# Patient Record
Sex: FEMALE | Race: WHITE | NOT HISPANIC OR LATINO | Employment: OTHER | URBAN - METROPOLITAN AREA
[De-identification: names, ages, dates, MRNs, and addresses within clinical notes are randomized per-mention and may not be internally consistent; named-entity substitution may affect disease eponyms.]

---

## 2017-01-11 DIAGNOSIS — F90.0 ADHD (ATTENTION DEFICIT HYPERACTIVITY DISORDER), INATTENTIVE TYPE: ICD-10-CM

## 2017-01-11 NOTE — TELEPHONE ENCOUNTER
----- Message from Maggie Jason sent at 1/11/2017 10:41 AM CST -----  Contact: [phone] / self   REFILLS:     Patient is requesting a medication refill.     RX name: dextroamphetamine-amphetamine (ADDERALL) 20 mg tablet    Please advise.

## 2017-01-12 RX ORDER — DEXTROAMPHETAMINE SACCHARATE, AMPHETAMINE ASPARTATE, DEXTROAMPHETAMINE SULFATE AND AMPHETAMINE SULFATE 5; 5; 5; 5 MG/1; MG/1; MG/1; MG/1
1 TABLET ORAL 2 TIMES DAILY
Qty: 60 TABLET | Refills: 0 | Status: SHIPPED | OUTPATIENT
Start: 2017-01-12 | End: 2017-02-08 | Stop reason: SDUPTHER

## 2017-02-07 NOTE — TELEPHONE ENCOUNTER
----- Message from Hodan King sent at 2/7/2017  1:49 PM CST -----  Contact: [phone]/ self   Pt requesting a refill on rx ADDERALL  . Please advise

## 2017-02-07 NOTE — TELEPHONE ENCOUNTER
----- Message from Hodan King sent at 2/7/2017  1:49 PM CST -----  Contact: [phone]/ self   Pt requesting a refill on rx ADDERALL  . Please advise

## 2017-02-08 ENCOUNTER — OFFICE VISIT (OUTPATIENT)
Dept: FAMILY MEDICINE | Facility: CLINIC | Age: 62
End: 2017-02-08
Payer: COMMERCIAL

## 2017-02-08 VITALS
DIASTOLIC BLOOD PRESSURE: 70 MMHG | SYSTOLIC BLOOD PRESSURE: 132 MMHG | HEIGHT: 66 IN | BODY MASS INDEX: 24.77 KG/M2 | WEIGHT: 154.13 LBS | TEMPERATURE: 98 F | HEART RATE: 99 BPM | OXYGEN SATURATION: 99 %

## 2017-02-08 DIAGNOSIS — F90.0 ADHD (ATTENTION DEFICIT HYPERACTIVITY DISORDER), INATTENTIVE TYPE: Primary | ICD-10-CM

## 2017-02-08 PROCEDURE — 99213 OFFICE O/P EST LOW 20 MIN: CPT | Mod: S$GLB,,, | Performed by: NURSE PRACTITIONER

## 2017-02-08 PROCEDURE — 99999 PR PBB SHADOW E&M-EST. PATIENT-LVL IV: CPT | Mod: PBBFAC,,, | Performed by: NURSE PRACTITIONER

## 2017-02-08 PROCEDURE — 3075F SYST BP GE 130 - 139MM HG: CPT | Mod: S$GLB,,, | Performed by: NURSE PRACTITIONER

## 2017-02-08 PROCEDURE — 3078F DIAST BP <80 MM HG: CPT | Mod: S$GLB,,, | Performed by: NURSE PRACTITIONER

## 2017-02-08 RX ORDER — DEXTROAMPHETAMINE SACCHARATE, AMPHETAMINE ASPARTATE, DEXTROAMPHETAMINE SULFATE AND AMPHETAMINE SULFATE 5; 5; 5; 5 MG/1; MG/1; MG/1; MG/1
1 TABLET ORAL 2 TIMES DAILY
Qty: 60 TABLET | Refills: 0 | Status: SHIPPED | OUTPATIENT
Start: 2017-02-10 | End: 2017-03-13 | Stop reason: SDUPTHER

## 2017-02-08 NOTE — MR AVS SNAPSHOT
[facility]  [address]  [place] [address]  Phone: [phone]  Fax: [phone]                  Madalyn Iverson   2017 11:00 AM   Office Visit    Description:  Female : 1955   Provider:  Ruth Jaime NP   Department:  [facility]           Reason for Visit     Medication Refill           Diagnoses this Visit        Comments    ADHD (attention deficit hyperactivity disorder), inattentive type    -  Primary            To Do List           Goals (5 Years of Data)     None      Follow-Up and Disposition     Return in about 3 months (around 2017) for fasting labs and full wellness exam.    Follow-up and Disposition History       These Medications        Disp Refills Start End    dextroamphetamine-amphetamine (ADDERALL) 20 mg tablet 60 tablet 0 2/10/2017     Take 1 tablet by mouth 2 (two) times daily. - Oral    Pharmacy: [facility]/pharmacy #[id number] - [place] - [address] [facility] #: [phone]         [facility] On Call     [facility] On Call Nurse Care Line -  Assistance  Registered nurses in the [facility] On Call Center provide clinical advisement, health education, appointment booking, and other advisory services.  Call for this free service at [phone].             Medications                Verify that the below list of medications is an accurate representation of the medications you are currently taking.  If none reported, the list may be blank. If incorrect, please contact your healthcare provider. Carry this list with you in case of emergency.           Current Medications     alprazolam (XANAX) 0.5 MG tablet Take 0.5 mg by mouth every 8 (eight) hours as needed.    amlodipine (NORVASC) 10 MG tablet Take 1 tablet (10 mg total) by mouth once daily.    ARMOUR THYROID 60 mg Tab Take 60 mg by mouth once daily.    carisoprodol (SOMA) 350 MG tablet Take 350 mg by mouth 4 (four) times daily as needed for Muscle spasms.    citalopram (CELEXA) 20  "MG tablet Take 20 mg by mouth once daily.    CYANOCOBALAMIN, VITAMIN B-12, (VITAMIN B-12 INJ) Inject 1,000 mcg as directed every 30 days.    dextroamphetamine-amphetamine (ADDERALL) 20 mg tablet Starting on Feb 10, 2017. Take 1 tablet by mouth 2 (two) times daily.    ergocalciferol (ERGOCALCIFEROL) 50,000 unit Cap Take 1 capsule (50,000 Units total) by mouth every 7 days.    estradiol (ESTRACE) 2 MG tablet Take 2 mg by mouth once daily.    furosemide (LASIX) 40 MG tablet TAKE 1 TABLET (40 MG TOTAL) BY MOUTH ONCE DAILY.    hydrocodone-acetaminophen 10-325mg (NORCO)  mg Tab Take 1 tablet by mouth 4 (four) times daily as needed.    KLOR-CON SPRINKLE 10 mEq CpSR TAKE ONE CAPSULE BY MOUTH TWICE A DAY    metoprolol succinate (TOPROL-XL) 50 MG 24 hr tablet TAKE 1 TABLET BY MOUTH EVERY DAY           Clinical Reference Information           Your Vitals Were     BP Pulse Temp Height Weight SpO2    132/70 (BP Location: Left arm, Patient Position: Sitting, BP Method: Manual) 99 98.2 °F (36.8 °C) (Oral) 5' 6" (1.676 m) 69.9 kg (154 lb 1.6 oz) 99%    BMI                24.87 kg/m2          Blood Pressure          Most Recent Value    BP  132/70      Allergies as of 2/8/2017     No Known Allergies      Immunizations Administered on Date of Encounter - 2/8/2017     None      Language Assistance Services     ATTENTION: Language assistance services are available, free of charge. Please call [phone].      ATENCIÓN: Si homer jose, tiene a real disposición servicios gratuitos de asistencia lingüística. Llame al [phone].     [facility] Ý: N?u b?n nói Ti?ng Vi?t, có các d?ch v? h? tr? ngôn ng? mi?n phí dành cho b?n. G?i s? [phone].         [facility] complies with applicable Federal civil rights laws and does not discriminate on the basis of race, color, national origin, age, disability, or sex.        "

## 2017-02-08 NOTE — PROGRESS NOTES
Subjective:       Patient ID: Madalyn Iverson is a 62 y.o. female.    Chief Complaint: Medication Refill    HPI Comments: Patient has ADHD - takes Adderall 20 mg twice daily - able to focus and complete tasks.  Vital signs stable.      Previous Medications    ALPRAZOLAM (XANAX) 0.5 MG TABLET    Take 0.5 mg by mouth every 8 (eight) hours as needed.    AMLODIPINE (NORVASC) 10 MG TABLET    Take 1 tablet (10 mg total) by mouth once daily.    ARMOUR THYROID 60 MG TAB    Take 60 mg by mouth once daily.    CARISOPRODOL (SOMA) 350 MG TABLET    Take 350 mg by mouth 4 (four) times daily as needed for Muscle spasms.    CITALOPRAM (CELEXA) 20 MG TABLET    Take 20 mg by mouth once daily.    CYANOCOBALAMIN, VITAMIN B-12, (VITAMIN B-12 INJ)    Inject 1,000 mcg as directed every 30 days.    DEXTROAMPHETAMINE-AMPHETAMINE (ADDERALL) 20 MG TABLET    Take 1 tablet by mouth 2 (two) times daily.    ERGOCALCIFEROL (ERGOCALCIFEROL) 50,000 UNIT CAP    Take 1 capsule (50,000 Units total) by mouth every 7 days.    ESTRADIOL (ESTRACE) 2 MG TABLET    Take 2 mg by mouth once daily.    FUROSEMIDE (LASIX) 40 MG TABLET    TAKE 1 TABLET (40 MG TOTAL) BY MOUTH ONCE DAILY.    HYDROCODONE-ACETAMINOPHEN 10-325MG (NORCO)  MG TAB    Take 1 tablet by mouth 4 (four) times daily as needed.    KLOR-CON SPRINKLE 10 MEQ CPSR    TAKE ONE CAPSULE BY MOUTH TWICE A DAY    METOPROLOL SUCCINATE (TOPROL-XL) 50 MG 24 HR TABLET    TAKE 1 TABLET BY MOUTH EVERY DAY       Past Medical History   Diagnosis Date    ADHD (attention deficit hyperactivity disorder), inattentive type     Crohn's colitis     Fibromyalgia      treated by Dr. Thorpe - Rheumatologist    Hyperlipidemia      High triglycerides    Hypertension     Hypothyroidism      Treated by Dr. Mathew    IFG (impaired fasting glucose)     Migraine      Treated by neurologist - Dr. Destiny Florez    Ulcerative colitis      Treated by Dr. Mcfadden    Vitamin D deficiency 3/31/2016        Past Surgical History   Procedure Laterality Date     section      Hysterectomy      Tonsillectomy      Shoulder surgery Left     Colonoscopy  10/08/2010     Dr. Lee - repeat in 5 years - has appointment for colonoscopy 2016    Upper and lower gi  2016    Colonoscopy  2016     Dr. Kelly - normal  colon - repeat in 10 years - scanned report to media file.       Family History   Problem Relation Age of Onset    Hypertension Mother     Heart disease Mother     Hyperlipidemia Mother     Cancer Sister 53     thyroid cancer and lymph nodes involvement    Cancer Brother 61     colon and lung cancer    Cancer Brother 63     colon    Hypertension Brother     Diabetes Brother        Social History     Social History    Marital status:      Spouse name: N/A    Number of children: N/A    Years of education: N/A     Social History Main Topics    Smoking status: Former Smoker     Packs/day: 1.00     Years: 20.00     Quit date: 1997    Smokeless tobacco: None    Alcohol use No    Drug use: No    Sexual activity: No     Other Topics Concern    None     Social History Narrative       Review of Systems   Constitutional: Negative for appetite change, chills, fatigue, fever and unexpected weight change.   HENT: Negative for congestion, ear pain, mouth sores, nosebleeds, postnasal drip, rhinorrhea, sinus pressure, sneezing, sore throat, trouble swallowing and voice change.    Eyes: Negative for photophobia, pain, discharge, redness, itching and visual disturbance.   Respiratory: Negative for cough, chest tightness and shortness of breath.    Cardiovascular: Negative for chest pain, palpitations and leg swelling.   Gastrointestinal: Negative for abdominal pain, blood in stool, constipation, diarrhea, nausea and vomiting.   Genitourinary: Negative for dysuria, frequency, hematuria and urgency.   Musculoskeletal: Negative for arthralgias, back pain, joint swelling and  "myalgias.   Skin: Negative for color change and rash.   Allergic/Immunologic: Negative for immunocompromised state.   Neurological: Negative for dizziness, seizures, syncope, weakness and headaches.   Hematological: Negative for adenopathy. Does not bruise/bleed easily.   Psychiatric/Behavioral: Negative for agitation, dysphoric mood, sleep disturbance and suicidal ideas. The patient is not nervous/anxious.          Objective:     Vitals:    02/08/17 1106   BP: 132/70   BP Location: Left arm   Patient Position: Sitting   BP Method: Manual   Pulse: 99   Temp: 98.2 °F (36.8 °C)   TempSrc: Oral   SpO2: 99%   Weight: 69.9 kg (154 lb 1.6 oz)   Height: 5' 6" (1.676 m)          Physical Exam   Constitutional: She is oriented to person, place, and time. She appears well-developed and well-nourished. No distress.   Body mass index is 24.87 kg/(m^2).           HENT:   Head: Normocephalic and atraumatic.   Right Ear: External ear normal.   Left Ear: External ear normal.   Nose: Nose normal.   Mouth/Throat: Oropharynx is clear and moist. No oropharyngeal exudate.   Eyes: Conjunctivae and EOM are normal. Pupils are equal, round, and reactive to light.   Neck: Normal range of motion. Neck supple. No JVD present. No tracheal deviation present. No thyromegaly present.   Cardiovascular: Normal rate, regular rhythm and normal heart sounds.    No murmur heard.  Pulmonary/Chest: Effort normal and breath sounds normal. No stridor. No respiratory distress. She has no wheezes. She has no rales.   Abdominal: Soft. Bowel sounds are normal. She exhibits no distension. There is no guarding.   Musculoskeletal: Normal range of motion. She exhibits no edema.   Lymphadenopathy:     She has no cervical adenopathy.   Neurological: She is alert and oriented to person, place, and time. No cranial nerve deficit. Coordination normal.   Skin: Skin is warm and dry. No rash noted. She is not diaphoretic.   Psychiatric: She has a normal mood and affect.    "      Assessment:         ICD-10-CM ICD-9-CM   1. ADHD (attention deficit hyperactivity disorder), inattentive type F90.0 314.00       Plan:       ADHD (attention deficit hyperactivity disorder), inattentive type  -  Controlled on present medication.  Will call or send message for next refill.  Follow up in 3 months.  -     dextroamphetamine-amphetamine (ADDERALL) 20 mg tablet; Take 1 tablet by mouth 2 (two) times daily.  Dispense: 60 tablet; Refill: 0    Return in about 3 months (around 5/8/2017).     Patient's Medications   New Prescriptions    No medications on file   Previous Medications    ALPRAZOLAM (XANAX) 0.5 MG TABLET    Take 0.5 mg by mouth every 8 (eight) hours as needed.    AMLODIPINE (NORVASC) 10 MG TABLET    Take 1 tablet (10 mg total) by mouth once daily.    ARMOUR THYROID 60 MG TAB    Take 60 mg by mouth once daily.    CARISOPRODOL (SOMA) 350 MG TABLET    Take 350 mg by mouth 4 (four) times daily as needed for Muscle spasms.    CITALOPRAM (CELEXA) 20 MG TABLET    Take 20 mg by mouth once daily.    CYANOCOBALAMIN, VITAMIN B-12, (VITAMIN B-12 INJ)    Inject 1,000 mcg as directed every 30 days.    ERGOCALCIFEROL (ERGOCALCIFEROL) 50,000 UNIT CAP    Take 1 capsule (50,000 Units total) by mouth every 7 days.    ESTRADIOL (ESTRACE) 2 MG TABLET    Take 2 mg by mouth once daily.    FUROSEMIDE (LASIX) 40 MG TABLET    TAKE 1 TABLET (40 MG TOTAL) BY MOUTH ONCE DAILY.    HYDROCODONE-ACETAMINOPHEN 10-325MG (NORCO)  MG TAB    Take 1 tablet by mouth 4 (four) times daily as needed.    KLOR-CON SPRINKLE 10 MEQ CPSR    TAKE ONE CAPSULE BY MOUTH TWICE A DAY    METOPROLOL SUCCINATE (TOPROL-XL) 50 MG 24 HR TABLET    TAKE 1 TABLET BY MOUTH EVERY DAY   Modified Medications    Modified Medication Previous Medication    DEXTROAMPHETAMINE-AMPHETAMINE (ADDERALL) 20 MG TABLET dextroamphetamine-amphetamine (ADDERALL) 20 mg tablet       Take 1 tablet by mouth 2 (two) times daily.    Take 1 tablet by mouth 2 (two) times daily.    Discontinued Medications    No medications on file

## 2017-03-13 DIAGNOSIS — F90.0 ADHD (ATTENTION DEFICIT HYPERACTIVITY DISORDER), INATTENTIVE TYPE: ICD-10-CM

## 2017-03-13 NOTE — TELEPHONE ENCOUNTER
Spoke with pt inform pt that you are out til Wednesday pt needs refill on medication states she only has one pill left, pt understood.

## 2017-03-13 NOTE — TELEPHONE ENCOUNTER
----- Message from Elizabet Barrett sent at 3/13/2017  4:13 PM CDT -----  Contact: Self [phone]  Patient is calling to get a written RX on her medication  dextroamphetamine-amphetamine (ADDERALL) 20 mg tablet 60 tablet

## 2017-03-15 RX ORDER — DEXTROAMPHETAMINE SACCHARATE, AMPHETAMINE ASPARTATE, DEXTROAMPHETAMINE SULFATE AND AMPHETAMINE SULFATE 5; 5; 5; 5 MG/1; MG/1; MG/1; MG/1
1 TABLET ORAL 2 TIMES DAILY
Qty: 60 TABLET | Refills: 0 | Status: SHIPPED | OUTPATIENT
Start: 2017-03-15 | End: 2017-04-10 | Stop reason: SDUPTHER

## 2017-04-10 DIAGNOSIS — F90.0 ADHD (ATTENTION DEFICIT HYPERACTIVITY DISORDER), INATTENTIVE TYPE: ICD-10-CM

## 2017-04-10 RX ORDER — DEXTROAMPHETAMINE SACCHARATE, AMPHETAMINE ASPARTATE, DEXTROAMPHETAMINE SULFATE AND AMPHETAMINE SULFATE 5; 5; 5; 5 MG/1; MG/1; MG/1; MG/1
1 TABLET ORAL 2 TIMES DAILY
Qty: 60 TABLET | Refills: 0 | Status: SHIPPED | OUTPATIENT
Start: 2017-04-13 | End: 2017-06-09 | Stop reason: DRUGHIGH

## 2017-04-10 NOTE — TELEPHONE ENCOUNTER
----- Message from Hafsa Gannon sent at 4/10/2017  9:19 AM CDT -----  Contact: Self/[phone]  Patient said she needs a refill oh her dextroamphetamine-amphetamine (ADDERALL) . Please advise

## 2017-04-10 NOTE — TELEPHONE ENCOUNTER
Advise patient that she can  the prescription today but she can not fill the medication until Thursday, April 14th because her last prescription was filled March 15th.

## 2017-04-17 DIAGNOSIS — I10 ESSENTIAL HYPERTENSION: ICD-10-CM

## 2017-04-18 RX ORDER — AMLODIPINE BESYLATE 10 MG/1
TABLET ORAL
Qty: 90 TABLET | Refills: 1 | Status: SHIPPED | OUTPATIENT
Start: 2017-04-18 | End: 2017-07-12 | Stop reason: SDUPTHER

## 2017-05-11 ENCOUNTER — TELEPHONE (OUTPATIENT)
Dept: FAMILY MEDICINE | Facility: CLINIC | Age: 62
End: 2017-05-11

## 2017-05-11 DIAGNOSIS — I10 ESSENTIAL HYPERTENSION: Primary | ICD-10-CM

## 2017-05-11 DIAGNOSIS — Z13.220 SCREENING CHOLESTEROL LEVEL: ICD-10-CM

## 2017-05-11 DIAGNOSIS — E55.9 VITAMIN D DEFICIENCY: ICD-10-CM

## 2017-05-11 DIAGNOSIS — Z13.0 SCREENING, ANEMIA, DEFICIENCY, IRON: ICD-10-CM

## 2017-05-11 NOTE — TELEPHONE ENCOUNTER
Lab orders in - schedule fasting labs and office visit - needs appointment [facility] - will fill Adderall then - over 3 month bárbara

## 2017-05-11 NOTE — TELEPHONE ENCOUNTER
----- Message from Hodan King sent at 5/11/2017 10:12 AM CDT -----  Contact: [phone]  Pt its requesting a refill on rx ADDERALL. Please advise

## 2017-05-11 NOTE — TELEPHONE ENCOUNTER
----- Message from Magdalena Ray sent at 5/11/2017 10:31 AM CDT -----  Contact: [phone]/self  Patient called in returning your call. Please advise.

## 2017-05-15 ENCOUNTER — OFFICE VISIT (OUTPATIENT)
Dept: FAMILY MEDICINE | Facility: CLINIC | Age: 62
End: 2017-05-15
Payer: COMMERCIAL

## 2017-05-15 VITALS
HEART RATE: 84 BPM | HEIGHT: 66 IN | DIASTOLIC BLOOD PRESSURE: 74 MMHG | SYSTOLIC BLOOD PRESSURE: 160 MMHG | BODY MASS INDEX: 24.45 KG/M2 | WEIGHT: 152.13 LBS | TEMPERATURE: 97 F | OXYGEN SATURATION: 97 %

## 2017-05-15 DIAGNOSIS — F41.9 ANXIETY: ICD-10-CM

## 2017-05-15 DIAGNOSIS — F90.0 ADHD (ATTENTION DEFICIT HYPERACTIVITY DISORDER), INATTENTIVE TYPE: ICD-10-CM

## 2017-05-15 DIAGNOSIS — R73.01 IFG (IMPAIRED FASTING GLUCOSE): ICD-10-CM

## 2017-05-15 DIAGNOSIS — K51.919 ULCERATIVE COLITIS WITH COMPLICATION, UNSPECIFIED LOCATION: ICD-10-CM

## 2017-05-15 DIAGNOSIS — E78.5 HYPERLIPIDEMIA, UNSPECIFIED HYPERLIPIDEMIA TYPE: ICD-10-CM

## 2017-05-15 DIAGNOSIS — E55.9 VITAMIN D DEFICIENCY: ICD-10-CM

## 2017-05-15 DIAGNOSIS — E03.9 ACQUIRED HYPOTHYROIDISM: ICD-10-CM

## 2017-05-15 DIAGNOSIS — Z12.39 BREAST CANCER SCREENING: ICD-10-CM

## 2017-05-15 DIAGNOSIS — M79.7 FIBROMYALGIA: ICD-10-CM

## 2017-05-15 DIAGNOSIS — Z00.00 ANNUAL PHYSICAL EXAM: Primary | ICD-10-CM

## 2017-05-15 DIAGNOSIS — I10 ESSENTIAL HYPERTENSION: ICD-10-CM

## 2017-05-15 PROCEDURE — 3078F DIAST BP <80 MM HG: CPT | Mod: S$GLB,,, | Performed by: NURSE PRACTITIONER

## 2017-05-15 PROCEDURE — 99396 PREV VISIT EST AGE 40-64: CPT | Mod: S$GLB,,, | Performed by: NURSE PRACTITIONER

## 2017-05-15 PROCEDURE — 3077F SYST BP >= 140 MM HG: CPT | Mod: S$GLB,,, | Performed by: NURSE PRACTITIONER

## 2017-05-15 PROCEDURE — 99999 PR PBB SHADOW E&M-EST. PATIENT-LVL IV: CPT | Mod: PBBFAC,,, | Performed by: NURSE PRACTITIONER

## 2017-05-15 RX ORDER — ROSUVASTATIN CALCIUM 5 MG/1
5 TABLET, COATED ORAL DAILY
Qty: 30 TABLET | Refills: 2 | Status: SHIPPED | OUTPATIENT
Start: 2017-05-15 | End: 2017-07-12 | Stop reason: SDUPTHER

## 2017-05-15 NOTE — PROGRESS NOTES
"Subjective:       Patient ID: Madalyn Iverson is a 62 y.o. female.    Chief Complaint: Annual Exam (wellness)    HPI Comments: Patient is a 62 year old white female here today for wellness exam with fasting lab results.    Patient has Hypertension - UNCONTROLLED TODAY but is normally controlled every 3 months when I follow up on patient. Patient states she is having a lot of pain from her Fibromyalgia and her rheumatologist tried to change her medication to Savella and just left pharmacy and it was over $600 so patient was very upset and in pain with the Fibromyalgia.  Will return in a week or two to recheck blood pressure.  BP (!) 160/74  Pulse 84  Temp 97.3 °F (36.3 °C) (Oral)   Ht 5' 6" (1.676 m)  Wt 69 kg (152 lb 1.9 oz)  SpO2 97%  BMI 24.55 kg/m2    Patient has Ulcerative Colitis that is followed by Dr. Lee and Dr. Kelly.    Patient has Hypothyroidism that is followed by Dr. Mathew.    Patient has Migraines that is followed by Neurologist - dr. Florez.    Patient had mildly elevated triglycerides in the past and on lifestyle modifications only BUT her cholesterol levels are high this year - based on 10-year risk, age and HTN - will start patient on low dose cholesterol medication.    Patient had Vitamin D deficiency of 18.  Patient has been taking Vitamin D supplement and level is now 41.    Patient has IFG of 114 - advised on diet and lifestyle modifications.     Patient had ADHD - has been on Adderall for many years - she was on Adderall 30 mg twice daily years ago with Dr. Randhawa but when I started taking over the ADHD medication several years ago, I decreased patient down to 20 mg twice daily.  Advised patient that I will not prescribe medication today until blood pressure is back under control.  Also, due to age and cardiovascular risk - we really need to work on weaning down on the dose again.  Will discuss further once blood pressure controlled.    Wellness labs:  -  CBC WNL  -  CMP - " glucose high at 114, rest okay  -  Cholesterol discussed above  -  Thyroid is managed by Dr. Mathew so did not perform  -  Vitamin D is much improved at 41.      Health Maintenance:  -  Due for mammogram - order placed.        Component      Latest Ref Rng & Units 5/12/2017 8/9/2016 3/30/2016   WBC      3.90 - 12.70 K/uL 5.56  5.13   RBC      4.00 - 5.40 M/uL 4.64  3.89 (L)   Hemoglobin      12.0 - 16.0 g/dL 14.6  12.1   Hematocrit      37.0 - 48.5 % 43.1  36.0 (L)   MCV      82 - 98 fL 93  93   MCH      27.0 - 31.0 pg 31.5 (H)  31.1 (H)   MCHC      32.0 - 36.0 % 33.9  33.6   RDW      11.5 - 14.5 % 12.4  12.8   Platelets      150 - 350 K/uL 191  227   MPV      9.2 - 12.9 fL 12.0  11.6   Gran #      1.8 - 7.7 K/uL 3.9  2.4   Lymph #      1.0 - 4.8 K/uL 1.2  2.1   Mono #      0.3 - 1.0 K/uL 0.4  0.5   Eos #      0.0 - 0.5 K/uL 0.0  0.1   Baso #      0.00 - 0.20 K/uL 0.05  0.03   Gran%      38.0 - 73.0 % 69.2  46.4   Lymph%      18.0 - 48.0 % 21.6  41.3   Mono%      4.0 - 15.0 % 7.6  9.7   Eosinophil%      0.0 - 8.0 % 0.7  1.8   Basophil%      0.0 - 1.9 % 0.9  0.6   Differential Method       Automated  Automated   Sodium      136 - 145 mmol/L 142  137   Potassium      3.5 - 5.1 mmol/L 4.0  4.4   Chloride      95 - 110 mmol/L 104  99   CO2      23 - 29 mmol/L 30 (H)  28   Glucose      70 - 110 mg/dL 114 (H)  84   BUN, Bld      7 - 17 mg/dL 11  14   Creatinine      0.50 - 1.40 mg/dL 0.56  0.8   Calcium      8.7 - 10.5 mg/dL 9.0  8.9   Total Protein      6.0 - 8.4 g/dL 7.4  7.0   Albumin      3.5 - 5.2 g/dL 3.9  3.2 (L)   Total Bilirubin      0.1 - 1.0 mg/dL 0.6  0.2   Alkaline Phosphatase      38 - 126 U/L 135 (H)  151 (H)   AST      15 - 46 U/L 25  20   ALT      10 - 44 U/L 24  17   Anion Gap      8 - 16 mmol/L 8  10   eGFR if African American      >60 mL/min/1.73 m:2 >60.0  >60.0   eGFR if non African American      >60 mL/min/1.73 m:2 >60.0  >60.0   Cholesterol      120 - 199 mg/dL 222 (H)  198   Triglycerides      30  - 150 mg/dL 218 (H)  158 (H)   HDL      40 - 75 mg/dL 54  54   LDL Cholesterol      63.0 - 159.0 mg/dL 124.4  112.4   HDL/Chol Ratio      20.0 - 50.0 % 24.3  27.3   Total Cholesterol/HDL Ratio      2.0 - 5.0 4.1  3.7   Non-HDL Cholesterol      mg/dL 168  144   Hemoglobin A1C      4.5 - 6.2 %   5.6   Estimated Avg Glucose      68 - 131 mg/dL   114   Hepatitis C Ab         Negative   Vitamin B-12      210 - 950 pg/mL   583   Vit D, 25-Hydroxy      30 - 96 ng/mL 41 18 (L) 13 (L)       Previous Medications    ALPRAZOLAM (XANAX) 0.5 MG TABLET    Take 0.5 mg by mouth every 8 (eight) hours as needed.    AMLODIPINE (NORVASC) 10 MG TABLET    TAKE 1 TABLET (10 MG TOTAL) BY MOUTH ONCE DAILY.    ARMOUR THYROID 60 MG TAB    Take 60 mg by mouth once daily.    CITALOPRAM (CELEXA) 20 MG TABLET    Take 20 mg by mouth once daily.    CYANOCOBALAMIN, VITAMIN B-12, (VITAMIN B-12 INJ)    Inject 1,000 mcg as directed every 30 days.    DEXTROAMPHETAMINE-AMPHETAMINE (ADDERALL) 20 MG TABLET    Take 1 tablet by mouth 2 (two) times daily.    ERGOCALCIFEROL (ERGOCALCIFEROL) 50,000 UNIT CAP    Take 1 capsule (50,000 Units total) by mouth every 7 days.    ESTRADIOL (ESTRACE) 2 MG TABLET    Take 2 mg by mouth once daily.    FUROSEMIDE (LASIX) 40 MG TABLET    TAKE 1 TABLET (40 MG TOTAL) BY MOUTH ONCE DAILY.    HYDROCODONE-ACETAMINOPHEN 10-325MG (NORCO)  MG TAB    Take 1 tablet by mouth 4 (four) times daily as needed.    KLOR-CON SPRINKLE 10 MEQ CPSR    TAKE 1 CAPSULE TWICE A DAY    METOPROLOL SUCCINATE (TOPROL-XL) 50 MG 24 HR TABLET    TAKE 1 TABLET BY MOUTH EVERY DAY       Past Medical History:   Diagnosis Date    ADHD (attention deficit hyperactivity disorder), inattentive type     Crohn's colitis     Fibromyalgia     treated by Dr. Thorpe - Rheumatologist    Hyperlipidemia     High triglycerides    Hypertension     Hypothyroidism     Treated by Dr. Mathew    IFG (impaired fasting glucose)     Migraine     Treated by neurologist -  Dr. Destiny Florez    Ulcerative colitis     Treated by Dr. Lee and Aaron    Vitamin D deficiency 3/31/2016       Past Surgical History:   Procedure Laterality Date     SECTION      COLONOSCOPY  10/08/2010    Dr. Lee - repeat in 5 years - has appointment for colonoscopy 2016    COLONOSCOPY  2016    Dr. Kelly - normal  colon - repeat in 10 years - scanned report to media file.    HYSTERECTOMY      SHOULDER SURGERY Left     TONSILLECTOMY      upper and lower GI  2016       Family History   Problem Relation Age of Onset    Hypertension Mother     Heart disease Mother     Hyperlipidemia Mother     Cancer Sister 53     thyroid cancer and lymph nodes involvement    Cancer Brother 61     colon and lung cancer    Cancer Brother 63     colon    Hypertension Brother     Diabetes Brother        Social History     Social History    Marital status:      Spouse name: N/A    Number of children: N/A    Years of education: N/A     Social History Main Topics    Smoking status: Former Smoker     Packs/day: 1.00     Years: 20.00     Quit date: 1997    Smokeless tobacco: None    Alcohol use No    Drug use: No    Sexual activity: No     Other Topics Concern    None     Social History Narrative       Review of Systems   Constitutional: Negative for appetite change, chills, fatigue, fever and unexpected weight change.   HENT: Negative for congestion, ear pain, mouth sores, nosebleeds, postnasal drip, rhinorrhea, sinus pressure, sneezing, sore throat, trouble swallowing and voice change.    Eyes: Negative for photophobia, pain, discharge, redness, itching and visual disturbance.   Respiratory: Negative for cough, chest tightness and shortness of breath.    Cardiovascular: Negative for chest pain, palpitations and leg swelling.   Gastrointestinal: Negative for abdominal pain, blood in stool, constipation, diarrhea, nausea and vomiting.   Genitourinary: Negative for dysuria,  "frequency, hematuria and urgency.   Musculoskeletal: Positive for arthralgias and joint swelling. Negative for back pain and myalgias.        Pain to hands and joints and fibromyalgia all followed by rheumatologist.   Skin: Negative for color change and rash.   Allergic/Immunologic: Negative for immunocompromised state.   Neurological: Negative for dizziness, seizures, syncope, weakness and headaches.   Hematological: Negative for adenopathy. Does not bruise/bleed easily.   Psychiatric/Behavioral: Negative for agitation, dysphoric mood, sleep disturbance and suicidal ideas. The patient is not nervous/anxious.          Objective:     Vitals:    05/15/17 1515 05/15/17 1540   BP: (!) 174/74 (!) 160/74   BP Location: Right leg    Patient Position: Sitting    BP Method: Manual    Pulse: 84    Temp: 97.3 °F (36.3 °C)    TempSrc: Oral    SpO2: 97%    Weight: 69 kg (152 lb 1.9 oz)    Height: 5' 6" (1.676 m)           Physical Exam   Constitutional: She is oriented to person, place, and time. She appears well-developed and well-nourished. No distress.   Body mass index is 24.55 kg/(m^2).             HENT:   Head: Normocephalic and atraumatic.   Right Ear: External ear normal.   Left Ear: External ear normal.   Nose: Nose normal.   Mouth/Throat: Oropharynx is clear and moist. No oropharyngeal exudate.   Eyes: Conjunctivae and EOM are normal. Pupils are equal, round, and reactive to light.   Neck: Normal range of motion. Neck supple. No JVD present. No tracheal deviation present. No thyromegaly present.   Cardiovascular: Normal rate, regular rhythm and normal heart sounds.    No murmur heard.  Pulmonary/Chest: Effort normal and breath sounds normal. No stridor. No respiratory distress. She has no wheezes. She has no rales.   Abdominal: Soft. Bowel sounds are normal. She exhibits no distension. There is no guarding.   Musculoskeletal: Normal range of motion. She exhibits no edema.   Lymphadenopathy:     She has no cervical " adenopathy.   Neurological: She is alert and oriented to person, place, and time. No cranial nerve deficit. Coordination normal.   Skin: Skin is warm and dry. No rash noted. She is not diaphoretic.   Psychiatric: She has a normal mood and affect.         Assessment:         ICD-10-CM ICD-9-CM   1. Annual physical exam Z00.00 V70.0   2. Essential hypertension I10 401.9   3. Vitamin D deficiency E55.9 268.9   4. ADHD (attention deficit hyperactivity disorder), inattentive type F90.0 314.00   5. Fibromyalgia M79.7 729.1   6. IFG (impaired fasting glucose) R73.01 790.21   7. Acquired hypothyroidism E03.9 244.9   8. Anxiety F41.9 300.00   9. Ulcerative colitis with complication, unspecified location K51.919 556.9   10. Breast cancer screening Z12.39 V76.10   11. Hyperlipidemia, unspecified hyperlipidemia type E78.5 272.4       Plan:       Annual physical exam  -  Due for Tdap and Shingles - gave order to have done at pharmacy    Essential hypertension  -  Patient has Hypertension - UNCONTROLLED TODAY but is normally controlled every 3 months when I follow up on patient. Patient states she is having a lot of pain from her Fibromyalgia and her rheumatologist tried to change her medication to Savella and just left pharmacy and it was over $600 so patient was very upset and in pain with the Fibromyalgia.  Will return in a week or two to recheck blood pressure.      Vitamin D deficiency  -  Continue Vitamin D supplement but improved and now in normal range.    ADHD (attention deficit hyperactivity disorder), inattentive type  -  Patient had ADHD - has been on Adderall for many years - she was on Adderall 30 mg twice daily years ago with Dr. Randhawa but when I started taking over the ADHD medication several years ago, I decreased patient down to 20 mg twice daily.  Advised patient that I will not prescribe medication today until blood pressure is back under control.  Also, due to age and cardiovascular risk - we really need to  work on weaning down on the dose again.  Will discuss further once blood pressure controlled.      Fibromyalgia  -  Managed by specialist    IFG (impaired fasting glucose)  -  Advised on lifestyle modifications.    Acquired hypothyroidism  -  Followed by Dr. Mathew    Anxiety  -  Controlled on present medication.    Ulcerative colitis with complication, unspecified location  -  Followed by specialist.    Breast cancer screening  -     Mammo Digital Screening Bilat with CAD; Future; Expected date: 5/15/17    Hyperlipidemia, unspecified hyperlipidemia type  -  Patient had mildly elevated triglycerides in the past and on lifestyle modifications only BUT her cholesterol levels are high this year - based on 10-year risk, age and HTN - will start patient on low dose cholesterol medication.  -  Start Crestor 5 mg daily and recheck in 3 months.    -     rosuvastatin (CRESTOR) 5 MG tablet; Take 1 tablet (5 mg total) by mouth once daily.  Dispense: 30 tablet; Refill: 2  -     Comprehensive metabolic panel; Future; Expected date: 5/15/17  -     Lipid panel; Future; Expected date: 5/15/17    Return in about 2 weeks (around 5/29/2017) for blood pressure check.     Patient's Medications   New Prescriptions    ROSUVASTATIN (CRESTOR) 5 MG TABLET    Take 1 tablet (5 mg total) by mouth once daily.   Previous Medications    ALPRAZOLAM (XANAX) 0.5 MG TABLET    Take 0.5 mg by mouth every 8 (eight) hours as needed.    AMLODIPINE (NORVASC) 10 MG TABLET    TAKE 1 TABLET (10 MG TOTAL) BY MOUTH ONCE DAILY.    ARMOUR THYROID 60 MG TAB    Take 60 mg by mouth once daily.    CITALOPRAM (CELEXA) 20 MG TABLET    Take 20 mg by mouth once daily.    CYANOCOBALAMIN, VITAMIN B-12, (VITAMIN B-12 INJ)    Inject 1,000 mcg as directed every 30 days.    DEXTROAMPHETAMINE-AMPHETAMINE (ADDERALL) 20 MG TABLET    Take 1 tablet by mouth 2 (two) times daily.    ERGOCALCIFEROL (ERGOCALCIFEROL) 50,000 UNIT CAP    Take 1 capsule (50,000 Units total) by mouth every  7 days.    ESTRADIOL (ESTRACE) 2 MG TABLET    Take 2 mg by mouth once daily.    FUROSEMIDE (LASIX) 40 MG TABLET    TAKE 1 TABLET (40 MG TOTAL) BY MOUTH ONCE DAILY.    HYDROCODONE-ACETAMINOPHEN 10-325MG (NORCO)  MG TAB    Take 1 tablet by mouth 4 (four) times daily as needed.    KLOR-CON SPRINKLE 10 MEQ CPSR    TAKE 1 CAPSULE TWICE A DAY    METOPROLOL SUCCINATE (TOPROL-XL) 50 MG 24 HR TABLET    TAKE 1 TABLET BY MOUTH EVERY DAY   Modified Medications    No medications on file   Discontinued Medications    CARISOPRODOL (SOMA) 350 MG TABLET    Take 350 mg by mouth 4 (four) times daily as needed for Muscle spasms.

## 2017-05-17 RX ORDER — METOPROLOL SUCCINATE 50 MG/1
TABLET, EXTENDED RELEASE ORAL
Qty: 90 TABLET | Refills: 1 | Status: SHIPPED | OUTPATIENT
Start: 2017-05-17 | End: 2017-10-10 | Stop reason: SDUPTHER

## 2017-05-31 DIAGNOSIS — F90.0 ADHD (ATTENTION DEFICIT HYPERACTIVITY DISORDER), INATTENTIVE TYPE: ICD-10-CM

## 2017-05-31 RX ORDER — DEXTROAMPHETAMINE SACCHARATE, AMPHETAMINE ASPARTATE, DEXTROAMPHETAMINE SULFATE AND AMPHETAMINE SULFATE 5; 5; 5; 5 MG/1; MG/1; MG/1; MG/1
1 TABLET ORAL 2 TIMES DAILY
Qty: 60 TABLET | Refills: 0 | OUTPATIENT
Start: 2017-05-31

## 2017-05-31 NOTE — TELEPHONE ENCOUNTER
Pt requesting medication refill Adderall, inform pt she was to return this week to recheck pt blood pressure, no answer left detail message.

## 2017-05-31 NOTE — TELEPHONE ENCOUNTER
----- Message from Viri Loomis sent at 5/31/2017  1:32 PM CDT -----  Contact: [phone]/self  Pt would like to know if she can get a refill for dextroamphetamine-amphetamine (ADDERALL) 20 mg tablet OR do she have to come in for a office visit.  Please advise

## 2017-05-31 NOTE — TELEPHONE ENCOUNTER
Advised patient she must come in and have stable blood pressure prior to Adderall being prescribed.

## 2017-06-01 ENCOUNTER — OFFICE VISIT (OUTPATIENT)
Dept: FAMILY MEDICINE | Facility: CLINIC | Age: 62
End: 2017-06-01
Payer: COMMERCIAL

## 2017-06-01 VITALS
SYSTOLIC BLOOD PRESSURE: 168 MMHG | BODY MASS INDEX: 24.8 KG/M2 | OXYGEN SATURATION: 98 % | HEART RATE: 109 BPM | HEIGHT: 66 IN | WEIGHT: 154.31 LBS | DIASTOLIC BLOOD PRESSURE: 70 MMHG | TEMPERATURE: 98 F

## 2017-06-01 DIAGNOSIS — I10 ESSENTIAL HYPERTENSION: Primary | ICD-10-CM

## 2017-06-01 PROCEDURE — 99999 PR PBB SHADOW E&M-EST. PATIENT-LVL V: CPT | Mod: PBBFAC,,, | Performed by: NURSE PRACTITIONER

## 2017-06-01 PROCEDURE — 99213 OFFICE O/P EST LOW 20 MIN: CPT | Mod: S$GLB,,, | Performed by: NURSE PRACTITIONER

## 2017-06-01 RX ORDER — CLONIDINE HYDROCHLORIDE 0.1 MG/1
0.1 TABLET ORAL 2 TIMES DAILY
Qty: 60 TABLET | Refills: 0 | Status: SHIPPED | OUTPATIENT
Start: 2017-06-01 | End: 2017-06-30 | Stop reason: SDUPTHER

## 2017-06-01 NOTE — PROGRESS NOTES
"Subjective:       Patient ID: Madalyn Iverson is a 62 y.o. female.    Chief Complaint: Medication Refill    Patient is here today for blood pressure check and ADHD refill.    Patient has Hypertension that is UNCONTROLLED.  She is currently taking Amlodipine 10 mg daily, Metoprolol XL 50 mg daily and Lasix 40 mg daily.  Blood pressure uncontrolled for past month.  Only change in medication was some of her pain medications for Fibromyalgia.  BP (!) 168/70   Pulse 109   Temp 98.4 °F (36.9 °C) (Oral)   Ht 5' 6" (1.676 m)   Wt 70 kg (154 lb 5.2 oz)   SpO2 98%   BMI 24.91 kg/m²     Patient had ADHD - has been on Adderall for many years - she was on Adderall 30 mg twice daily years ago with Dr. Randhawa but when I started taking over the ADHD medication several years ago, I decreased patient down to 20 mg twice daily.  Advised patient that I will not prescribe medication today until blood pressure is back under control.  Also, due to age and cardiovascular risk - we really need to work on weaning down on the dose again.  Will discuss further once blood pressure controlled.        Previous Medications    ALPRAZOLAM (XANAX) 0.5 MG TABLET    Take 0.5 mg by mouth every 8 (eight) hours as needed.    AMLODIPINE (NORVASC) 10 MG TABLET    TAKE 1 TABLET (10 MG TOTAL) BY MOUTH ONCE DAILY.    ARMOUR THYROID 60 MG TAB    Take 60 mg by mouth once daily.    CITALOPRAM (CELEXA) 20 MG TABLET    Take 20 mg by mouth once daily.    CYANOCOBALAMIN, VITAMIN B-12, (VITAMIN B-12 INJ)    Inject 1,000 mcg as directed every 30 days.    DEXTROAMPHETAMINE-AMPHETAMINE (ADDERALL) 20 MG TABLET    Take 1 tablet by mouth 2 (two) times daily.    ERGOCALCIFEROL (ERGOCALCIFEROL) 50,000 UNIT CAP    Take 1 capsule (50,000 Units total) by mouth every 7 days.    ESTRADIOL (ESTRACE) 2 MG TABLET    Take 2 mg by mouth once daily.    FUROSEMIDE (LASIX) 40 MG TABLET    TAKE 1 TABLET (40 MG TOTAL) BY MOUTH ONCE DAILY.    HYDROCODONE-ACETAMINOPHEN 10-325MG " (NORCO)  MG TAB    Take 1 tablet by mouth 4 (four) times daily as needed.    KLOR-CON SPRINKLE 10 MEQ CPSR    TAKE 1 CAPSULE TWICE A DAY    METOPROLOL SUCCINATE (TOPROL-XL) 50 MG 24 HR TABLET    TAKE 1 TABLET BY MOUTH EVERY DAY    ROSUVASTATIN (CRESTOR) 5 MG TABLET    Take 1 tablet (5 mg total) by mouth once daily.       Past Medical History:   Diagnosis Date    ADHD (attention deficit hyperactivity disorder), inattentive type     Crohn's colitis     Fibromyalgia     treated by Dr. Thorpe - Rheumatologist    Hyperlipidemia     High triglycerides    Hypertension     Hypothyroidism     Treated by Dr. Mathew    IFG (impaired fasting glucose)     Migraine     Treated by neurologist - Dr. Destiny Florez    Ulcerative colitis     Treated by Dr. Lee and Aaron    Vitamin D deficiency 3/31/2016       Past Surgical History:   Procedure Laterality Date     SECTION      COLONOSCOPY  10/08/2010    Dr. Lee - repeat in 5 years - has appointment for colonoscopy 2016    COLONOSCOPY  2016    Dr. Kelly - normal  colon - repeat in 10 years - scanned report to media file.    HYSTERECTOMY      SHOULDER SURGERY Left     TONSILLECTOMY      upper and lower GI  2016       Family History   Problem Relation Age of Onset    Hypertension Mother     Heart disease Mother     Hyperlipidemia Mother     Cancer Sister 53     thyroid cancer and lymph nodes involvement    Cancer Brother 61     colon and lung cancer    Cancer Brother 63     colon    Hypertension Brother     Diabetes Brother        Social History     Social History    Marital status:      Spouse name: N/A    Number of children: N/A    Years of education: N/A     Social History Main Topics    Smoking status: Former Smoker     Packs/day: 1.00     Years: 20.00     Quit date: 1997    Smokeless tobacco: None    Alcohol use No    Drug use: No    Sexual activity: No     Other Topics Concern    None     Social  "History Narrative    None       Review of Systems   Constitutional: Negative for appetite change, chills, fatigue, fever and unexpected weight change.   HENT: Negative for congestion, ear pain, mouth sores, nosebleeds, postnasal drip, rhinorrhea, sinus pressure, sneezing, sore throat, trouble swallowing and voice change.    Eyes: Negative for photophobia, pain, discharge, redness, itching and visual disturbance.   Respiratory: Negative for cough, chest tightness and shortness of breath.    Cardiovascular: Negative for chest pain, palpitations and leg swelling.   Gastrointestinal: Negative for abdominal pain, blood in stool, constipation, diarrhea, nausea and vomiting.   Genitourinary: Negative for dysuria, frequency, hematuria and urgency.   Musculoskeletal: Negative for arthralgias, back pain, joint swelling and myalgias.   Skin: Negative for color change and rash.   Allergic/Immunologic: Negative for immunocompromised state.   Neurological: Negative for dizziness, seizures, syncope, weakness and headaches.   Hematological: Negative for adenopathy. Does not bruise/bleed easily.   Psychiatric/Behavioral: Positive for decreased concentration. Negative for agitation, dysphoric mood, sleep disturbance and suicidal ideas. The patient is not nervous/anxious.          Objective:     Vitals:    06/01/17 1114 06/01/17 1146   BP: (!) 164/78 (!) 168/70   BP Location: Right arm    Patient Position: Sitting    BP Method: Manual    Pulse: 109    Temp: 98.4 °F (36.9 °C)    TempSrc: Oral    SpO2: 98%    Weight: 70 kg (154 lb 5.2 oz)    Height: 5' 6" (1.676 m)           Physical Exam   Constitutional: She is oriented to person, place, and time. She appears well-developed and well-nourished. No distress.   Body mass index is 24.91 kg/m².   HENT:   Head: Normocephalic and atraumatic.   Right Ear: External ear normal.   Left Ear: External ear normal.   Nose: Nose normal.   Mouth/Throat: Oropharynx is clear and moist. No oropharyngeal " exudate.   Eyes: Conjunctivae and EOM are normal. Pupils are equal, round, and reactive to light.   Neck: Normal range of motion. Neck supple. No JVD present. No tracheal deviation present. No thyromegaly present.   Cardiovascular: Normal rate, regular rhythm and normal heart sounds.    No murmur heard.  Pulmonary/Chest: Effort normal and breath sounds normal. No stridor. No respiratory distress. She has no wheezes. She has no rales.   Abdominal: Soft. Bowel sounds are normal. She exhibits no distension. There is no guarding.   Musculoskeletal: Normal range of motion. She exhibits no edema.   Lymphadenopathy:     She has no cervical adenopathy.   Neurological: She is alert and oriented to person, place, and time. No cranial nerve deficit. Coordination normal.   Skin: Skin is warm and dry. No rash noted. She is not diaphoretic.   Psychiatric: She has a normal mood and affect.         Assessment:         ICD-10-CM ICD-9-CM   1. Essential hypertension I10 401.9       Plan:       Essential hypertension  -  Start Clonidine 0.1 mg twice daily.  Continue the Amlodipine, Lasix and Metoprolol at present doses.  Recheck in 1 week.  -     cloNIDine (CATAPRES) 0.1 MG tablet; Take 1 tablet (0.1 mg total) by mouth 2 (two) times daily.  Dispense: 60 tablet; Refill: 0      Return in about 1 week (around 6/8/2017) for blood pressure.     Patient's Medications   New Prescriptions    CLONIDINE (CATAPRES) 0.1 MG TABLET    Take 1 tablet (0.1 mg total) by mouth 2 (two) times daily.   Previous Medications    ALPRAZOLAM (XANAX) 0.5 MG TABLET    Take 0.5 mg by mouth every 8 (eight) hours as needed.    AMLODIPINE (NORVASC) 10 MG TABLET    TAKE 1 TABLET (10 MG TOTAL) BY MOUTH ONCE DAILY.    ARMOUR THYROID 60 MG TAB    Take 60 mg by mouth once daily.    CITALOPRAM (CELEXA) 20 MG TABLET    Take 20 mg by mouth once daily.    CYANOCOBALAMIN, VITAMIN B-12, (VITAMIN B-12 INJ)    Inject 1,000 mcg as directed every 30 days.     DEXTROAMPHETAMINE-AMPHETAMINE (ADDERALL) 20 MG TABLET    Take 1 tablet by mouth 2 (two) times daily.    ERGOCALCIFEROL (ERGOCALCIFEROL) 50,000 UNIT CAP    Take 1 capsule (50,000 Units total) by mouth every 7 days.    ESTRADIOL (ESTRACE) 2 MG TABLET    Take 2 mg by mouth once daily.    FUROSEMIDE (LASIX) 40 MG TABLET    TAKE 1 TABLET (40 MG TOTAL) BY MOUTH ONCE DAILY.    HYDROCODONE-ACETAMINOPHEN 10-325MG (NORCO)  MG TAB    Take 1 tablet by mouth 4 (four) times daily as needed.    KLOR-CON SPRINKLE 10 MEQ CPSR    TAKE 1 CAPSULE TWICE A DAY    METOPROLOL SUCCINATE (TOPROL-XL) 50 MG 24 HR TABLET    TAKE 1 TABLET BY MOUTH EVERY DAY    ROSUVASTATIN (CRESTOR) 5 MG TABLET    Take 1 tablet (5 mg total) by mouth once daily.   Modified Medications    No medications on file   Discontinued Medications    No medications on file

## 2017-06-02 ENCOUNTER — TELEPHONE (OUTPATIENT)
Dept: FAMILY MEDICINE | Facility: CLINIC | Age: 62
End: 2017-06-02

## 2017-06-02 NOTE — TELEPHONE ENCOUNTER
----- Message from Elizabet Barrett sent at 6/2/2017 10:40 AM CDT -----  Contact: Self [phone]  Patient is calling to talk to nurse concerning she is still running fever and she is taking tylenol every 4 hours. Please advice

## 2017-06-02 NOTE — TELEPHONE ENCOUNTER
Spoke with patient on phone.  Patient states that she is running fever.  I just seen patient in office yesterday and she was not running fever and did not mention anything.  She reports that her normal temperature is 97.9, so her temperature yesterday of 98.4 she considers as fever and today she states her temperature is 99.4.  She reports sneezing, body aches, coughing, congestion, and burning on urination.  Advised patient that if she is having UTI symptoms - she needs to come in so we can check her urine for infection and offered an appointment - patient declined.  If she thinks more cold symptoms - the low-grade temperature is likely her immune system's response to viral infection - treat Tyelnol and Ibuprofen - rotating every 3 hours and take OTC BP safe cold medication.  If urinary symptoms become worse and since it is weekend, go to Urgent Care for worsening.  Patient verbalizes understanding.

## 2017-06-09 ENCOUNTER — TELEPHONE (OUTPATIENT)
Dept: FAMILY MEDICINE | Facility: CLINIC | Age: 62
End: 2017-06-09

## 2017-06-09 ENCOUNTER — OFFICE VISIT (OUTPATIENT)
Dept: FAMILY MEDICINE | Facility: CLINIC | Age: 62
End: 2017-06-09
Payer: COMMERCIAL

## 2017-06-09 VITALS
TEMPERATURE: 98 F | DIASTOLIC BLOOD PRESSURE: 68 MMHG | HEART RATE: 87 BPM | SYSTOLIC BLOOD PRESSURE: 104 MMHG | OXYGEN SATURATION: 98 % | BODY MASS INDEX: 24.87 KG/M2 | WEIGHT: 154.75 LBS | HEIGHT: 66 IN

## 2017-06-09 DIAGNOSIS — F90.0 ADHD (ATTENTION DEFICIT HYPERACTIVITY DISORDER), INATTENTIVE TYPE: ICD-10-CM

## 2017-06-09 DIAGNOSIS — I10 ESSENTIAL HYPERTENSION: Primary | ICD-10-CM

## 2017-06-09 PROCEDURE — 99999 PR PBB SHADOW E&M-EST. PATIENT-LVL V: CPT | Mod: PBBFAC,,, | Performed by: NURSE PRACTITIONER

## 2017-06-09 PROCEDURE — 99214 OFFICE O/P EST MOD 30 MIN: CPT | Mod: S$GLB,,, | Performed by: NURSE PRACTITIONER

## 2017-06-09 RX ORDER — DEXTROAMPHETAMINE SACCHARATE, AMPHETAMINE ASPARTATE, DEXTROAMPHETAMINE SULFATE AND AMPHETAMINE SULFATE 2.5; 2.5; 2.5; 2.5 MG/1; MG/1; MG/1; MG/1
10 TABLET ORAL 2 TIMES DAILY
Qty: 60 TABLET | Refills: 0 | Status: SHIPPED | OUTPATIENT
Start: 2017-06-09 | End: 2017-07-12 | Stop reason: SDUPTHER

## 2017-06-09 NOTE — TELEPHONE ENCOUNTER
----- Message from Jam Silva sent at 6/9/2017  1:20 PM CDT -----  Contact: self/[phone]  They need a PA on the adderall since the strength has been changed.

## 2017-06-09 NOTE — PROGRESS NOTES
"Subjective:       Patient ID: Madalyn Iverson is a 62 y.o. female.    Chief Complaint: Follow-up (blood pressure check)    Patient is here today for follow up.    Patient has Uncontrolled Hypertension.  I added on Clonidine 0.1 mg twice daily to Amlodipine, Metoprolol XL and Lasix.  Blood pressure is much improved and now controlled.  /68   Pulse 87   Temp 97.6 °F (36.4 °C) (Oral)   Ht 5' 6" (1.676 m)   Wt 70.2 kg (154 lb 12.2 oz)   SpO2 98%   BMI 24.98 kg/m²     Patient has ADHD - we had stopped the Adderall while blood pressure was uncontrolled.  Patient was taking Adderall 20 mg twice daily and now has been off of for around 1 month.  Advised I will start patient back but on a lower dose.    Patient was started on Cholesterol medication in May - will be due to repeat labs in August.        Previous Medications    ALPRAZOLAM (XANAX) 0.5 MG TABLET    Take 0.5 mg by mouth every 8 (eight) hours as needed.    AMLODIPINE (NORVASC) 10 MG TABLET    TAKE 1 TABLET (10 MG TOTAL) BY MOUTH ONCE DAILY.    ARMOUR THYROID 60 MG TAB    Take 60 mg by mouth once daily.    CITALOPRAM (CELEXA) 20 MG TABLET    Take 20 mg by mouth once daily.    CLONIDINE (CATAPRES) 0.1 MG TABLET    Take 1 tablet (0.1 mg total) by mouth 2 (two) times daily.    CYANOCOBALAMIN, VITAMIN B-12, (VITAMIN B-12 INJ)    Inject 1,000 mcg as directed every 30 days.    DEXTROAMPHETAMINE-AMPHETAMINE (ADDERALL) 20 MG TABLET    Take 1 tablet by mouth 2 (two) times daily.    ERGOCALCIFEROL (ERGOCALCIFEROL) 50,000 UNIT CAP    Take 1 capsule (50,000 Units total) by mouth every 7 days.    ESTRADIOL (ESTRACE) 2 MG TABLET    Take 2 mg by mouth once daily.    FUROSEMIDE (LASIX) 40 MG TABLET    TAKE 1 TABLET (40 MG TOTAL) BY MOUTH ONCE DAILY.    HYDROCODONE-ACETAMINOPHEN 10-325MG (NORCO)  MG TAB    Take 1 tablet by mouth 4 (four) times daily as needed.    KLOR-CON SPRINKLE 10 MEQ CPSR    TAKE 1 CAPSULE TWICE A DAY    METOPROLOL SUCCINATE (TOPROL-XL) 50 " MG 24 HR TABLET    TAKE 1 TABLET BY MOUTH EVERY DAY    ROSUVASTATIN (CRESTOR) 5 MG TABLET    Take 1 tablet (5 mg total) by mouth once daily.       Past Medical History:   Diagnosis Date    ADHD (attention deficit hyperactivity disorder), inattentive type     Crohn's colitis     Fibromyalgia     treated by Dr. Thorpe - Rheumatologist    Hyperlipidemia     High triglycerides    Hypertension     Hypothyroidism     Treated by Dr. Mathew    IFG (impaired fasting glucose)     Migraine     Treated by neurologist - Dr. Destiny Florez    Ulcerative colitis     Treated by Dr. Lee and Aaron    Vitamin D deficiency 3/31/2016       Past Surgical History:   Procedure Laterality Date     SECTION      COLONOSCOPY  10/08/2010    Dr. Lee - repeat in 5 years - has appointment for colonoscopy 2016    COLONOSCOPY  2016    Dr. Kelly - normal  colon - repeat in 10 years - scanned report to media file.    HYSTERECTOMY      SHOULDER SURGERY Left     TONSILLECTOMY      upper and lower GI  2016       Family History   Problem Relation Age of Onset    Hypertension Mother     Heart disease Mother     Hyperlipidemia Mother     Cancer Sister 53     thyroid cancer and lymph nodes involvement    Cancer Brother 61     colon and lung cancer    Cancer Brother 63     colon    Hypertension Brother     Diabetes Brother        Social History     Social History    Marital status:      Spouse name: N/A    Number of children: N/A    Years of education: N/A     Social History Main Topics    Smoking status: Former Smoker     Packs/day: 1.00     Years: 20.00     Quit date: 1997    Smokeless tobacco: None    Alcohol use No    Drug use: No    Sexual activity: No     Other Topics Concern    None     Social History Narrative    None       Review of Systems   Constitutional: Negative for appetite change, chills, fatigue, fever and unexpected weight change.   HENT: Negative for congestion,  "ear pain, mouth sores, nosebleeds, postnasal drip, rhinorrhea, sinus pressure, sneezing, sore throat, trouble swallowing and voice change.    Eyes: Negative for photophobia, pain, discharge, redness, itching and visual disturbance.   Respiratory: Negative for cough, chest tightness and shortness of breath.    Cardiovascular: Negative for chest pain, palpitations and leg swelling.   Gastrointestinal: Negative for abdominal pain, blood in stool, constipation, diarrhea, nausea and vomiting.   Genitourinary: Negative for dysuria, frequency, hematuria and urgency.   Musculoskeletal: Negative for arthralgias, back pain, joint swelling and myalgias.   Skin: Negative for color change and rash.   Allergic/Immunologic: Negative for immunocompromised state.   Neurological: Negative for dizziness, seizures, syncope, weakness and headaches.   Hematological: Negative for adenopathy. Does not bruise/bleed easily.   Psychiatric/Behavioral: Negative for agitation, dysphoric mood, sleep disturbance and suicidal ideas. The patient is not nervous/anxious.          Objective:     Vitals:    06/09/17 1006 06/09/17 1035   BP: 110/68 104/68   BP Location: Left arm    Patient Position: Sitting    BP Method: Manual    Pulse: 87    Temp: 97.6 °F (36.4 °C)    TempSrc: Oral    SpO2: 98%    Weight: 70.2 kg (154 lb 12.2 oz)    Height: 5' 6" (1.676 m)           Physical Exam   Constitutional: She is oriented to person, place, and time. She appears well-developed and well-nourished. No distress.   Body mass index is 24.98 kg/m².     HENT:   Head: Normocephalic and atraumatic.   Right Ear: External ear normal.   Left Ear: External ear normal.   Nose: Nose normal.   Mouth/Throat: Oropharynx is clear and moist. No oropharyngeal exudate.   Eyes: Conjunctivae and EOM are normal. Pupils are equal, round, and reactive to light.   Neck: Normal range of motion. Neck supple. No JVD present. No tracheal deviation present. No thyromegaly present. "   Cardiovascular: Normal rate, regular rhythm and normal heart sounds.    No murmur heard.  Pulmonary/Chest: Effort normal and breath sounds normal. No stridor. No respiratory distress. She has no wheezes. She has no rales.   Abdominal: Soft. Bowel sounds are normal. She exhibits no distension. There is no guarding.   Musculoskeletal: Normal range of motion. She exhibits no edema.   Lymphadenopathy:     She has no cervical adenopathy.   Neurological: She is alert and oriented to person, place, and time. No cranial nerve deficit. Coordination normal.   Skin: Skin is warm and dry. No rash noted. She is not diaphoretic.   Psychiatric: She has a normal mood and affect.         Assessment:         ICD-10-CM ICD-9-CM   1. Essential hypertension I10 401.9   2. ADHD (attention deficit hyperactivity disorder), inattentive type F90.0 314.00       Plan:       Essential hypertension  -  Continue all medications at present dose and recheck in 4 weeks.    ADHD (attention deficit hyperactivity disorder), inattentive type  -  Had stopped Adderall 20 mg twice daily due to elevated blood pressure.  Now that BP is controlled, advised I will start back at lower dose - start Adderall 10 mg twice daily and recheck in 4 weeks.  -     dextroamphetamine-amphetamine 10 mg Tab; Take 10 mg by mouth 2 (two) times daily.  Dispense: 60 tablet; Refill: 0      Return in about 4 weeks (around 7/7/2017) for med check. due to repeat cholesterol in  August    Patient's Medications   New Prescriptions    DEXTROAMPHETAMINE-AMPHETAMINE 10 MG TAB    Take 10 mg by mouth 2 (two) times daily.   Previous Medications    ALPRAZOLAM (XANAX) 0.5 MG TABLET    Take 0.5 mg by mouth every 8 (eight) hours as needed.    AMLODIPINE (NORVASC) 10 MG TABLET    TAKE 1 TABLET (10 MG TOTAL) BY MOUTH ONCE DAILY.    ARMOUR THYROID 60 MG TAB    Take 60 mg by mouth once daily.    CITALOPRAM (CELEXA) 20 MG TABLET    Take 20 mg by mouth once daily.    CLONIDINE (CATAPRES) 0.1 MG  TABLET    Take 1 tablet (0.1 mg total) by mouth 2 (two) times daily.    CYANOCOBALAMIN, VITAMIN B-12, (VITAMIN B-12 INJ)    Inject 1,000 mcg as directed every 30 days.    ERGOCALCIFEROL (ERGOCALCIFEROL) 50,000 UNIT CAP    Take 1 capsule (50,000 Units total) by mouth every 7 days.    ESTRADIOL (ESTRACE) 2 MG TABLET    Take 2 mg by mouth once daily.    FUROSEMIDE (LASIX) 40 MG TABLET    TAKE 1 TABLET (40 MG TOTAL) BY MOUTH ONCE DAILY.    HYDROCODONE-ACETAMINOPHEN 10-325MG (NORCO)  MG TAB    Take 1 tablet by mouth 4 (four) times daily as needed.    KLOR-CON SPRINKLE 10 MEQ CPSR    TAKE 1 CAPSULE TWICE A DAY    METOPROLOL SUCCINATE (TOPROL-XL) 50 MG 24 HR TABLET    TAKE 1 TABLET BY MOUTH EVERY DAY    ROSUVASTATIN (CRESTOR) 5 MG TABLET    Take 1 tablet (5 mg total) by mouth once daily.   Modified Medications    No medications on file   Discontinued Medications    DEXTROAMPHETAMINE-AMPHETAMINE (ADDERALL) 20 MG TABLET    Take 1 tablet by mouth 2 (two) times daily.

## 2017-06-09 NOTE — TELEPHONE ENCOUNTER
Spoke to pharmacy does not have prescription that was last sent for the 10 mg need script sent in.but called insurance  To do a PA on Adderall and insurance  said that pt is approved for the 20 mg tablet.will send over approval letter.

## 2017-06-14 RX ORDER — FUROSEMIDE 40 MG/1
TABLET ORAL
Qty: 90 TABLET | Refills: 0 | Status: SHIPPED | OUTPATIENT
Start: 2017-06-14 | End: 2017-09-09 | Stop reason: SDUPTHER

## 2017-06-30 DIAGNOSIS — I10 ESSENTIAL HYPERTENSION: ICD-10-CM

## 2017-06-30 RX ORDER — CLONIDINE HYDROCHLORIDE 0.1 MG/1
TABLET ORAL
Qty: 60 TABLET | Refills: 0 | Status: SHIPPED | OUTPATIENT
Start: 2017-06-30 | End: 2017-07-12 | Stop reason: SDUPTHER

## 2017-07-03 ENCOUNTER — TELEPHONE (OUTPATIENT)
Dept: FAMILY MEDICINE | Facility: CLINIC | Age: 62
End: 2017-07-03

## 2017-07-03 NOTE — TELEPHONE ENCOUNTER
----- Message from Magdalena Ray sent at 7/3/2017  3:10 PM CDT -----  Contact: [phone]/self  Patient called in returning your call. Please advise.

## 2017-07-03 NOTE — TELEPHONE ENCOUNTER
Spoke with pt to see if receive medication Clonidine pt said no called pharmacy to see if received order,pharmacy said no system was down gave verbal order for pt medication.

## 2017-07-12 ENCOUNTER — OFFICE VISIT (OUTPATIENT)
Dept: FAMILY MEDICINE | Facility: CLINIC | Age: 62
End: 2017-07-12
Payer: COMMERCIAL

## 2017-07-12 VITALS
BODY MASS INDEX: 25.26 KG/M2 | WEIGHT: 157.19 LBS | SYSTOLIC BLOOD PRESSURE: 90 MMHG | OXYGEN SATURATION: 99 % | DIASTOLIC BLOOD PRESSURE: 60 MMHG | HEART RATE: 77 BPM | TEMPERATURE: 98 F | HEIGHT: 66 IN

## 2017-07-12 DIAGNOSIS — R73.01 IFG (IMPAIRED FASTING GLUCOSE): ICD-10-CM

## 2017-07-12 DIAGNOSIS — I95.9 HYPOTENSION, UNSPECIFIED HYPOTENSION TYPE: Primary | ICD-10-CM

## 2017-07-12 DIAGNOSIS — F90.0 ADHD (ATTENTION DEFICIT HYPERACTIVITY DISORDER), INATTENTIVE TYPE: ICD-10-CM

## 2017-07-12 DIAGNOSIS — E78.5 HYPERLIPIDEMIA, UNSPECIFIED HYPERLIPIDEMIA TYPE: ICD-10-CM

## 2017-07-12 DIAGNOSIS — I10 ESSENTIAL HYPERTENSION: ICD-10-CM

## 2017-07-12 PROCEDURE — 99214 OFFICE O/P EST MOD 30 MIN: CPT | Mod: S$GLB,,, | Performed by: NURSE PRACTITIONER

## 2017-07-12 PROCEDURE — 99999 PR PBB SHADOW E&M-EST. PATIENT-LVL V: CPT | Mod: PBBFAC,,, | Performed by: NURSE PRACTITIONER

## 2017-07-12 RX ORDER — CLONIDINE HYDROCHLORIDE 0.1 MG/1
0.1 TABLET ORAL NIGHTLY
Qty: 30 TABLET | Refills: 0 | Status: SHIPPED | OUTPATIENT
Start: 2017-07-12 | End: 2018-06-08 | Stop reason: SDUPTHER

## 2017-07-12 RX ORDER — DEXTROAMPHETAMINE SACCHARATE, AMPHETAMINE ASPARTATE, DEXTROAMPHETAMINE SULFATE AND AMPHETAMINE SULFATE 2.5; 2.5; 2.5; 2.5 MG/1; MG/1; MG/1; MG/1
10 TABLET ORAL 2 TIMES DAILY
Qty: 60 TABLET | Refills: 0 | Status: SHIPPED | OUTPATIENT
Start: 2017-07-12 | End: 2017-08-16 | Stop reason: DRUGHIGH

## 2017-07-12 RX ORDER — AMLODIPINE BESYLATE 5 MG/1
5 TABLET ORAL DAILY
Qty: 30 TABLET | Refills: 0 | Status: SHIPPED | OUTPATIENT
Start: 2017-07-12 | End: 2017-12-01 | Stop reason: SDUPTHER

## 2017-07-12 RX ORDER — ROSUVASTATIN CALCIUM 20 MG/1
20 TABLET, COATED ORAL DAILY
Qty: 30 TABLET | Refills: 2 | Status: SHIPPED | OUTPATIENT
Start: 2017-07-12 | End: 2017-08-16 | Stop reason: ALTCHOICE

## 2017-07-12 RX ORDER — DEXTROAMPHETAMINE SACCHARATE, AMPHETAMINE ASPARTATE, DEXTROAMPHETAMINE SULFATE AND AMPHETAMINE SULFATE 2.5; 2.5; 2.5; 2.5 MG/1; MG/1; MG/1; MG/1
10 TABLET ORAL 2 TIMES DAILY
Qty: 60 TABLET | Refills: 0 | OUTPATIENT
Start: 2017-07-12

## 2017-07-12 NOTE — PROGRESS NOTES
"Subjective:       Patient ID: Madalyn Iverson is a 62 y.o. female.    Chief Complaint: Follow-up (lab results)    Patient is here today for follow up with lab results.    Patient has Hypertension.  Patient is now Hypotensive.  Patient was experiencing increasing amount of pain to neck and increased anxiety related to pain in the past couple months that was affecting blood pressure and we had adjusted her blood pressure medications to control in the past.  Patient reports she has now had a procedure done by specialist to block the nerves and pain is improved and getting better.  Blood pressure is now low.  Patient is currently taking Amlodipine 10 mg daily, Clonidine 0.1 mg twice daily, Lasix 40 mg daily and Metoprolol XL 50 mg daily for both blood pressure and migraine control.  BP 90/60   Pulse 77   Temp 97.8 °F (36.6 °C) (Oral)   Ht 5' 6" (1.676 m)   Wt 71.3 kg (157 lb 3 oz)   SpO2 99%   BMI 25.37 kg/m²     Patient has ADHD - we had stopped the Adderall while blood pressure was uncontrolled in the past couple months and started patient back on Adderall at reduced dose of 10 mg twice daily last month.    Patient was started on cholesterol medication in May.  Patient reports she has been taking the Crestor 5 mg daily but cholesterol levels continue to elevate - will increase dose.          Component      Latest Ref Rng & Units 6/23/2017 5/12/2017 3/30/2016   Sodium      136 - 145 mmol/L 139 142 137   Potassium      3.5 - 5.1 mmol/L 4.9 4.0 4.4   Chloride      95 - 110 mmol/L 99 104 99   CO2      23 - 29 mmol/L 31 (H) 30 (H) 28   Glucose      70 - 110 mg/dL 104 114 (H) 84   BUN, Bld      7 - 17 mg/dL 17 11 14   Creatinine      0.50 - 1.40 mg/dL 0.64 0.56 0.8   Calcium      8.7 - 10.5 mg/dL 9.8 9.0 8.9   Total Protein      6.0 - 8.4 g/dL 8.0 7.4 7.0   Albumin      3.5 - 5.2 g/dL 4.0 3.9 3.2 (L)   Total Bilirubin      0.1 - 1.0 mg/dL 0.4 0.6 0.2   Alkaline Phosphatase      38 - 126 U/L 173 (H) 135 (H) 151 (H) "   AST      15 - 46 U/L 23 25 20   ALT      10 - 44 U/L 32 24 17   Anion Gap      8 - 16 mmol/L 9 8 10   eGFR if African American      >60 mL/min/1.73 m:2 >60.0 >60.0 >60.0   eGFR if non African American      >60 mL/min/1.73 m:2 >60.0 >60.0 >60.0   Cholesterol      120 - 199 mg/dL 266 (H) 222 (H) 198   Triglycerides      30 - 150 mg/dL 226 (H) 218 (H) 158 (H)   HDL      40 - 75 mg/dL 62 54 54   LDL Cholesterol      63.0 - 159.0 mg/dL 158.8 124.4 112.4   HDL/Chol Ratio      20.0 - 50.0 % 23.3 24.3 27.3   Total Cholesterol/HDL Ratio      2.0 - 5.0 4.3 4.1 3.7   Non-HDL Cholesterol      mg/dL 204 168 144   Hemoglobin A1C      4.0 - 5.6 % 5.8 (H)     Estimated Avg Glucose      68 - 131 mg/dL 120         Previous Medications    ALPRAZOLAM (XANAX) 0.5 MG TABLET    Take 0.5 mg by mouth every 8 (eight) hours as needed.    AMLODIPINE (NORVASC) 10 MG TABLET    TAKE 1 TABLET (10 MG TOTAL) BY MOUTH ONCE DAILY.    ARMOUR THYROID 60 MG TAB    Take 60 mg by mouth once daily.    CITALOPRAM (CELEXA) 20 MG TABLET    Take 20 mg by mouth once daily.    CLONIDINE (CATAPRES) 0.1 MG TABLET    TAKE 1 TABLET BY MOUTH TWICE A DAY    CYANOCOBALAMIN, VITAMIN B-12, (VITAMIN B-12 INJ)    Inject 1,000 mcg as directed every 30 days.    DEXTROAMPHETAMINE-AMPHETAMINE 10 MG TAB    Take 10 mg by mouth 2 (two) times daily.    ERGOCALCIFEROL (ERGOCALCIFEROL) 50,000 UNIT CAP    Take 1 capsule (50,000 Units total) by mouth every 7 days.    ESTRADIOL (ESTRACE) 2 MG TABLET    Take 2 mg by mouth once daily.    FUROSEMIDE (LASIX) 40 MG TABLET    TAKE 1 TABLET (40 MG TOTAL) BY MOUTH ONCE DAILY.    HYDROCODONE-ACETAMINOPHEN 10-325MG (NORCO)  MG TAB    Take 1 tablet by mouth 4 (four) times daily as needed.    KLOR-CON SPRINKLE 10 MEQ CPSR    TAKE 1 CAPSULE TWICE A DAY    METOPROLOL SUCCINATE (TOPROL-XL) 50 MG 24 HR TABLET    TAKE 1 TABLET BY MOUTH EVERY DAY    ROSUVASTATIN (CRESTOR) 5 MG TABLET    Take 1 tablet (5 mg total) by mouth once daily.       Past  Medical History:   Diagnosis Date    ADHD (attention deficit hyperactivity disorder), inattentive type     Crohn's colitis     Fibromyalgia     treated by Dr. Thorpe - Rheumatologist    Hyperlipidemia     High triglycerides    Hypertension     Hypothyroidism     Treated by Dr. Mathew    IFG (impaired fasting glucose)     Migraine     Treated by neurologist - Dr. Destiny Florez    Ulcerative colitis     Treated by Dr. Lee and Aaron    Vitamin D deficiency 3/31/2016       Past Surgical History:   Procedure Laterality Date     SECTION      COLONOSCOPY  10/08/2010    Dr. Lee - repeat in 5 years - has appointment for colonoscopy 2016    COLONOSCOPY  2016    Dr. Kelly - normal  colon - repeat in 10 years - scanned report to media file.    HYSTERECTOMY      OOPHORECTOMY      SHOULDER SURGERY Left     TONSILLECTOMY      upper and lower GI  2016       Family History   Problem Relation Age of Onset    Hypertension Mother     Heart disease Mother     Hyperlipidemia Mother     Cancer Sister 53     thyroid cancer and lymph nodes involvement    Cancer Brother 61     colon and lung cancer    Cancer Brother 63     colon    Hypertension Brother     Diabetes Brother        Social History     Social History    Marital status:      Spouse name: N/A    Number of children: N/A    Years of education: N/A     Social History Main Topics    Smoking status: Former Smoker     Packs/day: 1.00     Years: 20.00     Quit date: 1997    Smokeless tobacco: Never Used    Alcohol use No    Drug use: No    Sexual activity: No     Other Topics Concern    None     Social History Narrative    None       Review of Systems   Constitutional: Negative for appetite change, chills, fatigue, fever and unexpected weight change.   HENT: Negative for congestion, ear pain, mouth sores, nosebleeds, postnasal drip, rhinorrhea, sinus pressure, sneezing, sore throat, trouble swallowing and  "voice change.    Eyes: Negative for photophobia, pain, discharge, redness, itching and visual disturbance.   Respiratory: Negative for cough, chest tightness and shortness of breath.    Cardiovascular: Negative for chest pain, palpitations and leg swelling.   Gastrointestinal: Negative for abdominal pain, blood in stool, constipation, diarrhea, nausea and vomiting.   Genitourinary: Negative for dysuria, frequency, hematuria and urgency.   Musculoskeletal: Negative for arthralgias, back pain, joint swelling and myalgias.   Skin: Negative for color change and rash.   Allergic/Immunologic: Negative for immunocompromised state.   Neurological: Negative for dizziness, seizures, syncope, weakness and headaches.   Hematological: Negative for adenopathy. Does not bruise/bleed easily.   Psychiatric/Behavioral: Negative for agitation, dysphoric mood, sleep disturbance and suicidal ideas. The patient is not nervous/anxious.          Objective:     Vitals:    07/12/17 1411 07/12/17 1427   BP: (!) 86/60 90/60   BP Location: Left arm    Patient Position: Sitting    BP Method: Manual    Pulse: 77    Temp: 97.8 °F (36.6 °C)    TempSrc: Oral    SpO2: 99%    Weight: 71.3 kg (157 lb 3 oz)    Height: 5' 6" (1.676 m)           Physical Exam   Constitutional: She is oriented to person, place, and time. She appears well-developed and well-nourished. No distress.   Body mass index is 25.37 kg/m².       HENT:   Head: Normocephalic and atraumatic.   Right Ear: External ear normal.   Left Ear: External ear normal.   Nose: Nose normal.   Mouth/Throat: Oropharynx is clear and moist. No oropharyngeal exudate.   Eyes: Conjunctivae and EOM are normal. Pupils are equal, round, and reactive to light.   Neck: Normal range of motion. Neck supple. No JVD present. No tracheal deviation present. No thyromegaly present.   Cardiovascular: Normal rate, regular rhythm and normal heart sounds.    No murmur heard.  Pulmonary/Chest: Effort normal and breath " sounds normal. No stridor. No respiratory distress. She has no wheezes. She has no rales.   Abdominal: Soft. Bowel sounds are normal. She exhibits no distension. There is no guarding.   Musculoskeletal: Normal range of motion. She exhibits no edema.   Lymphadenopathy:     She has no cervical adenopathy.   Neurological: She is alert and oriented to person, place, and time. No cranial nerve deficit. Coordination normal.   Skin: Skin is warm and dry. No rash noted. She is not diaphoretic.   Psychiatric: She has a normal mood and affect.         Assessment:         ICD-10-CM ICD-9-CM   1. Hypotension, unspecified hypotension type I95.9 458.9   2. Essential hypertension I10 401.9   3. IFG (impaired fasting glucose) R73.01 790.21   4. Hyperlipidemia, unspecified hyperlipidemia type E78.5 272.4   5. ADHD (attention deficit hyperactivity disorder), inattentive type F90.0 314.00       Plan:       Hypotension, unspecified hypotension type  HOLD METOPROLOL AND CLONIDINE DOSE TONIGHT!!!!!  DECREASE THE AMLODIPINE TO 5 MG IN AM AND DECREASE THE CLONIDINE TO 0.1 MG AT BEDTIME ONLY STARTING TOMORROW NIGHT.  MONITOR BP AT HOME, IF > 100/60 - CONTINUE CURRENT MEDICATIONS AND FOLLOW UP IN 4 WEEKS.  IF < 100/60 - CALL OFFICE.      Essential hypertension  HOLD METOPROLOL AND CLONIDINE DOSE TONIGHT!!!!!  DECREASE THE AMLODIPINE TO 5 MG IN AM AND DECREASE THE CLONIDINE TO 0.1 MG AT BEDTIME ONLY STARTING TOMORROW NIGHT.  MONITOR BP AT HOME, IF > 100/60 - CONTINUE CURRENT MEDICATIONS AND FOLLOW UP IN 4 WEEKS.  IF < 100/60 - CALL OFFICE.  -     amlodipine (NORVASC) 5 MG tablet; Take 1 tablet (5 mg total) by mouth once daily.  Dispense: 30 tablet; Refill: 0  -     cloNIDine (CATAPRES) 0.1 MG tablet; Take 1 tablet (0.1 mg total) by mouth nightly.  Dispense: 30 tablet; Refill: 0    IFG (impaired fasting glucose)  -  Resolved    Hyperlipidemia, unspecified hyperlipidemia type  -  Increase the Crestor to 20 mg daily and recheck in 3 months.  -      rosuvastatin (CRESTOR) 20 MG tablet; Take 1 tablet (20 mg total) by mouth once daily.  Dispense: 30 tablet; Refill: 2    ADHD (attention deficit hyperactivity disorder), inattentive type  -  Continue Adderall 10 mg twice daily and recheck in 4 weeks.  -     dextroamphetamine-amphetamine 10 mg Tab; Take 10 mg by mouth 2 (two) times daily.  Dispense: 60 tablet; Refill: 0      Return in about 4 weeks (around 8/9/2017) for blood pressure check.     Patient's Medications   New Prescriptions    No medications on file   Previous Medications    ALPRAZOLAM (XANAX) 0.5 MG TABLET    Take 0.5 mg by mouth every 8 (eight) hours as needed.    ARMOUR THYROID 60 MG TAB    Take 60 mg by mouth once daily.    CITALOPRAM (CELEXA) 20 MG TABLET    Take 20 mg by mouth once daily.    CYANOCOBALAMIN, VITAMIN B-12, (VITAMIN B-12 INJ)    Inject 1,000 mcg as directed every 30 days.    ERGOCALCIFEROL (ERGOCALCIFEROL) 50,000 UNIT CAP    Take 1 capsule (50,000 Units total) by mouth every 7 days.    ESTRADIOL (ESTRACE) 2 MG TABLET    Take 2 mg by mouth once daily.    FUROSEMIDE (LASIX) 40 MG TABLET    TAKE 1 TABLET (40 MG TOTAL) BY MOUTH ONCE DAILY.    HYDROCODONE-ACETAMINOPHEN 10-325MG (NORCO)  MG TAB    Take 1 tablet by mouth 4 (four) times daily as needed.    KLOR-CON SPRINKLE 10 MEQ CPSR    TAKE 1 CAPSULE TWICE A DAY    METOPROLOL SUCCINATE (TOPROL-XL) 50 MG 24 HR TABLET    TAKE 1 TABLET BY MOUTH EVERY DAY   Modified Medications    Modified Medication Previous Medication    AMLODIPINE (NORVASC) 5 MG TABLET amlodipine (NORVASC) 10 MG tablet       Take 1 tablet (5 mg total) by mouth once daily.    TAKE 1 TABLET (10 MG TOTAL) BY MOUTH ONCE DAILY.    CLONIDINE (CATAPRES) 0.1 MG TABLET cloNIDine (CATAPRES) 0.1 MG tablet       Take 1 tablet (0.1 mg total) by mouth nightly.    TAKE 1 TABLET BY MOUTH TWICE A DAY    DEXTROAMPHETAMINE-AMPHETAMINE 10 MG TAB dextroamphetamine-amphetamine 10 mg Tab       Take 10 mg by mouth 2 (two) times daily.     Take 10 mg by mouth 2 (two) times daily.    ROSUVASTATIN (CRESTOR) 20 MG TABLET rosuvastatin (CRESTOR) 5 MG tablet       Take 1 tablet (20 mg total) by mouth once daily.    Take 1 tablet (5 mg total) by mouth once daily.   Discontinued Medications    No medications on file

## 2017-07-12 NOTE — TELEPHONE ENCOUNTER
----- Message from Kandace Coronado sent at 7/12/2017  8:57 AM CDT -----  Contact: [phone]  Patient is requesting to  an order for adderall

## 2017-07-12 NOTE — PATIENT INSTRUCTIONS
HOLD METOPROLOL AND CLONIDINE DOSE TONIGHT!!!!!    DECREASE THE AMLODIPINE TO 5 MG IN AM AND DECREASE THE CLONIDINE TO 0.1 MG AT BEDTIME ONLY STARTING TOMORROW NIGHT.    CRESTOR/ROSUVASTATIN INCREASED TO 20 MG DAILY AND RECHECK FASTING LABS IN 3 MONTHS.    MONITOR BP AT HOME, IF > 100/60 - CONTINUE CURRENT MEDICATIONS AND FOLLOW UP IN 4 WEEKS.  IF < 100/60 - CALL OFFICE.

## 2017-08-16 ENCOUNTER — OFFICE VISIT (OUTPATIENT)
Dept: FAMILY MEDICINE | Facility: CLINIC | Age: 62
End: 2017-08-16
Payer: COMMERCIAL

## 2017-08-16 VITALS
SYSTOLIC BLOOD PRESSURE: 134 MMHG | HEIGHT: 66 IN | OXYGEN SATURATION: 98 % | HEART RATE: 108 BPM | TEMPERATURE: 99 F | BODY MASS INDEX: 25.22 KG/M2 | WEIGHT: 156.94 LBS | DIASTOLIC BLOOD PRESSURE: 70 MMHG

## 2017-08-16 DIAGNOSIS — F90.0 ADHD (ATTENTION DEFICIT HYPERACTIVITY DISORDER), INATTENTIVE TYPE: Primary | ICD-10-CM

## 2017-08-16 DIAGNOSIS — R25.2 LEG CRAMPS: ICD-10-CM

## 2017-08-16 DIAGNOSIS — I10 ESSENTIAL HYPERTENSION: ICD-10-CM

## 2017-08-16 DIAGNOSIS — E78.5 HYPERLIPIDEMIA, UNSPECIFIED HYPERLIPIDEMIA TYPE: ICD-10-CM

## 2017-08-16 DIAGNOSIS — F90.0 ADHD (ATTENTION DEFICIT HYPERACTIVITY DISORDER), INATTENTIVE TYPE: ICD-10-CM

## 2017-08-16 PROCEDURE — 3078F DIAST BP <80 MM HG: CPT | Mod: S$GLB,,, | Performed by: NURSE PRACTITIONER

## 2017-08-16 PROCEDURE — 3075F SYST BP GE 130 - 139MM HG: CPT | Mod: S$GLB,,, | Performed by: NURSE PRACTITIONER

## 2017-08-16 PROCEDURE — 99214 OFFICE O/P EST MOD 30 MIN: CPT | Mod: S$GLB,,, | Performed by: NURSE PRACTITIONER

## 2017-08-16 PROCEDURE — 99999 PR PBB SHADOW E&M-EST. PATIENT-LVL V: CPT | Mod: PBBFAC,,, | Performed by: NURSE PRACTITIONER

## 2017-08-16 PROCEDURE — 3008F BODY MASS INDEX DOCD: CPT | Mod: S$GLB,,, | Performed by: NURSE PRACTITIONER

## 2017-08-16 RX ORDER — ATORVASTATIN CALCIUM 20 MG/1
20 TABLET, FILM COATED ORAL DAILY
Qty: 90 TABLET | Refills: 1 | Status: SHIPPED | OUTPATIENT
Start: 2017-08-16 | End: 2017-12-01 | Stop reason: ALTCHOICE

## 2017-08-16 RX ORDER — DEXTROAMPHETAMINE SACCHARATE, AMPHETAMINE ASPARTATE, DEXTROAMPHETAMINE SULFATE AND AMPHETAMINE SULFATE 5; 5; 5; 5 MG/1; MG/1; MG/1; MG/1
TABLET ORAL
Qty: 135 TABLET | Refills: 0 | Status: SHIPPED | OUTPATIENT
Start: 2017-08-16 | End: 2017-12-26 | Stop reason: DRUGHIGH

## 2017-08-16 RX ORDER — DEXTROAMPHETAMINE SACCHARATE, AMPHETAMINE ASPARTATE, DEXTROAMPHETAMINE SULFATE AND AMPHETAMINE SULFATE 2.5; 2.5; 2.5; 2.5 MG/1; MG/1; MG/1; MG/1
10 TABLET ORAL 2 TIMES DAILY
Qty: 60 TABLET | Refills: 0 | OUTPATIENT
Start: 2017-08-16

## 2017-08-16 NOTE — PROGRESS NOTES
"Subjective:       Patient ID: Madalyn Iverson is a 62 y.o. female.    Chief Complaint: Follow-up (Blood pressure check)    Patient is here today for follow up.    Patient has Hypertension.  At last visit, patient's blood pressure was low and we had to decrease medications.  Amlodipine was cut back from 10 to 5 mg daily and Clonidine 0.1 mg was cut back from BID to daily at bedtime.  The Lasix and Metoprolol were continued at present doses.  Blood pressure is now stable.  /70 (BP Location: Left arm, Patient Position: Sitting, BP Method: Medium (Manual))   Pulse 108   Temp 98.7 °F (37.1 °C) (Oral)   Ht 5' 6" (1.676 m)   Wt 71.2 kg (156 lb 15.5 oz)   SpO2 98%   BMI 25.34 kg/m²     Patient has ADHD - patient was on Adderall 20 mg twice daily in the past - when patient started having problems with blood pressure going really high due to pain level - I stopped the Adderall.  Once the pain improved after patient had nerve block and blood pressure became better controlled, I started patient back on Adderall at 10 mg twice daily.  Patient now asking to go back up to higher dose.  Advised patient that I would agree to 20 mg in AM and 10 mg mid-day.    Patient was started on Crestor 5 mg in May - we increased to 20 mg at end of June due to uncontrolled levels.  Patient reports she STOPPED the Crestor due to charley horses/muscle cramps.        Previous Medications    ALPRAZOLAM (XANAX) 0.5 MG TABLET    Take 0.5 mg by mouth every 8 (eight) hours as needed.    AMLODIPINE (NORVASC) 5 MG TABLET    Take 1 tablet (5 mg total) by mouth once daily.    ARMOUR THYROID 60 MG TAB    Take 60 mg by mouth once daily.    CITALOPRAM (CELEXA) 20 MG TABLET    Take 20 mg by mouth once daily.    CLONIDINE (CATAPRES) 0.1 MG TABLET    Take 1 tablet (0.1 mg total) by mouth nightly.    CYANOCOBALAMIN, VITAMIN B-12, (VITAMIN B-12 INJ)    Inject 1,000 mcg as directed every 30 days.    ERGOCALCIFEROL (ERGOCALCIFEROL) 50,000 UNIT CAP    " Take 1 capsule (50,000 Units total) by mouth every 7 days.    ESTRADIOL (ESTRACE) 2 MG TABLET    Take 2 mg by mouth once daily.    FUROSEMIDE (LASIX) 40 MG TABLET    TAKE 1 TABLET (40 MG TOTAL) BY MOUTH ONCE DAILY.    KLOR-CON SPRINKLE 10 MEQ CPSR    TAKE 1 CAPSULE TWICE A DAY    METOPROLOL SUCCINATE (TOPROL-XL) 50 MG 24 HR TABLET    TAKE 1 TABLET BY MOUTH EVERY DAY       Past Medical History:   Diagnosis Date    ADHD (attention deficit hyperactivity disorder), inattentive type     Crohn's colitis     Fibromyalgia     treated by Dr. Thorpe - Rheumatologist    Hyperlipidemia     High triglycerides    Hypertension     Hypothyroidism     Treated by Dr. Mathew    IFG (impaired fasting glucose)     Migraine     Treated by neurologist - Dr. Destiny Florez    Ulcerative colitis     Treated by Dr. Lee and Aaron    Vitamin D deficiency 3/31/2016       Past Surgical History:   Procedure Laterality Date     SECTION      COLONOSCOPY  10/08/2010    Dr. Lee - repeat in 5 years - has appointment for colonoscopy 2016    COLONOSCOPY  2016    Dr. Kelly - normal  colon - repeat in 10 years - scanned report to media file.    HYSTERECTOMY      OOPHORECTOMY      SHOULDER SURGERY Left     TONSILLECTOMY      upper and lower GI  2016       Family History   Problem Relation Age of Onset    Hypertension Mother     Heart disease Mother     Hyperlipidemia Mother     Cancer Sister 53     thyroid cancer and lymph nodes involvement    Cancer Brother 61     colon and lung cancer    Cancer Brother 63     colon    Hypertension Brother     Diabetes Brother        Social History     Social History    Marital status:      Spouse name: N/A    Number of children: N/A    Years of education: N/A     Social History Main Topics    Smoking status: Former Smoker     Packs/day: 1.00     Years: 20.00     Quit date: 1997    Smokeless tobacco: Never Used    Alcohol use No    Drug use: No  "   Sexual activity: No     Other Topics Concern    None     Social History Narrative    None       Review of Systems   Constitutional: Negative for appetite change, chills, fatigue, fever and unexpected weight change.   HENT: Negative for congestion, ear pain, mouth sores, nosebleeds, postnasal drip, rhinorrhea, sinus pressure, sneezing, sore throat, trouble swallowing and voice change.    Eyes: Negative for photophobia, pain, discharge, redness, itching and visual disturbance.   Respiratory: Negative for cough, chest tightness and shortness of breath.    Cardiovascular: Negative for chest pain, palpitations and leg swelling.   Gastrointestinal: Negative for abdominal pain, blood in stool, constipation, diarrhea, nausea and vomiting.   Genitourinary: Negative for dysuria, frequency, hematuria and urgency.   Musculoskeletal: Positive for myalgias. Negative for arthralgias, back pain and joint swelling.   Skin: Negative for color change and rash.   Allergic/Immunologic: Negative for immunocompromised state.   Neurological: Negative for dizziness, seizures, syncope, weakness and headaches.   Hematological: Negative for adenopathy. Does not bruise/bleed easily.   Psychiatric/Behavioral: Negative for agitation, dysphoric mood, sleep disturbance and suicidal ideas. The patient is not nervous/anxious.          Objective:     Vitals:    08/16/17 1516   BP: 134/70   BP Location: Left arm   Patient Position: Sitting   BP Method: Medium (Manual)   Pulse: 108   Temp: 98.7 °F (37.1 °C)   TempSrc: Oral   SpO2: 98%   Weight: 71.2 kg (156 lb 15.5 oz)   Height: 5' 6" (1.676 m)          Physical Exam   Constitutional: She is oriented to person, place, and time. She appears well-developed and well-nourished. No distress.   Body mass index is 25.34 kg/m².         HENT:   Head: Normocephalic and atraumatic.   Right Ear: External ear normal.   Left Ear: External ear normal.   Nose: Nose normal.   Mouth/Throat: Oropharynx is clear and " moist. No oropharyngeal exudate.   Eyes: Conjunctivae and EOM are normal. Pupils are equal, round, and reactive to light.   Neck: Normal range of motion. Neck supple. No JVD present. No tracheal deviation present. No thyromegaly present.   Cardiovascular: Normal rate, regular rhythm and normal heart sounds.    No murmur heard.  Pulmonary/Chest: Effort normal and breath sounds normal. No stridor. No respiratory distress. She has no wheezes. She has no rales.   Abdominal: Soft. Bowel sounds are normal. She exhibits no distension. There is no guarding.   Musculoskeletal: Normal range of motion. She exhibits no edema.   Lymphadenopathy:     She has no cervical adenopathy.   Neurological: She is alert and oriented to person, place, and time. No cranial nerve deficit. Coordination normal.   Skin: Skin is warm and dry. No rash noted. She is not diaphoretic.   Psychiatric: She has a normal mood and affect.         Assessment:         ICD-10-CM ICD-9-CM   1. ADHD (attention deficit hyperactivity disorder), inattentive type F90.0 314.00   2. Essential hypertension I10 401.9   3. Hyperlipidemia, unspecified hyperlipidemia type E78.5 272.4   4. Leg cramps R25.2 729.82       Plan:       ADHD (attention deficit hyperactivity disorder), inattentive type  -  Increase to 20 mg in AM and 10 mg mid-day - recheck in 3 months.  -     dextroamphetamine-amphetamine (ADDERALL) 20 mg tablet; Take 1 tablet in AM and 1/2 tablet mid-day  Dispense: 135 tablet; Refill: 0    Essential hypertension  -  Continue Amlodipine 5 mg in AM, Clonidine 0.1 mg at night and the Metoprolol and Lasix at present doses.    Hyperlipidemia, unspecified hyperlipidemia type  -  Stop the Crestor due to myalgias and change to Atorvastatin 20 mg daily - recheck CMP and lipids in 3 months.  -     atorvastatin (LIPITOR) 20 MG tablet; Take 1 tablet (20 mg total) by mouth once daily.  Dispense: 90 tablet; Refill: 1    Leg cramps  -  See handout on home care  instructions.      Return in about 3 months (around 11/16/2017) for check cholesterol levels in 3 months.     Patient's Medications   New Prescriptions    ATORVASTATIN (LIPITOR) 20 MG TABLET    Take 1 tablet (20 mg total) by mouth once daily.    DEXTROAMPHETAMINE-AMPHETAMINE (ADDERALL) 20 MG TABLET    Take 1 tablet in AM and 1/2 tablet mid-day   Previous Medications    ALPRAZOLAM (XANAX) 0.5 MG TABLET    Take 0.5 mg by mouth every 8 (eight) hours as needed.    AMLODIPINE (NORVASC) 5 MG TABLET    Take 1 tablet (5 mg total) by mouth once daily.    ARMOUR THYROID 60 MG TAB    Take 60 mg by mouth once daily.    CITALOPRAM (CELEXA) 20 MG TABLET    Take 20 mg by mouth once daily.    CLONIDINE (CATAPRES) 0.1 MG TABLET    Take 1 tablet (0.1 mg total) by mouth nightly.    CYANOCOBALAMIN, VITAMIN B-12, (VITAMIN B-12 INJ)    Inject 1,000 mcg as directed every 30 days.    ERGOCALCIFEROL (ERGOCALCIFEROL) 50,000 UNIT CAP    Take 1 capsule (50,000 Units total) by mouth every 7 days.    ESTRADIOL (ESTRACE) 2 MG TABLET    Take 2 mg by mouth once daily.    FUROSEMIDE (LASIX) 40 MG TABLET    TAKE 1 TABLET (40 MG TOTAL) BY MOUTH ONCE DAILY.    KLOR-CON SPRINKLE 10 MEQ CPSR    TAKE 1 CAPSULE TWICE A DAY    METOPROLOL SUCCINATE (TOPROL-XL) 50 MG 24 HR TABLET    TAKE 1 TABLET BY MOUTH EVERY DAY   Modified Medications    No medications on file   Discontinued Medications    DEXTROAMPHETAMINE-AMPHETAMINE 10 MG TAB    Take 10 mg by mouth 2 (two) times daily.    HYDROCODONE-ACETAMINOPHEN 10-325MG (NORCO)  MG TAB    Take 1 tablet by mouth 4 (four) times daily as needed.    ROSUVASTATIN (CRESTOR) 20 MG TABLET    Take 1 tablet (20 mg total) by mouth once daily.

## 2017-08-16 NOTE — TELEPHONE ENCOUNTER
----- Message from Viri Loomis sent at 8/16/2017 10:19 AM CDT -----  Contact: [phone]/self  Refill request for dextroamphetamine-amphetamine (ADDERALL) 20 mg tablet   Please advise

## 2017-08-16 NOTE — PATIENT INSTRUCTIONS
Leg Cramps  A muscle cramp or spasm is a strong contraction of the muscle fibers. It is also called a charley horse. This may occur in the foot, calf, or thigh at night when the legs are elevated. If the spasm is prolonged, it can become very painful.  This may be caused by sleeping in an uncomfortable position, muscle fatigue, poor muscle tone from lack of exercise and stretching, dehydration, electrolyte imbalance, diabetes, alcohol use, and certain medicine.  Home care  · Drink plenty of fluids during the day to prevent dehydration.  · Stretch your legs before bedtime.  · Eat a diet high in potassium. These foods include fresh fruit, such as bananas, oranges, cantaloupe, and honeydew melon. It also includes apple, prune, orange, grape and pineapple juices. Other foods high in potassium are white, red, and mcclure beans, baked potatoes, raw spinach, cod, flounder, halibut, salmon, and scallops.  · Talk with your healthcare provider about taking mineral and vitamin supplements that contain magnesium and vitamin B-12 if you are not already taking these. Other prescription medicines may also be used.  · Avoid stimulants such as caffeine, nicotine, decongestants.  How to relieve an acute leg cramp  · For mild pain, getting out of the bed and walking may help. Some people find relief with heat and massage. You can apply heat with a warm shower, bath, or compress. Some people feel better with a cold packs. You can make an ice pack by filling a plastic bag that seals at the top with ice cubes and then wrapping it with a thin towel. Try both and use the method that feels best for 15 to 20 minutes at a time.  · For severe pain, stretching the muscle that is in spasm may quickly relieve the pain.  · When the spasm is in your foot, your toes may curl up or down. To stretch the muscle in spasm, bend your toes in the opposite direction. If the spasm pulls your toes up, bend them down. If the spasm pulls them down, bend them  up.  · When the spasm is in your calf, bend the ankle so the foot points upward toward your knee.  · When the spasm is in your thigh, bend or straighten the knee and hip until you feel relief.  Follow-up care  Follow up with your healthcare provider, or as advised.  When to seek medical advice  Call your healthcare provider right away if any of these occur:  · Walking makes your pain worse and rest makes it better  · You develop weakness in the affected leg  · Pain or frequency of spasms increases and is not controlled by the above measures  Date Last Reviewed: 11/23/2015  © 3093-4159 [employer]. [address] [place] [address], [place] [address]. All rights reserved. This information is not intended as a substitute for professional medical care. Always follow your healthcare professional's instructions.

## 2017-08-16 NOTE — TELEPHONE ENCOUNTER
Advise patient she is due for office visit - we have to recheck her blood pressure since it was low last visit and changed medications.

## 2017-09-11 RX ORDER — FUROSEMIDE 40 MG/1
TABLET ORAL
Qty: 90 TABLET | Refills: 1 | Status: SHIPPED | OUTPATIENT
Start: 2017-09-11 | End: 2018-03-05 | Stop reason: SDUPTHER

## 2017-10-09 DIAGNOSIS — I10 ESSENTIAL HYPERTENSION: ICD-10-CM

## 2017-10-09 RX ORDER — AMLODIPINE BESYLATE 10 MG/1
TABLET ORAL
Qty: 90 TABLET | Refills: 1 | OUTPATIENT
Start: 2017-10-09

## 2017-10-10 RX ORDER — METOPROLOL SUCCINATE 50 MG/1
TABLET, EXTENDED RELEASE ORAL
Qty: 90 TABLET | Refills: 0 | Status: SHIPPED | OUTPATIENT
Start: 2017-10-10 | End: 2018-01-06 | Stop reason: SDUPTHER

## 2017-11-17 ENCOUNTER — TELEPHONE (OUTPATIENT)
Dept: FAMILY MEDICINE | Facility: CLINIC | Age: 62
End: 2017-11-17

## 2017-11-17 DIAGNOSIS — I10 ESSENTIAL HYPERTENSION: ICD-10-CM

## 2017-11-17 DIAGNOSIS — E78.5 HYPERLIPIDEMIA, UNSPECIFIED HYPERLIPIDEMIA TYPE: Primary | ICD-10-CM

## 2017-11-17 NOTE — TELEPHONE ENCOUNTER
----- Message from Artur Shearer sent at 11/17/2017  4:30 PM CST -----  Contact: [phone] self  Patient called in returning your call. Please advise.

## 2017-11-17 NOTE — TELEPHONE ENCOUNTER
Called pt no answer left message to call office to schedule labs and appointment for refill on Friday.

## 2017-11-17 NOTE — TELEPHONE ENCOUNTER
----- Message from Kandace Coronado sent at 11/17/2017  7:44 AM CST -----  Contact: [phone]  Patient is requesting to  an order for adderral

## 2017-11-17 NOTE — TELEPHONE ENCOUNTER
Spoke with pt requesting refill inform pt time for office visit do pt need ,labs done to check cholesterol,if so need orders.

## 2017-12-01 ENCOUNTER — OFFICE VISIT (OUTPATIENT)
Dept: FAMILY MEDICINE | Facility: CLINIC | Age: 62
End: 2017-12-01
Payer: COMMERCIAL

## 2017-12-01 VITALS
TEMPERATURE: 98 F | OXYGEN SATURATION: 98 % | HEART RATE: 105 BPM | BODY MASS INDEX: 25.58 KG/M2 | WEIGHT: 159.19 LBS | SYSTOLIC BLOOD PRESSURE: 154 MMHG | HEIGHT: 66 IN | DIASTOLIC BLOOD PRESSURE: 78 MMHG

## 2017-12-01 DIAGNOSIS — M19.042 PRIMARY OSTEOARTHRITIS OF BOTH HANDS: ICD-10-CM

## 2017-12-01 DIAGNOSIS — E78.5 HYPERLIPIDEMIA, UNSPECIFIED HYPERLIPIDEMIA TYPE: Primary | ICD-10-CM

## 2017-12-01 DIAGNOSIS — M19.041 PRIMARY OSTEOARTHRITIS OF BOTH HANDS: ICD-10-CM

## 2017-12-01 DIAGNOSIS — I10 ESSENTIAL HYPERTENSION: ICD-10-CM

## 2017-12-01 DIAGNOSIS — F90.0 ADHD (ATTENTION DEFICIT HYPERACTIVITY DISORDER), INATTENTIVE TYPE: ICD-10-CM

## 2017-12-01 PROCEDURE — 99214 OFFICE O/P EST MOD 30 MIN: CPT | Mod: S$GLB,,, | Performed by: NURSE PRACTITIONER

## 2017-12-01 PROCEDURE — 99999 PR PBB SHADOW E&M-EST. PATIENT-LVL V: CPT | Mod: PBBFAC,,, | Performed by: NURSE PRACTITIONER

## 2017-12-01 RX ORDER — ROSUVASTATIN CALCIUM 10 MG/1
10 TABLET, COATED ORAL DAILY
Qty: 90 TABLET | Refills: 1 | Status: SHIPPED | OUTPATIENT
Start: 2017-12-01 | End: 2018-07-06 | Stop reason: SDUPTHER

## 2017-12-01 RX ORDER — AMLODIPINE BESYLATE 10 MG/1
10 TABLET ORAL DAILY
Qty: 30 TABLET | Refills: 1 | Status: SHIPPED | OUTPATIENT
Start: 2017-12-01 | End: 2018-01-18 | Stop reason: SDUPTHER

## 2017-12-01 RX ORDER — DICLOFENAC SODIUM 10 MG/G
2 GEL TOPICAL 4 TIMES DAILY
Qty: 100 G | Refills: 5 | Status: SHIPPED | OUTPATIENT
Start: 2017-12-01 | End: 2019-03-21 | Stop reason: ALTCHOICE

## 2017-12-01 NOTE — PROGRESS NOTES
"Subjective:       Patient ID: Madalyn Iverson is a 62 y.o. female.    Chief Complaint: Follow-up (lab results)    Patient is here today for follow up with fasting lab results.    Patient has Hypertension - she is currently taking Amlodipine 5 mg daily, Clonidine 0.1 mg at bedtime, Metoprolol 50 mg daily and Lasix 40 mg daily.  Blood pressure is high.  BP (!) 154/78 (BP Location: Right arm, Patient Position: Sitting, BP Method: Medium (Manual))   Pulse 105   Temp 97.8 °F (36.6 °C) (Oral)   Ht 5' 6" (1.676 m)   Wt 72.2 kg (159 lb 3.2 oz)   SpO2 98%   BMI 25.70 kg/m²     Patient has Hyperlipidemia - she was on Crestor but reports she was having leg cramps at night so she stopped taking on her own, so at last visit I changed to Atorvastatin and again patient reports she doesn't take every day due to leg cramps at night - advised patient that she is at increased cardiovascular risk and must lower cholesterol levels and blood pressure or I will not prescribe any medication for ADHD that is a stimulant and can also affect cardiovascular system.  Agreed to get back on Crestor - advised to take Co-Q 10 and magnesium supplement daily for the leg cramps and will recheck in 3 months.    Patient has ADHD - patient was on Adderall 20 mg twice daily in the past - when patient started having problems with blood pressure going really high due to pain level earlier this year- I stopped the Adderall.  Once the pain improved after patient had nerve block and blood pressure became better controlled, I started patient back on Adderall at 10 mg twice daily which she tolerated well.  At last visit, Patient asked to go back up to higher dose.  I agreed to 20 mg in AM and 10 mg mid-day at last visit BUT NOW blood pressure is uncontrolled and cholesterol uncontrolled.  Advised patient that I will not prescribe Adderall until blood pressure is well controlled.      Component      Latest Ref Rng & Units 11/21/2017 6/23/2017 5/12/2017 "   Sodium      136 - 145 mmol/L 145 139 142   Potassium      3.5 - 5.1 mmol/L 3.9 4.9 4.0   Chloride      95 - 110 mmol/L 105 99 104   CO2      23 - 29 mmol/L 31 (H) 31 (H) 30 (H)   Glucose      70 - 110 mg/dL 110 104 114 (H)   BUN, Bld      7 - 17 mg/dL 15 17 11   Creatinine      0.50 - 1.40 mg/dL 0.67 0.64 0.56   Calcium      8.7 - 10.5 mg/dL 9.8 9.8 9.0   Total Protein      6.0 - 8.4 g/dL 8.0 8.0 7.4   Albumin      3.5 - 5.2 g/dL 4.3 4.0 3.9   Total Bilirubin      0.1 - 1.0 mg/dL 0.5 0.4 0.6   Alkaline Phosphatase      38 - 126 U/L 141 (H) 173 (H) 135 (H)   AST      15 - 46 U/L 21 23 25   ALT      10 - 44 U/L 24 32 24   Anion Gap      8 - 16 mmol/L 9 9 8   eGFR if African American      >60 mL/min/1.73 m:2 >60.0 >60.0 >60.0   eGFR if non African American      >60 mL/min/1.73 m:2 >60.0 >60.0 >60.0   Cholesterol      120 - 199 mg/dL 240 (H) 266 (H) 222 (H)   Triglycerides      30 - 150 mg/dL 256 (H) 226 (H) 218 (H)   HDL      40 - 75 mg/dL 55 62 54   LDL Cholesterol      63.0 - 159.0 mg/dL 133.8 158.8 124.4   HDL/Chol Ratio      20.0 - 50.0 % 22.9 23.3 24.3   Total Cholesterol/HDL Ratio      2.0 - 5.0 4.4 4.3 4.1   Non-HDL Cholesterol      mg/dL 185 204 168   Hemoglobin A1C      4.0 - 5.6 %  5.8 (H)    Estimated Avg Glucose      68 - 131 mg/dL  120        Previous Medications    ALPRAZOLAM (XANAX) 0.5 MG TABLET    Take 0.5 mg by mouth every 8 (eight) hours as needed.    AMLODIPINE (NORVASC) 5 MG TABLET    Take 1 tablet (5 mg total) by mouth once daily.    ARMOUR THYROID 60 MG TAB    Take 60 mg by mouth once daily.    ATORVASTATIN (LIPITOR) 20 MG TABLET    Take 1 tablet (20 mg total) by mouth once daily.    CITALOPRAM (CELEXA) 20 MG TABLET    Take 20 mg by mouth once daily.    CLONIDINE (CATAPRES) 0.1 MG TABLET    Take 1 tablet (0.1 mg total) by mouth nightly.    CYANOCOBALAMIN, VITAMIN B-12, (VITAMIN B-12 INJ)    Inject 1,000 mcg as directed every 30 days.    DEXTROAMPHETAMINE-AMPHETAMINE (ADDERALL) 20 MG TABLET     Take 1 tablet in AM and 1/2 tablet mid-day    ERGOCALCIFEROL (ERGOCALCIFEROL) 50,000 UNIT CAP    Take 1 capsule (50,000 Units total) by mouth every 7 days.    ESTRADIOL (ESTRACE) 2 MG TABLET    Take 2 mg by mouth once daily.    FUROSEMIDE (LASIX) 40 MG TABLET    TAKE 1 TABLET (40 MG TOTAL) BY MOUTH ONCE DAILY.    KLOR-CON SPRINKLE 10 MEQ CPSR    TAKE 1 CAPSULE TWICE A DAY    METOPROLOL SUCCINATE (TOPROL-XL) 50 MG 24 HR TABLET    TAKE 1 TABLET BY MOUTH EVERY DAY       Past Medical History:   Diagnosis Date    ADHD (attention deficit hyperactivity disorder), inattentive type     Crohn's colitis     Fibromyalgia     treated by Dr. Thorpe - Rheumatologist    Hyperlipidemia     High triglycerides    Hypertension     Hypothyroidism     Treated by Dr. Mathew    IFG (impaired fasting glucose)     Migraine     Treated by neurologist - Dr. Destiny Florez    Ulcerative colitis     Treated by Dr. Lee and Aaron    Vitamin D deficiency 3/31/2016       Past Surgical History:   Procedure Laterality Date     SECTION      COLONOSCOPY  10/08/2010    Dr. Lee - repeat in 5 years - has appointment for colonoscopy 2016    COLONOSCOPY  2016    Dr. Kelly - normal  colon - repeat in 10 years - scanned report to media file.    HYSTERECTOMY      OOPHORECTOMY      SHOULDER SURGERY Left     TONSILLECTOMY      upper and lower GI  2016       Family History   Problem Relation Age of Onset    Hypertension Mother     Heart disease Mother     Hyperlipidemia Mother     Cancer Sister 53     thyroid cancer and lymph nodes involvement    Cancer Brother 61     colon and lung cancer    Cancer Brother 63     colon    Hypertension Brother     Diabetes Brother        Social History     Social History    Marital status:      Spouse name: N/A    Number of children: N/A    Years of education: N/A     Social History Main Topics    Smoking status: Former Smoker     Packs/day: 1.00     Years: 20.00  "    Quit date: 2/1/1997    Smokeless tobacco: Never Used    Alcohol use No    Drug use: No    Sexual activity: No     Other Topics Concern    None     Social History Narrative    None       Review of Systems   Constitutional: Negative for appetite change, chills, fatigue, fever and unexpected weight change.   HENT: Negative for congestion, ear pain, mouth sores, nosebleeds, postnasal drip, rhinorrhea, sinus pressure, sneezing, sore throat, trouble swallowing and voice change.    Eyes: Negative for photophobia, pain, discharge, redness, itching and visual disturbance.   Respiratory: Negative for cough, chest tightness and shortness of breath.    Cardiovascular: Negative for chest pain, palpitations and leg swelling.   Gastrointestinal: Negative for abdominal pain, blood in stool, constipation, diarrhea, nausea and vomiting.   Genitourinary: Negative for dysuria, frequency, hematuria and urgency.   Musculoskeletal: Positive for arthralgias, back pain and myalgias. Negative for joint swelling.   Skin: Negative for color change and rash.   Allergic/Immunologic: Negative for immunocompromised state.   Neurological: Negative for dizziness, seizures, syncope, weakness and headaches.   Hematological: Negative for adenopathy. Does not bruise/bleed easily.   Psychiatric/Behavioral: Negative for agitation, dysphoric mood, sleep disturbance and suicidal ideas. The patient is nervous/anxious.          Objective:     Vitals:    12/01/17 1102   BP: (!) 154/78   BP Location: Right arm   Patient Position: Sitting   BP Method: Medium (Manual)   Pulse: 105   Temp: 97.8 °F (36.6 °C)   TempSrc: Oral   SpO2: 98%   Weight: 72.2 kg (159 lb 3.2 oz)   Height: 5' 6" (1.676 m)          Physical Exam   Constitutional: She is oriented to person, place, and time. She appears well-developed and well-nourished. No distress.   Body mass index is 25.7 kg/m².     HENT:   Head: Normocephalic and atraumatic.   Right Ear: External ear normal. "   Left Ear: External ear normal.   Nose: Nose normal.   Mouth/Throat: Oropharynx is clear and moist. No oropharyngeal exudate.   Eyes: Conjunctivae and EOM are normal. Pupils are equal, round, and reactive to light.   Neck: Normal range of motion. Neck supple. No JVD present. No tracheal deviation present. No thyromegaly present.   Cardiovascular: Normal rate, regular rhythm and normal heart sounds.    No murmur heard.  Pulmonary/Chest: Effort normal and breath sounds normal. No stridor. No respiratory distress. She has no wheezes. She has no rales.   Abdominal: Soft. Bowel sounds are normal. She exhibits no distension. There is no guarding.   Musculoskeletal: Normal range of motion. She exhibits no edema.   Lymphadenopathy:     She has no cervical adenopathy.   Neurological: She is alert and oriented to person, place, and time. No cranial nerve deficit. Coordination normal.   Skin: Skin is warm and dry. No rash noted. She is not diaphoretic.   Psychiatric: She has a normal mood and affect.         Assessment:         ICD-10-CM ICD-9-CM   1. Hyperlipidemia, unspecified hyperlipidemia type E78.5 272.4   2. Essential hypertension I10 401.9   3. ADHD (attention deficit hyperactivity disorder), inattentive type F90.0 314.00   4. Primary osteoarthritis of both hands M19.041 715.14    M19.042        Plan:       Hyperlipidemia, unspecified hyperlipidemia type  -  Stop the Atorvastatin 20 mg daily and will change back to Crestor 10 mg daily since tolerated better.  Add on Co-Q10 and magnesium supplement daily.  Recheck labs in 3 to 6 months.  -     rosuvastatin (CRESTOR) 10 MG tablet; Take 1 tablet (10 mg total) by mouth once daily.  Dispense: 90 tablet; Refill: 1    Essential hypertension  -  Increase the Amlodipine from 5 to 10 mg daily, continue the Lasix and Metoprolol at present doses.  Cotinue the Clonidine 0.1 mg at bedtime but monitor blood pressure at home - if after 1 week of taking all meds as directed, if not <  130/80 - increase Clonidine to 0.1 mg twice daily.  -     amLODIPine (NORVASC) 10 MG tablet; Take 1 tablet (10 mg total) by mouth once daily.  Dispense: 30 tablet; Refill: 1    ADHD (attention deficit hyperactivity disorder), inattentive type  -  HOLD medication until blood pressure controlled and then will start back at Adderall 10 mg twice daily.    Primary osteoarthritis of both hands  - Patient can't take NSAIDs due to Ulcerative Colitis - will trial Topical Diclofenac - warned patient that even though it is topical and not ingested - it can still affect GI system due to systemic absorption - so IF GI side effects, stop medication.  -     diclofenac sodium 1 % Gel; Apply 2 g topically 4 (four) times daily.  Dispense: 100 g; Refill: 5      Return in about 2 weeks (around 12/15/2017) for blood pressure check.     Patient's Medications   New Prescriptions    DICLOFENAC SODIUM 1 % GEL    Apply 2 g topically 4 (four) times daily.    ROSUVASTATIN (CRESTOR) 10 MG TABLET    Take 1 tablet (10 mg total) by mouth once daily.   Previous Medications    ALPRAZOLAM (XANAX) 0.5 MG TABLET    Take 0.5 mg by mouth every 8 (eight) hours as needed.    ARMOUR THYROID 60 MG TAB    Take 60 mg by mouth once daily.    CITALOPRAM (CELEXA) 20 MG TABLET    Take 20 mg by mouth once daily.    CLONIDINE (CATAPRES) 0.1 MG TABLET    Take 1 tablet (0.1 mg total) by mouth nightly.    CYANOCOBALAMIN, VITAMIN B-12, (VITAMIN B-12 INJ)    Inject 1,000 mcg as directed every 30 days.    DEXTROAMPHETAMINE-AMPHETAMINE (ADDERALL) 20 MG TABLET    Take 1 tablet in AM and 1/2 tablet mid-day    ERGOCALCIFEROL (ERGOCALCIFEROL) 50,000 UNIT CAP    Take 1 capsule (50,000 Units total) by mouth every 7 days.    ESTRADIOL (ESTRACE) 2 MG TABLET    Take 2 mg by mouth once daily.    FUROSEMIDE (LASIX) 40 MG TABLET    TAKE 1 TABLET (40 MG TOTAL) BY MOUTH ONCE DAILY.    KLOR-CON SPRINKLE 10 MEQ CPSR    TAKE 1 CAPSULE TWICE A DAY    METOPROLOL SUCCINATE (TOPROL-XL) 50 MG  24 HR TABLET    TAKE 1 TABLET BY MOUTH EVERY DAY   Modified Medications    Modified Medication Previous Medication    AMLODIPINE (NORVASC) 10 MG TABLET amlodipine (NORVASC) 5 MG tablet       Take 1 tablet (10 mg total) by mouth once daily.    Take 1 tablet (5 mg total) by mouth once daily.   Discontinued Medications    ATORVASTATIN (LIPITOR) 20 MG TABLET    Take 1 tablet (20 mg total) by mouth once daily.

## 2017-12-26 ENCOUNTER — OFFICE VISIT (OUTPATIENT)
Dept: FAMILY MEDICINE | Facility: CLINIC | Age: 62
End: 2017-12-26
Payer: COMMERCIAL

## 2017-12-26 VITALS
OXYGEN SATURATION: 97 % | TEMPERATURE: 98 F | DIASTOLIC BLOOD PRESSURE: 64 MMHG | HEART RATE: 104 BPM | BODY MASS INDEX: 26.36 KG/M2 | WEIGHT: 164 LBS | SYSTOLIC BLOOD PRESSURE: 128 MMHG | HEIGHT: 66 IN

## 2017-12-26 DIAGNOSIS — I10 ESSENTIAL HYPERTENSION: Primary | ICD-10-CM

## 2017-12-26 DIAGNOSIS — F90.0 ADHD (ATTENTION DEFICIT HYPERACTIVITY DISORDER), INATTENTIVE TYPE: ICD-10-CM

## 2017-12-26 PROCEDURE — 99999 PR PBB SHADOW E&M-EST. PATIENT-LVL V: CPT | Mod: PBBFAC,,, | Performed by: NURSE PRACTITIONER

## 2017-12-26 PROCEDURE — 99213 OFFICE O/P EST LOW 20 MIN: CPT | Mod: S$GLB,,, | Performed by: NURSE PRACTITIONER

## 2017-12-26 RX ORDER — ACETAMINOPHEN 500 MG
5000 TABLET ORAL DAILY
COMMUNITY
End: 2018-07-09

## 2017-12-26 RX ORDER — DEXTROAMPHETAMINE SACCHARATE, AMPHETAMINE ASPARTATE, DEXTROAMPHETAMINE SULFATE AND AMPHETAMINE SULFATE 2.5; 2.5; 2.5; 2.5 MG/1; MG/1; MG/1; MG/1
10 TABLET ORAL 2 TIMES DAILY
Qty: 60 TABLET | Refills: 0 | Status: SHIPPED | OUTPATIENT
Start: 2017-12-26 | End: 2018-01-29 | Stop reason: SDUPTHER

## 2017-12-26 NOTE — PROGRESS NOTES
"Subjective:       Patient ID: Madalyn Iverson is a 62 y.o. female.    Chief Complaint: Follow-up (blood pressure check)    Patient is here today for 3 week follow up.    Patient had uncontrolled Hypertension at last visit.  I increased the Amlodipine from 5 to 10 mg daily and continued the Lasix and Metoprolol.  I also continued the Clonidine 0.1 mg at bedtime.  Blood pressure is much improved and now controlled.  /64 (BP Location: Left arm, Patient Position: Sitting, BP Method: Medium (Manual))   Pulse 104   Temp 98.4 °F (36.9 °C) (Oral)   Ht 5' 6" (1.676 m)   Wt 74.4 kg (164 lb 0.4 oz)   SpO2 97%   BMI 26.47 kg/m²     Patient has ADHD - patient was on Adderall 20 mg twice daily in the past - when patient started having problems with blood pressure going really high due to pain level earlier this year 2017- I stopped the Adderall.  Once the pain improved after patient had nerve block and blood pressure became better controlled, I started patient back on Adderall at 10 mg twice daily which she tolerated well. We tried increasing to 20 mg in AM and 10 mg mid-day BUT blood pressure elevates and the benefits do not outweigh the risks - advised I will ONLY  Prescribe Adderall 10 mg twice daily since this dose seems to not affect the blood pressure.        Previous Medications    ALPRAZOLAM (XANAX) 0.5 MG TABLET    Take 0.5 mg by mouth every 8 (eight) hours as needed.    AMLODIPINE (NORVASC) 10 MG TABLET    Take 1 tablet (10 mg total) by mouth once daily.    ARMOUR THYROID 60 MG TAB    Take 60 mg by mouth once daily.    CHOLECALCIFEROL, VITAMIN D3, (VITAMIN D3) 5,000 UNIT TAB    Take 5,000 Units by mouth once daily.    CITALOPRAM (CELEXA) 20 MG TABLET    Take 20 mg by mouth once daily.    CLONIDINE (CATAPRES) 0.1 MG TABLET    Take 1 tablet (0.1 mg total) by mouth nightly.    CYANOCOBALAMIN, VITAMIN B-12, (VITAMIN B-12 INJ)    Inject 1,000 mcg as directed every 30 days.    DEXTROAMPHETAMINE-AMPHETAMINE " (ADDERALL) 20 MG TABLET    Take 1 tablet in AM and 1/2 tablet mid-day    DICLOFENAC SODIUM 1 % GEL    Apply 2 g topically 4 (four) times daily.    ESTRADIOL (ESTRACE) 2 MG TABLET    Take 2 mg by mouth once daily.    FUROSEMIDE (LASIX) 40 MG TABLET    TAKE 1 TABLET (40 MG TOTAL) BY MOUTH ONCE DAILY.    KLOR-CON SPRINKLE 10 MEQ CPSR    TAKE 1 CAPSULE TWICE A DAY    METOPROLOL SUCCINATE (TOPROL-XL) 50 MG 24 HR TABLET    TAKE 1 TABLET BY MOUTH EVERY DAY    ROSUVASTATIN (CRESTOR) 10 MG TABLET    Take 1 tablet (10 mg total) by mouth once daily.       Past Medical History:   Diagnosis Date    ADHD (attention deficit hyperactivity disorder), inattentive type     Crohn's colitis     Fibromyalgia     treated by Dr. Thorpe - Rheumatologist    Hyperlipidemia     High triglycerides    Hypertension     Hypothyroidism     Treated by Dr. Mathew    IFG (impaired fasting glucose)     Migraine     Treated by neurologist - Dr. Destiny Florez    Ulcerative colitis     Treated by Dr. Lee and Aaron    Vitamin D deficiency 3/31/2016       Past Surgical History:   Procedure Laterality Date     SECTION      COLONOSCOPY  10/08/2010    Dr. Lee - repeat in 5 years - has appointment for colonoscopy 2016    COLONOSCOPY  2016    Dr. Kelly - normal  colon - repeat in 10 years - scanned report to media file.    HYSTERECTOMY      OOPHORECTOMY      SHOULDER SURGERY Left     TONSILLECTOMY      upper and lower GI  2016       Family History   Problem Relation Age of Onset    Hypertension Mother     Heart disease Mother     Hyperlipidemia Mother     Cancer Sister 53     thyroid cancer and lymph nodes involvement    Cancer Brother 61     colon and lung cancer    Cancer Brother 63     colon    Hypertension Brother     Diabetes Brother        Social History     Social History    Marital status:      Spouse name: N/A    Number of children: N/A    Years of education: N/A     Social History  "Main Topics    Smoking status: Former Smoker     Packs/day: 1.00     Years: 20.00     Quit date: 2/1/1997    Smokeless tobacco: Never Used    Alcohol use No    Drug use: No    Sexual activity: No     Other Topics Concern    None     Social History Narrative    None       Review of Systems   Constitutional: Negative for appetite change, chills, fatigue, fever and unexpected weight change.   HENT: Negative for congestion, ear pain, mouth sores, nosebleeds, postnasal drip, rhinorrhea, sinus pressure, sneezing, sore throat, trouble swallowing and voice change.    Eyes: Negative for photophobia, pain, discharge, redness, itching and visual disturbance.   Respiratory: Negative for cough, chest tightness and shortness of breath.    Cardiovascular: Negative for chest pain, palpitations and leg swelling.   Gastrointestinal: Negative for abdominal pain, blood in stool, constipation, diarrhea, nausea and vomiting.   Genitourinary: Negative for dysuria, frequency, hematuria and urgency.   Musculoskeletal: Positive for back pain. Negative for arthralgias, joint swelling and myalgias.   Skin: Negative for color change and rash.   Allergic/Immunologic: Negative for immunocompromised state.   Neurological: Negative for dizziness, seizures, syncope, weakness and headaches.   Hematological: Negative for adenopathy. Does not bruise/bleed easily.   Psychiatric/Behavioral: Negative for agitation, dysphoric mood, sleep disturbance and suicidal ideas. The patient is not nervous/anxious.          Objective:     Vitals:    12/26/17 1321   BP: 128/64   BP Location: Left arm   Patient Position: Sitting   BP Method: Medium (Manual)   Pulse: 104   Temp: 98.4 °F (36.9 °C)   TempSrc: Oral   SpO2: 97%   Weight: 74.4 kg (164 lb 0.4 oz)   Height: 5' 6" (1.676 m)          Physical Exam   Constitutional: She is oriented to person, place, and time. She appears well-developed and well-nourished. No distress.   Body mass index is 26.47 kg/m². "   HENT:   Head: Normocephalic and atraumatic.   Right Ear: External ear normal.   Left Ear: External ear normal.   Nose: Nose normal.   Mouth/Throat: Oropharynx is clear and moist. No oropharyngeal exudate.   Eyes: Conjunctivae and EOM are normal. Pupils are equal, round, and reactive to light.   Neck: Normal range of motion. Neck supple. No JVD present. No tracheal deviation present. No thyromegaly present.   Cardiovascular: Normal rate, regular rhythm and normal heart sounds.    No murmur heard.  Pulmonary/Chest: Effort normal and breath sounds normal. No stridor. No respiratory distress. She has no wheezes. She has no rales.   Abdominal: Soft. Bowel sounds are normal. She exhibits no distension. There is no guarding.   Musculoskeletal: Normal range of motion. She exhibits no edema.   Lymphadenopathy:     She has no cervical adenopathy.   Neurological: She is alert and oriented to person, place, and time. No cranial nerve deficit. Coordination normal.   Skin: Skin is warm and dry. No rash noted. She is not diaphoretic.   Psychiatric: She has a normal mood and affect.         Assessment:         ICD-10-CM ICD-9-CM   1. Essential hypertension I10 401.9   2. ADHD (attention deficit hyperactivity disorder), inattentive type F90.0 314.00       Plan:       Essential hypertension  -  Continue blood pressure medications at present dose.  Recheck in 3 months = fasting labs and office visit.    ADHD (attention deficit hyperactivity disorder), inattentive type  -  Will agree to Adderall 10 mg twice daily ONLY - may call for next prescription - follow up is in 3 months.  -     dextroamphetamine-amphetamine 10 mg Tab; Take 10 mg by mouth 2 (two) times daily.  Dispense: 60 tablet; Refill: 0      Return in about 3 months (around 3/26/2018).     Patient's Medications   New Prescriptions    DEXTROAMPHETAMINE-AMPHETAMINE 10 MG TAB    Take 10 mg by mouth 2 (two) times daily.   Previous Medications    ALPRAZOLAM (XANAX) 0.5 MG TABLET     Take 0.5 mg by mouth every 8 (eight) hours as needed.    AMLODIPINE (NORVASC) 10 MG TABLET    Take 1 tablet (10 mg total) by mouth once daily.    ARMOUR THYROID 60 MG TAB    Take 60 mg by mouth once daily.    CHOLECALCIFEROL, VITAMIN D3, (VITAMIN D3) 5,000 UNIT TAB    Take 5,000 Units by mouth once daily.    CITALOPRAM (CELEXA) 20 MG TABLET    Take 20 mg by mouth once daily.    CLONIDINE (CATAPRES) 0.1 MG TABLET    Take 1 tablet (0.1 mg total) by mouth nightly.    CYANOCOBALAMIN, VITAMIN B-12, (VITAMIN B-12 INJ)    Inject 1,000 mcg as directed every 30 days.    DICLOFENAC SODIUM 1 % GEL    Apply 2 g topically 4 (four) times daily.    ESTRADIOL (ESTRACE) 2 MG TABLET    Take 2 mg by mouth once daily.    FUROSEMIDE (LASIX) 40 MG TABLET    TAKE 1 TABLET (40 MG TOTAL) BY MOUTH ONCE DAILY.    KLOR-CON SPRINKLE 10 MEQ CPSR    TAKE 1 CAPSULE TWICE A DAY    METOPROLOL SUCCINATE (TOPROL-XL) 50 MG 24 HR TABLET    TAKE 1 TABLET BY MOUTH EVERY DAY    ROSUVASTATIN (CRESTOR) 10 MG TABLET    Take 1 tablet (10 mg total) by mouth once daily.   Modified Medications    No medications on file   Discontinued Medications    DEXTROAMPHETAMINE-AMPHETAMINE (ADDERALL) 20 MG TABLET    Take 1 tablet in AM and 1/2 tablet mid-day    ERGOCALCIFEROL (ERGOCALCIFEROL) 50,000 UNIT CAP    Take 1 capsule (50,000 Units total) by mouth every 7 days.

## 2018-01-06 RX ORDER — METOPROLOL SUCCINATE 50 MG/1
TABLET, EXTENDED RELEASE ORAL
Qty: 90 TABLET | Refills: 0 | Status: SHIPPED | OUTPATIENT
Start: 2018-01-06 | End: 2018-05-21 | Stop reason: SDUPTHER

## 2018-01-18 DIAGNOSIS — I10 ESSENTIAL HYPERTENSION: ICD-10-CM

## 2018-01-18 RX ORDER — AMLODIPINE BESYLATE 10 MG/1
TABLET ORAL
Qty: 30 TABLET | Refills: 2 | Status: SHIPPED | OUTPATIENT
Start: 2018-01-18 | End: 2018-04-01 | Stop reason: SDUPTHER

## 2018-01-29 DIAGNOSIS — F90.0 ADHD (ATTENTION DEFICIT HYPERACTIVITY DISORDER), INATTENTIVE TYPE: ICD-10-CM

## 2018-01-29 RX ORDER — DEXTROAMPHETAMINE SACCHARATE, AMPHETAMINE ASPARTATE, DEXTROAMPHETAMINE SULFATE AND AMPHETAMINE SULFATE 2.5; 2.5; 2.5; 2.5 MG/1; MG/1; MG/1; MG/1
10 TABLET ORAL 2 TIMES DAILY
Qty: 60 TABLET | Refills: 0 | Status: SHIPPED | OUTPATIENT
Start: 2018-01-29 | End: 2018-02-26 | Stop reason: SDUPTHER

## 2018-01-29 NOTE — TELEPHONE ENCOUNTER
----- Message from Mouna York sent at 1/29/2018  9:36 AM CST -----  Contact: [phone]/self  Patient is requesting to have a refill of dextroamphetamine-amphetamine 10 mg Tab sent to [facility].  Please call her if she has to pick it up. Please advise.

## 2018-02-02 DIAGNOSIS — E78.5 HYPERLIPIDEMIA, UNSPECIFIED HYPERLIPIDEMIA TYPE: ICD-10-CM

## 2018-02-02 RX ORDER — ATORVASTATIN CALCIUM 20 MG/1
TABLET, FILM COATED ORAL
Qty: 90 TABLET | OUTPATIENT
Start: 2018-02-02

## 2018-02-26 ENCOUNTER — TELEPHONE (OUTPATIENT)
Dept: FAMILY MEDICINE | Facility: CLINIC | Age: 63
End: 2018-02-26

## 2018-02-26 DIAGNOSIS — F90.0 ADHD (ATTENTION DEFICIT HYPERACTIVITY DISORDER), INATTENTIVE TYPE: ICD-10-CM

## 2018-02-26 RX ORDER — DEXTROAMPHETAMINE SACCHARATE, AMPHETAMINE ASPARTATE, DEXTROAMPHETAMINE SULFATE AND AMPHETAMINE SULFATE 2.5; 2.5; 2.5; 2.5 MG/1; MG/1; MG/1; MG/1
10 TABLET ORAL 2 TIMES DAILY
Qty: 60 TABLET | Refills: 0 | Status: SHIPPED | OUTPATIENT
Start: 2018-02-27 | End: 2018-04-04 | Stop reason: SDUPTHER

## 2018-02-26 NOTE — TELEPHONE ENCOUNTER
Advise patient that she can pickup prescription today but it can not be filled until tomorrow.  Also advise that she will be due for office visit before next refill.

## 2018-02-26 NOTE — TELEPHONE ENCOUNTER
----- Message from Mouna York sent at 2/26/2018 10:33 AM CST -----  Contact: [phone]/self  Patient is requesting a call back. She needs a refill for her adderol. When can she  the rx. Please advise.

## 2018-03-05 RX ORDER — FUROSEMIDE 40 MG/1
TABLET ORAL
Qty: 90 TABLET | Refills: 1 | Status: SHIPPED | OUTPATIENT
Start: 2018-03-05 | End: 2018-08-27 | Stop reason: SDUPTHER

## 2018-03-28 DIAGNOSIS — F90.0 ADHD (ATTENTION DEFICIT HYPERACTIVITY DISORDER), INATTENTIVE TYPE: ICD-10-CM

## 2018-03-28 RX ORDER — DEXTROAMPHETAMINE SACCHARATE, AMPHETAMINE ASPARTATE, DEXTROAMPHETAMINE SULFATE AND AMPHETAMINE SULFATE 2.5; 2.5; 2.5; 2.5 MG/1; MG/1; MG/1; MG/1
10 TABLET ORAL 2 TIMES DAILY
Qty: 60 TABLET | Refills: 0 | OUTPATIENT
Start: 2018-03-28

## 2018-03-28 NOTE — TELEPHONE ENCOUNTER
----- Message from Halley Villela sent at 3/28/2018 12:04 PM CDT -----  Contact: [phone]/self  Patient requesting to speak with you regarding picking up a Rx for dextroamphetamine-amphetamine 10 mg Tab. Please advise.

## 2018-03-28 NOTE — TELEPHONE ENCOUNTER
Advise patient that it has been 3 months - I must have visit before I can prescribe medication - must show that blood pressure and heart rate are stable - Im sorry - but I can not make no exceptions.

## 2018-04-01 DIAGNOSIS — I10 ESSENTIAL HYPERTENSION: ICD-10-CM

## 2018-04-03 RX ORDER — AMLODIPINE BESYLATE 10 MG/1
TABLET ORAL
Qty: 30 TABLET | Refills: 0 | Status: SHIPPED | OUTPATIENT
Start: 2018-04-03 | End: 2018-07-06 | Stop reason: SDUPTHER

## 2018-04-04 ENCOUNTER — OFFICE VISIT (OUTPATIENT)
Dept: FAMILY MEDICINE | Facility: CLINIC | Age: 63
End: 2018-04-04
Payer: MEDICARE

## 2018-04-04 VITALS
HEART RATE: 98 BPM | HEIGHT: 66 IN | WEIGHT: 165.81 LBS | OXYGEN SATURATION: 98 % | TEMPERATURE: 98 F | SYSTOLIC BLOOD PRESSURE: 136 MMHG | BODY MASS INDEX: 26.65 KG/M2 | DIASTOLIC BLOOD PRESSURE: 72 MMHG

## 2018-04-04 DIAGNOSIS — I10 ESSENTIAL HYPERTENSION: ICD-10-CM

## 2018-04-04 DIAGNOSIS — E78.5 HYPERLIPIDEMIA, UNSPECIFIED HYPERLIPIDEMIA TYPE: ICD-10-CM

## 2018-04-04 DIAGNOSIS — Z13.0 SCREENING FOR DEFICIENCY ANEMIA: ICD-10-CM

## 2018-04-04 DIAGNOSIS — R73.01 IFG (IMPAIRED FASTING GLUCOSE): ICD-10-CM

## 2018-04-04 DIAGNOSIS — E55.9 VITAMIN D DEFICIENCY: ICD-10-CM

## 2018-04-04 DIAGNOSIS — E03.9 ACQUIRED HYPOTHYROIDISM: ICD-10-CM

## 2018-04-04 DIAGNOSIS — F90.0 ADHD (ATTENTION DEFICIT HYPERACTIVITY DISORDER), INATTENTIVE TYPE: Primary | ICD-10-CM

## 2018-04-04 PROCEDURE — 99213 OFFICE O/P EST LOW 20 MIN: CPT | Mod: S$GLB,,, | Performed by: NURSE PRACTITIONER

## 2018-04-04 PROCEDURE — 3075F SYST BP GE 130 - 139MM HG: CPT | Mod: CPTII,S$GLB,, | Performed by: NURSE PRACTITIONER

## 2018-04-04 PROCEDURE — 3078F DIAST BP <80 MM HG: CPT | Mod: CPTII,S$GLB,, | Performed by: NURSE PRACTITIONER

## 2018-04-04 PROCEDURE — 99999 PR PBB SHADOW E&M-EST. PATIENT-LVL V: CPT | Mod: PBBFAC,,, | Performed by: NURSE PRACTITIONER

## 2018-04-04 RX ORDER — DEXTROAMPHETAMINE SACCHARATE, AMPHETAMINE ASPARTATE, DEXTROAMPHETAMINE SULFATE AND AMPHETAMINE SULFATE 2.5; 2.5; 2.5; 2.5 MG/1; MG/1; MG/1; MG/1
10 TABLET ORAL 2 TIMES DAILY
Qty: 60 TABLET | Refills: 0 | Status: SHIPPED | OUTPATIENT
Start: 2018-04-04 | End: 2018-05-04 | Stop reason: SDUPTHER

## 2018-04-04 NOTE — PROGRESS NOTES
"Subjective:       Patient ID: Madalyn Iverson is a 63 y.o. female.    Chief Complaint: Medication Refill    Patient has ADHD - patient was on Adderall 20 mg twice daily in the past - when patient started having problems with blood pressure going really high due to pain level earlier this year 2017- I stopped the Adderall.  Once the pain improved after patient had nerve block and blood pressure became better controlled, I started patient back on Adderall at 10 mg twice daily which she tolerated well. We tried increasing to 20 mg in AM and 10 mg mid-day BUT blood pressure elevates and the benefits do not outweigh the risks - advised I will ONLY  Prescribe Adderall 10 mg twice daily since this dose seems to not affect the blood pressure.  /72   Pulse 98   Temp 98.4 °F (36.9 °C) (Oral)   Ht 5' 6" (1.676 m)   Wt 75.2 kg (165 lb 12.6 oz)   SpO2 98%   BMI 26.76 kg/m²             Previous Medications    ALPRAZOLAM (XANAX) 0.5 MG TABLET    Take 0.5 mg by mouth every 8 (eight) hours as needed.    AMLODIPINE (NORVASC) 10 MG TABLET    TAKE 1 TABLET BY MOUTH ONCE DAILY    ARMOUR THYROID 60 MG TAB    Take 60 mg by mouth once daily.    CHOLECALCIFEROL, VITAMIN D3, (VITAMIN D3) 5,000 UNIT TAB    Take 5,000 Units by mouth once daily.    CITALOPRAM (CELEXA) 20 MG TABLET    Take 20 mg by mouth once daily.    CLONIDINE (CATAPRES) 0.1 MG TABLET    Take 1 tablet (0.1 mg total) by mouth nightly.    CYANOCOBALAMIN, VITAMIN B-12, (VITAMIN B-12 INJ)    Inject 1,000 mcg as directed every 30 days.    DEXTROAMPHETAMINE-AMPHETAMINE 10 MG TAB    Take 10 mg by mouth 2 (two) times daily.    DICLOFENAC SODIUM 1 % GEL    Apply 2 g topically 4 (four) times daily.    ESTRADIOL (ESTRACE) 2 MG TABLET    Take 2 mg by mouth once daily.    FUROSEMIDE (LASIX) 40 MG TABLET    TAKE 1 TABLET (40 MG TOTAL) BY MOUTH ONCE DAILY.    KLOR-CON SPRINKLE 10 MEQ CPSR    TAKE 1 CAPSULE TWICE A DAY    METOPROLOL SUCCINATE (TOPROL-XL) 50 MG 24 HR TABLET    " TAKE 1 TABLET BY MOUTH EVERY DAY    ROSUVASTATIN (CRESTOR) 10 MG TABLET    Take 1 tablet (10 mg total) by mouth once daily.       Past Medical History:   Diagnosis Date    ADHD (attention deficit hyperactivity disorder), inattentive type     Crohn's colitis     Fibromyalgia     treated by Dr. Thorpe - Rheumatologist    Hyperlipidemia     High triglycerides    Hypertension     Hypothyroidism     Treated by Dr. Mathew    IFG (impaired fasting glucose)     Migraine     Treated by neurologist - Dr. Destiny Florez    Ulcerative colitis     Treated by Dr. Lee and Aaron    Vitamin D deficiency 3/31/2016       Past Surgical History:   Procedure Laterality Date     SECTION      COLONOSCOPY  10/08/2010    Dr. Lee - repeat in 5 years - has appointment for colonoscopy 2016    COLONOSCOPY  2016    Dr. Kelly - normal  colon - repeat in 10 years - scanned report to media file.    HYSTERECTOMY      OOPHORECTOMY      SHOULDER SURGERY Left     TONSILLECTOMY      upper and lower GI  2016       Family History   Problem Relation Age of Onset    Hypertension Mother     Heart disease Mother     Hyperlipidemia Mother     Cancer Sister 53     thyroid cancer and lymph nodes involvement    Cancer Brother 61     colon and lung cancer    Cancer Brother 63     colon    Hypertension Brother     Diabetes Brother        Social History     Social History    Marital status:      Spouse name: N/A    Number of children: N/A    Years of education: N/A     Social History Main Topics    Smoking status: Former Smoker     Packs/day: 1.00     Years: 20.00     Quit date: 1997    Smokeless tobacco: Never Used    Alcohol use No    Drug use: No    Sexual activity: No     Other Topics Concern    None     Social History Narrative    None       Review of Systems   Constitutional: Negative for appetite change, chills, fatigue, fever and unexpected weight change.   HENT: Negative for  "congestion, ear pain, mouth sores, nosebleeds, postnasal drip, rhinorrhea, sinus pressure, sneezing, sore throat, trouble swallowing and voice change.    Eyes: Negative for photophobia, pain, discharge, redness, itching and visual disturbance.   Respiratory: Negative for cough, chest tightness and shortness of breath.    Cardiovascular: Negative for chest pain, palpitations and leg swelling.   Gastrointestinal: Negative for abdominal pain, blood in stool, constipation, diarrhea, nausea and vomiting.   Genitourinary: Negative for dysuria, frequency, hematuria and urgency.   Musculoskeletal: Negative for arthralgias, back pain, joint swelling and myalgias.   Skin: Negative for color change and rash.   Allergic/Immunologic: Negative for immunocompromised state.   Neurological: Negative for dizziness, seizures, syncope, weakness and headaches.   Hematological: Negative for adenopathy. Does not bruise/bleed easily.   Psychiatric/Behavioral: Negative for agitation, dysphoric mood, sleep disturbance and suicidal ideas. The patient is not nervous/anxious.          Objective:     Vitals:    04/04/18 1010 04/04/18 1023   BP: (!) 148/80 136/72   BP Location: Left arm    Patient Position: Sitting    BP Method: Medium (Manual)    Pulse: 98    Temp: 98.4 °F (36.9 °C)    TempSrc: Oral    SpO2: 98%    Weight: 75.2 kg (165 lb 12.6 oz)    Height: 5' 6" (1.676 m)           Physical Exam   Constitutional: She is oriented to person, place, and time. She appears well-developed and well-nourished. No distress.   Body mass index is 26.76 kg/m².     HENT:   Head: Normocephalic and atraumatic.   Right Ear: External ear normal.   Left Ear: External ear normal.   Nose: Nose normal.   Mouth/Throat: Oropharynx is clear and moist. No oropharyngeal exudate.   Eyes: Conjunctivae and EOM are normal. Pupils are equal, round, and reactive to light.   Neck: Normal range of motion. Neck supple. No JVD present. No tracheal deviation present. No " thyromegaly present.   Cardiovascular: Normal rate, regular rhythm and normal heart sounds.    No murmur heard.  Pulmonary/Chest: Effort normal and breath sounds normal. No stridor. No respiratory distress. She has no wheezes. She has no rales.   Abdominal: Soft. Bowel sounds are normal. She exhibits no distension. There is no guarding.   Musculoskeletal: Normal range of motion. She exhibits no edema.   Lymphadenopathy:     She has no cervical adenopathy.   Neurological: She is alert and oriented to person, place, and time. No cranial nerve deficit. Coordination normal.   Skin: Skin is warm and dry. No rash noted. She is not diaphoretic.   Psychiatric: She has a normal mood and affect.         Assessment:         ICD-10-CM ICD-9-CM   1. ADHD (attention deficit hyperactivity disorder), inattentive type F90.0 314.00   2. Essential hypertension I10 401.9   3. Hyperlipidemia, unspecified hyperlipidemia type E78.5 272.4   4. IFG (impaired fasting glucose) R73.01 790.21   5. Vitamin D deficiency E55.9 268.9   6. Acquired hypothyroidism E03.9 244.9   7. Screening for deficiency anemia Z13.0 V78.1       Plan:       ADHD (attention deficit hyperactivity disorder), inattentive type  -  Controlled on present medication - recheck in 3 months.  -     dextroamphetamine-amphetamine 10 mg Tab; Take 10 mg by mouth 2 (two) times daily.  Dispense: 60 tablet; Refill: 0    Essential hypertension  -  Controlled on present medications  -     CBC auto differential; Future; Expected date: 07/04/2018          ORDERS FOR NEXT VISIT:  Hyperlipidemia, unspecified hyperlipidemia type  -     Lipid panel; Future; Expected date: 07/04/2018    IFG (impaired fasting glucose)  -     Comprehensive metabolic panel; Future; Expected date: 07/04/2018  -     Hemoglobin A1c; Future; Expected date: 07/04/2018    Vitamin D deficiency  -     Vitamin D; Future; Expected date: 10/04/2018    Acquired hypothyroidism  -     TSH; Future; Expected date: 07/04/2018  -      T4, free; Future; Expected date: 07/04/2018    Screening for deficiency anemia      Follow-up in about 3 months (around 7/4/2018) for fasting labs and WELLNESS EXAM.     Patient's Medications   New Prescriptions    No medications on file   Previous Medications    ALPRAZOLAM (XANAX) 0.5 MG TABLET    Take 0.5 mg by mouth every 8 (eight) hours as needed.    AMLODIPINE (NORVASC) 10 MG TABLET    TAKE 1 TABLET BY MOUTH ONCE DAILY    ARMOUR THYROID 60 MG TAB    Take 60 mg by mouth once daily.    CHOLECALCIFEROL, VITAMIN D3, (VITAMIN D3) 5,000 UNIT TAB    Take 5,000 Units by mouth once daily.    CITALOPRAM (CELEXA) 20 MG TABLET    Take 20 mg by mouth once daily.    CLONIDINE (CATAPRES) 0.1 MG TABLET    Take 1 tablet (0.1 mg total) by mouth nightly.    CYANOCOBALAMIN, VITAMIN B-12, (VITAMIN B-12 INJ)    Inject 1,000 mcg as directed every 30 days.    DICLOFENAC SODIUM 1 % GEL    Apply 2 g topically 4 (four) times daily.    ESTRADIOL (ESTRACE) 2 MG TABLET    Take 2 mg by mouth once daily.    FUROSEMIDE (LASIX) 40 MG TABLET    TAKE 1 TABLET (40 MG TOTAL) BY MOUTH ONCE DAILY.    KLOR-CON SPRINKLE 10 MEQ CPSR    TAKE 1 CAPSULE TWICE A DAY    METOPROLOL SUCCINATE (TOPROL-XL) 50 MG 24 HR TABLET    TAKE 1 TABLET BY MOUTH EVERY DAY    ROSUVASTATIN (CRESTOR) 10 MG TABLET    Take 1 tablet (10 mg total) by mouth once daily.   Modified Medications    Modified Medication Previous Medication    DEXTROAMPHETAMINE-AMPHETAMINE 10 MG TAB dextroamphetamine-amphetamine 10 mg Tab       Take 10 mg by mouth 2 (two) times daily.    Take 10 mg by mouth 2 (two) times daily.   Discontinued Medications    No medications on file

## 2018-05-04 ENCOUNTER — OFFICE VISIT (OUTPATIENT)
Dept: FAMILY MEDICINE | Facility: CLINIC | Age: 63
End: 2018-05-04
Payer: MEDICARE

## 2018-05-04 VITALS
DIASTOLIC BLOOD PRESSURE: 70 MMHG | TEMPERATURE: 98 F | HEART RATE: 90 BPM | OXYGEN SATURATION: 98 % | SYSTOLIC BLOOD PRESSURE: 130 MMHG | WEIGHT: 166.44 LBS | HEIGHT: 66 IN | BODY MASS INDEX: 26.75 KG/M2

## 2018-05-04 DIAGNOSIS — H10.9 BACTERIAL CONJUNCTIVITIS OF RIGHT EYE: Primary | ICD-10-CM

## 2018-05-04 DIAGNOSIS — F90.0 ADHD (ATTENTION DEFICIT HYPERACTIVITY DISORDER), INATTENTIVE TYPE: ICD-10-CM

## 2018-05-04 PROCEDURE — 3078F DIAST BP <80 MM HG: CPT | Mod: CPTII,S$GLB,, | Performed by: NURSE PRACTITIONER

## 2018-05-04 PROCEDURE — 3075F SYST BP GE 130 - 139MM HG: CPT | Mod: CPTII,S$GLB,, | Performed by: NURSE PRACTITIONER

## 2018-05-04 PROCEDURE — 99213 OFFICE O/P EST LOW 20 MIN: CPT | Mod: S$GLB,,, | Performed by: NURSE PRACTITIONER

## 2018-05-04 PROCEDURE — 99999 PR PBB SHADOW E&M-EST. PATIENT-LVL V: CPT | Mod: PBBFAC,,, | Performed by: NURSE PRACTITIONER

## 2018-05-04 PROCEDURE — 3008F BODY MASS INDEX DOCD: CPT | Mod: CPTII,S$GLB,, | Performed by: NURSE PRACTITIONER

## 2018-05-04 RX ORDER — DEXTROAMPHETAMINE SACCHARATE, AMPHETAMINE ASPARTATE, DEXTROAMPHETAMINE SULFATE AND AMPHETAMINE SULFATE 2.5; 2.5; 2.5; 2.5 MG/1; MG/1; MG/1; MG/1
10 TABLET ORAL 2 TIMES DAILY
Qty: 60 TABLET | Refills: 0 | Status: SHIPPED | OUTPATIENT
Start: 2018-05-04 | End: 2019-03-21 | Stop reason: ALTCHOICE

## 2018-05-04 RX ORDER — POLYMYXIN B SULFATE AND TRIMETHOPRIM 1; 10000 MG/ML; [USP'U]/ML
1 SOLUTION OPHTHALMIC EVERY 4 HOURS
Qty: 10 ML | Refills: 0 | Status: SHIPPED | OUTPATIENT
Start: 2018-05-04 | End: 2018-06-05

## 2018-05-04 NOTE — PROGRESS NOTES
Subjective:       Patient ID: Madalyn Iverson is a 63 y.o. female.    Chief Complaint: Follow-up (blood pressuer) and Eye Pain (started 3 days ago red, vision blurred and 2 days ago could not open, swollen)    Patient is a 63-year-old white female with ADHD, fibromyalgia, hypertension, hyperlipidemia, hypothyroidism, impaired fasting glucose, migraines, vitamin D deficiency, and ulcerative colitis that is here today with complaint of redness and irritation to the right eye.          Conjunctivitis   This is a new problem. Episode onset: 3 days. The problem has been gradually worsening. Pertinent negatives include no abdominal pain, arthralgias, chest pain, chills, congestion, coughing, fatigue, fever, headaches, joint swelling, myalgias, nausea, rash, sore throat, vomiting or weakness. Associated symptoms comments: Right eye redness and irritated with watering during the day, crusted shut in the mornings for the past 3 days.  Does not wear contacts.. She has tried nothing for the symptoms.       Previous Medications    ALPRAZOLAM (XANAX) 0.5 MG TABLET    Take 0.5 mg by mouth every 8 (eight) hours as needed.    AMLODIPINE (NORVASC) 10 MG TABLET    TAKE 1 TABLET BY MOUTH ONCE DAILY    ARMOUR THYROID 60 MG TAB    Take 60 mg by mouth once daily.    CHOLECALCIFEROL, VITAMIN D3, (VITAMIN D3) 5,000 UNIT TAB    Take 5,000 Units by mouth once daily.    CITALOPRAM (CELEXA) 20 MG TABLET    Take 20 mg by mouth once daily.    CLONIDINE (CATAPRES) 0.1 MG TABLET    Take 1 tablet (0.1 mg total) by mouth nightly.    CYANOCOBALAMIN, VITAMIN B-12, (VITAMIN B-12 INJ)    Inject 1,000 mcg as directed every 30 days.    DEXTROAMPHETAMINE-AMPHETAMINE 10 MG TAB    Take 10 mg by mouth 2 (two) times daily.    DICLOFENAC SODIUM 1 % GEL    Apply 2 g topically 4 (four) times daily.    ESTRADIOL (ESTRACE) 2 MG TABLET    Take 2 mg by mouth once daily.    FUROSEMIDE (LASIX) 40 MG TABLET    TAKE 1 TABLET (40 MG TOTAL) BY MOUTH ONCE DAILY.     KLOR-CON SPRINKLE 10 MEQ CPSR    TAKE 1 CAPSULE TWICE A DAY    METOPROLOL SUCCINATE (TOPROL-XL) 50 MG 24 HR TABLET    TAKE 1 TABLET BY MOUTH EVERY DAY    ROSUVASTATIN (CRESTOR) 10 MG TABLET    Take 1 tablet (10 mg total) by mouth once daily.       Past Medical History:   Diagnosis Date    ADHD (attention deficit hyperactivity disorder), inattentive type     Crohn's colitis     Fibromyalgia     treated by Dr. Thorpe - Rheumatologist    Hyperlipidemia     High triglycerides    Hypertension     Hypothyroidism     Treated by Dr. Mathew    IFG (impaired fasting glucose)     Migraine     Treated by neurologist - Dr. Destiny Florez    Ulcerative colitis     Treated by Dr. Lee and Aaron    Vitamin D deficiency 3/31/2016       Past Surgical History:   Procedure Laterality Date     SECTION      COLONOSCOPY  10/08/2010    Dr. Lee - repeat in 5 years - has appointment for colonoscopy 2016    COLONOSCOPY  2016    Dr. Kelly - normal  colon - repeat in 10 years - scanned report to media file.    HYSTERECTOMY      OOPHORECTOMY      SHOULDER SURGERY Left     TONSILLECTOMY      upper and lower GI  2016       Family History   Problem Relation Age of Onset    Hypertension Mother     Heart disease Mother     Hyperlipidemia Mother     Cancer Sister 53        thyroid cancer and lymph nodes involvement    Cancer Brother 61        colon and lung cancer    Cancer Brother 63        colon    Hypertension Brother     Diabetes Brother        Social History     Social History    Marital status:      Spouse name: N/A    Number of children: N/A    Years of education: N/A     Social History Main Topics    Smoking status: Former Smoker     Packs/day: 1.00     Years: 20.00     Quit date: 1997    Smokeless tobacco: Never Used    Alcohol use No    Drug use: No    Sexual activity: No     Other Topics Concern    None     Social History Narrative    None       Review of Systems  "  Constitutional: Negative for appetite change, chills, fatigue, fever and unexpected weight change.   HENT: Negative for congestion, ear pain, mouth sores, nosebleeds, postnasal drip, rhinorrhea, sinus pressure, sneezing, sore throat, trouble swallowing and voice change.    Eyes: Positive for discharge, redness and itching. Negative for photophobia, pain and visual disturbance.   Respiratory: Negative for cough, chest tightness and shortness of breath.    Cardiovascular: Negative for chest pain, palpitations and leg swelling.   Gastrointestinal: Negative for abdominal pain, blood in stool, constipation, diarrhea, nausea and vomiting.   Genitourinary: Negative for dysuria, frequency, hematuria and urgency.   Musculoskeletal: Negative for arthralgias, back pain, joint swelling and myalgias.   Skin: Negative for color change and rash.   Allergic/Immunologic: Negative for immunocompromised state.   Neurological: Negative for dizziness, seizures, syncope, weakness and headaches.   Hematological: Negative for adenopathy. Does not bruise/bleed easily.   Psychiatric/Behavioral: Negative for agitation, dysphoric mood, sleep disturbance and suicidal ideas. The patient is not nervous/anxious.          Objective:     Vitals:    05/04/18 0832   BP: 130/70   BP Location: Left arm   Patient Position: Sitting   BP Method: Medium (Manual)   Pulse: 90   Temp: 97.7 °F (36.5 °C)   TempSrc: Oral   SpO2: 98%   Weight: 75.5 kg (166 lb 7.2 oz)   Height: 5' 6" (1.676 m)          Physical Exam   Constitutional: She is oriented to person, place, and time. She appears well-developed and well-nourished. No distress.   Body mass index is 26.87 kg/m².       HENT:   Head: Normocephalic and atraumatic.   Right Ear: External ear normal.   Left Ear: External ear normal.   Nose: Nose normal.   Mouth/Throat: Oropharynx is clear and moist. No oropharyngeal exudate.   Eyes: EOM and lids are normal. Pupils are equal, round, and reactive to light. Right " conjunctiva is injected. Right conjunctiva has no hemorrhage.   Neck: Normal range of motion. Neck supple. No JVD present. No tracheal deviation present. No thyromegaly present.   Cardiovascular: Normal rate, regular rhythm and normal heart sounds.    No murmur heard.  Pulmonary/Chest: Effort normal and breath sounds normal. No stridor. No respiratory distress. She has no wheezes. She has no rales.   Abdominal: Soft. Bowel sounds are normal. She exhibits no distension. There is no guarding.   Musculoskeletal: Normal range of motion. She exhibits no edema.   Lymphadenopathy:     She has no cervical adenopathy.   Neurological: She is alert and oriented to person, place, and time. No cranial nerve deficit. Coordination normal.   Skin: Skin is warm and dry. No rash noted. She is not diaphoretic.   Psychiatric: She has a normal mood and affect.         Assessment:         ICD-10-CM ICD-9-CM   1. Bacterial conjunctivitis of right eye H10.9 372.39     041.9   2. ADHD (attention deficit hyperactivity disorder), inattentive type F90.0 314.00       Plan:       Bacterial conjunctivitis of right eye  -  Apply drops as directed.  Frequent handwashing.  Get rid of masquera and eyeliners.  See eye specialist if no improvement 24 hours.  -     polymyxin B sulf-trimethoprim (POLYTRIM) 10,000 unit- 1 mg/mL Drop; Place 1 drop into the right eye every 4 (four) hours.  Dispense: 10 mL; Refill: 0    ADHD (attention deficit hyperactivity disorder), inattentive type  -     dextroamphetamine-amphetamine 10 mg Tab; Take 10 mg by mouth 2 (two) times daily.  Dispense: 60 tablet; Refill: 0      Follow-up in about 2 months (around 7/4/2018) for wellness as scheduled.     Patient's Medications   New Prescriptions    POLYMYXIN B SULF-TRIMETHOPRIM (POLYTRIM) 10,000 UNIT- 1 MG/ML DROP    Place 1 drop into the right eye every 4 (four) hours.   Previous Medications    ALPRAZOLAM (XANAX) 0.5 MG TABLET    Take 0.5 mg by mouth every 8 (eight) hours as  needed.    AMLODIPINE (NORVASC) 10 MG TABLET    TAKE 1 TABLET BY MOUTH ONCE DAILY    ARMOUR THYROID 60 MG TAB    Take 60 mg by mouth once daily.    CHOLECALCIFEROL, VITAMIN D3, (VITAMIN D3) 5,000 UNIT TAB    Take 5,000 Units by mouth once daily.    CITALOPRAM (CELEXA) 20 MG TABLET    Take 20 mg by mouth once daily.    CLONIDINE (CATAPRES) 0.1 MG TABLET    Take 1 tablet (0.1 mg total) by mouth nightly.    CYANOCOBALAMIN, VITAMIN B-12, (VITAMIN B-12 INJ)    Inject 1,000 mcg as directed every 30 days.    DICLOFENAC SODIUM 1 % GEL    Apply 2 g topically 4 (four) times daily.    ESTRADIOL (ESTRACE) 2 MG TABLET    Take 2 mg by mouth once daily.    FUROSEMIDE (LASIX) 40 MG TABLET    TAKE 1 TABLET (40 MG TOTAL) BY MOUTH ONCE DAILY.    KLOR-CON SPRINKLE 10 MEQ CPSR    TAKE 1 CAPSULE TWICE A DAY    METOPROLOL SUCCINATE (TOPROL-XL) 50 MG 24 HR TABLET    TAKE 1 TABLET BY MOUTH EVERY DAY    ROSUVASTATIN (CRESTOR) 10 MG TABLET    Take 1 tablet (10 mg total) by mouth once daily.   Modified Medications    Modified Medication Previous Medication    DEXTROAMPHETAMINE-AMPHETAMINE 10 MG TAB dextroamphetamine-amphetamine 10 mg Tab       Take 10 mg by mouth 2 (two) times daily.    Take 10 mg by mouth 2 (two) times daily.   Discontinued Medications    No medications on file

## 2018-05-21 RX ORDER — METOPROLOL SUCCINATE 50 MG/1
TABLET, EXTENDED RELEASE ORAL
Qty: 90 TABLET | Refills: 0 | Status: SHIPPED | OUTPATIENT
Start: 2018-05-21 | End: 2018-08-22 | Stop reason: SDUPTHER

## 2018-05-30 ENCOUNTER — TELEPHONE (OUTPATIENT)
Dept: FAMILY MEDICINE | Facility: CLINIC | Age: 63
End: 2018-05-30

## 2018-06-05 ENCOUNTER — HOSPITAL ENCOUNTER (OUTPATIENT)
Facility: HOSPITAL | Age: 63
Discharge: HOME | End: 2018-06-06
Attending: EMERGENCY MEDICINE | Admitting: INTERNAL MEDICINE
Payer: MEDICARE

## 2018-06-05 DIAGNOSIS — K85.90 ACUTE PANCREATITIS, UNSPECIFIED COMPLICATION STATUS, UNSPECIFIED PANCREATITIS TYPE: ICD-10-CM

## 2018-06-05 DIAGNOSIS — R10.9 INTRACTABLE ABDOMINAL PAIN: Primary | ICD-10-CM

## 2018-06-05 DIAGNOSIS — R00.0 TACHYCARDIA: ICD-10-CM

## 2018-06-05 PROBLEM — R19.7 DIARRHEA: Status: ACTIVE | Noted: 2018-06-05

## 2018-06-05 LAB
ALBUMIN SERPL BCP-MCNC: 3.5 G/DL
ALP SERPL-CCNC: 191 U/L
ALT SERPL W/O P-5'-P-CCNC: 33 U/L
ANION GAP SERPL CALC-SCNC: 13 MMOL/L
AST SERPL-CCNC: 32 U/L
BACTERIA #/AREA URNS AUTO: NORMAL /HPF
BASOPHILS # BLD AUTO: 0.09 K/UL
BASOPHILS NFR BLD: 0.9 %
BILIRUB SERPL-MCNC: 0.2 MG/DL
BILIRUB UR QL STRIP: NEGATIVE
BUN SERPL-MCNC: 13 MG/DL
BUN SERPL-MCNC: 14 MG/DL (ref 6–30)
CALCIUM SERPL-MCNC: 9.7 MG/DL
CHLORIDE SERPL-SCNC: 101 MMOL/L
CHLORIDE SERPL-SCNC: 102 MMOL/L (ref 95–110)
CLARITY UR REFRACT.AUTO: CLEAR
CO2 SERPL-SCNC: 23 MMOL/L
COLOR UR AUTO: YELLOW
CREAT SERPL-MCNC: 0.7 MG/DL (ref 0.5–1.4)
CREAT SERPL-MCNC: 0.8 MG/DL
CRP SERPL-MCNC: 29.5 MG/L
DIFFERENTIAL METHOD: ABNORMAL
EOSINOPHIL # BLD AUTO: 0.2 K/UL
EOSINOPHIL NFR BLD: 1.8 %
ERYTHROCYTE [DISTWIDTH] IN BLOOD BY AUTOMATED COUNT: 13 %
ERYTHROCYTE [SEDIMENTATION RATE] IN BLOOD BY WESTERGREN METHOD: 46 MM/HR
EST. GFR  (AFRICAN AMERICAN): >60 ML/MIN/1.73 M^2
EST. GFR  (NON AFRICAN AMERICAN): >60 ML/MIN/1.73 M^2
GLUCOSE SERPL-MCNC: 94 MG/DL
GLUCOSE SERPL-MCNC: 98 MG/DL (ref 70–110)
GLUCOSE UR QL STRIP: NEGATIVE
HCT VFR BLD AUTO: 44.6 %
HCT VFR BLD CALC: 45 %PCV (ref 36–54)
HGB BLD-MCNC: 15.3 G/DL
HGB UR QL STRIP: NEGATIVE
HYALINE CASTS UR QL AUTO: 1 /LPF
IMM GRANULOCYTES # BLD AUTO: 0.09 K/UL
IMM GRANULOCYTES NFR BLD AUTO: 0.9 %
IRON SERPL-MCNC: 61 UG/DL
KETONES UR QL STRIP: NEGATIVE
LACTATE SERPL-SCNC: 1.6 MMOL/L
LEUKOCYTE ESTERASE UR QL STRIP: ABNORMAL
LIPASE SERPL-CCNC: 16 U/L
LYMPHOCYTES # BLD AUTO: 2.6 K/UL
LYMPHOCYTES NFR BLD: 26.1 %
MCH RBC QN AUTO: 30.9 PG
MCHC RBC AUTO-ENTMCNC: 34.3 G/DL
MCV RBC AUTO: 90 FL
MICROSCOPIC COMMENT: NORMAL
MONOCYTES # BLD AUTO: 1.1 K/UL
MONOCYTES NFR BLD: 11 %
NEUTROPHILS # BLD AUTO: 5.9 K/UL
NEUTROPHILS NFR BLD: 59.3 %
NITRITE UR QL STRIP: NEGATIVE
NRBC BLD-RTO: 0 /100 WBC
PH UR STRIP: 7 [PH] (ref 5–8)
PLATELET # BLD AUTO: 403 K/UL
PMV BLD AUTO: 10.6 FL
POC IONIZED CALCIUM: 1.04 MMOL/L (ref 1.06–1.42)
POC TCO2 (MEASURED): 28 MMOL/L (ref 23–29)
POTASSIUM BLD-SCNC: 4.4 MMOL/L (ref 3.5–5.1)
POTASSIUM SERPL-SCNC: 4.5 MMOL/L
PROT SERPL-MCNC: 8.4 G/DL
PROT UR QL STRIP: NEGATIVE
RBC # BLD AUTO: 4.95 M/UL
RBC #/AREA URNS AUTO: 0 /HPF (ref 0–4)
SAMPLE: ABNORMAL
SATURATED IRON: 19 %
SODIUM BLD-SCNC: 138 MMOL/L (ref 136–145)
SODIUM SERPL-SCNC: 137 MMOL/L
SP GR UR STRIP: 1.01 (ref 1–1.03)
SQUAMOUS #/AREA URNS AUTO: 1 /HPF
TOTAL IRON BINDING CAPACITY: 323 UG/DL
TRANSFERRIN SERPL-MCNC: 218 MG/DL
URN SPEC COLLECT METH UR: ABNORMAL
UROBILINOGEN UR STRIP-ACNC: NEGATIVE EU/DL
WBC # BLD AUTO: 9.89 K/UL
WBC #/AREA URNS AUTO: 1 /HPF (ref 0–5)

## 2018-06-05 PROCEDURE — 25000003 PHARM REV CODE 250: Performed by: PHYSICIAN ASSISTANT

## 2018-06-05 PROCEDURE — 83690 ASSAY OF LIPASE: CPT

## 2018-06-05 PROCEDURE — 99285 EMERGENCY DEPT VISIT HI MDM: CPT | Mod: 25

## 2018-06-05 PROCEDURE — 99220 PR INITIAL OBSERVATION CARE,LEVL III: CPT | Mod: ,,, | Performed by: PHYSICIAN ASSISTANT

## 2018-06-05 PROCEDURE — 85651 RBC SED RATE NONAUTOMATED: CPT

## 2018-06-05 PROCEDURE — 83605 ASSAY OF LACTIC ACID: CPT

## 2018-06-05 PROCEDURE — 96374 THER/PROPH/DIAG INJ IV PUSH: CPT | Mod: 59

## 2018-06-05 PROCEDURE — 96375 TX/PRO/DX INJ NEW DRUG ADDON: CPT

## 2018-06-05 PROCEDURE — G0378 HOSPITAL OBSERVATION PER HR: HCPCS

## 2018-06-05 PROCEDURE — 96372 THER/PROPH/DIAG INJ SC/IM: CPT

## 2018-06-05 PROCEDURE — 63600175 PHARM REV CODE 636 W HCPCS: Performed by: PHYSICIAN ASSISTANT

## 2018-06-05 PROCEDURE — 81001 URINALYSIS AUTO W/SCOPE: CPT

## 2018-06-05 PROCEDURE — 99285 EMERGENCY DEPT VISIT HI MDM: CPT | Mod: ,,, | Performed by: PHYSICIAN ASSISTANT

## 2018-06-05 PROCEDURE — 96361 HYDRATE IV INFUSION ADD-ON: CPT

## 2018-06-05 PROCEDURE — 25500020 PHARM REV CODE 255: Performed by: EMERGENCY MEDICINE

## 2018-06-05 PROCEDURE — 86140 C-REACTIVE PROTEIN: CPT

## 2018-06-05 PROCEDURE — 83540 ASSAY OF IRON: CPT

## 2018-06-05 PROCEDURE — 93010 ELECTROCARDIOGRAM REPORT: CPT | Mod: ,,, | Performed by: INTERNAL MEDICINE

## 2018-06-05 PROCEDURE — 80053 COMPREHEN METABOLIC PANEL: CPT

## 2018-06-05 PROCEDURE — 85025 COMPLETE CBC W/AUTO DIFF WBC: CPT

## 2018-06-05 PROCEDURE — 93005 ELECTROCARDIOGRAM TRACING: CPT

## 2018-06-05 RX ORDER — CYCLOBENZAPRINE HCL 5 MG
5 TABLET ORAL 2 TIMES DAILY
Status: DISCONTINUED | OUTPATIENT
Start: 2018-06-05 | End: 2018-06-06 | Stop reason: HOSPADM

## 2018-06-05 RX ORDER — IBUPROFEN 200 MG
16 TABLET ORAL
Status: DISCONTINUED | OUTPATIENT
Start: 2018-06-05 | End: 2018-06-06 | Stop reason: HOSPADM

## 2018-06-05 RX ORDER — CITALOPRAM 20 MG/1
20 TABLET, FILM COATED ORAL DAILY
Status: DISCONTINUED | OUTPATIENT
Start: 2018-06-06 | End: 2018-06-06 | Stop reason: HOSPADM

## 2018-06-05 RX ORDER — KETOROLAC TROMETHAMINE 30 MG/ML
15 INJECTION, SOLUTION INTRAMUSCULAR; INTRAVENOUS EVERY 6 HOURS PRN
Status: DISCONTINUED | OUTPATIENT
Start: 2018-06-05 | End: 2018-06-06 | Stop reason: HOSPADM

## 2018-06-05 RX ORDER — TIZANIDINE HYDROCHLORIDE 4 MG/1
CAPSULE, GELATIN COATED ORAL 2 TIMES DAILY
COMMUNITY
End: 2020-04-29 | Stop reason: SDUPTHER

## 2018-06-05 RX ORDER — CIPROFLOXACIN 500 MG/1
500 TABLET ORAL 2 TIMES DAILY
COMMUNITY
End: 2018-07-06 | Stop reason: ALTCHOICE

## 2018-06-05 RX ORDER — THYROID 60 MG/1
60 TABLET ORAL
Status: DISCONTINUED | OUTPATIENT
Start: 2018-06-06 | End: 2018-06-06 | Stop reason: HOSPADM

## 2018-06-05 RX ORDER — DICYCLOMINE HYDROCHLORIDE 10 MG/ML
20 INJECTION INTRAMUSCULAR
Status: COMPLETED | OUTPATIENT
Start: 2018-06-05 | End: 2018-06-05

## 2018-06-05 RX ORDER — AMOXICILLIN 250 MG
1 CAPSULE ORAL 2 TIMES DAILY PRN
Status: DISCONTINUED | OUTPATIENT
Start: 2018-06-05 | End: 2018-06-06 | Stop reason: HOSPADM

## 2018-06-05 RX ORDER — TIZANIDINE 2 MG/1
4 TABLET ORAL 2 TIMES DAILY PRN
Status: DISCONTINUED | OUTPATIENT
Start: 2018-06-05 | End: 2018-06-05

## 2018-06-05 RX ORDER — PROMETHAZINE HYDROCHLORIDE 12.5 MG/1
25 TABLET ORAL EVERY 6 HOURS PRN
Status: DISCONTINUED | OUTPATIENT
Start: 2018-06-05 | End: 2018-06-06 | Stop reason: HOSPADM

## 2018-06-05 RX ORDER — PROGESTERONE 100 MG/1
100 CAPSULE ORAL DAILY
COMMUNITY
End: 2020-07-16

## 2018-06-05 RX ORDER — AMLODIPINE BESYLATE 10 MG/1
10 TABLET ORAL DAILY
Status: DISCONTINUED | OUTPATIENT
Start: 2018-06-06 | End: 2018-06-06 | Stop reason: HOSPADM

## 2018-06-05 RX ORDER — ONDANSETRON 4 MG/1
4 TABLET, ORALLY DISINTEGRATING ORAL EVERY 8 HOURS PRN
Status: DISCONTINUED | OUTPATIENT
Start: 2018-06-05 | End: 2018-06-06 | Stop reason: HOSPADM

## 2018-06-05 RX ORDER — ROSUVASTATIN CALCIUM 5 MG/1
10 TABLET, COATED ORAL DAILY
Status: DISCONTINUED | OUTPATIENT
Start: 2018-06-06 | End: 2018-06-06 | Stop reason: HOSPADM

## 2018-06-05 RX ORDER — HYDROCODONE BITARTRATE AND ACETAMINOPHEN 5; 325 MG/1; MG/1
1 TABLET ORAL EVERY 6 HOURS PRN
Status: DISCONTINUED | OUTPATIENT
Start: 2018-06-05 | End: 2018-06-06

## 2018-06-05 RX ORDER — PROMETHAZINE HYDROCHLORIDE 12.5 MG/1
12.5 TABLET ORAL 3 TIMES DAILY PRN
COMMUNITY
End: 2018-07-06 | Stop reason: ALTCHOICE

## 2018-06-05 RX ORDER — KETOROLAC TROMETHAMINE 30 MG/ML
15 INJECTION, SOLUTION INTRAMUSCULAR; INTRAVENOUS
Status: COMPLETED | OUTPATIENT
Start: 2018-06-05 | End: 2018-06-05

## 2018-06-05 RX ORDER — HYDROXYZINE HYDROCHLORIDE 25 MG/1
25 TABLET, FILM COATED ORAL 3 TIMES DAILY PRN
Status: DISCONTINUED | OUTPATIENT
Start: 2018-06-05 | End: 2018-06-06 | Stop reason: HOSPADM

## 2018-06-05 RX ORDER — IBUPROFEN 200 MG
24 TABLET ORAL
Status: DISCONTINUED | OUTPATIENT
Start: 2018-06-05 | End: 2018-06-06 | Stop reason: HOSPADM

## 2018-06-05 RX ORDER — METOPROLOL SUCCINATE 50 MG/1
50 TABLET, EXTENDED RELEASE ORAL DAILY
Status: DISCONTINUED | OUTPATIENT
Start: 2018-06-06 | End: 2018-06-06 | Stop reason: HOSPADM

## 2018-06-05 RX ORDER — SODIUM CHLORIDE 9 MG/ML
INJECTION, SOLUTION INTRAVENOUS CONTINUOUS
Status: DISCONTINUED | OUTPATIENT
Start: 2018-06-05 | End: 2018-06-06 | Stop reason: HOSPADM

## 2018-06-05 RX ORDER — SODIUM CHLORIDE 0.9 % (FLUSH) 0.9 %
5 SYRINGE (ML) INJECTION
Status: DISCONTINUED | OUTPATIENT
Start: 2018-06-05 | End: 2018-06-06 | Stop reason: HOSPADM

## 2018-06-05 RX ORDER — ONDANSETRON 4 MG/1
4 TABLET, ORALLY DISINTEGRATING ORAL
Status: COMPLETED | OUTPATIENT
Start: 2018-06-05 | End: 2018-06-05

## 2018-06-05 RX ORDER — HYOSCYAMINE SULFATE 0.12 MG/1
0.12 TABLET SUBLINGUAL EVERY 4 HOURS PRN
COMMUNITY
End: 2018-07-06 | Stop reason: ALTCHOICE

## 2018-06-05 RX ORDER — RAMELTEON 8 MG/1
8 TABLET ORAL NIGHTLY PRN
Status: DISCONTINUED | OUTPATIENT
Start: 2018-06-05 | End: 2018-06-06 | Stop reason: HOSPADM

## 2018-06-05 RX ORDER — GLUCAGON 1 MG
1 KIT INJECTION
Status: DISCONTINUED | OUTPATIENT
Start: 2018-06-05 | End: 2018-06-06 | Stop reason: HOSPADM

## 2018-06-05 RX ORDER — METRONIDAZOLE 500 MG/1
500 TABLET ORAL 3 TIMES DAILY
Status: DISCONTINUED | OUTPATIENT
Start: 2018-06-05 | End: 2018-06-06 | Stop reason: HOSPADM

## 2018-06-05 RX ORDER — IPRATROPIUM BROMIDE AND ALBUTEROL SULFATE 2.5; .5 MG/3ML; MG/3ML
3 SOLUTION RESPIRATORY (INHALATION) EVERY 4 HOURS PRN
Status: DISCONTINUED | OUTPATIENT
Start: 2018-06-05 | End: 2018-06-06 | Stop reason: HOSPADM

## 2018-06-05 RX ORDER — CIPROFLOXACIN 500 MG/1
500 TABLET ORAL 2 TIMES DAILY
Status: DISCONTINUED | OUTPATIENT
Start: 2018-06-05 | End: 2018-06-06 | Stop reason: HOSPADM

## 2018-06-05 RX ORDER — ESTRADIOL 2 MG/1
2 TABLET ORAL DAILY
Status: DISCONTINUED | OUTPATIENT
Start: 2018-06-06 | End: 2018-06-06 | Stop reason: HOSPADM

## 2018-06-05 RX ORDER — FUROSEMIDE 40 MG/1
40 TABLET ORAL DAILY
Status: DISCONTINUED | OUTPATIENT
Start: 2018-06-06 | End: 2018-06-05

## 2018-06-05 RX ORDER — HYDROCODONE BITARTRATE AND ACETAMINOPHEN 5; 325 MG/1; MG/1
1 TABLET ORAL
Status: COMPLETED | OUTPATIENT
Start: 2018-06-05 | End: 2018-06-05

## 2018-06-05 RX ORDER — ACETAMINOPHEN 325 MG/1
650 TABLET ORAL EVERY 4 HOURS PRN
Status: DISCONTINUED | OUTPATIENT
Start: 2018-06-05 | End: 2018-06-06 | Stop reason: HOSPADM

## 2018-06-05 RX ORDER — MORPHINE SULFATE 4 MG/ML
4 INJECTION, SOLUTION INTRAMUSCULAR; INTRAVENOUS
Status: COMPLETED | OUTPATIENT
Start: 2018-06-05 | End: 2018-06-05

## 2018-06-05 RX ORDER — METRONIDAZOLE 500 MG/1
500 TABLET ORAL 3 TIMES DAILY
Status: ON HOLD | COMMUNITY
End: 2018-07-18 | Stop reason: HOSPADM

## 2018-06-05 RX ORDER — TIZANIDINE 2 MG/1
4 TABLET ORAL 2 TIMES DAILY
Status: DISCONTINUED | OUTPATIENT
Start: 2018-06-05 | End: 2018-06-05

## 2018-06-05 RX ADMIN — SODIUM CHLORIDE 1000 ML: 0.9 INJECTION, SOLUTION INTRAVENOUS at 02:06

## 2018-06-05 RX ADMIN — KETOROLAC TROMETHAMINE 15 MG: 30 INJECTION, SOLUTION INTRAMUSCULAR at 11:06

## 2018-06-05 RX ADMIN — KETOROLAC TROMETHAMINE 15 MG: 30 INJECTION, SOLUTION INTRAMUSCULAR at 02:06

## 2018-06-05 RX ADMIN — DICYCLOMINE HYDROCHLORIDE 20 MG: 20 INJECTION, SOLUTION INTRAMUSCULAR at 03:06

## 2018-06-05 RX ADMIN — HYDROCODONE BITARTRATE AND ACETAMINOPHEN 1 TABLET: 5; 325 TABLET ORAL at 09:06

## 2018-06-05 RX ADMIN — IOHEXOL 75 ML: 350 INJECTION, SOLUTION INTRAVENOUS at 04:06

## 2018-06-05 RX ADMIN — SODIUM CHLORIDE: 0.9 INJECTION, SOLUTION INTRAVENOUS at 09:06

## 2018-06-05 RX ADMIN — CIPROFLOXACIN 500 MG: 500 TABLET, FILM COATED ORAL at 09:06

## 2018-06-05 RX ADMIN — CYCLOBENZAPRINE HYDROCHLORIDE 5 MG: 5 TABLET, FILM COATED ORAL at 09:06

## 2018-06-05 RX ADMIN — MORPHINE SULFATE 4 MG: 4 INJECTION INTRAVENOUS at 06:06

## 2018-06-05 RX ADMIN — METRONIDAZOLE 500 MG: 500 TABLET ORAL at 09:06

## 2018-06-05 RX ADMIN — ONDANSETRON 4 MG: 4 TABLET, ORALLY DISINTEGRATING ORAL at 02:06

## 2018-06-05 RX ADMIN — ALUMINUM HYDROXIDE, MAGNESIUM HYDROXIDE, AND SIMETHICONE 50 ML: 200; 200; 20 SUSPENSION ORAL at 09:06

## 2018-06-05 RX ADMIN — HYDROCODONE BITARTRATE AND ACETAMINOPHEN 1 TABLET: 5; 325 TABLET ORAL at 05:06

## 2018-06-05 RX ADMIN — ONDANSETRON 4 MG: 4 TABLET, ORALLY DISINTEGRATING ORAL at 06:06

## 2018-06-05 NOTE — ED NOTES
Patient identifiers have been checked and are correct.     LOC: The patient is awake, alert, and aware of environment. The patient is oriented x 3 and speaking appropriately.   APPEARANCE: No acute distress noted.   PSYCHOSOCIAL: Patient is calm and cooperative.  SKIN: The skin is warm, dry  RESPIRATORY: Airway is open and patent. Bilateral chest rise and fall. Respirations are spontaneous, even and unlabored. Normal effort and rate noted. No accessory muscle use noted.   CARDIAC: Patient has a normal rate  ABDOMEN: Soft. Generalized tenderness noted. No distention noted. + nausea  URINARY: Patient reports routine urination without pain, frequency, or urgency. Voids independently. Reports urine appears anand/yellow in color.  EXTREMITIES: No redness, heat, swelling, deformity, or pain.  PULSES: 2+ and equal in all extremities.   NEUROLOGIC: Eyes open spontaneously. Speech clear. Tolerating saliva secretions well. Able to follow commands.. Symmetrical facial muscles. Moving all extremities. Movement is purposeful.   MUSCULOSKELETAL: No obvious deformities noted.

## 2018-06-05 NOTE — ED TRIAGE NOTES
C/o generalized abdominal pain for a while. Reports discharged from ED Saturday and told pain caused from pancrease problem and they don't have a pancrease specialist. + nausea. Denies any urinary complaints. + low grade fevers.

## 2018-06-05 NOTE — ED PROVIDER NOTES
Encounter Date: 2018    SCRIBE #1 NOTE: I, Nicolasa Morales, am scribing for, and in the presence of,  Dr. Shah. I have scribed the following portions of the note - the APC attestation.       History     Chief Complaint   Patient presents with    Abdominal Pain     hx pancreatitis     Patient is a 63 year old female with a history of crohn's, Ulcerative colitis, hypertension, hypothyroidism is presenting to the ER for evaluation of abdominal pain.  Patient states that the abdominal pain is located in the epigastric region.  Patient was recently admitted to [facility] for Crohn's and pancreatitis.  She was discharged on the .  Patient states she continues to have severe abdominal pain.  She has also had nausea.  No vomiting. Patient states that she has diarrhea since prior to the discharge. She had 4 episodes today.  No blood in stool or black tarry stool.  She denies flank pain, history of kidney stones.  No prior abdominal surgeries.  No alcohol use.  Denies recorded fevers at home.    During patient's stay at the hospital she did have an EGD which was found to be unremarkable. She was discharged to be followed up with GI.  She is currently on Cipro and Flagyl.      The history is provided by the patient and the spouse.     Review of patient's allergies indicates:  No Known Allergies  Past Medical History:   Diagnosis Date    ADHD (attention deficit hyperactivity disorder), inattentive type     Crohn's colitis     Fibromyalgia     treated by Dr. Thorpe - Rheumatologist    Hyperlipidemia     High triglycerides    Hypertension     Hypothyroidism     Treated by Dr. Mathew    IFG (impaired fasting glucose)     Migraine     Treated by neurologist - Dr. Destiny Florez    Ulcerative colitis     Treated by Dr. Mcfadden    Vitamin D deficiency 3/31/2016     Past Surgical History:   Procedure Laterality Date     SECTION      COLONOSCOPY  10/08/2010    Dr. Lee -  repeat in 5 years - has appointment for colonoscopy 4/6/2016    COLONOSCOPY  04/06/2016    Dr. Kelly - normal  colon - repeat in 10 years - scanned report to media file.    HYSTERECTOMY      OOPHORECTOMY      SHOULDER SURGERY Left     TONSILLECTOMY      upper and lower GI  03/2016     Family History   Problem Relation Age of Onset    Hypertension Mother     Heart disease Mother     Hyperlipidemia Mother     Cancer Sister 53        thyroid cancer and lymph nodes involvement    Cancer Brother 61        colon and lung cancer    Cancer Brother 63        colon    Hypertension Brother     Diabetes Brother      Social History   Substance Use Topics    Smoking status: Former Smoker     Packs/day: 1.00     Years: 20.00     Quit date: 2/1/1997    Smokeless tobacco: Never Used    Alcohol use No     Review of Systems   Constitutional: Negative for chills and fever.   HENT: Negative for congestion.    Respiratory: Negative for chest tightness and shortness of breath.    Cardiovascular: Negative for chest pain and palpitations.   Gastrointestinal: Positive for abdominal pain, diarrhea and nausea. Negative for constipation and vomiting.   Genitourinary: Negative for dysuria.   Musculoskeletal: Negative for back pain.   Skin: Negative for wound.   Allergic/Immunologic: Negative for immunocompromised state.   Neurological: Positive for weakness. Negative for dizziness.   Hematological: Does not bruise/bleed easily.   Psychiatric/Behavioral: Negative for confusion.       Physical Exam     Initial Vitals [06/05/18 1311]   BP Pulse Resp Temp SpO2   (!) 145/88 108 18 98.4 °F (36.9 °C) 97 %      MAP       107         Physical Exam    Constitutional: She appears well-developed and well-nourished. She is not diaphoretic. No distress.   HENT:   Head: Normocephalic and atraumatic.   Mouth/Throat: Oropharynx is clear and moist.   Eyes: Conjunctivae and EOM are normal. Pupils are equal, round, and reactive to light.   Neck:  Neck supple.   Cardiovascular: Normal rate, regular rhythm, normal heart sounds and intact distal pulses.   Pulmonary/Chest: Breath sounds normal. No respiratory distress.   Abdominal: Soft. Normal appearance. There is tenderness in the epigastric area and periumbilical area. There is no rigidity, no rebound, no guarding and no CVA tenderness.   Neurological: She is alert and oriented to person, place, and time.   Skin: Skin is warm and dry.         ED Course   Procedures  Labs Reviewed   CBC W/ AUTO DIFFERENTIAL - Abnormal; Notable for the following:        Result Value    Platelets 403 (*)     Immature Granulocytes 0.9 (*)     Immature Grans (Abs) 0.09 (*)     Mono # 1.1 (*)     All other components within normal limits   COMPREHENSIVE METABOLIC PANEL - Abnormal; Notable for the following:     Alkaline Phosphatase 191 (*)     All other components within normal limits   URINALYSIS, REFLEX TO URINE CULTURE - Abnormal; Notable for the following:     Leukocytes, UA Trace (*)     All other components within normal limits    Narrative:     Preferred Collection Type->Urine, Clean Catch   ISTAT PROCEDURE - Abnormal; Notable for the following:     POC Ionized Calcium 1.04 (*)     All other components within normal limits   LACTIC ACID, PLASMA   LIPASE   URINALYSIS MICROSCOPIC    Narrative:     Preferred Collection Type->Urine, Clean Catch   C-REACTIVE PROTEIN   SEDIMENTATION RATE   SEDIMENTATION RATE         CT Abdomen Pelvis With Contrast   Final Result      Enlargement and peripancreatic haziness of the pancreatic head concerning for pancreatitis.  No evidence of calcified gallstone or biliary ductal dilatation.      Diverticulosis coli without evidence of diverticulitis.      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      Status post hysterectomy.      Electronically signed by resident: Néstor Alberto   Date:    06/05/2018   Time:    16:35      Electronically signed by: Nabil Chowdary MD   Date:    06/05/2018    Time:    17:13               Medical Decision Making:   History:   Old Medical Records: I decided to obtain old medical records.  Clinical Tests:   Lab Tests: Reviewed and Ordered  Radiological Study: Reviewed and Ordered       APC / Resident Notes:   Patient was seen in the ER promptly upon arrival.  She is afebrile, no acute distress.  Tenderness on palpation over the epigastric and periumbilical region.  Abdomen is otherwise soft, nondistended. IV access was established labs ordered fluids given.  She was given Toradol and Bentyl for pain.    Laboratory studies show normal white count of 9.8.  Hemoglobin stable.  Chemistries unremarkable.  Lipase normal limits. Urinalysis does not show evidence of infection or blood.  Lactic acid normal.    CT of the abdomen pelvis with contrast is concerning for pancreatitis.  There is enlargement and peripancreatic haziness of the pancreatic head.  No calcified gallstones or biliary ductal dilatation noted.  Diverticulosis without evidence of diverticulitis.    Upon reassessment patient states that she still continues have a 10/10 pain. She was given additional Norco and morphine with only minimal alleviation of her pain.    Will admit to Hospital Medicine for further management of her pain as well as workup of her pancreatitis.    The care of this patient was overseen by attending physician who agrees with treatment, plan, and disposition.         Scribe Attestation:   Scribe #1: I performed the above scribed service and the documentation accurately describes the services I performed. I attest to the accuracy of the note.    Attending Attestation:     Physician Attestation Statement for NP/PA:   I discussed this assessment and plan of this patient with the NP/PA, but I did not personally examine the patient. The face to face encounter was performed by the NP/PA.                     Clinical Impression:   The primary encounter diagnosis was Intractable abdominal pain.  Diagnoses of Acute pancreatitis, unspecified complication status, unspecified pancreatitis type and Tachycardia were also pertinent to this visit.        Disposition:   Disposition: Admitted  Condition: Stable                        Lamar Younger PA-C  06/05/18 1461

## 2018-06-05 NOTE — HPI
This is a 63 year old female with a history of crohn's/UC, hypertension, hypothyroidism, anxiety, fibromyalgia presents to ED c/o acute onset generalized abdominal pain x7 days with associated chronic diarrhea, nausea, reduced PO intake, dry heaves, abd distention, chills, low grade fevers ( 99).  Pain is severe, starts in suprapubic area and radiates to epigastric area then to BL back.  It is constant and has no alleviating or exacerbating factors, despite bentyl and levsin.  Patient was recently admitted to [facility] for Crohn's and pancreatitis from 5/28-6/2.  Patient reports that her diarrhea is chronic, mixed stool, present prior to discharge, sometimes is dark and has BRBPR on different occassions.   She had 4 episodes of diarrhea today and is exacerbated by PO intake.  Patient also c/o back pain, flank pain, and generalized fibromyalgia pains.  Pt denies prior abdominal surgeries.  Denies alcohol use.      During patient's stay at the hospital she did have an EGD which was found to be unremarkable. She was discharged to be followed up with GI.  She is currently on Cipro and Flagyl.    Of note, patient is poor historian and c/o admission status under observation because they believe that they will need to be here for several days and need full workup.    In ED, lipase and transaminases WNL, WBC 9.89, UA unremarkable. Afebrile, VSS.  ESR/CRP pending.  CT abd/pelvis with contrast concerning for pancreatitis with enlargement and peripancreatic haziness of the pancreatic head. There is no evidence of calcified gallstone or biliary ductal dilatation.  Also visualized is diverticulosis coli without evidence of diverticulitis.

## 2018-06-06 ENCOUNTER — TELEPHONE (OUTPATIENT)
Dept: GASTROENTEROLOGY | Facility: CLINIC | Age: 63
End: 2018-06-06

## 2018-06-06 VITALS
DIASTOLIC BLOOD PRESSURE: 79 MMHG | HEIGHT: 66 IN | BODY MASS INDEX: 24.91 KG/M2 | WEIGHT: 155 LBS | SYSTOLIC BLOOD PRESSURE: 170 MMHG | TEMPERATURE: 97 F | OXYGEN SATURATION: 94 % | RESPIRATION RATE: 16 BRPM | HEART RATE: 86 BPM

## 2018-06-06 LAB
ALBUMIN SERPL BCP-MCNC: 2.8 G/DL
ALP SERPL-CCNC: 148 U/L
ALT SERPL W/O P-5'-P-CCNC: 24 U/L
ANION GAP SERPL CALC-SCNC: 7 MMOL/L
AST SERPL-CCNC: 21 U/L
BASOPHILS # BLD AUTO: 0.07 K/UL
BASOPHILS NFR BLD: 1.1 %
BILIRUB SERPL-MCNC: 0.2 MG/DL
BNP SERPL-MCNC: <10 PG/ML
BUN SERPL-MCNC: 13 MG/DL
CALCIUM SERPL-MCNC: 8.5 MG/DL
CHLORIDE SERPL-SCNC: 105 MMOL/L
CO2 SERPL-SCNC: 27 MMOL/L
CREAT SERPL-MCNC: 0.8 MG/DL
DIFFERENTIAL METHOD: ABNORMAL
EOSINOPHIL # BLD AUTO: 0.2 K/UL
EOSINOPHIL NFR BLD: 2.9 %
ERYTHROCYTE [DISTWIDTH] IN BLOOD BY AUTOMATED COUNT: 13 %
EST. GFR  (AFRICAN AMERICAN): >60 ML/MIN/1.73 M^2
EST. GFR  (NON AFRICAN AMERICAN): >60 ML/MIN/1.73 M^2
ESTIMATED AVG GLUCOSE: 120 MG/DL
FERRITIN SERPL-MCNC: 119 NG/ML
FOLATE SERPL-MCNC: 12.2 NG/ML
GLUCOSE SERPL-MCNC: 105 MG/DL
HBA1C MFR BLD HPLC: 5.8 %
HCT VFR BLD AUTO: 38.6 %
HGB BLD-MCNC: 12.4 G/DL
IMM GRANULOCYTES # BLD AUTO: 0.07 K/UL
IMM GRANULOCYTES NFR BLD AUTO: 1.1 %
LIPASE SERPL-CCNC: 9 U/L
LYMPHOCYTES # BLD AUTO: 2.4 K/UL
LYMPHOCYTES NFR BLD: 38.7 %
MAGNESIUM SERPL-MCNC: 2.1 MG/DL
MCH RBC QN AUTO: 30.1 PG
MCHC RBC AUTO-ENTMCNC: 32.1 G/DL
MCV RBC AUTO: 94 FL
MONOCYTES # BLD AUTO: 0.6 K/UL
MONOCYTES NFR BLD: 9.6 %
NEUTROPHILS # BLD AUTO: 2.9 K/UL
NEUTROPHILS NFR BLD: 46.6 %
NRBC BLD-RTO: 0 /100 WBC
PHOSPHATE SERPL-MCNC: 3.8 MG/DL
PLATELET # BLD AUTO: 291 K/UL
PMV BLD AUTO: 10 FL
POTASSIUM SERPL-SCNC: 4 MMOL/L
PROT SERPL-MCNC: 6.4 G/DL
RBC # BLD AUTO: 4.12 M/UL
SODIUM SERPL-SCNC: 139 MMOL/L
TRANSFERRIN SERPL-MCNC: 167 MG/DL
TSH SERPL DL<=0.005 MIU/L-ACNC: 1.45 UIU/ML
VIT B12 SERPL-MCNC: 1015 PG/ML
WBC # BLD AUTO: 6.26 K/UL

## 2018-06-06 PROCEDURE — 97110 THERAPEUTIC EXERCISES: CPT

## 2018-06-06 PROCEDURE — 80053 COMPREHEN METABOLIC PANEL: CPT

## 2018-06-06 PROCEDURE — 82607 VITAMIN B-12: CPT

## 2018-06-06 PROCEDURE — 99217 PR OBSERVATION CARE DISCHARGE: CPT | Mod: ,,, | Performed by: PHYSICIAN ASSISTANT

## 2018-06-06 PROCEDURE — 83036 HEMOGLOBIN GLYCOSYLATED A1C: CPT

## 2018-06-06 PROCEDURE — 83735 ASSAY OF MAGNESIUM: CPT

## 2018-06-06 PROCEDURE — 25000003 PHARM REV CODE 250: Performed by: PHYSICIAN ASSISTANT

## 2018-06-06 PROCEDURE — 83690 ASSAY OF LIPASE: CPT

## 2018-06-06 PROCEDURE — 84466 ASSAY OF TRANSFERRIN: CPT

## 2018-06-06 PROCEDURE — 63600175 PHARM REV CODE 636 W HCPCS: Performed by: PHYSICIAN ASSISTANT

## 2018-06-06 PROCEDURE — G8988 SELF CARE GOAL STATUS: HCPCS | Mod: CJ

## 2018-06-06 PROCEDURE — 82746 ASSAY OF FOLIC ACID SERUM: CPT

## 2018-06-06 PROCEDURE — G8989 SELF CARE D/C STATUS: HCPCS | Mod: CJ

## 2018-06-06 PROCEDURE — 97165 OT EVAL LOW COMPLEX 30 MIN: CPT

## 2018-06-06 PROCEDURE — 83880 ASSAY OF NATRIURETIC PEPTIDE: CPT

## 2018-06-06 PROCEDURE — 36415 COLL VENOUS BLD VENIPUNCTURE: CPT

## 2018-06-06 PROCEDURE — G0378 HOSPITAL OBSERVATION PER HR: HCPCS

## 2018-06-06 PROCEDURE — G8987 SELF CARE CURRENT STATUS: HCPCS | Mod: CJ

## 2018-06-06 PROCEDURE — G8981 BODY POS CURRENT STATUS: HCPCS | Mod: CN

## 2018-06-06 PROCEDURE — 82728 ASSAY OF FERRITIN: CPT

## 2018-06-06 PROCEDURE — 84100 ASSAY OF PHOSPHORUS: CPT

## 2018-06-06 PROCEDURE — 85025 COMPLETE CBC W/AUTO DIFF WBC: CPT

## 2018-06-06 PROCEDURE — 97161 PT EVAL LOW COMPLEX 20 MIN: CPT

## 2018-06-06 PROCEDURE — 25000003 PHARM REV CODE 250: Performed by: NURSE PRACTITIONER

## 2018-06-06 PROCEDURE — G8982 BODY POS GOAL STATUS: HCPCS | Mod: CM

## 2018-06-06 PROCEDURE — 84443 ASSAY THYROID STIM HORMONE: CPT

## 2018-06-06 RX ORDER — HYDROCODONE BITARTRATE AND ACETAMINOPHEN 10; 325 MG/1; MG/1
1 TABLET ORAL EVERY 6 HOURS PRN
Qty: 28 TABLET | Refills: 0 | Status: SHIPPED | OUTPATIENT
Start: 2018-06-06 | End: 2018-06-06

## 2018-06-06 RX ORDER — HYDROCODONE BITARTRATE AND ACETAMINOPHEN 10; 325 MG/1; MG/1
1 TABLET ORAL EVERY 6 HOURS PRN
Qty: 28 TABLET | Refills: 0 | Status: SHIPPED | OUTPATIENT
Start: 2018-06-06 | End: 2018-06-13

## 2018-06-06 RX ORDER — HYDROCODONE BITARTRATE AND ACETAMINOPHEN 10; 325 MG/1; MG/1
1 TABLET ORAL EVERY 6 HOURS PRN
Status: DISCONTINUED | OUTPATIENT
Start: 2018-06-06 | End: 2018-06-06 | Stop reason: HOSPADM

## 2018-06-06 RX ADMIN — AMLODIPINE BESYLATE 10 MG: 10 TABLET ORAL at 08:06

## 2018-06-06 RX ADMIN — KETOROLAC TROMETHAMINE 15 MG: 30 INJECTION, SOLUTION INTRAMUSCULAR at 01:06

## 2018-06-06 RX ADMIN — CIPROFLOXACIN 500 MG: 500 TABLET, FILM COATED ORAL at 08:06

## 2018-06-06 RX ADMIN — ONDANSETRON 4 MG: 4 TABLET, ORALLY DISINTEGRATING ORAL at 02:06

## 2018-06-06 RX ADMIN — METOPROLOL SUCCINATE 50 MG: 50 TABLET, EXTENDED RELEASE ORAL at 08:06

## 2018-06-06 RX ADMIN — HYDROCODONE BITARTRATE AND ACETAMINOPHEN 1 TABLET: 10; 325 TABLET ORAL at 03:06

## 2018-06-06 RX ADMIN — HYDROXYZINE HYDROCHLORIDE 25 MG: 25 TABLET, FILM COATED ORAL at 09:06

## 2018-06-06 RX ADMIN — CITALOPRAM HYDROBROMIDE 20 MG: 20 TABLET ORAL at 08:06

## 2018-06-06 RX ADMIN — ESTRADIOL 2 MG: 2 TABLET ORAL at 01:06

## 2018-06-06 RX ADMIN — SODIUM CHLORIDE: 0.9 INJECTION, SOLUTION INTRAVENOUS at 06:06

## 2018-06-06 RX ADMIN — THYROID, PORCINE 60 MG: 60 TABLET ORAL at 06:06

## 2018-06-06 RX ADMIN — ACETAMINOPHEN 650 MG: 325 TABLET ORAL at 06:06

## 2018-06-06 RX ADMIN — HYDROCODONE BITARTRATE AND ACETAMINOPHEN 1 TABLET: 10; 325 TABLET ORAL at 09:06

## 2018-06-06 RX ADMIN — METRONIDAZOLE 500 MG: 500 TABLET ORAL at 08:06

## 2018-06-06 RX ADMIN — ROSUVASTATIN CALCIUM 10 MG: 5 TABLET, FILM COATED ORAL at 08:06

## 2018-06-06 RX ADMIN — METRONIDAZOLE 500 MG: 500 TABLET ORAL at 03:06

## 2018-06-06 RX ADMIN — CYCLOBENZAPRINE HYDROCHLORIDE 5 MG: 5 TABLET, FILM COATED ORAL at 01:06

## 2018-06-06 NOTE — ASSESSMENT & PLAN NOTE
Chronic Diarrhea    - patient w/o evidence of UC/Crohns on imaging, she states that in the past only 1 MD has told he she has symptoms, but has never seen this on colonoscopy or imaging. WIll remove from PMx  - She was recently discharged from [facility] for pancreatitis w/o pain medications  - case discussed with angelita HUANG for outpatient follow up and supporive care  - CT abdomen with inflammation of pancreas, lipase WNL. This represents a resolving pancreatitis.  - stool studies ordered, but doubt they will provide any significant information  - patient likely have IBS-D, GI consulted placed for outpatient work up  - continue cipro and flagyl from pancreatitis  - tolerated PO, good pain control. Further work up outpatient

## 2018-06-06 NOTE — ASSESSMENT & PLAN NOTE
Crohn's colitis   - f/w tory foster with GI ([facility])  - waxing and waning chronic diarrhea exacerbated by PO intake. No changes  - family and patient frustrated with care and lack of pancreas specialist at .  - will check iron studies, B12, folate

## 2018-06-06 NOTE — ASSESSMENT & PLAN NOTE
Suspect exacerbating presentation today  - Change tizanidine BID to flexeril BID in setting of cipro  - will need close rheumatology f/u for better pain control  - PT/OT consult

## 2018-06-06 NOTE — ASSESSMENT & PLAN NOTE
Diarrhea  62 y/o F with a pmhx of crohn's/UC, HTN, hypothyroidism presents with severe epigastric abdominal pain with associated diarrhea.     - Vague complaints and unable to localize pain.  Patient tearful during interview.  Will treat for GI source of pain for now, but likely associated with anxiety/fibromyalgia.  Patient will likely need multi-faceted approach to abd pain.  - GI consulted and appreciate recs  - CT abd concerning for pancreatitis but lipase WNL and recent hospitalization for pancreatitis- likely recovering.    - ESR and CRP mildly elevated, suspect 2/2 resolving pancreatitis vs UC/crohns  - Continue cipro and flagyl  - Will check C. Diff, stool studies.  - NPO  - pain control, anti-emetics, mIVF  - Need records from [facility]

## 2018-06-06 NOTE — HOSPITAL COURSE
Patient admitted for intractable abdominal pain in setting of resolving pancreatitis. She was not discharged with pain regimen from [facility]. Repeat CT here showed inflamed pancrease with normal lipase. Patient tolerated diet with still needing some pain control. Patient requesting to be seen by GI here, will send outpatient referral on discharge.

## 2018-06-06 NOTE — SUBJECTIVE & OBJECTIVE
Interval History: Complaining of continued abdominal pain, case reviewed with GI. Will send for outpatient follow up. Patient wanting work up of diarrhea x several years while inpatient. Explained that this can be worked up outpatient. Additionally GI reviewed imaging and does not see evidence of UC/Crohns. The patient states only 1 MD in the past has told her she has the symptoms of UC/Crohns but they have never had any direct evidence via colonoscopy. Will remove it from her diagnoses.     Review of Systems   Constitutional: Positive for fatigue. Negative for fever.   Cardiovascular: Negative for chest pain, palpitations and leg swelling.   Gastrointestinal: Positive for abdominal pain. Negative for nausea and vomiting.   Musculoskeletal: Negative for arthralgias and back pain.   Neurological: Positive for weakness. Negative for headaches.   Psychiatric/Behavioral: The patient is nervous/anxious.      Objective:     Vital Signs (Most Recent):  Temp: 96.3 °F (35.7 °C) (06/06/18 1145)  Pulse: 84 (06/06/18 1145)  Resp: 16 (06/06/18 1145)  BP: (!) 142/70 (06/06/18 1145)  SpO2: (!) 93 % (06/06/18 1145) Vital Signs (24h Range):  Temp:  [96.3 °F (35.7 °C)-98.2 °F (36.8 °C)] 96.3 °F (35.7 °C)  Pulse:  [84-95] 84  Resp:  [16-18] 16  SpO2:  [93 %-97 %] 93 %  BP: (137-155)/(68-81) 142/70     Weight: 70.3 kg (154 lb 15.7 oz)  Body mass index is 25.01 kg/m².    Intake/Output Summary (Last 24 hours) at 06/06/18 1313  Last data filed at 06/06/18 1000   Gross per 24 hour   Intake             2360 ml   Output                0 ml   Net             2360 ml      Physical Exam   Constitutional: She is oriented to person, place, and time. She appears well-developed and well-nourished. No distress.   HENT:   Head: Normocephalic and atraumatic.   Cardiovascular: Normal rate and regular rhythm.    No murmur heard.  Pulmonary/Chest: Effort normal and breath sounds normal. No respiratory distress. She has no wheezes.   Abdominal: Soft.  Bowel sounds are normal. She exhibits no distension. There is tenderness (generalized).   Musculoskeletal: Normal range of motion. She exhibits no edema.   Neurological: She is alert and oriented to person, place, and time.   Skin: She is not diaphoretic.   Psychiatric: She is withdrawn. She exhibits a depressed mood.   Nursing note and vitals reviewed.      Significant Labs: All pertinent labs within the past 24 hours have been reviewed.    Significant Imaging: I have reviewed all pertinent imaging results/findings within the past 24 hours.

## 2018-06-06 NOTE — H&P
[facility] Hospital Medicine  History & Physical    Patient Name: Madalyn Iverson  MRN: [id number]  Admission Date: 6/5/2018  Attending Physician: IVAN Allen MD   Primary Care Provider: Ruth Jaime NP    Hospital Medicine Team: Networked reference to record PCT  Néstor Lau PA-C     Patient information was obtained from patient, past medical records and ER records.     Subjective:     Principal Problem:Intractable abdominal pain    Chief Complaint:   Chief Complaint   Patient presents with    Abdominal Pain     hx pancreatitis        HPI: This is a 63 year old female with a history of crohn's/UC, hypertension, hypothyroidism, anxiety, fibromyalgia presents to ED c/o acute onset generalized abdominal pain x7 days with associated chronic diarrhea, nausea, reduced PO intake, dry heaves, abd distention, chills, low grade fevers ( 99).   Pain is severe, starts in suprapubic area and radiates to epigastric area then to BL back.  It is constant and has no alleviating or exacerbating factors, despite bentyl and levsin.  Patient was recently admitted to [facility] for Crohn's and pancreatitis from 5/28-6/2.  Patient reports that her diarrhea is chronic, mixed stool, present prior to discharge, sometimes is dark and has BRBPR on different occassions.   She had 4 episodes of diarrhea today and is exacerbated by PO intake.   Patient also c/o back pain, flank pain, and generalized fibromyalgia pains.   Pt denies prior abdominal surgeries.  Denies alcohol use.      During patient's stay at the hospital she did have an EGD which was found to be unremarkable. She was discharged to be followed up with GI.  She is currently on Cipro and Flagyl.    Of note, patient is poor historian and c/o admission status under observation because they believe that they will need to be here for several days and need full workup.    In ED, lipase and transaminases WNL, WBC 9.89, UA unremarkable. Afebrile, VSS.  ESR/CRP  pending.  CT abd/pelvis with contrast concerning for pancreatitis with enlargement and peripancreatic haziness of the pancreatic head. There is no evidence of calcified gallstone or biliary ductal dilatation.  Also visualized is diverticulosis coli without evidence of diverticulitis.    Past Medical History:   Diagnosis Date    ADHD (attention deficit hyperactivity disorder), inattentive type     Crohn's colitis     Fibromyalgia     treated by Dr. Thorpe - Rheumatologist    Hyperlipidemia     High triglycerides    Hypertension     Hypothyroidism     Treated by Dr. Mathew    IFG (impaired fasting glucose)     Migraine     Treated by neurologist - Dr. Destiny Florez    Ulcerative colitis     Treated by Dr. Lee and Aaron    Vitamin D deficiency 3/31/2016       Past Surgical History:   Procedure Laterality Date     SECTION      COLONOSCOPY  10/08/2010    Dr. Lee - repeat in 5 years - has appointment for colonoscopy 2016    COLONOSCOPY  2016    Dr. Kelly - normal  colon - repeat in 10 years - scanned report to media file.    HYSTERECTOMY      OOPHORECTOMY      SHOULDER SURGERY Left     TONSILLECTOMY      upper and lower GI  2016       Review of patient's allergies indicates:  No Known Allergies    No current facility-administered medications on file prior to encounter.      Current Outpatient Prescriptions on File Prior to Encounter   Medication Sig    alprazolam (XANAX) 0.5 MG tablet Take 0.5 mg by mouth every 8 (eight) hours as needed.    amLODIPine (NORVASC) 10 MG tablet TAKE 1 TABLET BY MOUTH ONCE DAILY    cholecalciferol, vitamin D3, (VITAMIN D3) 5,000 unit Tab Take 5,000 Units by mouth once daily.    citalopram (CELEXA) 20 MG tablet Take 20 mg by mouth once daily.    cloNIDine (CATAPRES) 0.1 MG tablet Take 1 tablet (0.1 mg total) by mouth nightly.    CYANOCOBALAMIN, VITAMIN B-12, (VITAMIN B-12 INJ) Inject 1,000 mcg as directed every 30 days.     dextroamphetamine-amphetamine 10 mg Tab Take 10 mg by mouth 2 (two) times daily.    diclofenac sodium 1 % Gel Apply 2 g topically 4 (four) times daily.    estradiol (ESTRACE) 2 MG tablet Take 2 mg by mouth once daily.    furosemide (LASIX) 40 MG tablet TAKE 1 TABLET (40 MG TOTAL) BY MOUTH ONCE DAILY.    KLOR-CON SPRINKLE 10 mEq CpSR TAKE 1 CAPSULE TWICE A DAY    metoprolol succinate (TOPROL-XL) 50 MG 24 hr tablet TAKE 1 TABLET BY MOUTH EVERY DAY    rosuvastatin (CRESTOR) 10 MG tablet Take 1 tablet (10 mg total) by mouth once daily.    [DISCONTINUED] ARMOUR THYROID 60 mg Tab Take 60 mg by mouth once daily.    [DISCONTINUED] polymyxin B sulf-trimethoprim (POLYTRIM) 10,000 unit- 1 mg/mL Drop Place 1 drop into the right eye every 4 (four) hours.     Family History     Problem Relation (Age of Onset)    Cancer Sister (53), Brother (61), Brother (63)    Diabetes Brother    Heart disease Mother    Hyperlipidemia Mother    Hypertension Mother, Brother        Social History Main Topics    Smoking status: Former Smoker     Packs/day: 1.00     Years: 20.00     Quit date: 2/1/1997    Smokeless tobacco: Never Used    Alcohol use No    Drug use: No    Sexual activity: No     Review of Systems   Constitutional: Positive for appetite change, chills, fatigue and fever (low grade 99). Negative for diaphoresis and unexpected weight change.   HENT: Negative for congestion and rhinorrhea.    Eyes: Negative for photophobia and visual disturbance.   Respiratory: Positive for shortness of breath (when pain severe). Negative for cough, chest tightness and wheezing.    Cardiovascular: Positive for chest pain (reproducible). Negative for leg swelling.   Gastrointestinal: Positive for abdominal distention, abdominal pain (generalized), blood in stool (chronic) and diarrhea (chronic). Negative for constipation, nausea and vomiting.   Genitourinary: Negative for difficulty urinating, dysuria, frequency and hematuria.   Skin:  Negative for rash and wound.        Bruising LUE   Neurological: Positive for weakness (generalized) and headaches (mid-frontal). Negative for dizziness and light-headedness.   Psychiatric/Behavioral: Positive for dysphoric mood. Negative for agitation and confusion. The patient is nervous/anxious.      Objective:     Vital Signs (Most Recent):  Temp: 98.2 °F (36.8 °C) (06/05/18 1847)  Pulse: 94 (06/05/18 1847)  Resp: 16 (06/05/18 1847)  BP: (!) 145/81 (06/05/18 1847)  SpO2: 95 % (06/05/18 1847) Vital Signs (24h Range):  Temp:  [98.2 °F (36.8 °C)-98.4 °F (36.9 °C)] 98.2 °F (36.8 °C)  Pulse:  [] 94  Resp:  [16-18] 16  SpO2:  [95 %-97 %] 95 %  BP: (145)/(81-88) 145/81     Weight: 70.3 kg (155 lb)  Body mass index is 25.02 kg/m².    Physical Exam   Constitutional: She is oriented to person, place, and time. She appears well-developed and well-nourished.   Obese female sitting on side of bed in NAD  Tearful during exam    HENT:   Head: Normocephalic and atraumatic.   Eyes: EOM are normal. Pupils are equal, round, and reactive to light.   Neck: Normal range of motion. Neck supple. No JVD present.   Cardiovascular: Normal rate, regular rhythm, normal heart sounds and intact distal pulses.    No murmur heard.  Pulmonary/Chest: Effort normal and breath sounds normal. No respiratory distress. She has no wheezes. She has no rales.   + chest tenderness   Abdominal: Soft. She exhibits no distension and no mass. There is tenderness. There is no rebound. No hernia.   Reduced bowel sounds  Diffuse tenderness to abd on mild palpation   Musculoskeletal: Normal range of motion.   TTP to paraspinal BL lumbar, BL thoracic, BL flank areas   No spinal tenderness  No cervical pain or neck stiffness  4+/5 STR to extremities   Neurological: She is alert and oriented to person, place, and time. No cranial nerve deficit or sensory deficit.   Skin: Skin is warm and dry.   Large 5x5 cm ecchymosis to LUE   Psychiatric: She has a normal  mood and affect. Her behavior is normal. Judgment and thought content normal.   Nursing note and vitals reviewed.        CRANIAL NERVES     CN III, IV, VI   Pupils are equal, round, and reactive to light.  Extraocular motions are normal.        Significant Labs:   CBC:   Recent Labs  Lab 06/05/18  1400 06/05/18  1404   WBC 9.89  --    HGB 15.3  --    HCT 44.6 45   *  --      CMP:   Recent Labs  Lab 06/05/18  1400      K 4.5      CO2 23   GLU 94   BUN 13   CREATININE 0.8   CALCIUM 9.7   PROT 8.4   ALBUMIN 3.5   BILITOT 0.2   ALKPHOS 191*   AST 32   ALT 33   ANIONGAP 13   EGFRNONAA >60.0     Lipase:   Recent Labs  Lab 06/05/18  1400   LIPASE 16     Urine Studies:   Recent Labs  Lab 06/05/18  1403   COLORU Yellow   APPEARANCEUA Clear   PHUR 7.0   SPECGRAV 1.010   PROTEINUA Negative   GLUCUA Negative   KETONESU Negative   BILIRUBINUA Negative   OCCULTUA Negative   NITRITE Negative   UROBILINOGEN Negative   LEUKOCYTESUR Trace*   RBCUA 0   WBCUA 1   BACTERIA Rare   SQUAMEPITHEL 1   HYALINECASTS 1       Significant Imaging: I have reviewed all pertinent imaging results/findings within the past 24 hours.    Assessment/Plan:     * Intractable abdominal pain    Diarrhea  64 y/o F with a pmhx of crohn's/UC, HTN, hypothyroidism presents with severe epigastric abdominal pain with associated diarrhea.     - Vague complaints and unable to localize pain.  Patient tearful during interview.  Will treat for GI source of pain for now, but likely associated with anxiety/fibromyalgia.  Patient will likely need multi-faceted approach to abd pain.  - GI consulted and appreciate recs  - CT abd concerning for pancreatitis but lipase WNL and recent hospitalization for pancreatitis- likely recovering.    - ESR and CRP mildly elevated, suspect 2/2 resolving pancreatitis vs UC/crohns  - Continue cipro and flagyl  - Will check C. Diff, stool studies.  - NPO  - pain control, anti-emetics, mIVF  - Need records from [facility]          Ulcerative colitis    Crohn's colitis   - f/w tory foster with GI ([facility])  - waxing and waning chronic diarrhea exacerbated by PO intake. No changes  - family and patient frustrated with care and lack of pancreas specialist at .  - will check iron studies, B12, folate        Anxiety    Uncontrolled  - continue celexa 20 mg PO daily  - takes xanax .5 mg PO QHD nightly for insomnia which has not been effective.  Will hold, as there are safer alternatives for sleep aids in elderly.        Fibromyalgia    Suspect exacerbating presentation today  - Change tizanidine BID to flexeril BID in setting of cipro  - will need close rheumatology f/u for better pain control  - PT/OT consult        Hypothyroidism    Continue thyroid armour 60 mg PO daily  - unclear if recently started by  or chronic medication  - check TSH        Hypertension    Stable  - continue amlodipine 10 mg PO daily, metoprolol succ 50 mg PO daily  - hold clonidine as better options for BP control  - hold lasix- BLE edema? Check BNP        Hyperlipidemia    Continue statin          VTE Risk Mitigation         Ordered     IP VTE LOW RISK PATIENT  Once      06/05/18 1928     Place sequential compression device  Until discontinued      06/05/18 1928     Place ELLIOT hose  Until discontinued      06/05/18 1928             Néstor Lau PA-C  Department of Hospital Medicine   [facility]

## 2018-06-06 NOTE — ASSESSMENT & PLAN NOTE
Stable  - continue amlodipine 10 mg PO daily, metoprolol succ 50 mg PO daily  - hold clonidine as better options for BP control  - hold lasix- BLE edema? Check BNP

## 2018-06-06 NOTE — NURSING
Order to d/c home today. Saline lock removed. VSS. Discharge instructions given to pt and spouse. Pt verbalized understanding. Pt discharged from OBS unit via w/c. Pt accompanied by spouse. All personal belongings taken home by pt.

## 2018-06-06 NOTE — PROGRESS NOTES
[facility] Hospital Medicine  Progress Note    Patient Name: Madalyn Iverson  MRN: [id number]  Patient Class: OP- Observation   Admission Date: 6/5/2018  Length of Stay: 0 days  Attending Physician: IVAN Allen MD  Primary Care Provider: Ruth Jaime NP    [facility] Medicine Team: [facility] MED E Herbert Luther PA-C    Subjective:     Principal Problem:Intractable abdominal pain    HPI:  This is a 63 year old female with a history of crohn's/UC, hypertension, hypothyroidism, anxiety, fibromyalgia presents to ED c/o acute onset generalized abdominal pain x7 days with associated chronic diarrhea, nausea, reduced PO intake, dry heaves, abd distention, chills, low grade fevers ( 99).   Pain is severe, starts in suprapubic area and radiates to epigastric area then to BL back.  It is constant and has no alleviating or exacerbating factors, despite bentyl and levsin.  Patient was recently admitted to [facility] for Crohn's and pancreatitis from 5/28-6/2.  Patient reports that her diarrhea is chronic, mixed stool, present prior to discharge, sometimes is dark and has BRBPR on different occassions.   She had 4 episodes of diarrhea today and is exacerbated by PO intake.   Patient also c/o back pain, flank pain, and generalized fibromyalgia pains.   Pt denies prior abdominal surgeries.  Denies alcohol use.      During patient's stay at the hospital she did have an EGD which was found to be unremarkable. She was discharged to be followed up with GI.  She is currently on Cipro and Flagyl.    Of note, patient is poor historian and c/o admission status under observation because they believe that they will need to be here for several days and need full workup.    In ED, lipase and transaminases WNL, WBC 9.89, UA unremarkable. Afebrile, VSS.  ESR/CRP pending.  CT abd/pelvis with contrast concerning for pancreatitis with enlargement and peripancreatic haziness of the pancreatic head. There is no evidence of  calcified gallstone or biliary ductal dilatation.  Also visualized is diverticulosis coli without evidence of diverticulitis.    Hospital Course:  Patient admitted for intractable abdominal pain in setting of resolving pancreatitis. She was not discharged with pain regimen from [facility]. Repeat CT here showed inflamed pancrease with normal lipase. Patient tolerated diet with still needing some pain control. Patient requesting to be seen by GI here, will send outpatient referral on discharge.    Interval History: Complaining of continued abdominal pain, case reviewed with GI. Will send for outpatient follow up. Patient wanting work up of diarrhea x several years while inpatient. Explained that this can be worked up outpatient. Additionally GI reviewed imaging and does not see evidence of UC/Crohns. The patient states only 1 MD in the past has told her she has the symptoms of UC/Crohns but they have never had any direct evidence via colonoscopy. Will remove it from her diagnoses.    She has empty cup of liquid at bedside, tolerating PO. Later reported to RN this upset her stomach and requesting delay in discharge.     Review of Systems   Constitutional: Positive for fatigue. Negative for fever.   Cardiovascular: Negative for chest pain, palpitations and leg swelling.   Gastrointestinal: Positive for abdominal pain. Negative for nausea and vomiting.   Musculoskeletal: Negative for arthralgias and back pain.   Neurological: Positive for weakness. Negative for headaches.   Psychiatric/Behavioral: The patient is nervous/anxious.      Objective:     Vital Signs (Most Recent):  Temp: 96.3 °F (35.7 °C) (06/06/18 1145)  Pulse: 84 (06/06/18 1145)  Resp: 16 (06/06/18 1145)  BP: (!) 142/70 (06/06/18 1145)  SpO2: (!) 93 % (06/06/18 1145) Vital Signs (24h Range):  Temp:  [96.3 °F (35.7 °C)-98.2 °F (36.8 °C)] 96.3 °F (35.7 °C)  Pulse:  [84-95] 84  Resp:  [16-18] 16  SpO2:  [93 %-97 %] 93 %  BP: (137-155)/(68-81) 142/70     Weight: 70.3  kg (154 lb 15.7 oz)  Body mass index is 25.01 kg/m².    Intake/Output Summary (Last 24 hours) at 06/06/18 1313  Last data filed at 06/06/18 1000   Gross per 24 hour   Intake             2360 ml   Output                0 ml   Net             2360 ml      Physical Exam   Constitutional: She is oriented to person, place, and time. She appears well-developed and well-nourished. No distress.   HENT:   Head: Normocephalic and atraumatic.   Cardiovascular: Normal rate and regular rhythm.    No murmur heard.  Pulmonary/Chest: Effort normal and breath sounds normal. No respiratory distress. She has no wheezes.   Abdominal: Soft. Bowel sounds are normal. She exhibits no distension. There is tenderness (generalized).   Musculoskeletal: Normal range of motion. She exhibits no edema.   Neurological: She is alert and oriented to person, place, and time.   Skin: She is not diaphoretic.   Psychiatric: She is withdrawn. She exhibits a depressed mood.   Nursing note and vitals reviewed.      Significant Labs: All pertinent labs within the past 24 hours have been reviewed.    Significant Imaging: I have reviewed all pertinent imaging results/findings within the past 24 hours.    Assessment/Plan:      * Intractable abdominal pain    Chronic Diarrhea    - patient w/o evidence of UC/Crohns on imaging, she states that in the past only 1 MD has told he she has symptoms, but has never seen this on colonoscopy or imaging. WIll remove from PMHx  - She was recently discharged from [facility] for pancreatitis w/o pain medications  - case discussed with angelita HUANG for outpatient follow up and supporive care  - CT abdomen with inflammation of pancreas, lipase WNL. This represents a resolving pancreatitis.  - stool studies ordered, but doubt they will provide any significant information  - patient likely have IBS-D, GI consulted placed for outpatient work up  - continue cipro and flagyl from pancreatitis    - continue to advance diet and obtain pain  control        Hyperlipidemia    Continue stati        Fibromyalgia    Suspect exacerbating presentation today  - Change tizanidine BID to flexeril BID in setting of cipro  - will need close rheumatology f/u for better pain control  - PT/OT consul        Hypothyroidism    Continue thyroid armour 60 mg PO daily  - unclear if recently started by EJ or chronic medication  - check TS        Anxiety    Uncontrolled  - continue celexa 20 mg PO daily  - takes xanax .5 mg PO QHD nightly for insomnia which has not been effective.  Will hold, as there are safer alternatives for sleep aids in elderly.        Hypertension    Stable  - continue amlodipine 10 mg PO daily, metoprolol succ 50 mg PO daily  - hold clonidine as better options for BP control  - hold lasix- BLE edema? Check BNP          VTE Risk Mitigation         Ordered     IP VTE LOW RISK PATIENT  Once      06/05/18 1928     Place sequential compression device  Until discontinued      06/05/18 1928     Place ELLIOT hose  Until discontinued      06/05/18 1928              Herbert Luther PA-C  Department of Hospital Medicine   [facility]

## 2018-06-06 NOTE — PT/OT/SLP EVAL
"Occupational Therapy   Evaluation and Discharge Note    Name: Madalyn Iverson  MRN: [id number]  Admitting Diagnosis:  Intractable abdominal pain      Recommendations:     Discharge Recommendations: home  Discharge Equipment Recommendations:  none  Barriers to discharge:  None    History:     Occupational Profile:  Living Environment: Pt reports she lives with her  in a H with 1 KATHERINE. Pt has a tub/shower combo with no DME present. 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  Previous level of function: PTA, pt was (I) with ADLs and functional mobility   Roles and Routines: wife  Equipment Owned:  none (owns but does not use: RW, w/c, bath bench )  Assistance upon Discharge: Pt will have assistance from  upon d/c.     Past Medical History:   Diagnosis Date    ADHD (attention deficit hyperactivity disorder), inattentive type     Crohn's colitis     Fibromyalgia     treated by Dr. Thorpe - Rheumatologist    Hyperlipidemia     High triglycerides    Hypertension     Hypothyroidism     Treated by Dr. Mathew    IFG (impaired fasting glucose)     Migraine     Treated by neurologist - Dr. Destiny Florez    Ulcerative colitis     Treated by Dr. Mcfadden    Vitamin D deficiency 3/31/2016       Past Surgical History:   Procedure Laterality Date     SECTION      COLONOSCOPY  10/08/2010    Dr. Lee - repeat in 5 years - has appointment for colonoscopy 2016    COLONOSCOPY  2016    Dr. Kelly - normal  colon - repeat in 10 years - scanned report to media file.    HYSTERECTOMY      OOPHORECTOMY      SHOULDER SURGERY Left     TONSILLECTOMY      upper and lower GI  2016       Subjective     Chief Complaint: abdominal pain   Patient/Family stated goals: to find out why she is having so much abdominal pain.   Communicated with: RN prior to session. Pt found resting with HOB elevated. Pt agreeable to therapy evaluation.     Pt stated, " I just want " "them to find out what's going on."     Pain/Comfort:  · Pain Rating 1: 10/10  · Location - Orientation 1: generalized  · Location 1:  (stomach pain that radiates to back)  · Pain Addressed 1: Reposition, Distraction    Patients cultural, spiritual, Congregational conflicts given the current situation: none stated     Objective:     Patient found with: peripheral IV     General Precautions: Standard, contact   Orthopedic Precautions:N/A   Braces: N/A     Occupational Performance:    Bed Mobility:    · Patient completed Supine to Sit with modified independence  · Patient completed Sit to Supine with modified independence    Functional Mobility/Transfers:  · Patient completed Sit <> Stand Transfer from EOB with supervision  with  no assistive device   · Patient completed Toilet Transfer with functional ambulation to the bathroom with supervision with grab bar  · Functional Mobility: Pt performed functional mobility within room simulating household mobility with SBA <> (S) using no AD, slight SOB noted 2* abdominal pain.     Activities of Daily Living:  · Grooming: supervision Pt completed hand hygiene standing at the sink countertop with (S) for standing balance.   · UB Dressing: minimum assistance to don gown like robe 2* line managment of R UE   · LB Dressing: supervision Pt able to bring   · Toileting: supervision simulated     Cognitive/Visual Perceptual:  Cognitive/Psychosocial Skills:     -       Oriented to: Person, Place, Time and Situation   -       Follows Commands/attention:Follows multistep  commands  -       Communication: clear/fluent  -       Memory: No Deficits noted  -       Safety awareness/insight to disability: intact   -       Mood/Affect/Coping skills/emotional control: Appropriate to situation  Visual/Perceptual:      -Intact    Physical Exam:  Balance:    - Static sit: (S)  -  Dynamic sit: SBA  - Static standing: SBA  - Dynamic Standing: SBA     Postural examination/scapula alignment:    -       No " "postural abnormalities identified  Skin integrity: Visible skin intact  Edema:  None noted  Sensation:    -       Intact  light/touch BUEs  Dominant hand:    -       right  Upper Extremity Range of Motion:     -       Right Upper Extremity: WFL  -       Left Upper Extremity: WFL  Upper Extremity Strength:    -       Right Upper Extremity: grossly 4+/5   -       Left Upper Extremity: grossly 4+/5     Patient left HOB elevated with all lines intact, call button in reach and RN notified    [facility] 6 Click:  [facility] Total Score: 20    Treatment & Education:  Pt educated by therapist on:   - Pt educated on role of OT, POC, and goals for therapy.    - Time provided for therapeutic counseling and discussion of health disposition.   - Importance of OOB ax's with staff member assistance and sitting OOB majority of day.   - Pt verbalized understanding. Pt expressed no further concerns/questions.  - whiteboard updated   Education:    Assessment:     Madalyn Iverson is a 63 y.o. female with a medical diagnosis of Intractable abdominal pain. At this time, patient is functioning at their prior level of function and does not require further acute OT services.     Clinical Decision Makin.  OT Low:  "Pt evaluation falls under low complexity for evaluation coding due to performance deficits noted in 1-3 areas as stated above and 0 co-morbities affecting current functional status. Data obtained from problem focused assessments. No modifications or assistance was required for completion of evaluation. Only brief occupational profile and history review completed."     Plan:     During this hospitalization, patient does not require further acute OT services.  Please re-consult if situation changes.    · Plan of Care Reviewed with: patient    This Plan of care has been discussed with the patient who was involved in its development and understands and is in agreement with the identified goals and treatment plan    GOALS:    " Occupational Therapy Goals     Not on file          Multidisciplinary Problems (Resolved)        Problem: Occupational Therapy Goal    Goal Priority Disciplines Outcome Interventions   Occupational Therapy Goal   (Resolved)     OT, PT/OT Outcome(s) achieved                    Time Tracking:     OT Date of Treatment: 06/06/18  OT Start Time: 0902  OT Stop Time: 0923  OT Total Time (min): 21 min    Billable Minutes:Evaluation 21    Roxana Estrada OT  6/6/2018

## 2018-06-06 NOTE — ASSESSMENT & PLAN NOTE
Suspect exacerbating presentation today  - Change tizanidine BID to flexeril BID in setting of cipro  - will need close rheumatology f/u for better pain control  - PT/OT consul

## 2018-06-06 NOTE — DISCHARGE SUMMARY
[facility] Hospital Medicine  Discharge Summary      Patient Name: Madalyn Iverson  MRN: [id number]  Admission Date: 6/5/2018  Hospital Length of Stay: 0 days  Discharge Date and Time:  06/06/2018 3:18 PM  Attending Physician: IVAN Allen MD   Discharging Provider: Herbert Luther PA-C  Primary Care Provider: Ruth Jaime NP  Hospital Medicine Team: [facility]OS[facility] MED E Herbert Luther PA-C    HPI:   This is a 63 year old female with a history of crohn's/UC, hypertension, hypothyroidism, anxiety, fibromyalgia presents to ED c/o acute onset generalized abdominal pain x7 days with associated chronic diarrhea, nausea, reduced PO intake, dry heaves, abd distention, chills, low grade fevers ( 99).   Pain is severe, starts in suprapubic area and radiates to epigastric area then to BL back.  It is constant and has no alleviating or exacerbating factors, despite bentyl and levsin.  Patient was recently admitted to [facility] for Crohn's and pancreatitis from 5/28-6/2.  Patient reports that her diarrhea is chronic, mixed stool, present prior to discharge, sometimes is dark and has BRBPR on different occassions.   She had 4 episodes of diarrhea today and is exacerbated by PO intake.   Patient also c/o back pain, flank pain, and generalized fibromyalgia pains.   Pt denies prior abdominal surgeries.  Denies alcohol use.      During patient's stay at the hospital she did have an EGD which was found to be unremarkable. She was discharged to be followed up with GI.  She is currently on Cipro and Flagyl.    Of note, patient is poor historian and c/o admission status under observation because they believe that they will need to be here for several days and need full workup.    In ED, lipase and transaminases WNL, WBC 9.89, UA unremarkable. Afebrile, VSS.  ESR/CRP pending.  CT abd/pelvis with contrast concerning for pancreatitis with enlargement and peripancreatic haziness of the pancreatic head. There is no evidence of  calcified gallstone or biliary ductal dilatation.  Also visualized is diverticulosis coli without evidence of diverticulitis.    * No surgery found *      Hospital Course:   Patient admitted for intractable abdominal pain in setting of resolving pancreatitis. She was not discharged with pain regimen from [facility]. Repeat CT here showed inflamed pancrease with normal lipase - likely resolving pancreatitis. Patient tolerated diet with still needing some pain control. Patient requesting to be seen by GI here, will send outpatient referral on discharge.     Consults:     * Intractable abdominal pain    Chronic Diarrhea    - patient w/o evidence of UC/Crohns on imaging, she states that in the past only 1 MD has told he she has symptoms, but has never seen this on colonoscopy or imaging. [facility]  - She was recently discharged from [facility] for pancreatitis w/o pain medications  - case discussed with angelita HUANG for outpatient follow up and supporive care  - CT abdomen with inflammation of pancreas, lipase WNL. This represents a resolving pancreatitis.  - stool studies ordered, but doubt they will provide any significant information  - patient likely have IBS-D, GI consulted placed for outpatient work up  - continue cipro and flagyl from pancreatitis  - tolerated PO, good pain control. Further work up outpatient        Fibromyalgia    Suspect exacerbating presentation today  - Change tizanidine BID to flexeril BID in setting of cipro  - will need close rheumatology f/u for better pain control  - PT/OT consul        Hypertension    Stable  - continue amlodipine 10 mg PO daily, metoprolol succ 50 mg PO daily  - hold clonidine as better options for BP control  - hold lasix- BLE edema? Check BNP        Crohn's colitis-resolved as of 6/6/2018    - self reported diagnosis, no imaging or colonoscopy evidence of such        Ulcerative colitis-resolved as of 6/6/2018    - self reported diagnosis, no imaging or colonoscopy  evidence of such          Final Active Diagnoses:    Diagnosis Date Noted POA    PRINCIPAL PROBLEM:  Intractable abdominal pain [R10.9] 06/05/2018 Yes    Diarrhea [R19.7] 06/05/2018 Yes    Hyperlipidemia [E78.5] 05/15/2017 Yes    Hypothyroidism [E03.9] 03/23/2016 Yes    Fibromyalgia [M79.7] 03/23/2016 Yes    Anxiety [F41.9] 01/14/2015 Yes    Hypertension [I10]  Yes      Problems Resolved During this Admission:    Diagnosis Date Noted Date Resolved POA    Ulcerative colitis [K51.90]  06/06/2018 Yes    Crohn's colitis [K50.10]  06/06/2018 Yes       Discharged Condition: fair    Disposition:     Follow Up:  Follow-up Information     [facility] - Gastroenterology On 6/8/2018.    Specialty:  Gastroenterology  Why:  GI clinic nurse to call the patient with appointment date & time.  Contact information:  [address]  [facility] [address] [phone]  Additional information:  [facility] [address]           Ruth B Colwart, NP On 6/13/2018.    Specialty:  Family Medicine  Why:  at 10:20 AM  Contact information:  [address] [facility]  [address] [place] [id number]  [phone]                 Patient Instructions:     Ambulatory referral to Gastroenterology   Referral Priority: Routine Referral Type: Consultation   Referral Reason: Specialty Services Required    Requested Specialty: Gastroenterology    Number of Visits Requested: 1      Diet Adult Regular     Activity as tolerated     Notify your health care provider if you experience any of the following:  temperature >100.4     Notify your health care provider if you experience any of the following:  persistent nausea and vomiting or diarrhea     Notify your health care provider if you experience any of the following:  severe uncontrolled pain         Significant Diagnostic Studies: Labs:   BMP:   Recent Labs  Lab 06/05/18  1400 06/06/18  0512   GLU 94 105    139   K 4.5 4.0    105   CO2 23 27   BUN 13 13   CREATININE 0.8 0.8   CALCIUM 9.7  8.5*   MG  --  2.1   , CMP   Recent Labs  Lab 06/05/18  1400 06/06/18  0512    139   K 4.5 4.0    105   CO2 23 27   GLU 94 105   BUN 13 13   CREATININE 0.8 0.8   CALCIUM 9.7 8.5*   PROT 8.4 6.4   ALBUMIN 3.5 2.8*   BILITOT 0.2 0.2   ALKPHOS 191* 148*   AST 32 21   ALT 33 24   ANIONGAP 13 7*   ESTGFRAFRICA >60.0 >60.0   EGFRNONAA >60.0 >60.0   , CBC   Recent Labs  Lab 06/05/18  1400  06/06/18  0512   WBC 9.89  --  6.26   HGB 15.3  --  12.4   HCT 44.6  < > 38.6   *  --  291   < > = values in this interval not displayed. and All labs within the past 24 hours have been reviewed    Pending Diagnostic Studies:     None         Medications:  Reconciled Home Medications:      Medication List      START taking these medications    HYDROcodone-acetaminophen  mg per tablet  Commonly known as:  NORCO  Take 1 tablet by mouth every 6 (six) hours as needed.        CONTINUE taking these medications    ALPRAZolam 0.5 MG tablet  Commonly known as:  XANAX  Take 0.5 mg by mouth every 8 (eight) hours as needed.     amLODIPine 10 MG tablet  Commonly known as:  NORVASC  TAKE 1 TABLET BY MOUTH ONCE DAILY     ciprofloxacin HCl 500 MG tablet  Commonly known as:  CIPRO  Take 500 mg by mouth 2 (two) times daily.     citalopram 20 MG tablet  Commonly known as:  CELEXA  Take 20 mg by mouth once daily.     cloNIDine 0.1 MG tablet  Commonly known as:  CATAPRES  Take 1 tablet (0.1 mg total) by mouth nightly.     dextroamphetamine-amphetamine 10 mg Tab  Take 10 mg by mouth 2 (two) times daily.     diclofenac sodium 1 % Gel  Apply 2 g topically 4 (four) times daily.     estradiol 2 MG tablet  Commonly known as:  ESTRACE  Take 2 mg by mouth once daily.     furosemide 40 MG tablet  Commonly known as:  LASIX  TAKE 1 TABLET (40 MG TOTAL) BY MOUTH ONCE DAILY.     hyoscyamine 0.125 mg Subl  Commonly known as:  LEVSIN/SL  Place 0.125 mg under the tongue every 4 (four) hours as needed.     KLOR-CON SPRINKLE 10 MEQ Cpsr  Generic drug:   potassium chloride  TAKE 1 CAPSULE TWICE A DAY     metoprolol succinate 50 MG 24 hr tablet  Commonly known as:  TOPROL-XL  TAKE 1 TABLET BY MOUTH EVERY DAY     metroNIDAZOLE 500 MG tablet  Commonly known as:  FLAGYL  Take 500 mg by mouth 3 (three) times daily.     progesterone 100 MG capsule  Commonly known as:  PROMETRIUM  Take 100 mg by mouth once daily.     promethazine 12.5 MG Tab  Commonly known as:  PHENERGAN  Take 12.5 mg by mouth 3 (three) times daily as needed.     rosuvastatin 10 MG tablet  Commonly known as:  CRESTOR  Take 1 tablet (10 mg total) by mouth once daily.     tiZANidine 4 mg Cap  Take by mouth.     VITAMIN B-12 INJ  Inject 1,000 mcg as directed every 30 days.     VITAMIN D3 5,000 unit Tab  Generic drug:  cholecalciferol (vitamin D3)  Take 5,000 Units by mouth once daily.            Indwelling Lines/Drains at time of discharge:   Lines/Drains/Airways          No matching active lines, drains, or airways          Time spent on the discharge of patient: 35 minutes  Patient was seen and examined on the date of discharge and determined to be suitable for discharge.         Herbert Luther PA-C  Department of Hospital Medicine  [facility]

## 2018-06-06 NOTE — PLAN OF CARE
Problem: Patient Care Overview  Goal: Plan of Care Review  Outcome: Ongoing (interventions implemented as appropriate)  Transferred drom ER at 19:00 06/05/18, admitted to room 6.  at bedside.  Patient AAO, ambulatory, has cramping  pain on upper abdomen--chief complaint.  Settled in bed, oriented to room, safety and falls risk discussed, call bell within reach.  Plan of care explained, pain meds given. Outstanding stool specimen, patient instructed on specimen collection.  Isolation precaution maintained for possible C.Diff infection, on going work up. Will continue to monitor..

## 2018-06-06 NOTE — PLAN OF CARE
Problem: Occupational Therapy Goal  Goal: Occupational Therapy Goal  Outcome: Outcome(s) achieved Date Met: 06/06/18  Initial eval completed. No goals established.   Pt is currently performing ADLs, functional mobility & t/fs at baseline and displays age-appropriate strength, endurance & balance. OT services are not recommended at this time and patient is safe to D/C home with assist from family.    Roxana Estrada, OTR/L  [phone]  6/6/2018

## 2018-06-06 NOTE — PLAN OF CARE
Problem: Patient Care Overview  Goal: Plan of Care Review  Outcome: Ongoing (interventions implemented as appropriate)  Pt with  No falls or injuries this hospital stay. Pt afebrile, on po antibiotics. Pt reports pain level 9/10 with pain med.

## 2018-06-06 NOTE — ASSESSMENT & PLAN NOTE
Uncontrolled  - continue celexa 20 mg PO daily  - takes xanax .5 mg PO QHD nightly for insomnia which has not been effective.  Will hold, as there are safer alternatives for sleep aids in elderly.

## 2018-06-06 NOTE — SUBJECTIVE & OBJECTIVE
Past Medical History:   Diagnosis Date    ADHD (attention deficit hyperactivity disorder), inattentive type     Crohn's colitis     Fibromyalgia     treated by Dr. Thorpe - Rheumatologist    Hyperlipidemia     High triglycerides    Hypertension     Hypothyroidism     Treated by Dr. Mathew    IFG (impaired fasting glucose)     Migraine     Treated by neurologist - Dr. Destiny Florez    Ulcerative colitis     Treated by Dr. Lee and Aaron    Vitamin D deficiency 3/31/2016       Past Surgical History:   Procedure Laterality Date     SECTION      COLONOSCOPY  10/08/2010    Dr. Lee - repeat in 5 years - has appointment for colonoscopy 2016    COLONOSCOPY  2016    Dr. Kelly - normal  colon - repeat in 10 years - scanned report to media file.    HYSTERECTOMY      OOPHORECTOMY      SHOULDER SURGERY Left     TONSILLECTOMY      upper and lower GI  2016       Review of patient's allergies indicates:  No Known Allergies    No current facility-administered medications on file prior to encounter.      Current Outpatient Prescriptions on File Prior to Encounter   Medication Sig    alprazolam (XANAX) 0.5 MG tablet Take 0.5 mg by mouth every 8 (eight) hours as needed.    amLODIPine (NORVASC) 10 MG tablet TAKE 1 TABLET BY MOUTH ONCE DAILY    cholecalciferol, vitamin D3, (VITAMIN D3) 5,000 unit Tab Take 5,000 Units by mouth once daily.    citalopram (CELEXA) 20 MG tablet Take 20 mg by mouth once daily.    cloNIDine (CATAPRES) 0.1 MG tablet Take 1 tablet (0.1 mg total) by mouth nightly.    CYANOCOBALAMIN, VITAMIN B-12, (VITAMIN B-12 INJ) Inject 1,000 mcg as directed every 30 days.    dextroamphetamine-amphetamine 10 mg Tab Take 10 mg by mouth 2 (two) times daily.    diclofenac sodium 1 % Gel Apply 2 g topically 4 (four) times daily.    estradiol (ESTRACE) 2 MG tablet Take 2 mg by mouth once daily.    furosemide (LASIX) 40 MG tablet TAKE 1 TABLET (40 MG TOTAL) BY MOUTH ONCE  DAILY.    KLOR-CON SPRINKLE 10 mEq CpSR TAKE 1 CAPSULE TWICE A DAY    metoprolol succinate (TOPROL-XL) 50 MG 24 hr tablet TAKE 1 TABLET BY MOUTH EVERY DAY    rosuvastatin (CRESTOR) 10 MG tablet Take 1 tablet (10 mg total) by mouth once daily.    [DISCONTINUED] ARMOUR THYROID 60 mg Tab Take 60 mg by mouth once daily.    [DISCONTINUED] polymyxin B sulf-trimethoprim (POLYTRIM) 10,000 unit- 1 mg/mL Drop Place 1 drop into the right eye every 4 (four) hours.     Family History     Problem Relation (Age of Onset)    Cancer Sister (53), Brother (61), Brother (63)    Diabetes Brother    Heart disease Mother    Hyperlipidemia Mother    Hypertension Mother, Brother        Social History Main Topics    Smoking status: Former Smoker     Packs/day: 1.00     Years: 20.00     Quit date: 2/1/1997    Smokeless tobacco: Never Used    Alcohol use No    Drug use: No    Sexual activity: No     Review of Systems   Constitutional: Positive for appetite change, chills, fatigue and fever (low grade 99). Negative for diaphoresis and unexpected weight change.   HENT: Negative for congestion and rhinorrhea.    Eyes: Negative for photophobia and visual disturbance.   Respiratory: Positive for shortness of breath (when pain severe). Negative for cough, chest tightness and wheezing.    Cardiovascular: Positive for chest pain (reproducible). Negative for leg swelling.   Gastrointestinal: Positive for abdominal distention, abdominal pain (generalized), blood in stool (chronic) and diarrhea (chronic). Negative for constipation, nausea and vomiting.   Genitourinary: Negative for difficulty urinating, dysuria, frequency and hematuria.   Skin: Negative for rash and wound.        Bruising LUE   Neurological: Positive for weakness (generalized) and headaches (mid-frontal). Negative for dizziness and light-headedness.   Psychiatric/Behavioral: Positive for dysphoric mood. Negative for agitation and confusion. The patient is nervous/anxious.       Objective:     Vital Signs (Most Recent):  Temp: 98.2 °F (36.8 °C) (06/05/18 1847)  Pulse: 94 (06/05/18 1847)  Resp: 16 (06/05/18 1847)  BP: (!) 145/81 (06/05/18 1847)  SpO2: 95 % (06/05/18 1847) Vital Signs (24h Range):  Temp:  [98.2 °F (36.8 °C)-98.4 °F (36.9 °C)] 98.2 °F (36.8 °C)  Pulse:  [] 94  Resp:  [16-18] 16  SpO2:  [95 %-97 %] 95 %  BP: (145)/(81-88) 145/81     Weight: 70.3 kg (155 lb)  Body mass index is 25.02 kg/m².    Physical Exam   Constitutional: She is oriented to person, place, and time. She appears well-developed and well-nourished.   Obese female sitting on side of bed in NAD  Tearful during exam    HENT:   Head: Normocephalic and atraumatic.   Eyes: EOM are normal. Pupils are equal, round, and reactive to light.   Neck: Normal range of motion. Neck supple. No JVD present.   Cardiovascular: Normal rate, regular rhythm, normal heart sounds and intact distal pulses.    No murmur heard.  Pulmonary/Chest: Effort normal and breath sounds normal. No respiratory distress. She has no wheezes. She has no rales.   + chest tenderness   Abdominal: Soft. She exhibits no distension and no mass. There is tenderness. There is no rebound. No hernia.   Reduced bowel sounds  Diffuse tenderness to abd on mild palpation   Musculoskeletal: Normal range of motion.   TTP to paraspinal BL lumbar, BL thoracic, BL flank areas   No spinal tenderness  No cervical pain or neck stiffness  4+/5 STR to extremities   Neurological: She is alert and oriented to person, place, and time. No cranial nerve deficit or sensory deficit.   Skin: Skin is warm and dry.   Large 5x5 cm ecchymosis to LUE   Psychiatric: She has a normal mood and affect. Her behavior is normal. Judgment and thought content normal.   Nursing note and vitals reviewed.        CRANIAL NERVES     CN III, IV, VI   Pupils are equal, round, and reactive to light.  Extraocular motions are normal.        Significant Labs:   CBC:   Recent Labs  Lab 06/05/18  1400  06/05/18  1404   WBC 9.89  --    HGB 15.3  --    HCT 44.6 45   *  --      CMP:   Recent Labs  Lab 06/05/18  1400      K 4.5      CO2 23   GLU 94   BUN 13   CREATININE 0.8   CALCIUM 9.7   PROT 8.4   ALBUMIN 3.5   BILITOT 0.2   ALKPHOS 191*   AST 32   ALT 33   ANIONGAP 13   EGFRNONAA >60.0     Lipase:   Recent Labs  Lab 06/05/18  1400   LIPASE 16     Urine Studies:   Recent Labs  Lab 06/05/18  1403   COLORU Yellow   APPEARANCEUA Clear   PHUR 7.0   SPECGRAV 1.010   PROTEINUA Negative   GLUCUA Negative   KETONESU Negative   BILIRUBINUA Negative   OCCULTUA Negative   NITRITE Negative   UROBILINOGEN Negative   LEUKOCYTESUR Trace*   RBCUA 0   WBCUA 1   BACTERIA Rare   SQUAMEPITHEL 1   HYALINECASTS 1       Significant Imaging: I have reviewed all pertinent imaging results/findings within the past 24 hours.

## 2018-06-06 NOTE — ASSESSMENT & PLAN NOTE
Continue thyroid armour 60 mg PO daily  - unclear if recently started by THI or chronic medication  - check TS

## 2018-06-06 NOTE — PT/OT/SLP EVAL
"Physical Therapy Evaluation and Discharge Note    Patient Name:  Madalyn Iverson   MRN:  [id number]    Recommendations:     Discharge Recommendations:  home   Discharge Equipment Recommendations: none   Barriers to discharge: None    Assessment:     Madalyn Iverson is a 63 y.o. female admitted with a medical diagnosis of Intractable abdominal pain.  Pt presents with a history of crohn's/UC, hypertension, hypothyroidism, anxiety, fibromyalgia presents to ED c/o acute onset generalized abdominal pain x7 days with associated chronic diarrhea, nausea, reduced PO intake, dry heaves, abd distention, chills, low grade fevers ( 99).   Pain is severe, starts in suprapubic area and radiates to epigastric area then to BL back.  It is constant and has no alleviating or exacerbating factors, despite bentyl and levsin.  Patient was recently admitted to [facility] for Crohn's and pancreatitis from 5/28-6/2.  Patient reports that her diarrhea is chronic, mixed stool, present prior to discharge, sometimes is dark and has BRBPR on different occassions.     CT of the abdomen indicates pancreatitis, diverticula without diverticulitis with the GI, and L4 anterolisthesis on L5.  Pt reports pain in L upper quadrant, consistent with referred pancreas pain.  Currently presenting as weepy and confused regarding dx and why she has so much pain.  Pt was crying throughout evaluation.  Refused OOB activity sec to pain and anxiety.  As per pt, she is functioning at their prior level of function and is currently refusing further acute PT services.     Recent Surgery: * No surgery found *      Plan:     During this hospitalization, patient does not require further acute PT services.  Please re-consult if situation changes.     Plan of Care Reviewed with: patient    Subjective     Communicated with nursing prior to session.  Patient found in supine upon PT entry to room, agreeable to evaluation.      "10 with anxiety."  "I don't know why they can't " "cure it."    Chief Complaint: abdominal and back pain  Patient comments/goals: To determine reason for pain  Pain/Comfort:  · Pain Rating 1: 10/10  · Location - Side 1: Left  · Location - Orientation 1: upper  · Location 1: abdomen  · Pain Addressed 1: Pre-medicate for activity, Nurse notified  · Pain Rating Post-Intervention 1: 10/10  · Pain Rating 2: 10/10  · Location - Orientation 2: lower  · Location 2: back  · Pain Addressed 2: Nurse notified, Pre-medicate for activity  · Pain Rating Post-Intervention 2: 10/10    Patients cultural, spiritual, Religion conflicts given the current situation: no    Living Environment:  Pt lives with spouse, SSH, [address].  Pt and family drive.   Prior to admission, patients level of function was independent with gait, ADLs, and functional transfers.  Patient has the following equipment: none.  DME owned (not currently used): rolling walker, single point cane, bedside commode, shower chair and wheelchair.  Upon discharge, patient will have assistance from spouse.    Objective:     Patient found with: peripheral IV     General Precautions: Standard, fall   Orthopedic Precautions:N/A   Braces: N/A     Exams:  · Cognitive Exam:  Patient is oriented to Person, Place, Time and Situation and follows 80% of single step commands   · Fine Motor Coordination:    · -       Intact  Left hand thumb/finger opposition skills and Right hand thumb/finger opposition skills  · Gross Motor Coordination:  WFL  · Postural Exam:  Patient presented with the following abnormalities:    · -       Unable to determine with pt in supine throughout eval  · Sensation:    · -       Intact  light/touch B LEs  · Skin Integrity/Edema:      · -       Skin integrity: Visible skin intact  · -       Edema: None noted B LEs  · RUE ROM: WFL  · RUE Strength: WFL  · LUE ROM: WFL  · LUE Strength: WFL  · RLE ROM: WFL  · RLE Strength: WFL  · LLE ROM: WFL  · LLE Strength: WFL    Functional Mobility:  · As per consultation with " OT: pt amb with S from bed<>bathroom, without AD.  I with functional transfers.    AM-PAC 6 CLICK MOBILITY  Total Score:6       Therapeutic Activities and Exercises:   Whiteboard updated  Ed on referred pain  Ed on CT scan results and indications  Ed on LE therex to be perf in bed: Ankle Pumps:Heel slides: Hip abd/add: SKTC: Bridges    Patient left supine with all lines intact, call button in reach and nursing notified.    GOALS:    Physical Therapy Goals     Not on file                History:     Past Medical History:   Diagnosis Date    ADHD (attention deficit hyperactivity disorder), inattentive type     Crohn's colitis     Fibromyalgia     treated by Dr. Thorpe - Rheumatologist    Hyperlipidemia     High triglycerides    Hypertension     Hypothyroidism     Treated by Dr. Mathew    IFG (impaired fasting glucose)     Migraine     Treated by neurologist - Dr. Destiny Florez    Ulcerative colitis     Treated by Dr. Lee and Aaron    Vitamin D deficiency 3/31/2016       Past Surgical History:   Procedure Laterality Date     SECTION      COLONOSCOPY  10/08/2010    Dr. Lee - repeat in 5 years - has appointment for colonoscopy 2016    COLONOSCOPY  2016    Dr. Kelly - normal  colon - repeat in 10 years - scanned report to media file.    HYSTERECTOMY      OOPHORECTOMY      SHOULDER SURGERY Left     TONSILLECTOMY      upper and lower GI  2016       Clinical Decision Making:     History  Co-morbidities and personal factors that may impact the plan of care Examination  Body Structures and Functions, activity limitations and participation restrictions that may impact the plan of care Clinical Presentation   Decision Making/ Complexity Score   Co-morbidities:   [] Time since onset of injury / illness / exacerbation  [] Status of current condition  []Patient's cognitive status and safety concerns    [] Multiple Medical Problems (see med hx)  Personal Factors:   [] Patient's  age  [] Prior Level of function   [] Patient's home situation (environment and family support)  [] Patient's level of motivation  [] Expected progression of patient      HISTORY:(criteria)    [] 05425 - no personal factors/history    [] 04457 - has 1-2 personal factor/comorbidity     [] 05095 - has >3 personal factor/comorbidity     Body Regions:  [] Objective examination findings  [] Head     []  Neck  [] Trunk   [] Upper Extremity  [] Lower Extremity    Body Systems:  [] For communication ability, affect, cognition, language, and learning style: the assessment of the ability to make needs known, consciousness, orientation (person, place, and time), expected emotional /behavioral responses, and learning preferences (eg, learning barriers, education  needs)  [] For the neuromuscular system: a general assessment of gross coordinated movement (eg, balance, gait, locomotion, transfers, and transitions) and motor function  (motor control and motor learning)  [] For the musculoskeletal system: the assessment of gross symmetry, gross range of motion, gross strength, height, and weight  [] For the integumentary system: the assessment of pliability(texture), presence of scar formation, skin color, and skin integrity  [] For cardiovascular/pulmonary system: the assessment of heart rate, respiratory rate, blood pressure, and edema     Activity limitations:    [] Patient's cognitive status and saf ety concerns          [] Status of current condition      [] Weight bearing restriction  [] Cardiopulmunary Restriction    Participation Restrictions:   [] Goals and goal agreement with the patient     [] Rehab potential (prognosis) and probable outcome      Examination of Body System: (criteria)    [] 72701 - addressing 1-2 elements    [] 09889 - addressing a total of 3 or more elements     [] 25222 -  Addressing a total of 4 or more elements         Clinical Presentation: (criteria)  Choose one     On examination of body system  using standardized tests and measures patient presents with (CHOOSE ONE) elements from any of the following: body structures and functions, activity limitations, and/or participation restrictions.  Leading to a clinical presentation that is considered (CHOOSE ONE)                              Clinical Decision Making  (Eval Complexity):  Choose One     Time Tracking:     PT Received On: 06/06/18  PT Start Time: 1040     PT Stop Time: 1104  PT Total Time (min): 24 min     Billable Minutes: Evaluation 9 and Therapeutic Exercise 15      Chelle Solitario, PT  06/06/2018

## 2018-06-06 NOTE — TELEPHONE ENCOUNTER
----- Message from Agnieszka Watson MA sent at 6/6/2018 12:08 PM CDT -----  Contact: pt [phone]  Patient Requesting Referral    Referral to What Specialty: Gastro    Provider asking for Referral: any available provider that see's this problem     Reason for Referral: Intractable abdominal pain  Acute pancreatitis, unspecified complication status, unspecified pancreatitis type    Communication Preference: pt can be reached at [phone]     Additional Information: this is from a hospital discharge request. Pt going home today and instructed to f/u with gastro in one week.

## 2018-06-06 NOTE — PLAN OF CARE
06/06/18 0955   Discharge Assessment   Assessment Type Discharge Planning Assessment   Confirmed/corrected address and phone number on facesheet? Yes   Assessment information obtained from? Patient   Expected Length of Stay (days) 2   Communicated expected length of stay with patient/caregiver yes   Prior to hospitilization cognitive status: Alert/Oriented   Prior to hospitalization functional status: Independent   Current cognitive status: Alert/Oriented   Current Functional Status: Independent   Lives With spouse  (spouse, Kendra Boyce ([phone]))   Able to Return to Prior Arrangements yes   Is patient able to care for self after discharge? Yes   Patient's perception of discharge disposition home or selfcare   Readmission Within The Last 30 Days previous discharge plan unsuccessful   If yes, most recent facility [facility]   Patient currently being followed by outpatient case management? No   Patient currently receives any other outside agency services? No   Equipment Currently Used at Home none   Do you have any problems affording any of your prescribed medications? No   Is the patient taking medications as prescribed? yes   Does the patient have transportation home? Yes   Transportation Available family or friend will provide  (spouse)   Does the patient receive services at the [facility]? No   Discharge Plan A Home with family   Discharge Plan B Home Health   Patient/Family In Agreement With Plan yes   Readmission Questionnaire   At the time of your discharge, did someone talk to you about what your health problems were? Yes   At the time of discharge, did someone talk to you about what to watch out for regarding worsening of your health problem? Yes   At the time of discharge, did someone talk to you about what to do if you experienced worsening of your health problem? Yes   At the time of discharge, did someone talk to you about which medication to take when you left the hospital and which ones  to stop taking? Yes  (pt dc without pain medication)   At the time of discharge, did someone talk to you about when and where to follow up with a doctor after you left the hospital? Yes   What do you believe caused you to be sick enough to be re-admitted? abd pain   How often do you need to have someone help you when you read instructions, pamphlets, or other written material from your doctor or pharmacy? Never   Do you have problems taking your medications as prescribed? No   Do you have any problems affording any of  your prescribed medications? No   Do you have problems obtaining/receiving your medications? No   Does the patient have transportation to healthcare appointments? Yes   Living Arrangements house   Does the patient have family/friends to help with healtcare needs after discharge? yes   Does your caregiver provide all the help you need? Yes     Dx: intractable abd pain  Consult: GI & PT/OT  Pharm: [facility]  Hosp f/u appt: REDD Jaime (PCP) on 6/13/18 at 1020. Message sent to the GI clinic requesting an appointment in 1-2 weeks. GI clinic nurse to call the patient with appointment date & time.

## 2018-06-06 NOTE — ASSESSMENT & PLAN NOTE
Continue thyroid armour 60 mg PO daily  - unclear if recently started by THI or chronic medication  - check TSH

## 2018-06-06 NOTE — ASSESSMENT & PLAN NOTE
Chronic Diarrhea    - patient w/o evidence of UC/Crohns on imaging, she states that in the past only 1 MD has told he she has symptoms, but has never seen this on colonoscopy or imaging. [facility]  - She was recently discharged from [facility] for pancreatitis w/o pain medications  - case discussed with angelita HUANG for outpatient follow up and supporive care  - CT abdomen with inflammation of pancreas, lipase WNL. This represents a resolving pancreatitis.  - stool studies ordered, but doubt they will provide any significant information  - patient likely have IBS-D, GI consulted placed for outpatient work up  - continue cipro and flagyl from pancreatitis    - continue to advance diet and obtain pain control

## 2018-06-07 ENCOUNTER — TELEPHONE (OUTPATIENT)
Dept: GASTROENTEROLOGY | Facility: CLINIC | Age: 63
End: 2018-06-07

## 2018-06-07 NOTE — PLAN OF CARE
06/07/18 0755   Final Note   Assessment Type Final Discharge Note     Patient discharged home with no needs on 6/6/18.

## 2018-06-07 NOTE — TELEPHONE ENCOUNTER
----- Message from Alma Rosa Maciel MA sent at 6/6/2018 12:21 PM CDT -----  Contact: pt [phone]      ----- Message -----  From: Agnieszka Watson MA  Sent: 6/6/2018  12:08 PM  To: [facility] Clinical Staff    Patient Requesting Referral    Referral to What Specialty: Gastro    Provider asking for Referral: any available provider that see's this problem     Reason for Referral: Intractable abdominal pain  Acute pancreatitis, unspecified complication status, unspecified pancreatitis type    Communication Preference: pt can be reached at [phone]     Additional Information: this is from a hospital discharge request. Pt going home today and instructed to f/u with gastro in one week.

## 2018-06-08 DIAGNOSIS — I10 ESSENTIAL HYPERTENSION: ICD-10-CM

## 2018-06-11 RX ORDER — CLONIDINE HYDROCHLORIDE 0.1 MG/1
TABLET ORAL
Qty: 30 TABLET | Refills: 5 | Status: SHIPPED | OUTPATIENT
Start: 2018-06-11 | End: 2018-06-13 | Stop reason: ALTCHOICE

## 2018-06-13 ENCOUNTER — OFFICE VISIT (OUTPATIENT)
Dept: FAMILY MEDICINE | Facility: CLINIC | Age: 63
End: 2018-06-13
Payer: MEDICARE

## 2018-06-13 VITALS
SYSTOLIC BLOOD PRESSURE: 148 MMHG | WEIGHT: 164.13 LBS | TEMPERATURE: 99 F | OXYGEN SATURATION: 97 % | DIASTOLIC BLOOD PRESSURE: 86 MMHG | BODY MASS INDEX: 26.38 KG/M2 | HEART RATE: 108 BPM | HEIGHT: 66 IN

## 2018-06-13 DIAGNOSIS — Z09 HOSPITAL DISCHARGE FOLLOW-UP: ICD-10-CM

## 2018-06-13 DIAGNOSIS — R10.9 INTRACTABLE ABDOMINAL PAIN: ICD-10-CM

## 2018-06-13 DIAGNOSIS — F90.0 ADHD (ATTENTION DEFICIT HYPERACTIVITY DISORDER), INATTENTIVE TYPE: ICD-10-CM

## 2018-06-13 DIAGNOSIS — M79.7 FIBROMYALGIA: ICD-10-CM

## 2018-06-13 DIAGNOSIS — E03.9 ACQUIRED HYPOTHYROIDISM: ICD-10-CM

## 2018-06-13 DIAGNOSIS — K85.90 ACUTE PANCREATITIS, UNSPECIFIED COMPLICATION STATUS, UNSPECIFIED PANCREATITIS TYPE: Primary | ICD-10-CM

## 2018-06-13 DIAGNOSIS — K52.9 CHRONIC DIARRHEA: ICD-10-CM

## 2018-06-13 DIAGNOSIS — I10 ESSENTIAL HYPERTENSION: ICD-10-CM

## 2018-06-13 PROCEDURE — 3079F DIAST BP 80-89 MM HG: CPT | Mod: CPTII,S$GLB,, | Performed by: NURSE PRACTITIONER

## 2018-06-13 PROCEDURE — 99999 PR PBB SHADOW E&M-EST. PATIENT-LVL V: CPT | Mod: PBBFAC,,, | Performed by: NURSE PRACTITIONER

## 2018-06-13 PROCEDURE — 3077F SYST BP >= 140 MM HG: CPT | Mod: CPTII,S$GLB,, | Performed by: NURSE PRACTITIONER

## 2018-06-13 PROCEDURE — 99215 OFFICE O/P EST HI 40 MIN: CPT | Mod: S$GLB,,, | Performed by: NURSE PRACTITIONER

## 2018-06-13 PROCEDURE — 3008F BODY MASS INDEX DOCD: CPT | Mod: CPTII,S$GLB,, | Performed by: NURSE PRACTITIONER

## 2018-06-13 RX ORDER — VALSARTAN 80 MG/1
80 TABLET ORAL DAILY
Qty: 30 TABLET | Refills: 0 | Status: SHIPPED | OUTPATIENT
Start: 2018-06-13 | End: 2018-07-06 | Stop reason: SDUPTHER

## 2018-06-13 RX ORDER — VALSARTAN 80 MG/1
80 TABLET ORAL DAILY
COMMUNITY
End: 2018-06-13 | Stop reason: CLARIF

## 2018-06-13 RX ORDER — THYROID 60 MG/1
TABLET ORAL
COMMUNITY
End: 2022-07-26 | Stop reason: SDUPTHER

## 2018-06-13 NOTE — PROGRESS NOTES
Subjective:       Patient ID: Madalyn Iverson is a 63 y.o. female.    Chief Complaint: Follow-up (hospital)    Patient is a 63-year-old white female with ADHD, fibromyalgia, hypertension, hyperlipidemia, hypothyroidism, impaired fasting glucose, migraines, vitamin D deficiency, and ulcerative colitis per patient report that is here today for Hospital follow up.    Patient was Hospitalized at [facility] on 6/5/18 with Resolving Pancreatitis, intractable abdominal pain, chronic diarrhea, Fibromyalgia and Hypertension.  Patient was hospitalized at [facility] for Crohn's and Pancreatitis from 5/28/18 to 6/2/18.  Patient had EGD during hospital stay that was unremarkable.   She was discharged from [facility] with instructions to follow up with GI and prescribed Cipro and Flagyl.  At [facility] nn ED, lipase and transaminases WNL, WBC 9.89, UA unremarkable. Afebrile, VSS.  ESR/CRP pending.  CT abd/pelvis with contrast concerning for pancreatitis with enlargement and peripancreatic haziness of the pancreatic head. There is no evidence of calcified gallstone or biliary ductal dilatation.  Also visualized is diverticulosis coli without evidence of diverticulitis.  Hospital Course:   Patient admitted for intractable abdominal pain in setting of resolving pancreatitis. She was not discharged with pain regimen from [facility]. Repeat CT here showed inflamed pancrease with normal lipase - likely resolving pancreatitis. Patient tolerated diet with still needing some pain control.    Hospital Discharge Plan/Notes:  Intractable abdominal pain    Chronic Diarrhea  - patient w/o evidence of UC/Crohns on imaging, she states that in the past only 1 MD has told he she has symptoms, but has never seen this on colonoscopy or imaging. WIll remove from [facility]  - She was recently discharged from [facility] for pancreatitis w/o pain medications  - case discussed with angelita HUANG for outpatient follow up and supporive care  - CT abdomen with inflammation of pancreas,  "lipase WNL. This represents a resolving pancreatitis.  - stool studies ordered, but doubt they will provide any significant information  - patient likely have IBS-D, GI consulted placed for outpatient work up  - continue cipro and flagyl from pancreatitis  - tolerated PO, good pain control. Further work up outpatient     ####Of note based on medical records, patient has been being followed by GI Dr. Lee/Robin since 2004.  The colonoscopy in April 2004 showed a few 8mm ulcers to the descending colon that could be suggestive of ulcerative colitis BUT repeat sigmoidoscopy in June 2004 showed no ulcerations present and patient has had normal colonoscopies since that time - please review media file for all colonoscopy reports from Dr. Lee and Keiko######    Patient has appointment with [facility] tomorrow for follow up on Pancreatitis admit as well as intractable abdominal pains and chronic diarrhea.    Patient has Fibromyalgia that is being followed by Rheumatology, Dr. Thorpe and prescribed Tizanidine BID.  The admitting provider advised to change the Tizanidine to Flexeril in setting of Cipro but patient reports that the Flexeril is not effective.  Patient advised she needs to see Rheumatology to further discuss and manage Fibromyalgia - patient reports around 1 year seen last seen by Dr. Thorpe and would like referral to [facility].    Patient has Hypertension and currently taking Amlodipine 10 mg daily, Metoprolol 50 mg daily Lasix 40 mg daily and Clonidine at bedtime.  Patient reports the extremity fluid retention is only controlled with Lasix and has been stable on this medication.  Will stop the Clonidine and add on an ARB for better blood pressure control.  BP (!) 148/86 (BP Location: Left arm, Patient Position: Sitting, BP Method: Medium (Manual))   Pulse 108   Temp 98.7 °F (37.1 °C) (Oral)   Ht 5' 6" (1.676 m)   Wt 74.5 kg (164 lb 2.1 oz)   SpO2 97%   BMI 26.49 kg/m² "       Patient has ADHD - patient was on Adderall 20 mg twice daily in the past - when patient started having problems with blood pressure going really high due to pain level earlier this year 2017- I stopped the Adderall.  Once the pain improved after patient had nerve block and blood pressure became better controlled, I started patient back on Adderall at 10 mg twice daily which she tolerated well. We tried increasing to 20 mg in AM and 10 mg mid-day BUT blood pressure elevates and the benefits do not outweigh the risks - advised I will ONLY  Prescribe Adderall 10 mg twice daily since this dose seems to not affect the blood pressure. Advised patient that ADHD medications are not recommended in patients > 65 but patient reports she can not function without the medication and has been on for many years.  Advised patient I will again not fill medication until her blood pressure is controlled.    Patient has Hypothyroidism that is followed by Dr. Mathew.            Previous Medications    ALPRAZOLAM (XANAX) 0.5 MG TABLET    Take 0.5 mg by mouth every 8 (eight) hours as needed.    AMLODIPINE (NORVASC) 10 MG TABLET    TAKE 1 TABLET BY MOUTH ONCE DAILY    CHOLECALCIFEROL, VITAMIN D3, (VITAMIN D3) 5,000 UNIT TAB    Take 5,000 Units by mouth once daily.    CIPROFLOXACIN HCL (CIPRO) 500 MG TABLET    Take 500 mg by mouth 2 (two) times daily.    CITALOPRAM (CELEXA) 20 MG TABLET    Take 20 mg by mouth once daily.    CLONIDINE (CATAPRES) 0.1 MG TABLET    TAKE 1 TABLET BY MOUTH AT BEDTIME    CYANOCOBALAMIN, VITAMIN B-12, (VITAMIN B-12 INJ)    Inject 1,000 mcg as directed every 30 days.    DEXTROAMPHETAMINE-AMPHETAMINE 10 MG TAB    Take 10 mg by mouth 2 (two) times daily.    DICLOFENAC SODIUM 1 % GEL    Apply 2 g topically 4 (four) times daily.    ESTRADIOL (ESTRACE) 2 MG TABLET    Take 2 mg by mouth once daily.    FUROSEMIDE (LASIX) 40 MG TABLET    TAKE 1 TABLET (40 MG TOTAL) BY MOUTH ONCE DAILY.    HYDROCODONE-ACETAMINOPHEN  (NORCO)  MG PER TABLET    Take 1 tablet by mouth every 6 (six) hours as needed.    HYOSCYAMINE (LEVSIN/SL) 0.125 MG SUBL    Place 0.125 mg under the tongue every 4 (four) hours as needed.    KLOR-CON SPRINKLE 10 MEQ CPSR    TAKE 1 CAPSULE TWICE A DAY    METOPROLOL SUCCINATE (TOPROL-XL) 50 MG 24 HR TABLET    TAKE 1 TABLET BY MOUTH EVERY DAY    METRONIDAZOLE (FLAGYL) 500 MG TABLET    Take 500 mg by mouth 3 (three) times daily.    PROGESTERONE (PROMETRIUM) 100 MG CAPSULE    Take 100 mg by mouth once daily.    PROMETHAZINE (PHENERGAN) 12.5 MG TAB    Take 12.5 mg by mouth 3 (three) times daily as needed.    ROSUVASTATIN (CRESTOR) 10 MG TABLET    Take 1 tablet (10 mg total) by mouth once daily.    THYROID, PORK, (ARMOUR THYROID) 60 MG TAB    [place] Thyroid 60 mg tablet    TIZANIDINE 4 MG CAP    Take by mouth.       Past Medical History:   Diagnosis Date    Acute pancreatitis     2018 - [facility]    ADHD (attention deficit hyperactivity disorder), inattentive type     Fibromyalgia     treated by Dr. Thorpe - Rheumatologist    Hyperlipidemia     High triglycerides    Hypertension     Hypothyroidism     Treated by Dr. Mathew    IFG (impaired fasting glucose)     Migraine     Treated by neurologist - Dr. Destiny Florez    Ulcerative colitis     Treated by Dr. Lee and Aaron in past per patient report but on admit in 2018 - no UC seen on records    Vitamin D deficiency 3/31/2016       Past Surgical History:   Procedure Laterality Date     SECTION      COLONOSCOPY  10/08/2010    Dr. Lee - repeat in 5 years - has appointment for colonoscopy 2016    COLONOSCOPY  2016    Dr. Kelly - normal  colon - repeat in 10 years - scanned report to media file.    HYSTERECTOMY      OOPHORECTOMY      SHOULDER SURGERY Left     TONSILLECTOMY      upper and lower GI  2016       Family History   Problem Relation Age of Onset    Hypertension Mother      Heart disease Mother     Hyperlipidemia Mother     Cancer Sister 53        thyroid cancer and lymph nodes involvement    Cancer Brother 61        colon and lung cancer    Cancer Brother 63        colon    Hypertension Brother     Diabetes Brother        Social History     Social History    Marital status:      Spouse name: N/A    Number of children: N/A    Years of education: N/A     Social History Main Topics    Smoking status: Former Smoker     Packs/day: 1.00     Years: 20.00     Quit date: 2/1/1997    Smokeless tobacco: Never Used    Alcohol use No    Drug use: No    Sexual activity: No     Other Topics Concern    None     Social History Narrative    None       Review of Systems   Constitutional: Negative for appetite change, chills, fatigue, fever and unexpected weight change.   HENT: Negative for congestion, ear pain, mouth sores, nosebleeds, postnasal drip, rhinorrhea, sinus pressure, sneezing, sore throat, trouble swallowing and voice change.    Eyes: Negative for photophobia, pain, discharge, redness, itching and visual disturbance.   Respiratory: Negative for cough, chest tightness and shortness of breath.    Cardiovascular: Negative for chest pain, palpitations and leg swelling.   Gastrointestinal: Positive for abdominal pain and diarrhea. Negative for blood in stool, constipation, nausea and vomiting.   Genitourinary: Negative for dysuria, frequency, hematuria and urgency.   Musculoskeletal: Positive for arthralgias and myalgias. Negative for back pain and joint swelling.   Skin: Negative for color change and rash.   Allergic/Immunologic: Negative for immunocompromised state.   Neurological: Negative for dizziness, seizures, syncope, weakness and headaches.   Hematological: Negative for adenopathy. Does not bruise/bleed easily.   Psychiatric/Behavioral: Positive for decreased concentration. Negative for agitation, dysphoric mood, sleep disturbance and suicidal ideas. The patient is  "not nervous/anxious.          Objective:     Vitals:    06/13/18 1001   BP: (!) 148/86   BP Location: Left arm   Patient Position: Sitting   BP Method: Medium (Manual)   Pulse: 108   Temp: 98.7 °F (37.1 °C)   TempSrc: Oral   SpO2: 97%   Weight: 74.5 kg (164 lb 2.1 oz)   Height: 5' 6" (1.676 m)          Physical Exam   Constitutional: She is oriented to person, place, and time. She appears well-developed and well-nourished. No distress.   Body mass index is 26.49 kg/m².   HENT:   Head: Normocephalic and atraumatic.   Right Ear: External ear normal.   Left Ear: External ear normal.   Nose: Nose normal.   Mouth/Throat: Oropharynx is clear and moist. No oropharyngeal exudate.   Eyes: Conjunctivae and EOM are normal. Pupils are equal, round, and reactive to light.   Neck: Normal range of motion. Neck supple. No JVD present. No tracheal deviation present. No thyromegaly present.   Cardiovascular: Normal rate, regular rhythm and normal heart sounds.    No murmur heard.  Pulmonary/Chest: Effort normal and breath sounds normal. No stridor. No respiratory distress. She has no wheezes. She has no rales.   Abdominal: Soft. Bowel sounds are normal. She exhibits no distension and no mass. There is no rebound and no guarding.   Musculoskeletal: Normal range of motion. She exhibits no edema.   Lymphadenopathy:     She has no cervical adenopathy.   Neurological: She is alert and oriented to person, place, and time. No cranial nerve deficit. Coordination normal.   Skin: Skin is warm and dry. No rash noted. She is not diaphoretic.   Psychiatric: She has a normal mood and affect.         Assessment:         ICD-10-CM ICD-9-CM   1. Acute pancreatitis, unspecified complication status, unspecified pancreatitis type K85.90 577.0   2. Intractable abdominal pain R10.9 789.00   3. Chronic diarrhea K52.9 787.91   4. Hospital discharge follow-up Z09 V67.59   5. Essential hypertension I10 401.9   6. Fibromyalgia M79.7 729.1   7. ADHD (attention " deficit hyperactivity disorder), inattentive type F90.0 314.00   8. Acquired hypothyroidism E03.9 244.9       Plan:       Acute pancreatitis, unspecified complication status, unspecified pancreatitis type  -  Has appt with GI tomorrow for hospital follow up.    Intractable abdominal pain  -  Has appt with GI tomorrow for hospital follow up.  ####PLEASE REVIEW NOTES UNDER HPI###    Chronic diarrhea  -  Has appt with GI tomorrow for hospital follow up.  ####PLEASE REVIEW NOTES UNDER HPI###      Hospital discharge follow-up    Essential hypertension  -  Stop the Clonidine.  Start Valsartan 80 mg daily.  Continue Amlodipine, Metoprolol and Lasix at present doses.  Patient already has appt in July for fasting labs and WELLNESS exam so will re-evaluate at that time.  -     valsartan (DIOVAN) 80 MG tablet; Take 1 tablet (80 mg total) by mouth once daily.  Dispense: 30 tablet; Refill: 0    Fibromyalgia  -  Patient wants to establish care with [facility] - diagnosed and treated in past by Dr. Thorpe.  -     Ambulatory Referral to Rheumatology    ADHD (attention deficit hyperactivity disorder), inattentive type  -  Hold Adderall until blood pressure is controlled.    Acquired hypothyroidism  -  Followed by Dr. Mathew    #### 1 hour spent with patient reviewing diagnoses with patient - review of previous colonoscopies/records, etc.  A lot of patient education/counseling during visit####    Follow-up in about 4 weeks (around 7/11/2018) for follow up = keep appt for wellness.     Patient's Medications   New Prescriptions    VALSARTAN (DIOVAN) 80 MG TABLET    Take 1 tablet (80 mg total) by mouth once daily.   Previous Medications    ALPRAZOLAM (XANAX) 0.5 MG TABLET    Take 0.5 mg by mouth every 8 (eight) hours as needed.    AMLODIPINE (NORVASC) 10 MG TABLET    TAKE 1 TABLET BY MOUTH ONCE DAILY    CHOLECALCIFEROL, VITAMIN D3, (VITAMIN D3) 5,000 UNIT TAB    Take 5,000 Units by mouth once daily.    CIPROFLOXACIN HCL  (CIPRO) 500 MG TABLET    Take 500 mg by mouth 2 (two) times daily.    CITALOPRAM (CELEXA) 20 MG TABLET    Take 20 mg by mouth once daily.    CYANOCOBALAMIN, VITAMIN B-12, (VITAMIN B-12 INJ)    Inject 1,000 mcg as directed every 30 days.    DEXTROAMPHETAMINE-AMPHETAMINE 10 MG TAB    Take 10 mg by mouth 2 (two) times daily.    DICLOFENAC SODIUM 1 % GEL    Apply 2 g topically 4 (four) times daily.    ESTRADIOL (ESTRACE) 2 MG TABLET    Take 2 mg by mouth once daily.    FUROSEMIDE (LASIX) 40 MG TABLET    TAKE 1 TABLET (40 MG TOTAL) BY MOUTH ONCE DAILY.    HYDROCODONE-ACETAMINOPHEN (NORCO)  MG PER TABLET    Take 1 tablet by mouth every 6 (six) hours as needed.    HYOSCYAMINE (LEVSIN/SL) 0.125 MG SUBL    Place 0.125 mg under the tongue every 4 (four) hours as needed.    KLOR-CON SPRINKLE 10 MEQ CPSR    TAKE 1 CAPSULE TWICE A DAY    METOPROLOL SUCCINATE (TOPROL-XL) 50 MG 24 HR TABLET    TAKE 1 TABLET BY MOUTH EVERY DAY    METRONIDAZOLE (FLAGYL) 500 MG TABLET    Take 500 mg by mouth 3 (three) times daily.    PROGESTERONE (PROMETRIUM) 100 MG CAPSULE    Take 100 mg by mouth once daily.    PROMETHAZINE (PHENERGAN) 12.5 MG TAB    Take 12.5 mg by mouth 3 (three) times daily as needed.    ROSUVASTATIN (CRESTOR) 10 MG TABLET    Take 1 tablet (10 mg total) by mouth once daily.    THYROID, PORK, (ARMOUR THYROID) 60 MG TAB    [place] Thyroid 60 mg tablet    TIZANIDINE 4 MG CAP    Take by mouth.   Modified Medications    No medications on file   Discontinued Medications    CLONIDINE (CATAPRES) 0.1 MG TABLET    TAKE 1 TABLET BY MOUTH AT BEDTIME    VALSARTAN (DIOVAN) 80 MG TABLET    Take 80 mg by mouth once daily.

## 2018-06-14 ENCOUNTER — LAB VISIT (OUTPATIENT)
Dept: LAB | Facility: HOSPITAL | Age: 63
End: 2018-06-14
Attending: INTERNAL MEDICINE
Payer: MEDICARE

## 2018-06-14 ENCOUNTER — OFFICE VISIT (OUTPATIENT)
Dept: GASTROENTEROLOGY | Facility: CLINIC | Age: 63
End: 2018-06-14
Payer: MEDICARE

## 2018-06-14 VITALS
SYSTOLIC BLOOD PRESSURE: 159 MMHG | HEIGHT: 66 IN | BODY MASS INDEX: 26.14 KG/M2 | WEIGHT: 162.69 LBS | HEART RATE: 108 BPM | DIASTOLIC BLOOD PRESSURE: 76 MMHG

## 2018-06-14 DIAGNOSIS — K85.90 ACUTE PANCREATITIS, UNSPECIFIED COMPLICATION STATUS, UNSPECIFIED PANCREATITIS TYPE: Primary | ICD-10-CM

## 2018-06-14 DIAGNOSIS — K85.90 ACUTE PANCREATITIS, UNSPECIFIED COMPLICATION STATUS, UNSPECIFIED PANCREATITIS TYPE: ICD-10-CM

## 2018-06-14 LAB
ALBUMIN SERPL BCP-MCNC: 3.4 G/DL
ALP SERPL-CCNC: 146 U/L
ALT SERPL W/O P-5'-P-CCNC: 22 U/L
ANION GAP SERPL CALC-SCNC: 7 MMOL/L
AST SERPL-CCNC: 27 U/L
BILIRUB DIRECT SERPL-MCNC: 0.1 MG/DL
BILIRUB SERPL-MCNC: 0.2 MG/DL
BUN SERPL-MCNC: 13 MG/DL
CALCIUM SERPL-MCNC: 9.5 MG/DL
CHLORIDE SERPL-SCNC: 102 MMOL/L
CO2 SERPL-SCNC: 28 MMOL/L
CREAT SERPL-MCNC: 0.7 MG/DL
EST. GFR  (AFRICAN AMERICAN): >60 ML/MIN/1.73 M^2
EST. GFR  (NON AFRICAN AMERICAN): >60 ML/MIN/1.73 M^2
GLUCOSE SERPL-MCNC: 110 MG/DL
LIPASE SERPL-CCNC: 4 U/L
POTASSIUM SERPL-SCNC: 4.6 MMOL/L
PROT SERPL-MCNC: 7.8 G/DL
SODIUM SERPL-SCNC: 137 MMOL/L

## 2018-06-14 PROCEDURE — 3008F BODY MASS INDEX DOCD: CPT | Mod: CPTII,S$GLB,, | Performed by: INTERNAL MEDICINE

## 2018-06-14 PROCEDURE — 99999 PR PBB SHADOW E&M-EST. PATIENT-LVL III: CPT | Mod: PBBFAC,,,

## 2018-06-14 PROCEDURE — 3077F SYST BP >= 140 MM HG: CPT | Mod: CPTII,S$GLB,, | Performed by: INTERNAL MEDICINE

## 2018-06-14 PROCEDURE — 99215 OFFICE O/P EST HI 40 MIN: CPT | Mod: S$GLB,,, | Performed by: INTERNAL MEDICINE

## 2018-06-14 PROCEDURE — 80076 HEPATIC FUNCTION PANEL: CPT

## 2018-06-14 PROCEDURE — 80321 ALCOHOLS BIOMARKERS 1OR 2: CPT

## 2018-06-14 PROCEDURE — 80048 BASIC METABOLIC PNL TOTAL CA: CPT

## 2018-06-14 PROCEDURE — 3078F DIAST BP <80 MM HG: CPT | Mod: CPTII,S$GLB,, | Performed by: INTERNAL MEDICINE

## 2018-06-14 PROCEDURE — 83690 ASSAY OF LIPASE: CPT

## 2018-06-14 PROCEDURE — 36415 COLL VENOUS BLD VENIPUNCTURE: CPT

## 2018-06-19 ENCOUNTER — TELEPHONE (OUTPATIENT)
Dept: GASTROENTEROLOGY | Facility: CLINIC | Age: 63
End: 2018-06-19

## 2018-06-19 DIAGNOSIS — K86.1 CHRONIC PANCREATITIS, UNSPECIFIED PANCREATITIS TYPE: Primary | ICD-10-CM

## 2018-06-19 LAB — PHOSPHATIDYLETHANOL (PETH): NEGATIVE NG/ML

## 2018-06-19 NOTE — TELEPHONE ENCOUNTER
----- Message from Etta Smith MD sent at 6/19/2018 10:18 AM CDT -----  Labs are fine. Please schedule EUS in 3-4 weeks.

## 2018-07-05 ENCOUNTER — TELEPHONE (OUTPATIENT)
Dept: GASTROENTEROLOGY | Facility: CLINIC | Age: 63
End: 2018-07-05

## 2018-07-05 NOTE — TELEPHONE ENCOUNTER
----- Message from Samantha Masterson sent at 7/5/2018 12:48 PM CDT -----  Needs Advice    Reason for call:  Pt states Kenny Quintanilla states Dr Quan would have to fax a request for pt's medical records.   Pt is asking that medical records be obtained prior too her appt on 07/09/18 at 8 am  Communication Preference: [phone]  Additional Information:  Kenny Lee and Dr Gee Kelly   Fax [phone]

## 2018-07-05 NOTE — TELEPHONE ENCOUNTER
Spoke with patient.  She will contact  office to sign a release to have her medical records faxed to .

## 2018-07-06 ENCOUNTER — OFFICE VISIT (OUTPATIENT)
Dept: FAMILY MEDICINE | Facility: CLINIC | Age: 63
End: 2018-07-06
Payer: MEDICARE

## 2018-07-06 VITALS
TEMPERATURE: 99 F | SYSTOLIC BLOOD PRESSURE: 114 MMHG | BODY MASS INDEX: 26.89 KG/M2 | DIASTOLIC BLOOD PRESSURE: 78 MMHG | HEART RATE: 84 BPM | OXYGEN SATURATION: 98 % | HEIGHT: 66 IN | WEIGHT: 167.31 LBS

## 2018-07-06 DIAGNOSIS — Z23 NEED FOR STREPTOCOCCUS PNEUMONIAE VACCINATION: ICD-10-CM

## 2018-07-06 DIAGNOSIS — Z12.39 BREAST CANCER SCREENING: ICD-10-CM

## 2018-07-06 DIAGNOSIS — E03.9 ACQUIRED HYPOTHYROIDISM: ICD-10-CM

## 2018-07-06 DIAGNOSIS — M79.7 FIBROMYALGIA: ICD-10-CM

## 2018-07-06 DIAGNOSIS — E55.9 VITAMIN D DEFICIENCY: ICD-10-CM

## 2018-07-06 DIAGNOSIS — Z00.00 ANNUAL PHYSICAL EXAM: Primary | ICD-10-CM

## 2018-07-06 DIAGNOSIS — F90.0 ADHD (ATTENTION DEFICIT HYPERACTIVITY DISORDER), INATTENTIVE TYPE: ICD-10-CM

## 2018-07-06 DIAGNOSIS — I10 ESSENTIAL HYPERTENSION: ICD-10-CM

## 2018-07-06 DIAGNOSIS — K52.9 CHRONIC DIARRHEA: ICD-10-CM

## 2018-07-06 DIAGNOSIS — Z78.0 POSTMENOPAUSAL: ICD-10-CM

## 2018-07-06 DIAGNOSIS — E78.5 HYPERLIPIDEMIA, UNSPECIFIED HYPERLIPIDEMIA TYPE: ICD-10-CM

## 2018-07-06 DIAGNOSIS — F41.9 ANXIETY: ICD-10-CM

## 2018-07-06 DIAGNOSIS — Z87.19 HISTORY OF PANCREATITIS: ICD-10-CM

## 2018-07-06 PROCEDURE — G0402 INITIAL PREVENTIVE EXAM: HCPCS | Mod: S$GLB,,, | Performed by: NURSE PRACTITIONER

## 2018-07-06 PROCEDURE — 3078F DIAST BP <80 MM HG: CPT | Mod: CPTII,S$GLB,, | Performed by: NURSE PRACTITIONER

## 2018-07-06 PROCEDURE — 90670 PCV13 VACCINE IM: CPT | Mod: S$GLB,,, | Performed by: NURSE PRACTITIONER

## 2018-07-06 PROCEDURE — 99999 PR PBB SHADOW E&M-EST. PATIENT-LVL V: CPT | Mod: PBBFAC,,, | Performed by: NURSE PRACTITIONER

## 2018-07-06 PROCEDURE — G0009 ADMIN PNEUMOCOCCAL VACCINE: HCPCS | Mod: S$GLB,,, | Performed by: NURSE PRACTITIONER

## 2018-07-06 PROCEDURE — 3074F SYST BP LT 130 MM HG: CPT | Mod: CPTII,S$GLB,, | Performed by: NURSE PRACTITIONER

## 2018-07-06 RX ORDER — CITALOPRAM 20 MG/1
20 TABLET, FILM COATED ORAL DAILY
Qty: 90 TABLET | Refills: 1 | Status: SHIPPED | OUTPATIENT
Start: 2018-07-06 | End: 2018-07-09

## 2018-07-06 RX ORDER — VALSARTAN 80 MG/1
80 TABLET ORAL DAILY
Qty: 90 TABLET | Refills: 0 | Status: SHIPPED | OUTPATIENT
Start: 2018-07-06 | End: 2018-10-26 | Stop reason: SDUPTHER

## 2018-07-06 RX ORDER — AMLODIPINE BESYLATE 10 MG/1
10 TABLET ORAL DAILY
Qty: 90 TABLET | Refills: 0 | Status: SHIPPED | OUTPATIENT
Start: 2018-07-06 | End: 2019-03-21 | Stop reason: SDUPTHER

## 2018-07-06 RX ORDER — ROSUVASTATIN CALCIUM 20 MG/1
20 TABLET, COATED ORAL DAILY
Qty: 90 TABLET | Refills: 0 | Status: SHIPPED | OUTPATIENT
Start: 2018-07-06 | End: 2019-03-21 | Stop reason: SINTOL

## 2018-07-06 NOTE — PROGRESS NOTES
Subjective:       Patient ID: Madalyn Iverson is a 63 y.o. female.    Chief Complaint: Annual Exam (wellness)    Patient is a 63-year-old white female with ADHD, fibromyalgia, hypertension, hyperlipidemia, hypothyroidism, impaired fasting glucose, migraines, vitamin D deficiency, osteoarthritis to both hands and chronic GI problem that patient reported was ulcerative colitis that is now questionable that is here today for Annual physical exam with fasting lab results.    Health Risk Assessment, Depression Screening and ADL checklist completed - please see flowsheets.    Patient has chronic abdominal pain with chronic diarrhea that was previously been being followed by Dr. Kelly and Dr. Lee.  Patient had reported that she had Ulcerative Colitis but repeat colonoscopies do not support this.  ####Of note based on medical records, patient has been being followed by GI Dr. Lee/Robin since 2004.  The colonoscopy in April 2004 showed a few 8mm ulcers to the descending colon that could be suggestive of ulcerative colitis BUT repeat sigmoidoscopy in June 2004 showed no ulcerations present and patient has had normal colonoscopies since that time - please review media file for all colonoscopy reports from Dr. Lee and [facility]######  Patient is now being followed by [facility] for chronic complaints and recent diagnosis of Pancreatitis.    Patient has Fibromyalgia and chronic pains that was being followed by Rheumatology, Dr. Thorpe in the past.  She reports she is no longer seeing Dr. Thorpe and has appointment with [facility].    Patient has chronic Migraines that is followed by Neurologist, Dr. Florez, whom she states has been prescribing her Xanax and Tizanidine.  She reports she has recently been approved for Botox injections.    Patient has Hypothyroidism that is followed by her GYN MD, Dr. Mathew.    Patient has Hypertension that is now controlled on Amlodipine 10 mg daily,  "Metoprolol 50 mg daily, Lasix 40 mg with potassium supplement, and Valsartan 80 mg daily.  /78 (BP Location: Right arm, Patient Position: Sitting, BP Method: Medium (Manual))   Pulse 84   Temp 98.7 °F (37.1 °C) (Oral)   Ht 5' 6" (1.676 m)   Wt 75.9 kg (167 lb 5.3 oz)   SpO2 98%   BMI 27.01 kg/m²     Patient has Hyperlipidemia.  Patient has known NONCOMPLIANCE with medication due to report of leg cramping but reports that she has been taking the Rosuvastatin 10 mg for the past 3 months - cholesterol levels remain elevated.    Patient has ADHD that has been treated for multiple years by Dr. Randhawa in the past and myself over the past several years.  I have titrated patient down from Adderall 30 mg twice daily over the past several years down to Adderall 10 mg twice daily.  Advised patient that Adderall is NOT recommended to patient over age 65 due to cardiovascular risk.  I advised that if she keeps her cardiovascular risks down, I will prescribe a low dose medication but NOT until blood pressure and cholesterol levels are well controlled.    Patient has Anxiety and reports that her neurologist prescribes the Xanax.  Patient states unsure who prescribes her Citalopram.  I will fill the Citalopram today but will not prescribe Xanax.    Patient has Osteoarthritis to both hands and prescribed topical Dicofenac because she can not take oral NSAIDs due to chronic GI issues.    Wellness Labs:  -  CBC WNL  -  CMP okay other than chronically elevated Alk Phos - followed by GI  -  Cholesterol levels uncontrolled - will adjust Crestor and advised she must take medication daily  -  Thyroid levels are normal on current medication that is followed by Dr. Mathew  -  Vitamin D level remains low and advised to increase supplement.    Health Maintenance:  -  Due for mammogram and DEXA  -  Due for Pneumonia vaccine.        Component      Latest Ref Rng & Units 6/30/2018 6/6/2018 11/21/2017 6/23/2017   WBC      3.90 - 12.70 " K/uL 7.17 6.26     RBC      4.00 - 5.40 M/uL 4.55 4.12     Hemoglobin      12.0 - 16.0 g/dL 13.8 12.4     Hematocrit      37.0 - 48.5 % 41.5 38.6     MCV      82 - 98 fL 91 94     MCH      27.0 - 31.0 pg 30.3 30.1     MCHC      32.0 - 36.0 g/dL 33.3 32.1     RDW      11.5 - 14.5 % 13.0 13.0     Platelets      150 - 350 K/uL 170 291     MPV      9.2 - 12.9 fL 12.2 10.0     Immature Granulocytes      0.0 - 0.5 %  1.1 (H)     Gran # (ANC)      1.8 - 7.7 K/uL 4.7 2.9     Immature Grans (Abs)      0.00 - 0.04 K/uL  0.07 (H)     Lymph #      1.0 - 4.8 K/uL 1.7 2.4     Mono #      0.3 - 1.0 K/uL 0.6 0.6     Eos #      0.0 - 0.5 K/uL 0.1 0.2     Baso #      0.00 - 0.20 K/uL 0.05 0.07     nRBC      0 /100 WBC  0     Gran%      38.0 - 73.0 % 66.1 46.6     Lymph%      18.0 - 48.0 % 23.2 38.7     Mono%      4.0 - 15.0 % 8.5 9.6     Eosinophil%      0.0 - 8.0 % 1.5 2.9     Basophil%      0.0 - 1.9 % 0.7 1.1     Differential Method       Automated Automated     Sodium      136 - 145 mmol/L 141 139 145 139   Potassium      3.5 - 5.1 mmol/L 3.9 4.0 3.9 4.9   Chloride      95 - 110 mmol/L 102 105 105 99   CO2      23 - 29 mmol/L 27 27 31 (H) 31 (H)   Glucose      70 - 110 mg/dL 110 105 110 104   BUN, Bld      7 - 17 mg/dL 17 13 15 17   Creatinine      0.50 - 1.40 mg/dL 0.56 0.8 0.67 0.64   Calcium      8.7 - 10.5 mg/dL 9.2 8.5 (L) 9.8 9.8   Total Protein      6.0 - 8.4 g/dL 7.5 6.4 8.0 8.0   Albumin      3.5 - 5.2 g/dL 3.9 2.8 (L) 4.3 4.0   Total Bilirubin      0.1 - 1.0 mg/dL 0.3 0.2 0.5 0.4   Alkaline Phosphatase      38 - 126 U/L 162 (H) 148 (H) 141 (H) 173 (H)   AST      15 - 46 U/L 29 21 21 23   ALT      10 - 44 U/L 41 24 24 32   Anion Gap      8 - 16 mmol/L 12 7 (L) 9 9   eGFR if African American      >60 mL/min/1.73 m:2 >60.0 >60.0 >60.0 >60.0   eGFR if non African American      >60 mL/min/1.73 m:2 >60.0 >60.0 >60.0 >60.0   Cholesterol      120 - 199 mg/dL 248 (H)  240 (H) 266 (H)   Triglycerides      30 - 150 mg/dL 295 (H)   256 (H) 226 (H)   HDL      40 - 75 mg/dL 47  55 62   LDL Cholesterol      63.0 - 159.0 mg/dL 142.0  133.8 158.8   HDL/Chol Ratio      20.0 - 50.0 % 19.0 (L)  22.9 23.3   Total Cholesterol/HDL Ratio      2.0 - 5.0 5.3 (H)  4.4 4.3   Non-HDL Cholesterol      mg/dL 201  185 204   Hemoglobin A1C      4.0 - 5.6 % 5.8 (H) 5.8 (H)  5.8 (H)   Estimated Avg Glucose      68 - 131 mg/dL 120 120  120   TSH      0.400 - 4.000 uIU/mL 0.695 1.446     Free T4      0.71 - 1.51 ng/dL 0.80      Vit D, 25-Hydroxy      30 - 96 ng/mL 18 (L)        Previous Medications    ALPRAZOLAM (XANAX) 0.5 MG TABLET    Take 0.5 mg by mouth every 8 (eight) hours as needed.    AMLODIPINE (NORVASC) 10 MG TABLET    TAKE 1 TABLET BY MOUTH ONCE DAILY    CHOLECALCIFEROL, VITAMIN D3, (VITAMIN D3) 5,000 UNIT TAB    Take 5,000 Units by mouth once daily.    CITALOPRAM (CELEXA) 20 MG TABLET    Take 20 mg by mouth once daily.    CYANOCOBALAMIN, VITAMIN B-12, (VITAMIN B-12 INJ)    Inject 1,000 mcg as directed every 30 days.    DEXTROAMPHETAMINE-AMPHETAMINE 10 MG TAB    Take 10 mg by mouth 2 (two) times daily.    DICLOFENAC SODIUM 1 % GEL    Apply 2 g topically 4 (four) times daily.    ESTRADIOL (ESTRACE) 2 MG TABLET    Take 2 mg by mouth once daily.    FUROSEMIDE (LASIX) 40 MG TABLET    TAKE 1 TABLET (40 MG TOTAL) BY MOUTH ONCE DAILY.    KLOR-CON SPRINKLE 10 MEQ CPSR    TAKE 1 CAPSULE TWICE A DAY    METOPROLOL SUCCINATE (TOPROL-XL) 50 MG 24 HR TABLET    TAKE 1 TABLET BY MOUTH EVERY DAY    METRONIDAZOLE (FLAGYL) 500 MG TABLET    Take 500 mg by mouth 3 (three) times daily.    PROGESTERONE (PROMETRIUM) 100 MG CAPSULE    Take 100 mg by mouth once daily.    ROSUVASTATIN (CRESTOR) 10 MG TABLET    Take 1 tablet (10 mg total) by mouth once daily.    THYROID, PORK, (ARMOUR THYROID) 60 MG TAB    [place] Thyroid 60 mg tablet    TIZANIDINE 4 MG CAP    Take by mouth.    VALSARTAN (DIOVAN) 80 MG TABLET    Take 1 tablet (80 mg total) by mouth once daily.       Past Medical  History:   Diagnosis Date    Acute pancreatitis     2018 - [facility]    ADHD (attention deficit hyperactivity disorder), inattentive type     Fibromyalgia     treated by Dr. Thorpe - Rheumatologist    Hyperlipidemia     High triglycerides    Hypertension     Hypothyroidism     Treated by Dr. Mathew    IFG (impaired fasting glucose)     Migraine     Treated by neurologist - Dr. Destiny Florez    Ulcerative colitis     Patient reported treated by Dr. Lee and Aaron in past  but on admit in 2018 - no UC seen on recent colonoscopy.  It was noted on colonoscopy in 2004 there were ulcers noted to descending colon but not present on sigmoidoscopy in 2004.    Vitamin D deficiency 3/31/2016       Past Surgical History:   Procedure Laterality Date     SECTION      COLONOSCOPY  10/08/2010    Dr. Lee - repeat in 5 years - has appointment for colonoscopy 2016    COLONOSCOPY  2016    Dr. Kelly - normal  colon - repeat in 10 years - scanned report to media file.    HYSTERECTOMY      OOPHORECTOMY      SHOULDER SURGERY Left     TONSILLECTOMY      upper and lower GI  2016       Family History   Problem Relation Age of Onset    Hypertension Mother     Heart disease Mother     Hyperlipidemia Mother     Cancer Sister 53        thyroid cancer and lymph nodes involvement    Cancer Brother 61        colon and lung cancer    Cancer Brother 63        colon    Hypertension Brother     Diabetes Brother        Social History     Social History    Marital status:      Spouse name: N/A    Number of children: N/A    Years of education: N/A     Social History Main Topics    Smoking status: Former Smoker     Packs/day: 1.00     Years: 20.00     Quit date: 1997    Smokeless tobacco: Former User    Alcohol use No    Drug use: No    Sexual activity: No     Other Topics Concern    None     Social History Narrative    None  "      Review of Systems   Constitutional: Negative for appetite change, chills, fatigue, fever and unexpected weight change.   HENT: Negative for congestion, ear pain, mouth sores, nosebleeds, postnasal drip, rhinorrhea, sinus pressure, sneezing, sore throat, trouble swallowing and voice change.    Eyes: Negative for photophobia, pain, discharge, redness, itching and visual disturbance.   Respiratory: Negative for cough, chest tightness and shortness of breath.    Cardiovascular: Negative for chest pain, palpitations and leg swelling.   Gastrointestinal: Positive for abdominal pain and diarrhea. Negative for blood in stool, constipation, nausea and vomiting.   Genitourinary: Negative for dysuria, frequency, hematuria and urgency.   Musculoskeletal: Positive for arthralgias and myalgias. Negative for back pain and joint swelling.   Skin: Negative for color change and rash.   Allergic/Immunologic: Negative for immunocompromised state.   Neurological: Negative for dizziness, seizures, syncope, weakness and headaches.   Hematological: Negative for adenopathy. Does not bruise/bleed easily.   Psychiatric/Behavioral: Positive for decreased concentration. Negative for agitation, dysphoric mood, sleep disturbance and suicidal ideas. The patient is not nervous/anxious.          Objective:     Vitals:    07/06/18 1003   BP: 114/78   BP Location: Right arm   Patient Position: Sitting   BP Method: Medium (Manual)   Pulse: 84   Temp: 98.7 °F (37.1 °C)   TempSrc: Oral   SpO2: 98%   Weight: 75.9 kg (167 lb 5.3 oz)   Height: 5' 6" (1.676 m)          Physical Exam   Constitutional: She is oriented to person, place, and time. She appears well-developed and well-nourished. No distress.   Body mass index is 27.01 kg/m².     HENT:   Head: Normocephalic and atraumatic.   Right Ear: External ear normal.   Left Ear: External ear normal.   Nose: Nose normal.   Mouth/Throat: Oropharynx is clear and moist. No oropharyngeal exudate.   Eyes: " Conjunctivae and EOM are normal. Pupils are equal, round, and reactive to light.   Neck: Normal range of motion. Neck supple. No JVD present. No tracheal deviation present. No thyromegaly present.   Cardiovascular: Normal rate, regular rhythm and normal heart sounds.    No murmur heard.  Pulmonary/Chest: Effort normal and breath sounds normal. No stridor. No respiratory distress. She has no wheezes. She has no rales.   Abdominal: Soft. Bowel sounds are normal. She exhibits no distension and no mass. There is no rebound and no guarding.   Musculoskeletal: Normal range of motion. She exhibits no edema.   Lymphadenopathy:     She has no cervical adenopathy.   Neurological: She is alert and oriented to person, place, and time. No cranial nerve deficit. Coordination normal.   Skin: Skin is warm and dry. No rash noted. She is not diaphoretic.   Psychiatric: Her behavior is normal. Her mood appears anxious.         Assessment:         ICD-10-CM ICD-9-CM   1. Annual physical exam Z00.00 V70.0   2. History of pancreatitis Z87.19 V12.79   3. Chronic diarrhea K52.9 787.91   4. Essential hypertension I10 401.9   5. Hyperlipidemia, unspecified hyperlipidemia type E78.5 272.4   6. Fibromyalgia M79.7 729.1   7. Acquired hypothyroidism E03.9 244.9   8. ADHD (attention deficit hyperactivity disorder), inattentive type F90.0 314.00   9. Anxiety F41.9 300.00   10. Breast cancer screening Z12.31 V76.10   11. Postmenopausal Z78.0 V49.81   12. Need for Streptococcus pneumoniae vaccination Z23 V03.82   13. Vitamin D deficiency E55.9 268.9       Plan:       Annual physical exam  -  Mammogram and DEXA scheduled  Health Maintenance Summary     DEXA SCAN Overdue 1/25/1993    Influenza Vaccine Next Due 8/1/2018     Declined 12/26/2017     Done 10/16/2015 Imm Admin: Influenza    Declined 10/8/2015 getting next week at work    Done 9/24/2014 Imm Admin: Influenza   Mammogram Next Due 6/28/2019     Done 6/28/2017 MAMMO DIGITAL SCREENING BILAT WITH  CAD    Done 8/31/2015 MAMMO PREVIOUS    Done 8/31/2015 MAMMO PREVIOUS    Done 8/31/2015 MAMMO PREVIOUS    Done 8/31/2015 MAMMO DIGITAL SCREENING BILAT WITH TOMOSYNTHESIS_CAD   Pneumococcal (65+) Next Due 7/6/2019     Done 7/6/2018 Imm Admin: Pneumococcal Conjugate - 13 Valent   Lipid Panel Next Due 6/30/2023     Done 6/30/2018 LIPID PANEL     Done 11/21/2017 LIPID PANEL     Done 6/23/2017 LIPID PANEL     Done 5/12/2017 LIPID PANEL     Done 3/30/2016 LIPID PANEL    Colonoscopy Next Due 4/6/2026     Done 4/6/2016     Done 9/28/2015 followed by GI due to history of ulcerative colitis   TETANUS VACCINE Next Due 5/15/2027     Done 5/15/2017 Imm Admin: Tdap   Hepatitis C Screening Completed     Done 3/30/2016 HEPATITIS C ANTIBODY   Zoster Vaccine Completed     Done 5/15/2017 Imm Admin: Zoster         History of pancreatitis  -  Being followed by [facility]    Chronic diarrhea  -  Being followed by [facility]    Essential hypertension  -  Continue the Valsartan, Amlodipine, Metoprolol and Lasix with potassium supplement daily.  Recheck in 3 months.  -     valsartan (DIOVAN) 80 MG tablet; Take 1 tablet (80 mg total) by mouth once daily.  Dispense: 90 tablet; Refill: 0  -     amLODIPine (NORVASC) 10 MG tablet; Take 1 tablet (10 mg total) by mouth once daily.  Dispense: 90 tablet; Refill: 0  -     Comprehensive metabolic panel; Future; Expected date: 10/01/2018    Hyperlipidemia, unspecified hyperlipidemia type  -  Increase the Crestor from 10 to 20 mg daily and recheck in 3 months.  -     rosuvastatin (CRESTOR) 20 MG tablet; Take 1 tablet (20 mg total) by mouth once daily.  Dispense: 90 tablet; Refill: 0  -     Lipid panel; Future; Expected date: 10/01/2018    Fibromyalgia  -  Has appt with Rheumatology    Acquired hypothyroidism  -  Thyroid levels normal - followed by Dr. Mathew    ADHD (attention deficit hyperactivity disorder), inattentive type  -  Medication is on HOLD until BP and Cholesterol levels are well  controlled.  Patient is aware that medication is NOT recommended in patients > 65.    Anxiety  -  Continue the Citalopram 20 mg daily.  -     citalopram (CELEXA) 20 MG tablet; Take 1 tablet (20 mg total) by mouth once daily.  Dispense: 90 tablet; Refill: 1    Breast cancer screening  -     Mammo Digital Screening Bilateral With CAD; Future; Expected date: 07/06/2018    Postmenopausal  -     DXA Bone Density Spine And Hip; Future; Expected date: 07/06/2018    Need for Streptococcus pneumoniae vaccination  -     (In Office Administered) Pneumococcal Conjugate Vaccine (13 Valent) (IM)    Vitamin D deficiency  -  Get back on and stay on Vitamin D supplement.    Follow-up in about 3 months (around 10/6/2018) for fasting labs and follow up visit.     Patient's Medications   New Prescriptions    No medications on file   Previous Medications    ALPRAZOLAM (XANAX) 0.5 MG TABLET    Take 0.5 mg by mouth every 8 (eight) hours as needed.    CHOLECALCIFEROL, VITAMIN D3, (VITAMIN D3) 5,000 UNIT TAB    Take 5,000 Units by mouth once daily.    CYANOCOBALAMIN, VITAMIN B-12, (VITAMIN B-12 INJ)    Inject 1,000 mcg as directed every 30 days.    DEXTROAMPHETAMINE-AMPHETAMINE 10 MG TAB    Take 10 mg by mouth 2 (two) times daily.    DICLOFENAC SODIUM 1 % GEL    Apply 2 g topically 4 (four) times daily.    ESTRADIOL (ESTRACE) 2 MG TABLET    Take 2 mg by mouth once daily.    FUROSEMIDE (LASIX) 40 MG TABLET    TAKE 1 TABLET (40 MG TOTAL) BY MOUTH ONCE DAILY.    KLOR-CON SPRINKLE 10 MEQ CPSR    TAKE 1 CAPSULE TWICE A DAY    METOPROLOL SUCCINATE (TOPROL-XL) 50 MG 24 HR TABLET    TAKE 1 TABLET BY MOUTH EVERY DAY    METRONIDAZOLE (FLAGYL) 500 MG TABLET    Take 500 mg by mouth 3 (three) times daily.    PROGESTERONE (PROMETRIUM) 100 MG CAPSULE    Take 100 mg by mouth once daily.    THYROID, PORK, (ARMOUR THYROID) 60 MG TAB    [place] Thyroid 60 mg tablet    TIZANIDINE 4 MG CAP    Take by mouth.   Modified Medications    Modified Medication Previous  Medication    AMLODIPINE (NORVASC) 10 MG TABLET amLODIPine (NORVASC) 10 MG tablet       Take 1 tablet (10 mg total) by mouth once daily.    TAKE 1 TABLET BY MOUTH ONCE DAILY    CITALOPRAM (CELEXA) 20 MG TABLET citalopram (CELEXA) 20 MG tablet       Take 1 tablet (20 mg total) by mouth once daily.    Take 20 mg by mouth once daily.    ROSUVASTATIN (CRESTOR) 20 MG TABLET rosuvastatin (CRESTOR) 10 MG tablet       Take 1 tablet (20 mg total) by mouth once daily.    Take 1 tablet (10 mg total) by mouth once daily.    VALSARTAN (DIOVAN) 80 MG TABLET valsartan (DIOVAN) 80 MG tablet       Take 1 tablet (80 mg total) by mouth once daily.    Take 1 tablet (80 mg total) by mouth once daily.   Discontinued Medications    CIPROFLOXACIN HCL (CIPRO) 500 MG TABLET    Take 500 mg by mouth 2 (two) times daily.    HYOSCYAMINE (LEVSIN/SL) 0.125 MG SUBL    Place 0.125 mg under the tongue every 4 (four) hours as needed.    PROMETHAZINE (PHENERGAN) 12.5 MG TAB    Take 12.5 mg by mouth 3 (three) times daily as needed.

## 2018-07-09 ENCOUNTER — HOSPITAL ENCOUNTER (OUTPATIENT)
Dept: RADIOLOGY | Facility: HOSPITAL | Age: 63
Discharge: HOME | End: 2018-07-09
Attending: INTERNAL MEDICINE
Payer: MEDICARE

## 2018-07-09 ENCOUNTER — OFFICE VISIT (OUTPATIENT)
Dept: GASTROENTEROLOGY | Facility: CLINIC | Age: 63
End: 2018-07-09
Payer: MEDICARE

## 2018-07-09 ENCOUNTER — OFFICE VISIT (OUTPATIENT)
Dept: RHEUMATOLOGY | Facility: CLINIC | Age: 63
End: 2018-07-09
Payer: MEDICARE

## 2018-07-09 VITALS
WEIGHT: 169 LBS | HEART RATE: 68 BPM | DIASTOLIC BLOOD PRESSURE: 55 MMHG | BODY MASS INDEX: 27.16 KG/M2 | HEIGHT: 66 IN | SYSTOLIC BLOOD PRESSURE: 80 MMHG

## 2018-07-09 VITALS
HEIGHT: 66 IN | SYSTOLIC BLOOD PRESSURE: 128 MMHG | WEIGHT: 168.88 LBS | HEART RATE: 80 BPM | BODY MASS INDEX: 27.14 KG/M2 | DIASTOLIC BLOOD PRESSURE: 79 MMHG

## 2018-07-09 DIAGNOSIS — M25.50 POLYARTHRALGIA: Primary | ICD-10-CM

## 2018-07-09 DIAGNOSIS — M25.50 POLYARTHRALGIA: ICD-10-CM

## 2018-07-09 DIAGNOSIS — F41.9 ANXIETY: ICD-10-CM

## 2018-07-09 DIAGNOSIS — K58.0 IRRITABLE BOWEL SYNDROME WITH DIARRHEA: Primary | ICD-10-CM

## 2018-07-09 DIAGNOSIS — G43.909 MIGRAINE WITHOUT STATUS MIGRAINOSUS, NOT INTRACTABLE, UNSPECIFIED MIGRAINE TYPE: ICD-10-CM

## 2018-07-09 DIAGNOSIS — M79.7 FIBROMYALGIA: ICD-10-CM

## 2018-07-09 PROCEDURE — 73562 X-RAY EXAM OF KNEE 3: CPT | Mod: 26,50,, | Performed by: RADIOLOGY

## 2018-07-09 PROCEDURE — 72100 X-RAY EXAM L-S SPINE 2/3 VWS: CPT | Mod: 26,,, | Performed by: RADIOLOGY

## 2018-07-09 PROCEDURE — 72100 X-RAY EXAM L-S SPINE 2/3 VWS: CPT | Mod: TC

## 2018-07-09 PROCEDURE — 3074F SYST BP LT 130 MM HG: CPT | Mod: CPTII,S$GLB,, | Performed by: INTERNAL MEDICINE

## 2018-07-09 PROCEDURE — 99999 PR PBB SHADOW E&M-EST. PATIENT-LVL IV: CPT | Mod: PBBFAC,,, | Performed by: INTERNAL MEDICINE

## 2018-07-09 PROCEDURE — 72040 X-RAY EXAM NECK SPINE 2-3 VW: CPT | Mod: 26,,, | Performed by: RADIOLOGY

## 2018-07-09 PROCEDURE — 3078F DIAST BP <80 MM HG: CPT | Mod: CPTII,S$GLB,, | Performed by: INTERNAL MEDICINE

## 2018-07-09 PROCEDURE — 99204 OFFICE O/P NEW MOD 45 MIN: CPT | Mod: S$GLB,,, | Performed by: INTERNAL MEDICINE

## 2018-07-09 PROCEDURE — 73562 X-RAY EXAM OF KNEE 3: CPT | Mod: 50,TC

## 2018-07-09 PROCEDURE — 73130 X-RAY EXAM OF HAND: CPT | Mod: 26,50,, | Performed by: RADIOLOGY

## 2018-07-09 PROCEDURE — 99999 PR PBB SHADOW E&M-EST. PATIENT-LVL III: CPT | Mod: PBBFAC,,, | Performed by: INTERNAL MEDICINE

## 2018-07-09 PROCEDURE — 72040 X-RAY EXAM NECK SPINE 2-3 VW: CPT | Mod: TC

## 2018-07-09 PROCEDURE — 3008F BODY MASS INDEX DOCD: CPT | Mod: CPTII,S$GLB,, | Performed by: INTERNAL MEDICINE

## 2018-07-09 PROCEDURE — 73130 X-RAY EXAM OF HAND: CPT | Mod: 50,TC

## 2018-07-09 PROCEDURE — 99215 OFFICE O/P EST HI 40 MIN: CPT | Mod: S$GLB,,, | Performed by: INTERNAL MEDICINE

## 2018-07-09 RX ORDER — ALPRAZOLAM 0.5 MG/1
0.5 TABLET ORAL 3 TIMES DAILY
Qty: 90 TABLET | Refills: 3 | Status: SHIPPED | OUTPATIENT
Start: 2018-07-09 | End: 2018-08-08

## 2018-07-09 RX ORDER — ERGOCALCIFEROL 1.25 MG/1
50000 CAPSULE ORAL
Qty: 12 CAPSULE | Refills: 0 | Status: SHIPPED | OUTPATIENT
Start: 2018-07-09 | End: 2018-10-02 | Stop reason: SDUPTHER

## 2018-07-09 ASSESSMENT — ROUTINE ASSESSMENT OF PATIENT INDEX DATA (RAPID3)
PATIENT GLOBAL ASSESSMENT SCORE: 10
PAIN SCORE: 10
TOTAL RAPID3 SCORE: 8.89
AM STIFFNESS SCORE: 1, YES
MDHAQ FUNCTION SCORE: 2
FATIGUE SCORE: 10

## 2018-07-09 NOTE — LETTER
July 9, 2018      [place]  [address] [facility]  [address]  [facility] [place] [address]           [facility] Rhe[facility]  [address]  [place] [address]  Phone: [phone]  Fax: [phone]          Patient: Madalyn Iverson   MR Number: [id number]   YOB: 1955   Date of Visit: 7/9/2018       Dear Ruth Jaime:    Thank you for referring Madalyn Iverson to me for evaluation. Attached you will find relevant portions of my assessment and plan of care.    If you have questions, please do not hesitate to call me. I look forward to following Madalyn Iverson along with you.    Sincerely,    Michael Yates MD    Enclosure  CC:  No Recipients    If you would like to receive this communication electronically, please contact [email or web address] or [phone] to request more information on [employer] access.    For providers and/or their staff who would like to refer a patient to [facility], please contact us through our one-stop-shop provider referral line, [facility], at [phone].    If you feel you have received this communication in error or would no longer like to receive these types of communications, please e-mail [email or web address]

## 2018-07-09 NOTE — PROGRESS NOTES
Subjective:       Patient ID: Madalyn Iverson is a 63 y.o. female.    Chief Complaint: Crohn's Disease    HPI  Review of Systems   Constitutional: Positive for activity change and fatigue. Negative for appetite change, chills, diaphoresis, fever and unexpected weight change.   HENT: Negative for congestion, ear pain, mouth sores, nosebleeds, postnasal drip, rhinorrhea, sinus pressure, sore throat, trouble swallowing and voice change.    Eyes: Negative for pain.   Respiratory: Negative for cough, shortness of breath and wheezing.    Cardiovascular: Negative for chest pain, palpitations and leg swelling.   Genitourinary: Negative for difficulty urinating, dysuria, flank pain, hematuria and menstrual problem.   Musculoskeletal: Positive for arthralgias, joint swelling and myalgias. Negative for back pain, gait problem and neck pain.   Skin: Negative for rash.   Neurological: Positive for dizziness. Negative for tremors, syncope, numbness and headaches.   Hematological: Negative for adenopathy. Does not bruise/bleed easily.   Psychiatric/Behavioral: Negative for agitation, behavioral problems, confusion, decreased concentration and dysphoric mood. The patient is not nervous/anxious.        Objective:      Physical Exam   Constitutional: She is oriented to person, place, and time. She appears well-developed and well-nourished. No distress.   HENT:   Head: Normocephalic and atraumatic.   Right Ear: External ear normal.   Left Ear: External ear normal.   Nose: Nose normal.   Mouth/Throat: Oropharynx is clear and moist. No oropharyngeal exudate.   Eyes: Conjunctivae are normal. Pupils are equal, round, and reactive to light. No scleral icterus.   Neck: Normal range of motion. Neck supple. No thyromegaly present.   Cardiovascular: Normal rate, regular rhythm, normal heart sounds and intact distal pulses.  Exam reveals no gallop.    No murmur heard.  Pulmonary/Chest: Effort normal and breath sounds normal. She has no  "wheezes. She has no rales.   Abdominal: Soft. Normal appearance and bowel sounds are normal. She exhibits no shifting dullness, no distension, no fluid wave, no abdominal bruit and no mass. There is no hepatosplenomegaly. There is generalized tenderness.   Musculoskeletal: Normal range of motion. She exhibits no edema or tenderness.   Lymphadenopathy:     She has no cervical adenopathy.   Neurological: She is alert and oriented to person, place, and time. No cranial nerve deficit.   Skin: Skin is warm and dry. No rash noted.   Psychiatric: She has a normal mood and affect. Her behavior is normal. Judgment and thought content normal.       Assessment:       1. Irritable bowel syndrome with diarrhea        Plan:         Ms. Boyce is here today with her .  She is booked as a new patient,   but in actuality this is a fifth opinion established patient.  She has seen over   the years and has been followed by Dr. Lee and Dr. Kelly at .  She saw   Dr. Plascencia two years ago and recently should she saw Dr. Doug Smith at the   [facility].  She is taking this fifth opinion because of her "Crohn's disease."    I had a chance to review a large volume of outside medical records.  She says   "every time I eat, I have a bowel movement."  She has up to five bowel movements   a day.  She has a lot of urgency.  There is rarely bright red blood.  This has   been going on for about 14 years.  It is variable in its intensity.  The stool   is either loose or soft.  It is never totally hard and firm.  She does have   abdominal pain.  There have been different abdominal pains she has had over the   past 14 years.  For instance, recently, she had more epigastric pain that   radiated to the back and that was since she was admitted for pancreatitis at   [facility] (I do not have all those records), but more typically over the   last 14 years she has had a generalized diffuse abdominal discomfort or cramps.    She has " incomplete evacuation.  The pain can be relieved by having a bowel   movement.  Symptoms are worse with stress.  She is unable to identify any   certain foods that cause this.    PAST WORKUP:  , EGD at [facility] was normal.  , outpatient visit with Dr. Plascencia.  She was going to obtain records.    No followup.  , colonoscopy by Dr. Lee with biopsies was normal.  , colonoscopy normal.  , flex sig showed some ulcers in the descending colon and felt to be   consistent with ischemic colitis.    PAST MEDICATION TRIALS:  She has used Imodium.  I do not see she has ever used   antispasm medicines.    Apparently, she was recently admitted with pancreatitis.  She does not have   gallstones.  She is pending an endoscopic ultrasound.    PAST MEDICAL HISTORY:  Significant for palpitations, hypothyroidism and   fibromyalgia.  The fibromyalgia causes a lot of complaints.    FAMILY HISTORY:  Significant for colon cancer in a brother.    ASSESSMENT AND DECISION MAKIN.  IBS.  This is IBS with diarrhea.  She has been told IBD in the past.  I   think it is unlikely this is IBD.  It sounds like she did have one episode of   ischemic colitis in  with documented healing of the mucosa.  I think this is   probably just a postinflammatory IBS that is combined with fibromyalgia and is   just severe in general.  She did have the episode of pancreatitis.  I do not   know if there may have been others undiagnosed.  So, there is possibility of   some pancreatic insufficiency, which possibly consider SIBO as a factor in   making her IBS worse and I need to review the last date of her colonoscopy.  Her   last colonoscopy was in  that is too long for someone who has had a family   history.  On the other hand, it looks like there may have been a colonoscopy in   2016 and if that is the case, then that is adequate for now.    RECOMMENDATION:  1.  Proceed with EUS as scheduled for next week.  2.  We can  probably use Bentyl, Levsin and possibly even Creon to help with her   symptoms.  3.  We might even be able to use Viberzi since she does have a gallbladder in   place.  4.  I will check stool for fat stain now.  5.  I will check TTG and immunoglobulins now.  6.  Consider SIBO workup in the future.  7.  Confirm if that the last colonoscopy was in 2016, that if so she does not   need a colonoscopy.  If not, we would need to repeat colonoscopy with random   biopsies, although that has been done in the past.      SOPHIA  dd: 07/09/2018 08:34:32 (CDT)  td: 07/09/2018 09:10:32 (CDT)  Doc ID   #[id number]  Job ID #[id number]    CC:

## 2018-07-09 NOTE — LETTER
July 9, 2018      Jose Lee MD  [address] [facility]  [address]  [facility]  [place] [address]           [facility]  [address]  [place] [address]  Phone: [phone]  Fax: [phone]          Patient: Madalyn Iverson   MR Number: [id number]   YOB: 1955   Date of Visit: 7/9/2018       Dear Dr. Jose Lee:    Thank you for referring Madalyn Iverson to me for evaluation. Attached you will find relevant portions of my assessment and plan of care.    If you have questions, please do not hesitate to call me. I look forward to following Madalyn Iverson along with you.    Sincerely,    Cesar Quan MD    Enclosure  CC:  No Recipients    If you would like to receive this communication electronically, please contact [email or web address] or [phone] to request more information on Constant Contact Link access.    For providers and/or their staff who would like to refer a patient to [facility], please contact us through our one-stop-shop provider referral line, [facility], at [phone].    If you feel you have received this communication in error or would no longer like to receive these types of communications, please e-mail [email or web address]

## 2018-07-09 NOTE — PROGRESS NOTES
Chief Complaint   Patient presents with    Disease Management       Patient was referred by Yeni    History of presenting illness    63 year old white female has fibromyalgia : for 2 years now  She used to see :   : rheumatology : has not see him   Tried :  lyrica : light headed,dizziness    Takes cymbalta for anxiety/depression  Off and on  Doesn't think it helps  Takes xanax to sleep  Started celexa 20 mg   Takes tizanidine 4 mg x 2 daily   Used to take trazodone,50 mg,didnt help    Neck hurts  Low back hurts  Knees hurt,they crack  Hands hurt  Cant   Fingers swell  Mornings harder  They are frozen  MCPs hurt and swell  Ankes and feet hurt and swell  She had xrays : bulging discs low back,L4-5  Hand xrays : OA   C spine : OA     Headaches +  Takes 4 BP meds : metoprolol,lasix,diovan,norvasc   Migraines not on treatment  Light headedness+  Sees neurology : CT brains normal    Episodes of nausea and feeling of sickness  Diarrhea+  Abdominal pain +  GI : they think this is irritable bowel syndrome   Only once in 2004 she had ischemic colitis  EUS all planned   Colonoscopy 2 years ago nml  EGD this year nml  There was no Crohn's on the recent w/u    Crestor gives her charley horses     Past history : pancreatitis,IBS  ADHD  Htn,migraine,anxiety,hypothyroidism,FMS,HLD,IBS    Cause of pancreatitis : unclear   Has had high ALPs for a while now     Family history : mom heart problems    Social history : quit smoking 1997      Review of Systems   Constitutional: Negative for activity change, appetite change, chills, diaphoresis, fatigue, fever and unexpected weight change.   HENT: Negative for congestion, dental problem, drooling, ear discharge, ear pain, facial swelling, hearing loss, mouth sores, nosebleeds, postnasal drip, rhinorrhea, sinus pain, sinus pressure, sneezing, sore throat, tinnitus, trouble swallowing and voice change.    Eyes: Negative for photophobia, pain, discharge, redness, itching and  visual disturbance.   Respiratory: Negative for apnea, cough, choking, chest tightness, shortness of breath, wheezing and stridor.    Cardiovascular: Negative for chest pain, palpitations and leg swelling.   Gastrointestinal: Positive for abdominal pain, diarrhea and nausea. Negative for abdominal distention, anal bleeding, blood in stool, constipation, rectal pain and vomiting.   Endocrine: Negative for cold intolerance, heat intolerance, polydipsia, polyphagia and polyuria.   Genitourinary: Negative for decreased urine volume, difficulty urinating, dysuria, enuresis, flank pain, frequency, genital sores, hematuria and urgency.   Musculoskeletal: Positive for arthralgias. Negative for back pain, gait problem, joint swelling, myalgias, neck pain and neck stiffness.   Skin: Negative for color change, pallor, rash and wound.   Allergic/Immunologic: Negative for environmental allergies, food allergies and immunocompromised state.   Neurological: Negative for dizziness, tremors, seizures, syncope, facial asymmetry, speech difficulty, weakness, light-headedness, numbness and headaches.   Hematological: Negative for adenopathy. Does not bruise/bleed easily.   Psychiatric/Behavioral: Negative for agitation, behavioral problems, confusion, decreased concentration, dysphoric mood, hallucinations, self-injury, sleep disturbance and suicidal ideas. The patient is not nervous/anxious and is not hyperactive.      Physical Exam     Tenderness:   Right hand: 2nd PIP, 3rd PIP, 4th PIP and 5th PIP  Left hand: 2nd PIP, 3rd PIP, 4th PIP and 5th PIP    SANTIAGO-28 tender joint count: 8  SANTIAGO-28 swollen joint count: 0    All PIPs have osteophytes    Physical Exam   Constitutional: She is oriented to person, place, and time and well-developed, well-nourished, and in no distress. No distress.   HENT:   Head: Normocephalic.   Mouth/Throat: Oropharynx is clear and moist.   Eyes: Conjunctivae are normal. Pupils are equal, round, and reactive to  light. Right eye exhibits no discharge. Left eye exhibits no discharge. No scleral icterus.   Neck: Normal range of motion. No thyromegaly present.   Cardiovascular: Normal rate, regular rhythm, normal heart sounds and intact distal pulses.    Pulmonary/Chest: Effort normal and breath sounds normal. No stridor.   Abdominal: Soft. Bowel sounds are normal.   Lymphadenopathy:     She has no cervical adenopathy.   Neurological: She is alert and oriented to person, place, and time.   Skin: Skin is warm. No rash noted. She is not diaphoretic.     Psychiatric: Affect and judgment normal.   Musculoskeletal: Normal range of motion.           Assessment     63 year old white female comes with    -polyarthralgias in the hands,knees,low back and neck  -GI symptoms of nausea,diarrhea,abdominal pain  -anxiety > depression  -poor sleep  -headaches and light headedness    She has a diagnosis of fibromyalgia,osteoarthritis of hands,C,LS spine    She also has a diagnosis of IBS,a question if this is Crohn's vs celiac disease   Recent pancreatitis,cause not known  Noted high ALP,but also low vit d,not sure if ALP is from liver or bone    Light headedness but she takes so many BP meds,she has chronic diarrhea,she also has BP of 80/55 today    We need to w/u more and work on her medications    1. Polyarthralgia    2. Fibromyalgia    3. Migraine without status migrainosus, not intractable, unspecified migraine type    4. Anxiety        Reviewed labs/xrays  Reviewed medications    Ordered labs /xrays    New problem     Plan    RA panel  Xray survey    Low vit   Will replace    Anxiety :  Xanax 0.5 mg daily tid  Stop cymbalta and celexa : wean it off   Tizanidine bid : she wants to keep it    BP meds : polypharmacy  Wean off one at a time  Work with PCP,make a log of meds and give her the numbers so that the meds can be addressed    Crestor and Lipitor give ciarra horses  Try changing from crestor to something else  She has family h/o  intolerance to statins    GI w/u     Neurology w/u      Madalyn was seen today for disease management.    Diagnoses and all orders for this visit:    Polyarthralgia  -     Rheumatoid factor; Future  -     Cyclic citrul peptide antibody, IgG; Future  -     Sedimentation rate; Future  -     C-reactive protein; Standing  -     HLA B27 Antigen; Future  -     X-Ray Hand 3 View Bilateral; Future  -     X-Ray Cervical Spine Flexion And Extension Only; Future  -     X-Ray Lumbar Spine AP And Lateral; Future  -     X-Ray Knee 3 View Bilateral; Future    Fibromyalgia    Migraine without status migrainosus, not intractable, unspecified migraine type    Anxiety    Other orders  -     ALPRAZolam (XANAX) 0.5 MG tablet; Take 1 tablet (0.5 mg total) by mouth 3 (three) times daily.  -     ergocalciferol (ERGOCALCIFEROL) 50,000 unit Cap; Take 1 capsule (50,000 Units total) by mouth every 7 days. for 12 doses        rtc in 2 to 3 weeks

## 2018-07-18 ENCOUNTER — SURGERY (OUTPATIENT)
Age: 63
End: 2018-07-18

## 2018-07-18 ENCOUNTER — ANESTHESIA EVENT (OUTPATIENT)
Dept: ENDOSCOPY | Facility: HOSPITAL | Age: 63
End: 2018-07-18
Payer: MEDICARE

## 2018-07-18 ENCOUNTER — ANESTHESIA (OUTPATIENT)
Dept: ENDOSCOPY | Facility: HOSPITAL | Age: 63
End: 2018-07-18
Payer: MEDICARE

## 2018-07-18 ENCOUNTER — HOSPITAL ENCOUNTER (OUTPATIENT)
Facility: HOSPITAL | Age: 63
Discharge: HOME | End: 2018-07-18
Attending: INTERNAL MEDICINE | Admitting: INTERNAL MEDICINE
Payer: MEDICARE

## 2018-07-18 DIAGNOSIS — K86.1 CHRONIC PANCREATITIS: ICD-10-CM

## 2018-07-18 DIAGNOSIS — Z87.19 HISTORY OF PANCREATITIS: Primary | ICD-10-CM

## 2018-07-18 PROCEDURE — 63600175 PHARM REV CODE 636 W HCPCS: Performed by: NURSE ANESTHETIST, CERTIFIED REGISTERED

## 2018-07-18 PROCEDURE — 43259 EGD US EXAM DUODENUM/JEJUNUM: CPT | Mod: ,,, | Performed by: INTERNAL MEDICINE

## 2018-07-18 PROCEDURE — 43259 EGD US EXAM DUODENUM/JEJUNUM: CPT | Performed by: INTERNAL MEDICINE

## 2018-07-18 PROCEDURE — 37000009 HC ANESTHESIA EA ADD 15 MINS: Performed by: INTERNAL MEDICINE

## 2018-07-18 PROCEDURE — 37000008 HC ANESTHESIA 1ST 15 MINUTES: Performed by: INTERNAL MEDICINE

## 2018-07-18 PROCEDURE — 25000003 PHARM REV CODE 250: Performed by: NURSE ANESTHETIST, CERTIFIED REGISTERED

## 2018-07-18 RX ORDER — PROPOFOL 10 MG/ML
VIAL (ML) INTRAVENOUS
Status: DISCONTINUED | OUTPATIENT
Start: 2018-07-18 | End: 2018-07-18

## 2018-07-18 RX ORDER — PROPOFOL 10 MG/ML
VIAL (ML) INTRAVENOUS CONTINUOUS PRN
Status: DISCONTINUED | OUTPATIENT
Start: 2018-07-18 | End: 2018-07-18

## 2018-07-18 RX ORDER — FENTANYL CITRATE 50 UG/ML
INJECTION, SOLUTION INTRAMUSCULAR; INTRAVENOUS
Status: DISCONTINUED | OUTPATIENT
Start: 2018-07-18 | End: 2018-07-18

## 2018-07-18 RX ORDER — LIDOCAINE HCL/PF 100 MG/5ML
SYRINGE (ML) INTRAVENOUS
Status: DISCONTINUED | OUTPATIENT
Start: 2018-07-18 | End: 2018-07-18

## 2018-07-18 RX ORDER — SODIUM CHLORIDE 9 MG/ML
INJECTION, SOLUTION INTRAVENOUS CONTINUOUS PRN
Status: DISCONTINUED | OUTPATIENT
Start: 2018-07-18 | End: 2018-07-18

## 2018-07-18 RX ORDER — SODIUM CHLORIDE 0.9 % (FLUSH) 0.9 %
3 SYRINGE (ML) INJECTION
Status: DISCONTINUED | OUTPATIENT
Start: 2018-07-18 | End: 2018-07-18 | Stop reason: HOSPADM

## 2018-07-18 RX ADMIN — FENTANYL CITRATE 50 MCG: 50 INJECTION, SOLUTION INTRAMUSCULAR; INTRAVENOUS at 08:07

## 2018-07-18 RX ADMIN — PROPOFOL 150 MCG/KG/MIN: 10 INJECTION, EMULSION INTRAVENOUS at 08:07

## 2018-07-18 RX ADMIN — LIDOCAINE HYDROCHLORIDE 60 MG: 20 INJECTION, SOLUTION INTRAVENOUS at 08:07

## 2018-07-18 RX ADMIN — SODIUM CHLORIDE: 0.9 INJECTION, SOLUTION INTRAVENOUS at 08:07

## 2018-07-18 RX ADMIN — PROPOFOL 50 MG: 10 INJECTION, EMULSION INTRAVENOUS at 08:07

## 2018-07-18 NOTE — [employer] PATIENT INSTRUCTIONS
Discharge Summary/Instructions after an Endoscopic Procedure  Patient Name: Madalyn Iverson  Patient MRN: [id number]  Patient YOB: 1955 Wednesday, July 18, 2018  Reji Russell MD  RESTRICTIONS:  During your procedure today, you received medications for sedation.  These   medications may affect your judgment, balance and coordination.  Therefore,   for 24 hours, you have the following restrictions:   - DO NOT drive a car, operate machinery, make legal/financial decisions,   sign important papers or drink alcohol.    ACTIVITY:  Today: no heavy lifting, straining or running due to procedural   sedation/anesthesia.  The following day: return to full activity including work.  DIET:  Eat and drink normally unless instructed otherwise.     TREATMENT FOR COMMON SIDE EFFECTS:  - Mild abdominal pain, nausea, belching, bloating or excessive gas:  rest,   eat lightly and use a heating pad.  - Sore Throat: treat with throat lozenges and/or gargle with warm salt   water.  - Because air was used during the procedure, expelling large amounts of air   from your rectum or belching is normal.  - If a bowel prep was taken, you may not have a bowel movement for 1-3 days.    This is normal.  SYMPTOMS TO WATCH FOR AND REPORT TO YOUR PHYSICIAN:  1. Abdominal pain or bloating, other than gas cramps.  2. Chest pain.  3. Back pain.  4. Signs of infection such as: chills or fever occurring within 24 hours   after the procedure.  5. Rectal bleeding, which would show as bright red, maroon, or black stools.   (A tablespoon of blood from the rectum is not serious, especially if   hemorrhoids are present.)  6. Vomiting.  7. Weakness or dizziness.  GO DIRECTLY TO THE NEAREST EMERGENCY ROOM IF YOU HAVE ANY OF THE FOLLOWING:      Difficulty breathing              Chills and/or fever over 101 F   Persistent vomiting and/or vomiting blood   Severe abdominal pain   Severe chest pain   Black, tarry stools   Bleeding- more than one  tablespoon   Any other symptom or condition that you feel may need urgent attention  Your doctor recommends these additional instructions:  If any biopsies were taken, your doctors clinic will contact you in 1 to 2   weeks with any results.  - Discharge patient to home (ambulatory).   - Return to referring physician.  For questions, problems or results please call your physician - Reji Russell MD at Work:  [phone].  EMERGENCY PHONE NUMBER: [phone],  LAB RESULTS: [phone]  IF A COMPLICATION OR EMERGENCY SITUATION ARISES AND YOU ARE UNABLE TO REACH   YOUR PHYSICIAN - GO DIRECTLY TO THE EMERGENCY ROOM.  Reji Russell MD  7/18/2018 8:32:03 AM  This report has been verified and signed electronically.  [employer]

## 2018-07-18 NOTE — ANESTHESIA POSTPROCEDURE EVALUATION
"Anesthesia Post Evaluation    Patient: Madalyn Iverson    Procedure(s) Performed: Procedure(s) (LRB):  ULTRASOUND, ENDOSCOPIC, UPPER GI TRACT (N/A)    Final Anesthesia Type: MAC  Patient location during evaluation: GI PACU  Patient participation: Yes- Able to Participate  Level of consciousness: awake and alert and oriented  Post-procedure vital signs: reviewed and stable  Pain management: adequate  Airway patency: patent  PONV status at discharge: No PONV  Anesthetic complications: no      Cardiovascular status: blood pressure returned to baseline and hemodynamically stable  Respiratory status: unassisted, spontaneous ventilation and room air  Hydration status: euvolemic  Follow-up not needed.        Visit Vitals  BP (!) 140/73   Pulse 79   Temp 37.1 °C (98.8 °F) (Temporal)   Resp 20   Ht 5' 6" (1.676 m)   Wt 74.8 kg (165 lb)   SpO2 99%   Breastfeeding? No   BMI 26.63 kg/m²       Pain/Carie Score: Presence of Pain: denies (7/18/2018  7:45 AM)  Carie Score: 10 (7/18/2018  8:30 AM)      "

## 2018-07-18 NOTE — TRANSFER OF CARE
"Anesthesia Transfer of Care Note    Patient: Mdaalyn Iverson    Procedure(s) Performed: Procedure(s) (LRB):  ULTRASOUND, ENDOSCOPIC, UPPER GI TRACT (N/A)    Patient location: GI    Anesthesia Type: MAC    Transport from OR: Transported from OR on room air with adequate spontaneous ventilation    Post pain: adequate analgesia    Post assessment: no apparent anesthetic complications and tolerated procedure well    Post vital signs: stable    Level of consciousness: awake, alert and oriented    Nausea/Vomiting: no nausea/vomiting    Complications: none    Transfer of care protocol was followed      Last vitals:   Visit Vitals  BP (!) 140/73   Pulse 79   Temp 37.1 °C (98.8 °F) (Temporal)   Resp 20   Ht 5' 6" (1.676 m)   Wt 74.8 kg (165 lb)   SpO2 99%   Breastfeeding? No   BMI 26.63 kg/m²     "

## 2018-07-18 NOTE — DISCHARGE SUMMARY
Discharge Summary/Instructions after an Endoscopic Procedure    Patient Name: Madalyn Iverson  Patient MRN: [id number]  Patient YOB: 1955 Wednesday, July 18, 2018  Reji Russell MD    RESTRICTIONS:  During your procedure today, you received medications for sedation.  These medications may affect your judgment, balance and coordination.  Therefore, for 24 hours, you have the following restrictions:     - DO NOT drive a car, operate machinery, make legal/financial decisions, sign important papers or drink alcohol.      ACTIVITY:  Today: no heavy lifting, straining or running due to procedural sedation/anesthesia.  The following day: return to full activity including work.    DIET:  Eat and drink normally unless instructed otherwise.     TREATMENT FOR COMMON SIDE EFFECTS:  - Mild abdominal pain, nausea, belching, bloating or excessive gas:  rest, eat lightly and use a heating pad.  - Sore Throat: treat with throat lozenges and/or gargle with warm salt water.  - Because air was used during the procedure, expelling large amounts of air from your rectum or belching is normal.  - If a bowel prep was taken, you may not have a bowel movement for 1-3 days.  This is normal.      SYMPTOMS TO WATCH FOR AND REPORT TO YOUR PHYSICIAN:  1. Abdominal pain or bloating, other than gas cramps.  2. Chest pain.  3. Back pain.  4. Signs of infection such as: chills or fever occurring within 24 hours after the procedure.  5. Rectal bleeding, which would show as bright red, maroon, or black stools. (A tablespoon of blood from the rectum is not serious, especially if hemorrhoids are present.)  6. Vomiting.  7. Weakness or dizziness.      GO DIRECTLY TO THE NEAREST EMERGENCY ROOM IF YOU HAVE ANY OF THE FOLLOWING:     Difficulty breathing              Chills and/or fever over 101 F   Persistent vomiting and/or vomiting blood   Severe abdominal pain   Severe chest pain   Black, tarry stools   Bleeding- more than one tablespoon   Any  other symptom or condition that you feel may need urgent attention    Your doctor recommends these additional instructions:  If any biopsies were taken, your doctors clinic will contact you in 1 to 2 weeks with any results.    - Discharge patient to home (ambulatory).   - Return to referring physician.    For questions, problems or results please call your physician - Reji Russell MD at Work:  [phone].    EMERGENCY PHONE NUMBER: [phone],  LAB RESULTS: [phone]    IF A COMPLICATION OR EMERGENCY SITUATION ARISES AND YOU ARE UNABLE TO REACH YOUR PHYSICIAN - GO DIRECTLY TO THE EMERGENCY ROOM.

## 2018-07-19 VITALS
TEMPERATURE: 99 F | WEIGHT: 165 LBS | DIASTOLIC BLOOD PRESSURE: 62 MMHG | SYSTOLIC BLOOD PRESSURE: 119 MMHG | RESPIRATION RATE: 17 BRPM | HEART RATE: 69 BPM | BODY MASS INDEX: 26.52 KG/M2 | HEIGHT: 66 IN | OXYGEN SATURATION: 98 %

## 2018-07-26 ENCOUNTER — OFFICE VISIT (OUTPATIENT)
Dept: RHEUMATOLOGY | Facility: CLINIC | Age: 63
End: 2018-07-26
Payer: MEDICARE

## 2018-07-26 ENCOUNTER — TELEPHONE (OUTPATIENT)
Dept: RHEUMATOLOGY | Facility: CLINIC | Age: 63
End: 2018-07-26

## 2018-07-26 VITALS
HEART RATE: 103 BPM | BODY MASS INDEX: 27.32 KG/M2 | SYSTOLIC BLOOD PRESSURE: 160 MMHG | DIASTOLIC BLOOD PRESSURE: 77 MMHG | WEIGHT: 170 LBS | HEIGHT: 66 IN

## 2018-07-26 DIAGNOSIS — M19.042 PRIMARY OSTEOARTHRITIS OF BOTH HANDS: ICD-10-CM

## 2018-07-26 DIAGNOSIS — M19.041 PRIMARY OSTEOARTHRITIS OF BOTH HANDS: ICD-10-CM

## 2018-07-26 DIAGNOSIS — M25.542 ARTHRALGIA OF BOTH HANDS: Primary | ICD-10-CM

## 2018-07-26 DIAGNOSIS — M79.7 FIBROMYALGIA: ICD-10-CM

## 2018-07-26 DIAGNOSIS — M47.892 OTHER OSTEOARTHRITIS OF SPINE, CERVICAL REGION: ICD-10-CM

## 2018-07-26 DIAGNOSIS — R20.2 PARESTHESIA OF BOTH HANDS: ICD-10-CM

## 2018-07-26 DIAGNOSIS — M25.541 ARTHRALGIA OF BOTH HANDS: Primary | ICD-10-CM

## 2018-07-26 DIAGNOSIS — M47.896 OTHER OSTEOARTHRITIS OF SPINE, LUMBAR REGION: ICD-10-CM

## 2018-07-26 PROCEDURE — 99999 PR PBB SHADOW E&M-EST. PATIENT-LVL III: CPT | Mod: PBBFAC,,, | Performed by: INTERNAL MEDICINE

## 2018-07-26 PROCEDURE — 3078F DIAST BP <80 MM HG: CPT | Mod: CPTII,S$GLB,, | Performed by: INTERNAL MEDICINE

## 2018-07-26 PROCEDURE — 99214 OFFICE O/P EST MOD 30 MIN: CPT | Mod: S$GLB,,, | Performed by: INTERNAL MEDICINE

## 2018-07-26 PROCEDURE — 3077F SYST BP >= 140 MM HG: CPT | Mod: CPTII,S$GLB,, | Performed by: INTERNAL MEDICINE

## 2018-07-26 PROCEDURE — 3008F BODY MASS INDEX DOCD: CPT | Mod: CPTII,S$GLB,, | Performed by: INTERNAL MEDICINE

## 2018-07-26 RX ORDER — TRAZODONE HYDROCHLORIDE 50 MG/1
50 TABLET ORAL NIGHTLY
Qty: 30 TABLET | Refills: 3 | Status: SHIPPED | OUTPATIENT
Start: 2018-07-26 | End: 2018-09-19

## 2018-07-26 RX ORDER — TRAMADOL HYDROCHLORIDE 50 MG/1
50 TABLET ORAL EVERY 6 HOURS PRN
Qty: 60 TABLET | Refills: 3 | Status: SHIPPED | OUTPATIENT
Start: 2018-07-26 | End: 2019-03-21 | Stop reason: SDUPTHER

## 2018-07-26 ASSESSMENT — ROUTINE ASSESSMENT OF PATIENT INDEX DATA (RAPID3)
WHEN YOU AWAKENED IN THE MORNING OVER THE LAST WEEK, PLEASE INDICATE THE AMOUNT OF TIME IT TAKES UNTIL YOU ARE AS LIMBER AS YOU WILL BE FOR THE DAY: 2HRS
TOTAL RAPID3 SCORE: 5
MDHAQ FUNCTION SCORE: .6
FATIGUE SCORE: 9
PAIN SCORE: 6.5
AM STIFFNESS SCORE: 1, YES
PATIENT GLOBAL ASSESSMENT SCORE: 6.5

## 2018-07-26 NOTE — TELEPHONE ENCOUNTER
----- Message from Michael Yates MD sent at 7/25/2018  4:53 PM CDT -----  Have called her to see if she can come at 3.40 tomorrow  She didn't   Left her a message to call us back  If she calls and confirms please keep me posted  thanks

## 2018-07-26 NOTE — PROGRESS NOTES
Chief Complaint   Patient presents with    Pain     follow up       Patient with fibromyalgia for a follow up    History of presenting illness    63 year old white female has fibromyalgia : for 2 years now  She used to see :   : rheumatology : has not see him recently   Tried :  lyrica : light headed,dizziness    Takes cymbalta for anxiety/depression  Off and on  Doesn't think it helps  Takes xanax to sleep  Started celexa 20 mg   Takes tizanidine 4 mg x 2 daily   Used to take trazodone,50 mg,didnt help,then took 100 mg and it helped    Current complaints     Neck hurts  Low back hurts  Knees hurt,they crack  Hands hurt  Cant   Fingers swell  Mornings harder  They are frozen  MCPs hurt and swell  Hands are numb and tingly   [place]kes and feet hurt and swell  She had xrays : bulging discs low back,L4-5  Hand xrays : OA   C spine : OA     Headaches +  Takes 4 BP meds : metoprolol,lasix,diovan,norvasc   Migraines not on treatment  Light headedness+  Sees neurology : CT brains normal    Episodes of nausea and feeling of sickness  Diarrhea+  Abdominal pain +  GI : they think this is irritable bowel syndrome   Only once in 2004 she had ischemic colitis  EUS all planned   Colonoscopy 2 years ago nml  EGD this year nml  There was no Crohn's on the recent w/u    Crestor gives her charley horses     Past history : pancreatitis,IBS  ADHD  Htn,migraine,anxiety,hypothyroidism,FMS,HLD,IBS    Cause of pancreatitis : unclear   Has had high ALPs for a while now     Blood work    High ESR,CRP  Negative RF,CCP  HLA B27 negative    Xrays    Hands : deg changes + punctate erosions proximal phalanx of the little finger   Knees : nml  C spine : deg changes  LS spine : deg changes,facet arthritis    Family history : mom heart problems    Social history : quit smoking 1997      Review of Systems   Constitutional: Negative for activity change, appetite change, chills, diaphoresis, fatigue, fever and unexpected weight change.    HENT: Negative for congestion, dental problem, drooling, ear discharge, ear pain, facial swelling, hearing loss, mouth sores, nosebleeds, postnasal drip, rhinorrhea, sinus pain, sinus pressure, sneezing, sore throat, tinnitus, trouble swallowing and voice change.    Eyes: Negative for photophobia, pain, discharge, redness, itching and visual disturbance.   Respiratory: Negative for apnea, cough, choking, chest tightness, shortness of breath, wheezing and stridor.    Cardiovascular: Negative for chest pain, palpitations and leg swelling.   Gastrointestinal: Positive for abdominal pain, diarrhea and nausea. Negative for abdominal distention, anal bleeding, blood in stool, constipation, rectal pain and vomiting.   Endocrine: Negative for cold intolerance, heat intolerance, polydipsia, polyphagia and polyuria.   Genitourinary: Negative for decreased urine volume, difficulty urinating, dysuria, enuresis, flank pain, frequency, genital sores, hematuria and urgency.   Musculoskeletal: Positive for arthralgias. Negative for back pain, gait problem, joint swelling, myalgias, neck pain and neck stiffness.   Skin: Negative for color change, pallor, rash and wound.   Allergic/Immunologic: Negative for environmental allergies, food allergies and immunocompromised state.   Neurological: Negative for dizziness, tremors, seizures, syncope, facial asymmetry, speech difficulty, weakness, light-headedness, numbness and headaches.   Hematological: Negative for adenopathy. Does not bruise/bleed easily.   Psychiatric/Behavioral: Negative for agitation, behavioral problems, confusion, decreased concentration, dysphoric mood, hallucinations, self-injury, sleep disturbance and suicidal ideas. The patient is not nervous/anxious and is not hyperactive.      Physical Exam     SANTIAGO-28 tender joint count: 0  SANTIAGO-28 swollen joint count: 0    All PIPs have osteophytes  Tender C/LS spine    Physical Exam   Constitutional: She is oriented to  person, place, and time and well-developed, well-nourished, and in no distress. No distress.   HENT:   Head: Normocephalic.   Mouth/Throat: Oropharynx is clear and moist.   Eyes: Conjunctivae are normal. Pupils are equal, round, and reactive to light. Right eye exhibits no discharge. Left eye exhibits no discharge. No scleral icterus.   Neck: Normal range of motion. No thyromegaly present.   Cardiovascular: Normal rate, regular rhythm, normal heart sounds and intact distal pulses.    Pulmonary/Chest: Effort normal and breath sounds normal. No stridor.   Abdominal: Soft. Bowel sounds are normal.   Lymphadenopathy:     She has no cervical adenopathy.   Neurological: She is alert and oriented to person, place, and time.   Skin: Skin is warm. No rash noted. She is not diaphoretic.     Psychiatric: Affect and judgment normal.   Musculoskeletal: Normal range of motion.           Assessment     63 year old white female comes with    -polyarthralgias in the hands,knees,low back and neck  -GI symptoms of nausea,diarrhea,abdominal pain  -anxiety > depression  -poor sleep  -headaches and light headedness    She has a diagnosis of fibromyalgia,osteoarthritis of hands,C,LS spine    She also has a diagnosis of IBS,a question if this is Crohn's vs celiac disease   Recent pancreatitis,cause not known  Noted high ALP,but also low vit d,not sure if ALP is from liver or bone    She has high inflammatory markers  She has erosive changes on the xrays    She definitely has osteoarthritis of her C/LS spine  Want to r/o inflammatory arthritis  She has osteoarthritis of her hands  Her hand paresthesias also need evaluation    1. Arthralgia of both hands    2. Paresthesia of both hands    3. Fibromyalgia    4. Other osteoarthritis of spine, cervical region    5. Other osteoarthritis of spine, lumbar region    6. Primary osteoarthritis of both hands        Reviewed labs/xrays  Reviewed medications    f/u    Plan    MRI hands b/l with and  without contrast    Continue vit d replacement    Offered pain management,PT for her C/LS spine issues    Referred to EMG/NCS for hand paresthesias     Tramadol for pain management     Anxiety :Xanax 0.5 mg daily tid  Trazodone to sleep  Stop cymbalta and celexa : wean it off   Tizanidine bid : she wants to keep it    Crestor and Lipitor give charley horses  Try changing from crestor to something else  She has family h/o intolerance to statins    GI w/u     Neurology w/u      Madalyn was seen today for pain.    Diagnoses and all orders for this visit:    Arthralgia of both hands  -     MRI Hand Fingers W WO Contrast Left; Future  -     MRI Hand Fingers W WO Contrast Right; Future  -     EMG W/ ULTRASOUND AND NERVE CONDUCTION TEST 2 Extremities; Future    Paresthesia of both hands  -     EMG W/ ULTRASOUND AND NERVE CONDUCTION TEST 2 Extremities; Future    Fibromyalgia    Other osteoarthritis of spine, cervical region    Other osteoarthritis of spine, lumbar region    Primary osteoarthritis of both hands    Other orders  -     traMADol (ULTRAM) 50 mg tablet; Take 1 tablet (50 mg total) by mouth every 6 (six) hours as needed for Pain.  -     traZODone (DESYREL) 50 MG tablet; Take 1 tablet (50 mg total) by mouth every evening.        Call to discuss MRI results  rtc in 3 months  If she decides to go to PT/pain management,she will call me

## 2018-08-03 ENCOUNTER — HOSPITAL ENCOUNTER (OUTPATIENT)
Dept: RADIOLOGY | Facility: HOSPITAL | Age: 63
Discharge: HOME | End: 2018-08-03
Attending: INTERNAL MEDICINE
Payer: MEDICARE

## 2018-08-03 DIAGNOSIS — M25.542 ARTHRALGIA OF BOTH HANDS: ICD-10-CM

## 2018-08-03 DIAGNOSIS — M25.541 ARTHRALGIA OF BOTH HANDS: ICD-10-CM

## 2018-08-03 PROCEDURE — 73220 MRI UPPR EXTREMITY W/O&W/DYE: CPT | Mod: 26,LT,, | Performed by: RADIOLOGY

## 2018-08-03 PROCEDURE — 73220 MRI UPPR EXTREMITY W/O&W/DYE: CPT | Mod: TC,RT

## 2018-08-03 PROCEDURE — 73220 MRI UPPR EXTREMITY W/O&W/DYE: CPT | Mod: 26,RT,, | Performed by: RADIOLOGY

## 2018-08-03 PROCEDURE — 73220 MRI UPPR EXTREMITY W/O&W/DYE: CPT | Mod: TC,LT

## 2018-08-03 PROCEDURE — A9585 GADOBUTROL INJECTION: HCPCS | Performed by: INTERNAL MEDICINE

## 2018-08-03 PROCEDURE — 25500020 PHARM REV CODE 255: Performed by: INTERNAL MEDICINE

## 2018-08-03 RX ORDER — GADOBUTROL 604.72 MG/ML
7.5 INJECTION INTRAVENOUS
Status: COMPLETED | OUTPATIENT
Start: 2018-08-03 | End: 2018-08-03

## 2018-08-03 RX ADMIN — GADOBUTROL 7.5 ML: 604.72 INJECTION INTRAVENOUS at 03:08

## 2018-08-14 ENCOUNTER — TELEPHONE (OUTPATIENT)
Dept: FAMILY MEDICINE | Facility: CLINIC | Age: 63
End: 2018-08-14

## 2018-08-14 NOTE — TELEPHONE ENCOUNTER
----- Message from Kathi Hawthorne LPN sent at 8/14/2018  2:52 PM CDT -----  Contact: Cindy blackwell/ DARRI[facility] [phone] ext [phone]      ----- Message -----  From: Halley Villela  Sent: 8/14/2018   2:48 PM  To: [facility] Staff    Cindy is calling to get additional patient information. Please advise.

## 2018-08-22 RX ORDER — METOPROLOL SUCCINATE 50 MG/1
TABLET, EXTENDED RELEASE ORAL
Qty: 90 TABLET | Refills: 0 | Status: SHIPPED | OUTPATIENT
Start: 2018-08-22 | End: 2018-08-27 | Stop reason: SDUPTHER

## 2018-08-27 DIAGNOSIS — I10 ESSENTIAL HYPERTENSION: Primary | ICD-10-CM

## 2018-08-27 RX ORDER — FUROSEMIDE 40 MG/1
TABLET ORAL
Qty: 90 TABLET | Refills: 0 | Status: SHIPPED | OUTPATIENT
Start: 2018-08-27 | End: 2018-11-27 | Stop reason: SDUPTHER

## 2018-08-27 RX ORDER — METOPROLOL SUCCINATE 50 MG/1
TABLET, EXTENDED RELEASE ORAL
Qty: 90 TABLET | Refills: 0 | Status: SHIPPED | OUTPATIENT
Start: 2018-08-27 | End: 2018-11-30 | Stop reason: SDUPTHER

## 2018-08-28 RX ORDER — POTASSIUM CHLORIDE 750 MG/1
CAPSULE, EXTENDED RELEASE ORAL
Qty: 180 CAPSULE | Refills: 0 | Status: SHIPPED | OUTPATIENT
Start: 2018-08-28 | End: 2018-11-27 | Stop reason: SDUPTHER

## 2018-09-19 RX ORDER — TRAZODONE HYDROCHLORIDE 50 MG/1
50 TABLET ORAL NIGHTLY
Qty: 90 TABLET | Refills: 2 | Status: SHIPPED | OUTPATIENT
Start: 2018-09-19 | End: 2019-02-20 | Stop reason: SDUPTHER

## 2018-10-01 ENCOUNTER — PROCEDURE VISIT (OUTPATIENT)
Dept: NEUROLOGY | Facility: CLINIC | Age: 63
End: 2018-10-01
Payer: MEDICARE

## 2018-10-01 DIAGNOSIS — M25.542 ARTHRALGIA OF BOTH HANDS: ICD-10-CM

## 2018-10-01 DIAGNOSIS — M25.541 ARTHRALGIA OF BOTH HANDS: ICD-10-CM

## 2018-10-01 DIAGNOSIS — R20.2 PARESTHESIA OF BOTH HANDS: ICD-10-CM

## 2018-10-01 PROCEDURE — 95886 MUSC TEST DONE W/N TEST COMP: CPT | Mod: 26,S$PBB,, | Performed by: PSYCHIATRY & NEUROLOGY

## 2018-10-01 PROCEDURE — 95913 NRV CNDJ TEST 13/> STUDIES: CPT | Mod: PBBFAC | Performed by: PSYCHIATRY & NEUROLOGY

## 2018-10-01 PROCEDURE — 95886 MUSC TEST DONE W/N TEST COMP: CPT | Mod: PBBFAC | Performed by: PSYCHIATRY & NEUROLOGY

## 2018-10-01 PROCEDURE — 95913 NRV CNDJ TEST 13/> STUDIES: CPT | Mod: 26,S$PBB,, | Performed by: PSYCHIATRY & NEUROLOGY

## 2018-10-02 RX ORDER — ERGOCALCIFEROL 1.25 MG/1
CAPSULE ORAL
Qty: 12 CAPSULE | Refills: 0 | Status: SHIPPED | OUTPATIENT
Start: 2018-10-02 | End: 2019-03-21 | Stop reason: ALTCHOICE

## 2018-10-09 ENCOUNTER — PATIENT MESSAGE (OUTPATIENT)
Dept: GASTROENTEROLOGY | Facility: CLINIC | Age: 63
End: 2018-10-09

## 2018-10-10 ENCOUNTER — OFFICE VISIT (OUTPATIENT)
Dept: GASTROENTEROLOGY | Facility: CLINIC | Age: 63
End: 2018-10-10
Payer: MEDICARE

## 2018-10-10 VITALS
HEIGHT: 66 IN | BODY MASS INDEX: 28.21 KG/M2 | DIASTOLIC BLOOD PRESSURE: 85 MMHG | HEART RATE: 94 BPM | SYSTOLIC BLOOD PRESSURE: 147 MMHG | WEIGHT: 175.5 LBS

## 2018-10-10 DIAGNOSIS — K58.0 IRRITABLE BOWEL SYNDROME WITH DIARRHEA: Primary | ICD-10-CM

## 2018-10-10 DIAGNOSIS — R10.84 GENERALIZED ABDOMINAL PAIN: ICD-10-CM

## 2018-10-10 DIAGNOSIS — R15.9 INCONTINENCE OF FECES, UNSPECIFIED FECAL INCONTINENCE TYPE: ICD-10-CM

## 2018-10-10 PROCEDURE — 3079F DIAST BP 80-89 MM HG: CPT | Mod: CPTII,,, | Performed by: NURSE PRACTITIONER

## 2018-10-10 PROCEDURE — 99999 PR PBB SHADOW E&M-EST. PATIENT-LVL V: CPT | Mod: PBBFAC,,, | Performed by: NURSE PRACTITIONER

## 2018-10-10 PROCEDURE — 99214 OFFICE O/P EST MOD 30 MIN: CPT | Mod: S$PBB,,, | Performed by: NURSE PRACTITIONER

## 2018-10-10 PROCEDURE — 99215 OFFICE O/P EST HI 40 MIN: CPT | Mod: PBBFAC | Performed by: NURSE PRACTITIONER

## 2018-10-10 PROCEDURE — 3077F SYST BP >= 140 MM HG: CPT | Mod: CPTII,,, | Performed by: NURSE PRACTITIONER

## 2018-10-10 PROCEDURE — 1101F PT FALLS ASSESS-DOCD LE1/YR: CPT | Mod: CPTII,,, | Performed by: NURSE PRACTITIONER

## 2018-10-10 PROCEDURE — 3008F BODY MASS INDEX DOCD: CPT | Mod: CPTII,,, | Performed by: NURSE PRACTITIONER

## 2018-10-10 RX ORDER — DICYCLOMINE HYDROCHLORIDE 20 MG/1
20 TABLET ORAL 4 TIMES DAILY
Qty: 120 TABLET | Refills: 6 | Status: SHIPPED | OUTPATIENT
Start: 2018-10-10 | End: 2019-04-12 | Stop reason: SDUPTHER

## 2018-10-10 NOTE — PATIENT INSTRUCTIONS
Take over the counter IBgard for abdominal pain.    HIGH FIBER KEY POINTS:  1. Goal of 20-25 grams of fiber each day for women, 30-35 grams each day for men. Slowly build up to this goal as you may experience gas and bloating at first.  2. Take a fiber supplement to help reach this goal: Metamucil, Citrucel, Fibercon, Konsyl, or Psyllium  3. For Constipation: Finely ground psyllium husk (with or without flavoring or Additives)   - To start: 1 teaspoon once a day in the morning with 8 oz of liquid    followed by an additional 8 ounce glass of water   - After a week: add a second teaspoon in the middle of the day   - After two weeks: add a third teaspoon at bedtime   - Follow each dose with another glass of water. Can add a splash of juice or lemonade for taste.  3. Drink 6-8 glasses of water a day.  4. Try to do plant fiber (fruits and vegetables) over processed fibers (cereals and breads)     HIGH FIBER DIET  Fiber is present in all fruits, vegetables, cereals and grains. Fiber passes through the body undigested. A high fiber diet helps food move through the intestinal tract. The added bulk is helpful in preventing constipation. In persons with diverticulosis it serves to clean out the pouches along the colon wall while preventing new ones from forming. A high fiber diet may reduce the risk of colon cancer, decreases blood cholesterol and prevents high blood sugar in diabetics.    The foods listed below are high in fiber and should be included in your diet. If you are not used to high fiber foods, start with 1 or 2 foods from this list. Every 3-4 days add a new one to your diet until you are eating 4 high fiber foods per day. This should give you 20-35 Gm of fiber/day. It is also important to drink a lot of water when you are on this diet (6-8 glasses a day). Water causes the fiber to swell and increases the benefit.  Add Fiber to Your Diet   Adding fiber to your diet can help relieve constipation by making stools  softer and easier to pass. To increase your fiber intake, your doctor may recommend a bulking agent, such as psyllium. This is a high-fiber supplement available at most grocery and drugstores. Eating more fiber-rich foods will also help. There are two types of fiber:   Insoluble fiber is the main ingredient in bulking agents. Its also found in foods such as wheat bran, whole-grain breads, fresh fruits, and vegetables.   Soluble fiber is found in foods such as oat bran. Although soluble fiber is good for you, it may not ease constipation as much as foods high in insoluble fiber.    FOODS HIGH IN DIETARY FIBER:  BREADS: Made with 100% whole wheat flour; Marcelo, wheat or rye crackers; tortillas, bran muffins. Whole grains, such as wheat bran, corn bran, and brown rice.  CEREALS: Whole grain cereal with bran (Chex, Raisin Bran, Corn Bran), oatmeal, rolled oats, granola, wheat flakes, brown rice  NUTS: Any nuts  FRUITS: All fresh fruits along with edible skins, (bananas, citrus fruit, mangoes, pears, prunes, raisins, apples, pineapple, apricot, melon, jams and marmalades), fruit juices (especially prune juice)  VEGETABLES: All types, preferably raw or lightly cooked: especially, celery, eggplant, potatoes,spinach, broccoli, brussel sprouts, winter squash, carrots, cauliflower, soybeans, lentils, fresh and dried beans of all kinds  OTHER: Popcorn, any spices.   Nuts and legumes, especially peanuts, lentils, and kidney beans.  Easy Ways to Add Fiber   The tips below offer some simple ways to add more high-fiber foods to your meals.   Start your day with a high-fiber breakfast. Eat a wheat bran cereal along with a sliced banana. Or, try peanut butter on whole-wheat toast.   Eat carrot sticks for snacks. Theyre easy to prepare, taste great, and are low in calories.   Use whole-grain breads instead of white bread for sandwiches.   Eat fruits for treats. Try an apple and some raisins instead of a candy  bar.  Vegetables  Artichoke, cooked 10.3  Asparagus - 2.8  Beans - 2  Broccoli, boiled 1 cup - 5.1  [place] sprouts, cooked 1 cup - 4.1  Carrots - boiled 2.5, raw 4  Celery - 1  Corn - 4  Corn on the [place] - 5.9  Lettuce - 1  Peas (canned) - 4  Peas (dried) - 7.9  Green peas (cooked) - 8.8  Potato, with skin, baked - 3.0  Spinach - 4  Sweet potato - 3.4  Turnip greens, boiled 1 cup - 5.0  Sweet corn, cooked  1 cup  4.0  Tomato paste -1/4 cup -2.7  Yam - 2.7  Legumes, Nuts, and Seeds  Split peas, cooked  1 cup  16.3  Lentils, cooked 1 cup 15.6  Black beans, cooked 1 cup 15.0  Black eyed peas (1/2 cup) 4.2  Chick peas (1/2 cup) 5  Lima beans, cooked 1 cup  13.2  Baked beans, vegetarian, canned, cooked 1 cup 10.4  Sunflower seed kernels 1/4 cup 3.9  Almonds 1 ounce (23 nuts)  3.5  Pistachio nuts 1 ounce (49 nuts) 2.9  Pecans 1 ounce (19 halves) 2.7  Beans (lima,kidney,baked) - 10 (1/2 cup)  Refried beans -12 (1cup)  Lentils - 8  Soybeans (1/2 cup) 5  Fruit  Raspberries  1 cup  8.0  Pear, with skin - 5.5  Apple, with skin 4.4 (Juice = 0)  Banana - 3.1  Orange - 3.1  Strawberries (halves) 1 cup - 3.0  Figs, dried 2 medium - 1.6  Raisins 1 ounce (60 raisins) -1.0  Grapefruit - 1.4  Peach - 2  Kiwi - 5  [place] - 3.7  Pineapple - 2  Cereal, Grains, and Pasta  Spaghetti, whole-wheat, cooked 1 cup 6.3  Barley, pearled, cooked 1 cup 6.0  Bran flakes 3/4 cup 5.3  Oat bran muffin 1 medium 5.2  Oatmeal, instant, cooked 1 cup 4.0  Popcorn, air-popped 3 cups 3.5  Brown rice, cooked  1 cup  3.5  Bread, rye 1 slice 1.9, pumpernickel - 2.1  Bagel - 1.6  Bread, whole-wheat or multigrain  1 slice 1.9  Fiber One - 14  100% Bran - 13  Raisin Bran - 3.5  All Bran Extra Fiber - 13

## 2018-10-10 NOTE — PROGRESS NOTES
[facility] Established Patient Visit    Reason for Visit:  The primary encounter diagnosis was Irritable bowel syndrome with diarrhea. Diagnoses of Generalized abdominal pain and Incontinence of feces, unspecified fecal incontinence type were also pertinent to this visit.    PCP: Ruth Jaime    HPI:  This is a 63 y.o. female here for f/u IBS.  Previously seen by Dr. Quan for 5 th opinion (7/2018)  Dx with IBS.     Abdominal pain  ONSET:several years  LOCATION:gereralized  DURATION:constant  CHARACTER:bloating, cramping  ASSOCIATED/ALLEVIATING/AGGRAVAITING:+ nausea. No vomiting. Sometimes fever. better with resting. No improvement with BM.   RADIATION:to back  SEVERITY:10/10    Reflux - no  Dysphagia/odynophagia - no   Bowel habits - usually 3-5 BM/day. [place] type 6-7 w/ mucus. Sometimes type bristol type 4,5. With urgency. No improvement with imodium 4 tabs daily x 2 years. Some accidents d/t urgency.  GI bleeding - BRBPR. Improving. Now occurring every few months. Last occurrence 1 month ago. On TP and in TW. Large amt x 1 day. denies hematochezia, hematemesis, melena, BRBPR, black/tarry stools, and coffee ground emesis  NSAID usage - ibuprofen PRN-very seldom.    ROS:  Constitutional: No fevers, no chills, No unintentional weight loss, + fatigue,   ENT: No allergies  CV: No chest pain, no palpitations, no perif. edema, no sob on exertion  Pulm: No cough, + shortness of breath, no wheezes, no sputum  Ophtho: No vision changes  GI: see HPI;   Derm: No rash  Heme: No lymphadenopathy, No bruising  MSK: + arthritis, + muscle pain 2/2 fibro, no muscle weakness  : No dysuria, No hematuria  Endo: No hot or cold intolerance  Neuro: No syncope, No seizure,     PMHX:  has a past medical history of Acute pancreatitis, ADHD (attention deficit hyperactivity disorder), inattentive type, Fibromyalgia, Hyperlipidemia, Hypertension, Hypothyroidism, IFG (impaired fasting glucose), Migraine, Ulcerative colitis,  and Vitamin D deficiency (3/31/2016).    PSHX:  has a past surgical history that includes  section; Hysterectomy; Tonsillectomy; Shoulder surgery (Left); Colonoscopy (10/08/2010); upper and lower GI (2016); Colonoscopy (2016); Oophorectomy; and ULTRASOUND, ENDOSCOPIC, UPPER GI TRACT (N/A, 2018).    The patient's social and family histories were reviewed by me and updated in the appropriate section of the electronic medical record.    Review of patient's allergies indicates:  No Known Allergies    Current Outpatient Medications   Medication Sig    alprazolam (XANAX) 0.5 MG tablet Take 0.5 mg by mouth every 8 (eight) hours as needed.    amLODIPine (NORVASC) 10 MG tablet Take 1 tablet (10 mg total) by mouth once daily.    CYANOCOBALAMIN, VITAMIN B-12, (VITAMIN B-12 INJ) Inject 1,000 mcg as directed every 30 days.    diclofenac sodium 1 % Gel Apply 2 g topically 4 (four) times daily.    estradiol (ESTRACE) 2 MG tablet Take 2 mg by mouth once daily.    furosemide (LASIX) 40 MG tablet TAKE 1 TABLET BY MOUTH EVERY DAY    metoprolol succinate (TOPROL-XL) 50 MG 24 hr tablet TAKE 1 TABLET BY MOUTH EVERY DAY    potassium chloride (MICRO-K) 10 MEQ CpSR TAKE ONE CAPSULE TWICE A DAY    rosuvastatin (CRESTOR) 20 MG tablet Take 1 tablet (20 mg total) by mouth once daily.    thyroid, pork, (ARMOUR THYROID) 60 mg Tab [place] Thyroid 60 mg tablet    tiZANidine 4 mg Cap Take by mouth.    traMADol (ULTRAM) 50 mg tablet Take 1 tablet (50 mg total) by mouth every 6 (six) hours as needed for Pain.    traZODone (DESYREL) 50 MG tablet Take 1 tablet (50 mg total) by mouth every evening.    VITAMIN D2 50,000 unit capsule TAKE 1 CAPSULE (50,000 UNITS TOTAL) BY MOUTH EVERY 7 DAYS. FOR 12 DOSES    dextroamphetamine-amphetamine 10 mg Tab Take 10 mg by mouth 2 (two) times daily.    dicyclomine (BENTYL) 20 mg tablet Take 1 tablet (20 mg total) by mouth 4 (four) times daily.    progesterone (PROMETRIUM) 100 MG  "capsule Take 100 mg by mouth once daily.    valsartan (DIOVAN) 80 MG tablet Take 1 tablet (80 mg total) by mouth once daily.     No current facility-administered medications for this visit.          Objective Findings:    Vital Signs:  BP (!) 147/85 Comment: took med today  Pulse 94   Ht 5' 6" (1.676 m)   Wt 79.6 kg (175 lb 7.8 oz)   BMI 28.32 kg/m²  Body mass index is 28.32 kg/m².    Physical Exam:  General Appearance: Well appearing in no acute distress  Head:   Normocephalic, without obvious abnormality  Eyes:    No scleral icterus, EOMI  Throat: Lips, mucosa, and tongue normal; teeth and gums normal  Lungs: CTA bilaterally in anterior and posterior fields, no wheezes, no crackles.  Heart:  Regular rate and rhythm, S1, S2 normal, no murmurs heard  Abdomen: Soft, diffuse tenderness LUQ>right, non distended with positive bowel sounds in all four quadrants. No hepatosplenomegaly, ascites, or mass  Extremities:    2+ radial pulses, no clubbing, cyanosis or edema  Skin: No rash to exposed areas  Neurologic: A&Ox4      Labs:  Lab Results   Component Value Date    WBC 7.17 06/30/2018    HGB 13.8 06/30/2018    HCT 41.5 06/30/2018     06/30/2018    CHOL 167 08/08/2018    TRIG 192 (H) 08/08/2018    HDL 61 08/08/2018    ALT 19 08/08/2018    AST 25 08/08/2018     08/08/2018    K 4.3 08/08/2018     08/08/2018    CREATININE 0.65 08/08/2018    BUN 18 (H) 08/08/2018    CO2 25 08/08/2018    TSH 0.695 06/30/2018    HGBA1C 5.8 (H) 06/30/2018       Endoscopy:   EUS 7/18/18:gallbladder sludge  05/18, [facility] was [facility].  Colonoscopy 2016:[facility]PTC.large internal hemorrhoids. Nl colon. bx for microscopic colitis (?)  EGD 2016:nl esophagus. Nl stomach (?). Nl dudenum, (?)  04/16, outpatient visit with Dr. Plascencia.  She was going to obtain records.    No followup.  2010, colonoscopy by Dr. Jesus with biopsies was normal.  2004, colonoscopy normal.  2004, flex sig showed some ulcers in the descending " colon and felt to be   consistent with ischemic colitis.    Assessment:    1. Irritable bowel syndrome with diarrhea    2. Generalized abdominal pain    3. Incontinence of feces, unspecified fecal incontinence type           Recommendations:  1., 2.  IBS-D- bentyl 20 mg QID. OTC IBgard.   3. Fecal incontinence- Daily fiber as per handout provided.    Follow-up in about 3 months (around 1/10/2019) for IBS.     Visit time: 30 minutes with greater than 50% of the time spent in face to face pt  discussing the aforementioned diagnoses, recommendations, test results, and answering pt questions.    Order summary:  Orders Placed This Encounter    dicyclomine (BENTYL) 20 mg tablet       Thank you for allowing me to participate in the care of Madalyn Whitney, EBONY, FNP-C

## 2018-10-19 RX ORDER — ACETAMINOPHEN 500 MG
5000 TABLET ORAL DAILY
Qty: 30 TABLET | Refills: 3 | Status: SHIPPED | OUTPATIENT
Start: 2018-10-19 | End: 2018-11-18

## 2018-10-26 ENCOUNTER — OFFICE VISIT (OUTPATIENT)
Dept: RHEUMATOLOGY | Facility: CLINIC | Age: 63
End: 2018-10-26
Payer: MEDICARE

## 2018-10-26 VITALS — DIASTOLIC BLOOD PRESSURE: 67 MMHG | SYSTOLIC BLOOD PRESSURE: 108 MMHG | HEART RATE: 65 BPM

## 2018-10-26 DIAGNOSIS — M47.896 OTHER OSTEOARTHRITIS OF SPINE, LUMBAR REGION: ICD-10-CM

## 2018-10-26 DIAGNOSIS — I10 ESSENTIAL HYPERTENSION: ICD-10-CM

## 2018-10-26 DIAGNOSIS — G56.03 BILATERAL CARPAL TUNNEL SYNDROME: Primary | ICD-10-CM

## 2018-10-26 DIAGNOSIS — M19.90 INFLAMMATORY ARTHRITIS: ICD-10-CM

## 2018-10-26 DIAGNOSIS — M47.22 OSTEOARTHRITIS OF SPINE WITH RADICULOPATHY, CERVICAL REGION: ICD-10-CM

## 2018-10-26 PROCEDURE — 99999 PR PBB SHADOW E&M-EST. PATIENT-LVL IV: CPT | Mod: PBBFAC,,, | Performed by: INTERNAL MEDICINE

## 2018-10-26 PROCEDURE — 99214 OFFICE O/P EST MOD 30 MIN: CPT | Mod: PBBFAC | Performed by: INTERNAL MEDICINE

## 2018-10-26 PROCEDURE — 99214 OFFICE O/P EST MOD 30 MIN: CPT | Mod: S$PBB,,, | Performed by: INTERNAL MEDICINE

## 2018-10-26 PROCEDURE — 3074F SYST BP LT 130 MM HG: CPT | Mod: CPTII,,, | Performed by: INTERNAL MEDICINE

## 2018-10-26 PROCEDURE — 3078F DIAST BP <80 MM HG: CPT | Mod: CPTII,,, | Performed by: INTERNAL MEDICINE

## 2018-10-26 PROCEDURE — 1101F PT FALLS ASSESS-DOCD LE1/YR: CPT | Mod: CPTII,,, | Performed by: INTERNAL MEDICINE

## 2018-10-26 RX ORDER — TRAMADOL HYDROCHLORIDE 50 MG/1
50 TABLET ORAL EVERY 12 HOURS PRN
Qty: 60 TABLET | Refills: 3 | Status: ON HOLD | OUTPATIENT
Start: 2018-10-26 | End: 2019-03-07 | Stop reason: SDUPTHER

## 2018-10-26 RX ORDER — VALSARTAN 80 MG/1
TABLET ORAL
Qty: 90 TABLET | Refills: 0 | Status: SHIPPED | OUTPATIENT
Start: 2018-10-26 | End: 2019-03-21

## 2018-10-26 RX ORDER — TRAZODONE HYDROCHLORIDE 100 MG/1
100 TABLET ORAL NIGHTLY
Qty: 30 TABLET | Refills: 3 | Status: SHIPPED | OUTPATIENT
Start: 2018-10-26 | End: 2019-02-24 | Stop reason: SDUPTHER

## 2018-10-26 RX ORDER — FOLIC ACID 1 MG/1
1 TABLET ORAL DAILY
Qty: 30 TABLET | Refills: 3 | Status: SHIPPED | OUTPATIENT
Start: 2018-10-26 | End: 2019-02-18 | Stop reason: SDUPTHER

## 2018-10-26 RX ORDER — METHOTREXATE 2.5 MG/1
TABLET ORAL
Qty: 20 TABLET | Refills: 3 | Status: SHIPPED | OUTPATIENT
Start: 2018-10-26 | End: 2019-01-23

## 2018-10-26 ASSESSMENT — ROUTINE ASSESSMENT OF PATIENT INDEX DATA (RAPID3)
PAIN SCORE: 8.5
WHEN YOU AWAKENED IN THE MORNING OVER THE LAST WEEK, PLEASE INDICATE THE AMOUNT OF TIME IT TAKES UNTIL YOU ARE AS LIMBER AS YOU WILL BE FOR THE DAY: 2HRS
AM STIFFNESS SCORE: 1, YES
MDHAQ FUNCTION SCORE: 3
PATIENT GLOBAL ASSESSMENT SCORE: 9.5
FATIGUE SCORE: 10
TOTAL RAPID3 SCORE: 9.33

## 2018-10-26 NOTE — PROGRESS NOTES
Chief Complaint   Patient presents with    Pain     swelling in legs and arm, pt feels really fatique       Patient with fibromyalgia for a follow up    History of presenting illness    63 year old white female has fibromyalgia : for 2 years now  She used to see :   : rheumatology : has not see him recently   Tried :  lyrica : light headed,dizziness    She used to take cymbalta,she weaned off  She weaned off the celexa    Takes xanax to sleep  Takes tizanidine 4 mg x 2 daily   She takes trazodone 100 mg to sleep    Current complaints     Neck hurts  Low back hurts  Knees hurt,they crack  Hands hurt  Cant   Fingers swell  Mornings harder  They are frozen  MCPs hurt and swell  Hands are numb and tingly   Ankes and feet hurt and swell  She had xrays : bulging discs low back,L4-5  Hand xrays : OA   C spine : OA     She had feet spurs : injected     Headaches +  Takes 4 BP meds : metoprolol,lasix,diovan,norvasc   Migraines not on treatment  Light headedness+  Sees neurology : CT brains normal    Episodes of nausea and feeling of sickness  Diarrhea+  Abdominal pain +  GI : they think this is irritable bowel syndrome   Only once in 2004 she had ischemic colitis  EUS all planned   Colonoscopy 2 years ago nml  EGD this year nml  There was no Crohn's on the recent w/u    Crestor gives her charley horses     Past history : pancreatitis,IBS  ADHD  Htn,migraine,anxiety,hypothyroidism,FMS,HLD,IBS    Cause of pancreatitis : unclear   Has had high ALPs for a while now     Blood work    High ESR,CRP  Negative RF,CCP  HLA B27 negative    Xrays    Hands : deg changes + punctate erosions proximal phalanx of the little finger   Knees : nml  C spine : deg changes  LS spine : deg changes,facet arthritis    MRI hands :  Right and left hands: Multifocal degenerative changes noted, most prominent in the IP joints, noting joint space loss and osteophytosis.  Prominent degenerative changes also noted at the 1st carpometacarpal  and triscaphe joint of the wrist.  Subcortical erosive change noted in the scaphoid, capitate, and triquetrum.    There is prominent synovitis/enhancement at the MCP joints, carpal carpal joints, and proximal IP joint.  Periarticular erosions noted..  Enhancement also noted around the flexor tendon sheath, suggesting tenosynovitis. The flexor tendons demonstrate normal size and signal.    No fracture fracture.  No marrow replacement process.      Impression       Degenerative arthropathy of both hands with enhancement/ synovitis involving the MCP, carpal-carpal, and proximal IP joints, consistent with superimposed inflammatory component.     UE EMG/NCS :    Mild to moderately severe bilateral carpal tunnel syndrome;   2. A primarily axonal right ulnar sensory neuropathy with some secondary demyelination;   3. Chronic denervation in the right C7 myotome consistent with chronic radiculopathy.       Family history : mom heart problems    Social history : quit smoking 1997      Review of Systems   Constitutional: Negative for activity change, appetite change, chills, diaphoresis, fatigue, fever and unexpected weight change.   HENT: Negative for congestion, dental problem, drooling, ear discharge, ear pain, facial swelling, hearing loss, mouth sores, nosebleeds, postnasal drip, rhinorrhea, sinus pressure, sinus pain, sneezing, sore throat, tinnitus, trouble swallowing and voice change.    Eyes: Negative for photophobia, pain, discharge, redness, itching and visual disturbance.   Respiratory: Negative for apnea, cough, choking, chest tightness, shortness of breath, wheezing and stridor.    Cardiovascular: Negative for chest pain, palpitations and leg swelling.   Gastrointestinal: Positive for abdominal pain, diarrhea and nausea. Negative for abdominal distention, anal bleeding, blood in stool, constipation, rectal pain and vomiting.   Endocrine: Negative for cold intolerance, heat intolerance, polydipsia, polyphagia and  polyuria.   Genitourinary: Negative for decreased urine volume, difficulty urinating, dysuria, enuresis, flank pain, frequency, genital sores, hematuria and urgency.   Musculoskeletal: Positive for arthralgias. Negative for back pain, gait problem, joint swelling, myalgias, neck pain and neck stiffness.   Skin: Negative for color change, pallor, rash and wound.   Allergic/Immunologic: Negative for environmental allergies, food allergies and immunocompromised state.   Neurological: Negative for dizziness, tremors, seizures, syncope, facial asymmetry, speech difficulty, weakness, light-headedness, numbness and headaches.   Hematological: Negative for adenopathy. Does not bruise/bleed easily.   Psychiatric/Behavioral: Negative for agitation, behavioral problems, confusion, decreased concentration, dysphoric mood, hallucinations, self-injury, sleep disturbance and suicidal ideas. The patient is not nervous/anxious and is not hyperactive.      Physical Exam     SANTIAGO-28 tender joint count: 0  SANTIAGO-28 swollen joint count: 0    All PIPs have osteophytes  Tender C/LS spine    Physical Exam   Constitutional: She is oriented to person, place, and time and well-developed, well-nourished, and in no distress. No distress.   HENT:   Head: Normocephalic.   Mouth/Throat: Oropharynx is clear and moist.   Eyes: Conjunctivae are normal. Pupils are equal, round, and reactive to light. Right eye exhibits no discharge. Left eye exhibits no discharge. No scleral icterus.   Neck: Normal range of motion. No thyromegaly present.   Cardiovascular: Normal rate, regular rhythm, normal heart sounds and intact distal pulses.    Pulmonary/Chest: Effort normal and breath sounds normal. No stridor.   Abdominal: Soft. Bowel sounds are normal.   Lymphadenopathy:     She has no cervical adenopathy.   Neurological: She is alert and oriented to person, place, and time.   Skin: Skin is warm. No rash noted. She is not diaphoretic.     Psychiatric: Affect and  judgment normal.   Musculoskeletal: Normal range of motion.           Assessment     63 year old white female comes with    -polyarthralgias in the hands,knees,low back and neck  -GI symptoms of nausea,diarrhea,abdominal pain  -anxiety > depression  -poor sleep  -headaches and light headedness    She has a diagnosis of fibromyalgia,osteoarthritis of hands,C,LS spine    She also has a diagnosis of IBS  Gi ruled out crohn's and celiac disease  Recent pancreatitis,cause not known  Noted high ALP,but also low vit d,not sure if ALP is from liver or bone    She has high inflammatory markers  She has erosive changes on the xrays    She definitely has osteoarthritis of her C/LS spine    She has inflammatory arthritis in her hands/wrists  She has b/l CTS and right ulnar neuropathy and she has cervical radiculopathy    1. Bilateral carpal tunnel syndrome    2. Osteoarthritis of spine with radiculopathy, cervical region    3. Other osteoarthritis of spine, lumbar region    4. Inflammatory arthritis        Reviewed labs/xrays  Reviewed medications    f/u    Plan     mtx : 5 tabs weekly with folic acid  Discussed side effects    Continue vit d replacement    Offered pain management,PT for her C/LS spine issues  OT hands    CT surgery discussed  Suggested OT/splinting    Tramadol for pain management   50 mg bid    BP meds adjustments    Anxiety :Xanax 0.5 mg daily tid  Trazodone to sleep  Tizanidine bid     Crestor and Lipitor give charley horses  Try changing from crestor to something else  She has family h/o intolerance to statins    GI says she has IBS   she is on meds    Neurology w/u  To evaluate her C/LS spine issues and the neuropathy     Madalyn was seen today for pain.    Diagnoses and all orders for this visit:    Bilateral carpal tunnel syndrome  -     Ambulatory Referral to Physical/Occupational Therapy  -     Ambulatory Referral to Physical/Occupational Therapy    Osteoarthritis of spine with radiculopathy, cervical  region  -     Ambulatory Referral to Physical/Occupational Therapy  -     Ambulatory Referral to Physical/Occupational Therapy    Other osteoarthritis of spine, lumbar region  -     Ambulatory Referral to Physical/Occupational Therapy  -     Ambulatory Referral to Physical/Occupational Therapy    Inflammatory arthritis  -     CBC auto differential; Future  -     Comprehensive metabolic panel; Future  -     Sedimentation rate; Future  -     C-reactive protein; Standing    Other orders  -     traZODone (DESYREL) 100 MG tablet; Take 1 tablet (100 mg total) by mouth every evening.  -     methotrexate 2.5 MG Tab; Take 5 tabs once a week only.  -     folic acid (FOLVITE) 1 MG tablet; Take 1 tablet (1 mg total) by mouth once daily.  -     traMADol (ULTRAM) 50 mg tablet; Take 1 tablet (50 mg total) by mouth every 12 (twelve) hours as needed for Pain.          rtc in 2 months

## 2018-11-06 PROBLEM — M25.60 DECREASED RANGE OF MOTION WITH DECREASED STRENGTH: Status: ACTIVE | Noted: 2018-11-06

## 2018-11-06 PROBLEM — R53.1 DECREASED RANGE OF MOTION WITH DECREASED STRENGTH: Status: ACTIVE | Noted: 2018-11-06

## 2018-11-16 RX ORDER — ALPRAZOLAM 0.5 MG/1
TABLET ORAL
Qty: 90 TABLET | Refills: 1 | Status: SHIPPED | OUTPATIENT
Start: 2018-11-16 | End: 2019-01-20 | Stop reason: SDUPTHER

## 2018-11-20 RX ORDER — ALPRAZOLAM 0.5 MG/1
TABLET ORAL
Qty: 90 TABLET | Refills: 1 | OUTPATIENT
Start: 2018-11-20

## 2018-11-20 RX ORDER — ALPRAZOLAM 0.5 MG/1
0.5 TABLET ORAL 3 TIMES DAILY
Qty: 90 TABLET | Refills: 1 | Status: CANCELLED | OUTPATIENT
Start: 2018-11-20

## 2018-11-20 NOTE — TELEPHONE ENCOUNTER
----- Message from Michael Yates MD sent at 11/20/2018  2:54 PM CST -----  I have not given her narcotics  Please call her and ask her to go to pain management  We dont want to start narcotics here  Thanks    ----- Message -----  From: Allison Suárez MA  Sent: 11/20/2018   2:39 PM  To: Michael Yates MD    Pt stated she usually takes Tylenol 4 with Norco, she would like a rx for tylenol 4 (30 or 60), she stated she is leaving out of town tomorrow afternoon.

## 2018-11-20 NOTE — TELEPHONE ENCOUNTER
----- Message from Leticia Song sent at 11/20/2018  2:19 PM CST -----  Contact: self  Pt is requesting a call back to schedule the next available appt. Pt can be reached at [phone].

## 2018-11-20 NOTE — TELEPHONE ENCOUNTER
----- Message from Don Lloyd sent at 11/20/2018  3:19 PM CST -----  Contact: patient  Please call pt at [phone]    Patient has a questions regarding Tylenol #4    Thank you

## 2018-11-27 DIAGNOSIS — I10 ESSENTIAL HYPERTENSION: ICD-10-CM

## 2018-11-27 RX ORDER — FUROSEMIDE 40 MG/1
TABLET ORAL
Qty: 90 TABLET | Refills: 0 | Status: SHIPPED | OUTPATIENT
Start: 2018-11-27 | End: 2019-02-27 | Stop reason: SDUPTHER

## 2018-11-27 RX ORDER — POTASSIUM CHLORIDE 750 MG/1
CAPSULE, EXTENDED RELEASE ORAL
Qty: 180 CAPSULE | Refills: 0 | Status: SHIPPED | OUTPATIENT
Start: 2018-11-27 | End: 2019-04-16 | Stop reason: SDUPTHER

## 2018-11-28 DIAGNOSIS — I10 ESSENTIAL HYPERTENSION: ICD-10-CM

## 2018-11-29 RX ORDER — CLONIDINE HYDROCHLORIDE 0.1 MG/1
TABLET ORAL
Qty: 30 TABLET | Refills: 5 | OUTPATIENT
Start: 2018-11-29

## 2018-11-30 RX ORDER — METOPROLOL SUCCINATE 50 MG/1
TABLET, EXTENDED RELEASE ORAL
Qty: 90 TABLET | Refills: 0 | Status: SHIPPED | OUTPATIENT
Start: 2018-11-30 | End: 2019-02-27 | Stop reason: SDUPTHER

## 2018-12-05 ENCOUNTER — OFFICE VISIT (OUTPATIENT)
Dept: RHEUMATOLOGY | Facility: CLINIC | Age: 63
End: 2018-12-05
Payer: MEDICARE

## 2018-12-05 VITALS
SYSTOLIC BLOOD PRESSURE: 140 MMHG | HEART RATE: 106 BPM | WEIGHT: 177 LBS | DIASTOLIC BLOOD PRESSURE: 87 MMHG | BODY MASS INDEX: 28.45 KG/M2 | HEIGHT: 66 IN

## 2018-12-05 DIAGNOSIS — M79.7 FIBROMYALGIA: Primary | ICD-10-CM

## 2018-12-05 PROCEDURE — 99214 OFFICE O/P EST MOD 30 MIN: CPT | Mod: S$GLB,,, | Performed by: INTERNAL MEDICINE

## 2018-12-05 PROCEDURE — 3077F SYST BP >= 140 MM HG: CPT | Mod: CPTII,S$GLB,, | Performed by: INTERNAL MEDICINE

## 2018-12-05 PROCEDURE — 3079F DIAST BP 80-89 MM HG: CPT | Mod: CPTII,S$GLB,, | Performed by: INTERNAL MEDICINE

## 2018-12-05 PROCEDURE — 3008F BODY MASS INDEX DOCD: CPT | Mod: CPTII,S$GLB,, | Performed by: INTERNAL MEDICINE

## 2018-12-05 PROCEDURE — 99999 PR PBB SHADOW E&M-EST. PATIENT-LVL III: CPT | Mod: PBBFAC,,, | Performed by: INTERNAL MEDICINE

## 2018-12-05 PROCEDURE — 1101F PT FALLS ASSESS-DOCD LE1/YR: CPT | Mod: CPTII,S$GLB,, | Performed by: INTERNAL MEDICINE

## 2018-12-05 ASSESSMENT — ROUTINE ASSESSMENT OF PATIENT INDEX DATA (RAPID3)
AM STIFFNESS SCORE: 1, YES
PATIENT GLOBAL ASSESSMENT SCORE: 9
PAIN SCORE: 9
MDHAQ FUNCTION SCORE: .2
TOTAL RAPID3 SCORE: 6.22
PSYCHOLOGICAL DISTRESS SCORE: 7.1
FATIGUE SCORE: 10

## 2018-12-05 NOTE — PROGRESS NOTES
Chief Complaint   Patient presents with    Follow-up       Patient with fibromyalgia for a follow up    History of presenting illness    63 year old white female has fibromyalgia : for 2 years now  She used to see :   : rheumatology : has not see him recently   Tried :  lyrica : light headed,dizziness    She used to take cymbalta,she weaned off  She weaned off the celexa    Takes xanax to sleep  Takes tizanidine 4 mg x 2 daily   She takes trazodone 100 mg to sleep    Current complaints     Neck hurts  Low back hurts  Knees hurt,they crack  Hands hurt  Cant   Fingers swell  Mornings harder  They are frozen  MCPs hurt and swell  Hands are numb and tingly   Ankes and feet hurt and swell  She had xrays : bulging discs low back,L4-5  Hand xrays : OA   C spine : OA     She had feet spurs : injected     Headaches +  Takes 4 BP meds : metoprolol,lasix,diovan,norvasc   Migraines not on treatment  Light headedness+  Sees neurology : CT brains normal    Episodes of nausea and feeling of sickness  Diarrhea+  Abdominal pain +  GI : they think this is irritable bowel syndrome   Only once in 2004 she had ischemic colitis  EUS all planned   Colonoscopy 2 years ago nml  EGD this year nml  There was no Crohn's on the recent w/u    Crestor gives her charley horses     Past history : pancreatitis,IBS  ADHD  Htn,migraine,anxiety,hypothyroidism,FMS,HLD,IBS    Cause of pancreatitis : unclear   Has had high ALPs for a while now     Blood work    High ESR,CRP  Negative RF,CCP  HLA B27 negative    Xrays    Hands : deg changes + punctate erosions proximal phalanx of the little finger   Knees : nml  C spine : deg changes  LS spine : deg changes,facet arthritis    MRI hands :  Right and left hands: Multifocal degenerative changes noted, most prominent in the IP joints, noting joint space loss and osteophytosis.  Prominent degenerative changes also noted at the 1st carpometacarpal and triscaphe joint of the wrist.  Subcortical  erosive change noted in the scaphoid, capitate, and triquetrum.    There is prominent synovitis/enhancement at the MCP joints, carpal carpal joints, and proximal IP joint.  Periarticular erosions noted..  Enhancement also noted around the flexor tendon sheath, suggesting tenosynovitis. The flexor tendons demonstrate normal size and signal.    No fracture fracture.  No marrow replacement process.      Impression       Degenerative arthropathy of both hands with enhancement/ synovitis involving the MCP, carpal-carpal, and proximal IP joints, consistent with superimposed inflammatory component.     UE EMG/NCS :    Mild to moderately severe bilateral carpal tunnel syndrome;   2. A primarily axonal right ulnar sensory neuropathy with some secondary demyelination;   3. Chronic denervation in the right C7 myotome consistent with chronic radiculopathy.       Family history : mom heart problems    Social history : quit smoking 1997      Review of Systems   Constitutional: Negative for activity change, appetite change, chills, diaphoresis, fatigue, fever and unexpected weight change.   HENT: Negative for congestion, dental problem, drooling, ear discharge, ear pain, facial swelling, hearing loss, mouth sores, nosebleeds, postnasal drip, rhinorrhea, sinus pressure, sinus pain, sneezing, sore throat, tinnitus, trouble swallowing and voice change.    Eyes: Negative for photophobia, pain, discharge, redness, itching and visual disturbance.   Respiratory: Negative for apnea, cough, choking, chest tightness, shortness of breath, wheezing and stridor.    Cardiovascular: Negative for chest pain, palpitations and leg swelling.   Gastrointestinal: Positive for abdominal pain, diarrhea and nausea. Negative for abdominal distention, anal bleeding, blood in stool, constipation, rectal pain and vomiting.   Endocrine: Negative for cold intolerance, heat intolerance, polydipsia, polyphagia and polyuria.   Genitourinary: Negative for  decreased urine volume, difficulty urinating, dysuria, enuresis, flank pain, frequency, genital sores, hematuria and urgency.   Musculoskeletal: Positive for arthralgias. Negative for back pain, gait problem, joint swelling, myalgias, neck pain and neck stiffness.   Skin: Negative for color change, pallor, rash and wound.   Allergic/Immunologic: Negative for environmental allergies, food allergies and immunocompromised state.   Neurological: Negative for dizziness, tremors, seizures, syncope, facial asymmetry, speech difficulty, weakness, light-headedness, numbness and headaches.   Hematological: Negative for adenopathy. Does not bruise/bleed easily.   Psychiatric/Behavioral: Negative for agitation, behavioral problems, confusion, decreased concentration, dysphoric mood, hallucinations, self-injury, sleep disturbance and suicidal ideas. The patient is not nervous/anxious and is not hyperactive.      Physical Exam     SANTIAGO-28 tender joint count: 0  SANTIAGO-28 swollen joint count: 0    All PIPs have osteophytes  Tender C/LS spine    Physical Exam   Constitutional: She is oriented to person, place, and time and well-developed, well-nourished, and in no distress. No distress.   HENT:   Head: Normocephalic.   Mouth/Throat: Oropharynx is clear and moist.   Eyes: Conjunctivae are normal. Pupils are equal, round, and reactive to light. Right eye exhibits no discharge. Left eye exhibits no discharge. No scleral icterus.   Neck: Normal range of motion. No thyromegaly present.   Cardiovascular: Normal rate, regular rhythm, normal heart sounds and intact distal pulses.    Pulmonary/Chest: Effort normal and breath sounds normal. No stridor.   Abdominal: Soft. Bowel sounds are normal.   Lymphadenopathy:     She has no cervical adenopathy.   Neurological: She is alert and oriented to person, place, and time.   Skin: Skin is warm. No rash noted. She is not diaphoretic.     Psychiatric: Affect and judgment normal.   Musculoskeletal:  Normal range of motion.           Assessment     63 year old white female comes with    -polyarthralgias in the hands,knees,low back and neck  -GI symptoms of nausea,diarrhea,abdominal pain  -anxiety > depression  -poor sleep  -headaches and light headedness    She has a diagnosis of fibromyalgia,osteoarthritis of hands,C,LS spine    She also has a diagnosis of IBS  Gi ruled out crohn's and celiac disease  Recent pancreatitis,cause not known  Noted high ALP,but also low vit d,not sure if ALP is from liver or bone    She has high inflammatory markers  She has erosive changes on the xrays    She definitely has osteoarthritis of her C/LS spine    She has inflammatory arthritis in her hands/wrists  She has b/l CTS and right ulnar neuropathy and she has cervical radiculopathy    No diagnosis found.    Reviewed labs/xrays  Reviewed medications    f/u    Plan     mtx : 5 tabs weekly with folic acid offered last time  Discussed side effects  She will do labs and rtc pino    Continue vit d replacement    Offered pain management,PT for her C/LS spine issues  OT hands    CT surgery discussed  Suggested OT/splinting    Tramadol for pain management   50 mg bid    BP meds adjustments    Anxiety :Xanax 0.5 mg daily tid  Trazodone to sleep  Tizanidine bid     Offered counselling    Crestor and Lipitor give charley horses  Try changing from crestor to something else  She has family h/o intolerance to statins    GI says she has IBS   she is on meds    Neurology w/u  To evaluate her C/LS spine issues and the neuropathy     There are no diagnoses linked to this encounter.      rtc in 2 months

## 2019-01-04 RX ORDER — CITALOPRAM 20 MG/1
TABLET, FILM COATED ORAL
Qty: 90 TABLET | Refills: 1 | OUTPATIENT
Start: 2019-01-04

## 2019-01-11 DIAGNOSIS — F41.9 ANXIETY: ICD-10-CM

## 2019-01-11 RX ORDER — CITALOPRAM 20 MG/1
TABLET, FILM COATED ORAL
Qty: 90 TABLET | Refills: 1 | OUTPATIENT
Start: 2019-01-11

## 2019-01-18 DIAGNOSIS — F41.9 ANXIETY: ICD-10-CM

## 2019-01-18 RX ORDER — CITALOPRAM 20 MG/1
TABLET, FILM COATED ORAL
Qty: 90 TABLET | Refills: 1 | OUTPATIENT
Start: 2019-01-18

## 2019-01-21 RX ORDER — ALPRAZOLAM 0.5 MG/1
TABLET ORAL
Qty: 90 TABLET | Refills: 1 | Status: SHIPPED | OUTPATIENT
Start: 2019-01-21 | End: 2019-02-22 | Stop reason: SDUPTHER

## 2019-01-23 RX ORDER — METHOTREXATE 2.5 MG/1
TABLET ORAL
Qty: 75 TABLET | Refills: 1 | Status: SHIPPED | OUTPATIENT
Start: 2019-01-23 | End: 2019-02-05 | Stop reason: SDUPTHER

## 2019-01-25 DIAGNOSIS — F41.9 ANXIETY: ICD-10-CM

## 2019-01-25 RX ORDER — CITALOPRAM 20 MG/1
TABLET, FILM COATED ORAL
Qty: 90 TABLET | Refills: 1 | OUTPATIENT
Start: 2019-01-25

## 2019-02-01 DIAGNOSIS — F41.9 ANXIETY: ICD-10-CM

## 2019-02-01 RX ORDER — CITALOPRAM 20 MG/1
TABLET, FILM COATED ORAL
Qty: 90 TABLET | Refills: 1 | OUTPATIENT
Start: 2019-02-01

## 2019-02-05 ENCOUNTER — OFFICE VISIT (OUTPATIENT)
Dept: RHEUMATOLOGY | Facility: CLINIC | Age: 64
End: 2019-02-05
Payer: MEDICARE

## 2019-02-05 VITALS
BODY MASS INDEX: 28.29 KG/M2 | WEIGHT: 175.25 LBS | HEART RATE: 88 BPM | SYSTOLIC BLOOD PRESSURE: 132 MMHG | DIASTOLIC BLOOD PRESSURE: 84 MMHG

## 2019-02-05 DIAGNOSIS — M79.7 FIBROMYALGIA: Primary | ICD-10-CM

## 2019-02-05 DIAGNOSIS — G56.03 BILATERAL CARPAL TUNNEL SYNDROME: ICD-10-CM

## 2019-02-05 DIAGNOSIS — M13.80 SERONEGATIVE ARTHRITIS: ICD-10-CM

## 2019-02-05 PROCEDURE — 3075F PR MOST RECENT SYSTOLIC BLOOD PRESS GE 130-139MM HG: ICD-10-PCS | Mod: CPTII,S$GLB,, | Performed by: INTERNAL MEDICINE

## 2019-02-05 PROCEDURE — 99214 OFFICE O/P EST MOD 30 MIN: CPT | Mod: S$GLB,,, | Performed by: INTERNAL MEDICINE

## 2019-02-05 PROCEDURE — 99999 PR PBB SHADOW E&M-EST. PATIENT-LVL IV: CPT | Mod: PBBFAC,,, | Performed by: INTERNAL MEDICINE

## 2019-02-05 PROCEDURE — 99999 PR PBB SHADOW E&M-EST. PATIENT-LVL IV: ICD-10-PCS | Mod: PBBFAC,,, | Performed by: INTERNAL MEDICINE

## 2019-02-05 PROCEDURE — 1101F PR PT FALLS ASSESS DOC 0-1 FALLS W/OUT INJ PAST YR: ICD-10-PCS | Mod: CPTII,S$GLB,, | Performed by: INTERNAL MEDICINE

## 2019-02-05 PROCEDURE — 3075F SYST BP GE 130 - 139MM HG: CPT | Mod: CPTII,S$GLB,, | Performed by: INTERNAL MEDICINE

## 2019-02-05 PROCEDURE — 1101F PT FALLS ASSESS-DOCD LE1/YR: CPT | Mod: CPTII,S$GLB,, | Performed by: INTERNAL MEDICINE

## 2019-02-05 PROCEDURE — 3079F DIAST BP 80-89 MM HG: CPT | Mod: CPTII,S$GLB,, | Performed by: INTERNAL MEDICINE

## 2019-02-05 PROCEDURE — 99214 PR OFFICE/OUTPT VISIT, EST, LEVL IV, 30-39 MIN: ICD-10-PCS | Mod: S$GLB,,, | Performed by: INTERNAL MEDICINE

## 2019-02-05 PROCEDURE — 3079F PR MOST RECENT DIASTOLIC BLOOD PRESSURE 80-89 MM HG: ICD-10-PCS | Mod: CPTII,S$GLB,, | Performed by: INTERNAL MEDICINE

## 2019-02-05 RX ORDER — CYANOCOBALAMIN 1000 UG/ML
1000 INJECTION, SOLUTION INTRAMUSCULAR; SUBCUTANEOUS
Qty: 1 ML | Refills: 11 | Status: SHIPPED | OUTPATIENT
Start: 2019-02-05 | End: 2020-02-07

## 2019-02-05 RX ORDER — METHOTREXATE 2.5 MG/1
TABLET ORAL
Qty: 120 TABLET | Refills: 1 | Status: SHIPPED | OUTPATIENT
Start: 2019-02-05 | End: 2019-05-21

## 2019-02-05 ASSESSMENT — ROUTINE ASSESSMENT OF PATIENT INDEX DATA (RAPID3)
PSYCHOLOGICAL DISTRESS SCORE: 2.2
MDHAQ FUNCTION SCORE: 0
AM STIFFNESS SCORE: 1, YES
FATIGUE SCORE: 7.5
PAIN SCORE: 6
WHEN YOU AWAKENED IN THE MORNING OVER THE LAST WEEK, PLEASE INDICATE THE AMOUNT OF TIME IT TAKES UNTIL YOU ARE AS LIMBER AS YOU WILL BE FOR THE DAY: 2HRS
PATIENT GLOBAL ASSESSMENT SCORE: 7
TOTAL RAPID3 SCORE: 4.33

## 2019-02-05 NOTE — PROGRESS NOTES
Chief Complaint   Patient presents with    Follow-up       Patient with fibromyalgia for a follow up    History of presenting illness    64 year old white female has fibromyalgia : for 2 years now  She used to see :   : rheumatology  Tried :  lyrica : light headed,dizziness    She used to take cymbalta,she weaned off  She weaned off the celexa    Takes xanax to sleep  Takes tizanidine 4 mg x 2 daily   She takes trazodone 100 mg to sleep    Current complaints     Hand swelling and pain  Hand stiffness    Overall her pain is better     She had xrays : bulging discs low back,L4-5  Hand xrays : OA   C spine : OA   She had feet spurs : injected     Headaches +  Takes 4 BP meds : metoprolol,lasix,diovan,norvasc   Migraines not on treatment  Light headedness+  Sees neurology : CT brains normal    Episodes of nausea and feeling of sickness  Diarrhea+  Abdominal pain +  GI : they think this is irritable bowel syndrome   Only once in 2004 she had ischemic colitis  EUS all planned   Colonoscopy 2 years ago nml  EGD this year nml  There was no Crohn's on the recent w/u    Crestor gives her charley horses     Past history : pancreatitis,IBS  ADHD  Htn,migraine,anxiety,hypothyroidism,FMS,HLD,IBS    Cause of pancreatitis : unclear   Has had high ALPs for a while now     Blood work    High ESR,CRP    Negative RF,CCP  HLA B27 negative    Xrays    Hands : deg changes + punctate erosions proximal phalanx of the little finger   Knees : nml  C spine : deg changes  LS spine : deg changes,facet arthritis    MRI hands :  Right and left hands: Multifocal degenerative changes noted, most prominent in the IP joints, noting joint space loss and osteophytosis.  Prominent degenerative changes also noted at the 1st carpometacarpal and triscaphe joint of the wrist.  Subcortical erosive change noted in the scaphoid, capitate, and triquetrum.    There is prominent synovitis/enhancement at the MCP joints, carpal carpal joints, and proximal  IP joint.  Periarticular erosions noted..  Enhancement also noted around the flexor tendon sheath, suggesting tenosynovitis. The flexor tendons demonstrate normal size and signal.    No fracture fracture.  No marrow replacement process.      Impression       Degenerative arthropathy of both hands with enhancement/ synovitis involving the MCP, carpal-carpal, and proximal IP joints, consistent with superimposed inflammatory component.     UE EMG/NCS :    Mild to moderately severe bilateral carpal tunnel syndrome;   2. A primarily axonal right ulnar sensory neuropathy with some secondary demyelination;   3. Chronic denervation in the right C7 myotome consistent with chronic radiculopathy.     We have her on mtx 5 tabs weekly and folic acid  Labs normal CBC,CMP  Normal ESR  CRP mild elevation    Family history : mom heart problems    Social history : quit smoking 1997      Review of Systems   Constitutional: Negative for activity change, appetite change, chills, diaphoresis, fatigue, fever and unexpected weight change.   HENT: Negative for congestion, dental problem, drooling, ear discharge, ear pain, facial swelling, hearing loss, mouth sores, nosebleeds, postnasal drip, rhinorrhea, sinus pressure, sinus pain, sneezing, sore throat, tinnitus, trouble swallowing and voice change.    Eyes: Negative for photophobia, pain, discharge, redness, itching and visual disturbance.   Respiratory: Negative for apnea, cough, choking, chest tightness, shortness of breath, wheezing and stridor.    Cardiovascular: Negative for chest pain, palpitations and leg swelling.   Gastrointestinal: Positive for abdominal pain, diarrhea and nausea. Negative for abdominal distention, anal bleeding, blood in stool, constipation, rectal pain and vomiting.   Endocrine: Negative for cold intolerance, heat intolerance, polydipsia, polyphagia and polyuria.   Genitourinary: Negative for decreased urine volume, difficulty urinating, dysuria, enuresis,  flank pain, frequency, genital sores, hematuria and urgency.   Musculoskeletal: Positive for arthralgias. Negative for back pain, gait problem, joint swelling, myalgias, neck pain and neck stiffness.   Skin: Negative for color change, pallor, rash and wound.   Allergic/Immunologic: Negative for environmental allergies, food allergies and immunocompromised state.   Neurological: Negative for dizziness, tremors, seizures, syncope, facial asymmetry, speech difficulty, weakness, light-headedness, numbness and headaches.   Hematological: Negative for adenopathy. Does not bruise/bleed easily.   Psychiatric/Behavioral: Negative for agitation, behavioral problems, confusion, decreased concentration, dysphoric mood, hallucinations, self-injury, sleep disturbance and suicidal ideas. The patient is not nervous/anxious and is not hyperactive.      Physical Exam     Tenderness:   RUE: carpometacarpal  LUE: carpometacarpal  Right hand: 1st MCP, 2nd PIP, 3rd PIP, 4th PIP, 5th PIP, 2nd DIP, 3rd DIP, 4th DIP and 5th DIP  Left hand: 1st MCP, 2nd PIP, 3rd PIP, 4th PIP, 2nd DIP, 3rd DIP, 4th DIP and 5th DIP    SANTIAGO-28 tender joint count: 9  SANTIAGO-28 swollen joint count: 0    All PIPs have osteophytes  Tender C/LS spine    Physical Exam   Constitutional: She is oriented to person, place, and time and well-developed, well-nourished, and in no distress. No distress.   HENT:   Head: Normocephalic.   Mouth/Throat: Oropharynx is clear and moist.   Eyes: Conjunctivae are normal. Pupils are equal, round, and reactive to light. Right eye exhibits no discharge. Left eye exhibits no discharge. No scleral icterus.   Neck: Normal range of motion. No thyromegaly present.   Cardiovascular: Normal rate, regular rhythm, normal heart sounds and intact distal pulses.    Pulmonary/Chest: Effort normal and breath sounds normal. No stridor.   Abdominal: Soft. Bowel sounds are normal.   Lymphadenopathy:     She has no cervical adenopathy.   Neurological: She is  alert and oriented to person, place, and time.   Skin: Skin is warm. No rash noted. She is not diaphoretic.     Psychiatric: Affect and judgment normal.   Musculoskeletal: Normal range of motion.           Assessment     64 year old white female comes with    -polyarthralgias in the hands,knees,low back and neck  -GI symptoms of nausea,diarrhea,abdominal pain  -anxiety > depression  -poor sleep  -headaches and light headedness    She has a diagnosis of fibromyalgia,osteoarthritis of hands,C,LS spine    She also has a diagnosis of IBS  Gi ruled out crohn's and celiac disease  Recent pancreatitis,cause not known  Noted high ALP,but also low vit d,not sure if ALP is from liver or bone    She has high inflammatory markers  She has erosive changes on the xrays    She definitely has osteoarthritis of her C/LS spine    She has inflammatory arthritis in her hands/wrists  She has b/l CTS and right ulnar neuropathy and she has cervical radiculopathy    1. Fibromyalgia    2. Seronegative arthritis        Reviewed labs/xrays  Reviewed medications    F/u    She has done well except that her hands continue to hurt and swell  May be this is a combination of OA and inflammatory arthritis and CTS and the neuropathy    CRP better than before but still high  May be the hand pain and swelling is from active RA    Plan    Titrate to mtx to 8 tabs weekly  Folic acid daily     Continue vit d replacement  Vit b12     Offered pain management,PT for her C/LS spine issues  OT hands    CT surgery discussed  Suggested OT/splinting    Tramadol for pain management   50 mg bid to tid     BP meds adjustments    Anxiety :Xanax 0.5 mg daily tid  Trazodone to sleep  Tizanidine bid     Offered counselling    Crestor and Lipitor give charley horses  Try changing from crestor to something else  She has family h/o intolerance to statins    GI says she has IBS   she is on meds    Neurology w/u  To evaluate her C/LS spine issues and the neuropathy  "    Madalyn was seen today for follow-up.    Diagnoses and all orders for this visit:    Fibromyalgia  -     CBC auto differential; Future  -     Comprehensive metabolic panel; Future  -     Sedimentation rate; Future  -     C-reactive protein; Standing    Seronegative arthritis  -     CBC auto differential; Future  -     Comprehensive metabolic panel; Future  -     Sedimentation rate; Future  -     C-reactive protein; Standing    Other orders  -     cyanocobalamin 1,000 mcg/mL injection; Inject 1 mL (1,000 mcg total) into the skin every 30 days.  -     methotrexate 2.5 MG Tab; Take 8 tabs once a week only  -     insulin syringe-needle U-100 1 mL 25 gauge x 5/8" Syrg; 1 mL by Misc.(Non-Drug; Combo Route) route every 30 days.          rtc in 3 months          "

## 2019-02-08 DIAGNOSIS — F41.9 ANXIETY: ICD-10-CM

## 2019-02-08 RX ORDER — CITALOPRAM 20 MG/1
TABLET, FILM COATED ORAL
Qty: 90 TABLET | Refills: 1 | OUTPATIENT
Start: 2019-02-08

## 2019-02-15 DIAGNOSIS — F41.9 ANXIETY: ICD-10-CM

## 2019-02-15 RX ORDER — CITALOPRAM 20 MG/1
TABLET, FILM COATED ORAL
Qty: 90 TABLET | Refills: 1 | OUTPATIENT
Start: 2019-02-15

## 2019-02-18 RX ORDER — FOLIC ACID 1 MG/1
1 TABLET ORAL DAILY
Qty: 30 TABLET | Refills: 3 | Status: SHIPPED | OUTPATIENT
Start: 2019-02-18 | End: 2019-05-17 | Stop reason: SDUPTHER

## 2019-02-21 RX ORDER — TRAZODONE HYDROCHLORIDE 50 MG/1
50 TABLET ORAL NIGHTLY
Qty: 90 TABLET | Refills: 2 | Status: ON HOLD | OUTPATIENT
Start: 2019-02-21 | End: 2019-03-07 | Stop reason: SDUPTHER

## 2019-02-22 DIAGNOSIS — F41.9 ANXIETY: ICD-10-CM

## 2019-02-22 RX ORDER — CITALOPRAM 20 MG/1
TABLET, FILM COATED ORAL
Qty: 90 TABLET | Refills: 1 | OUTPATIENT
Start: 2019-02-22

## 2019-02-22 RX ORDER — ALPRAZOLAM 0.5 MG/1
TABLET ORAL
Qty: 90 TABLET | Refills: 0 | Status: SHIPPED | OUTPATIENT
Start: 2019-02-22 | End: 2019-03-21 | Stop reason: SDUPTHER

## 2019-02-25 RX ORDER — TRAZODONE HYDROCHLORIDE 100 MG/1
100 TABLET ORAL NIGHTLY
Qty: 30 TABLET | Refills: 3 | Status: SHIPPED | OUTPATIENT
Start: 2019-02-25 | End: 2019-05-20 | Stop reason: SDUPTHER

## 2019-02-27 DIAGNOSIS — E55.9 VITAMIN D DEFICIENCY: ICD-10-CM

## 2019-02-27 DIAGNOSIS — E03.9 ACQUIRED HYPOTHYROIDISM: ICD-10-CM

## 2019-02-27 DIAGNOSIS — E78.5 HYPERLIPIDEMIA, UNSPECIFIED HYPERLIPIDEMIA TYPE: Primary | ICD-10-CM

## 2019-02-27 DIAGNOSIS — I10 ESSENTIAL HYPERTENSION: ICD-10-CM

## 2019-02-27 DIAGNOSIS — R73.01 IFG (IMPAIRED FASTING GLUCOSE): ICD-10-CM

## 2019-02-27 RX ORDER — METOPROLOL SUCCINATE 50 MG/1
TABLET, EXTENDED RELEASE ORAL
Qty: 30 TABLET | Refills: 0 | Status: SHIPPED | OUTPATIENT
Start: 2019-02-27 | End: 2019-05-13 | Stop reason: SDUPTHER

## 2019-02-27 RX ORDER — FUROSEMIDE 40 MG/1
TABLET ORAL
Qty: 30 TABLET | Refills: 0 | Status: SHIPPED | OUTPATIENT
Start: 2019-02-27 | End: 2019-03-30 | Stop reason: SDUPTHER

## 2019-02-27 NOTE — TELEPHONE ENCOUNTER
Advise patient she is OVERDUE for fasting labs and visit to check blood pressure and cholesterol - need to schedule fasting labs and visit for results.  Make sure to link ONLY my labs:  CMP, Lipid, HgbA1C, TSH, free T4 and Vitamin D levels.

## 2019-03-01 DIAGNOSIS — F41.9 ANXIETY: ICD-10-CM

## 2019-03-01 RX ORDER — CITALOPRAM 20 MG/1
TABLET, FILM COATED ORAL
Qty: 90 TABLET | Refills: 1 | OUTPATIENT
Start: 2019-03-01

## 2019-03-02 ENCOUNTER — NURSE TRIAGE (OUTPATIENT)
Dept: ADMINISTRATIVE | Facility: CLINIC | Age: 64
End: 2019-03-02

## 2019-03-02 ENCOUNTER — HOSPITAL ENCOUNTER (INPATIENT)
Facility: HOSPITAL | Age: 64
LOS: 5 days | Discharge: HOME | DRG: 418 | End: 2019-03-07
Attending: EMERGENCY MEDICINE | Admitting: HOSPITALIST
Payer: MEDICARE

## 2019-03-02 DIAGNOSIS — K85.10 ACUTE BILIARY PANCREATITIS WITHOUT INFECTION OR NECROSIS: ICD-10-CM

## 2019-03-02 DIAGNOSIS — R10.12 LEFT UPPER QUADRANT PAIN: ICD-10-CM

## 2019-03-02 DIAGNOSIS — K85.00 IDIOPATHIC ACUTE PANCREATITIS WITHOUT INFECTION OR NECROSIS: Primary | ICD-10-CM

## 2019-03-02 DIAGNOSIS — Z90.49 STATUS POST LAPAROSCOPIC CHOLECYSTECTOMY: ICD-10-CM

## 2019-03-02 LAB
ALBUMIN SERPL BCP-MCNC: 3.5 G/DL
ALP SERPL-CCNC: 180 U/L
ALT SERPL W/O P-5'-P-CCNC: 30 U/L
AMYLASE SERPL-CCNC: 138 U/L
ANION GAP SERPL CALC-SCNC: 12 MMOL/L
AST SERPL-CCNC: 36 U/L
BACTERIA #/AREA URNS HPF: ABNORMAL /HPF
BASOPHILS # BLD AUTO: 0.03 K/UL
BASOPHILS NFR BLD: 0.2 %
BILIRUB SERPL-MCNC: 0.8 MG/DL
BILIRUB UR QL STRIP: ABNORMAL
BUN SERPL-MCNC: 13 MG/DL
CALCIUM SERPL-MCNC: 9.7 MG/DL
CHLORIDE SERPL-SCNC: 99 MMOL/L
CLARITY UR: ABNORMAL
CO2 SERPL-SCNC: 24 MMOL/L
COLOR UR: ABNORMAL
CREAT SERPL-MCNC: 1 MG/DL
DIFFERENTIAL METHOD: ABNORMAL
EOSINOPHIL # BLD AUTO: 0.1 K/UL
EOSINOPHIL NFR BLD: 0.7 %
ERYTHROCYTE [DISTWIDTH] IN BLOOD BY AUTOMATED COUNT: 14.1 %
EST. GFR  (AFRICAN AMERICAN): >60 ML/MIN/1.73 M^2
EST. GFR  (NON AFRICAN AMERICAN): 59 ML/MIN/1.73 M^2
GLUCOSE SERPL-MCNC: 106 MG/DL
GLUCOSE UR QL STRIP: NEGATIVE
HCT VFR BLD AUTO: 42 %
HGB BLD-MCNC: 13.9 G/DL
HGB UR QL STRIP: NEGATIVE
HYALINE CASTS #/AREA URNS LPF: 0 /LPF
KETONES UR QL STRIP: ABNORMAL
LACTATE SERPL-SCNC: 1.2 MMOL/L
LEUKOCYTE ESTERASE UR QL STRIP: NEGATIVE
LIPASE SERPL-CCNC: 49 U/L
LYMPHOCYTES # BLD AUTO: 1.4 K/UL
LYMPHOCYTES NFR BLD: 9.1 %
MCH RBC QN AUTO: 31.2 PG
MCHC RBC AUTO-ENTMCNC: 33.1 G/DL
MCV RBC AUTO: 94 FL
MICROSCOPIC COMMENT: ABNORMAL
MONOCYTES # BLD AUTO: 1.5 K/UL
MONOCYTES NFR BLD: 10 %
NEUTROPHILS # BLD AUTO: 12.1 K/UL
NEUTROPHILS NFR BLD: 79.6 %
NITRITE UR QL STRIP: POSITIVE
PH UR STRIP: 6 [PH] (ref 5–8)
PLATELET # BLD AUTO: 205 K/UL
PMV BLD AUTO: 11.9 FL
POTASSIUM SERPL-SCNC: 3.8 MMOL/L
PROT SERPL-MCNC: 7.9 G/DL
PROT UR QL STRIP: ABNORMAL
RBC # BLD AUTO: 4.45 M/UL
RBC #/AREA URNS HPF: 2 /HPF (ref 0–4)
SODIUM SERPL-SCNC: 135 MMOL/L
SP GR UR STRIP: >=1.03 (ref 1–1.03)
URN SPEC COLLECT METH UR: ABNORMAL
UROBILINOGEN UR STRIP-ACNC: 1 EU/DL
WBC # BLD AUTO: 15.14 K/UL
WBC #/AREA URNS HPF: 3 /HPF (ref 0–5)

## 2019-03-02 PROCEDURE — 63600175 PHARM REV CODE 636 W HCPCS: Performed by: FAMILY MEDICINE

## 2019-03-02 PROCEDURE — 83690 ASSAY OF LIPASE: CPT

## 2019-03-02 PROCEDURE — 82150 ASSAY OF AMYLASE: CPT

## 2019-03-02 PROCEDURE — 25000003 PHARM REV CODE 250: Performed by: EMERGENCY MEDICINE

## 2019-03-02 PROCEDURE — 87040 BLOOD CULTURE FOR BACTERIA: CPT

## 2019-03-02 PROCEDURE — 96376 TX/PRO/DX INJ SAME DRUG ADON: CPT

## 2019-03-02 PROCEDURE — 96374 THER/PROPH/DIAG INJ IV PUSH: CPT

## 2019-03-02 PROCEDURE — 99220 PR INITIAL OBSERVATION CARE,LEVL III: CPT | Mod: ,,, | Performed by: FAMILY MEDICINE

## 2019-03-02 PROCEDURE — G0378 HOSPITAL OBSERVATION PER HR: HCPCS

## 2019-03-02 PROCEDURE — 99220 PR INITIAL OBSERVATION CARE,LEVL III: ICD-10-PCS | Mod: ,,, | Performed by: FAMILY MEDICINE

## 2019-03-02 PROCEDURE — 96375 TX/PRO/DX INJ NEW DRUG ADDON: CPT

## 2019-03-02 PROCEDURE — 25000003 PHARM REV CODE 250: Performed by: FAMILY MEDICINE

## 2019-03-02 PROCEDURE — 85025 COMPLETE CBC W/AUTO DIFF WBC: CPT

## 2019-03-02 PROCEDURE — 80053 COMPREHEN METABOLIC PANEL: CPT

## 2019-03-02 PROCEDURE — 99285 EMERGENCY DEPT VISIT HI MDM: CPT | Mod: 25

## 2019-03-02 PROCEDURE — 11000001 HC ACUTE MED/SURG PRIVATE ROOM

## 2019-03-02 PROCEDURE — 83605 ASSAY OF LACTIC ACID: CPT

## 2019-03-02 PROCEDURE — C9113 INJ PANTOPRAZOLE SODIUM, VIA: HCPCS | Performed by: EMERGENCY MEDICINE

## 2019-03-02 PROCEDURE — 96361 HYDRATE IV INFUSION ADD-ON: CPT

## 2019-03-02 PROCEDURE — 63600175 PHARM REV CODE 636 W HCPCS: Performed by: EMERGENCY MEDICINE

## 2019-03-02 PROCEDURE — 81000 URINALYSIS NONAUTO W/SCOPE: CPT

## 2019-03-02 PROCEDURE — 25500020 PHARM REV CODE 255: Performed by: EMERGENCY MEDICINE

## 2019-03-02 RX ORDER — HYDROMORPHONE HYDROCHLORIDE 1 MG/ML
1 INJECTION, SOLUTION INTRAMUSCULAR; INTRAVENOUS; SUBCUTANEOUS 3 TIMES DAILY PRN
Status: DISCONTINUED | OUTPATIENT
Start: 2019-03-03 | End: 2019-03-03

## 2019-03-02 RX ORDER — METOPROLOL SUCCINATE 50 MG/1
50 TABLET, EXTENDED RELEASE ORAL DAILY
Status: DISCONTINUED | OUTPATIENT
Start: 2019-03-03 | End: 2019-03-07 | Stop reason: HOSPADM

## 2019-03-02 RX ORDER — HYDROMORPHONE HYDROCHLORIDE 1 MG/ML
0.5 INJECTION, SOLUTION INTRAMUSCULAR; INTRAVENOUS; SUBCUTANEOUS
Status: COMPLETED | OUTPATIENT
Start: 2019-03-02 | End: 2019-03-02

## 2019-03-02 RX ORDER — DEXTROSE MONOHYDRATE AND SODIUM CHLORIDE 5; .9 G/100ML; G/100ML
1000 INJECTION, SOLUTION INTRAVENOUS
Status: DISCONTINUED | OUTPATIENT
Start: 2019-03-02 | End: 2019-03-03

## 2019-03-02 RX ORDER — PANTOPRAZOLE SODIUM 40 MG/10ML
40 INJECTION, POWDER, LYOPHILIZED, FOR SOLUTION INTRAVENOUS
Status: COMPLETED | OUTPATIENT
Start: 2019-03-02 | End: 2019-03-02

## 2019-03-02 RX ORDER — SODIUM CHLORIDE 0.9 % (FLUSH) 0.9 %
5 SYRINGE (ML) INJECTION
Status: DISCONTINUED | OUTPATIENT
Start: 2019-03-02 | End: 2019-03-07 | Stop reason: HOSPADM

## 2019-03-02 RX ORDER — FUROSEMIDE 40 MG/1
40 TABLET ORAL DAILY
Status: DISCONTINUED | OUTPATIENT
Start: 2019-03-03 | End: 2019-03-03

## 2019-03-02 RX ORDER — HYDRALAZINE HYDROCHLORIDE 20 MG/ML
10 INJECTION INTRAMUSCULAR; INTRAVENOUS EVERY 6 HOURS PRN
Status: DISCONTINUED | OUTPATIENT
Start: 2019-03-03 | End: 2019-03-07 | Stop reason: HOSPADM

## 2019-03-02 RX ORDER — FOLIC ACID 1 MG/1
1 TABLET ORAL DAILY
Status: DISCONTINUED | OUTPATIENT
Start: 2019-03-03 | End: 2019-03-07 | Stop reason: HOSPADM

## 2019-03-02 RX ORDER — DICYCLOMINE HYDROCHLORIDE 20 MG/1
20 TABLET ORAL 4 TIMES DAILY
Status: DISCONTINUED | OUTPATIENT
Start: 2019-03-03 | End: 2019-03-07 | Stop reason: HOSPADM

## 2019-03-02 RX ORDER — ONDANSETRON 4 MG/1
4 TABLET, ORALLY DISINTEGRATING ORAL
Status: COMPLETED | OUTPATIENT
Start: 2019-03-02 | End: 2019-03-02

## 2019-03-02 RX ORDER — ONDANSETRON 2 MG/ML
4 INJECTION INTRAMUSCULAR; INTRAVENOUS EVERY 8 HOURS PRN
Status: DISCONTINUED | OUTPATIENT
Start: 2019-03-02 | End: 2019-03-03

## 2019-03-02 RX ORDER — AMLODIPINE BESYLATE 5 MG/1
10 TABLET ORAL DAILY
Status: DISCONTINUED | OUTPATIENT
Start: 2019-03-03 | End: 2019-03-07 | Stop reason: HOSPADM

## 2019-03-02 RX ORDER — ESTRADIOL 0.5 MG/1
2 TABLET ORAL DAILY
Status: DISCONTINUED | OUTPATIENT
Start: 2019-03-03 | End: 2019-03-03

## 2019-03-02 RX ORDER — MORPHINE SULFATE 4 MG/ML
4 INJECTION, SOLUTION INTRAMUSCULAR; INTRAVENOUS
Status: COMPLETED | OUTPATIENT
Start: 2019-03-02 | End: 2019-03-02

## 2019-03-02 RX ORDER — TRAZODONE HYDROCHLORIDE 100 MG/1
100 TABLET ORAL NIGHTLY
Status: DISCONTINUED | OUTPATIENT
Start: 2019-03-02 | End: 2019-03-07 | Stop reason: HOSPADM

## 2019-03-02 RX ORDER — SODIUM CHLORIDE AND POTASSIUM CHLORIDE 150; 450 MG/100ML; MG/100ML
INJECTION, SOLUTION INTRAVENOUS CONTINUOUS
Status: DISCONTINUED | OUTPATIENT
Start: 2019-03-02 | End: 2019-03-03

## 2019-03-02 RX ORDER — HYDROMORPHONE HYDROCHLORIDE 1 MG/ML
1 INJECTION, SOLUTION INTRAMUSCULAR; INTRAVENOUS; SUBCUTANEOUS ONCE
Status: COMPLETED | OUTPATIENT
Start: 2019-03-02 | End: 2019-03-02

## 2019-03-02 RX ADMIN — LIDOCAINE HYDROCHLORIDE: 20 SOLUTION ORAL; TOPICAL at 06:03

## 2019-03-02 RX ADMIN — SODIUM CHLORIDE 1000 ML: 0.9 INJECTION, SOLUTION INTRAVENOUS at 05:03

## 2019-03-02 RX ADMIN — Medication 0.5 MG: at 06:03

## 2019-03-02 RX ADMIN — POTASSIUM CHLORIDE AND SODIUM CHLORIDE: 450; 150 INJECTION, SOLUTION INTRAVENOUS at 10:03

## 2019-03-02 RX ADMIN — TRAZODONE HYDROCHLORIDE 100 MG: 100 TABLET ORAL at 10:03

## 2019-03-02 RX ADMIN — HYDROMORPHONE HYDROCHLORIDE 1 MG: 1 INJECTION, SOLUTION INTRAMUSCULAR; INTRAVENOUS; SUBCUTANEOUS at 10:03

## 2019-03-02 RX ADMIN — ONDANSETRON 4 MG: 4 TABLET, ORALLY DISINTEGRATING ORAL at 06:03

## 2019-03-02 RX ADMIN — PIPERACILLIN AND TAZOBACTAM 4.5 G: 4; .5 INJECTION, POWDER, LYOPHILIZED, FOR SOLUTION INTRAVENOUS; PARENTERAL at 11:03

## 2019-03-02 RX ADMIN — PANTOPRAZOLE SODIUM 40 MG: 40 INJECTION, POWDER, FOR SOLUTION INTRAVENOUS at 06:03

## 2019-03-02 RX ADMIN — IOHEXOL 75 ML: 350 INJECTION, SOLUTION INTRAVENOUS at 07:03

## 2019-03-02 RX ADMIN — MORPHINE SULFATE 4 MG: 4 INJECTION INTRAVENOUS at 05:03

## 2019-03-02 RX ADMIN — ONDANSETRON 4 MG: 4 TABLET, ORALLY DISINTEGRATING ORAL at 05:03

## 2019-03-02 NOTE — ED PROVIDER NOTES
Sort note: Madalyn Iverson nontoxic/afebrile 64 y.o.  presented to the ED with c/o abdominal pain with nausea and vomiting.  History of pancreatitis.    Patient seen and medically screened by Nurse practitioner in Sort process due to ED crowding.  Appropriate tests and/or medications ordered.  Care transferred to an alternate provider when patient was placed in an Exam Room from the [facility] for physical exam, additional orders, and disposition. 5:04 PM. REDD Reyes  03/02/19 170

## 2019-03-02 NOTE — ED PROVIDER NOTES
Encounter Date: 3/2/2019    SCRIBE #1 NOTE: I, Becca Morrell, am scribing for, and in the presence of,  Dr. Yuri Barron. I have scribed the entire note.       History     Chief Complaint   Patient presents with    Abdominal Pain     abd pain with nuasea and fever x 3 days. hx of pancreatitis. last dose of tylenol was 0900 this am     Madalyn Iverson is a 64 y.o. female who  has a past medical history of Acute pancreatitis, ADHD (attention deficit hyperactivity disorder), inattentive type, Fibromyalgia, Hyperlipidemia, Hypertension, Hypothyroidism, IFG (impaired fasting glucose), Migraine, Ulcerative colitis, and Vitamin D deficiency (3/31/2016).    Pt presents in the ED today with complaints of mid-epigastric abdominal pain and bloating for the past 3 days. Pt notes that pain radiates around to her left side and back. She confirms nausea. Pt states that pain is currently 10/10  Pt is not a smoker.   She denies EtOH consumption.       The history is provided by the patient.     Review of patient's allergies indicates:  No Known Allergies  Past Medical History:   Diagnosis Date    Acute pancreatitis     2018 - [facility]    ADHD (attention deficit hyperactivity disorder), inattentive type     Fibromyalgia     treated by Dr. Thorpe - Rheumatologist    Hyperlipidemia     High triglycerides    Hypertension     Hypothyroidism     Treated by Dr. Mathew    IFG (impaired fasting glucose)     Migraine     Treated by neurologist - Dr. Destiny Florez    Ulcerative colitis     Patient reported treated by Dr. Lee and Aaron in past  but on admit in 2018 - no UC seen on recent colonoscopy.  It was noted on colonoscopy in 2004 there were ulcers noted to descending colon but not present on sigmoidoscopy in 2004.    Vitamin D deficiency 3/31/2016     Past Surgical History:   Procedure Laterality Date     SECTION      COLONOSCOPY  10/08/2010      Jesus - repeat in 5 years - has appointment for colonoscopy 2016    COLONOSCOPY  2016    Dr. Kelly - normal  colon - repeat in 10 years - scanned report to media file.    HYSTERECTOMY      OOPHORECTOMY      SHOULDER SURGERY Left     TONSILLECTOMY      ULTRASOUND, ENDOSCOPIC, UPPER GI TRACT N/A 2018    [facility]    upper and lower GI  2016     Family History   Problem Relation Age of Onset    Hypertension Mother     Heart disease Mother     Hyperlipidemia Mother     Cancer Sister 53        thyroid cancer and lymph nodes involvement    Cancer Brother 61        colon and lung cancer    Colon cancer Brother 66    Cancer Brother 63        colon    Hypertension Brother     Diabetes Brother     Colon cancer Brother 60    Esophageal cancer Neg Hx     Stomach cancer Neg Hx     Ulcerative colitis Neg Hx     Crohn's disease Neg Hx     Irritable bowel syndrome Neg Hx     Celiac disease Neg Hx      Social History     Tobacco Use    Smoking status: Former Smoker     Packs/day: 1.00     Years: 20.00     Pack years: 20.00     Last attempt to quit: 1997     Years since quittin.0    Smokeless tobacco: Former User   Substance Use Topics    Alcohol use: No    Drug use: No     Review of Systems   Constitutional: Negative for chills and fever.   HENT: Negative for congestion, rhinorrhea and sore throat.    Eyes: Negative for redness and visual disturbance.   Respiratory: Negative for cough, shortness of breath and wheezing.    Cardiovascular: Negative for chest pain and palpitations.   Gastrointestinal: Positive for abdominal pain and nausea. Negative for diarrhea and vomiting.        Abd bloating.    Genitourinary: Negative for dysuria and hematuria.   Musculoskeletal: Negative for back pain, myalgias and neck pain.   Skin: Negative for rash.   Neurological: Negative for dizziness, weakness and light-headedness.   Psychiatric/Behavioral:  Negative for confusion.       Physical Exam     Initial Vitals [03/02/19 1702]   BP Pulse Resp Temp SpO2   (!) 122/59 85 19 98.6 °F (37 °C) 96 %      MAP       --         Physical Exam    Nursing note and vitals reviewed.  Constitutional: She appears well-developed and well-nourished. She is not diaphoretic. No distress.   HENT:   Head: Normocephalic and atraumatic.   Mouth/Throat: Oropharynx is clear and moist.   Eyes: Conjunctivae and EOM are normal. Pupils are equal, round, and reactive to light.   Neck: Normal range of motion. Neck supple.   Cardiovascular: Normal rate, regular rhythm and normal heart sounds. Exam reveals no gallop and no friction rub.    No murmur heard.  Pulmonary/Chest: Breath sounds normal. She has no wheezes. She has no rhonchi. She has no rales.   Abdominal: Soft. Bowel sounds are normal. There is no rebound and no guarding.   LUQ and mid epigastric tenderness w/ palpation.    Musculoskeletal: Normal range of motion. She exhibits no edema or tenderness.   Lymphadenopathy:     She has no cervical adenopathy.   Neurological: She is alert and oriented to person, place, and time. She has normal strength.   Skin: Skin is warm and dry. Capillary refill takes less than 2 seconds. No rash noted.         ED Course   Procedures  Labs Reviewed   CBC W/ AUTO DIFFERENTIAL - Abnormal; Notable for the following components:       Result Value    WBC 15.14 (*)     MCH 31.2 (*)     Gran # (ANC) 12.1 (*)     Mono # 1.5 (*)     Gran% 79.6 (*)     Lymph% 9.1 (*)     All other components within normal limits   COMPREHENSIVE METABOLIC PANEL - Abnormal; Notable for the following components:    Sodium 135 (*)     Alkaline Phosphatase 180 (*)     eGFR if non  59 (*)     All other components within normal limits   URINALYSIS, REFLEX TO URINE CULTURE - Abnormal; Notable for the following components:    Color, UA Orange (*)     Appearance, UA Hazy (*)     Specific Gravity, UA >=1.030 (*)     Protein,  UA 1+ (*)     Ketones, UA Trace (*)     Bilirubin (UA) 2+ (*)     Nitrite, UA Positive (*)     All other components within normal limits    Narrative:     Preferred Collection Type->Urine, Clean Catch   AMYLASE - Abnormal; Notable for the following components:    Amylase 138 (*)     All other components within normal limits   URINALYSIS MICROSCOPIC - Abnormal; Notable for the following components:    Bacteria, UA Few (*)     All other components within normal limits    Narrative:     Preferred Collection Type->Urine, Clean Catch   CULTURE, BLOOD   CULTURE, BLOOD   LIPASE   LACTIC ACID, PLASMA          Imaging Results          CT Abdomen Pelvis With Contrast (Final result)  Result time 03/02/19 19:09:48    Final result by Violeta Woodward MD (03/02/19 19:09:48)                 Impression:      1. Peripancreatic inflammatory changes consistent with acute pancreatitis.  2. Additional findings as detailed above.      Electronically signed by: Violeta Woodward MD  Date:    03/02/2019  Time:    19:09             Narrative:    EXAMINATION:  CT ABDOMEN PELVIS WITH CONTRAST    CLINICAL HISTORY:  luq and midepigastric pain, hx of pancreatitis with nl lipase;    TECHNIQUE:  Low dose axial images, sagittal and coronal reformations were obtained from the lung bases to the pubic symphysis following the IV administration of 75 mL of Omnipaque 350 .  Oral contrast was not given.    COMPARISON:  CT abdomen and pelvis from June 2018.    FINDINGS:  The visualized portion of the heart is unremarkable.  Mild atelectatic changes are seen at the lung bases.    Peripancreatic inflammatory changes are seen consistent with acute pancreatitis.  Pancreatic parenchyma enhances normally.  No evidence of pancreatic necrosis or pseudocyst.  Portal vein and splenic vein are patent.  No significant hepatic abnormalities are identified.  There is no intra-or extrahepatic biliary ductal dilatation.  The gallbladder is unremarkable.  The stomach,  spleen, and adrenal glands are unremarkable.    Kidneys enhance normally with no evidence of hydronephrosis.  No abnormalities are seen along the ureteral courses.  Urinary bladder is nondistended.  Uterus has been removed.    Appendix is visualized and is unremarkable.  The visualized loops of small and large bowel show no evidence of obstruction or inflammation.  Scattered colonic diverticula are seen without evidence of acute diverticulitis.  No free air or free fluid.    Aorta tapers normally with mild to moderate atherosclerosis.  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    No acute osseous abnormality identified.  Multilevel degenerative changes are visualized within the spine.  Subcutaneous soft tissue structures are unremarkable.                                 Medical Decision Making:   Clinical Tests:   Lab Tests: Ordered and Reviewed  ED Management:  6:21 PM Pt continues to have pain s/p Morphine 4 mg.       Medical decision making patient with persistent left upper quadrant pain after receiving a L of fluid, 4 mg of morphine IV, 0.5 mg of Dilaudid and Zofran 4 mg requesting admission for pain control.  Case discussed with , who will admit the patient for Dr. Salgado, CT demonstrates inflammation of the pancreatic head no peritoneal fluid or evidence of perforation.  Slight elevation of white blood count otherwise unremarkable chemistries and laboratory.                          Clinical Impression:     1. Idiopathic acute pancreatitis without infection or necrosis    2. Left upper quadrant pain                  I, Dr. Yuri Barron, personally performed the services described in this documentation.   All medical record entries made by the scribe were at my direction and in my presence.   I have reviewed the chart and agree that the record is accurate and complete.   Yuri Barron MD.  7:41 PM 03/02/2019              Yuri Barron MD  03/02/19 1942

## 2019-03-03 PROBLEM — K85.10 ACUTE BILIARY PANCREATITIS WITHOUT INFECTION OR NECROSIS: Status: ACTIVE | Noted: 2018-06-13

## 2019-03-03 PROBLEM — D64.9 ANEMIA: Status: ACTIVE | Noted: 2019-03-03

## 2019-03-03 PROBLEM — E87.1 HYPONATREMIA: Status: ACTIVE | Noted: 2019-03-03

## 2019-03-03 PROBLEM — D72.829 LEUKOCYTOSIS: Status: ACTIVE | Noted: 2019-03-03

## 2019-03-03 LAB
ALBUMIN SERPL BCP-MCNC: 2.7 G/DL
ALP SERPL-CCNC: 132 U/L
ALT SERPL W/O P-5'-P-CCNC: 19 U/L
ANION GAP SERPL CALC-SCNC: 9 MMOL/L
AST SERPL-CCNC: 17 U/L
BASOPHILS # BLD AUTO: 0.04 K/UL
BASOPHILS NFR BLD: 0.3 %
BILIRUB SERPL-MCNC: 1 MG/DL
BUN SERPL-MCNC: 7 MG/DL
CALCIUM SERPL-MCNC: 9 MG/DL
CHLORIDE SERPL-SCNC: 103 MMOL/L
CO2 SERPL-SCNC: 23 MMOL/L
CREAT SERPL-MCNC: 0.7 MG/DL
DIFFERENTIAL METHOD: ABNORMAL
EOSINOPHIL # BLD AUTO: 0.1 K/UL
EOSINOPHIL NFR BLD: 1 %
ERYTHROCYTE [DISTWIDTH] IN BLOOD BY AUTOMATED COUNT: 14.1 %
EST. GFR  (AFRICAN AMERICAN): >60 ML/MIN/1.73 M^2
EST. GFR  (NON AFRICAN AMERICAN): >60 ML/MIN/1.73 M^2
GGT SERPL-CCNC: 53 U/L
GLUCOSE SERPL-MCNC: 88 MG/DL
HCT VFR BLD AUTO: 35.7 %
HGB BLD-MCNC: 11.8 G/DL
LIPASE SERPL-CCNC: 22 U/L
LYMPHOCYTES # BLD AUTO: 1.5 K/UL
LYMPHOCYTES NFR BLD: 11.2 %
MAGNESIUM SERPL-MCNC: 2.2 MG/DL
MCH RBC QN AUTO: 31.2 PG
MCHC RBC AUTO-ENTMCNC: 33.1 G/DL
MCV RBC AUTO: 94 FL
MONOCYTES # BLD AUTO: 1.6 K/UL
MONOCYTES NFR BLD: 11.8 %
NEUTROPHILS # BLD AUTO: 10.2 K/UL
NEUTROPHILS NFR BLD: 75.5 %
PHOSPHATE SERPL-MCNC: 3.1 MG/DL
PLATELET # BLD AUTO: 172 K/UL
PMV BLD AUTO: 11.6 FL
POTASSIUM SERPL-SCNC: 3.9 MMOL/L
PROT SERPL-MCNC: 6.3 G/DL
RBC # BLD AUTO: 3.78 M/UL
SODIUM SERPL-SCNC: 135 MMOL/L
TRIGL SERPL-MCNC: 89 MG/DL
WBC # BLD AUTO: 13.51 K/UL

## 2019-03-03 PROCEDURE — 80053 COMPREHEN METABOLIC PANEL: CPT

## 2019-03-03 PROCEDURE — 83690 ASSAY OF LIPASE: CPT

## 2019-03-03 PROCEDURE — 63600175 PHARM REV CODE 636 W HCPCS: Performed by: EMERGENCY MEDICINE

## 2019-03-03 PROCEDURE — 25000003 PHARM REV CODE 250: Performed by: FAMILY MEDICINE

## 2019-03-03 PROCEDURE — 11000001 HC ACUTE MED/SURG PRIVATE ROOM

## 2019-03-03 PROCEDURE — 99222 PR INITIAL HOSPITAL CARE,LEVL II: ICD-10-PCS | Mod: ,,, | Performed by: STUDENT IN AN ORGANIZED HEALTH CARE EDUCATION/TRAINING PROGRAM

## 2019-03-03 PROCEDURE — 85025 COMPLETE CBC W/AUTO DIFF WBC: CPT

## 2019-03-03 PROCEDURE — 63600175 PHARM REV CODE 636 W HCPCS: Performed by: HOSPITALIST

## 2019-03-03 PROCEDURE — 63600175 PHARM REV CODE 636 W HCPCS: Performed by: FAMILY MEDICINE

## 2019-03-03 PROCEDURE — 96375 TX/PRO/DX INJ NEW DRUG ADDON: CPT

## 2019-03-03 PROCEDURE — 27000221 HC OXYGEN, UP TO 24 HOURS

## 2019-03-03 PROCEDURE — 82977 ASSAY OF GGT: CPT

## 2019-03-03 PROCEDURE — 84100 ASSAY OF PHOSPHORUS: CPT

## 2019-03-03 PROCEDURE — 84478 ASSAY OF TRIGLYCERIDES: CPT

## 2019-03-03 PROCEDURE — 36415 COLL VENOUS BLD VENIPUNCTURE: CPT

## 2019-03-03 PROCEDURE — 96376 TX/PRO/DX INJ SAME DRUG ADON: CPT

## 2019-03-03 PROCEDURE — 83735 ASSAY OF MAGNESIUM: CPT

## 2019-03-03 PROCEDURE — 94761 N-INVAS EAR/PLS OXIMETRY MLT: CPT

## 2019-03-03 PROCEDURE — 99222 1ST HOSP IP/OBS MODERATE 55: CPT | Mod: ,,, | Performed by: STUDENT IN AN ORGANIZED HEALTH CARE EDUCATION/TRAINING PROGRAM

## 2019-03-03 PROCEDURE — 25000003 PHARM REV CODE 250: Performed by: PHYSICIAN ASSISTANT

## 2019-03-03 RX ORDER — TRAZODONE HYDROCHLORIDE 100 MG/1
100 TABLET ORAL NIGHTLY
Status: DISCONTINUED | OUTPATIENT
Start: 2019-03-03 | End: 2019-03-03

## 2019-03-03 RX ORDER — HYDROMORPHONE HYDROCHLORIDE 1 MG/ML
1 INJECTION, SOLUTION INTRAMUSCULAR; INTRAVENOUS; SUBCUTANEOUS
Status: DISCONTINUED | OUTPATIENT
Start: 2019-03-03 | End: 2019-03-06

## 2019-03-03 RX ORDER — HYDROMORPHONE HYDROCHLORIDE 1 MG/ML
0.5 INJECTION, SOLUTION INTRAMUSCULAR; INTRAVENOUS; SUBCUTANEOUS
Status: DISCONTINUED | OUTPATIENT
Start: 2019-03-03 | End: 2019-03-06

## 2019-03-03 RX ORDER — ONDANSETRON 2 MG/ML
4 INJECTION INTRAMUSCULAR; INTRAVENOUS EVERY 6 HOURS PRN
Status: DISCONTINUED | OUTPATIENT
Start: 2019-03-03 | End: 2019-03-07 | Stop reason: HOSPADM

## 2019-03-03 RX ORDER — TIZANIDINE 4 MG/1
4 TABLET ORAL EVERY 8 HOURS PRN
Status: DISCONTINUED | OUTPATIENT
Start: 2019-03-03 | End: 2019-03-07 | Stop reason: HOSPADM

## 2019-03-03 RX ORDER — HYDROMORPHONE HYDROCHLORIDE 1 MG/ML
1 INJECTION, SOLUTION INTRAMUSCULAR; INTRAVENOUS; SUBCUTANEOUS EVERY 4 HOURS
Status: DISCONTINUED | OUTPATIENT
Start: 2019-03-03 | End: 2019-03-03

## 2019-03-03 RX ORDER — THYROID 30 MG/1
60 TABLET ORAL
Status: DISCONTINUED | OUTPATIENT
Start: 2019-03-04 | End: 2019-03-07 | Stop reason: HOSPADM

## 2019-03-03 RX ORDER — SODIUM CHLORIDE, SODIUM LACTATE, POTASSIUM CHLORIDE, CALCIUM CHLORIDE 600; 310; 30; 20 MG/100ML; MG/100ML; MG/100ML; MG/100ML
INJECTION, SOLUTION INTRAVENOUS CONTINUOUS
Status: DISCONTINUED | OUTPATIENT
Start: 2019-03-03 | End: 2019-03-04

## 2019-03-03 RX ORDER — ACETAMINOPHEN 325 MG/1
650 TABLET ORAL EVERY 4 HOURS PRN
Status: DISCONTINUED | OUTPATIENT
Start: 2019-03-03 | End: 2019-03-07 | Stop reason: HOSPADM

## 2019-03-03 RX ADMIN — FOLIC ACID 1 MG: 1 TABLET ORAL at 08:03

## 2019-03-03 RX ADMIN — DICYCLOMINE HYDROCHLORIDE 20 MG: 20 TABLET ORAL at 08:03

## 2019-03-03 RX ADMIN — TRAZODONE HYDROCHLORIDE 100 MG: 100 TABLET ORAL at 08:03

## 2019-03-03 RX ADMIN — HYDROMORPHONE HYDROCHLORIDE 0.5 MG: 1 INJECTION, SOLUTION INTRAMUSCULAR; INTRAVENOUS; SUBCUTANEOUS at 08:03

## 2019-03-03 RX ADMIN — ESTRADIOL 2 MG: 0.5 TABLET ORAL at 08:03

## 2019-03-03 RX ADMIN — ONDANSETRON 4 MG: 2 INJECTION INTRAMUSCULAR; INTRAVENOUS at 04:03

## 2019-03-03 RX ADMIN — HYDROMORPHONE HYDROCHLORIDE 0.5 MG: 1 INJECTION, SOLUTION INTRAMUSCULAR; INTRAVENOUS; SUBCUTANEOUS at 11:03

## 2019-03-03 RX ADMIN — AMLODIPINE BESYLATE 10 MG: 5 TABLET ORAL at 08:03

## 2019-03-03 RX ADMIN — DICYCLOMINE HYDROCHLORIDE 20 MG: 20 TABLET ORAL at 02:03

## 2019-03-03 RX ADMIN — DICYCLOMINE HYDROCHLORIDE 20 MG: 20 TABLET ORAL at 05:03

## 2019-03-03 RX ADMIN — HYDROMORPHONE HYDROCHLORIDE 1 MG: 1 INJECTION, SOLUTION INTRAMUSCULAR; INTRAVENOUS; SUBCUTANEOUS at 03:03

## 2019-03-03 RX ADMIN — HYDROMORPHONE HYDROCHLORIDE 0.5 MG: 1 INJECTION, SOLUTION INTRAMUSCULAR; INTRAVENOUS; SUBCUTANEOUS at 03:03

## 2019-03-03 RX ADMIN — HYDROMORPHONE HYDROCHLORIDE 0.5 MG: 1 INJECTION, SOLUTION INTRAMUSCULAR; INTRAVENOUS; SUBCUTANEOUS at 06:03

## 2019-03-03 RX ADMIN — SODIUM CHLORIDE, SODIUM LACTATE, POTASSIUM CHLORIDE, AND CALCIUM CHLORIDE: .6; .31; .03; .02 INJECTION, SOLUTION INTRAVENOUS at 07:03

## 2019-03-03 RX ADMIN — METOPROLOL SUCCINATE 50 MG: 50 TABLET, EXTENDED RELEASE ORAL at 08:03

## 2019-03-03 RX ADMIN — ONDANSETRON 4 MG: 2 INJECTION INTRAMUSCULAR; INTRAVENOUS at 11:03

## 2019-03-03 NOTE — H&P
[facility] Medicine  History & Physical    Patient Name: Madalyn Iverson  MRN: [id number]  Admission Date: 3/2/2019  Attending Physician: Eitan Lora MD   Primary Care Provider: Ruth Jaime NP         Patient information was obtained from patient and ER records.     Subjective:     Principal Problem:Acute pancreatitis    Chief Complaint:   Chief Complaint   Patient presents with    Abdominal Pain     abd pain with nuasea and fever x 3 days. hx of pancreatitis. last dose of tylenol was 0900 this am        HPI: 64 yr old pleasant white female with ADD, HTN, HLD, hypothyroidism, IGT, migraine, pancreatitis, UC, presents to [facility] complaining of severe upper abdominal pain. Onset 3-4 days agoa nd rapidly worsening. It is aching type, 10/10 in severity and associated with nausea and SOB. No chest pain, vomiting, diarrhea or constipation. She reported febrile and had fever while in emergency. On presentation, she has leukocytosis, fever, and CT showed acute pancreatitis. She will be admitted for fluids, bowel rest, pain control and antibiotics.    Past Medical History:   Diagnosis Date    Acute pancreatitis     2018 - [facility]    ADHD (attention deficit hyperactivity disorder), inattentive type     Fibromyalgia     treated by Dr. Thorpe - Rheumatologist    Hyperlipidemia     High triglycerides    Hypertension     Hypothyroidism     Treated by Dr. Mathew    IFG (impaired fasting glucose)     Migraine     Treated by neurologist - Dr. Destiny Florez    Ulcerative colitis     Patient reported treated by Dr. Mcfadden in past  but on admit in 2018 - no UC seen on recent colonoscopy.  It was noted on colonoscopy in 2004 there were ulcers noted to descending colon but not present on sigmoidoscopy in 2004.    Vitamin D deficiency 3/31/2016       Past Surgical History:   Procedure Laterality Date      "SECTION      COLONOSCOPY  10/08/2010    Dr. Lee - repeat in 5 years - has appointment for colonoscopy 4/6/2016    COLONOSCOPY  04/06/2016    Dr. Kelly - normal  colon - repeat in 10 years - scanned report to media file.    HYSTERECTOMY      OOPHORECTOMY      SHOULDER SURGERY Left     TONSILLECTOMY      ULTRASOUND, ENDOSCOPIC, UPPER GI TRACT N/A 7/18/2018    Performed by Reji Russell [facility] at [facility]    upper and lower GI  03/2016       Review of patient's allergies indicates:  No Known Allergies    No current facility-administered medications on file prior to encounter.      Current Outpatient Medications on File Prior to Encounter   Medication Sig    ALPRAZolam (XANAX) 0.5 MG tablet TAKE 1 TABLET BY MOUTH 3 TIMES A DAY    amLODIPine (NORVASC) 10 MG tablet Take 1 tablet (10 mg total) by mouth once daily.    thyroid, pork, (ARMOUR THYROID) 60 mg Tab [place] Thyroid 60 mg tablet    cyanocobalamin 1,000 mcg/mL injection Inject 1 mL (1,000 mcg total) into the skin every 30 days.    CYANOCOBALAMIN, VITAMIN B-12, (VITAMIN B-12 INJ) Inject 1,000 mcg as directed every 30 days.    dextroamphetamine-amphetamine 10 mg Tab Take 10 mg by mouth 2 (two) times daily.    diclofenac sodium 1 % Gel Apply 2 g topically 4 (four) times daily.    dicyclomine (BENTYL) 20 mg tablet Take 1 tablet (20 mg total) by mouth 4 (four) times daily.    estradiol (ESTRACE) 2 MG tablet Take 2 mg by mouth once daily.    folic acid (FOLVITE) 1 MG tablet Take 1 tablet (1 mg total) by mouth once daily.    furosemide (LASIX) 40 MG tablet TAKE 1 TABLET BY MOUTH EVERY DAY    insulin syringe-needle U-100 1 mL 25 gauge x 5/8" Syrg 1 mL by Misc.(Non-Drug; Combo Route) route every 30 days.    methotrexate 2.5 MG Tab Take 8 tabs once a week only    metoprolol succinate (TOPROL-XL) 50 MG 24 hr tablet TAKE 1 TABLET BY MOUTH EVERY DAY    potassium chloride (MICRO-K) 10 MEQ CpSR TAKE 1 CAPSULE BY MOUTH TWICE A DAY    progesterone " (PROMETRIUM) 100 MG capsule Take 100 mg by mouth once daily.    rosuvastatin (CRESTOR) 20 MG tablet Take 1 tablet (20 mg total) by mouth once daily.    tiZANidine 4 mg Cap Take by mouth.    traMADol (ULTRAM) 50 mg tablet Take 1 tablet (50 mg total) by mouth every 6 (six) hours as needed for Pain.    traMADol (ULTRAM) 50 mg tablet Take 1 tablet (50 mg total) by mouth every 12 (twelve) hours as needed for Pain.    traZODone (DESYREL) 100 MG tablet TAKE 1 TABLET (100 MG TOTAL) BY MOUTH EVERY EVENING.    traZODone (DESYREL) 50 MG tablet Take 1 tablet (50 mg total) by mouth every evening.    valsartan (DIOVAN) 80 MG tablet TAKE 1 TABLET BY MOUTH EVERY DAY    VITAMIN D2 50,000 unit capsule TAKE 1 CAPSULE (50,000 UNITS TOTAL) BY MOUTH EVERY 7 DAYS. FOR 12 DOSES     Family History     Problem Relation (Age of Onset)    Cancer Sister (53), Brother (61), Brother (63)    Colon cancer Brother (66), Brother (60)    Diabetes Brother    Heart disease Mother    Hyperlipidemia Mother    Hypertension Mother, Brother        Tobacco Use    Smoking status: Former Smoker     Packs/day: 1.00     Years: 20.00     Pack years: 20.00     Last attempt to quit: 1997     Years since quittin.0    Smokeless tobacco: Former User   Substance and Sexual Activity    Alcohol use: No    Drug use: No    Sexual activity: No     Review of Systems   Constitutional: Positive for fatigue and fever. Negative for activity change, diaphoresis and unexpected weight change.   HENT: Negative.  Negative for congestion, ear discharge, hearing loss, rhinorrhea, sore throat and voice change.    Eyes: Negative.  Negative for pain, discharge and visual disturbance.   Respiratory: Negative.  Negative for chest tightness, shortness of breath and wheezing.    Cardiovascular: Negative.  Negative for chest pain.   Gastrointestinal: Positive for abdominal distention and abdominal pain. Negative for anal bleeding, constipation and nausea.   Endocrine:  Negative.  Negative for cold intolerance, polydipsia and polyuria.   Genitourinary: Negative.  Negative for decreased urine volume, difficulty urinating, dysuria, frequency, menstrual problem and vaginal pain.   Musculoskeletal: Positive for arthralgias and myalgias. Negative for gait problem.   Skin: Negative.  Negative for color change, pallor and wound.   Allergic/Immunologic: Negative.  Negative for environmental allergies and immunocompromised state.   Neurological: Negative.  Negative for dizziness, tremors, seizures, speech difficulty and headaches.   Hematological: Negative.  Negative for adenopathy. Does not bruise/bleed easily.   Psychiatric/Behavioral: Negative.  Negative for agitation, confusion, decreased concentration, hallucinations, self-injury and suicidal ideas. The patient is not nervous/anxious.      Objective:     Vital Signs (Most Recent):  Temp: 98.7 °F (37.1 °C) (03/02/19 2108)  Pulse: 106 (03/02/19 2108)  Resp: 18 (03/02/19 2108)  BP: (!) 169/74 (03/02/19 2108)  SpO2: 97 % (03/02/19 2108) Vital Signs (24h Range):  Temp:  [98.6 °F (37 °C)-98.7 °F (37.1 °C)] 98.7 °F (37.1 °C)  Pulse:  [] 106  Resp:  [18-19] 18  SpO2:  [90 %-97 %] 97 %  BP: (122-174)/(59-81) 169/74     Weight: 82 kg (180 lb 12.4 oz)  Body mass index is 29.18 kg/m².    Physical Exam   Abdominal: She exhibits distension. Bowel sounds are decreased. There is tenderness in the right upper quadrant, epigastric area and left upper quadrant. There is rebound and guarding.              Significant Labs:   Admission on 03/02/2019   Component Date Value Ref Range Status    WBC 03/02/2019 15.14* 3.90 - 12.70 K/uL Final    RBC 03/02/2019 4.45  4.00 - 5.40 M/uL Final    Hemoglobin 03/02/2019 13.9  12.0 - 16.0 g/dL Final    Hematocrit 03/02/2019 42.0  37.0 - 48.5 % Final    MCV 03/02/2019 94  82 - 98 fL Final    MCH 03/02/2019 31.2* 27.0 - 31.0 pg Final    MCHC 03/02/2019 33.1  32.0 - 36.0 g/dL Final    RDW 03/02/2019 14.1   11.5 - 14.5 % Final    Platelets 03/02/2019 205  150 - 350 K/uL Final    MPV 03/02/2019 11.9  9.2 - 12.9 fL Final    Gran # (ANC) 03/02/2019 12.1* 1.8 - 7.7 K/uL Final    Lymph # 03/02/2019 1.4  1.0 - 4.8 K/uL Final    Mono # 03/02/2019 1.5* 0.3 - 1.0 K/uL Final    Eos # 03/02/2019 0.1  0.0 - 0.5 K/uL Final    Baso # 03/02/2019 0.03  0.00 - 0.20 K/uL Final    Gran% 03/02/2019 79.6* 38.0 - 73.0 % Final    Lymph% 03/02/2019 9.1* 18.0 - 48.0 % Final    Mono% 03/02/2019 10.0  4.0 - 15.0 % Final    Eosinophil% 03/02/2019 0.7  0.0 - 8.0 % Final    Basophil% 03/02/2019 0.2  0.0 - 1.9 % Final    Differential Method 03/02/2019 Automated   Final    Sodium 03/02/2019 135* 136 - 145 mmol/L Final    Potassium 03/02/2019 3.8  3.5 - 5.1 mmol/L Final    Chloride 03/02/2019 99  95 - 110 mmol/L Final    CO2 03/02/2019 24  23 - 29 mmol/L Final    Glucose 03/02/2019 106  70 - 110 mg/dL Final    BUN, Bld 03/02/2019 13  8 - 23 mg/dL Final    Creatinine 03/02/2019 1.0  0.5 - 1.4 mg/dL Final    Calcium 03/02/2019 9.7  8.7 - 10.5 mg/dL Final    Total Protein 03/02/2019 7.9  6.0 - 8.4 g/dL Final    Albumin 03/02/2019 3.5  3.5 - 5.2 g/dL Final    Total Bilirubin 03/02/2019 0.8  0.1 - 1.0 mg/dL Final    Comment: For infants and newborns, interpretation of results should be based  on gestational age, weight and in agreement with clinical  observations.  Premature Infant recommended reference ranges:  Up to 24 hours.............<8.0 mg/dL  Up to 48 hours............<12.0 mg/dL  3-5 days..................<15.0 mg/dL  6-29 days.................<15.0 mg/dL      Alkaline Phosphatase 03/02/2019 180* 55 - 135 U/L Final    AST 03/02/2019 36  10 - 40 U/L Final    ALT 03/02/2019 30  10 - 44 U/L Final    Anion Gap 03/02/2019 12  8 - 16 mmol/L Final    eGFR if African American 03/02/2019 >60  >60 mL/min/1.73 m^2 Final    eGFR if non African American 03/02/2019 59* >60 mL/min/1.73 m^2 Final    Comment: Calculation used to  obtain the estimated glomerular filtration  rate (eGFR) is the CKD-EPI equation.       Lipase 03/02/2019 49  4 - 60 U/L Final    Specimen UA 03/02/2019 Urine, Clean Catch   Final    Color, UA 03/02/2019 Orange* Yellow, Straw, Gabriela Final    Appearance, UA 03/02/2019 Hazy* Clear Final    pH, UA 03/02/2019 6.0  5.0 - 8.0 Final    Specific Gravity, UA 03/02/2019 >=1.030* 1.005 - 1.030 Final    Protein, UA 03/02/2019 1+* Negative Final    Comment: Recommend a 24 hour urine protein or a urine   protein/creatinine ratio if globulin induced proteinuria is  clinically suspected.      Glucose, UA 03/02/2019 Negative  Negative Final    Ketones, UA 03/02/2019 Trace* Negative Final    Bilirubin (UA) 03/02/2019 2+* Negative Final    Comment: Positive urine bilirubin is not confirmed. Correlate with   serum bilirubin and clinical presentation.      Occult Blood UA 03/02/2019 Negative  Negative Final    Nitrite, UA 03/02/2019 Positive* Negative Final    Urobilinogen, UA 03/02/2019 1.0  <2.0 EU/dL Final    Leukocytes, UA 03/02/2019 Negative  Negative Final    Lactate (Lactic Acid) 03/02/2019 1.2  0.5 - 2.2 mmol/L Final    Comment: Falsely low lactic acid results can be found in samples   containing >=13.0 mg/dL total bilirubin and/or >=3.5 mg/dL   direct bilirubin.      Amylase 03/02/2019 138* 20 - 110 U/L Final    RBC, UA 03/02/2019 2  0 - 4 /hpf Final    WBC, UA 03/02/2019 3  0 - 5 /hpf Final    Bacteria, UA 03/02/2019 Few* None-Occ /hpf Final    Hyaline Casts, UA 03/02/2019 0  0-1/lpf /lpf Final    Microscopic Comment 03/02/2019 SEE COMMENT   Final    Comment: Other formed elements not mentioned in the report are not   present in the microscopic examination.            Significant Imaging: I have reviewed and interpreted all pertinent imaging results/findings within the past 24 hours.     Imaging Results          CT Abdomen Pelvis With Contrast (Final result)  Result time 03/02/19 19:09:48    Final result by  Violeta Woodward MD (03/02/19 19:09:48)                 Impression:      1. Peripancreatic inflammatory changes consistent with acute pancreatitis.  2. Additional findings as detailed above.      Electronically signed by: Violeta Woodward MD  Date:    03/02/2019  Time:    19:09             Narrative:    EXAMINATION:  CT ABDOMEN PELVIS WITH CONTRAST    CLINICAL HISTORY:  luq and midepigastric pain, hx of pancreatitis with nl lipase;    TECHNIQUE:  Low dose axial images, sagittal and coronal reformations were obtained from the lung bases to the pubic symphysis following the IV administration of 75 mL of Omnipaque 350 .  Oral contrast was not given.    COMPARISON:  CT abdomen and pelvis from June 2018.    FINDINGS:  The visualized portion of the heart is unremarkable.  Mild atelectatic changes are seen at the lung bases.    Peripancreatic inflammatory changes are seen consistent with acute pancreatitis.  Pancreatic parenchyma enhances normally.  No evidence of pancreatic necrosis or pseudocyst.  Portal vein and splenic vein are patent.  No significant hepatic abnormalities are identified.  There is no intra-or extrahepatic biliary ductal dilatation.  The gallbladder is unremarkable.  The stomach, spleen, and adrenal glands are unremarkable.    Kidneys enhance normally with no evidence of hydronephrosis.  No abnormalities are seen along the ureteral courses.  Urinary bladder is nondistended.  Uterus has been removed.    Appendix is visualized and is unremarkable.  The visualized loops of small and large bowel show no evidence of obstruction or inflammation.  Scattered colonic diverticula are seen without evidence of acute diverticulitis.  No free air or free fluid.    Aorta tapers normally with mild to moderate atherosclerosis.  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    No acute osseous abnormality identified.  Multilevel degenerative changes are visualized within the spine.  Subcutaneous soft tissue structures are  unremarkable.                                  Assessment/Plan:     Active Diagnoses:    Diagnosis Date Noted POA    PRINCIPAL PROBLEM:  Acute pancreatitis [K85.90] 06/13/2018 Yes    Idiopathic acute pancreatitis without infection or necrosis [K85.00] 03/02/2019 Unknown    Hyperlipidemia [E78.5] 05/15/2017 Yes    Hypothyroidism [E03.9] 03/23/2016 Yes    Essential hypertension [I10]  Yes      Problems Resolved During this Admission:     VTE Risk Mitigation (From admission, onward)        Ordered     IP VTE LOW RISK PATIENT  Once      03/02/19 2035     Place sequential compression device  Until discontinued      03/02/19 2035        Admit to Hospital Medicine    1. Acute pancreatitis  -bowel rest  -IV Fluids  -IV Zosyn     2. HTN  -continue home meds    3. Hypothyroidism  -continue synthroid    4. HLD  -continue statin    5. Prophylaxis  -SCD, PPI    Spent adequate time in obtaining history and explaining differentials      Cruz Finn MD  Department of Hospital Medicine   [facility]

## 2019-03-03 NOTE — ASSESSMENT & PLAN NOTE
Leukocytosis  Had similar episode in June 2018 with same findings on CT scan at that time. Alk phos was elevated to 191 then other LFTs normal. Had EUS in July 2018 that showed normal pancreas, normal CBD duct, and normal liver, but she did have sludge in the gallbladder. Alk phos is elevated still and other LFTs remain WNL. It is possible that the sludge could be causing the recurrence of pancreatitis. She is on a couple medications that could be causing the pancreatitis as well with estradiol and furosemide. Will hold these for now. Will consult GI and General Surgery for evaluation and consideration of the sludge as the cause of her recurrent pancreatitis.

## 2019-03-03 NOTE — SUBJECTIVE & OBJECTIVE
Interval History: Still having significant epigastric pain that radiates to the back. Mild nausea, but is asking to eat. Feeling short of breath with the pain.     Review of Systems   Constitutional: Negative for chills and fever.   Respiratory: Positive for shortness of breath. Negative for cough.    Cardiovascular: Negative for chest pain and palpitations.   Gastrointestinal: Positive for abdominal pain and nausea. Negative for vomiting.     Objective:     Vital Signs (Most Recent):  Temp: 99.2 °F (37.3 °C) (03/03/19 1004)  Pulse: 103 (03/03/19 1004)  Resp: 20 (03/03/19 1004)  BP: (!) 148/62 (03/03/19 1004)  SpO2: 99 % (03/03/19 1004) Vital Signs (24h Range):  Temp:  [98.6 °F (37 °C)-100 °F (37.8 °C)] 99.2 °F (37.3 °C)  Pulse:  [] 103  Resp:  [18-20] 20  SpO2:  [90 %-99 %] 99 %  BP: (122-182)/(59-86) 148/62     Weight: 80.2 kg (176 lb 12.9 oz)  Body mass index is 28.54 kg/m².    Intake/Output Summary (Last 24 hours) at 3/3/2019 1045  Last data filed at 3/3/2019 0811  Gross per 24 hour   Intake 1131.25 ml   Output 900 ml   Net 231.25 ml      Physical Exam   Constitutional: She is oriented to person, place, and time. She appears well-developed and well-nourished. No distress. Nasal cannula in place.   Cardiovascular: Normal rate and regular rhythm.   No murmur heard.  Pulmonary/Chest: Effort normal and breath sounds normal. No respiratory distress. She has no wheezes.   Abdominal: Soft. Bowel sounds are normal. She exhibits distension. There is tenderness.   Musculoskeletal: She exhibits no edema.   Neurological: She is alert and oriented to person, place, and time.   Skin: Skin is warm and dry.   Psychiatric: She has a normal mood and affect. Her behavior is normal.   Nursing note and vitals reviewed.      Significant Labs:   A1C:   Recent Labs   Lab 02/28/19  0837   HGBA1C 5.9*     CBC:   Recent Labs   Lab 03/02/19  1728 03/03/19  0622   WBC 15.14* 13.51*   HGB 13.9 11.8*   HCT 42.0 35.7*    172      CMP:   Recent Labs   Lab 03/02/19  1728 03/03/19  0621   * 135*   K 3.8 3.9   CL 99 103   CO2 24 23    88   BUN 13 7*   CREATININE 1.0 0.7   CALCIUM 9.7 9.0   PROT 7.9 6.3   ALBUMIN 3.5 2.7*   BILITOT 0.8 1.0   ALKPHOS 180* 132   AST 36 17   ALT 30 19   ANIONGAP 12 9   EGFRNONAA 59* >60     Lipase:   Recent Labs   Lab 03/02/19  1728 03/03/19  0621   LIPASE 49 22     Lipid Panel:   Recent Labs   Lab 03/03/19  0621   TRIG 89     Magnesium:   Recent Labs   Lab 03/03/19  0621   MG 2.2       Significant Imaging: I have reviewed all pertinent imaging results/findings within the past 24 hours.

## 2019-03-03 NOTE — ASSESSMENT & PLAN NOTE
SBP ranged 122 to 182. Holding her home furosemide and olmesartan. Continue home amlodipine 10 mg daily and Toprol XL 50 mg daily. Hydralazine prn.

## 2019-03-03 NOTE — CONSULTS
Patient ID: Madalyn Iverson is a 64 y.o. female.    Chief Complaint: Abdominal Pain (abd pain with nuasea and fever x 3 days. hx of pancreatitis. last dose of tylenol was 0900 this am)      HPI:  64F presented to ED with worsening epigastric pain now severe. Admitted by medicine service for pancreatitis when CT showed inflammation around pancreas. She does report pain radiates to nereida upper abdomen and around to her back. She states that this pain has been constant for months but not this bad. Does not seem to get worse with eating. She does have off and on nausea for the past few months. In may of last year she presented with the same pain. She underwent EUS which showed some sludge in the gallbladder. CT then also showed inflammation around her pancreas. This pain improved but never completely resolved. She has seen Dr Cesar Quan with GI at [facility] but not recently since this pain started. She says she has a hx of crohns that was diagnosed in 2004 when she would have intermittent lower abdominal cramping and diarrhea, sometimes with blood. No bloody stool recently. She is not being treated for crohns. States that she feels distended like she overate all the time. She feels she has gained significant amount of weight in the past few months.  Feels about the same as she did yesterday. GI is consulted. Surgery consulted for evaluation and reocmmendations regarding possible biliary pancreatitis needing cholecystectomy. Denies etoh. Stopped smoking over 20 years ago.        Review of Systems   Constitutional: Positive for unexpected weight change. Negative for fever.   HENT: Negative for trouble swallowing.    Respiratory: Negative for shortness of breath.    Cardiovascular: Negative for chest pain.   Gastrointestinal: Positive for abdominal distention, abdominal pain and nausea. Negative for blood in stool and vomiting.   Genitourinary: Negative for dysuria.   Musculoskeletal: Negative for gait problem.   Skin:  Negative for rash and wound.   Allergic/Immunologic: Negative for immunocompromised state.   Neurological: Negative for weakness.   Hematological: Does not bruise/bleed easily.   Psychiatric/Behavioral: Negative for agitation.       Current Facility-Administered Medications   Medication Dose Route Frequency Provider Last Rate Last Dose    acetaminophen tablet 650 mg  650 mg Oral Q4H PRN Eitan Lora MD        amLODIPine tablet 10 mg  10 mg Oral Daily Cruz Finn MD   10 mg at 03/03/19 0813    dicyclomine tablet 20 mg  20 mg Oral QID Cruz Finn MD   20 mg at 03/03/19 0813    folic acid tablet 1 mg  1 mg Oral Daily Cruz Finn MD   1 mg at 03/03/19 0813    hydrALAZINE injection 10 mg  10 mg Intravenous Q6H PRN Cruz Finn MD        HYDROmorphone injection 0.5 mg  0.5 mg Intravenous Q3H PRN Eitan Lora MD   0.5 mg at 03/03/19 1111    HYDROmorphone injection 1 mg  1 mg Intravenous Q3H PRN Eitan Lora MD        lactated ringers infusion   Intravenous Continuous Eitan Lora  mL/hr at 03/03/19 0757      metoprolol succinate (TOPROL-XL) 24 hr tablet 50 mg  50 mg Oral Daily Cruz Finn MD   50 mg at 03/03/19 0813    ondansetron injection 4 mg  4 mg Intravenous Q6H PRN Eitan Lora MD   4 mg at 03/03/19 1122    promethazine (PHENERGAN) 12.5 mg in dextrose 5 % 50 mL IVPB  12.5 mg Intravenous Q6H PRN Eitan Lora MD        sodium chloride 0.9% flush 5 mL  5 mL Intravenous PRN Yuri Barron MD        [START ON 3/4/2019] thyroid (pork) tablet 60 mg  60 mg Oral Before breakfast Eitan Lora MD        tiZANidine tablet 4 mg  4 mg Oral Q8H PRN Eitan Lora MD        traZODone tablet 100 mg  100 mg Oral QHS Cruz Finn MD   100 mg at 03/02/19 9576       Review of patient's allergies indicates:  No Known Allergies    Past Medical History:   Diagnosis Date    Acute pancreatitis     June 2018 - [facility]    ADHD (attention deficit hyperactivity disorder), inattentive type     Fibromyalgia     treated by Dr. Thorpe - Rheumatologist    Hyperlipidemia     High triglycerides    Hypertension     Hypothyroidism     Treated by Dr. Mathew    IFG (impaired fasting glucose)     Migraine     Treated by neurologist - Dr. Destiny Florez    Ulcerative colitis     Patient reported treated by Dr. Lee and Aaron in past  but on admit in 2018 - no UC seen on recent colonoscopy.  It was noted on colonoscopy in 2004 there were ulcers noted to descending colon but not present on sigmoidoscopy in 2004.    Vitamin D deficiency 3/31/2016       Past Surgical History:   Procedure Laterality Date     SECTION      COLONOSCOPY  10/08/2010    Dr. Lee - repeat in 5 years - has appointment for colonoscopy 2016    COLONOSCOPY  2016    Dr. Kelly - normal  colon - repeat in 10 years - scanned report to media file.    HYSTERECTOMY      OOPHORECTOMY      SHOULDER SURGERY Left     TONSILLECTOMY      ULTRASOUND, ENDOSCOPIC, UPPER GI TRACT N/A 2018    Performed by [facility] Russell [facility]    upper and lower GI  2016       Family History   Problem Relation Age of Onset    Hypertension Mother     Heart disease Mother     Hyperlipidemia Mother     Cancer Sister 53        thyroid cancer and lymph nodes involvement    Cancer Brother 61        colon and lung cancer    Colon cancer Brother 66    Cancer Brother 63        colon    Hypertension Brother     Diabetes Brother     Colon cancer Brother 60    Esophageal cancer Neg Hx     Stomach cancer Neg Hx     Ulcerative colitis Neg Hx     Crohn's disease Neg Hx     Irritable bowel syndrome Neg Hx     Celiac disease Neg Hx        Social History     Socioeconomic History    Marital status:      Spouse name: Not on file    Number of children: Not on file    Years of education: Not on file    Highest education  level: Not on file   Social Needs    Financial resource strain: Not on file    Food insecurity - worry: Not on file    Food insecurity - inability: Not on file    Transportation needs - medical: Not on file    Transportation needs - non-medical: Not on file   Occupational History    Not on file   Tobacco Use    Smoking status: Former Smoker     Packs/day: 1.00     Years: 20.00     Pack years: 20.00     Last attempt to quit: 1997     Years since quittin.0    Smokeless tobacco: Former User   Substance and Sexual Activity    Alcohol use: No    Drug use: No    Sexual activity: No   Other Topics Concern    Not on file   Social History Narrative    Not on file       Vitals:    19 1132   BP: 135/62   Pulse: 98   Resp: 20   Temp: 98 °F (36.7 °C)       Physical Exam   Constitutional: She is oriented to person, place, and time. She appears well-developed and well-nourished. No distress.   HENT:   Head: Normocephalic and atraumatic.   Eyes: No scleral icterus.   Cardiovascular: Normal rate.   Pulmonary/Chest: Effort normal. No stridor.   Abdominal: Soft. She exhibits no distension. There is no tenderness.   Lymphadenopathy:     She has no cervical adenopathy.   Neurological: She is alert and oriented to person, place, and time.   Skin: Skin is warm. No erythema.   Psychiatric: She has a normal mood and affect. Her behavior is normal.     WBC 13 from 15  Lipase normal. Has been normal with every lab for the past few months  GFR >60  Tbili 1.0  AST/ALT normal  Triglycerides normal  CT shows peripancreatic inflammation, no necrosis or abscess. No stones or biliary abnormalities  US shows no obvious stones, normal CBD  EUS from 2018 does show biliary sludge    Assessment & Plan:   64F with pancreatitis of uncertain origin  Will discuss with GI. We can assume she still has biliary sludge and it is not unreasonable to do lap tiffany. Discussed with patient. Will get GI input on crohns treatment.  No etoh,  triglycerides normal this admit.but she does take lasix and mtx? Triglycerides mildy elevated in the past but only 200-300 range. Lipase normal because pancreatitis is chronic?  Pancreatitis related to IBD?  NPO for now. Will follow. Will discuss possible lap tiffany as she improves and after discuss with GI.

## 2019-03-03 NOTE — PROGRESS NOTES
[facility] Hospital Medicine  Progress Note    Patient Name: Madalyn Iverson  MRN: [id number]  Patient Class: IP- Inpatient   Admission Date: 3/2/2019  Length of Stay: 0 days  Attending Physician: Eitan Lora MD  Primary Care Provider: Ruth Jaime NP        Subjective:     Principal Problem:Acute biliary pancreatitis without infection or necrosis    HPI:  64 yr old pleasant white female with ADD, HTN, HLD, hypothyroidism, IGT, migraine, pancreatitis, UC, presents to [facility] complaining of severe upper abdominal pain. Onset 3-4 days agoa nd rapidly worsening. It is aching type, 10/10 in severity and associated with nausea and SOB. No chest pain, vomiting, diarrhea or constipation. She reported febrile and had fever while in emergency. On presentation, she has leukocytosis, fever, and CT showed acute pancreatitis. She will be admitted for fluids, bowel rest, pain control and antibiotics.    Hospital Course:  No notes on file    Interval History: Still having significant epigastric pain that radiates to the back. Mild nausea, but is asking to eat. Feeling short of breath with the pain.     Review of Systems   Constitutional: Negative for chills and fever.   Respiratory: Positive for shortness of breath. Negative for cough.    Cardiovascular: Negative for chest pain and palpitations.   Gastrointestinal: Positive for abdominal pain and nausea. Negative for vomiting.     Objective:     Vital Signs (Most Recent):  Temp: 99.2 °F (37.3 °C) (03/03/19 1004)  Pulse: 103 (03/03/19 1004)  Resp: 20 (03/03/19 1004)  BP: (!) 148/62 (03/03/19 1004)  SpO2: 99 % (03/03/19 1004) Vital Signs (24h Range):  Temp:  [98.6 °F (37 °C)-100 °F (37.8 °C)] 99.2 °F (37.3 °C)  Pulse:  [] 103  Resp:  [18-20] 20  SpO2:  [90 %-99 %] 99 %  BP: (122-182)/(59-86) 148/62     Weight: 80.2 kg (176 lb 12.9 oz)  Body mass index is 28.54 kg/m².    Intake/Output Summary (Last 24 hours) at 3/3/2019 1045  Last data  filed at 3/3/2019 0811  Gross per 24 hour   Intake 1131.25 ml   Output 900 ml   Net 231.25 ml      Physical Exam   Constitutional: She is oriented to person, place, and time. She appears well-developed and well-nourished. No distress. Nasal cannula in place.   Cardiovascular: Normal rate and regular rhythm.   No murmur heard.  Pulmonary/Chest: Effort normal and breath sounds normal. No respiratory distress. She has no wheezes.   Abdominal: Soft. Bowel sounds are normal. She exhibits distension. There is tenderness.   Musculoskeletal: She exhibits no edema.   Neurological: She is alert and oriented to person, place, and time.   Skin: Skin is warm and dry.   Psychiatric: She has a normal mood and affect. Her behavior is normal.   Nursing note and vitals reviewed.      Significant Labs:   A1C:   Recent Labs   Lab 02/28/19  0837   HGBA1C 5.9*     CBC:   Recent Labs   Lab 03/02/19 1728 03/03/19  0622   WBC 15.14* 13.51*   HGB 13.9 11.8*   HCT 42.0 35.7*    172     CMP:   Recent Labs   Lab 03/02/19 1728 03/03/19  0621   * 135*   K 3.8 3.9   CL 99 103   CO2 24 23    88   BUN 13 7*   CREATININE 1.0 0.7   CALCIUM 9.7 9.0   PROT 7.9 6.3   ALBUMIN 3.5 2.7*   BILITOT 0.8 1.0   ALKPHOS 180* 132   AST 36 17   ALT 30 19   ANIONGAP 12 9   EGFRNONAA 59* >60     Lipase:   Recent Labs   Lab 03/02/19 1728 03/03/19  0621   LIPASE 49 22     Lipid Panel:   Recent Labs   Lab 03/03/19  0621   TRIG 89     Magnesium:   Recent Labs   Lab 03/03/19  0621   MG 2.2       Significant Imaging: I have reviewed all pertinent imaging results/findings within the past 24 hours.    Assessment/Plan:      * Acute biliary pancreatitis without infection or necrosis    Leukocytosis  Had similar episode in June 2018 with same findings on CT scan at that time. Alk phos was elevated to 191 then other LFTs normal. Had EUS in July 2018 that showed normal pancreas, normal CBD duct, and normal liver, but she did have sludge in the gallbladder.  Alk phos is elevated still and other LFTs remain WNL. It is possible that the sludge could be causing the recurrence of pancreatitis. She is on a couple medications that could be causing the pancreatitis as well with estradiol and furosemide. Will hold these for now. Will consult GI and General Surgery for evaluation and consideration of the sludge as the cause of her recurrent pancreatitis.        Anemia    Expected dilutional. Monitor.        Hyponatremia    Likely related to volume depletion. Will monitor with the IV fluids.        Hyperlipidemia    Holding crestor.        Acquired hypothyroidism    Continue thyroid pork.        Essential hypertension    SBP ranged 122 to 182. Holding her home furosemide and olmesartan. Continue home amlodipine 10 mg daily and Toprol XL 50 mg daily. Hydralazine prn.          VTE Risk Mitigation (From admission, onward)        Ordered     IP VTE LOW RISK PATIENT  Once      03/02/19 2035     Place sequential compression device  Until discontinued      03/02/19 2035              Eitan Lora MD  Department of Hospital Medicine   [facility]

## 2019-03-03 NOTE — PLAN OF CARE
Chief Complaint   Patient presents with    Abdominal Pain       abd pain with nuasea and fever x 3 days. hx of pancreatitis. last dose of tylenol was 0900 this am       Pt independent with ADLs, no HH/HME, lives with , Kendra Burr, he can provide transportation on discharge    Future Appointments   Date Time Provider Department Center   3/8/2019  9:30 AM Ruth Jaime NP [facility]   3/12/2019 10:15 AM Li Waller MD [facility]   5/7/2019 11:50 AM SAME DAY LAB, [facility]   5/21/2019 10:40 AM Michael Yates MD [facility]       Pt denies any needs or concerns at this time. Discharge planning brochure and business card provided. CM numbers written on white board. Pt encouraged to call with any questions or needs. CM will continue to follow patient throughout the transitions of care, and assist with any discharge needs.       03/03/19 1529   Discharge Assessment   Assessment Type Discharge Planning Assessment   Confirmed/corrected address and phone number on facesheet? Yes   Assessment information obtained from? Patient   Expected Length of Stay (days) 3   Communicated expected length of stay with patient/caregiver yes   Prior to hospitilization cognitive status: Alert/Oriented   Prior to hospitalization functional status: Independent   Current cognitive status: Alert/Oriented   Current Functional Status: Needs Assistance   Facility Arrived From: (home)   Lives With spouse  (: Kendra Iverson [phone])   Able to Return to Prior Arrangements yes   Is patient able to care for self after discharge? Yes   Who are your caregiver(s) and their phone number(s)? : Kendra Iverson [phone]   Patient's perception of discharge disposition home or selfcare   Readmission Within the Last 30 Days no previous admission in last 30 days   Patient currently being followed by outpatient case management? No   Patient currently  receives any other outside agency services? No   Equipment Currently Used at Home none   Do you have any problems affording any of your prescribed medications? No   Is the patient taking medications as prescribed? yes   Does the patient have transportation home? Yes   Transportation Anticipated family or friend will provide   Dialysis Name and Scheduled days N/A   Does the patient receive services at the Coumadin Clinic? No   Discharge Plan A Home   Discharge Plan B Home with family   DME Needed Upon Discharge  none   Patient/Family in Agreement with Plan yes     Emily Campoverde RN Transitional Navigator  [phone]

## 2019-03-03 NOTE — PLAN OF CARE
Problem: Adult Inpatient Plan of Care  Goal: Plan of Care Review  Outcome: Ongoing (interventions implemented as appropriate)  POC was discussed with patient and her . IV fluids and antibiotics initiated. Oral medication taken with small sips of water. C/o of pain treated with IV dilaudid x2 during this shift. Assisting patient with other comfort needs. Patient was encouraged to use call light to voice needs as they arise.

## 2019-03-03 NOTE — HPI
Madalyn Iverson is a 64 y.o. white woman with hypertension, hyperlipidemia, hypothyroidism, fibromyalgia, attention deficit hyperactivity disorder, history of pancreatitis, irritable bowel syndrome with diarrhea, migraine headaches, history of  section in  and . Her primary care provider is nurse practitioner Ruth Jaime.    She presented to [facility] on 3/2/19 with severe upper abdominal pain for 3-4 days. She described it as aching. It was associated with nausea and shortness of breath. She had subjective and objective fever. WBC count was found to be 15,140. Lipase was only 49. CT showed pancreatitis. She was admitted to [facility].

## 2019-03-04 ENCOUNTER — ANESTHESIA EVENT (OUTPATIENT)
Dept: ENDOSCOPY | Facility: HOSPITAL | Age: 64
DRG: 418 | End: 2019-03-04
Payer: MEDICARE

## 2019-03-04 ENCOUNTER — ANESTHESIA (OUTPATIENT)
Dept: ENDOSCOPY | Facility: HOSPITAL | Age: 64
DRG: 418 | End: 2019-03-04
Payer: MEDICARE

## 2019-03-04 PROBLEM — D72.829 LEUKOCYTOSIS: Status: RESOLVED | Noted: 2019-03-03 | Resolved: 2019-03-04

## 2019-03-04 PROBLEM — K86.1 CHRONIC PANCREATITIS: Status: RESOLVED | Noted: 2018-07-18 | Resolved: 2019-03-04

## 2019-03-04 PROBLEM — R10.9 INTRACTABLE ABDOMINAL PAIN: Status: RESOLVED | Noted: 2018-06-05 | Resolved: 2019-03-04

## 2019-03-04 PROBLEM — D64.9 ANEMIA: Status: RESOLVED | Noted: 2019-03-03 | Resolved: 2019-03-04

## 2019-03-04 PROBLEM — E87.1 HYPONATREMIA: Status: RESOLVED | Noted: 2019-03-03 | Resolved: 2019-03-04

## 2019-03-04 LAB
ALBUMIN SERPL BCP-MCNC: 2.6 G/DL
ALP SERPL-CCNC: 137 U/L
ALT SERPL W/O P-5'-P-CCNC: 15 U/L
ANION GAP SERPL CALC-SCNC: 8 MMOL/L
AST SERPL-CCNC: 13 U/L
BASOPHILS # BLD AUTO: 0.04 K/UL
BASOPHILS NFR BLD: 0.5 %
BILIRUB SERPL-MCNC: 0.4 MG/DL
BUN SERPL-MCNC: 7 MG/DL
CALCIUM SERPL-MCNC: 9.5 MG/DL
CHLORIDE SERPL-SCNC: 106 MMOL/L
CO2 SERPL-SCNC: 26 MMOL/L
CREAT SERPL-MCNC: 0.7 MG/DL
DIFFERENTIAL METHOD: ABNORMAL
EOSINOPHIL # BLD AUTO: 0.1 K/UL
EOSINOPHIL NFR BLD: 1.5 %
ERYTHROCYTE [DISTWIDTH] IN BLOOD BY AUTOMATED COUNT: 13.8 %
EST. GFR  (AFRICAN AMERICAN): >60 ML/MIN/1.73 M^2
EST. GFR  (NON AFRICAN AMERICAN): >60 ML/MIN/1.73 M^2
GLUCOSE SERPL-MCNC: 91 MG/DL
HCT VFR BLD AUTO: 37.4 %
HGB BLD-MCNC: 12.5 G/DL
INR PPP: 1
LYMPHOCYTES # BLD AUTO: 1 K/UL
LYMPHOCYTES NFR BLD: 12.6 %
MAGNESIUM SERPL-MCNC: 2 MG/DL
MCH RBC QN AUTO: 31.3 PG
MCHC RBC AUTO-ENTMCNC: 33.4 G/DL
MCV RBC AUTO: 94 FL
MONOCYTES # BLD AUTO: 0.5 K/UL
MONOCYTES NFR BLD: 6.7 %
NEUTROPHILS # BLD AUTO: 6.2 K/UL
NEUTROPHILS NFR BLD: 78.6 %
PHOSPHATE SERPL-MCNC: 3 MG/DL
PLATELET # BLD AUTO: 213 K/UL
PMV BLD AUTO: 11.1 FL
POTASSIUM SERPL-SCNC: 4 MMOL/L
PROT SERPL-MCNC: 6.7 G/DL
PROTHROMBIN TIME: 10.6 SEC
RBC # BLD AUTO: 3.99 M/UL
SODIUM SERPL-SCNC: 140 MMOL/L
WBC # BLD AUTO: 7.91 K/UL

## 2019-03-04 PROCEDURE — 43259 EGD US EXAM DUODENUM/JEJUNUM: CPT | Mod: ,,, | Performed by: INTERNAL MEDICINE

## 2019-03-04 PROCEDURE — 80053 COMPREHEN METABOLIC PANEL: CPT

## 2019-03-04 PROCEDURE — 99232 PR SUBSEQUENT HOSPITAL CARE,LEVL II: ICD-10-PCS | Mod: ,,, | Performed by: STUDENT IN AN ORGANIZED HEALTH CARE EDUCATION/TRAINING PROGRAM

## 2019-03-04 PROCEDURE — 85025 COMPLETE CBC W/AUTO DIFF WBC: CPT

## 2019-03-04 PROCEDURE — 43259 PR ENDOSCOPIC ULTRASOUND EXAM: ICD-10-PCS | Mod: ,,, | Performed by: INTERNAL MEDICINE

## 2019-03-04 PROCEDURE — 85610 PROTHROMBIN TIME: CPT

## 2019-03-04 PROCEDURE — 84100 ASSAY OF PHOSPHORUS: CPT

## 2019-03-04 PROCEDURE — 11000001 HC ACUTE MED/SURG PRIVATE ROOM

## 2019-03-04 PROCEDURE — 99222 1ST HOSP IP/OBS MODERATE 55: CPT | Mod: ,,, | Performed by: INTERNAL MEDICINE

## 2019-03-04 PROCEDURE — 63600175 PHARM REV CODE 636 W HCPCS: Performed by: NURSE ANESTHETIST, CERTIFIED REGISTERED

## 2019-03-04 PROCEDURE — 25000003 PHARM REV CODE 250: Performed by: NURSE ANESTHETIST, CERTIFIED REGISTERED

## 2019-03-04 PROCEDURE — 36415 COLL VENOUS BLD VENIPUNCTURE: CPT

## 2019-03-04 PROCEDURE — 94761 N-INVAS EAR/PLS OXIMETRY MLT: CPT

## 2019-03-04 PROCEDURE — 37000009 HC ANESTHESIA EA ADD 15 MINS: Performed by: INTERNAL MEDICINE

## 2019-03-04 PROCEDURE — 63600175 PHARM REV CODE 636 W HCPCS: Performed by: HOSPITALIST

## 2019-03-04 PROCEDURE — 83735 ASSAY OF MAGNESIUM: CPT

## 2019-03-04 PROCEDURE — 25000003 PHARM REV CODE 250: Performed by: HOSPITALIST

## 2019-03-04 PROCEDURE — 99232 SBSQ HOSP IP/OBS MODERATE 35: CPT | Mod: ,,, | Performed by: STUDENT IN AN ORGANIZED HEALTH CARE EDUCATION/TRAINING PROGRAM

## 2019-03-04 PROCEDURE — 43259 EGD US EXAM DUODENUM/JEJUNUM: CPT | Performed by: INTERNAL MEDICINE

## 2019-03-04 PROCEDURE — 25000003 PHARM REV CODE 250: Performed by: FAMILY MEDICINE

## 2019-03-04 PROCEDURE — 37000008 HC ANESTHESIA 1ST 15 MINUTES: Performed by: INTERNAL MEDICINE

## 2019-03-04 PROCEDURE — 99222 PR INITIAL HOSPITAL CARE,LEVL II: ICD-10-PCS | Mod: ,,, | Performed by: INTERNAL MEDICINE

## 2019-03-04 RX ORDER — FUROSEMIDE 10 MG/ML
20 INJECTION INTRAMUSCULAR; INTRAVENOUS ONCE
Status: COMPLETED | OUTPATIENT
Start: 2019-03-04 | End: 2019-03-04

## 2019-03-04 RX ORDER — LIDOCAINE HCL/PF 100 MG/5ML
SYRINGE (ML) INTRAVENOUS
Status: DISCONTINUED | OUTPATIENT
Start: 2019-03-04 | End: 2019-03-04

## 2019-03-04 RX ORDER — PROPOFOL 10 MG/ML
VIAL (ML) INTRAVENOUS CONTINUOUS PRN
Status: DISCONTINUED | OUTPATIENT
Start: 2019-03-04 | End: 2019-03-04

## 2019-03-04 RX ORDER — PROPOFOL 10 MG/ML
VIAL (ML) INTRAVENOUS
Status: DISCONTINUED | OUTPATIENT
Start: 2019-03-04 | End: 2019-03-04

## 2019-03-04 RX ORDER — SODIUM CHLORIDE 9 MG/ML
INJECTION, SOLUTION INTRAVENOUS CONTINUOUS PRN
Status: DISCONTINUED | OUTPATIENT
Start: 2019-03-04 | End: 2019-03-04

## 2019-03-04 RX ADMIN — DICYCLOMINE HYDROCHLORIDE 20 MG: 20 TABLET ORAL at 08:03

## 2019-03-04 RX ADMIN — HYDROMORPHONE HYDROCHLORIDE 1 MG: 1 INJECTION, SOLUTION INTRAMUSCULAR; INTRAVENOUS; SUBCUTANEOUS at 03:03

## 2019-03-04 RX ADMIN — SODIUM CHLORIDE, SODIUM LACTATE, POTASSIUM CHLORIDE, AND CALCIUM CHLORIDE: .6; .31; .03; .02 INJECTION, SOLUTION INTRAVENOUS at 07:03

## 2019-03-04 RX ADMIN — FUROSEMIDE 20 MG: 10 INJECTION, SOLUTION INTRAMUSCULAR; INTRAVENOUS at 03:03

## 2019-03-04 RX ADMIN — LEVOTHYROXINE, LIOTHYRONINE 60 MG: 19; 4.5 TABLET ORAL at 06:03

## 2019-03-04 RX ADMIN — SODIUM CHLORIDE: 9 INJECTION, SOLUTION INTRAVENOUS at 01:03

## 2019-03-04 RX ADMIN — HYDROMORPHONE HYDROCHLORIDE 0.5 MG: 1 INJECTION, SOLUTION INTRAMUSCULAR; INTRAVENOUS; SUBCUTANEOUS at 08:03

## 2019-03-04 RX ADMIN — SODIUM CHLORIDE, SODIUM LACTATE, POTASSIUM CHLORIDE, AND CALCIUM CHLORIDE: .6; .31; .03; .02 INJECTION, SOLUTION INTRAVENOUS at 01:03

## 2019-03-04 RX ADMIN — DICYCLOMINE HYDROCHLORIDE 20 MG: 20 TABLET ORAL at 03:03

## 2019-03-04 RX ADMIN — METOPROLOL SUCCINATE 50 MG: 50 TABLET, EXTENDED RELEASE ORAL at 09:03

## 2019-03-04 RX ADMIN — HYDROMORPHONE HYDROCHLORIDE 1 MG: 1 INJECTION, SOLUTION INTRAMUSCULAR; INTRAVENOUS; SUBCUTANEOUS at 10:03

## 2019-03-04 RX ADMIN — PROPOFOL 80 MG: 10 INJECTION, EMULSION INTRAVENOUS at 01:03

## 2019-03-04 RX ADMIN — PROPOFOL 150 MCG/KG/MIN: 10 INJECTION, EMULSION INTRAVENOUS at 01:03

## 2019-03-04 RX ADMIN — LIDOCAINE HYDROCHLORIDE 80 MG: 20 INJECTION, SOLUTION INTRAVENOUS at 01:03

## 2019-03-04 RX ADMIN — TOPICAL ANESTHETIC 1 EACH: 200 SPRAY DENTAL; PERIODONTAL at 01:03

## 2019-03-04 RX ADMIN — DICYCLOMINE HYDROCHLORIDE 20 MG: 20 TABLET ORAL at 09:03

## 2019-03-04 RX ADMIN — HYDROMORPHONE HYDROCHLORIDE 0.5 MG: 1 INJECTION, SOLUTION INTRAMUSCULAR; INTRAVENOUS; SUBCUTANEOUS at 11:03

## 2019-03-04 RX ADMIN — FOLIC ACID 1 MG: 1 TABLET ORAL at 09:03

## 2019-03-04 RX ADMIN — AMLODIPINE BESYLATE 10 MG: 5 TABLET ORAL at 09:03

## 2019-03-04 RX ADMIN — TRAZODONE HYDROCHLORIDE 100 MG: 100 TABLET ORAL at 08:03

## 2019-03-04 NOTE — PLAN OF CARE
VN cued into pt's room for introduction. VN informed pt and  that VN would be working along side bedside nurse and PCT throughout shift. Level of present pain assessed. At present pain diminishing since bedside nurse just admin prn pain medication. Discussed thoroughly with patient and  today's plan of car and upcoming surgery. Patient under the imoression that she will be going to surgery today. OR nursing station contacted and the schedule shows that patient not scheduled for surgery until Wed. March 6th at 11 am. Dr Clayton paged for clarification. Waiting for return call. Discussed with patient High fall risk protocol and interventions that have been initiated and cont be in place for safety. Patient verbalized clear understanding and cooperation using teach back method. Bed alarm presently activated and in use. Will cont to be available to patient and intervene prn.

## 2019-03-04 NOTE — ANESTHESIA POSTPROCEDURE EVALUATION
"Anesthesia Post Evaluation    Patient: Madalyn Iverson    Procedure(s) Performed: Procedure(s) (LRB):  ULTRASOUND, UPPER GI TRACT, ENDOSCOPIC (N/A)    Final Anesthesia Type: MAC  Patient location during evaluation: GI PACU  Patient participation: Yes- Able to Participate  Level of consciousness: awake and alert  Post-procedure vital signs: reviewed and stable  Pain management: adequate  Airway patency: patent  PONV status at discharge: No PONV  Anesthetic complications: no      Cardiovascular status: hemodynamically stable  Respiratory status: unassisted, spontaneous ventilation and room air  Hydration status: euvolemic  Follow-up not needed.        Visit Vitals  BP (!) 167/73 (BP Location: Left arm, Patient Position: Lying)   Pulse 97   Temp 36.7 °C (98 °F) (Oral)   Resp 16   Ht 5' 6" (1.676 m)   Wt 79 kg (174 lb 2.6 oz)   SpO2 (!) 94%   Breastfeeding? No   BMI 28.11 kg/m²       Pain/Carie Score: Pain Rating Prior to Med Admin: 5 (3/4/2019 11:49 AM)  Pain Rating Post Med Admin: 0 (3/3/2019  5:09 PM)        "

## 2019-03-04 NOTE — ASSESSMENT & PLAN NOTE
- Pt with recurrent acute pancreatitis.  EUS with normal pancreas 6 months ago and with sludge in gallbladder therefore she will need her gallbladder removed.  - Will plan for repeat EUS to ensure no pancreaticobiliary pathology, to be done today.  - She does not have Crohn's therefore this is likely biliary pancreatitis.

## 2019-03-04 NOTE — [employer] PATIENT INSTRUCTIONS
Discharge Summary/Instructions after an Endoscopic Procedure  Patient Name: Madalyn Iverson  Patient MRN: [id number]  Patient YOB: 1955 Monday, March 04, 2019  Randy Boyd MD  RESTRICTIONS:  During your procedure today, you received medications for sedation.  These   medications may affect your judgment, balance and coordination.  Therefore,   for 24 hours, you have the following restrictions:   - DO NOT drive a car, operate machinery, make legal/financial decisions,   sign important papers or drink alcohol.    ACTIVITY:  Today: no heavy lifting, straining or running due to procedural   sedation/anesthesia.  The following day: return to full activity including work.  DIET:  Eat and drink normally unless instructed otherwise.     TREATMENT FOR COMMON SIDE EFFECTS:  - Mild abdominal pain, nausea, belching, bloating or excessive gas:  rest,   eat lightly and use a heating pad.  - Sore Throat: treat with throat lozenges and/or gargle with warm salt   water.  - Because air was used during the procedure, expelling large amounts of air   from your rectum or belching is normal.  - If a bowel prep was taken, you may not have a bowel movement for 1-3 days.    This is normal.  SYMPTOMS TO WATCH FOR AND REPORT TO YOUR PHYSICIAN:  1. Abdominal pain or bloating, other than gas cramps.  2. Chest pain.  3. Back pain.  4. Signs of infection such as: chills or fever occurring within 24 hours   after the procedure.  5. Rectal bleeding, which would show as bright red, maroon, or black stools.   (A tablespoon of blood from the rectum is not serious, especially if   hemorrhoids are present.)  6. Vomiting.  7. Weakness or dizziness.  GO DIRECTLY TO THE NEAREST EMERGENCY ROOM IF YOU HAVE ANY OF THE FOLLOWING:      Difficulty breathing              Chills and/or fever over 101 F   Persistent vomiting and/or vomiting blood   Severe abdominal pain   Severe chest pain   Black, tarry stools   Bleeding- more than one  tablespoon   Any other symptom or condition that you feel may need urgent attention  Your doctor recommends these additional instructions:  If any biopsies were taken, your doctors clinic will contact you in 1 to 2   weeks with any results.  - Resume previous diet.   - Refer to a surgeon at appointment to be scheduled.   - Return patient to hospital [facility] for ongoing care.   - Continue present medications.  For questions, problems or results please call your physician - Randy Boyd MD at Work:  [phone].  EMERGENCY PHONE NUMBER: [phone],  LAB RESULTS: [phone]  IF A COMPLICATION OR EMERGENCY SITUATION ARISES AND YOU ARE UNABLE TO REACH   YOUR PHYSICIAN - GO DIRECTLY TO THE EMERGENCY ROOM.  Randy Boyd MD  3/4/2019 1:34:47 PM  This report has been verified and signed electronically.  [employer]

## 2019-03-04 NOTE — TRANSFER OF CARE
"Anesthesia Transfer of Care Note    Patient: Madalyn Iverson    Procedure(s) Performed: Procedure(s) (LRB):  ULTRASOUND, UPPER GI TRACT, ENDOSCOPIC (N/A)    Patient location: GI    Anesthesia Type: MAC    Transport from OR: Transported from OR on room air with adequate spontaneous ventilation    Post pain: adequate analgesia    Post assessment: no apparent anesthetic complications and tolerated procedure well    Post vital signs: stable    Level of consciousness: awake    Nausea/Vomiting: no nausea/vomiting    Complications: none    Transfer of care protocol was followed      Last vitals:   Visit Vitals  BP (!) 167/73 (BP Location: Left arm, Patient Position: Lying)   Pulse 97   Temp 36.7 °C (98 °F) (Oral)   Resp 16   Ht 5' 6" (1.676 m)   Wt 79 kg (174 lb 2.6 oz)   SpO2 (!) 94%   Breastfeeding? No   BMI 28.11 kg/m²     "

## 2019-03-04 NOTE — PLAN OF CARE
Problem: Adult Inpatient Plan of Care  Goal: Plan of Care Review  Outcome: Ongoing (interventions implemented as appropriate)  Patient on RA with sats as documented.  Will continue to monitor.

## 2019-03-04 NOTE — H&P
Short Stay Endoscopy History and Physical    PCP - uRth Jaime NP  Referring Physician - Aaareferral Self  No address on file    Procedure - eus  ASA - per anesthesia  Mallampati - per anesthesia  History of Anesthesia problems - no  Family history Anesthesia problems -  no   Plan of anesthesia - General    HPI:  This is a 64 y.o. female here for evaluation of: pancreatitis    Reflux - no  Dysphagia - no  Abdominal pain - no  Diarrhea - no    ROS:  Constitutional: No fevers, chills, No weight loss  CV: No chest pain  Pulm: No cough, No shortness of breath  Ophtho: No vision changes  GI: see HPI  Derm: No rash    Medical History:  has a past medical history of Acute pancreatitis, ADHD (attention deficit hyperactivity disorder), inattentive type, Fibromyalgia, Hyperlipidemia, Hypertension, Hypothyroidism, IFG (impaired fasting glucose), Migraine, Ulcerative colitis, and Vitamin D deficiency (3/31/2016).    Surgical History:  has a past surgical history that includes  section; Hysterectomy; Tonsillectomy; Shoulder surgery (Left); Colonoscopy (10/08/2010); upper and lower GI (2016); Colonoscopy (2016); Oophorectomy; and Endoscopic ultrasound of upper gastrointestinal tract (N/A, 2018).    Family History: family history includes Cancer (age of onset: 53) in her sister; Cancer (age of onset: 61) in her brother; Cancer (age of onset: 63) in her brother; Colon cancer (age of onset: 60) in her brother; Colon cancer (age of onset: 66) in her brother; Diabetes in her brother; Heart disease in her mother; Hyperlipidemia in her mother; Hypertension in her brother and mother..    Social History:  reports that she quit smoking about 22 years ago. She has a 20.00 pack-year smoking history. She has quit using smokeless tobacco. She reports that she does not drink alcohol or use drugs.    Review of patient's allergies indicates:  No Known Allergies    Medications:   Medications Prior to Admission    Medication Sig Dispense Refill Last Dose    ALPRAZolam (XANAX) 0.5 MG tablet TAKE 1 TABLET BY MOUTH 3 TIMES A DAY 90 tablet 0 3/1/2019 at Unknown time    amLODIPine (NORVASC) 10 MG tablet Take 1 tablet (10 mg total) by mouth once daily. 90 tablet 0 3/1/2019 at Unknown time    estradiol (ESTRACE) 2 MG tablet Take 2 mg by mouth once daily.  3 3/1/2019 at Unknown time    furosemide (LASIX) 40 MG tablet TAKE 1 TABLET BY MOUTH EVERY DAY 30 tablet 0 3/1/2019 at Unknown time    metoprolol succinate (TOPROL-XL) 50 MG 24 hr tablet TAKE 1 TABLET BY MOUTH EVERY DAY 30 tablet 0 3/1/2019 at Unknown time    potassium chloride (MICRO-K) 10 MEQ CpSR TAKE 1 CAPSULE BY MOUTH TWICE A  capsule 0 3/1/2019 at Unknown time    thyroid, pork, (ARMOUR THYROID) 60 mg Tab Correll Thyroid 60 mg tablet   3/1/2019 at Unknown time    tiZANidine 4 mg Cap Take by mouth 2 (two) times daily.    3/1/2019 at Unknown time    traMADol (ULTRAM) 50 mg tablet Take 1 tablet (50 mg total) by mouth every 12 (twelve) hours as needed for Pain. 60 tablet 3 3/1/2019 at Unknown time    traZODone (DESYREL) 50 MG tablet Take 1 tablet (50 mg total) by mouth every evening. (Patient taking differently: Take 100 mg by mouth every evening. ) 90 tablet 2 3/1/2019 at Unknown time    VITAMIN D2 50,000 unit capsule TAKE 1 CAPSULE (50,000 UNITS TOTAL) BY MOUTH EVERY 7 DAYS. FOR 12 DOSES 12 capsule 0 Past Week at Unknown time    cyanocobalamin 1,000 mcg/mL injection Inject 1 mL (1,000 mcg total) into the skin every 30 days. 1 mL 11     CYANOCOBALAMIN, VITAMIN B-12, (VITAMIN B-12 INJ) Inject 1,000 mcg as directed every 30 days.   Taking    dextroamphetamine-amphetamine 10 mg Tab Take 10 mg by mouth 2 (two) times daily. 60 tablet 0 Not Taking    diclofenac sodium 1 % Gel Apply 2 g topically 4 (four) times daily. 100 g 5 Not Taking    dicyclomine (BENTYL) 20 mg tablet Take 1 tablet (20 mg total) by mouth 4 (four) times daily. 120 tablet 6 Taking    folic  "acid (FOLVITE) 1 MG tablet Take 1 tablet (1 mg total) by mouth once daily. 30 tablet 3     insulin syringe-needle U-100 1 mL 25 gauge x 5/8" Syrg 1 mL by Misc.(Non-Drug; Combo Route) route every 30 days. 12 each 0     methotrexate 2.5 MG Tab Take 8 tabs once a week only 120 tablet 1     progesterone (PROMETRIUM) 100 MG capsule Take 100 mg by mouth once daily.   Not Taking    rosuvastatin (CRESTOR) 20 MG tablet Take 1 tablet (20 mg total) by mouth once daily. 90 tablet 0 Not Taking    traMADol (ULTRAM) 50 mg tablet Take 1 tablet (50 mg total) by mouth every 6 (six) hours as needed for Pain. 60 tablet 3 Taking    traZODone (DESYREL) 100 MG tablet TAKE 1 TABLET (100 MG TOTAL) BY MOUTH EVERY EVENING. 30 tablet 3     valsartan (DIOVAN) 80 MG tablet TAKE 1 TABLET BY MOUTH EVERY DAY 90 tablet 0 Not Taking       Physical Exam:    Vital Signs:   Vitals:    03/04/19 1203   BP: (!) 167/73   Pulse: 97   Resp: 16   Temp: 98 °F (36.7 °C)       General Appearance: Well appearing in no acute distress    Labs:  Lab Results   Component Value Date    WBC 7.91 03/04/2019    HGB 12.5 03/04/2019    HCT 37.4 03/04/2019     03/04/2019    CHOL 241 (H) 02/28/2019    TRIG 89 03/03/2019    HDL 56 02/28/2019    ALT 15 03/04/2019    AST 13 03/04/2019     03/04/2019    K 4.0 03/04/2019     03/04/2019    CREATININE 0.7 03/04/2019    BUN 7 (L) 03/04/2019    CO2 26 03/04/2019    TSH 0.574 02/28/2019    INR 1.0 03/04/2019    HGBA1C 5.9 (H) 02/28/2019       I have explained the risks and benefits of this endoscopic procedure to the patient including but not limited to bleeding, inflammation, infection, perforation, and death.      Randy Boyd MD    "

## 2019-03-04 NOTE — PROGRESS NOTES
[facility]  General Surgery  Progress Note    Subjective:     Interval History: feeling somewhat better  Soreness improving  Still not much appetite  SOme nausea  EUS today still no abnormality in the pancreas or stomach, +sludge      Post-Op Info:  Procedure(s) (LRB):  ULTRASOUND, UPPER GI TRACT, ENDOSCOPIC (N/A)   Day of Surgery      Medications:  Continuous Infusions:   lactated ringers 75 mL/hr at 03/04/19 0927     Scheduled Meds:   amLODIPine  10 mg Oral Daily    dicyclomine  20 mg Oral QID    folic acid  1 mg Oral Daily    metoprolol succinate  50 mg Oral Daily    thyroid (pork)  60 mg Oral Before breakfast    traZODone  100 mg Oral QHS     PRN Meds:acetaminophen, hydrALAZINE, HYDROmorphone, HYDROmorphone, ondansetron, promethazine (PHENERGAN) IVPB, sodium chloride 0.9%, tiZANidine     Objective:     Vital Signs (Most Recent):  Temp: 98 °F (36.7 °C) (03/04/19 1203)  Pulse: 90 (03/04/19 1404)  Resp: 17 (03/04/19 1404)  BP: 139/77 (03/04/19 1404)  SpO2: 99 % (03/04/19 1404) Vital Signs (24h Range):  Temp:  [96.4 °F (35.8 °C)-98.4 °F (36.9 °C)] 98 °F (36.7 °C)  Pulse:  [] 90  Resp:  [16-20] 17  SpO2:  [92 %-99 %] 99 %  BP: (123-167)/(56-77) 139/77       Intake/Output Summary (Last 24 hours) at 3/4/2019 1406  Last data filed at 3/4/2019 1327  Gross per 24 hour   Intake 3363.75 ml   Output 900 ml   Net 2463.75 ml       Physical Exam   Constitutional: She is oriented to person, place, and time. She appears well-developed and well-nourished. No distress.   HENT:   Head: Normocephalic and atraumatic.   Eyes: No scleral icterus.   Cardiovascular: Normal rate.   Pulmonary/Chest: Effort normal. No stridor.   Abdominal: Soft. She exhibits no distension. There is no tenderness.   Lymphadenopathy:     She has no cervical adenopathy.   Neurological: She is alert and oriented to person, place, and time.   Skin: Skin is warm. No erythema.   Psychiatric: She has a normal mood and affect. Her  behavior is normal.       Significant Labs:  CBC:   Recent Labs   Lab 03/04/19  0847   WBC 7.91   RBC 3.99*   HGB 12.5   HCT 37.4      MCV 94   MCH 31.3*   MCHC 33.4     CMP:   Recent Labs   Lab 03/04/19  0847   GLU 91   CALCIUM 9.5   ALBUMIN 2.6*   PROT 6.7      K 4.0   CO2 26      BUN 7*   CREATININE 0.7   ALKPHOS 137*   ALT 15   AST 13   BILITOT 0.4       Significant Diagnostics:  I have reviewed all pertinent imaging results/findings within the past 24 hours.    Assessment/Plan:     Active Diagnoses:    Diagnosis Date Noted POA    PRINCIPAL PROBLEM:  Acute biliary pancreatitis without infection or necrosis [K85.10] 06/13/2018 Yes    Hyponatremia [E87.1] 03/03/2019 Yes    Leukocytosis [D72.829] 03/03/2019 Yes    Anemia [D64.9] 03/03/2019 Yes    Idiopathic acute pancreatitis without infection or necrosis [K85.00] 03/02/2019 Yes    Hyperlipidemia [E78.5] 05/15/2017 Yes    Acquired hypothyroidism [E03.9] 03/23/2016 Yes    Essential hypertension [I10]  Yes      Problems Resolved During this Admission:     64F with pancreatitis, recurrent likely biliary  Discussed with GI. EUS unremarkable aside from GB sludge  Still NPO per primary team  Plan for lap tiffany Wednesday morning        Hill Palacios MD  General Surgery  [facility]

## 2019-03-04 NOTE — PLAN OF CARE
Problem: Adult Inpatient Plan of Care  Goal: Plan of Care Review  Outcome: Ongoing (interventions implemented as appropriate)  POC discussed with patient. VSS. IV fluids continued per MAR. Denies any pain or nausea. Telemetry monitored. Patient consistently tachycardic 120-130s. Patient encouraged to use call light to voice needs. Will continue to monitor.

## 2019-03-04 NOTE — ANESTHESIA PREPROCEDURE EVALUATION
03/04/2019  Madalyn Iverson is a 64 y.o., female for ERCP    Past Medical History:   Diagnosis Date    Acute pancreatitis     June 2018 - [facility] at [facility]    ADHD (attention deficit hyperactivity disorder), inattentive type     Fibromyalgia     treated by Dr. Thorpe - Rheumatologist    Hyperlipidemia     High triglycerides    Hypertension     Hypothyroidism     Treated by Dr. Mathew    IFG (impaired fasting glucose)     Migraine     Treated by neurologist - Dr. Destiny Florez    Ulcerative colitis     Patient reported treated by Dr. Mcfadden in past  but on admit in 2018 - no UC seen on recent colonoscopy.  It was noted on colonoscopy in April 2004 there were ulcers noted to descending colon but not present on sigmoidoscopy in June 2004.    Vitamin D deficiency 3/31/2016         Pre-op Assessment    I have reviewed the Patient Summary Reports.      I have reviewed the Medications.     Review of Systems  Anesthesia Hx:   Denies Personal Hx of Anesthesia complications.   Social:  Former Smoker, No Alcohol Use    Cardiovascular:   Hypertension hyperlipidemia    Hepatic/GI:   PUD, UC, pancreatitis   Neurological:   Headaches    Endocrine:   Hypothyroidism    Psych:   Psychiatric History fibromyalgia         Physical Exam  General:  Well nourished    Airway/Jaw/Neck:  Airway Findings: Mouth Opening: Normal Tongue: Normal  General Airway Assessment: Adult  Mallampati: II  TM Distance: Normal, at least 6 cm      Dental:  Dental Findings: Periodontal disease, Severe, lower partial dentures   Chest/Lungs:  Chest/Lungs Clear    Heart/Vascular:  Heart Findings: Normal            Anesthesia Plan  Type of Anesthesia, risks & benefits discussed:  Anesthesia Type:  MAC, general  Patient's Preference:   Intra-op Monitoring Plan:   Intra-op Monitoring Plan Comments:   Post  Op Pain Control Plan:   Post Op Pain Control Plan Comments:   Induction:   IV  Beta Blocker:  Patient is on a Beta-Blocker and has received one dose within the past 24 hours (No further documentation required).       Informed Consent: Patient understands risks and agrees with Anesthesia plan.  Questions answered. Anesthesia consent signed with patient.  ASA Score: 3     Day of Surgery Review of History & Physical:            Ready For Surgery From Anesthesia Perspective.

## 2019-03-04 NOTE — HPI
"65 yo F admitted with pancreatitis. She had her initial episode of pancreatitis 6/2018 with EUS at that time showing normal pancreas and sludge in gallbladder.  She states that she has not really felt better since that time.  She has a constant upper abdominal pressure and "noise" that is constant.  She states that the pain again became acute therefore she came to the ED.  She is a poor historian and can not give me details to help differentiate b/t this pain and her IBS pain.  She did not have her gallbladder removed after last episode of pancreatitis.    She states that she was told she had Crohn's in 2004.  Chart review shows that pt with an acute colitis at that time.  Dr. Quan at [facility] has given opinion that the pt had acute ischemic colitis at that time and does NOT have Crohn's disease.  She has IBS-D.  "

## 2019-03-04 NOTE — CONSULTS
"[facility]  Gastroenterology  Consult Note    Patient Name: Madalyn Iverson  MRN: [id number]  Admission Date: 3/2/2019  Hospital Length of Stay: 1 days  Code Status: Full Code   Attending Provider: Eitan Lora MD   Consulting Provider: Monisha Clayton MD  Primary Care Physician: Ruth Jaime NP  Principal Problem:Acute biliary pancreatitis without infection or necrosis    Inpatient consult [facility]  Consult performed by: Monisha Clayton MD  Consult ordered by: Eitan Lora MD  Reason for consult: Acute pancreatis  Assessment/Recommendations: EUS today  Cholecystectomy    She does not have Crohn's disease        Subjective:     HPI:  65 yo F admitted with pancreatitis. She had her initial episode of pancreatitis 6/2018 with EUS at that time showing normal pancreas and sludge in gallbladder.  She states that she has not really felt better since that time.  She has a constant upper abdominal pressure and "noise" that is constant.  She states that the pain again became acute therefore she came to the ED.  She is a poor historian and can not give me details to help differentiate b/t this pain and her IBS pain.  She did not have her gallbladder removed after last episode of pancreatitis.    She states that she was told she had Crohn's in 2004.  Chart review shows that pt with an acute colitis at that time.  Dr. Quan at [facility] has given opinion that the pt had acute ischemic colitis at that time and does NOT have Crohn's disease.  She has IBS-D.    Past Medical History:   Diagnosis Date    Acute pancreat[facility]     [facility]    ADHD (attention deficit hyperactivity disorder), inattentive type     Fibromyalgia     treated by Dr. Thorpe - Rheumatologist    Hyperlipidemia     High triglycerides    Hypertension     Hypothyroidism     Treated by Dr. Mathew    IFG (impaired fasting glucose)     Migraine     " Treated by neurologist - Dr. Destiny Florez    Ulcerative colitis     Patient reported treated by Dr. Lee and Aaron in past  but on admit in 2018 - no UC seen on recent colonoscopy.  It was noted on colonoscopy in 2004 there were ulcers noted to descending colon but not present on sigmoidoscopy in 2004.    Vitamin D deficiency 3/31/2016       Past Surgical History:   Procedure Laterality Date     SECTION      COLONOSCOPY  10/08/2010    Dr. Lee - repeat in 5 years - has appointment for colonoscopy 2016    COLONOSCOPY  2016    Dr. Kelly - normal  colon - repeat in 10 years - scanned report to media file.    HYSTERECTOMY      OOPHORECTOMY      SHOULDER SURGERY Left     TONSILLECTOMY      ULTRASOUND, ENDOSCOPIC, UPPER GI TRACT N/A 2018    Performed by Reji Russell [facility] at [facility]    upper and lower GI  2016       Review of patient's allergies indicates:  No Known Allergies  Family History     Problem Relation (Age of Onset)    Cancer Sister (53), Brother (61), Brother (63)    Colon cancer Brother (66), Brother (60)    Diabetes Brother    Heart disease Mother    Hyperlipidemia Mother    Hypertension Mother, Brother        Tobacco Use    Smoking status: Former Smoker     Packs/day: 1.00     Years: 20.00     Pack years: 20.00     Last attempt to quit: 1997     Years since quittin.0    Smokeless tobacco: Former User   Substance and Sexual Activity    Alcohol use: No    Drug use: No    Sexual activity: No     Review of Systems   Constitutional: Positive for fever. Negative for appetite change and unexpected weight change.   Eyes: Negative for photophobia and visual disturbance.   Respiratory: Negative for chest tightness, shortness of breath and wheezing.    Cardiovascular: Negative for chest pain, palpitations and leg swelling.   Genitourinary: Negative for dysuria, flank pain and hematuria.   Musculoskeletal: Negative for joint swelling and  myalgias.   Skin: Negative for color change and rash.   Neurological: Negative for dizziness and speech difficulty.   Psychiatric/Behavioral: Negative for confusion and hallucinations.     Objective:     Vital Signs (Most Recent):  Temp: 96.4 °F (35.8 °C) (03/04/19 0555)  Pulse: 90 (03/04/19 0741)  Resp: 17 (03/04/19 0555)  BP: (!) 153/72 (03/04/19 0555)  SpO2: (!) 92 % (03/04/19 0555) Vital Signs (24h Range):  Temp:  [96.4 °F (35.8 °C)-99.2 °F (37.3 °C)] 96.4 °F (35.8 °C)  Pulse:  [] 90  Resp:  [17-20] 17  SpO2:  [92 %-99 %] 92 %  BP: (123-159)/(56-74) 153/72     Weight: 79 kg (174 lb 2.6 oz) (03/04/19 0500)  Body mass index is 28.11 kg/m².      Intake/Output Summary (Last 24 hours) at 3/4/2019 0753  Last data filed at 3/4/2019 0600  Gross per 24 hour   Intake 3313.75 ml   Output 1800 ml   Net 1513.75 ml       Lines/Drains/Airways     Peripheral Intravenous Line                 Peripheral IV - Single Lumen 03/03/19 0904 Anterior;Right Upper Arm less than 1 day                Physical Exam   Constitutional: She is oriented to person, place, and time. She appears well-developed and well-nourished.   Eyes: EOM are normal. Pupils are equal, round, and reactive to light.   Neck: Normal range of motion. Neck supple.   Cardiovascular: Normal rate and regular rhythm.   Pulmonary/Chest: Effort normal and breath sounds normal.   Abdominal: Soft. Bowel sounds are normal. She exhibits no distension and no mass. There is tenderness. There is no rebound and no guarding.   Musculoskeletal: Normal range of motion. She exhibits no edema.   Neurological: She is alert and oriented to person, place, and time.   Psychiatric: She has a normal mood and affect. Her behavior is normal. Judgment and thought content normal.       Significant Labs:  Amylase:   Recent Labs   Lab 03/02/19  1728   AMYLASE 138*     CBC:   Recent Labs   Lab 03/02/19  1728 03/03/19  0622   WBC 15.14* 13.51*   HGB 13.9 11.8*   HCT 42.0 35.7*    172      CMP:   Recent Labs   Lab 03/03/19  0621   GLU 88   CALCIUM 9.0   ALBUMIN 2.7*   PROT 6.3   *   K 3.9   CO2 23      BUN 7*   CREATININE 0.7   ALKPHOS 132   ALT 19   AST 17   BILITOT 1.0     Lipase:   Recent Labs   Lab 03/02/19  1728 03/03/19  0621   LIPASE 49 22       Significant Imaging:  Imaging results within the past 24 hours have been reviewed.    Assessment/Plan:     * Acute biliary pancreatitis without infection or necrosis    - Pt with recurrent acute pancreatitis.  EUS with normal pancreas 6 months ago and with sludge in gallbladder therefore she will need her gallbladder removed.  - Will plan for repeat EUS to ensure no pancreaticobiliary pathology, to be done today.  - She does not have Crohn's therefore this is likely biliary pancreatitis.         Thank you for your consult. I will follow-up with patient. Please contact us if you have any additional questions.    Monisha Clayton MD  Gastroenterology  [facility]

## 2019-03-04 NOTE — NURSING
VN rounding: VN cued into patient's room to complete evening rounding. Patient sittiung up in bed resting without difficulties.  at bedside. Patient requesting clear liquid tray. Dietary office contacted and relayed requests. At this time patient denies any questions, concerns and needs. Will cont to be available to patient and intervene prn.

## 2019-03-04 NOTE — SUBJECTIVE & OBJECTIVE
Past Medical History:   Diagnosis Date    Acute pancreatitis     2018 - admitted at [facility] then [facility]    ADHD (attention deficit hyperactivity disorder), inattentive type     Fibromyalgia     treated by Dr. Thorpe - Rheumatologist    Hyperlipidemia     High triglycerides    Hypertension     Hypothyroidism     Treated by Dr. Mathew    IFG (impaired fasting glucose)     Migraine     Treated by neurologist - Dr. Destiny Florez    Ulcerative colitis     Patient reported treated by Dr. Lee and Aaron in past  but on admit in 2018 - no UC seen on recent colonoscopy.  It was noted on colonoscopy in 2004 there were ulcers noted to descending colon but not present on sigmoidoscopy in 2004.    Vitamin D deficiency 3/31/2016       Past Surgical History:   Procedure Laterality Date     SECTION      COLONOSCOPY  10/08/2010    Dr. Lee - repeat in 5 years - has appointment for colonoscopy 2016    COLONOSCOPY  2016    Dr. Kelly - normal  colon - repeat in 10 years - scanned report to media file.    HYSTERECTOMY      OOPHORECTOMY      SHOULDER SURGERY Left     TONSILLECTOMY      ULTRASOUND, ENDOSCOPIC, UPPER GI TRACT N/A 2018    Performed by Reji Russell [facility] at [facility] END[facility]    upper and lower GI  2016       Review of patient's allergies indicates:  No Known Allergies  Family History     Problem Relation (Age of Onset)    Cancer Sister (53), Brother (61), Brother (63)    Colon cancer Brother (66), Brother (60)    Diabetes Brother    Heart disease Mother    Hyperlipidemia Mother    Hypertension Mother, Brother        Tobacco Use    Smoking status: Former Smoker     Packs/day: 1.00     Years: 20.00     Pack years: 20.00     Last attempt to quit: 1997     Years since quittin.0    Smokeless tobacco: Former User   Substance and Sexual Activity    Alcohol use: No    Drug use: No    Sexual activity: No     Review of Systems    Constitutional: Positive for fever. Negative for appetite change and unexpected weight change.   Eyes: Negative for photophobia and visual disturbance.   Respiratory: Negative for chest tightness, shortness of breath and wheezing.    Cardiovascular: Negative for chest pain, palpitations and leg swelling.   Genitourinary: Negative for dysuria, flank pain and hematuria.   Musculoskeletal: Negative for joint swelling and myalgias.   Skin: Negative for color change and rash.   Neurological: Negative for dizziness and speech difficulty.   Psychiatric/Behavioral: Negative for confusion and hallucinations.     Objective:     Vital Signs (Most Recent):  Temp: 96.4 °F (35.8 °C) (03/04/19 0555)  Pulse: 90 (03/04/19 0741)  Resp: 17 (03/04/19 0555)  BP: (!) 153/72 (03/04/19 0555)  SpO2: (!) 92 % (03/04/19 0555) Vital Signs (24h Range):  Temp:  [96.4 °F (35.8 °C)-99.2 °F (37.3 °C)] 96.4 °F (35.8 °C)  Pulse:  [] 90  Resp:  [17-20] 17  SpO2:  [92 %-99 %] 92 %  BP: (123-159)/(56-74) 153/72     Weight: 79 kg (174 lb 2.6 oz) (03/04/19 0500)  Body mass index is 28.11 kg/m².      Intake/Output Summary (Last 24 hours) at 3/4/2019 0753  Last data filed at 3/4/2019 0600  Gross per 24 hour   Intake 3313.75 ml   Output 1800 ml   Net 1513.75 ml       Lines/Drains/Airways     Peripheral Intravenous Line                 Peripheral IV - Single Lumen 03/03/19 0904 Anterior;Right Upper Arm less than 1 day                Physical Exam   Constitutional: She is oriented to person, place, and time. She appears well-developed and well-nourished.   Eyes: EOM are normal. Pupils are equal, round, and reactive to light.   Neck: Normal range of motion. Neck supple.   Cardiovascular: Normal rate and regular rhythm.   Pulmonary/Chest: Effort normal and breath sounds normal.   Abdominal: Soft. Bowel sounds are normal. She exhibits no distension and no mass. There is tenderness. There is no rebound and no guarding.   Musculoskeletal: Normal range of  motion. She exhibits no edema.   Neurological: She is alert and oriented to person, place, and time.   Psychiatric: She has a normal mood and affect. Her behavior is normal. Judgment and thought content normal.       Significant Labs:  Amylase:   Recent Labs   Lab 03/02/19  1728   AMYLASE 138*     CBC:   Recent Labs   Lab 03/02/19  1728 03/03/19  0622   WBC 15.14* 13.51*   HGB 13.9 11.8*   HCT 42.0 35.7*    172     CMP:   Recent Labs   Lab 03/03/19  0621   GLU 88   CALCIUM 9.0   ALBUMIN 2.7*   PROT 6.3   *   K 3.9   CO2 23      BUN 7*   CREATININE 0.7   ALKPHOS 132   ALT 19   AST 17   BILITOT 1.0     Lipase:   Recent Labs   Lab 03/02/19  1728 03/03/19  0621   LIPASE 49 22       Significant Imaging:  Imaging results within the past 24 hours have been reviewed.

## 2019-03-04 NOTE — NURSING
VN note: Dr Clayton clarified that patient is to have a EUS today in Endoscopy not in OR. Cued into patient's room and updated on procedure today. Patient and  verbalized clear understanding of all discussed. Will cont to monitor pt and intervene prn.

## 2019-03-05 ENCOUNTER — ANESTHESIA EVENT (OUTPATIENT)
Dept: SURGERY | Facility: HOSPITAL | Age: 64
DRG: 418 | End: 2019-03-05
Payer: MEDICARE

## 2019-03-05 PROBLEM — K58.0 IRRITABLE BOWEL SYNDROME WITH DIARRHEA: Chronic | Status: ACTIVE | Noted: 2018-06-05

## 2019-03-05 PROCEDURE — 99231 PR SUBSEQUENT HOSPITAL CARE,LEVL I: ICD-10-PCS | Mod: ,,, | Performed by: INTERNAL MEDICINE

## 2019-03-05 PROCEDURE — 63600175 PHARM REV CODE 636 W HCPCS: Performed by: HOSPITALIST

## 2019-03-05 PROCEDURE — 11000001 HC ACUTE MED/SURG PRIVATE ROOM

## 2019-03-05 PROCEDURE — 25000003 PHARM REV CODE 250: Performed by: FAMILY MEDICINE

## 2019-03-05 PROCEDURE — 25000003 PHARM REV CODE 250: Performed by: HOSPITALIST

## 2019-03-05 PROCEDURE — 99231 SBSQ HOSP IP/OBS SF/LOW 25: CPT | Mod: ,,, | Performed by: INTERNAL MEDICINE

## 2019-03-05 PROCEDURE — 94761 N-INVAS EAR/PLS OXIMETRY MLT: CPT

## 2019-03-05 RX ADMIN — LEVOTHYROXINE, LIOTHYRONINE 60 MG: 19; 4.5 TABLET ORAL at 06:03

## 2019-03-05 RX ADMIN — DICYCLOMINE HYDROCHLORIDE 20 MG: 20 TABLET ORAL at 05:03

## 2019-03-05 RX ADMIN — HYDROMORPHONE HYDROCHLORIDE 1 MG: 1 INJECTION, SOLUTION INTRAMUSCULAR; INTRAVENOUS; SUBCUTANEOUS at 10:03

## 2019-03-05 RX ADMIN — DICYCLOMINE HYDROCHLORIDE 20 MG: 20 TABLET ORAL at 02:03

## 2019-03-05 RX ADMIN — TRAZODONE HYDROCHLORIDE 100 MG: 100 TABLET ORAL at 08:03

## 2019-03-05 RX ADMIN — HYDROMORPHONE HYDROCHLORIDE 1 MG: 1 INJECTION, SOLUTION INTRAMUSCULAR; INTRAVENOUS; SUBCUTANEOUS at 05:03

## 2019-03-05 RX ADMIN — HYDROMORPHONE HYDROCHLORIDE 1 MG: 1 INJECTION, SOLUTION INTRAMUSCULAR; INTRAVENOUS; SUBCUTANEOUS at 02:03

## 2019-03-05 RX ADMIN — HYDROMORPHONE HYDROCHLORIDE 1 MG: 1 INJECTION, SOLUTION INTRAMUSCULAR; INTRAVENOUS; SUBCUTANEOUS at 08:03

## 2019-03-05 RX ADMIN — HYDROMORPHONE HYDROCHLORIDE 0.5 MG: 1 INJECTION, SOLUTION INTRAMUSCULAR; INTRAVENOUS; SUBCUTANEOUS at 03:03

## 2019-03-05 RX ADMIN — METOPROLOL SUCCINATE 50 MG: 50 TABLET, EXTENDED RELEASE ORAL at 08:03

## 2019-03-05 RX ADMIN — AMLODIPINE BESYLATE 10 MG: 5 TABLET ORAL at 08:03

## 2019-03-05 RX ADMIN — DICYCLOMINE HYDROCHLORIDE 20 MG: 20 TABLET ORAL at 08:03

## 2019-03-05 RX ADMIN — FOLIC ACID 1 MG: 1 TABLET ORAL at 08:03

## 2019-03-05 RX ADMIN — HYDROMORPHONE HYDROCHLORIDE 1 MG: 1 INJECTION, SOLUTION INTRAMUSCULAR; INTRAVENOUS; SUBCUTANEOUS at 06:03

## 2019-03-05 NOTE — ASSESSMENT & PLAN NOTE
SBP ranged 123 to 159. Holding her home olmesartan. Continue home amlodipine 10 mg daily and Toprol XL 50 mg daily. Hydralazine prn. Give dose of furosemide 20 mg IV today.

## 2019-03-05 NOTE — ASSESSMENT & PLAN NOTE
Holding her home olmesartan. Continue home amlodipine 10 mg daily and Toprol XL 50 mg daily. Hydralazine prn.

## 2019-03-05 NOTE — PROGRESS NOTES
[facility]  Gastroenterology  Progress Note    Patient Name: Madalyn Iverson  MRN: [id number]  Admission Date: 3/2/2019  Hospital Length of Stay: 2 days  Code Status: Full Code   Attending Provider: Austin Weiss MD  Consulting Provider: Aguilar Estevez MD  Primary Care Physician: Ruth Jaime NP  Principal Problem: Acute biliary pancreatitis without infection or necrosis      Subjective:     Interval History: Doing ok, feels better. Less abd pain. jersey diet, afebrile, no melena.     Review of Systems   Constitutional: Negative for appetite change and unexpected weight change.   Respiratory: Negative for apnea and chest tightness.    Cardiovascular: Negative for chest pain and palpitations.   Gastrointestinal: Negative for abdominal distention and anal bleeding.     Objective:     Vital Signs (Most Recent):  Temp: 97 °F (36.1 °C) (03/05/19 0759)  Pulse: 79 (03/05/19 0759)  Resp: 16 (03/05/19 0759)  BP: (!) 142/72 (03/05/19 0759)  SpO2: (!) 93 % (03/05/19 0808) Vital Signs (24h Range):  Temp:  [96.9 °F (36.1 °C)-98.2 °F (36.8 °C)] 97 °F (36.1 °C)  Pulse:  [] 79  Resp:  [16-19] 16  SpO2:  [92 %-99 %] 93 %  BP: (130-167)/(65-77) 142/72     Weight: 79 kg (174 lb 2.6 oz) (03/04/19 0500)  Body mass index is 28.11 kg/m².      Intake/Output Summary (Last 24 hours) at 3/5/2019 0943  Last data filed at 3/5/2019 0025  Gross per 24 hour   Intake 375 ml   Output 2 ml   Net 373 ml       Lines/Drains/Airways     Peripheral Intravenous Line                 Peripheral IV - Single Lumen 03/03/19 0904 Anterior;Right Upper Arm 2 days                Physical Exam   Constitutional: She is oriented to person, place, and time. She appears well-developed and well-nourished. No distress.   HENT:   Head: Normocephalic and atraumatic.   Eyes: Conjunctivae are normal. No scleral icterus.   Cardiovascular: Normal rate and regular rhythm. Exam reveals no gallop and no friction rub.   Pulmonary/Chest: Effort normal and  breath sounds normal. No respiratory distress. She has no wheezes.   Abdominal: Soft. Bowel sounds are normal. She exhibits no distension. There is no tenderness.   Neurological: She is alert and oriented to person, place, and time.   Skin: Skin is warm and dry. No rash noted. She is not diaphoretic. No erythema.   Psychiatric: She has a normal mood and affect. Her behavior is normal.   Nursing note and vitals reviewed.      Significant Labs:  CMP:   Recent Labs   Lab 03/04/19  0847   GLU 91   CALCIUM 9.5   ALBUMIN 2.6*   PROT 6.7      K 4.0   CO2 26      BUN 7*   CREATININE 0.7   ALKPHOS 137*   ALT 15   AST 13   BILITOT 0.4         Significant Imaging:  Imaging results within the past 24 hours have been reviewed.    Assessment/Plan:     * Acute biliary pancreatitis without infection or necrosis    - EUS with no additional findings  - surgery planned for tomorrow  - monitor LFTs  - clinically improving  - diet per surgery         Thank you for your consult. I will follow-up with patient. Please contact us if you have any additional questions.    Aguilar Estevez MD  Gastroenterology  [facility]

## 2019-03-05 NOTE — PLAN OF CARE
Problem: Adult Inpatient Plan of Care  Goal: Plan of Care Review  Outcome: Ongoing (interventions implemented as appropriate)   03/05/19 0516   Plan of Care Review   Plan of Care Reviewed With patient   POC reviewed with the pt,  verbalized understanding.  AAOx4, VSS.  Complaint of pain on abdomen, prn pain medication given; moderate relief noted.  On continuous telemetry monitor, SR.  No ectopy noted.  Plan for cholecystectomy on Wed.  Safety maintained, free of falls throughout shift.  Instructed to call for any assistance.  Anticipate discharge in am.

## 2019-03-05 NOTE — HOSPITAL COURSE
Ultrasound on 3/3/19 showed no evidence of cholelithiasis or cholecystitis. Due to history of gallbladder sludge, she had an endoscopic ultrasound on 3/4/19, which again showed gallbladder sludge, with a normal appearing pancreas and bile ducts. She tolerated clear liquid diet. General surgeon Dr. Adarsh Palacios performed laparoscopic cholecystectomy on 3/6/19. She tolerated food and was discharged home the next morning with a prescription for 20 tablets of oxycodone-acetaminophen 7.5-325 mg.

## 2019-03-05 NOTE — PROGRESS NOTES
[facility] Hospital Medicine  Progress Note    Patient Name: Madalyn Iverson  MRN: [id number]  Patient Class: IP- Inpatient   Admission Date: 3/2/2019  Length of Stay: 2 days  Attending Physician: Austin Weiss MD  Primary Care Provider: Ruth Jaime NP        Subjective:     Principal Problem:Acute biliary pancreatitis without infection or necrosis    HPI:  Madalyn Iverson is a 64 y.o. white woman with hypertension, hyperlipidemia, hypothyroidism, fibromyalgia, attention deficit hyperactivity disorder, history of pancreatitis, irritable bowel syndrome with diarrhea, migraine headaches. Her primary care provider is nurse practitioner Ruth Jaime.    She presented to [facility] Emergency Department on 3/2/19 with severe upper abdominal pain for 3-4 days. She described it as aching. It was associated with nausea and shortness of breath. She had subjective and objective fever. WBC count was found to be 15,140. Lipase was only 49. CT showed pancreatitis. She was admitted to [facility].     Hospital Course:  Ultrasound on 3/3/19 showed no evidence of cholelithiasis or cholecystitis. Due to history of gallbladder sludge, she had an endoscopic ultrasound on 3/4/19, which again showed gallbladder sludge, with a normal appearing pancreas and bile ducts. She tolerated clear liquid diet. General Surgery was consulted and planned cholecystectomy on 3/6/19.     Interval History: Has been having symptoms for the past 6 months.    Review of Systems   Constitutional: Negative for chills and fever.   Respiratory: Positive for shortness of breath. Negative for cough.    Gastrointestinal: Positive for abdominal pain.     Objective:     Vital Signs (Most Recent):  Temp: 98.5 °F (36.9 °C) (03/05/19 1152)  Pulse: 79 (03/05/19 1209)  Resp: 18 (03/05/19 1152)  BP: 125/72 (03/05/19 1152)  SpO2: 95 % (03/05/19 1210) Vital Signs (24h Range):  Temp:  [96.9 °F (36.1 °C)-98.5 °F  (36.9 °C)] 98.5 °F (36.9 °C)  Pulse:  [79-96] 79  Resp:  [16-18] 18  SpO2:  [92 %-98 %] 95 %  BP: (125-157)/(65-76) 125/72     Weight: 79 kg (174 lb 2.6 oz)  Body mass index is 28.11 kg/m².    Intake/Output Summary (Last 24 hours) at 3/5/2019 1519  Last data filed at 3/5/2019 0957  Gross per 24 hour   Intake 325 ml   Output 202 ml   Net 123 ml      Physical Exam   Constitutional: She is oriented to person, place, and time. She appears well-developed. No distress.   Pulmonary/Chest: Effort normal. No respiratory distress.   Abdominal: Soft. There is tenderness.   Neurological: She is alert and oriented to person, place, and time.   Psychiatric: She has a normal mood and affect.   Nursing note and vitals reviewed.      Significant Labs: All pertinent labs within the past 24 hours have been reviewed.    Significant Imaging: I have reviewed all pertinent imaging results/findings within the past 24 hours.   CT Abdomen Pelvis With Contrast 3/02/19: FINDINGS:  The visualized portion of the heart is unremarkable.  Mild atelectatic changes are seen at the lung bases.  Peripancreatic inflammatory changes are seen consistent with acute pancreatitis.  Pancreatic parenchyma enhances normally.  No evidence of pancreatic necrosis or pseudocyst.  Portal vein and splenic vein are patent.  No significant hepatic abnormalities are identified.  There is no intra-or extrahepatic biliary ductal dilatation.  The gallbladder is unremarkable.  The stomach, spleen, and adrenal glands are unremarkable.  Kidneys enhance normally with no evidence of hydronephrosis.  No abnormalities are seen along the ureteral courses.  Urinary bladder is nondistended.  Uterus has been removed.  Appendix is visualized and is unremarkable.  The visualized loops of small and large bowel show no evidence of obstruction or inflammation.  Scattered colonic diverticula are seen without evidence of acute diverticulitis.  No free air or free fluid.  Aorta tapers  normally with mild to moderate atherosclerosis.  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  No acute osseous abnormality identified.  Multilevel degenerative changes are visualized within the spine.  Subcutaneous soft tissue structures are unremarkable.  Impression:  1. Peripancreatic inflammatory changes consistent with acute pancreatitis.  2. Additional findings as detailed above.  US Abdomen Limited 3/03/19: FINDINGS:  The liver is normal in size measuring approximately 15.4 cmat the mid clavicular line, no focal abnormal hepatic echogenicity to suggest focal lesion..No evidence for intra or extra biliary ductal dilatation common duct measuring approximately 5-6 mm.  No gallstones, wall thickening or evidence for pericholecystic fluid.  Pancreas is partially obscured by bowel gas, visualized portions of the pancreas are within normal limits without evidence for peripancreatic fluid collection. No evidence for right-sided hydronephrosis.  Impression:  Unremarkable right upper quadrant abdominal ultrasound specifically without evidence for cholelithiasis or secondary signs to suggest acute cholecystitis.  Please note pancreas is partially obscured by bowel gas, no definite peripancreatic fluid collection in the visualized pancreas.  Please see abdominal CT report for further details.    Assessment/Plan:      * Acute biliary pancreatitis without infection or necrosis    Clear liquid diet. Cholecystectomy tomorrow.     Hyperlipidemia    Holding home rosuvastatin.     Acquired hypothyroidism    Continue home thyroid pork.      Essential hypertension    Holding her home olmesartan. Continue home amlodipine 10 mg daily and Toprol XL 50 mg daily. Hydralazine prn.        VTE Risk Mitigation (From admission, onward)        Ordered     IP VTE LOW RISK PATIENT  Once      03/02/19 2035     Place sequential compression device  Until discontinued      03/02/19 2035              Austin Weiss MD  Department of Hospital  Medicine   [facility]

## 2019-03-05 NOTE — PLAN OF CARE
VN cued into pt's room for introduction. VN informed pt that VN would be working along side bedside nurse and PCT throughout shift. Level of present pain assessed. At present no distress noted. Thoroughly discussed with patient today's plan of care and tomorrow's surgery. Discussed with patient High fall risk protocol and interventions that have been initiated and cont be in place for safety. Patient verbalized clear understanding and cooperation using teach back method. Bed alarm presently activated and in use. Will cont to be available to patient and intervene prn.

## 2019-03-05 NOTE — PROGRESS NOTES
[facility] Hospital Medicine  Progress Note    Patient Name: Madalyn Iverson  MRN: [id number]  Patient Class: IP- Inpatient   Admission Date: 3/2/2019  Length of Stay: 1 days  Attending Physician: Eitan Lora MD  Primary Care Provider: Ruth Jaime NP        Subjective:     Principal Problem:Acute biliary pancreatitis without infection or necrosis    HPI:  64 yr old pleasant white female with ADD, HTN, HLD, hypothyroidism, IGT, migraine, pancreatitis, UC, presents to [facility] complaining of severe upper abdominal pain. Onset 3-4 days agoa nd rapidly worsening. It is aching type, 10/10 in severity and associated with nausea and SOB. No chest pain, vomiting, diarrhea or constipation. She reported febrile and had fever while in emergency. On presentation, she has leukocytosis, fever, and CT showed acute pancreatitis. She will be admitted for fluids, bowel rest, pain control and antibiotics.    Hospital Course:  EUS on 3/4 showed gallbladder sludge again with a normal appearing pancreas and bile ducts. General Surgery planning for cholecystectomy on Wednesday 3/6/19.     Interval History: Continues to have epigastric pain and nausea, but they are better controlled with the current medication regimen. She is asking about eating.     Review of Systems   Constitutional: Negative for chills and fever.   Respiratory: Positive for shortness of breath. Negative for cough.    Cardiovascular: Negative for chest pain and palpitations.   Gastrointestinal: Positive for abdominal pain and nausea. Negative for vomiting.     Objective:     Vital Signs (Most Recent):  Temp: 97.5 °F (36.4 °C) (03/04/19 1739)  Pulse: 83 (03/04/19 1739)  Resp: 16 (03/04/19 1739)  BP: (!) 157/72 (03/04/19 1739)  SpO2: 98 % (03/04/19 1536) Vital Signs (24h Range):  Temp:  [96.4 °F (35.8 °C)-98.1 °F (36.7 °C)] 97.5 °F (36.4 °C)  Pulse:  [] 83  Resp:  [16-20] 16  SpO2:  [92 %-99 %] 98 %  BP: (123-167)/(56-77)  157/72     Weight: 79 kg (174 lb 2.6 oz)  Body mass index is 28.11 kg/m².    Intake/Output Summary (Last 24 hours) at 3/4/2019 1825  Last data filed at 3/4/2019 1327  Gross per 24 hour   Intake 1850 ml   Output --   Net 1850 ml      Physical Exam   Constitutional: She is oriented to person, place, and time. She appears well-developed and well-nourished. No distress.   Cardiovascular: Normal rate and regular rhythm.   No murmur heard.  Pulmonary/Chest: Effort normal. No respiratory distress. She has no wheezes. She has rales.   Abdominal: Soft. Bowel sounds are normal. She exhibits no distension. There is tenderness in the right upper quadrant and epigastric area.   Musculoskeletal: She exhibits edema.   Neurological: She is alert and oriented to person, place, and time.   Skin: Skin is warm and dry.   Psychiatric: She has a normal mood and affect. Her behavior is normal.   Nursing note and vitals reviewed.      Significant Labs:   CBC:   Recent Labs   Lab 03/03/19  0622 03/04/19  0847   WBC 13.51* 7.91   HGB 11.8* 12.5   HCT 35.7* 37.4    213     CMP:   Recent Labs   Lab 03/03/19  0621 03/04/19  0847   * 140   K 3.9 4.0    106   CO2 23 26   GLU 88 91   BUN 7* 7*   CREATININE 0.7 0.7   CALCIUM 9.0 9.5   PROT 6.3 6.7   ALBUMIN 2.7* 2.6*   BILITOT 1.0 0.4   ALKPHOS 132 137*   AST 17 13   ALT 19 15   ANIONGAP 9 8   EGFRNONAA >60 >60     Magnesium:   Recent Labs   Lab 03/03/19  0621 03/04/19  0847   MG 2.2 2.0       Significant Imaging: I have reviewed all pertinent imaging results/findings within the past 24 hours.    Assessment/Plan:      * Acute biliary pancreatitis without infection or necrosis    Leukocytosis -  Resolved  EUS today shows gallbladder sludge and normal pancreas, normal bile duct. GI agrees that this is likely biliary pancreatitis and recommends surgical consult. Dr. Palacios planning for cholesystectomy on Wednesday, 3/6/19, after acute pancreatitis improves.  Will start on clear  liquids today. Decrease IV fluids.      Anemia    Expected dilutional. Monitor.        Hyponatremia    Resolved. Likely related to volume depletion. Will monitor with the IV fluids.        Hyperlipidemia    Holding crestor.        Acquired hypothyroidism    Continue thyroid pork.        Essential hypertension    SBP ranged 123 to 159. Holding her home olmesartan. Continue home amlodipine 10 mg daily and Toprol XL 50 mg daily. Hydralazine prn. Give dose of furosemide 20 mg IV today.          VTE Risk Mitigation (From admission, onward)        Ordered     IP VTE LOW RISK PATIENT  Once      03/02/19 2035     Place sequential compression device  Until discontinued      03/02/19 2035              Eitan Lora MD  Department of Hospital Medicine   [facility]

## 2019-03-05 NOTE — SUBJECTIVE & OBJECTIVE
Subjective:     Interval History: Doing ok, feels better. Less abd pain. jersey diet, afebrile, no melena.     Review of Systems   Constitutional: Negative for appetite change and unexpected weight change.   Respiratory: Negative for apnea and chest tightness.    Cardiovascular: Negative for chest pain and palpitations.   Gastrointestinal: Negative for abdominal distention and anal bleeding.     Objective:     Vital Signs (Most Recent):  Temp: 97 °F (36.1 °C) (03/05/19 0759)  Pulse: 79 (03/05/19 0759)  Resp: 16 (03/05/19 0759)  BP: (!) 142/72 (03/05/19 0759)  SpO2: (!) 93 % (03/05/19 0808) Vital Signs (24h Range):  Temp:  [96.9 °F (36.1 °C)-98.2 °F (36.8 °C)] 97 °F (36.1 °C)  Pulse:  [] 79  Resp:  [16-19] 16  SpO2:  [92 %-99 %] 93 %  BP: (130-167)/(65-77) 142/72     Weight: 79 kg (174 lb 2.6 oz) (03/04/19 0500)  Body mass index is 28.11 kg/m².      Intake/Output Summary (Last 24 hours) at 3/5/2019 0943  Last data filed at 3/5/2019 0025  Gross per 24 hour   Intake 375 ml   Output 2 ml   Net 373 ml       Lines/Drains/Airways     Peripheral Intravenous Line                 Peripheral IV - Single Lumen 03/03/19 0904 Anterior;Right Upper Arm 2 days                Physical Exam   Constitutional: She is oriented to person, place, and time. She appears well-developed and well-nourished. No distress.   HENT:   Head: Normocephalic and atraumatic.   Eyes: Conjunctivae are normal. No scleral icterus.   Cardiovascular: Normal rate and regular rhythm. Exam reveals no gallop and no friction rub.   Pulmonary/Chest: Effort normal and breath sounds normal. No respiratory distress. She has no wheezes.   Abdominal: Soft. Bowel sounds are normal. She exhibits no distension. There is no tenderness.   Neurological: She is alert and oriented to person, place, and time.   Skin: Skin is warm and dry. No rash noted. She is not diaphoretic. No erythema.   Psychiatric: She has a normal mood and affect. Her behavior is normal.   Nursing  note and vitals reviewed.      Significant Labs:  CMP:   Recent Labs   Lab 03/04/19  0847   GLU 91   CALCIUM 9.5   ALBUMIN 2.6*   PROT 6.7      K 4.0   CO2 26      BUN 7*   CREATININE 0.7   ALKPHOS 137*   ALT 15   AST 13   BILITOT 0.4         Significant Imaging:  Imaging results within the past 24 hours have been reviewed.

## 2019-03-05 NOTE — PROGRESS NOTES
EUS confirmed biliary sludge    Plan for laparoscopic cholecystectomy tomorrow with Dr. Palacios    Patient made NPO at midnight for procedure

## 2019-03-05 NOTE — ASSESSMENT & PLAN NOTE
- EUS with no additional findings  - surgery planned for tomorrow  - monitor LFTs  - clinically improving  - diet per surgery

## 2019-03-05 NOTE — ASSESSMENT & PLAN NOTE
Leukocytosis -  Resolved  EUS today shows gallbladder sludge and normal pancreas, normal bile duct. GI agrees that this is likely biliary pancreatitis and recommends surgical consult. Dr. Palacios planning for cholesystectomy on Wednesday, 3/6/19, after acute pancreatitis improves.  Will start on clear liquids today. Decrease IV fluids.

## 2019-03-05 NOTE — SUBJECTIVE & OBJECTIVE
Interval History: Continues to have epigastric pain and nausea, but they are better controlled with the current medication regimen. She is asking about eating.     Review of Systems   Constitutional: Negative for chills and fever.   Respiratory: Positive for shortness of breath. Negative for cough.    Cardiovascular: Negative for chest pain and palpitations.   Gastrointestinal: Positive for abdominal pain and nausea. Negative for vomiting.     Objective:     Vital Signs (Most Recent):  Temp: 97.5 °F (36.4 °C) (03/04/19 1739)  Pulse: 83 (03/04/19 1739)  Resp: 16 (03/04/19 1739)  BP: (!) 157/72 (03/04/19 1739)  SpO2: 98 % (03/04/19 1536) Vital Signs (24h Range):  Temp:  [96.4 °F (35.8 °C)-98.1 °F (36.7 °C)] 97.5 °F (36.4 °C)  Pulse:  [] 83  Resp:  [16-20] 16  SpO2:  [92 %-99 %] 98 %  BP: (123-167)/(56-77) 157/72     Weight: 79 kg (174 lb 2.6 oz)  Body mass index is 28.11 kg/m².    Intake/Output Summary (Last 24 hours) at 3/4/2019 1825  Last data filed at 3/4/2019 1327  Gross per 24 hour   Intake 1850 ml   Output --   Net 1850 ml      Physical Exam   Constitutional: She is oriented to person, place, and time. She appears well-developed and well-nourished. No distress.   Cardiovascular: Normal rate and regular rhythm.   No murmur heard.  Pulmonary/Chest: Effort normal. No respiratory distress. She has no wheezes. She has rales.   Abdominal: Soft. Bowel sounds are normal. She exhibits no distension. There is tenderness in the right upper quadrant and epigastric area.   Musculoskeletal: She exhibits edema.   Neurological: She is alert and oriented to person, place, and time.   Skin: Skin is warm and dry.   Psychiatric: She has a normal mood and affect. Her behavior is normal.   Nursing note and vitals reviewed.      Significant Labs:   CBC:   Recent Labs   Lab 03/03/19  0622 03/04/19  0847   WBC 13.51* 7.91   HGB 11.8* 12.5   HCT 35.7* 37.4    213     CMP:   Recent Labs   Lab 03/03/19  0621 03/04/19  0844    * 140   K 3.9 4.0    106   CO2 23 26   GLU 88 91   BUN 7* 7*   CREATININE 0.7 0.7   CALCIUM 9.0 9.5   PROT 6.3 6.7   ALBUMIN 2.7* 2.6*   BILITOT 1.0 0.4   ALKPHOS 132 137*   AST 17 13   ALT 19 15   ANIONGAP 9 8   EGFRNONAA >60 >60     Magnesium:   Recent Labs   Lab 03/03/19  0621 03/04/19  0847   MG 2.2 2.0       Significant Imaging: I have reviewed all pertinent imaging results/findings within the past 24 hours.

## 2019-03-06 ENCOUNTER — ANESTHESIA (OUTPATIENT)
Dept: SURGERY | Facility: HOSPITAL | Age: 64
DRG: 418 | End: 2019-03-06
Payer: MEDICARE

## 2019-03-06 PROCEDURE — 36000708 HC OR TIME LEV III 1ST 15 MIN: Performed by: STUDENT IN AN ORGANIZED HEALTH CARE EDUCATION/TRAINING PROGRAM

## 2019-03-06 PROCEDURE — 25000003 PHARM REV CODE 250: Performed by: FAMILY MEDICINE

## 2019-03-06 PROCEDURE — 88304 TISSUE EXAM BY PATHOLOGIST: CPT | Mod: 26,,, | Performed by: PATHOLOGY

## 2019-03-06 PROCEDURE — 25000003 PHARM REV CODE 250: Performed by: HOSPITALIST

## 2019-03-06 PROCEDURE — 47562 PR LAP,CHOLECYSTECTOMY: ICD-10-PCS | Mod: ,,, | Performed by: STUDENT IN AN ORGANIZED HEALTH CARE EDUCATION/TRAINING PROGRAM

## 2019-03-06 PROCEDURE — S0020 INJECTION, BUPIVICAINE HYDRO: HCPCS | Performed by: STUDENT IN AN ORGANIZED HEALTH CARE EDUCATION/TRAINING PROGRAM

## 2019-03-06 PROCEDURE — 88304 TISSUE EXAM BY PATHOLOGIST: CPT | Performed by: PATHOLOGY

## 2019-03-06 PROCEDURE — 25000003 PHARM REV CODE 250: Performed by: ANESTHESIOLOGY

## 2019-03-06 PROCEDURE — 25000003 PHARM REV CODE 250: Performed by: STUDENT IN AN ORGANIZED HEALTH CARE EDUCATION/TRAINING PROGRAM

## 2019-03-06 PROCEDURE — 37000008 HC ANESTHESIA 1ST 15 MINUTES: Performed by: STUDENT IN AN ORGANIZED HEALTH CARE EDUCATION/TRAINING PROGRAM

## 2019-03-06 PROCEDURE — 25000003 PHARM REV CODE 250: Performed by: NURSE ANESTHETIST, CERTIFIED REGISTERED

## 2019-03-06 PROCEDURE — 71000033 HC RECOVERY, INTIAL HOUR: Performed by: STUDENT IN AN ORGANIZED HEALTH CARE EDUCATION/TRAINING PROGRAM

## 2019-03-06 PROCEDURE — 47562 LAPAROSCOPIC CHOLECYSTECTOMY: CPT | Mod: ,,, | Performed by: STUDENT IN AN ORGANIZED HEALTH CARE EDUCATION/TRAINING PROGRAM

## 2019-03-06 PROCEDURE — 63600175 PHARM REV CODE 636 W HCPCS: Performed by: NURSE ANESTHETIST, CERTIFIED REGISTERED

## 2019-03-06 PROCEDURE — 37000009 HC ANESTHESIA EA ADD 15 MINS: Performed by: STUDENT IN AN ORGANIZED HEALTH CARE EDUCATION/TRAINING PROGRAM

## 2019-03-06 PROCEDURE — 71000039 HC RECOVERY, EACH ADD'L HOUR: Performed by: STUDENT IN AN ORGANIZED HEALTH CARE EDUCATION/TRAINING PROGRAM

## 2019-03-06 PROCEDURE — 11000001 HC ACUTE MED/SURG PRIVATE ROOM

## 2019-03-06 PROCEDURE — 94761 N-INVAS EAR/PLS OXIMETRY MLT: CPT

## 2019-03-06 PROCEDURE — 88304 TISSUE SPECIMEN TO PATHOLOGY - SURGERY: ICD-10-PCS | Mod: 26,,, | Performed by: PATHOLOGY

## 2019-03-06 PROCEDURE — 36000709 HC OR TIME LEV III EA ADD 15 MIN: Performed by: STUDENT IN AN ORGANIZED HEALTH CARE EDUCATION/TRAINING PROGRAM

## 2019-03-06 PROCEDURE — 63600175 PHARM REV CODE 636 W HCPCS: Performed by: ANESTHESIOLOGY

## 2019-03-06 PROCEDURE — 63600175 PHARM REV CODE 636 W HCPCS: Performed by: HOSPITALIST

## 2019-03-06 PROCEDURE — 27201423 OPTIME MED/SURG SUP & DEVICES STERILE SUPPLY: Performed by: STUDENT IN AN ORGANIZED HEALTH CARE EDUCATION/TRAINING PROGRAM

## 2019-03-06 RX ORDER — SODIUM CHLORIDE 0.9 % (FLUSH) 0.9 %
3 SYRINGE (ML) INJECTION
Status: DISCONTINUED | OUTPATIENT
Start: 2019-03-06 | End: 2019-03-06

## 2019-03-06 RX ORDER — ONDANSETRON 2 MG/ML
4 INJECTION INTRAMUSCULAR; INTRAVENOUS DAILY PRN
Status: DISCONTINUED | OUTPATIENT
Start: 2019-03-06 | End: 2019-03-06

## 2019-03-06 RX ORDER — FENTANYL CITRATE 50 UG/ML
INJECTION, SOLUTION INTRAMUSCULAR; INTRAVENOUS
Status: DISCONTINUED | OUTPATIENT
Start: 2019-03-06 | End: 2019-03-06

## 2019-03-06 RX ORDER — CEFAZOLIN SODIUM 1 G/3ML
INJECTION, POWDER, FOR SOLUTION INTRAMUSCULAR; INTRAVENOUS
Status: DISCONTINUED | OUTPATIENT
Start: 2019-03-06 | End: 2019-03-06

## 2019-03-06 RX ORDER — NEOSTIGMINE METHYLSULFATE 1 MG/ML
INJECTION, SOLUTION INTRAVENOUS
Status: DISCONTINUED | OUTPATIENT
Start: 2019-03-06 | End: 2019-03-06

## 2019-03-06 RX ORDER — GLYCOPYRROLATE 0.2 MG/ML
INJECTION INTRAMUSCULAR; INTRAVENOUS
Status: DISCONTINUED | OUTPATIENT
Start: 2019-03-06 | End: 2019-03-06

## 2019-03-06 RX ORDER — LIDOCAINE HCL/PF 100 MG/5ML
SYRINGE (ML) INTRAVENOUS
Status: DISCONTINUED | OUTPATIENT
Start: 2019-03-06 | End: 2019-03-06

## 2019-03-06 RX ORDER — METOPROLOL TARTRATE 1 MG/ML
INJECTION, SOLUTION INTRAVENOUS
Status: DISCONTINUED | OUTPATIENT
Start: 2019-03-06 | End: 2019-03-06

## 2019-03-06 RX ORDER — ACETAMINOPHEN 10 MG/ML
INJECTION, SOLUTION INTRAVENOUS
Status: DISCONTINUED | OUTPATIENT
Start: 2019-03-06 | End: 2019-03-06

## 2019-03-06 RX ORDER — ROCURONIUM BROMIDE 10 MG/ML
INJECTION, SOLUTION INTRAVENOUS
Status: DISCONTINUED | OUTPATIENT
Start: 2019-03-06 | End: 2019-03-06

## 2019-03-06 RX ORDER — HYDROMORPHONE HYDROCHLORIDE 2 MG/ML
0.5 INJECTION, SOLUTION INTRAMUSCULAR; INTRAVENOUS; SUBCUTANEOUS EVERY 5 MIN PRN
Status: DISCONTINUED | OUTPATIENT
Start: 2019-03-06 | End: 2019-03-06

## 2019-03-06 RX ORDER — MIDAZOLAM HYDROCHLORIDE 1 MG/ML
INJECTION, SOLUTION INTRAMUSCULAR; INTRAVENOUS
Status: DISCONTINUED | OUTPATIENT
Start: 2019-03-06 | End: 2019-03-06

## 2019-03-06 RX ORDER — SUCCINYLCHOLINE CHLORIDE 20 MG/ML
INJECTION INTRAMUSCULAR; INTRAVENOUS
Status: DISCONTINUED | OUTPATIENT
Start: 2019-03-06 | End: 2019-03-06

## 2019-03-06 RX ORDER — EPHEDRINE SULFATE 50 MG/ML
INJECTION, SOLUTION INTRAVENOUS
Status: DISCONTINUED | OUTPATIENT
Start: 2019-03-06 | End: 2019-03-06

## 2019-03-06 RX ORDER — OXYCODONE HYDROCHLORIDE 5 MG/1
5 TABLET ORAL EVERY 6 HOURS PRN
Status: DISCONTINUED | OUTPATIENT
Start: 2019-03-06 | End: 2019-03-07 | Stop reason: HOSPADM

## 2019-03-06 RX ORDER — ONDANSETRON 2 MG/ML
INJECTION INTRAMUSCULAR; INTRAVENOUS
Status: DISCONTINUED | OUTPATIENT
Start: 2019-03-06 | End: 2019-03-06

## 2019-03-06 RX ORDER — BUPIVACAINE HYDROCHLORIDE 5 MG/ML
INJECTION, SOLUTION EPIDURAL; INTRACAUDAL
Status: DISCONTINUED | OUTPATIENT
Start: 2019-03-06 | End: 2019-03-06 | Stop reason: HOSPADM

## 2019-03-06 RX ORDER — DIPHENHYDRAMINE HYDROCHLORIDE 50 MG/ML
12.5 INJECTION INTRAMUSCULAR; INTRAVENOUS EVERY 6 HOURS PRN
Status: DISCONTINUED | OUTPATIENT
Start: 2019-03-06 | End: 2019-03-06

## 2019-03-06 RX ORDER — SODIUM CHLORIDE, SODIUM LACTATE, POTASSIUM CHLORIDE, CALCIUM CHLORIDE 600; 310; 30; 20 MG/100ML; MG/100ML; MG/100ML; MG/100ML
INJECTION, SOLUTION INTRAVENOUS CONTINUOUS PRN
Status: DISCONTINUED | OUTPATIENT
Start: 2019-03-06 | End: 2019-03-06

## 2019-03-06 RX ORDER — PROPOFOL 10 MG/ML
VIAL (ML) INTRAVENOUS
Status: DISCONTINUED | OUTPATIENT
Start: 2019-03-06 | End: 2019-03-06

## 2019-03-06 RX ORDER — PHENYLEPHRINE HYDROCHLORIDE 10 MG/ML
INJECTION INTRAVENOUS
Status: DISCONTINUED | OUTPATIENT
Start: 2019-03-06 | End: 2019-03-06

## 2019-03-06 RX ORDER — OXYCODONE HYDROCHLORIDE 5 MG/1
10 TABLET ORAL EVERY 6 HOURS PRN
Status: DISCONTINUED | OUTPATIENT
Start: 2019-03-06 | End: 2019-03-07 | Stop reason: HOSPADM

## 2019-03-06 RX ADMIN — HYDROMORPHONE HYDROCHLORIDE 1 MG: 1 INJECTION, SOLUTION INTRAMUSCULAR; INTRAVENOUS; SUBCUTANEOUS at 08:03

## 2019-03-06 RX ADMIN — EPHEDRINE SULFATE 5 MG: 50 INJECTION, SOLUTION INTRAMUSCULAR; INTRAVENOUS; SUBCUTANEOUS at 11:03

## 2019-03-06 RX ADMIN — ACETAMINOPHEN 1000 MG: 10 INJECTION, SOLUTION INTRAVENOUS at 11:03

## 2019-03-06 RX ADMIN — CEFAZOLIN 2 G: 330 INJECTION, POWDER, FOR SOLUTION INTRAMUSCULAR; INTRAVENOUS at 11:03

## 2019-03-06 RX ADMIN — HYDROMORPHONE HYDROCHLORIDE 0.5 MG: 2 INJECTION INTRAMUSCULAR; INTRAVENOUS; SUBCUTANEOUS at 12:03

## 2019-03-06 RX ADMIN — SUCCINYLCHOLINE CHLORIDE 100 MG: 20 INJECTION, SOLUTION INTRAMUSCULAR; INTRAVENOUS at 11:03

## 2019-03-06 RX ADMIN — DICYCLOMINE HYDROCHLORIDE 20 MG: 20 TABLET ORAL at 03:03

## 2019-03-06 RX ADMIN — NEOSTIGMINE METHYLSULFATE 5 MG: 1 INJECTION INTRAVENOUS at 12:03

## 2019-03-06 RX ADMIN — PHENYLEPHRINE HYDROCHLORIDE 50 MCG: 10 INJECTION INTRAVENOUS at 11:03

## 2019-03-06 RX ADMIN — METOPROLOL TARTRATE 1 MG: 5 INJECTION, SOLUTION INTRAVENOUS at 11:03

## 2019-03-06 RX ADMIN — METOPROLOL SUCCINATE 50 MG: 50 TABLET, EXTENDED RELEASE ORAL at 10:03

## 2019-03-06 RX ADMIN — METOPROLOL TARTRATE 1 MG: 5 INJECTION, SOLUTION INTRAVENOUS at 12:03

## 2019-03-06 RX ADMIN — ONDANSETRON 4 MG: 2 INJECTION INTRAMUSCULAR; INTRAVENOUS at 08:03

## 2019-03-06 RX ADMIN — MIDAZOLAM 2 MG: 1 INJECTION INTRAMUSCULAR; INTRAVENOUS at 11:03

## 2019-03-06 RX ADMIN — FOLIC ACID 1 MG: 1 TABLET ORAL at 03:03

## 2019-03-06 RX ADMIN — ROCURONIUM BROMIDE 25 MG: 10 INJECTION, SOLUTION INTRAVENOUS at 11:03

## 2019-03-06 RX ADMIN — SODIUM CHLORIDE, SODIUM LACTATE, POTASSIUM CHLORIDE, AND CALCIUM CHLORIDE: .6; .31; .03; .02 INJECTION, SOLUTION INTRAVENOUS at 11:03

## 2019-03-06 RX ADMIN — PROMETHAZINE HYDROCHLORIDE 6.25 MG: 25 INJECTION INTRAMUSCULAR; INTRAVENOUS at 01:03

## 2019-03-06 RX ADMIN — PROPOFOL 200 MG: 10 INJECTION, EMULSION INTRAVENOUS at 11:03

## 2019-03-06 RX ADMIN — LIDOCAINE HYDROCHLORIDE 100 MG: 20 INJECTION, SOLUTION INTRAVENOUS at 11:03

## 2019-03-06 RX ADMIN — OXYCODONE HYDROCHLORIDE 10 MG: 5 TABLET ORAL at 12:03

## 2019-03-06 RX ADMIN — GLYCOPYRROLATE 0.8 MG: 0.2 INJECTION, SOLUTION INTRAMUSCULAR; INTRAVENOUS at 12:03

## 2019-03-06 RX ADMIN — DICYCLOMINE HYDROCHLORIDE 20 MG: 20 TABLET ORAL at 08:03

## 2019-03-06 RX ADMIN — LEVOTHYROXINE, LIOTHYRONINE 60 MG: 19; 4.5 TABLET ORAL at 05:03

## 2019-03-06 RX ADMIN — ONDANSETRON 4 MG: 2 INJECTION, SOLUTION INTRAMUSCULAR; INTRAVENOUS at 12:03

## 2019-03-06 RX ADMIN — FENTANYL CITRATE 100 MCG: 50 INJECTION, SOLUTION INTRAMUSCULAR; INTRAVENOUS at 11:03

## 2019-03-06 RX ADMIN — HYDROMORPHONE HYDROCHLORIDE 1 MG: 1 INJECTION, SOLUTION INTRAMUSCULAR; INTRAVENOUS; SUBCUTANEOUS at 12:03

## 2019-03-06 RX ADMIN — OXYCODONE HYDROCHLORIDE 10 MG: 5 TABLET ORAL at 06:03

## 2019-03-06 RX ADMIN — AMLODIPINE BESYLATE 10 MG: 5 TABLET ORAL at 10:03

## 2019-03-06 RX ADMIN — PROPOFOL 30 MG: 10 INJECTION, EMULSION INTRAVENOUS at 11:03

## 2019-03-06 RX ADMIN — ROCURONIUM BROMIDE 5 MG: 10 INJECTION, SOLUTION INTRAVENOUS at 11:03

## 2019-03-06 RX ADMIN — PHENYLEPHRINE HYDROCHLORIDE 100 MCG: 10 INJECTION INTRAVENOUS at 11:03

## 2019-03-06 RX ADMIN — TIZANIDINE 4 MG: 4 TABLET ORAL at 03:03

## 2019-03-06 RX ADMIN — TRAZODONE HYDROCHLORIDE 100 MG: 100 TABLET ORAL at 08:03

## 2019-03-06 RX ADMIN — HYDROMORPHONE HYDROCHLORIDE 1 MG: 1 INJECTION, SOLUTION INTRAMUSCULAR; INTRAVENOUS; SUBCUTANEOUS at 05:03

## 2019-03-06 NOTE — PLAN OF CARE
Problem: Adult Inpatient Plan of Care  Goal: Plan of Care Review  Outcome: Ongoing (interventions implemented as appropriate)  Pt is AAOx3 with complaints of abd pain with relief from oxy and zanaflex. Pt tolerating bland diet. Pt up with assist to bathroom.

## 2019-03-06 NOTE — OP NOTE
DATE OF PROCEDURE:  03/06/2019    PREOPERATIVE DIAGNOSIS:  Biliary pancreatitis.    POSTOPERATIVE DIAGNOSIS:  Biliary pancreatitis.    PROCEDURE:  Laparoscopic cholecystectomy.    SURGEON:  Hill Palacios M.D.    ASSISTANT:  Marco Del Rosario PGY4    ANESTHESIA:  General endotracheal.    PREP:  Chlorhexidine.    SPECIMEN:  Gallbladder.    ESTIMATED BLOOD LOSS:  Minimal.    INDICATIONS:  The patient is a 64 y.o. female who presents to ED with   Biliary pancreatitis.  The patient was counseled on his options for   treatment and desired to proceed with surgical intervention.  The risks of the   procedure were described to the patient including bleeding, infection, pain,   scarring, wound complications, injury to local structures including the liver,   intestine or bile duct retained common duct stone and potential need for further   surgery.  The patient demonstrated understanding of these risks and a consent   form was obtained.    PROCEDURE:  The patient was identified in the Preoperative Unit and taken back   to the Operating Room and laid supine on the operating room table.  IV   antibiotics were administered prior to the induction of general anesthesia.    General anesthesia was induced without complication.  The patient was then   prepped and draped in standard sterile fashion manner.  A timeout procedure was   performed in accordance of hospital protocol.      A 3 mm incision was made in the   supraumbilical location using a #15 blade scalpel.  A 5mm optical trocar was used to enter the abdomen.  Once we confirmed an   intra-abdominal presence, CO2 insufflation was initiated.  A camera was inserted and the abdomen   was inspected.  There did not appear to be any abnormalities within the   abdomen.  At this point, an 11 mm subxiphoid trocar and two 5-mm right subcostal   trocars were then placed under direct visualization.  The gallbladder was   grasped and elevated into the right upper quadrant over the liver.  The    peritoneum was gently stripped inferiorly until the infundibulum and cystic   structures were visualized.  Gentle dissection of the cystic duct and artery   were skeletonized and the critical view was obtained.  At this point, the cystic   duct and artery were then clipped twice proximally, once distally and then   divided.  The gallbladder was then removed from the liver bed using Bovie   cautery.  Once this was completed, it was put into an EndoCatch bag and then   removed through the umbilical port.      The clips were visualized and appeared   intact.  The liver bed was hemostatic.  The right upper quadrant was gently   irrigated and fluid was evacuated.  At this point, the ports were removed under   direct visualization and CO2 gas was evacuated.  The subxiphoid fascia was closed   using 0 Vicryl suture in a figure-of-eight fashion.  The skin incisions were   closed using 5-0 Monocryl suture in an interrupted fashion.  Dry sterile   dressings were applied.  The patient was awakened from anesthesia without   complication and returned to the Postop Recovery in stable condition.  At the   end of the case, sponge and needle counts were correct on 2 occasions.  I was   present and scrubbed throughout the entirety of the case.    COMPLICATIONS:  None.    CONDITION:  Stable.

## 2019-03-06 NOTE — ANESTHESIA PREPROCEDURE EVALUATION
03/05/2019  Madalyn Iverson is a 64 y.o., female with HTN, hypothyroidism, Ulcerative colitis, here for lap tiffany under GEN    Underwent EUS 3/4/18 with [facility]    Past Medical History:   Diagnosis Date    Acute pancreatitis     June 2018 - admitted at [facility] then [facility]    ADHD (attention deficit hyperactivity disorder), inattentive type     Fibromyalgia     treated by Dr. Thorpe - Rheumatologist    Hyperlipidemia     High triglycerides    Hypertension     Hypothyroidism     Treated by Dr. Mathew    IFG (impaired fasting glucose)     Migraine     Treated by neurologist - Dr. Destiny Florez    Ulcerative colitis     Patient reported treated by Dr. Lee and Aaron in past  but on admit in 2018 - no UC seen on recent colonoscopy.  It was noted on colonoscopy in April 2004 there were ulcers noted to descending colon but not present on sigmoidoscopy in June 2004.    Vitamin D deficiency 3/31/2016         Anesthesia Evaluation    I have reviewed the Patient Summary Reports.     I have reviewed the Medications.     Review of Systems  Anesthesia Hx:   Denies Personal Hx of Anesthesia complications.   Social:  Former Smoker, No Alcohol Use    Cardiovascular:   Hypertension hyperlipidemia    Hepatic/GI:   PUD, UC, pancreatitis   Neurological:   Headaches    Endocrine:   Hypothyroidism    Psych:   Psychiatric History fibromyalgia         Physical Exam  General:  Well nourished    Airway/Jaw/Neck:  Airway Findings: Mouth Opening: Normal Tongue: Normal  General Airway Assessment: Adult  Mallampati: II  TM Distance: Normal, at least 6 cm      Dental:  Dental Findings: Periodontal disease, Severe, lower partial dentures   Chest/Lungs:  Chest/Lungs Clear    Heart/Vascular:  Heart Findings: Normal          No results for input(s): WBC, RBC, HGB, HCT, PLT, MCV, MCH, MCHC in the last 24  hours.        Anesthesia Plan  Type of Anesthesia, risks & benefits discussed:  Anesthesia Type:  MAC, general  Patient's Preference:   Intra-op Monitoring Plan: standard ASA monitors  Intra-op Monitoring Plan Comments:   Post Op Pain Control Plan: per primary service following discharge from PACU  Post Op Pain Control Plan Comments:   Induction:   IV  Beta Blocker:  Patient is on a Beta-Blocker and has received one dose within the past 24 hours (No further documentation required).       Informed Consent: Patient understands risks and agrees with Anesthesia plan.  Questions answered. Anesthesia consent signed with patient.  ASA Score: 3     Day of Surgery Review of History & Physical:  There are no significant changes.          Ready For Surgery From Anesthesia Perspective.

## 2019-03-06 NOTE — SUBJECTIVE & OBJECTIVE
Interval History: Anxious and did not get good sleep last night.    Review of Systems   Constitutional: Negative for chills and fever.   Eyes:        Dry eyes   Respiratory: Negative for cough.    Psychiatric/Behavioral: The patient is nervous/anxious.      Objective:     Vital Signs (Most Recent):  Temp: 97.7 °F (36.5 °C) (03/06/19 1330)  Pulse: 76 (03/06/19 1330)  Resp: 18 (03/06/19 1330)  BP: (!) 140/67 (03/06/19 1330)  SpO2: 96 % (03/06/19 1330) Vital Signs (24h Range):  Temp:  [96.5 °F (35.8 °C)-99.1 °F (37.3 °C)] 97.7 °F (36.5 °C)  Pulse:  [76-90] 76  Resp:  [14-20] 18  SpO2:  [95 %-100 %] 96 %  BP: (125-198)/(61-86) 140/67     Weight: 75 kg (165 lb 5.5 oz)  Body mass index is 26.69 kg/m².    Intake/Output Summary (Last 24 hours) at 3/6/2019 1339  Last data filed at 3/6/2019 1219  Gross per 24 hour   Intake 800 ml   Output --   Net 800 ml      Physical Exam   Constitutional: She is oriented to person, place, and time. She appears well-developed. No distress.   Pulmonary/Chest: Effort normal. No respiratory distress.   Abdominal: Soft. There is tenderness.   Neurological: She is alert and oriented to person, place, and time.   Psychiatric: She has a normal mood and affect.   Nursing note and vitals reviewed.      Significant Labs: All pertinent labs within the past 24 hours have been reviewed.    Significant Imaging: I have reviewed all pertinent imaging results/findings within the past 24 hours.

## 2019-03-06 NOTE — PLAN OF CARE
VN cued into pt's room for introduction. VN informed pt and  that VN would be working along side bedside nurse and PCT throughout shift. Level of present pain assessed. At present no distress noted. Discussed thoroughly today's plan of care and surgery with patient and . Discussed with patient High fall risk protocol and interventions that have been initiated and cont be in place for safety. Patient verbalized clear understanding and cooperation using teach back method. Bed alarm presently activated and in use. Will cont to be available to patient and intervene prn.

## 2019-03-06 NOTE — ANESTHESIA POSTPROCEDURE EVALUATION
"Anesthesia Post Evaluation    Patient: Madalyn Iverson    Procedure(s) Performed: Procedure(s) (LRB):  CHOLECYSTECTOMY, LAPAROSCOPIC (N/A)    Final Anesthesia Type: general  Patient location during evaluation: PACU  Patient participation: Yes- Able to Participate  Level of consciousness: awake and alert, oriented and awake  Post-procedure vital signs: reviewed and stable  Pain management: adequate  Airway patency: patent  PONV status at discharge: No PONV  Anesthetic complications: no      Cardiovascular status: blood pressure returned to baseline  Respiratory status: unassisted and room air  Hydration status: euvolemic  Follow-up not needed.        Visit Vitals  BP (!) 175/78   Pulse 88   Temp 36.4 °C (97.5 °F) (Skin)   Resp (!) 21   Ht 5' 6" (1.676 m)   Wt 75 kg (165 lb 5.5 oz)   SpO2 99%   Breastfeeding? No   BMI 26.69 kg/m²       Pain/Carie Score: Pain Rating Prior to Med Admin: 7 (3/6/2019 12:45 PM)  Pain Rating Post Med Admin: 6 (3/5/2019  3:53 AM)  Carie Score: 9 (3/6/2019 12:45 PM)        "

## 2019-03-06 NOTE — TRANSFER OF CARE
"Anesthesia Transfer of Care Note    Patient: Madalyn Iverson    Procedure(s) Performed: Procedure(s) (LRB):  CHOLECYSTECTOMY, LAPAROSCOPIC (N/A)    Patient location: PACU    Anesthesia Type: general    Transport from OR: Transported from OR on 6-10 L/min O2 by face mask with adequate spontaneous ventilation    Post pain: adequate analgesia    Post assessment: no apparent anesthetic complications and tolerated procedure well    Post vital signs: stable    Level of consciousness: awake and alert    Nausea/Vomiting: no nausea/vomiting    Complications: none    Transfer of care protocol was followed      Last vitals:   Visit Vitals  BP (!) 198/86   Pulse 90   Temp 36.4 °C (97.5 °F) (Skin)   Resp 18   Ht 5' 6" (1.676 m)   Wt 75 kg (165 lb 5.5 oz)   SpO2 100%   Breastfeeding? No   BMI 26.69 kg/m²     "

## 2019-03-06 NOTE — PLAN OF CARE
Problem: Adult Inpatient Plan of Care  Goal: Plan of Care Review  Outcome: Ongoing (interventions implemented as appropriate)  Pt AAOx4, no family members at bedside throughout shift. Pt complains of intermittent intense abd pain but denies n/v/d and SOB. Pt remains NPO after midnight for possible procedure on 3/6. Cardiac monitoring continued. Safety maintained, will cont to monitor.

## 2019-03-06 NOTE — PROGRESS NOTES
[facility]  Progress Note    Patient Name: Madalyn Iverson  MRN: [id number]  Patient Class: IP- Inpatient   Admission Date: 3/2/2019  Length of Stay: 3 days  Attending Physician: Austin eWiss MD  Primary Care Provider: Ruth Jaime NP        Subjective:     Principal Problem:Acute biliary pancreatitis without infection or necrosis    HPI:  Madalyn Iverson is a 64 y.o. white woman with hypertension, hyperlipidemia, hypothyroidism, fibromyalgia, attention deficit hyperactivity disorder, history of pancreatitis, irritable bowel syndrome with diarrhea, migraine headaches, history of  section in  and . Her primary care provider is nurse practitioner Ruth Jaime.    She presented to [facility] on 3/2/19 with severe upper abdominal pain for 3-4 days. She described it as aching. It was associated with nausea and shortness of breath. She had subjective and objective fever. WBC count was found to be 15,140. Lipase was only 49. CT showed pancreatitis. She was admitted to [facility].     Hospital Course:  Ultrasound on 3/3/19 showed no evidence of cholelithiasis or cholecystitis. Due to history of gallbladder sludge, she had an endoscopic ultrasound on 3/4/19, which again showed gallbladder sludge, with a normal appearing pancreas and bile ducts. She tolerated clear liquid diet. General surgeon Dr. Adarsh Palacios performed laparoscopic cholecystectomy on 3/6/19.     Interval History: Anxious and did not get good sleep last night.    Review of Systems   Constitutional: Negative for chills and fever.   Eyes:        Dry eyes   Respiratory: Negative for cough.    Psychiatric/Behavioral: The patient is nervous/anxious.      Objective:     Vital Signs (Most Recent):  Temp: 97.7 °F (36.5 °C) (19 1330)  Pulse: 76 (19 1330)  Resp: 18 (19 1330)  BP: (!) 140/67 (19 1330)  SpO2: 96 % (19  1330) Vital Signs (24h Range):  Temp:  [96.5 °F (35.8 °C)-99.1 °F (37.3 °C)] 97.7 °F (36.5 °C)  Pulse:  [76-90] 76  Resp:  [14-20] 18  SpO2:  [95 %-100 %] 96 %  BP: (125-198)/(61-86) 140/67     Weight: 75 kg (165 lb 5.5 oz)  Body mass index is 26.69 kg/m².    Intake/Output Summary (Last 24 hours) at 3/6/2019 1339  Last data filed at 3/6/2019 1219  Gross per 24 hour   Intake 800 ml   Output --   Net 800 ml      Physical Exam   Constitutional: She is oriented to person, place, and time. She appears well-developed. No distress.   Pulmonary/Chest: Effort normal. No respiratory distress.   Abdominal: Soft. There is tenderness.   Neurological: She is alert and oriented to person, place, and time.   Psychiatric: She has a normal mood and affect.   Nursing note and vitals reviewed.      Significant Labs: All pertinent labs within the past 24 hours have been reviewed.    Significant Imaging: I have reviewed all pertinent imaging results/findings within the past 24 hours.     Assessment/Plan:      * Acute biliary pancreatitis without infection or necrosis    Cholecystectomy today. Low fat bland diet.     Irritable bowel syndrome with diarrhea    Chronic, stable.     Hyperlipidemia    Holding home rosuvastatin.     Fibromyalgia    Chronic, stable.     Acquired hypothyroidism    Continue home thyroid pork.      Essential hypertension    Holding her home olmesartan. Continue home amlodipine 10 mg daily and Toprol XL 50 mg daily. Hydralazine prn.      ADHD (attention deficit hyperactivity disorder), inattentive type    Takes dextroamphetamine-amphetamine.       VTE Risk Mitigation (From admission, onward)        Ordered     IP VTE LOW RISK PATIENT  Once      03/02/19 2035     Place sequential compression device  Until discontinued      03/02/19 2035              Austin Weiss MD  Department of Hospital Medicine   [facility]

## 2019-03-06 NOTE — NURSING
VN note: Patient cued VN to rm.  at bedside. Patient states now that she if finished with surgery, she is hungry. Explained that the physician ordered for her to have a [place] diet. Notified Diet office about patient 's new diet order. Lunch tray to be delivered bedside. Cued back into patient's room and updated patient and . Both verbalized clear understanding and appreciation. Will cont to be available to pt and intervene prn.

## 2019-03-06 NOTE — PLAN OF CARE
Problem: Adult Inpatient Plan of Care  Goal: Plan of Care Review  Outcome: Ongoing (interventions implemented as appropriate)  Patient is AAOx4. Patient did report any n/v. Patient's pain have been managed with Dilaudid every 3 hours. Pt will be NPO at midnight due to Sheryl. Telemetry monitored. Encouraged pt to use call light when assistance is needed.

## 2019-03-06 NOTE — PLAN OF CARE
Signed out from pacu per Dr Manley. Report called to Reny Vasquez/5A. Transported to room 515 via stretcher,sr upx2.

## 2019-03-07 VITALS
RESPIRATION RATE: 16 BRPM | WEIGHT: 176.56 LBS | TEMPERATURE: 98 F | HEIGHT: 66 IN | HEART RATE: 95 BPM | SYSTOLIC BLOOD PRESSURE: 134 MMHG | DIASTOLIC BLOOD PRESSURE: 70 MMHG | BODY MASS INDEX: 28.38 KG/M2 | OXYGEN SATURATION: 97 %

## 2019-03-07 PROBLEM — K85.10 ACUTE BILIARY PANCREATITIS WITHOUT INFECTION OR NECROSIS: Status: RESOLVED | Noted: 2018-06-13 | Resolved: 2019-03-07

## 2019-03-07 PROBLEM — Z90.49 STATUS POST LAPAROSCOPIC CHOLECYSTECTOMY: Status: ACTIVE | Noted: 2019-03-07

## 2019-03-07 LAB
BACTERIA BLD CULT: NORMAL
BACTERIA BLD CULT: NORMAL

## 2019-03-07 PROCEDURE — 25000003 PHARM REV CODE 250: Performed by: HOSPITALIST

## 2019-03-07 PROCEDURE — 27000221 HC OXYGEN, UP TO 24 HOURS

## 2019-03-07 PROCEDURE — 94761 N-INVAS EAR/PLS OXIMETRY MLT: CPT

## 2019-03-07 PROCEDURE — 25000003 PHARM REV CODE 250: Performed by: FAMILY MEDICINE

## 2019-03-07 RX ORDER — OXYCODONE AND ACETAMINOPHEN 7.5; 325 MG/1; MG/1
1 TABLET ORAL EVERY 6 HOURS PRN
Qty: 20 TABLET | Refills: 0 | Status: SHIPPED | OUTPATIENT
Start: 2019-03-07 | End: 2019-03-12

## 2019-03-07 RX ADMIN — FOLIC ACID 1 MG: 1 TABLET ORAL at 08:03

## 2019-03-07 RX ADMIN — METOPROLOL SUCCINATE 50 MG: 50 TABLET, EXTENDED RELEASE ORAL at 08:03

## 2019-03-07 RX ADMIN — OXYCODONE HYDROCHLORIDE 10 MG: 5 TABLET ORAL at 05:03

## 2019-03-07 RX ADMIN — LEVOTHYROXINE, LIOTHYRONINE 60 MG: 19; 4.5 TABLET ORAL at 05:03

## 2019-03-07 RX ADMIN — DICYCLOMINE HYDROCHLORIDE 20 MG: 20 TABLET ORAL at 08:03

## 2019-03-07 RX ADMIN — AMLODIPINE BESYLATE 10 MG: 5 TABLET ORAL at 08:03

## 2019-03-07 NOTE — PROGRESS NOTES
TN called to inform pt of her f/u apt with pcp 3/8/19 at 3:30 pm.   She plans to keep this apt.

## 2019-03-07 NOTE — DISCHARGE INSTRUCTIONS
Cholecystectomy (Laparoscopic), Discharge Instructions (English) View Edit Remove   Laparoscopic Cholecystectomy, Having (English) View Edit Remove   Pancreatitis, Understanding (English) View Edit Remove   High Blood Pressure (Hypertension), Discharge Instructions (English) View Edit Remove   Diet, Low Fat (English) View Edit Remove   Acetaminophen; Oxycodone tablets (English) View Edit Remove

## 2019-03-07 NOTE — SUBJECTIVE & OBJECTIVE
"Interval History: NAEON.  Tolerated lap tiffany well.  Feels "better".  Tolerated some liquids.  Hasnt walked yet.      Medications:  Continuous Infusions:  Scheduled Meds:   amLODIPine  10 mg Oral Daily    dicyclomine  20 mg Oral QID    folic acid  1 mg Oral Daily    metoprolol succinate  50 mg Oral Daily    thyroid (pork)  60 mg Oral Before breakfast    traZODone  100 mg Oral QHS     PRN Meds:acetaminophen, hydrALAZINE, ondansetron, oxyCODONE, oxyCODONE, promethazine (PHENERGAN) IVPB, sodium chloride 0.9%, tiZANidine     Review of patient's allergies indicates:  No Known Allergies  Objective:     Vital Signs (Most Recent):  Temp: 96.9 °F (36.1 °C) (03/07/19 0429)  Pulse: 106 (03/07/19 0429)  Resp: 18 (03/07/19 0429)  BP: 127/62 (03/07/19 0429)  SpO2: 96 % (03/06/19 1923) Vital Signs (24h Range):  Temp:  [96.5 °F (35.8 °C)-98.2 °F (36.8 °C)] 96.9 °F (36.1 °C)  Pulse:  [] 106  Resp:  [14-20] 18  SpO2:  [96 %-100 %] 96 %  BP: (117-198)/(57-86) 127/62     Weight: 80.1 kg (176 lb 9.4 oz)  Body mass index is 28.5 kg/m².    Intake/Output - Last 3 Shifts       03/05 0700 - 03/06 0659 03/06 0700 - 03/07 0659    P.O. 200     I.V. (mL/kg)  600 (7.5)    Total Intake(mL/kg) 200 (2.7) 600 (7.5)    Urine (mL/kg/hr) 200 (0.1)     Total Output 200     Net 0 +600          Urine Occurrence  2 x          Physical Exam   Constitutional: She is oriented to person, place, and time. She appears well-developed and well-nourished. No distress.   HENT:   Head: Normocephalic and atraumatic.   Eyes: EOM are normal. Pupils are equal, round, and reactive to light.   Neck: Normal range of motion. Neck supple.   Cardiovascular: Normal rate and intact distal pulses.   Pulmonary/Chest: Effort normal. No respiratory distress.   Abdominal:   Soft, ND, incisional tenderness, surgical dressings c/d/i   Neurological: She is alert and oriented to person, place, and time.   Skin: Skin is warm and dry.   Psychiatric: She has a normal mood and " affect.       Significant Labs:  Reviewed    Significant Diagnostics:  Reviewed

## 2019-03-07 NOTE — PLAN OF CARE
VN note: VN cued into patient's room. Trazodone and Tramadol both listed as duplicate on home medication list. VN verified with patient her med doses and frequency at home. Patient reports she takes trazodone 100 mg nightly and Tramadol every 6 hours as needed for pain. Corrections made in Epic. VN also educated patient on new medication she would be discharging on (percocet) and side effects. Patient also instructed to shower and not take baths at home. Awaiting final discharge instructions when ready.

## 2019-03-07 NOTE — PROGRESS NOTES
Follow-up With  Details  Why  Contact Info   Hill Palacios MD  On 3/20/2019  1:20 pm previously booked   [address]  [address]  [place] [address]  [phone]   Ruth Jaime NP  On 3/20/2019  3:30 pm -- previously booked   [address]  [address]  [place] [address]  [phone]   Li Waller MD  On 3/12/2019  10:15 am -- previously booked   [address]  [address]  [facility] [place] [address]  [phone]

## 2019-03-07 NOTE — PLAN OF CARE
VN note: VN cued into patient's room to review discharge papers. Patient's  at bedside. Patient's  cleaning patient's hair and patient/ going through suitcase periodically when VN reviewed discharged. VN educated patient on new medications and side effects. Diet education reinforced. She was also educated on lap choley home care and when to seek medical attention. Patient was also educated on signs and symptoms of infection as well. Follow-up information also reviewed. Patient verbalized understanding and all questions answered. Refer to clinical references for further education. Transport requested.    Patient's medication delivered at bedside.

## 2019-03-07 NOTE — PROGRESS NOTES
Follow-up With  Details  Why  Contact Info   Hill Palacios MD  On 3/20/2019  1:20 pm previously booked   [address]  [address]  [place] [address]  [phone]   Ruth Jaime NP  On 3/8/2019  3:30 pm   [address]  [address]  [facility] [place] [address]  [phone]   Li Waller MD  On 3/12/2019  10:15 am -- previously booked   [address]  [address]  [facility] [address]  [phone]

## 2019-03-07 NOTE — ASSESSMENT & PLAN NOTE
S/p natalie torrese  Doing well  ADAT  Ambulate  Once able to tolerate diet and move around, can go home  F/u in Dr Palacios's clinic in 2 weeks for a post op check  Can shower tomorrow  Discussed lifting restrictions with patient

## 2019-03-07 NOTE — PROGRESS NOTES
Discharge instructions given to patient for VN to review, IV removed at bedside, medications delivered to room, spouse her for transportation.

## 2019-03-07 NOTE — DISCHARGE SUMMARY
[facility] Hospital Medicine  Discharge Summary      Patient Name: Madalyn Iverson  MRN: [id number]  Admission Date: 3/2/2019  Hospital Length of Stay: 4 days  Discharge Date and Time: 3/7/2019 11:58 AM  Attending Physician: Austin Weiss MD   Discharging Provider: Austin Weiss MD  Primary Care Provider: Ruth Jaime NP      HPI:   Madalyn Iverson is a 64 y.o. white woman with hypertension, hyperlipidemia, hypothyroidism, fibromyalgia, attention deficit hyperactivity disorder, history of pancreatitis, irritable bowel syndrome with diarrhea, migraine headaches, history of  section in  and . Her primary care provider is nurse practitioner Ruth Jaime.    She presented to [facility] Emergency [facility] on 3/2/19 with severe upper abdominal pain for 3-4 days. She described it as aching. It was associated with nausea and shortness of breath. She had subjective and objective fever. WBC count was found to be 15,140. Lipase was only 49. CT showed pancreatitis. She was admitted to [facility].     Procedure(s) (LRB):  CHOLECYSTECTOMY, LAPAROSCOPIC (N/A)      Hospital Course:   Ultrasound on 3/3/19 showed no evidence of cholelithiasis or cholecystitis. Due to history of gallbladder sludge, she had an endoscopic ultrasound on 3/4/19, which again showed gallbladder sludge, with a normal appearing pancreas and bile ducts. She tolerated clear liquid diet. General surgeon Dr. Adarsh Palacios performed laparoscopic cholecystectomy on 3/6/19. She tolerated food and was discharged home the next morning with a prescription for 20 tablets of oxycodone-acetaminophen 7.5-325 mg.     Consults:   Consults (From admission, onward)        Status Ordering Provider     Inpatient consult to [facility]  Once     Provider:  Aguilar Estevez MD    Completed RYLEY CAMACHO     Inpatient consult to General Surgery  Once     Provider:  Hill LEVIEN  MD Philip    Completed RYLEY CAMACHO        Final Active Diagnoses:    Diagnosis Date Noted POA    Status post laparoscopic cholecystectomy [Z90.49] 03/07/2019 Not Applicable    Irritable bowel syndrome with diarrhea [K58.0] 06/05/2018 Yes     Chronic    Hyperlipidemia [E78.5] 05/15/2017 Yes    Acquired hypothyroidism [E03.9] 03/23/2016 Yes     Chronic    Fibromyalgia [M79.7] 03/23/2016 Yes     Chronic    Essential hypertension [I10]  Yes     Chronic    ADHD (attention deficit hyperactivity disorder), inattentive type [F90.0]  Yes     Chronic      Problems Resolved During this Admission:    Diagnosis Date Noted Date Resolved POA    PRINCIPAL PROBLEM:  Acute biliary pancreatitis without infection or necrosis [K85.10] 06/13/2018 03/07/2019 Yes    Hyponatremia [E87.1] 03/03/2019 03/04/2019 Yes    Leukocytosis [D72.829] 03/03/2019 03/04/2019 Yes    Anemia [D64.9] 03/03/2019 03/04/2019 Yes       Discharged Condition: good    Disposition: Home or Self Care    Follow Up:  Follow-up Information     Hill Palacios MD In 1 week.    Specialties:  Surgery, General Surgery  Contact information:  [address]  [address]  [facility] [id number] [phone]                 Patient Instructions:      Diet Adult Regular     Order Specific Question Answer Comments   Fat restriction, if any: 60g Fat      Lifting restrictions   Order Comments: As advised by the surgeon     Notify your health care provider if you experience any of the following:  severe uncontrolled pain     Notify your health care provider if you experience any of the following:  persistent nausea and vomiting or diarrhea     Notify your health care provider if you experience any of the following:  redness, tenderness, or signs of infection (pain, swelling, redness, odor or green/yellow discharge around incision site)     Activity as tolerated     Shower on day dressing removed (No bath)       Significant Diagnostic Studies:   CT Abdomen Pelvis  With Contrast 3/02/19: FINDINGS:  The visualized portion of the heart is unremarkable.  Mild atelectatic changes are seen at the lung bases.  Peripancreatic inflammatory changes are seen consistent with acute pancreatitis.  Pancreatic parenchyma enhances normally.  No evidence of pancreatic necrosis or pseudocyst.  Portal vein and splenic vein are patent.  No significant hepatic abnormalities are identified.  There is no intra-or extrahepatic biliary ductal dilatation.  The gallbladder is unremarkable.  The stomach, spleen, and adrenal glands are unremarkable.  Kidneys enhance normally with no evidence of hydronephrosis.  No abnormalities are seen along the ureteral courses.  Urinary bladder is nondistended.  Uterus has been removed.  Appendix is visualized and is unremarkable.  The visualized loops of small and large bowel show no evidence of obstruction or inflammation.  Scattered colonic diverticula are seen without evidence of acute diverticulitis.  No free air or free fluid.  Aorta tapers normally with mild to moderate atherosclerosis.  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  No acute osseous abnormality identified.  Multilevel degenerative changes are visualized within the spine.  Subcutaneous soft tissue structures are unremarkable.  Impression:  1. Peripancreatic inflammatory changes consistent with acute pancreatitis.  2. Additional findings as detailed above.  US Abdomen Limited 3/03/19: FINDINGS:  The liver is normal in size measuring approximately 15.4 cmat the mid clavicular line, no focal abnormal hepatic echogenicity to suggest focal lesion..No evidence for intra or extra biliary ductal dilatation common duct measuring approximately 5-6 mm.  No gallstones, wall thickening or evidence for pericholecystic fluid.  Pancreas is partially obscured by bowel gas, visualized portions of the pancreas are within normal limits without evidence for peripancreatic fluid collection. No evidence for right-sided  hydronephrosis.  Impression:  Unremarkable right upper quadrant abdominal ultrasound specifically without evidence for cholelithiasis or secondary signs to suggest acute cholecystitis.  Please note pancreas is partially obscured by bowel gas, no definite peripancreatic fluid collection in the visualized pancreas.  Please see abdominal CT report for further details.  Upper EUS 3/04/19: Findings:       ENDOSCOPIC FINDING: :       The examined esophagus was endoscopically normal. The entire examined stomach was endoscopically normal. The examined duodenum was endoscopically normal.       ENDOSONOGRAPHIC FINDING: :       The esophagus, stomach and duodenum and adjacent structures were visualized endosonographically.       There was no sign of significant endosonographic abnormality in the entire pancreas. The pancreas was well visualized, no masses, the pancreatic duct was well visualized from ampulla to tail, the pancreatic duct was thin in caliber, the pancreatic duct was regular in contour.       Endosonographic imaging in the common hepatic duct showed no abnormalities.       Moderate hyperechoic material consistent with sludge was visualized endosonographically in the gallbladder body.  Impression:           - Normal esophagus.                        - Normal stomach.                        - Normal examined duodenum.                        - There was no sign of significant pathology in the entire pancreas.                        - Hyperechoic material consistent with sludge was visualized endosonographically in the gallbladder body.                        - No specimens collected.    Medications:  Reconciled Home Medications:      Medication List      START taking these medications    oxyCODONE-acetaminophen 7.5-325 mg per tablet  Commonly known as:  PERCOCET  Take 1 tablet by mouth every 6 (six) hours as needed for Pain.        CHANGE how you take these medications    * traZODone 50 MG tablet  Commonly known as:  " DESYREL  Take 1 tablet (50 mg total) by mouth every evening.  What changed:  how much to take     * traZODone 100 MG tablet  Commonly known as:  DESYREL  TAKE 1 TABLET (100 MG TOTAL) BY MOUTH EVERY EVENING.  What changed:  Another medication with the same name was changed. Make sure you understand how and when to take each.         * This list has 2 medication(s) that are the same as other medications prescribed for you. Read the directions carefully, and ask your doctor or other care provider to review them with you.            CONTINUE taking these medications    ALPRAZolam 0.5 MG tablet  Commonly known as:  XANAX  TAKE 1 TABLET BY MOUTH 3 TIMES A DAY     amLODIPine 10 MG tablet  Commonly known as:  NORVASC  Take 1 tablet (10 mg total) by mouth once daily.     ARMOUR THYROID 60 mg Tab  Generic drug:  thyroid (pork)  Bridgeton Thyroid 60 mg tablet     dextroamphetamine-amphetamine 10 mg Tab  Take 10 mg by mouth 2 (two) times daily.     diclofenac sodium 1 % Gel  Commonly known as:  VOLTAREN  Apply 2 g topically 4 (four) times daily.     dicyclomine 20 mg tablet  Commonly known as:  BENTYL  Take 1 tablet (20 mg total) by mouth 4 (four) times daily.     estradiol 2 MG tablet  Commonly known as:  ESTRACE  Take 2 mg by mouth once daily.     folic acid 1 MG tablet  Commonly known as:  FOLVITE  Take 1 tablet (1 mg total) by mouth once daily.     furosemide 40 MG tablet  Commonly known as:  LASIX  TAKE 1 TABLET BY MOUTH EVERY DAY     insulin syringe-needle U-100 1 mL 25 gauge x 5/8" Syrg  1 mL by Misc.(Non-Drug; Combo Route) route every 30 days.     methotrexate 2.5 MG Tab  Take 8 tabs once a week only     metoprolol succinate 50 MG 24 hr tablet  Commonly known as:  TOPROL-XL  TAKE 1 TABLET BY MOUTH EVERY DAY     potassium chloride 10 MEQ Cpsr  Commonly known as:  MICRO-K  TAKE 1 CAPSULE BY MOUTH TWICE A DAY     progesterone 100 MG capsule  Commonly known as:  PROMETRIUM  Take 100 mg by mouth once daily.     rosuvastatin 20 " MG tablet  Commonly known as:  CRESTOR  Take 1 tablet (20 mg total) by mouth once daily.     tiZANidine 4 mg Cap  Take by mouth 2 (two) times daily.     * traMADol 50 mg tablet  Commonly known as:  ULTRAM  Take 1 tablet (50 mg total) by mouth every 6 (six) hours as needed for Pain.     * traMADol 50 mg tablet  Commonly known as:  ULTRAM  Take 1 tablet (50 mg total) by mouth every 12 (twelve) hours as needed for Pain.     valsartan 80 MG tablet  Commonly known as:  DIOVAN  TAKE 1 TABLET BY MOUTH EVERY DAY     * VITAMIN B-12 INJ  Inject 1,000 mcg as directed every 30 days.     * cyanocobalamin 1,000 mcg/mL injection  Inject 1 mL (1,000 mcg total) into the skin every 30 days.     VITAMIN D2 50,000 unit Cap  Generic drug:  ergocalciferol  TAKE 1 CAPSULE (50,000 UNITS TOTAL) BY MOUTH EVERY 7 DAYS. FOR 12 DOSES         * This list has 4 medication(s) that are the same as other medications prescribed for you. Read the directions carefully, and ask your doctor or other care provider to review them with you.                Indwelling Lines/Drains at time of discharge: None    Time spent on the discharge of patient: 35 minutes  Patient was seen and examined on the date of discharge and determined to be suitable for discharge.         Austin Weiss MD  Department of Hospital Medicine  [facility]

## 2019-03-07 NOTE — PLAN OF CARE
TN met with pt and spouse Kendra prior to d/c   reviewed f/u apts - pt wants a sooner apt with pcp.     TN will call pt with apt --     Future Appointments   Date Time Provider Department Center   3/8/2019  3:30 PM Ruth Jaime NP DESC [facility]   3/12/2019 10:15 AM Li Waller MD [facility] HAND [facility]   3/20/2019  1:20 PM Hill Palacios MD [facility] GENSUR [facility]   5/7/2019 11:50 AM SAME DAY LAB, [facility] LAB [facility]   5/21/2019 10:40 AM Michael Yates MD [facility]     no hh, no dme ordered          03/07/19 1234   Final Note   Assessment Type Final Discharge Note   Anticipated Discharge Disposition Home   What phone number can be called within the next 1-3 days to see how you are doing after discharge? [phone]   Hospital Follow Up  Appt(s) scheduled? Yes   Discharge plans and expectations educations in teach back method with documentation complete? Yes   Right Care Referral Info   Post Acute Recommendation Home-care   Referral Type (no care )

## 2019-03-07 NOTE — PROGRESS NOTES
"[facility]  General Surgery  Progress Note    Subjective:     History of Present Illness:  No notes on file    Post-Op Info:  Procedure(s) (LRB):  CHOLECYSTECTOMY, LAPAROSCOPIC (N/A)   1 Day Post-Op     Interval History: NAEON.  Tolerated lap tiffany well.  Feels "better".  Tolerated some liquids.  Hasnt walked yet.      Medications:  Continuous Infusions:  Scheduled Meds:   amLODIPine  10 mg Oral Daily    dicyclomine  20 mg Oral QID    folic acid  1 mg Oral Daily    metoprolol succinate  50 mg Oral Daily    thyroid (pork)  60 mg Oral Before breakfast    traZODone  100 mg Oral QHS     PRN Meds:acetaminophen, hydrALAZINE, ondansetron, oxyCODONE, oxyCODONE, promethazine (PHENERGAN) IVPB, sodium chloride 0.9%, tiZANidine     Review of patient's allergies indicates:  No Known Allergies  Objective:     Vital Signs (Most Recent):  Temp: 96.9 °F (36.1 °C) (03/07/19 0429)  Pulse: 106 (03/07/19 0429)  Resp: 18 (03/07/19 0429)  BP: 127/62 (03/07/19 0429)  SpO2: 96 % (03/06/19 1923) Vital Signs (24h Range):  Temp:  [96.5 °F (35.8 °C)-98.2 °F (36.8 °C)] 96.9 °F (36.1 °C)  Pulse:  [] 106  Resp:  [14-20] 18  SpO2:  [96 %-100 %] 96 %  BP: (117-198)/(57-86) 127/62     Weight: 80.1 kg (176 lb 9.4 oz)  Body mass index is 28.5 kg/m².    Intake/Output - Last 3 Shifts       03/05 0700 - 03/06 0659 03/06 0700 - 03/07 0659    P.O. 200     I.V. (mL/kg)  600 (7.5)    Total Intake(mL/kg) 200 (2.7) 600 (7.5)    Urine (mL/kg/hr) 200 (0.1)     Total Output 200     Net 0 +600          Urine Occurrence  2 x          Physical Exam   Constitutional: She is oriented to person, place, and time. She appears well-developed and well-nourished. No distress.   HENT:   Head: Normocephalic and atraumatic.   Eyes: EOM are normal. Pupils are equal, round, and reactive to light.   Neck: Normal range of motion. Neck supple.   Cardiovascular: Normal rate and intact distal pulses.   Pulmonary/Chest: Effort normal. No respiratory " distress.   Abdominal:   Soft, ND, incisional tenderness, surgical dressings c/d/i   Neurological: She is alert and oriented to person, place, and time.   Skin: Skin is warm and dry.   Psychiatric: She has a normal mood and affect.       Significant Labs:  Reviewed    Significant Diagnostics:  Reviewed    Assessment/Plan:     * Acute biliary pancreatitis without infection or necrosis    S/p lap tiffany  Doing well  ADAT  Ambulate  Once able to tolerate diet and move around, can go home  F/u in Dr Palacios's clinic in 2 weeks for a post op check  Can shower tomorrow  Discussed lifting restrictions with patient          Osbaldo Rodriguez MD  General Surgery  [facility]

## 2019-03-07 NOTE — PLAN OF CARE
"Problem: Adult Inpatient Plan of Care  Goal: Plan of Care Review  Outcome: Ongoing (interventions implemented as appropriate)  Pt AAOx4, no family members at bedside throughout shift. Pt complains of abd pain "soreness" but denies n/v/d and SOB. Regular diet tolerated fairly. Pt ambulating with minimal assistance and voiding without difficulties. Small amount of drainage noted to incisions. Safety maintained, bed alarm on - will cont to monitor.       "

## 2019-03-08 ENCOUNTER — OFFICE VISIT (OUTPATIENT)
Dept: FAMILY MEDICINE | Facility: CLINIC | Age: 64
End: 2019-03-08
Payer: MEDICARE

## 2019-03-08 VITALS
HEIGHT: 66 IN | WEIGHT: 169 LBS | TEMPERATURE: 98 F | RESPIRATION RATE: 18 BRPM | OXYGEN SATURATION: 96 % | SYSTOLIC BLOOD PRESSURE: 132 MMHG | BODY MASS INDEX: 27.16 KG/M2 | HEART RATE: 91 BPM | DIASTOLIC BLOOD PRESSURE: 78 MMHG

## 2019-03-08 DIAGNOSIS — H57.11 ACUTE RIGHT EYE PAIN: Primary | ICD-10-CM

## 2019-03-08 DIAGNOSIS — F41.9 ANXIETY: ICD-10-CM

## 2019-03-08 PROCEDURE — 99999 PR PBB SHADOW E&M-EST. PATIENT-LVL V: ICD-10-PCS | Mod: PBBFAC,,, | Performed by: NURSE PRACTITIONER

## 2019-03-08 PROCEDURE — 3078F PR MOST RECENT DIASTOLIC BLOOD PRESSURE < 80 MM HG: ICD-10-PCS | Mod: CPTII,S$GLB,, | Performed by: NURSE PRACTITIONER

## 2019-03-08 PROCEDURE — 3075F PR MOST RECENT SYSTOLIC BLOOD PRESS GE 130-139MM HG: ICD-10-PCS | Mod: CPTII,S$GLB,, | Performed by: NURSE PRACTITIONER

## 2019-03-08 PROCEDURE — 99213 OFFICE O/P EST LOW 20 MIN: CPT | Mod: S$GLB,,, | Performed by: NURSE PRACTITIONER

## 2019-03-08 PROCEDURE — 99999 PR PBB SHADOW E&M-EST. PATIENT-LVL V: CPT | Mod: PBBFAC,,, | Performed by: NURSE PRACTITIONER

## 2019-03-08 PROCEDURE — 1101F PR PT FALLS ASSESS DOC 0-1 FALLS W/OUT INJ PAST YR: ICD-10-PCS | Mod: CPTII,S$GLB,, | Performed by: NURSE PRACTITIONER

## 2019-03-08 PROCEDURE — 99213 PR OFFICE/OUTPT VISIT, EST, LEVL III, 20-29 MIN: ICD-10-PCS | Mod: S$GLB,,, | Performed by: NURSE PRACTITIONER

## 2019-03-08 PROCEDURE — 3078F DIAST BP <80 MM HG: CPT | Mod: CPTII,S$GLB,, | Performed by: NURSE PRACTITIONER

## 2019-03-08 PROCEDURE — 1101F PT FALLS ASSESS-DOCD LE1/YR: CPT | Mod: CPTII,S$GLB,, | Performed by: NURSE PRACTITIONER

## 2019-03-08 PROCEDURE — 3075F SYST BP GE 130 - 139MM HG: CPT | Mod: CPTII,S$GLB,, | Performed by: NURSE PRACTITIONER

## 2019-03-08 RX ORDER — CITALOPRAM 20 MG/1
TABLET, FILM COATED ORAL
Qty: 90 TABLET | Refills: 1 | OUTPATIENT
Start: 2019-03-08

## 2019-03-08 RX ORDER — KETOROLAC TROMETHAMINE 5 MG/ML
1 SOLUTION OPHTHALMIC 3 TIMES DAILY
Qty: 5 ML | Refills: 0 | Status: SHIPPED | OUTPATIENT
Start: 2019-03-08 | End: 2019-03-18

## 2019-03-08 NOTE — PROGRESS NOTES
Subjective:       Patient ID: Madalyn Iverson is a 64 y.o. female.    Chief Complaint: Eye Pain (right eye--pt states she noticed when she woke up from surgery yesterday(gallbladder removed) pt states the vision in eye is blurry )    63 y/o female presents to clinic for urgent care visit with c/o right eye pain and redness. Patient states her  noticed that her right eye was red after cholecystectomy yesterday. Patient does not report injury to eye. Does not wear contacts. Hx of cataract surgery a few years ago. See add'l notes below.        Eye Pain    The right eye is affected. This is a new problem. The current episode started yesterday. The problem has been gradually worsening. The injury mechanism is unknown. The pain is at a severity of 4/10. There is no known exposure to pink eye. She does not wear contacts. Associated symptoms include blurred vision, eye redness and a foreign body sensation. Pertinent negatives include no eye discharge, fever, itching, photophobia or recent URI. She has tried nothing for the symptoms.       Current Outpatient Medications   Medication Sig Dispense Refill    ALPRAZolam (XANAX) 0.5 MG tablet TAKE 1 TABLET BY MOUTH 3 TIMES A DAY 90 tablet 0    amLODIPine (NORVASC) 10 MG tablet Take 1 tablet (10 mg total) by mouth once daily. 90 tablet 0    CYANOCOBALAMIN, VITAMIN B-12, (VITAMIN B-12 INJ) Inject 1,000 mcg as directed every 30 days.      dicyclomine (BENTYL) 20 mg tablet Take 1 tablet (20 mg total) by mouth 4 (four) times daily. 120 tablet 6    estradiol (ESTRACE) 2 MG tablet Take 2 mg by mouth once daily.  3    folic acid (FOLVITE) 1 MG tablet Take 1 tablet (1 mg total) by mouth once daily. 30 tablet 3    furosemide (LASIX) 40 MG tablet TAKE 1 TABLET BY MOUTH EVERY DAY 30 tablet 0    methotrexate 2.5 MG Tab Take 8 tabs once a week only 120 tablet 1    metoprolol succinate (TOPROL-XL) 50 MG 24 hr tablet TAKE 1 TABLET BY MOUTH EVERY DAY 30 tablet 0     oxyCODONE-acetaminophen (PERCOCET) 7.5-325 mg per tablet Take 1 tablet by mouth every 6 (six) hours as needed for Pain. 20 tablet 0    potassium chloride (MICRO-K) 10 MEQ CpSR TAKE 1 CAPSULE BY MOUTH TWICE A  capsule 0    thyroid, pork, (ARMOUR THYROID) 60 mg Tab [place] Thyroid 60 mg tablet      tiZANidine 4 mg Cap Take by mouth 2 (two) times daily.       traMADol (ULTRAM) 50 mg tablet Take 1 tablet (50 mg total) by mouth every 6 (six) hours as needed for Pain. 60 tablet 3    traZODone (DESYREL) 100 MG tablet TAKE 1 TABLET (100 MG TOTAL) BY MOUTH EVERY EVENING. 30 tablet 3    dextroamphetamine-amphetamine 10 mg Tab Take 10 mg by mouth 2 (two) times daily. 60 tablet 0    diclofenac sodium 1 % Gel Apply 2 g topically 4 (four) times daily. 100 g 5    ketorolac 0.5% (ACULAR) 0.5 % Drop Place 1 drop into the right eye 3 (three) times daily. for 10 days 5 mL 0    progesterone (PROMETRIUM) 100 MG capsule Take 100 mg by mouth once daily.      rosuvastatin (CRESTOR) 20 MG tablet Take 1 tablet (20 mg total) by mouth once daily. 90 tablet 0    valsartan (DIOVAN) 80 MG tablet TAKE 1 TABLET BY MOUTH EVERY DAY 90 tablet 0    VITAMIN D2 50,000 unit capsule TAKE 1 CAPSULE (50,000 UNITS TOTAL) BY MOUTH EVERY 7 DAYS. FOR 12 DOSES 12 capsule 0     No current facility-administered medications for this visit.        Past Medical History:   Diagnosis Date    Acute biliary pancreatitis without infection or necrosis 6/13/2018    Acute pancreatitis     June 2018 - admitted at [facility]    ADHD (attention deficit hyperactivity disorder), inattentive type     Fibromyalgia     treated by Dr. Thorpe - Rheumatologist    Hyperlipidemia     High triglycerides    Hypertension     Hypothyroidism     Treated by Dr. Mathew    IFG (impaired fasting glucose)     Migraine     Treated by neurologist - Dr. Destiny Florez    Ulcerative colitis     Patient reported treated by Dr. Mcfadden in past   but on admit in 2018 - no UC seen on recent colonoscopy.  It was noted on colonoscopy in 2004 there were ulcers noted to descending colon but not present on sigmoidoscopy in 2004.    Vitamin D deficiency 3/31/2016       Past Surgical History:   Procedure Laterality Date     SECTION      CHOLECYSTECTOMY, LAPAROSCOPIC N/A 3/6/2019    Performed by Hill Palacios [facility] at [facility]    COLONOSCOPY  10/08/2010    Dr. Lee - repeat in 5 years - has appointment for colonoscopy 2016    COLONOSCOPY  2016    Dr. Kelly - normal  colon - repeat in 10 years - scanned report to media file.    HYSTERECTOMY      OOPHORECTOMY      SHOULDER SURGERY Left     TONSILLECTOMY      ULTRASOUND, ENDOSCOPIC, UPPER GI TRACT N/A 2018    Performed by Reji Russell MD at [facility] ENDO    ULTRASOUND, UPPER GI TRACT, ENDOSCOPIC N/A 3/4/2019    Performed by Randy Boyd MD at [facility] ENDO    upper and lower GI  2016       Family History   Problem Relation Age of Onset    Hypertension Mother     Heart disease Mother     Hyperlipidemia Mother     Cancer Sister 53        thyroid cancer and lymph nodes involvement    Cancer Brother 61        colon and lung cancer    Colon cancer Brother 66    Cancer Brother 63        colon    Hypertension Brother     Diabetes Brother     Colon cancer Brother 60    Esophageal cancer Neg Hx     Stomach cancer Neg Hx     Ulcerative colitis Neg Hx     Crohn's disease Neg Hx     Irritable bowel syndrome Neg Hx     Celiac disease Neg Hx        Social History     Socioeconomic History    Marital status:      Spouse name: None    Number of children: None    Years of education: None    Highest education level: None   Social Needs    Financial resource strain: None    Food insecurity - worry: None    Food insecurity - inability: None    Transportation needs - medical: None    Transportation needs - non-medical: None   Occupational History     "None   Tobacco Use    Smoking status: Former Smoker     Packs/day: 1.00     Years: 20.00     Pack years: 20.00     Last attempt to quit: 1997     Years since quittin.1    Smokeless tobacco: Former User   Substance and Sexual Activity    Alcohol use: No    Drug use: No    Sexual activity: No   Other Topics Concern    None   Social History Narrative    None       Review of Systems   Constitutional: Negative for fever.   HENT: Negative for congestion and rhinorrhea.    Eyes: Positive for blurred vision, pain, redness and visual disturbance. Negative for photophobia, discharge and itching.   Respiratory: Negative for cough and shortness of breath.    Cardiovascular: Negative for chest pain.   Neurological: Negative for dizziness, syncope and headaches.         Objective:     Vitals:    19 1532   BP: 132/78   Pulse: 91   Resp: 18   Temp: 98.2 °F (36.8 °C)   TempSrc: Oral   SpO2: 96%   Weight: 76.7 kg (169 lb)   Height: 5' 6" (1.676 m)          Physical Exam   Constitutional: She is oriented to person, place, and time. She appears well-developed and well-nourished.   HENT:   Head: Normocephalic.   Right Ear: Tympanic membrane and ear canal normal.   Left Ear: Tympanic membrane and ear canal normal.   Nose: Nose normal.   Mouth/Throat: Uvula is midline, oropharynx is clear and moist and mucous membranes are normal.   Eyes: EOM and lids are normal. Pupils are equal, round, and reactive to light. Lids are everted and swept, no foreign bodies found. Right eye exhibits no discharge and no exudate. No foreign body present in the right eye. Right conjunctiva is injected. Right conjunctiva has no hemorrhage. Left conjunctiva is not injected. Left conjunctiva has no hemorrhage.   No foreign body, corneal abrasion/laceration on fluorescein exam.   Neck: Normal range of motion. Neck supple.   Cardiovascular: Normal rate and regular rhythm.   Pulmonary/Chest: Effort normal and breath sounds normal. "   Musculoskeletal: Normal range of motion.   Neurological: She is alert and oriented to person, place, and time. No cranial nerve deficit or sensory deficit.   Skin: Skin is warm and dry.   Psychiatric: She has a normal mood and affect.         Assessment:         ICD-10-CM ICD-9-CM   1. Acute right eye pain H57.11 379.91       Plan:       Acute right eye pain  -     ketorolac 0.5% (ACULAR) 0.5 % Drop; Place 1 drop into the right eye 3 (three) times daily. for 10 days  Dispense: 5 mL; Refill: 0    -May use OTC saline eye wash TID for comfort  -If pain worsens or fails to improve in the next 2-3 days, please see your opthalmologist or schedule follow up PCP  Follow-up if symptoms worsen or fail to improve.     Patient's Medications   New Prescriptions    KETOROLAC 0.5% (ACULAR) 0.5 % DROP    Place 1 drop into the right eye 3 (three) times daily. for 10 days   Previous Medications    ALPRAZOLAM (XANAX) 0.5 MG TABLET    TAKE 1 TABLET BY MOUTH 3 TIMES A DAY    AMLODIPINE (NORVASC) 10 MG TABLET    Take 1 tablet (10 mg total) by mouth once daily.    CYANOCOBALAMIN, VITAMIN B-12, (VITAMIN B-12 INJ)    Inject 1,000 mcg as directed every 30 days.    DEXTROAMPHETAMINE-AMPHETAMINE 10 MG TAB    Take 10 mg by mouth 2 (two) times daily.    DICLOFENAC SODIUM 1 % GEL    Apply 2 g topically 4 (four) times daily.    DICYCLOMINE (BENTYL) 20 MG TABLET    Take 1 tablet (20 mg total) by mouth 4 (four) times daily.    ESTRADIOL (ESTRACE) 2 MG TABLET    Take 2 mg by mouth once daily.    FOLIC ACID (FOLVITE) 1 MG TABLET    Take 1 tablet (1 mg total) by mouth once daily.    FUROSEMIDE (LASIX) 40 MG TABLET    TAKE 1 TABLET BY MOUTH EVERY DAY    METHOTREXATE 2.5 MG TAB    Take 8 tabs once a week only    METOPROLOL SUCCINATE (TOPROL-XL) 50 MG 24 HR TABLET    TAKE 1 TABLET BY MOUTH EVERY DAY    OXYCODONE-ACETAMINOPHEN (PERCOCET) 7.5-325 MG PER TABLET    Take 1 tablet by mouth every 6 (six) hours as needed for Pain.    POTASSIUM CHLORIDE  (MICRO-K) 10 MEQ CPSR    TAKE 1 CAPSULE BY MOUTH TWICE A DAY    PROGESTERONE (PROMETRIUM) 100 MG CAPSULE    Take 100 mg by mouth once daily.    ROSUVASTATIN (CRESTOR) 20 MG TABLET    Take 1 tablet (20 mg total) by mouth once daily.    THYROID, PORK, (ARMOUR THYROID) 60 MG TAB    [place] Thyroid 60 mg tablet    TIZANIDINE 4 MG CAP    Take by mouth 2 (two) times daily.     TRAMADOL (ULTRAM) 50 MG TABLET    Take 1 tablet (50 mg total) by mouth every 6 (six) hours as needed for Pain.    TRAZODONE (DESYREL) 100 MG TABLET    TAKE 1 TABLET (100 MG TOTAL) BY MOUTH EVERY EVENING.    VALSARTAN (DIOVAN) 80 MG TABLET    TAKE 1 TABLET BY MOUTH EVERY DAY    VITAMIN D2 50,000 UNIT CAPSULE    TAKE 1 CAPSULE (50,000 UNITS TOTAL) BY MOUTH EVERY 7 DAYS. FOR 12 DOSES   Modified Medications    No medications on file   Discontinued Medications    No medications on file

## 2019-03-11 ENCOUNTER — TELEPHONE (OUTPATIENT)
Dept: GASTROENTEROLOGY | Facility: CLINIC | Age: 64
End: 2019-03-11

## 2019-03-11 NOTE — TELEPHONE ENCOUNTER
Post procedure instructions: Refer to a surgeon at appointment to be scheduled. Please contact patient to schedule.

## 2019-03-12 ENCOUNTER — OFFICE VISIT (OUTPATIENT)
Dept: ORTHOPEDICS | Facility: CLINIC | Age: 64
End: 2019-03-12
Payer: MEDICARE

## 2019-03-12 VITALS
SYSTOLIC BLOOD PRESSURE: 113 MMHG | HEIGHT: 66 IN | WEIGHT: 169.06 LBS | BODY MASS INDEX: 27.17 KG/M2 | HEART RATE: 78 BPM | DIASTOLIC BLOOD PRESSURE: 72 MMHG

## 2019-03-12 DIAGNOSIS — G56.00 CARPAL TUNNEL SYNDROME, UNSPECIFIED LATERALITY: Primary | ICD-10-CM

## 2019-03-12 DIAGNOSIS — G56.20 LESION OF ULNAR NERVE, UNSPECIFIED LATERALITY: ICD-10-CM

## 2019-03-12 PROCEDURE — 3078F DIAST BP <80 MM HG: CPT | Mod: CPTII,S$GLB,, | Performed by: ORTHOPAEDIC SURGERY

## 2019-03-12 PROCEDURE — 99999 PR PBB SHADOW E&M-EST. PATIENT-LVL IV: ICD-10-PCS | Mod: PBBFAC,,, | Performed by: ORTHOPAEDIC SURGERY

## 2019-03-12 PROCEDURE — 1101F PT FALLS ASSESS-DOCD LE1/YR: CPT | Mod: CPTII,S$GLB,, | Performed by: ORTHOPAEDIC SURGERY

## 2019-03-12 PROCEDURE — 3074F SYST BP LT 130 MM HG: CPT | Mod: CPTII,S$GLB,, | Performed by: ORTHOPAEDIC SURGERY

## 2019-03-12 PROCEDURE — 3078F PR MOST RECENT DIASTOLIC BLOOD PRESSURE < 80 MM HG: ICD-10-PCS | Mod: CPTII,S$GLB,, | Performed by: ORTHOPAEDIC SURGERY

## 2019-03-12 PROCEDURE — 99202 PR OFFICE/OUTPT VISIT, NEW, LEVL II, 15-29 MIN: ICD-10-PCS | Mod: S$GLB,,, | Performed by: ORTHOPAEDIC SURGERY

## 2019-03-12 PROCEDURE — 3074F PR MOST RECENT SYSTOLIC BLOOD PRESSURE < 130 MM HG: ICD-10-PCS | Mod: CPTII,S$GLB,, | Performed by: ORTHOPAEDIC SURGERY

## 2019-03-12 PROCEDURE — 1101F PR PT FALLS ASSESS DOC 0-1 FALLS W/OUT INJ PAST YR: ICD-10-PCS | Mod: CPTII,S$GLB,, | Performed by: ORTHOPAEDIC SURGERY

## 2019-03-12 PROCEDURE — 99999 PR PBB SHADOW E&M-EST. PATIENT-LVL IV: CPT | Mod: PBBFAC,,, | Performed by: ORTHOPAEDIC SURGERY

## 2019-03-12 PROCEDURE — 99202 OFFICE O/P NEW SF 15 MIN: CPT | Mod: S$GLB,,, | Performed by: ORTHOPAEDIC SURGERY

## 2019-03-12 NOTE — PROGRESS NOTES
I have personally taken the history and examined this patient. I agree with the resident's note as stated above. Pt has N/T that wakes her up at night. She has had it for years. EMG/NCS done 2018. Pt drops coffee cups. Pt feels right is worse than left. Pt has index finger MCP pain as well.     This is an abnormal study. There is electrophysiologic evidence of:   1. Mild to moderately severe bilateral carpal tunnel syndrome;   2. A primarily axonal right ulnar sensory neuropathy with some secondary demyelination;   3. Chronic denervation in the right C7 myotome consistent with chronic radiculopathy     + provacative exam for CTS,   Pt does have ulnar nerve symptoms. Discussed surgery for ulnar nerve as well.     I have explained the risks, benefits, and alternatives of the procedure to the patient in great detail. The patient voices understanding and all questions have been answered. The patient agrees with to proceed as planned. Consents were performed in clinic.

## 2019-03-12 NOTE — LETTER
March 13, 2019      Michael Yates MD  [address] [facility] [address]           [facility] [address]  [address], [address]  [facility] [address]  Phone: [phone]          Patient: Madalyn Iverson   MR Number: [id number]   YOB: 1955   Date of Visit: 3/12/2019       Dear Dr. Michael Yates:    Thank you for referring Madalyn Iverson to me for evaluation. Attached you will find relevant portions of my assessment and plan of care.    If you have questions, please do not hesitate to call me. I look forward to following Madalyn Iverson along with you.    Sincerely,    Li Waller MD    Enclosure  CC:  No Recipients    If you would like to receive this communication electronically, please contact [email or web address] or [phone] to request more information on [email or web address] Link access.    For providers and/or their staff who would like to refer a patient to [facility], please contact us through our one-stop-shop provider referral line, [facility], at [phone].    If you feel you have received this communication in error or would no longer like to receive these types of communications, please e-mail [email or web address]

## 2019-03-12 NOTE — PROGRESS NOTES
Subjective:      Patient ID: Madalyn Iverson is a 64 y.o. female.    Chief Complaint: Pain of the Right Hand and Pain of the Left Hand     HPI     Madalyn Iverson is a 64 y.o. female, RHD, who presents to clinic for B hand pain, numbness, and paresthesias. She reports numbness and tingling of radial 3.5 digits R>L that has been progressively worsening over 2 years. Pain wakes her up at night. She also reports difficulty gripping objects and dropping things. She c/o tenderness and pain over the MCP joints of B hands. She has tried hand therapy with minimal improvement.   Unable to take NSAIDs 2/2 UC.      Social History     Occupational History    Not on file   Tobacco Use    Smoking status: Former Smoker     Packs/day: 1.00     Years: 20.00     Pack years: 20.00     Last attempt to quit: 1997     Years since quittin.1    Smokeless tobacco: Former User   Substance and Sexual Activity    Alcohol use: No    Drug use: No    Sexual activity: No      ROS   Negative except as stated in HPI        Objective:    Ortho Exam     B hands:  Skin intact, no deformity/atrophy  No open wounds/abrasions  TTP over the 2nd MCP jt of B hand  + Durkans, + Phalen  - phalen at [place]  SILT M/U/R  Motor intact AIN/PIN/M/U/R   Cap refill < 2s  2+ RP        Assessment:       No diagnosis found.      Plan:       - patient to OR for R CTR and ulnar nerve decompression at the wrist

## 2019-03-14 DIAGNOSIS — G56.21 ENTRAPMENT OF RIGHT ULNAR NERVE: ICD-10-CM

## 2019-03-14 DIAGNOSIS — G56.01 CARPAL TUNNEL SYNDROME OF RIGHT WRIST: Primary | ICD-10-CM

## 2019-03-15 DIAGNOSIS — F41.9 ANXIETY: ICD-10-CM

## 2019-03-15 RX ORDER — CITALOPRAM 20 MG/1
TABLET, FILM COATED ORAL
Qty: 90 TABLET | Refills: 1 | OUTPATIENT
Start: 2019-03-15

## 2019-03-20 ENCOUNTER — OFFICE VISIT (OUTPATIENT)
Dept: SURGERY | Facility: CLINIC | Age: 64
End: 2019-03-20
Payer: MEDICARE

## 2019-03-20 VITALS
HEART RATE: 124 BPM | DIASTOLIC BLOOD PRESSURE: 95 MMHG | BODY MASS INDEX: 27.1 KG/M2 | HEIGHT: 66 IN | SYSTOLIC BLOOD PRESSURE: 157 MMHG | WEIGHT: 168.63 LBS | OXYGEN SATURATION: 98 % | TEMPERATURE: 98 F

## 2019-03-20 DIAGNOSIS — Z90.49 STATUS POST LAPAROSCOPIC CHOLECYSTECTOMY: Primary | ICD-10-CM

## 2019-03-20 PROCEDURE — 99999 PR PBB SHADOW E&M-EST. PATIENT-LVL III: CPT | Mod: PBBFAC,,, | Performed by: STUDENT IN AN ORGANIZED HEALTH CARE EDUCATION/TRAINING PROGRAM

## 2019-03-20 PROCEDURE — 99024 PR POST-OP FOLLOW-UP VISIT: ICD-10-PCS | Mod: S$GLB,,, | Performed by: STUDENT IN AN ORGANIZED HEALTH CARE EDUCATION/TRAINING PROGRAM

## 2019-03-20 PROCEDURE — 99999 PR PBB SHADOW E&M-EST. PATIENT-LVL III: ICD-10-PCS | Mod: PBBFAC,,, | Performed by: STUDENT IN AN ORGANIZED HEALTH CARE EDUCATION/TRAINING PROGRAM

## 2019-03-20 PROCEDURE — 99024 POSTOP FOLLOW-UP VISIT: CPT | Mod: S$GLB,,, | Performed by: STUDENT IN AN ORGANIZED HEALTH CARE EDUCATION/TRAINING PROGRAM

## 2019-03-20 NOTE — PROGRESS NOTES
Feeling better overall  Epigastric pain much improved but still there  Incisional pain resolving  Some nausea no vomiting  Some diarrhea the other day but none now    Ab soft NT ND  Incisions healing well    S/p lap tiffany for biliary pancreatitis  Path unremarkable gallbladder    Resume normal activity  RTC prn

## 2019-03-21 ENCOUNTER — OFFICE VISIT (OUTPATIENT)
Dept: FAMILY MEDICINE | Facility: CLINIC | Age: 64
End: 2019-03-21
Payer: MEDICARE

## 2019-03-21 VITALS
DIASTOLIC BLOOD PRESSURE: 64 MMHG | BODY MASS INDEX: 27.46 KG/M2 | HEIGHT: 66 IN | SYSTOLIC BLOOD PRESSURE: 96 MMHG | WEIGHT: 170.88 LBS | RESPIRATION RATE: 18 BRPM | OXYGEN SATURATION: 95 % | TEMPERATURE: 98 F | HEART RATE: 100 BPM

## 2019-03-21 DIAGNOSIS — E55.9 VITAMIN D DEFICIENCY: ICD-10-CM

## 2019-03-21 DIAGNOSIS — M79.7 FIBROMYALGIA: Chronic | ICD-10-CM

## 2019-03-21 DIAGNOSIS — Z78.0 POSTMENOPAUSAL: ICD-10-CM

## 2019-03-21 DIAGNOSIS — E03.9 ACQUIRED HYPOTHYROIDISM: Chronic | ICD-10-CM

## 2019-03-21 DIAGNOSIS — R73.01 IFG (IMPAIRED FASTING GLUCOSE): ICD-10-CM

## 2019-03-21 DIAGNOSIS — I10 ESSENTIAL HYPERTENSION: Primary | Chronic | ICD-10-CM

## 2019-03-21 DIAGNOSIS — R41.3 MEMORY DIFFICULTIES: ICD-10-CM

## 2019-03-21 DIAGNOSIS — E78.2 MIXED HYPERLIPIDEMIA: ICD-10-CM

## 2019-03-21 DIAGNOSIS — G43.909 MIGRAINE WITHOUT STATUS MIGRAINOSUS, NOT INTRACTABLE, UNSPECIFIED MIGRAINE TYPE: ICD-10-CM

## 2019-03-21 DIAGNOSIS — F90.0 ADHD (ATTENTION DEFICIT HYPERACTIVITY DISORDER), INATTENTIVE TYPE: Chronic | ICD-10-CM

## 2019-03-21 PROCEDURE — 99999 PR PBB SHADOW E&M-EST. PATIENT-LVL V: ICD-10-PCS | Mod: PBBFAC,,, | Performed by: NURSE PRACTITIONER

## 2019-03-21 PROCEDURE — 99999 PR PBB SHADOW E&M-EST. PATIENT-LVL V: CPT | Mod: PBBFAC,,, | Performed by: NURSE PRACTITIONER

## 2019-03-21 PROCEDURE — 3074F PR MOST RECENT SYSTOLIC BLOOD PRESSURE < 130 MM HG: ICD-10-PCS | Mod: CPTII,S$GLB,, | Performed by: NURSE PRACTITIONER

## 2019-03-21 PROCEDURE — 3078F PR MOST RECENT DIASTOLIC BLOOD PRESSURE < 80 MM HG: ICD-10-PCS | Mod: CPTII,S$GLB,, | Performed by: NURSE PRACTITIONER

## 2019-03-21 PROCEDURE — 1101F PT FALLS ASSESS-DOCD LE1/YR: CPT | Mod: CPTII,S$GLB,, | Performed by: NURSE PRACTITIONER

## 2019-03-21 PROCEDURE — 3074F SYST BP LT 130 MM HG: CPT | Mod: CPTII,S$GLB,, | Performed by: NURSE PRACTITIONER

## 2019-03-21 PROCEDURE — 3078F DIAST BP <80 MM HG: CPT | Mod: CPTII,S$GLB,, | Performed by: NURSE PRACTITIONER

## 2019-03-21 PROCEDURE — 99214 OFFICE O/P EST MOD 30 MIN: CPT | Mod: S$GLB,,, | Performed by: NURSE PRACTITIONER

## 2019-03-21 PROCEDURE — 1101F PR PT FALLS ASSESS DOC 0-1 FALLS W/OUT INJ PAST YR: ICD-10-PCS | Mod: CPTII,S$GLB,, | Performed by: NURSE PRACTITIONER

## 2019-03-21 PROCEDURE — 99214 PR OFFICE/OUTPT VISIT, EST, LEVL IV, 30-39 MIN: ICD-10-PCS | Mod: S$GLB,,, | Performed by: NURSE PRACTITIONER

## 2019-03-21 RX ORDER — ACETAMINOPHEN AND CODEINE PHOSPHATE 300; 30 MG/1; MG/1
1 TABLET ORAL EVERY 6 HOURS PRN
Qty: 29 TABLET | Refills: 0 | Status: ON HOLD | OUTPATIENT
Start: 2019-03-21 | End: 2019-03-27 | Stop reason: HOSPADM

## 2019-03-21 RX ORDER — ACETAMINOPHEN 500 MG
5000 TABLET ORAL DAILY
COMMUNITY
Start: 2019-03-21 | End: 2019-07-17 | Stop reason: SDUPTHER

## 2019-03-21 RX ORDER — TRAMADOL HYDROCHLORIDE 50 MG/1
TABLET ORAL
Qty: 60 TABLET | Refills: 1 | Status: SHIPPED | OUTPATIENT
Start: 2019-03-21 | End: 2019-05-21

## 2019-03-21 RX ORDER — AMLODIPINE BESYLATE 5 MG/1
5 TABLET ORAL DAILY
Qty: 30 TABLET | Refills: 0 | Status: SHIPPED | OUTPATIENT
Start: 2019-03-21 | End: 2019-04-18 | Stop reason: SDUPTHER

## 2019-03-21 RX ORDER — EZETIMIBE 10 MG/1
10 TABLET ORAL DAILY
Qty: 90 TABLET | Refills: 0 | Status: SHIPPED | OUTPATIENT
Start: 2019-03-21 | End: 2019-06-11 | Stop reason: SDUPTHER

## 2019-03-21 RX ORDER — ALPRAZOLAM 0.5 MG/1
TABLET ORAL
Qty: 90 TABLET | Refills: 0 | Status: SHIPPED | OUTPATIENT
Start: 2019-03-21 | End: 2019-04-28 | Stop reason: SDUPTHER

## 2019-03-21 NOTE — H&P
Patient ID: Madalyn Iverson is a 64 y.o. female.     Chief Complaint: Pain of the Right Hand and Pain of the Left Hand     HPI      Madalyn Iverson is a 64 y.o. female, RHD, who presents to clinic for B hand pain, numbness, and paresthesias. She reports numbness and tingling of radial 3.5 digits R>L that has been progressively worsening over 2 years. Pain wakes her up at night. She also reports difficulty gripping objects and dropping things. She c/o tenderness and pain over the MCP joints of B hands. She has tried hand therapy with minimal improvement.   Unable to take NSAIDs 2/2 UC.       Social History            Occupational History    Not on file   Tobacco Use    Smoking status: Former Smoker       Packs/day: 1.00       Years: 20.00       Pack years: 20.00       Last attempt to quit: 1997       Years since quittin.1    Smokeless tobacco: Former User   Substance and Sexual Activity    Alcohol use: No    Drug use: No    Sexual activity: No      ROS   Negative except as stated in HPI         Objective:    Ortho Exam      B hands:  Skin intact, no deformity/atrophy  No open wounds/abrasions  TTP over the 2nd MCP jt of B hand  + Durkans, + Phalen  - phalen at cubital tunnel  SILT M/U/R  Motor intact AIN/PIN/M/U/R   Cap refill < 2s  2+ RP         Assessment:       No diagnosis found.      Plan:       - patient to OR for R CTR and ulnar nerve decompression at the wrist

## 2019-03-21 NOTE — PROGRESS NOTES
Subjective:       Patient ID: Madalyn Iverson is a 64 y.o. female.    Chief Complaint: Follow-up    Patient is a 64-year-old white female with history of  ADHD, fibromyalgia, hypertension, hyperlipidemia, hypothyroidism, impaired fasting glucose, migraines, vitamin D deficiency, osteoarthritis to both hands and chronic GI problem that patient reported was ulcerative colitis that is now questionable that is here today for follow up with fasting lab results.    Patient has chronic abdominal pain with chronic diarrhea that was previously been being followed by Dr. Kelly and Dr. Lee.  Patient had reported that she had Ulcerative Colitis but repeat colonoscopies do not support this.  ####Of note based on medical records, patient has been being followed by GI Dr. Lee/Robin since 2004.  The colonoscopy in April 2004 showed a few 8mm ulcers to the descending colon that could be suggestive of ulcerative colitis BUT repeat sigmoidoscopy in June 2004 showed no ulcerations present and patient has had normal colonoscopies since that time - please review media file for all colonoscopy reports from Dr. Lee and [facility]######  Patient is now being followed by [facility] for chronic complaints and had recent diagnosis of Pancreatitis and had Gallbladder removed this year - advised to keep appt with specialist for follow up.    Patient has Fibromyalgia and chronic pains that was being followed by Rheumatology, Dr. Thorpe in the past BUT now being followed and treated by [facility].     Patient has chronic Migraines that is followed by Neurologist, Dr. Florez, whom she states has been prescribing her Tizanidine in the past..  She reported at July 2018 she has recently been approved for Botox injections.  Patient reports it has been awhile since she seen Dr. Florez.  Patient reports having problems with memory - very forgetful - walking into room but forgets what she was going to get, etc.  Advised  "patient to discuss the memory issues with neurology to rule out neurological etiology and if rule out and suspect it is secondary to ADD - then she will need to see psychiatry for further evaluation and management because due to patient age of 64 and cardiovascular risk of HTN and uncontrolled Hyperlipidemia, I am no longer willing to prescribe an amphetamine for ADD. Patient verbalizes understanding.     Patient has Hypothyroidism that is followed by her GYN MD, Dr. Mathew. Thyroid levels are within acceptable range.     Patient has Hypertension and is currently taking Amlodipine 10 mg daily, Metoprolol 50 mg daily, and Lasix 40 mg with potassium supplement.  She reports she stopped taking the Valsartan 80 mg daily over 1 month ago due to the Valsartan recall even though she did NOT verify that her medication was part of the recall.  Patient's blood pressure is low today - manually checked twice myself.  BP 96/64 (BP Location: Right arm, Patient Position: Sitting, BP Method: Medium (Manual))   Pulse 100   Temp 98.2 °F (36.8 °C) (Oral)   Resp 18   Ht 5' 6" (1.676 m)   Wt 77.5 kg (170 lb 13.7 oz)   SpO2 95%   BMI 27.58 kg/m²      Patient has Hyperlipidemia.  Patient has known NONCOMPLIANCE with medication due to report of leg cramping and does not take the medication as prescribed.  She has been tried on several statin medications and has an intolerance.  Her total cholesterol is 241 with .  Advised patient that I will try her on Zetia 10 mg daily and recheck in 3 months - if cholesterol is not better controlled or if she can not take the Zetia due to cost or intolerance - then she will be referred to Cardiologist for CVD risk management.      Patient has ADHD that has been treated for multiple years by Dr. Randhawa in the past and myself over the past several years.  I have titrated patient down from Adderall 30 mg twice daily over the past several years down to Adderall 10 mg twice daily and titrated " patient off of medication in the past year.  Advised patient that Adderall is NOT recommended to patient over age 65 due to cardiovascular risk.  I advised patient that if she feels like her attention deficit is unmanageable with behavioral modifications - she should seek treatment from a psychiatrist for management as that is there specialty.     Patient has Anxiety and reports that her rheumatologist now prescribes the Xanax.      Patient has  with HgbA1C of 5.9% - advised on lifestyle modifications.    Patient has Vitamin D deficiency with a level of 14 - acute deficiency.  She was prescribed Vitamin D2 50,000 units weekly by her Rheumatologist BUT patient reports that Dr. Mathew, her GYN MD,  told her that she should be on D3 supplementation so she bought D3 OTC but unsure of the units she is taking - advised to make sure she is taking D3 at least 5000 units every single day and I will recheck levels in 3 months.      Component      Latest Ref Rng & Units 2/28/2019 6/30/2018 11/21/2017 6/23/2017   Sodium      136 - 145 mmol/L 142 141 145    Potassium      3.5 - 5.1 mmol/L 4.3 3.9 3.9    Chloride      95 - 110 mmol/L 105 102 105    CO2      23 - 29 mmol/L 25 27 31 (H)    Glucose      70 - 110 mg/dL 116 (H) 110 110    BUN, Bld      7 - 17 mg/dL 16 17 15    Creatinine      0.50 - 1.40 mg/dL 0.67 0.56 0.67    Calcium      8.7 - 10.5 mg/dL 9.4 9.2 9.8    Total Protein      6.0 - 8.4 g/dL 8.1 7.5 8.0    Albumin      3.5 - 5.2 g/dL 4.1 3.9 4.3    Total Bilirubin      0.1 - 1.0 mg/dL 0.3 0.3 0.5    Alkaline Phosphatase      38 - 126 U/L 147 (H) 162 (H) 141 (H)    AST      15 - 46 U/L 23 29 21    ALT      10 - 44 U/L 20 41 24    Anion Gap      8 - 16 mmol/L 12 12 9    eGFR if African American      >60 mL/min/1.73 m:2 >60.0 >60.0 >60.0    eGFR if non African American      >60 mL/min/1.73 m:2 >60.0 >60.0 >60.0    Cholesterol      120 - 199 mg/dL 241 (H) 248 (H) 240 (H)    Triglycerides      30 - 150 mg/dL 150 295  (H) 256 (H)    HDL      40 - 75 mg/dL 56 47 55    LDL Cholesterol      63.0 - 159.0 mg/dL 155.0 142.0 133.8    HDL/Chol Ratio      20.0 - 50.0 % 23.2 19.0 (L) 22.9    Total Cholesterol/HDL Ratio      2.0 - 5.0 4.3 5.3 (H) 4.4    Non-HDL Cholesterol      mg/dL 185 201 185    Hemoglobin A1C External      4.0 - 5.6 % 5.9 (H) 5.8 (H)  5.8 (H)   Estimated Avg Glucose      68 - 131 mg/dL 123 120  120   TSH      0.400 - 4.000 uIU/mL 0.574 0.695     Free T4      0.71 - 1.51 ng/dL 1.24 0.80     Vit D, 25-Hydroxy      30 - 96 ng/mL 14 (L) 18 (L)       Current Outpatient Medications   Medication Sig Dispense Refill    ALPRAZolam (XANAX) 0.5 MG tablet TAKE 1 TABLET BY MOUTH 3 TIMES A DAY 90 tablet 0    amLODIPine (NORVASC) 10 MG tablet Take 1 tablet (10 mg total) by mouth once daily. 90 tablet 0    CYANOCOBALAMIN, VITAMIN B-12, (VITAMIN B-12 INJ) Inject 1,000 mcg as directed every 30 days.      dicyclomine (BENTYL) 20 mg tablet Take 1 tablet (20 mg total) by mouth 4 (four) times daily. 120 tablet 6    estradiol (ESTRACE) 2 MG tablet Take 2 mg by mouth once daily.  3    folic acid (FOLVITE) 1 MG tablet Take 1 tablet (1 mg total) by mouth once daily. 30 tablet 3    furosemide (LASIX) 40 MG tablet TAKE 1 TABLET BY MOUTH EVERY DAY 30 tablet 0    methotrexate 2.5 MG Tab Take 8 tabs once a week only 120 tablet 1    metoprolol succinate (TOPROL-XL) 50 MG 24 hr tablet TAKE 1 TABLET BY MOUTH EVERY DAY 30 tablet 0    potassium chloride (MICRO-K) 10 MEQ CpSR TAKE 1 CAPSULE BY MOUTH TWICE A  capsule 0    thyroid, pork, (ARMOUR THYROID) 60 mg Tab [place] Thyroid 60 mg tablet      tiZANidine 4 mg Cap Take by mouth 2 (two) times daily.       traMADol (ULTRAM) 50 mg tablet Take 1 tablet (50 mg total) by mouth every 6 (six) hours as needed for Pain. 60 tablet 3    traZODone (DESYREL) 100 MG tablet TAKE 1 TABLET (100 MG TOTAL) BY MOUTH EVERY EVENING. 30 tablet 3    VITAMIN D2 50,000 unit capsule TAKE 1 CAPSULE (50,000 UNITS  TOTAL) BY MOUTH EVERY 7 DAYS. FOR 12 DOSES 12 capsule 0    progesterone (PROMETRIUM) 100 MG capsule Take 100 mg by mouth once daily.       No current facility-administered medications for this visit.        Past Medical History:   Diagnosis Date    Acute biliary pancreatitis without infection or necrosis 2018    Acute pancreatitis     2018 - [facility]    ADHD (attention deficit hyperactivity disorder), inattentive type     Fibromyalgia     treated by Dr. Thorpe in past and now treated by Dr. Yates - Rheumatologist    Hyperlipidemia     High triglycerides    Hypertension     Hypothyroidism     Treated by Dr. Mathew    IFG (impaired fasting glucose)     Migraine     Treated by neurologist - Dr. Destiny Florez    Ulcerative colitis     Patient reported treated by Dr. Lee and Aaron in past  but on admit in 2018 - no UC seen on recent colonoscopy.  It was noted on colonoscopy in 2004 there were ulcers noted to descending colon but not present on sigmoidoscopy in 2004.    Vitamin D deficiency 3/31/2016       Past Surgical History:   Procedure Laterality Date     SECTION      CHOLECYSTECTOMY, LAPAROSCOPIC N/A 3/6/2019    Performed by Hill Palacios [facility] at [facility] OR    COLONOSCOPY  10/08/2010    Dr. Lee - repeat in 5 years - has appointment for colonoscopy 2016    COLONOSCOPY  2016    Dr. Kelly - normal  colon - repeat in 10 years - scanned report to media file.    HYSTERECTOMY      OOPHORECTOMY      SHOULDER SURGERY Left     TONSILLECTOMY      ULTRASOUND, ENDOSCOPIC, UPPER GI TRACT N/A 2018    Performed by Reji Russell MD at [facility] ENDO    ULTRASOUND, UPPER GI TRACT, ENDOSCOPIC N/A 3/4/2019    Performed by Randy Boyd MD at [facility] ENDO    upper and lower GI  2016       Family History   Problem Relation Age of Onset    Hypertension Mother     Heart disease Mother     Hyperlipidemia Mother      Cancer Sister 53        thyroid cancer and lymph nodes involvement    Cancer Brother 61        colon and lung cancer    Colon cancer Brother 66    Cancer Brother 63        colon    Hypertension Brother     Diabetes Brother     Colon cancer Brother 60    Esophageal cancer Neg Hx     Stomach cancer Neg Hx     Ulcerative colitis Neg Hx     Crohn's disease Neg Hx     Irritable bowel syndrome Neg Hx     Celiac disease Neg Hx        Social History     Socioeconomic History    Marital status:      Spouse name: None    Number of children: None    Years of education: None    Highest education level: None   Social Needs    Financial resource strain: None    Food insecurity - worry: None    Food insecurity - inability: None    Transportation needs - medical: None    Transportation needs - non-medical: None   Occupational History    None   Tobacco Use    Smoking status: Former Smoker     Packs/day: 1.00     Years: 20.00     Pack years: 20.00     Last attempt to quit: 1997     Years since quittin.1    Smokeless tobacco: Former User   Substance and Sexual Activity    Alcohol use: No    Drug use: No    Sexual activity: No   Other Topics Concern    None   Social History Narrative    None       Review of Systems   Constitutional: Negative for appetite change, chills, fatigue, fever and unexpected weight change.   HENT: Negative for congestion, ear pain, mouth sores, nosebleeds, postnasal drip, rhinorrhea, sinus pressure, sneezing, sore throat, trouble swallowing and voice change.    Eyes: Negative for photophobia, pain, discharge, redness, itching and visual disturbance.   Respiratory: Negative for cough, chest tightness and shortness of breath.    Cardiovascular: Negative for chest pain, palpitations and leg swelling.   Gastrointestinal: Negative for abdominal pain, blood in stool, constipation, diarrhea, nausea and vomiting.   Genitourinary: Negative for dysuria, frequency,  "hematuria and urgency.   Musculoskeletal: Positive for arthralgias, back pain, myalgias and neck pain. Negative for joint swelling.        Chronic pain   Skin: Negative for color change and rash.   Allergic/Immunologic: Negative for immunocompromised state.   Neurological: Negative for dizziness, seizures, syncope, weakness and headaches.        Memory problems   Hematological: Negative for adenopathy. Does not bruise/bleed easily.   Psychiatric/Behavioral: Positive for decreased concentration. Negative for agitation, dysphoric mood, sleep disturbance and suicidal ideas. The patient is not nervous/anxious.          Objective:     Vitals:    03/21/19 1025   BP: 96/64   BP Location: Right arm   Patient Position: Sitting   BP Method: Medium (Manual)   Pulse: 100   Resp: 18   Temp: 98.2 °F (36.8 °C)   TempSrc: Oral   SpO2: 95%   Weight: 77.5 kg (170 lb 13.7 oz)   Height: 5' 6" (1.676 m)          Physical Exam   Constitutional: She is oriented to person, place, and time. She appears well-developed and well-nourished. No distress.   Body mass index is 27.58 kg/m².   HENT:   Head: Normocephalic and atraumatic.   Right Ear: External ear normal.   Left Ear: External ear normal.   Nose: Nose normal.   Mouth/Throat: Oropharynx is clear and moist. No oropharyngeal exudate.   Eyes: Conjunctivae and EOM are normal. Pupils are equal, round, and reactive to light.   Neck: Normal range of motion. Neck supple. No JVD present. No tracheal deviation present. No thyromegaly present.   Cardiovascular: Normal rate, regular rhythm and normal heart sounds.   No murmur heard.  Pulmonary/Chest: Effort normal and breath sounds normal. No stridor. No respiratory distress. She has no wheezes. She has no rales.   Abdominal: Soft. Bowel sounds are normal. She exhibits no distension and no mass. There is no rebound and no guarding.   Musculoskeletal: Normal range of motion. She exhibits no edema.   Lymphadenopathy:     She has no cervical " adenopathy.   Neurological: She is alert and oriented to person, place, and time. No cranial nerve deficit. Coordination normal.   Skin: Skin is warm and dry. No rash noted. She is not diaphoretic.   Psychiatric: Her behavior is normal. Her mood appears anxious.         Assessment:         ICD-10-CM ICD-9-CM   1. Essential hypertension I10 401.9   2. Mixed hyperlipidemia E78.2 272.2   3. Migraine without status migrainosus, not intractable, unspecified migraine type G43.909 346.90   4. Acquired hypothyroidism E03.9 244.9   5. IFG (impaired fasting glucose) R73.01 790.21   6. Vitamin D deficiency E55.9 268.9   7. Fibromyalgia M79.7 729.1   8. Memory difficulties R41.3 780.93   9. ADHD (attention deficit hyperactivity disorder), inattentive type F90.0 314.00   10. Postmenopausal Z78.0 V49.81       Plan:       Essential hypertension  -  Decrease the Amlodipine from 10 to 5 mg daily.  Continue the Metoprolol and Lasix at present doses and recheck in 4 weeks.    -     amLODIPine (NORVASC) 5 MG tablet; Take 1 tablet (5 mg total) by mouth once daily.  Dispense: 30 tablet; Refill: 0    Mixed hyperlipidemia  -  INTOLERANT to statin therapy per patient report - Start Zetia 10 mg daily and recheck in 3 months.  If unable to tolerate or too expensive - call office and I will refer to Cardiology for further CVD risk reduction/management of cholesterol and blood pressure - may be a candidate for Repatha if Zetia ineffective.  -     ezetimibe (ZETIA) 10 mg tablet; Take 1 tablet (10 mg total) by mouth once daily.  Dispense: 90 tablet; Refill: 0  -     Lipid panel; Future; Expected date: 03/21/2019    Migraine without status migrainosus, not intractable, unspecified migraine type  -  See neurologist    Acquired hypothyroidism  -  Treated by GYN MD    IFG (impaired fasting glucose)  -  Decrease sugars in diet and recheck in 3 months.  -     Comprehensive metabolic panel; Future; Expected date: 03/21/2019  -     Hemoglobin A1c;  Future; Expected date: 03/21/2019    Vitamin D deficiency  -  Take Vitamin D3 5000 units every single day and recheck in 3 months.  -     cholecalciferol, vitamin D3, (VITAMIN D3) 5,000 unit Tab; Take 1 tablet (5,000 Units total) by mouth once daily.  -     Vitamin D; Future; Expected date: 03/21/2019    Fibromyalgia  -  Followed by Rheumatologist.    Memory difficulties  -  See neurologist to rule out neurological and if neurology thinks it is secondary to ADD - see psychiatrist for further management.    ADHD (attention deficit hyperactivity disorder), inattentive type  - see psychiatrist for management    Postmenopausal  -  Followed by GYN MD      Follow-up in about 4 weeks (around 4/18/2019) for BP check; 3 month labs and follow up.     Patient's Medications   New Prescriptions    ACETAMINOPHEN-CODEINE 300-30MG (TYLENOL #3) 300-30 MG TAB    Take 1 tablet by mouth every 6 (six) hours as needed.    CHOLECALCIFEROL, VITAMIN D3, (VITAMIN D3) 5,000 UNIT TAB    Take 1 tablet (5,000 Units total) by mouth once daily.    EZETIMIBE (ZETIA) 10 MG TABLET    Take 1 tablet (10 mg total) by mouth once daily.   Previous Medications    CYANOCOBALAMIN, VITAMIN B-12, (VITAMIN B-12 INJ)    Inject 1,000 mcg as directed every 30 days.    DICYCLOMINE (BENTYL) 20 MG TABLET    Take 1 tablet (20 mg total) by mouth 4 (four) times daily.    ESTRADIOL (ESTRACE) 2 MG TABLET    Take 2 mg by mouth once daily.    FOLIC ACID (FOLVITE) 1 MG TABLET    Take 1 tablet (1 mg total) by mouth once daily.    FUROSEMIDE (LASIX) 40 MG TABLET    TAKE 1 TABLET BY MOUTH EVERY DAY    METHOTREXATE 2.5 MG TAB    Take 8 tabs once a week only    METOPROLOL SUCCINATE (TOPROL-XL) 50 MG 24 HR TABLET    TAKE 1 TABLET BY MOUTH EVERY DAY    POTASSIUM CHLORIDE (MICRO-K) 10 MEQ CPSR    TAKE 1 CAPSULE BY MOUTH TWICE A DAY    PROGESTERONE (PROMETRIUM) 100 MG CAPSULE    Take 100 mg by mouth once daily.    THYROID, PORK, (ARMOUR THYROID) 60 MG TAB    [place] Thyroid 60 mg  tablet    TIZANIDINE 4 MG CAP    Take by mouth 2 (two) times daily.     TRAZODONE (DESYREL) 100 MG TABLET    TAKE 1 TABLET (100 MG TOTAL) BY MOUTH EVERY EVENING.   Modified Medications    Modified Medication Previous Medication    ALPRAZOLAM (XANAX) 0.5 MG TABLET ALPRAZolam (XANAX) 0.5 MG tablet       TAKE 1 TABLET BY MOUTH 3 TIMES A DAY    TAKE 1 TABLET BY MOUTH 3 TIMES A DAY    AMLODIPINE (NORVASC) 5 MG TABLET amLODIPine (NORVASC) 10 MG tablet       Take 1 tablet (5 mg total) by mouth once daily.    Take 1 tablet (10 mg total) by mouth once daily.    TRAMADOL (ULTRAM) 50 MG TABLET traMADol (ULTRAM) 50 mg tablet       TAKE 1 TABLET EVERY 12 HOURS AS NEEDED FOR PAIN    Take 1 tablet (50 mg total) by mouth every 6 (six) hours as needed for Pain.   Discontinued Medications    DEXTROAMPHETAMINE-AMPHETAMINE 10 MG TAB    Take 10 mg by mouth 2 (two) times daily.    DICLOFENAC SODIUM 1 % GEL    Apply 2 g topically 4 (four) times daily.    ROSUVASTATIN (CRESTOR) 20 MG TABLET    Take 1 tablet (20 mg total) by mouth once daily.    VALSARTAN (DIOVAN) 80 MG TABLET    TAKE 1 TABLET BY MOUTH EVERY DAY    VITAMIN D2 50,000 UNIT CAPSULE    TAKE 1 CAPSULE (50,000 UNITS TOTAL) BY MOUTH EVERY 7 DAYS. FOR 12 DOSES

## 2019-03-22 DIAGNOSIS — F41.9 ANXIETY: ICD-10-CM

## 2019-03-22 RX ORDER — TRAMADOL HYDROCHLORIDE 50 MG/1
TABLET ORAL
Qty: 60 TABLET | Refills: 1 | OUTPATIENT
Start: 2019-03-22

## 2019-03-22 RX ORDER — ALPRAZOLAM 0.5 MG/1
TABLET ORAL
Qty: 90 TABLET | Refills: 0 | OUTPATIENT
Start: 2019-03-22

## 2019-03-22 RX ORDER — CITALOPRAM 20 MG/1
TABLET, FILM COATED ORAL
Qty: 90 TABLET | Refills: 1 | OUTPATIENT
Start: 2019-03-22

## 2019-03-26 ENCOUNTER — TELEPHONE (OUTPATIENT)
Dept: ORTHOPEDICS | Facility: CLINIC | Age: 64
End: 2019-03-26

## 2019-03-26 RX ORDER — ALPRAZOLAM 0.5 MG/1
TABLET ORAL
Qty: 90 TABLET | Refills: 0 | OUTPATIENT
Start: 2019-03-26

## 2019-03-26 NOTE — PRE-PROCEDURE INSTRUCTIONS
"Preop instructions: NPO solids/ milk products after midnight or clears up to 2 hours before arrival (clear liquids are: water, apple juice, Gatorade & Jell-O, black coffee/no milk, cream or creamer), shower instructions, directions, leave all valuables at home, medication instructions for PM prior & am of procedure explained.   Patient stated an understanding.      Patient denies any side effects or issues with anesthesia or sedation.                                                                                                                                    Patient does not know arrival time. Explained that this information comes from the surgeons office and if they have not heard from them by 3 pm, to call office. Patient stated an understanding.    PT'S FULL NAME IS JIGAR NICHOLAS  PT GOES BY - "JOHANNE"      "

## 2019-03-27 ENCOUNTER — ANESTHESIA (OUTPATIENT)
Dept: SURGERY | Facility: HOSPITAL | Age: 64
End: 2019-03-27
Payer: MEDICARE

## 2019-03-27 ENCOUNTER — HOSPITAL ENCOUNTER (OUTPATIENT)
Facility: HOSPITAL | Age: 64
Discharge: HOME | End: 2019-03-27
Attending: ORTHOPAEDIC SURGERY | Admitting: ORTHOPAEDIC SURGERY
Payer: MEDICARE

## 2019-03-27 ENCOUNTER — ANESTHESIA EVENT (OUTPATIENT)
Dept: SURGERY | Facility: HOSPITAL | Age: 64
End: 2019-03-27
Payer: MEDICARE

## 2019-03-27 VITALS
HEART RATE: 88 BPM | TEMPERATURE: 98 F | RESPIRATION RATE: 17 BRPM | OXYGEN SATURATION: 95 % | SYSTOLIC BLOOD PRESSURE: 123 MMHG | DIASTOLIC BLOOD PRESSURE: 61 MMHG

## 2019-03-27 DIAGNOSIS — G56.00 CARPAL TUNNEL SYNDROME: ICD-10-CM

## 2019-03-27 DIAGNOSIS — G56.00 CARPAL TUNNEL SYNDROME, UNSPECIFIED LATERALITY: Primary | ICD-10-CM

## 2019-03-27 PROCEDURE — 64415 NJX AA&/STRD BRCH PLXS IMG: CPT | Mod: 59,RT,, | Performed by: ANESTHESIOLOGY

## 2019-03-27 PROCEDURE — 63600175 PHARM REV CODE 636 W HCPCS: Performed by: STUDENT IN AN ORGANIZED HEALTH CARE EDUCATION/TRAINING PROGRAM

## 2019-03-27 PROCEDURE — D9220A PRA ANESTHESIA: ICD-10-PCS | Mod: ,,, | Performed by: ANESTHESIOLOGY

## 2019-03-27 PROCEDURE — D9220A PRA ANESTHESIA: Mod: ,,, | Performed by: ANESTHESIOLOGY

## 2019-03-27 PROCEDURE — 36000707: Performed by: ORTHOPAEDIC SURGERY

## 2019-03-27 PROCEDURE — 37000009 HC ANESTHESIA EA ADD 15 MINS: Performed by: ORTHOPAEDIC SURGERY

## 2019-03-27 PROCEDURE — 71000016 HC POSTOP RECOV ADDL HR: Performed by: ORTHOPAEDIC SURGERY

## 2019-03-27 PROCEDURE — 27200750 HC INSULATED NEEDLE/ STIMUPLEX: Performed by: STUDENT IN AN ORGANIZED HEALTH CARE EDUCATION/TRAINING PROGRAM

## 2019-03-27 PROCEDURE — 64718 REVISE ULNAR NERVE AT ELBOW: CPT | Mod: RT,,, | Performed by: ORTHOPAEDIC SURGERY

## 2019-03-27 PROCEDURE — 76942 ECHO GUIDE FOR BIOPSY: CPT | Performed by: ANESTHESIOLOGY

## 2019-03-27 PROCEDURE — 25000003 PHARM REV CODE 250: Performed by: ORTHOPAEDIC SURGERY

## 2019-03-27 PROCEDURE — 37000008 HC ANESTHESIA 1ST 15 MINUTES: Performed by: ORTHOPAEDIC SURGERY

## 2019-03-27 PROCEDURE — 25000003 PHARM REV CODE 250: Performed by: ANESTHESIOLOGY

## 2019-03-27 PROCEDURE — 64415 SUPRACLAVICULAR BRACHIAL PLEXUS SINGLE INJECTION BLOCK: ICD-10-PCS | Mod: 59,RT,, | Performed by: ANESTHESIOLOGY

## 2019-03-27 PROCEDURE — 71000015 HC POSTOP RECOV 1ST HR: Performed by: ORTHOPAEDIC SURGERY

## 2019-03-27 PROCEDURE — 76942 SUPRACLAVICULAR BRACHIAL PLEXUS SINGLE INJECTION BLOCK: ICD-10-PCS | Mod: 26,,, | Performed by: ANESTHESIOLOGY

## 2019-03-27 PROCEDURE — 25000003 PHARM REV CODE 250: Performed by: NURSE ANESTHETIST, CERTIFIED REGISTERED

## 2019-03-27 PROCEDURE — 64718 PR REVISE ULNAR NERVE AT ELBOW: ICD-10-PCS | Mod: RT,,, | Performed by: ORTHOPAEDIC SURGERY

## 2019-03-27 PROCEDURE — 63600175 PHARM REV CODE 636 W HCPCS: Performed by: ANESTHESIOLOGY

## 2019-03-27 PROCEDURE — 25000003 PHARM REV CODE 250: Performed by: STUDENT IN AN ORGANIZED HEALTH CARE EDUCATION/TRAINING PROGRAM

## 2019-03-27 PROCEDURE — 71000044 HC DOSC ROUTINE RECOVERY FIRST HOUR: Performed by: ORTHOPAEDIC SURGERY

## 2019-03-27 PROCEDURE — 27201423 OPTIME MED/SURG SUP & DEVICES STERILE SUPPLY: Performed by: ORTHOPAEDIC SURGERY

## 2019-03-27 PROCEDURE — 64721 PR REVISE MEDIAN N/CARPAL TUNNEL SURG: ICD-10-PCS | Mod: 51,RT,, | Performed by: ORTHOPAEDIC SURGERY

## 2019-03-27 PROCEDURE — 63600175 PHARM REV CODE 636 W HCPCS: Performed by: NURSE ANESTHETIST, CERTIFIED REGISTERED

## 2019-03-27 PROCEDURE — 64721 CARPAL TUNNEL SURGERY: CPT | Mod: 51,RT,, | Performed by: ORTHOPAEDIC SURGERY

## 2019-03-27 PROCEDURE — 36000706: Performed by: ORTHOPAEDIC SURGERY

## 2019-03-27 DEVICE — MATRIX REGENERATIVE CLARIX FLO: Type: IMPLANTABLE DEVICE | Site: ELBOW | Status: FUNCTIONAL

## 2019-03-27 RX ORDER — ACETAMINOPHEN 10 MG/ML
INJECTION, SOLUTION INTRAVENOUS
Status: COMPLETED
Start: 2019-03-27 | End: 2019-03-27

## 2019-03-27 RX ORDER — HYDROCODONE BITARTRATE AND ACETAMINOPHEN 5; 325 MG/1; MG/1
1 TABLET ORAL EVERY 4 HOURS PRN
Status: DISCONTINUED | OUTPATIENT
Start: 2019-03-27 | End: 2019-03-27 | Stop reason: HOSPADM

## 2019-03-27 RX ORDER — BUPIVACAINE HYDROCHLORIDE 2.5 MG/ML
INJECTION, SOLUTION EPIDURAL; INFILTRATION; INTRACAUDAL
Status: DISCONTINUED | OUTPATIENT
Start: 2019-03-27 | End: 2019-03-27 | Stop reason: HOSPADM

## 2019-03-27 RX ORDER — DEXAMETHASONE SODIUM PHOSPHATE 4 MG/ML
INJECTION, SOLUTION INTRA-ARTICULAR; INTRALESIONAL; INTRAMUSCULAR; INTRAVENOUS; SOFT TISSUE
Status: DISCONTINUED | OUTPATIENT
Start: 2019-03-27 | End: 2019-03-27

## 2019-03-27 RX ORDER — PHENYLEPHRINE HYDROCHLORIDE 10 MG/ML
INJECTION INTRAVENOUS
Status: DISCONTINUED | OUTPATIENT
Start: 2019-03-27 | End: 2019-03-27

## 2019-03-27 RX ORDER — PROPOFOL 10 MG/ML
VIAL (ML) INTRAVENOUS
Status: DISCONTINUED | OUTPATIENT
Start: 2019-03-27 | End: 2019-03-27

## 2019-03-27 RX ORDER — MUPIROCIN 20 MG/G
OINTMENT TOPICAL
Status: DISCONTINUED
Start: 2019-03-27 | End: 2019-03-27 | Stop reason: HOSPADM

## 2019-03-27 RX ORDER — ACETAMINOPHEN 10 MG/ML
INJECTION, SOLUTION INTRAVENOUS
Status: DISCONTINUED | OUTPATIENT
Start: 2019-03-27 | End: 2019-03-27

## 2019-03-27 RX ORDER — FENTANYL CITRATE 50 UG/ML
INJECTION, SOLUTION INTRAMUSCULAR; INTRAVENOUS
Status: DISCONTINUED | OUTPATIENT
Start: 2019-03-27 | End: 2019-03-27

## 2019-03-27 RX ORDER — BUPIVACAINE HYDROCHLORIDE AND EPINEPHRINE 2.5; 5 MG/ML; UG/ML
INJECTION, SOLUTION EPIDURAL; INFILTRATION; INTRACAUDAL; PERINEURAL
Status: DISCONTINUED | OUTPATIENT
Start: 2019-03-27 | End: 2019-03-27

## 2019-03-27 RX ORDER — HYDROMORPHONE HYDROCHLORIDE 1 MG/ML
0.2 INJECTION, SOLUTION INTRAMUSCULAR; INTRAVENOUS; SUBCUTANEOUS EVERY 5 MIN PRN
Status: DISCONTINUED | OUTPATIENT
Start: 2019-03-27 | End: 2019-03-27 | Stop reason: HOSPADM

## 2019-03-27 RX ORDER — ONDANSETRON 2 MG/ML
INJECTION INTRAMUSCULAR; INTRAVENOUS
Status: DISCONTINUED | OUTPATIENT
Start: 2019-03-27 | End: 2019-03-27

## 2019-03-27 RX ORDER — BACITRACIN ZINC 500 UNIT/G
OINTMENT (GRAM) TOPICAL
Status: DISCONTINUED | OUTPATIENT
Start: 2019-03-27 | End: 2019-03-27 | Stop reason: HOSPADM

## 2019-03-27 RX ORDER — MUPIROCIN 20 MG/G
OINTMENT TOPICAL
Status: ACTIVE | OUTPATIENT
Start: 2019-03-27

## 2019-03-27 RX ORDER — CEFAZOLIN SODIUM 1 G/3ML
2 INJECTION, POWDER, FOR SOLUTION INTRAMUSCULAR; INTRAVENOUS
Status: COMPLETED | OUTPATIENT
Start: 2019-03-27 | End: 2019-03-27

## 2019-03-27 RX ORDER — ACETAMINOPHEN AND CODEINE PHOSPHATE 300; 30 MG/1; MG/1
1 TABLET ORAL EVERY 6 HOURS PRN
Qty: 29 TABLET | Refills: 0 | Status: SHIPPED | OUTPATIENT
Start: 2019-03-27 | End: 2019-04-06

## 2019-03-27 RX ORDER — BACITRACIN 500 [USP'U]/G
OINTMENT TOPICAL
Status: DISCONTINUED
Start: 2019-03-27 | End: 2019-03-27 | Stop reason: HOSPADM

## 2019-03-27 RX ORDER — LIDOCAINE HCL/PF 100 MG/5ML
SYRINGE (ML) INTRAVENOUS
Status: DISCONTINUED | OUTPATIENT
Start: 2019-03-27 | End: 2019-03-27

## 2019-03-27 RX ORDER — KETAMINE HYDROCHLORIDE 10 MG/ML
INJECTION, SOLUTION INTRAMUSCULAR; INTRAVENOUS
Status: DISCONTINUED | OUTPATIENT
Start: 2019-03-27 | End: 2019-03-27

## 2019-03-27 RX ORDER — SODIUM CHLORIDE 9 MG/ML
INJECTION, SOLUTION INTRAVENOUS CONTINUOUS
Status: ACTIVE | OUTPATIENT
Start: 2019-03-27

## 2019-03-27 RX ORDER — KETAMINE HCL IN 0.9 % NACL 50 MG/5 ML
SYRINGE (ML) INTRAVENOUS
Status: DISCONTINUED
Start: 2019-03-27 | End: 2019-03-27 | Stop reason: HOSPADM

## 2019-03-27 RX ORDER — BUPIVACAINE HYDROCHLORIDE 2.5 MG/ML
INJECTION, SOLUTION EPIDURAL; INFILTRATION; INTRACAUDAL
Status: DISCONTINUED
Start: 2019-03-27 | End: 2019-03-27 | Stop reason: HOSPADM

## 2019-03-27 RX ORDER — ONDANSETRON 8 MG/1
8 TABLET, ORALLY DISINTEGRATING ORAL EVERY 8 HOURS PRN
Status: DISCONTINUED | OUTPATIENT
Start: 2019-03-27 | End: 2019-03-27 | Stop reason: HOSPADM

## 2019-03-27 RX ADMIN — FENTANYL CITRATE 25 MCG: 50 INJECTION, SOLUTION INTRAMUSCULAR; INTRAVENOUS at 11:03

## 2019-03-27 RX ADMIN — KETAMINE HYDROCHLORIDE 20 MG: 10 INJECTION, SOLUTION INTRAMUSCULAR; INTRAVENOUS at 11:03

## 2019-03-27 RX ADMIN — PROPOFOL 200 MG: 10 INJECTION, EMULSION INTRAVENOUS at 11:03

## 2019-03-27 RX ADMIN — SODIUM CHLORIDE: 0.9 INJECTION, SOLUTION INTRAVENOUS at 10:03

## 2019-03-27 RX ADMIN — ONDANSETRON 4 MG: 2 INJECTION INTRAMUSCULAR; INTRAVENOUS at 11:03

## 2019-03-27 RX ADMIN — SODIUM CHLORIDE 1000 ML: 0.9 INJECTION, SOLUTION INTRAVENOUS at 09:03

## 2019-03-27 RX ADMIN — PHENYLEPHRINE HYDROCHLORIDE 150 MCG: 10 INJECTION INTRAVENOUS at 12:03

## 2019-03-27 RX ADMIN — SODIUM CHLORIDE, SODIUM GLUCONATE, SODIUM ACETATE, POTASSIUM CHLORIDE, MAGNESIUM CHLORIDE, SODIUM PHOSPHATE, DIBASIC, AND POTASSIUM PHOSPHATE: .53; .5; .37; .037; .03; .012; .00082 INJECTION, SOLUTION INTRAVENOUS at 10:03

## 2019-03-27 RX ADMIN — PHENYLEPHRINE HYDROCHLORIDE 100 MCG: 10 INJECTION INTRAVENOUS at 11:03

## 2019-03-27 RX ADMIN — PHENYLEPHRINE HYDROCHLORIDE 150 MCG: 10 INJECTION INTRAVENOUS at 11:03

## 2019-03-27 RX ADMIN — ACETAMINOPHEN 1000 MG: 10 INJECTION, SOLUTION INTRAVENOUS at 11:03

## 2019-03-27 RX ADMIN — BUPIVACAINE HYDROCHLORIDE AND EPINEPHRINE BITARTRATE 30 ML: 2.5; .0091 INJECTION, SOLUTION EPIDURAL; INFILTRATION; INTRACAUDAL; PERINEURAL at 12:03

## 2019-03-27 RX ADMIN — CEFAZOLIN 2 G: 330 INJECTION, POWDER, FOR SOLUTION INTRAMUSCULAR; INTRAVENOUS at 11:03

## 2019-03-27 RX ADMIN — HYDROMORPHONE HYDROCHLORIDE 0.2 MG: 1 INJECTION, SOLUTION INTRAMUSCULAR; INTRAVENOUS; SUBCUTANEOUS at 12:03

## 2019-03-27 RX ADMIN — LIDOCAINE HYDROCHLORIDE 75 MG: 20 INJECTION, SOLUTION INTRAVENOUS at 11:03

## 2019-03-27 RX ADMIN — MUPIROCIN: 20 OINTMENT TOPICAL at 08:03

## 2019-03-27 RX ADMIN — DEXAMETHASONE SODIUM PHOSPHATE 8 MG: 4 INJECTION, SOLUTION INTRAMUSCULAR; INTRAVENOUS at 11:03

## 2019-03-27 NOTE — INTERVAL H&P NOTE
The patient has been examined and the H&P has been reviewed:    I concur with the findings and no changes have occurred since H&P was written.    Anesthesia/Surgery risks, benefits and alternative options discussed and understood by patient/family.          Active Hospital Problems    Diagnosis  POA    Carpal tunnel syndrome [G56.00]  Yes      Resolved Hospital Problems   No resolved problems to display.

## 2019-03-27 NOTE — ANESTHESIA POSTPROCEDURE EVALUATION
Anesthesia Post Evaluation    Patient: Madalyn Iverson    Procedure(s) Performed: Procedure(s) (LRB):  RELEASE, CARPAL TUNNEL right (Right)  DECOMPRESSION, NERVE, ULNAR right elbow (Right)    Final Anesthesia Type: general  Patient location during evaluation: PACU  Patient participation: Yes- Able to Participate  Level of consciousness: awake and alert  Post-procedure vital signs: reviewed and stable  Pain management: adequate  Airway patency: patent  PONV status at discharge: No PONV  Anesthetic complications: no      Cardiovascular status: hemodynamically stable  Respiratory status: unassisted, spontaneous ventilation and room air  Hydration status: euvolemic  Follow-up not needed.          Vitals Value Taken Time   /61 3/27/2019  1:46 PM   Temp 36.5 °C (97.7 °F) 3/27/2019 12:19 PM   Pulse 91 3/27/2019  1:52 PM   Resp 15 3/27/2019  1:49 PM   SpO2 93 % 3/27/2019  1:52 PM   Vitals shown include unvalidated device data.      No case tracking events are documented in the log.      Pain/Carie Score: Pain Rating Prior to Med Admin: 9 (3/27/2019 12:40 PM)  Pain Rating Post Med Admin: 0 (3/27/2019  2:30 PM)  Carie Score: 9 (3/27/2019  2:30 PM)

## 2019-03-27 NOTE — PROGRESS NOTES
Patient arrived to recovery easily aroused to voice with c/o significant 7/10 pain.  Contacted Dr. Cody with regional team and Dr. Christian with [facility] staff and obtained PACU dilaudid orders, 0.2 mg Q5 min.  Dr. Seo to bedside for post op block, time out completed at 1234.  Patient tolerated procedure well.  Support provided at bedside during block.

## 2019-03-27 NOTE — BRIEF OP NOTE
[facility]  Brief Operative Note     SUMMARY     Surgery Date: 3/27/2019     Surgeon(s) and Role:     * Li Waller MD - Primary     * Jermaine Chatman MD - Resident - Assisting        Pre-op Diagnosis:  Carpal tunnel syndrome of right wrist [G56.01]  Entrapment of right ulnar nerve [G56.21]    Post-op Diagnosis:  Post-Op Diagnosis Codes:     * Carpal tunnel syndrome of right wrist [G56.01]     * Entrapment of right ulnar nerve [G56.21]    Procedure(s) (LRB):  RELEASE, CARPAL TUNNEL right (Right)  DECOMPRESSION, NERVE, ULNAR right elbow (Right)    Anesthesia: General    Description of the findings of the procedure: se    Findings/Key Components: see op note    Estimated Blood Loss: * No values recorded between 3/27/2019 11:29 AM and 3/27/2019 12:14 PM *         Specimens:   Specimen (12h ago, onward)    None          Discharge Note    SUMMARY     Admit Date: 3/27/2019    Discharge Date and Time: No discharge date for patient encounter.    Hospital Course (synopsis of major diagnoses, care, treatment, and services provided during the course of the hospital stay): the patient arrived to pre-op area for proper pre-operative management.  Upon completion of pre-operative preparation, the patient was taken back to the operative theatre.  A Right carpal tunnel and cubital tunnel release was performed without complication and the patient was transported to the post anesthesia care unit in stable condition. After appropriate recovery from the anaesthetic agents used during the surgery the patient was transferred to the floor where they received pain medication and physical therapy. The following day, when the patient was deemed safe for DC, they were discharged home.           Final Diagnosis: Post-Op Diagnosis Codes:     * Carpal tunnel syndrome of right wrist [G56.01]     * Entrapment of right ulnar nerve [G56.21]    Disposition: Home or Self Care    Follow Up/Patient Instructions:      Medications:  Reconciled Home Medications:      Medication List      CHANGE how you take these medications    ALPRAZolam 0.5 MG tablet  Commonly known as:  XANAX  TAKE 1 TABLET BY MOUTH 3 TIMES A DAY  What changed:    · how much to take  · how to take this  · when to take this        CONTINUE taking these medications    acetaminophen-codeine 300-30mg 300-30 mg Tab  Commonly known as:  TYLENOL #3  Take 1 tablet by mouth every 6 (six) hours as needed.     amLODIPine 5 MG tablet  Commonly known as:  NORVASC  Take 1 tablet (5 mg total) by mouth once daily.     ARMOUR THYROID 60 mg Tab  Generic drug:  thyroid (pork)  [place] Thyroid 60 mg tablet     cholecalciferol (vitamin D3) 5,000 unit Tab  Commonly known as:  VITAMIN D3  Take 1 tablet (5,000 Units total) by mouth once daily.     dicyclomine 20 mg tablet  Commonly known as:  BENTYL  Take 1 tablet (20 mg total) by mouth 4 (four) times daily.     estradiol 2 MG tablet  Commonly known as:  ESTRACE  Take 2 mg by mouth once daily.     ezetimibe 10 mg tablet  Commonly known as:  ZETIA  Take 1 tablet (10 mg total) by mouth once daily.     folic acid 1 MG tablet  Commonly known as:  FOLVITE  Take 1 tablet (1 mg total) by mouth once daily.     furosemide 40 MG tablet  Commonly known as:  LASIX  TAKE 1 TABLET BY MOUTH EVERY DAY     methotrexate 2.5 MG Tab  Take 8 tabs once a week only     metoprolol succinate 50 MG 24 hr tablet  Commonly known as:  TOPROL-XL  TAKE 1 TABLET BY MOUTH EVERY DAY     potassium chloride 10 MEQ Cpsr  Commonly known as:  MICRO-K  TAKE 1 CAPSULE BY MOUTH TWICE A DAY     progesterone 100 MG capsule  Commonly known as:  PROMETRIUM  Take 100 mg by mouth once daily.     tiZANidine 4 mg Cap  Take by mouth 2 (two) times daily.     traMADol 50 mg tablet  Commonly known as:  ULTRAM  TAKE 1 TABLET EVERY 12 HOURS AS NEEDED FOR PAIN     traZODone 100 MG tablet  Commonly known as:  DESYREL  TAKE 1 TABLET (100 MG TOTAL) BY MOUTH EVERY EVENING.     VITAMIN B-12  INJ  Inject 1,000 mcg as directed every 30 days.          No discharge procedures on file.

## 2019-03-27 NOTE — ANESTHESIA PREPROCEDURE EVALUATION
2019  Madalyn Iverson is a 64 y.o., female.  Past Medical History:   Diagnosis Date    Acute biliary pancreatitis without infection or necrosis 2018    Acute pancreatitis     2018 - [facility]    ADHD (attention deficit hyperactivity disorder), inattentive type     Fibromyalgia     treated by Dr. Thorpe in past and now treated by Dr. Yates - Rheumatologist    Hyperlipidemia     High triglycerides    Hypertension     Hypothyroidism     Treated by Dr. Mathew    IFG (impaired fasting glucose)     Migraine     Treated by neurologist - Dr. Destiny Florez    Ulcerative colitis     Patient reported treated by Dr. Lee and Aaron in past  but on admit in 2018 - no UC seen on recent colonoscopy.  It was noted on colonoscopy in 2004 there were ulcers noted to descending colon but not present on sigmoidoscopy in 2004.    Vitamin D deficiency 3/31/2016     Past Surgical History:   Procedure Laterality Date     SECTION      CHOLECYSTECTOMY, LAPAROSCOPIC N/A 3/6/2019    Performed by Hill Palacios [facility] at [facility] OR    COLONOSCOPY  10/08/2010    Dr. Lee - repeat in 5 years - has appointment for colonoscopy 2016    COLONOSCOPY  2016    Dr. Kelly - normal  colon - repeat in 10 years - scanned report to media file.    HYSTERECTOMY      OOPHORECTOMY      SHOULDER SURGERY Left     TONSILLECTOMY      ULTRASOUND, ENDOSCOPIC, UPPER GI TRACT N/A 2018    Performed by Reji Russell MD at [facility] ENDO    ULTRASOUND, UPPER GI TRACT, ENDOSCOPIC N/A 3/4/2019    Performed by Randy Boyd MD at [facility] ENDO    upper and lower GI  2016         Anesthesia Evaluation    I have reviewed the Patient Summary Reports.    I have reviewed the Nursing Notes.   I have reviewed the Medications.     Review of  Systems  Anesthesia Hx:  No problems with previous Anesthesia  History of prior surgery of interest to airway management or planning: Denies Family Hx of Anesthesia complications.   Denies Personal Hx of Anesthesia complications.   Social:  Non-Smoker    Cardiovascular:   Hypertension Denies MI.  Denies CAD.           Pulmonary:  Pulmonary Normal    Renal/:  Renal/ Normal     Hepatic/GI:   UC   Neurological:   fibromyalgia   Endocrine:   Hypothyroidism        Physical Exam  General:  Well nourished    Airway/Jaw/Neck:  Airway Findings: Mouth Opening: Normal Tongue: Normal  General Airway Assessment: Adult  Mallampati: I  TM Distance: Normal, at least 6 cm      Dental:  Dental Findings: In tact   Chest/Lungs:  Chest/Lungs Findings: Normal Respiratory Rate     Heart/Vascular:  Heart Findings: Rate: Normal  Rhythm: Regular Rhythm        Mental Status:  Mental Status Findings:  Cooperative, Alert and Oriented         Anesthesia Plan  Type of Anesthesia, risks & benefits discussed:  Anesthesia Type:  regional, general  Patient's Preference:   Intra-op Monitoring Plan: standard ASA monitors  Intra-op Monitoring Plan Comments:   Post Op Pain Control Plan: multimodal analgesia  Post Op Pain Control Plan Comments:   Induction:   IV  Beta Blocker:  Patient is not currently on a Beta-Blocker (No further documentation required).       Informed Consent: Patient understands risks and agrees with Anesthesia plan.  Questions answered. Anesthesia consent signed with patient.  ASA Score: 2     Day of Surgery Review of History & Physical:    H&P update referred to the surgeon.         Ready For Surgery From Anesthesia Perspective.

## 2019-03-27 NOTE — PLAN OF CARE
Patient tolerated post op block and surgical procedure well.  VSS no complaints of pain, tolerating po.  Prepared for discharge.  Instructions reviewed with patient and family, who verbalized understanding of site care, pain control, S/S of complications, follow up and activity restrictions.  IV removed prior to departure.

## 2019-03-27 NOTE — ANESTHESIA RELEASE NOTE
Anesthesia Release from PACU Note    Patient: Madalyn Iverson    Procedure(s) Performed: Procedure(s) (LRB):  RELEASE, CARPAL TUNNEL right (Right)  DECOMPRESSION, NERVE, ULNAR right elbow (Right)    Anesthesia type: general    Post pain: Adequate analgesia    Post assessment: no apparent anesthetic complications and tolerated procedure well    Last Vitals:   Visit Vitals  /61   Pulse 88   Temp 36.5 °C (97.7 °F) (Temporal)   Resp 17   SpO2 95%   Breastfeeding? No       Post vital signs: stable    Level of consciousness: awake and alert     Nausea/Vomiting: no nausea/no vomiting    Complications: none    Airway Patency: patent    Respiratory: unassisted, spontaneous ventilation, room air    Cardiovascular: stable and blood pressure at baseline    Hydration: euvolemic

## 2019-03-27 NOTE — ANESTHESIA PROCEDURE NOTES
Supraclavicular Brachial Plexus Single Injection Block    Patient location during procedure: pre-op   Block not for primary anesthetic.  Reason for block: at surgeon's request and post-op pain management   Post-op Pain Location: right albow pain   Start time: 3/27/2019 12:36 PM  Timeout: 3/27/2019 12:34 PM   End time: 3/27/2019 12:50 PM  Staffing  Anesthesiologist: Osbaldo Willis MD  Resident/CRNA: Fran Manning MD  Other anesthesia staff: Lai Seo Jr., MD  Performed: resident/CRNA   Preanesthetic Checklist  Completed: patient identified, site marked, surgical consent, pre-op evaluation, timeout performed, IV checked, risks and benefits discussed and monitors and equipment checked  Peripheral Block  Patient position: supine  Prep: ChloraPrep  Patient monitoring: heart rate, cardiac monitor, continuous pulse ox, continuous capnometry and frequent blood pressure checks  Block type: supraclavicular  Laterality: right  Injection technique: single shot  Needle  Needle type: Stimuplex   Needle gauge: 22 G  Needle length: 2 in  Needle localization: anatomical landmarks and ultrasound guidance   -ultrasound image captured on disc.  Assessment  Injection assessment: negative aspiration, negative parasthesia and local visualized surrounding nerve  Paresthesia pain: none  Heart rate change: no  Slow fractionated injection: yes  Additional Notes  VSS.  [facility] RN monitoring vitals throughout procedure.  Patient tolerated procedure well.

## 2019-03-27 NOTE — PLAN OF CARE
Patient tolerated procedure well.  VSS, no complaints of pain, tolerating po.  Prepared for discharge. Instructions reviewed with patient and family, who verbalized understanding of S/S of complications, when to seek medical attention

## 2019-03-27 NOTE — DISCHARGE INSTRUCTIONS
Instructions from Dr. Waller:    Please keep splint clean dry and intact until follow up  Follow up in  2 weeks  Agresively ice and elevate extremity  Non weight bearing RUE  Pain control per prescription      Discharge Instructions for Carpal Tunnel Release  You had a carpal tunnel release procedure to help relieve the symptoms of carpal tunnel syndrome. In carpal tunnel syndrome, a nerve in the wrist is compressed and irritated. This causes numbness and pain in the fingers and hand. Carpal tunnel release relieves the compression of the nerve. Here are instructions that will help you care for your arm and wrist when you are at home.  Home care  · Avoid gripping objects tightly or lifting with your affected arm.  · Wear your bandage, splint, or cast as directed by your doctor.  · Always keep the dressing, splint, or cast dry and clean.  · When showering, cover your hand and  wrist with plastic and use tape or rubberbands to keep the dressing, splint, or cast dry. Shower as necessary.    · Use an ice pack or bag of frozen peas -- or something similar -- wrapped in a thin towel on your wrist to reduce swelling for the first 48 hours. Leave the ice pack on for 20 minutes; then take it off for 20 minutes. Repeat as needed.  · Keep your arm elevated above your heart for 24 to 48 hours after surgery.  · Do the exercises you learned in the hospital, or as instructed by your doctor.  · Take pain medicine as directed.  · Dont drive until your doctor says its OK. Never drive while you are taking opioid pain medicine.  · Ask your doctor when you can return to work. If your job requires heavy lifting, you may not be able to begin working again for several weeks.  Follow-up care  Make a follow-up appointment as directed by your doctor.     When to seek medical care  Call 911 right away if you have any of the following:  · Chest pain  · Shortness of breath  Otherwise, call your doctor immediately if you have any of the  following:  · A splint, cast, or dressing that has gotten wet  · Increased bleeding or drainage from the incision (cut)  · Opening of the incision  · Fever above 100.4°F (38.9°C) taken by mouth, or shaking chills  · Any new numbness in the fingers or thumb  · Blue hand or fingers  · Increased pain with or without activity  · Increased redness, tenderness, or swelling of the incision       Anesthesia: General Anesthesia     You are watched continuously during your procedure by your anesthesia provider.     Youre due to have surgery. During surgery, youll be given medicine called anesthesia or anesthetic. This will keep you comfortable and pain-free. Your anesthesia provider will use general anesthesia.  What is general anesthesia?  General anesthesia puts you into a state like deep sleep. It goes into the bloodstream (IV anesthetics), into the lungs (gas anesthetics), or both. You feel nothing during the procedure. You will not remember it. During the procedure, the anesthesia provider monitors you continuously. He or she checks your heart rate and rhythm, blood pressure, breathing, and blood oxygen.  · IV anesthetics. IV anesthetics are given through an IV line in your arm. Theyre often given first. This is so you are asleep before a gas anesthetic is started. Some kinds of IV anesthetics relieve pain. Others relax you. Your doctor will decide which kind is best in your case.  · Gas anesthetics. Gas anesthetics are breathed into the lungs. They are often used to keep you asleep. They can be given through a facemask or a tube placed in your larynx or trachea (breathing tube).  ¨ If you have a facemask, your anesthesia provider will most likely place it over your nose and mouth while youre still awake. Youll breathe oxygen through the mask as your IV anesthetic is started. Gas anesthetic may be added through the mask.  ¨ If you have a tube in the larynx or trachea, it will be inserted into your throat after  youre asleep.  Anesthesia tools and medicines  You will likely have:  · IV anesthetics. These are put into an IV line into your bloodstream.  · Gas anesthetics. You breathe these anesthetics into your lungs, where they pass into your bloodstream.  · Pulse oximeter. This is a small clip that is attached to the end of your finger. This measures your blood oxygen level.  · Electrocardiography leads (electrodes). These are small sticky pads that are placed on your chest. They record your heart rate and rhythm.  · Blood pressure cuff. This reads your blood pressure.  Risks and possible complications  General anesthesia has some risks. These include:  · Breathing problems  · Nausea and vomiting  · Sore throat or hoarseness (usually temporary)  · Allergic reaction to the anesthetic  · Irregular heartbeat (rare)  · Cardiac arrest (rare)   Anesthesia safety  · Follow all instructions you are given for how long not to eat or drink before your procedure.  · Be sure your doctor knows what medicines and drugs you take. This includes over-the-counter medicines, herbs, supplements, alcohol or other drugs. You will be asked when those were last taken.  · Have an adult family member or friend drive you home after the procedure.  · For the first 24 hours after your surgery:  ¨ Do not drive or use heavy equipment.  ¨ Do not make important decisions or sign legal documents. If important decisions or signing legal documents is necessary during the first 24 hours after surgery, have a trusted family member or spouse act on your behalf.  ¨ Avoid alcohol.  ¨ Have a responsible adult stay with you. He or she can watch for problems and help keep you safe.

## 2019-03-27 NOTE — TRANSFER OF CARE
Anesthesia Transfer of Care Note    Patient: Madalyn Iverson    Procedure(s) Performed: Procedure(s) (LRB):  RELEASE, CARPAL TUNNEL right (Right)  DECOMPRESSION, NERVE, ULNAR right elbow (Right)    Patient location: PACU    Anesthesia Type: general    Transport from OR: Transported from OR on room air with adequate spontaneous ventilation    Post pain: pain needs to be addressed    Post assessment: no apparent anesthetic complications and tolerated procedure well    Post vital signs: stable    Level of consciousness: awake and sedated    Nausea/Vomiting: no nausea/vomiting    Complications: none    Transfer of care protocol was followed      Last vitals:   Visit Vitals  /87   Pulse 97   Temp 36.5 °C (97.7 °F) (Temporal)   Resp 16   SpO2 98%   Breastfeeding? No

## 2019-03-27 NOTE — OP NOTE
[facility]  Surgery Department  Operative Note    SUMMARY     Date of Procedure: 3/27/2019     Procedure: Procedure(s) (LRB):  RELEASE, CARPAL TUNNEL right (Right)  DECOMPRESSION, NERVE, ULNAR right elbow (Right)     Surgeon(s) and Role:     * Li Waller MD - Primary     * Jermaine Chatman MD - Resident - Assisting        Pre-Operative Diagnosis: Carpal tunnel syndrome of right wrist [G56.01]  Entrapment of right ulnar nerve [G56.21]    Post-Operative Diagnosis: Post-Op Diagnosis Codes:     * Carpal tunnel syndrome of right wrist [G56.01]     * Entrapment of right ulnar nerve [G56.21]    Anesthesia: General    Technical Procedures Used:     Description of the Findings of the Procedure: DATE OF PROCEDURE:  03/25/2019     SERVICE:  Orthopedics.     ATTENDING SURGEON:  Li Waller,     RESIDENT SURGEON:  Dr. Jermaine Chatman.     PREOPERATIVE DIAGNOSES:  1.  Ulnar nerve compression at the elbow on the right side.  2.  Right carpal tunnel syndrome.     PROCEDURES:  1.  Right ulnar nerve decompression at the elbow with Clarix injectable.  2.  Right carpal tunnel release.  3.  Splint application, right upper extremity.     ANESTHESIA:  General.     FLUIDS:  Lactated Ringer's.     BLOOD LOSS:  None.  No blood was given.     TOURNIQUET TIME:  Less than 1 hour.     PACKS AND DRAINS:  None.     IMPLANTS:  Clarix injectable.     COMPLICATIONS:  None.     INDICATIONS FOR PROCEDURE:  Ms. Boyce is a 56-year-old female.  She has   numbness and tingling in her hand.  She has failed conservative treatment.  EMG   nerve conduction study was performed.  After much discussion with the patient,   we elected for surgical intervention.  Risks and benefits were explained to the   patient in clinic.  Consents were performed in clinic.     PROCEDURE IN DETAIL:  After the correct site was marked with the patient's   participation in the holding area, the patient was brought to the Operating   Room,  placed in supine position and underwent general anesthesia.  Her right   upper extremity was prepped and draped in normal sterile fashion.  A well-padded   sterile tourniquet was placed on the right upper extremity.  A timeout was   conducted for correct site procedure and patient to be indicated.  Incisions   were marked out, one posterior to the medial epicondyle as well as using   Peng's cardinal lines in the palm.  The hand was exsanguinated with an   Esmarch.  Tourniquet was insufflated to 250 mmHg. The incision posterior to the   epicondyle was made first.  Very careful dissection down to the ulnar nerve was   maintained.  Medial antebrachial cutaneous nerve was identified, gently   retracted out of the way.  The ulnar nerve was identified.  It was very   compressed and hyperemic.  It was completely decompressed starting proximally   through the cubital tunnel and distally including the fascia of the two heads of   the FCU.  Again, it was very hyperemic.  The elbow was placed through range of   motion, showed it did not sublux.  The area was irrigated with copious amounts   of normal saline.  Clarix was injected in the area.  Vicryl, Monocryl and   Dermabond closed the skin.  Our attention then to the carpal tunnel and incision   was made.  Careful dissection down to the palmar fascia was maintained.  Palmar   fascia was sharply incised.  Transverse ligament was identified and it was   sharply incised.  Mosquito hemostat was passed on the undersurface of the   transverse ligament proximally and distally to free it from the median nerve.    Metzenbaum scissors were used to cut the transverse ligament proximally and   distally.  Mosquito hemostat was passed once again to confirm a complete   release.  The area was irrigated with copious amounts of normal saline, nylon   closed the skin.  Sterile dressing was applied.  Tourniquet was deflated.  Brisk   capillary refill ensued.  The patient was placed in a  well-padded long arm   posterior splint.  He tolerated the procedure well and was brought to the   Recovery area in stable condition.     POSTOPERATIVE PLAN FOR THIS PATIENT:  I will keep the dressing clean, dry and   intact.  We will see the patient back in two weeks' time.  Splint to be removed   and therapy to be initiated.        Significant Surgical Tasks Conducted by the Assistant(s), if Applicable: retraction of soft tissue    Complications: No    Estimated Blood Loss (EBL): * No values recorded between 3/27/2019 11:29 AM and 3/27/2019 12:18 PM *           Implants:   Implant Name Type Inv. Item Serial No.  Lot No. LRB No. Used   MATRIX REGENERATIVE CLARIX HERMINIO - [id number]  MATRIX REGENERATIVE CLARIX HERMINIO [id number] [place]  Right 1       Specimens:   Specimen (12h ago, onward)    None                  Condition: Good    Disposition: PACU - hemodynamically stable.    Attestation: I performed the procedure.    Discharge Note    SUMMARY     Admit Date: 3/27/2019    Discharge Date and Time: 3/27/2019  2:36 PM    Hospital Course (synopsis of major diagnoses, care, treatment, and services provided during the course of the hospital stay): surgery     Final Diagnosis: Post-Op Diagnosis Codes:     * Carpal tunnel syndrome of right wrist [G56.01]     * Entrapment of right ulnar nerve [G56.21]    Disposition: Home or Self Care    Follow Up/Patient Instructions:     Medications:  Reconciled Home Medications:      Medication List      CHANGE how you take these medications    ALPRAZolam 0.5 MG tablet  Commonly known as:  XANAX  TAKE 1 TABLET BY MOUTH 3 TIMES A DAY  What changed:    · how much to take  · how to take this  · when to take this        CONTINUE taking these medications    acetaminophen-codeine 300-30mg 300-30 mg Tab  Commonly known as:  TYLENOL #3  Take 1 tablet by mouth every 6 (six) hours as needed.     amLODIPine 5 MG tablet  Commonly known as:  NORVASC  Take 1 tablet (5  mg total) by mouth once daily.     ARMOUR THYROID 60 mg Tab  Generic drug:  thyroid (pork)  [place] Thyroid 60 mg tablet     cholecalciferol (vitamin D3) 5,000 unit Tab  Commonly known as:  VITAMIN D3  Take 1 tablet (5,000 Units total) by mouth once daily.     dicyclomine 20 mg tablet  Commonly known as:  BENTYL  Take 1 tablet (20 mg total) by mouth 4 (four) times daily.     estradiol 2 MG tablet  Commonly known as:  ESTRACE  Take 2 mg by mouth once daily.     ezetimibe 10 mg tablet  Commonly known as:  ZETIA  Take 1 tablet (10 mg total) by mouth once daily.     folic acid 1 MG tablet  Commonly known as:  FOLVITE  Take 1 tablet (1 mg total) by mouth once daily.     furosemide 40 MG tablet  Commonly known as:  LASIX  TAKE 1 TABLET BY MOUTH EVERY DAY     methotrexate 2.5 MG Tab  Take 8 tabs once a week only     metoprolol succinate 50 MG 24 hr tablet  Commonly known as:  TOPROL-XL  TAKE 1 TABLET BY MOUTH EVERY DAY     potassium chloride 10 MEQ Cpsr  Commonly known as:  MICRO-K  TAKE 1 CAPSULE BY MOUTH TWICE A DAY     progesterone 100 MG capsule  Commonly known as:  PROMETRIUM  Take 100 mg by mouth once daily.     tiZANidine 4 mg Cap  Take by mouth 2 (two) times daily.     traMADol 50 mg tablet  Commonly known as:  ULTRAM  TAKE 1 TABLET EVERY 12 HOURS AS NEEDED FOR PAIN     traZODone 100 MG tablet  Commonly known as:  DESYREL  TAKE 1 TABLET (100 MG TOTAL) BY MOUTH EVERY EVENING.     VITAMIN B-12 INJ  Inject 1,000 mcg as directed every 30 days.          Discharge Procedure Orders   Diet general     Call MD for:  temperature >100.4     Call MD for:  persistent nausea and vomiting     Call MD for:  severe uncontrolled pain     Call MD for:  difficulty breathing, headache or visual disturbances     Call MD for:  redness, tenderness, or signs of infection (pain, swelling, redness, odor or green/yellow discharge around incision site)     Call MD for:  hives     Call MD for:  persistent dizziness or light-headedness     Call  MD for:  extreme fatigue     Keep surgical extremity elevated     Ice to affected area     Leave dressing on - Keep it clean, dry, and intact until clinic visit     Follow-up Information     Li Waller MD In 2 weeks.    Specialties:  Hand Surgery, Orthopedic Surgery  Why:  For suture removal  Contact information:  [address]  [address]  [facility]  [facility] [id number] [phone]

## 2019-03-29 ENCOUNTER — TELEPHONE (OUTPATIENT)
Dept: FAMILY MEDICINE | Facility: CLINIC | Age: 64
End: 2019-03-29

## 2019-03-29 ENCOUNTER — TELEPHONE (OUTPATIENT)
Dept: RHEUMATOLOGY | Facility: CLINIC | Age: 64
End: 2019-03-29

## 2019-03-29 RX ORDER — CITALOPRAM 20 MG/1
20 TABLET, FILM COATED ORAL DAILY
COMMUNITY
End: 2019-03-29 | Stop reason: ALTCHOICE

## 2019-03-29 NOTE — TELEPHONE ENCOUNTER
Dr. Yates sent refill request on 3/21/2019 to the [facility] in [place] but was never filled. I called Rx and told the instructions and to fill the pt's medication

## 2019-03-30 DIAGNOSIS — F41.9 ANXIETY: ICD-10-CM

## 2019-03-30 DIAGNOSIS — I10 ESSENTIAL HYPERTENSION: ICD-10-CM

## 2019-04-01 RX ORDER — FUROSEMIDE 40 MG/1
TABLET ORAL
Qty: 30 TABLET | Refills: 1 | Status: SHIPPED | OUTPATIENT
Start: 2019-04-01 | End: 2019-06-03 | Stop reason: SDUPTHER

## 2019-04-01 RX ORDER — CITALOPRAM 20 MG/1
TABLET, FILM COATED ORAL
Qty: 90 TABLET | Refills: 1 | OUTPATIENT
Start: 2019-04-01

## 2019-04-01 NOTE — TELEPHONE ENCOUNTER
Celexa has been stopped by another provider since July 2018 - I have called [facility] pharmacist and asked that they remove from her medication list.

## 2019-04-05 DIAGNOSIS — F41.9 ANXIETY: ICD-10-CM

## 2019-04-05 RX ORDER — CITALOPRAM 20 MG/1
TABLET, FILM COATED ORAL
Qty: 90 TABLET | Refills: 1 | OUTPATIENT
Start: 2019-04-05

## 2019-04-10 ENCOUNTER — OFFICE VISIT (OUTPATIENT)
Dept: ORTHOPEDICS | Facility: CLINIC | Age: 64
End: 2019-04-10
Payer: MEDICARE

## 2019-04-10 VITALS
SYSTOLIC BLOOD PRESSURE: 171 MMHG | HEIGHT: 66 IN | DIASTOLIC BLOOD PRESSURE: 96 MMHG | HEART RATE: 83 BPM | WEIGHT: 170 LBS | BODY MASS INDEX: 27.32 KG/M2

## 2019-04-10 DIAGNOSIS — G56.00 CARPAL TUNNEL SYNDROME, UNSPECIFIED LATERALITY: ICD-10-CM

## 2019-04-10 DIAGNOSIS — G56.20 ULNAR NERVE ENTRAPMENT, UNSPECIFIED LATERALITY: Primary | ICD-10-CM

## 2019-04-10 PROCEDURE — 99999 PR PBB SHADOW E&M-EST. PATIENT-LVL V: ICD-10-PCS | Mod: PBBFAC,,, | Performed by: PHYSICIAN ASSISTANT

## 2019-04-10 PROCEDURE — 99024 POSTOP FOLLOW-UP VISIT: CPT | Mod: S$GLB,,, | Performed by: PHYSICIAN ASSISTANT

## 2019-04-10 PROCEDURE — 99999 PR PBB SHADOW E&M-EST. PATIENT-LVL V: CPT | Mod: PBBFAC,,, | Performed by: PHYSICIAN ASSISTANT

## 2019-04-10 PROCEDURE — 99024 PR POST-OP FOLLOW-UP VISIT: ICD-10-PCS | Mod: S$GLB,,, | Performed by: PHYSICIAN ASSISTANT

## 2019-04-10 NOTE — PROGRESS NOTES
"Ms. Iverson is here today for a post-operative visit.  She is 14 days status post Right ulnar nerve decompression at the elbow with Clarix injectable and Right carpal tunnel release by Dr. Waller on 3/27/19. She reports that she is doing well.  Pain is mild and intermittent. She denies fever, chills, and sweats since the time of the surgery.     Physical exam:    Vitals:    04/10/19 1128   BP: (!) 171/96   Pulse: 83   Weight: 77.1 kg (170 lb)   Height: 5' 6" (1.676 m)   PainSc:   5     Vital signs are stable, patient is afebrile.  Patient is well dressed and well groomed, no acute distress.  Alert and oriented to person, place, and time.  Post op dressing taken down.  Incision is clean, dry and intact.  There is no erythema or exudate.  There is no sign of any infection. She is NVI. Sutures and suture tails removed without difficulty.      Assessment: 14 days status post Right ulnar nerve decompression at the elbow with Clarix injectable and Right carpal tunnel release        Plan:  Madalyn was seen today for pain.    Diagnoses and all orders for this visit:    Ulnar nerve entrapment, unspecified laterality  -     Ambulatory Referral to Physical/Occupational Therapy    Carpal tunnel syndrome, unspecified laterality  -     Ambulatory Referral to Physical/Occupational Therapy        - PO instruction reviewed and provided to patient  - Gel brace provided  - ACE wrap provided  - Orders for OT  - Discussed scar massage  - Follow up in 4 weeks  - Call with questions or concerns      "

## 2019-04-12 DIAGNOSIS — R10.84 GENERALIZED ABDOMINAL PAIN: ICD-10-CM

## 2019-04-12 DIAGNOSIS — K58.0 IRRITABLE BOWEL SYNDROME WITH DIARRHEA: ICD-10-CM

## 2019-04-12 RX ORDER — DICYCLOMINE HYDROCHLORIDE 20 MG/1
20 TABLET ORAL 4 TIMES DAILY
Qty: 120 TABLET | Refills: 5 | Status: SHIPPED | OUTPATIENT
Start: 2019-04-12 | End: 2020-09-04 | Stop reason: SDUPTHER

## 2019-04-16 RX ORDER — POTASSIUM CHLORIDE 750 MG/1
CAPSULE, EXTENDED RELEASE ORAL
Qty: 180 CAPSULE | Refills: 0 | Status: SHIPPED | OUTPATIENT
Start: 2019-04-16 | End: 2019-07-13 | Stop reason: SDUPTHER

## 2019-04-18 ENCOUNTER — OFFICE VISIT (OUTPATIENT)
Dept: FAMILY MEDICINE | Facility: CLINIC | Age: 64
End: 2019-04-18
Payer: MEDICARE

## 2019-04-18 VITALS
HEART RATE: 92 BPM | OXYGEN SATURATION: 98 % | WEIGHT: 169.63 LBS | DIASTOLIC BLOOD PRESSURE: 80 MMHG | TEMPERATURE: 98 F | HEIGHT: 66 IN | SYSTOLIC BLOOD PRESSURE: 150 MMHG | BODY MASS INDEX: 27.26 KG/M2 | RESPIRATION RATE: 20 BRPM

## 2019-04-18 DIAGNOSIS — I10 ESSENTIAL HYPERTENSION: Primary | ICD-10-CM

## 2019-04-18 PROCEDURE — 99999 PR PBB SHADOW E&M-EST. PATIENT-LVL V: CPT | Mod: PBBFAC,,, | Performed by: NURSE PRACTITIONER

## 2019-04-18 PROCEDURE — 3074F SYST BP LT 130 MM HG: CPT | Mod: CPTII,S$GLB,, | Performed by: NURSE PRACTITIONER

## 2019-04-18 PROCEDURE — 99999 PR PBB SHADOW E&M-EST. PATIENT-LVL V: ICD-10-PCS | Mod: PBBFAC,,, | Performed by: NURSE PRACTITIONER

## 2019-04-18 PROCEDURE — 99213 PR OFFICE/OUTPT VISIT, EST, LEVL III, 20-29 MIN: ICD-10-PCS | Mod: S$GLB,,, | Performed by: NURSE PRACTITIONER

## 2019-04-18 PROCEDURE — 99213 OFFICE O/P EST LOW 20 MIN: CPT | Mod: S$GLB,,, | Performed by: NURSE PRACTITIONER

## 2019-04-18 PROCEDURE — 1101F PT FALLS ASSESS-DOCD LE1/YR: CPT | Mod: CPTII,S$GLB,, | Performed by: NURSE PRACTITIONER

## 2019-04-18 PROCEDURE — 3074F PR MOST RECENT SYSTOLIC BLOOD PRESSURE < 130 MM HG: ICD-10-PCS | Mod: CPTII,S$GLB,, | Performed by: NURSE PRACTITIONER

## 2019-04-18 PROCEDURE — 3079F PR MOST RECENT DIASTOLIC BLOOD PRESSURE 80-89 MM HG: ICD-10-PCS | Mod: CPTII,S$GLB,, | Performed by: NURSE PRACTITIONER

## 2019-04-18 PROCEDURE — 3079F DIAST BP 80-89 MM HG: CPT | Mod: CPTII,S$GLB,, | Performed by: NURSE PRACTITIONER

## 2019-04-18 PROCEDURE — 1101F PR PT FALLS ASSESS DOC 0-1 FALLS W/OUT INJ PAST YR: ICD-10-PCS | Mod: CPTII,S$GLB,, | Performed by: NURSE PRACTITIONER

## 2019-04-18 RX ORDER — AMLODIPINE BESYLATE 10 MG/1
10 TABLET ORAL DAILY
Qty: 90 TABLET | Refills: 0 | Status: SHIPPED | OUTPATIENT
Start: 2019-04-18 | End: 2019-07-13 | Stop reason: SDUPTHER

## 2019-04-18 NOTE — PROGRESS NOTES
Subjective:       Patient ID: Madalyn Iverson is a 64 y.o. female.    Chief Complaint: Blood Pressure Check    Patient is a 64-year-old white female with history of  ADHD, fibromyalgia, hypertension, hyperlipidemia, hypothyroidism, impaired fasting glucose, migraines, vitamin D deficiency, osteoarthritis to both hands and chronic GI problem that patient reported was ulcerative colitis that is now questionable that is here today for blood pressure check.    Patient has Hypertension and is currently taking Amlodipine 5 mg daily, Metoprolol 50 mg daily, and Lasix 40 mg with potassium supplement.  Systolic BP is again elevated - will increase the Amlodipine from 5 to 10 mg and recheck in June when patient returns for fasting labs and follow up.       Patient has chronic abdominal pain with chronic diarrhea that was previously been being followed by Dr. Kelly and Dr. Lee.  Patient had reported that she had Ulcerative Colitis but repeat colonoscopies do not support this.  ####Of note based on medical records, patient has been being followed by GI Dr. Lee/Robin since 2004.  The colonoscopy in April 2004 showed a few 8mm ulcers to the descending colon that could be suggestive of ulcerative colitis BUT repeat sigmoidoscopy in June 2004 showed no ulcerations present and patient has had normal colonoscopies since that time - please review media file for all colonoscopy reports from Dr. Lee and [facility]######  Patient is now being followed by [facility] for chronic complaints and had recent diagnosis of Pancreatitis and had Gallbladder removed this year - advised to keep appt with specialist for follow up.     Patient has Fibromyalgia and chronic pains that was being followed by Rheumatology, Dr. Thorpe in the past BUT now being followed and treated by [facility].     Patient has chronic Migraines that is followed by Neurologist, Dr. Florez, whom she states has been prescribing her  Tizanidine in the past..  She reported at July 2018 she has recently been approved for Botox injections.  Patient reports it has been awhile since she seen Dr. Florez.  Patient reports having problems with memory - very forgetful - walking into room but forgets what she was going to get, etc.  Advised patient to discuss the memory issues with neurology to rule out neurological etiology and if rule out and suspect it is secondary to ADD - then she will need to see psychiatry for further evaluation and management because due to patient age of 64 and cardiovascular risk of HTN and uncontrolled Hyperlipidemia, I am no longer willing to prescribe an amphetamine for ADD. Patient verbalizes understanding.     Patient has Hypothyroidism that is followed by her GYN MD, Dr. Mathew. Thyroid levels are within acceptable range.     Patient has Hypertension and is currently taking Amlodipine 5 mg daily, Metoprolol 50 mg daily, and Lasix 40 mg with potassium supplement.  Systolic BP is again elevated - will increase the Amlodipine from 5 to 10 mg and recheck in June when patient returns for fasting labs and follow up.    Patient has Hyperlipidemia.  Patient has known NONCOMPLIANCE with statin medication due to report of leg cramping and does not take the medication as prescribed.  She has been tried on several statin medications and has an intolerance.  Her total cholesterol was 241 with  in March 2019.  Started patient on Zetia 10 mg daily and will recheck in June 2019.     Patient has ADHD that has been treated for multiple years by Dr. Randhawa in the past and myself over the past several years.  I have titrated patient down from Adderall 30 mg twice daily over the past several years down to Adderall 10 mg twice daily and titrated patient off of medication in the past year.  Advised patient that Adderall is NOT recommended to patient over age 65 due to cardiovascular risk.  I advised patient that if she feels like her  attention deficit is unmanageable with behavioral modifications - she should seek treatment from a psychiatrist for management as that is there specialty.     Patient has Anxiety and reports that her rheumatologist now prescribes the Xanax.                 Current Outpatient Medications   Medication Sig Dispense Refill    ALPRAZolam (XANAX) 0.5 MG tablet TAKE 1 TABLET BY MOUTH 3 TIMES A DAY (Patient taking differently: TAKE 1 TABLET BY MOUTH 3 TIMES A DAY AS NEEDED) 90 tablet 0    amLODIPine (NORVASC) 10 MG tablet Take 1 tablet (10 mg total) by mouth once daily. 90 tablet 0    cholecalciferol, vitamin D3, (VITAMIN D3) 5,000 unit Tab Take 1 tablet (5,000 Units total) by mouth once daily.      CYANOCOBALAMIN, VITAMIN B-12, (VITAMIN B-12 INJ) Inject 1,000 mcg as directed every 30 days.      dicyclomine (BENTYL) 20 mg tablet TAKE 1 TABLET (20 MG TOTAL) BY MOUTH 4 (FOUR) TIMES DAILY. 120 tablet 5    estradiol (ESTRACE) 2 MG tablet Take 2 mg by mouth once daily.  3    ezetimibe (ZETIA) 10 mg tablet Take 1 tablet (10 mg total) by mouth once daily. 90 tablet 0    folic acid (FOLVITE) 1 MG tablet Take 1 tablet (1 mg total) by mouth once daily. 30 tablet 3    furosemide (LASIX) 40 MG tablet TAKE 1 TABLET BY MOUTH EVERY DAY 30 tablet 1    methotrexate 2.5 MG Tab Take 8 tabs once a week only 120 tablet 1    metoprolol succinate (TOPROL-XL) 50 MG 24 hr tablet TAKE 1 TABLET BY MOUTH EVERY DAY 30 tablet 0    potassium chloride (MICRO-K) 10 MEQ CpSR TAKE 1 CAPSULE BY MOUTH TWICE A  capsule 0    thyroid, pork, (ARMOUR THYROID) 60 mg Tab Holman Thyroid 60 mg tablet      tiZANidine 4 mg Cap Take by mouth 2 (two) times daily.       traMADol (ULTRAM) 50 mg tablet TAKE 1 TABLET EVERY 12 HOURS AS NEEDED FOR PAIN 60 tablet 1    traZODone (DESYREL) 100 MG tablet TAKE 1 TABLET (100 MG TOTAL) BY MOUTH EVERY EVENING. 30 tablet 3    progesterone (PROMETRIUM) 100 MG capsule Take 100 mg by mouth once daily.       No  current facility-administered medications for this visit.      Facility-Administered Medications Ordered in Other Visits   Medication Dose Route Frequency Provider Last Rate Last Dose    0.9%  NaCl infusion   Intravenous Continuous Jermaine Chatman MD   Stopped at 19 1040    mupirocin 2 % ointment   Nasal On Call Procedure Jermaine Chatman MD           Past Medical History:   Diagnosis Date    Acute biliary pancreatitis without infection or necrosis 2018    Acute pancreatitis     2018 - [facility]    ADHD (attention deficit hyperactivity disorder), inattentive type     Fibromyalgia     treated by Dr. Thorpe in past and now treated by Dr. Yates - Rheumatologist    Hyperlipidemia     High triglycerides    Hypertension     Hypothyroidism     Treated by Dr. Mathew    IFG (impaired fasting glucose)     Migraine     Treated by neurologist - Dr. Destiny Florez    Ulcerative colitis     Patient reported treated by Dr. Lee and Aaron in past  but on admit in  - no UC seen on recent colonoscopy.  It was noted on colonoscopy in 2004 there were ulcers noted to descending colon but not present on sigmoidoscopy in 2004.    Vitamin D deficiency 3/31/2016       Past Surgical History:   Procedure Laterality Date     SECTION      CHOLECYSTECTOMY, LAPAROSCOPIC N/A 3/6/2019    [facility]ios [facility]    COLONOSCOPY  10/08/2010    Dr. Lee - repeat in 5 years - has appointment for colonoscopy 2016    COLONOSCOPY  2016    Dr. Kelly - normal  colon - repeat in 10 years - scanned report to media file.    DECOMPRESSION, NERVE, ULNAR right elbow Right 3/27/2019    Performed [facility] at [facility]    HYSTERECTOMY      OOPHORECTOMY      RELEASE, CARPAL TUNNEL right Right 3/27/2019    Per[facility] at [facility]    SHOULDER SURGERY Left      TONSILLECTOMY      ULTRASOUND, ENDOSCOPIC, UPPER GI TRACT N/A 2018    [facility]    ULTRASOUND, UPPER GI TRACT, ENDOSCOPIC N/A 3/4/2019    Performed by Randy Boyd [facility] at [facility] ENDO    upper and lower GI  2016       Family History   Problem Relation Age of Onset    Hypertension Mother     Heart disease Mother     Hyperlipidemia Mother     Cancer Sister 53        thyroid cancer and lymph nodes involvement    Cancer Brother 61        colon and lung cancer    Colon cancer Brother 66    Cancer Brother 63        colon    Hypertension Brother     Diabetes Brother     Colon cancer Brother 60    Esophageal cancer Neg Hx     Stomach cancer Neg Hx     Ulcerative colitis Neg Hx     Crohn's disease Neg Hx     Irritable bowel syndrome Neg Hx     Celiac disease Neg Hx        Social History     Socioeconomic History    Marital status:      Spouse name: Not on file    Number of children: Not on file    Years of education: Not on file    Highest education level: Not on file   Occupational History    Not on file   Social Needs    Financial resource strain: Not on file    Food insecurity:     Worry: Not on file     Inability: Not on file    Transportation needs:     Medical: Not on file     Non-medical: Not on file   Tobacco Use    Smoking status: Former Smoker     Packs/day: 1.00     Years: 20.00     Pack years: 20.00     Last attempt to quit: 1997     Years since quittin.2    Smokeless tobacco: Former User   Substance and Sexual Activity    Alcohol use: No    Drug use: No    Sexual activity: Never   Lifestyle    Physical activity:     Days per week: Not on file     Minutes per session: Not on file    Stress: Not on file   Relationships    Social connections:     Talks on phone: Not on file     Gets together: Not on file     Attends Evangelical service: Not on file     Active member of club or organization: Not on file     Attends  "meetings of clubs or organizations: Not on file     Relationship status: Not on file   Other Topics Concern    Not on file   Social History Narrative    Not on file       Review of Systems   Constitutional: Negative for appetite change, chills, fatigue, fever and unexpected weight change.   HENT: Negative for congestion, ear pain, mouth sores, nosebleeds, postnasal drip, rhinorrhea, sinus pressure, sneezing, sore throat, trouble swallowing and voice change.    Eyes: Negative for photophobia, pain, discharge, redness, itching and visual disturbance.   Respiratory: Negative for cough, chest tightness and shortness of breath.    Cardiovascular: Negative for chest pain, palpitations and leg swelling.   Gastrointestinal: Negative for abdominal pain, blood in stool, constipation, diarrhea, nausea and vomiting.   Genitourinary: Negative for dysuria, frequency, hematuria and urgency.   Musculoskeletal: Negative for arthralgias, back pain, joint swelling and myalgias.   Skin: Negative for color change and rash.   Allergic/Immunologic: Negative for immunocompromised state.   Neurological: Negative for dizziness, seizures, syncope, weakness and headaches.   Hematological: Negative for adenopathy. Does not bruise/bleed easily.   Psychiatric/Behavioral: Positive for decreased concentration. Negative for agitation, dysphoric mood, sleep disturbance and suicidal ideas. The patient is nervous/anxious.          Objective:     Vitals:    04/18/19 0927 04/18/19 0949   BP: 138/78 (!) 150/80   Pulse: 92    Resp: 20    Temp: 98.3 °F (36.8 °C)    TempSrc: Oral    SpO2: 98%    Weight: 76.9 kg (169 lb 10.3 oz)    Height: 5' 6" (1.676 m)           Physical Exam   Constitutional: She is oriented to person, place, and time. She appears well-developed and well-nourished. No distress.   Body mass index is 27.38 kg/m².     HENT:   Head: Normocephalic and atraumatic.   Right Ear: External ear normal.   Left Ear: External ear normal.   Nose: Nose " normal.   Mouth/Throat: Oropharynx is clear and moist. No oropharyngeal exudate.   Eyes: Pupils are equal, round, and reactive to light. Conjunctivae and EOM are normal.   Neck: Normal range of motion. Neck supple. No JVD present. No tracheal deviation present. No thyromegaly present.   Cardiovascular: Normal rate, regular rhythm and normal heart sounds.   No murmur heard.  Pulmonary/Chest: Effort normal and breath sounds normal. No stridor. No respiratory distress. She has no wheezes. She has no rales.   Abdominal: Soft. Bowel sounds are normal. She exhibits no distension and no mass. There is no rebound and no guarding.   Musculoskeletal: Normal range of motion. She exhibits no edema.   Lymphadenopathy:     She has no cervical adenopathy.   Neurological: She is alert and oriented to person, place, and time. No cranial nerve deficit. Coordination normal.   Skin: Skin is warm and dry. No rash noted. She is not diaphoretic.   Psychiatric: Her behavior is normal. Her mood appears anxious.         Assessment:         ICD-10-CM ICD-9-CM   1. Essential hypertension I10 401.9       Plan:       Essential hypertension  -  Increase Amlodipine from 5 to 10 mg daily.  Continue the Metoprolol and Lasix.  Recheck in June when we recheck other chronic problems.  -     amLODIPine (NORVASC) 10 MG tablet; Take 1 tablet (10 mg total) by mouth once daily.  Dispense: 90 tablet; Refill: 0      Follow up in about 2 months (around 6/18/2019) for fasting labs and visit for results.     Patient's Medications   New Prescriptions    No medications on file   Previous Medications    ALPRAZOLAM (XANAX) 0.5 MG TABLET    TAKE 1 TABLET BY MOUTH 3 TIMES A DAY    CHOLECALCIFEROL, VITAMIN D3, (VITAMIN D3) 5,000 UNIT TAB    Take 1 tablet (5,000 Units total) by mouth once daily.    CYANOCOBALAMIN, VITAMIN B-12, (VITAMIN B-12 INJ)    Inject 1,000 mcg as directed every 30 days.    DICYCLOMINE (BENTYL) 20 MG TABLET    TAKE 1 TABLET (20 MG TOTAL) BY MOUTH 4  (FOUR) TIMES DAILY.    ESTRADIOL (ESTRACE) 2 MG TABLET    Take 2 mg by mouth once daily.    EZETIMIBE (ZETIA) 10 MG TABLET    Take 1 tablet (10 mg total) by mouth once daily.    FOLIC ACID (FOLVITE) 1 MG TABLET    Take 1 tablet (1 mg total) by mouth once daily.    FUROSEMIDE (LASIX) 40 MG TABLET    TAKE 1 TABLET BY MOUTH EVERY DAY    METHOTREXATE 2.5 MG TAB    Take 8 tabs once a week only    METOPROLOL SUCCINATE (TOPROL-XL) 50 MG 24 HR TABLET    TAKE 1 TABLET BY MOUTH EVERY DAY    POTASSIUM CHLORIDE (MICRO-K) 10 MEQ CPSR    TAKE 1 CAPSULE BY MOUTH TWICE A DAY    PROGESTERONE (PROMETRIUM) 100 MG CAPSULE    Take 100 mg by mouth once daily.    THYROID, PORK, (ARMOUR THYROID) 60 MG TAB    Tiger Thyroid 60 mg tablet    TIZANIDINE 4 MG CAP    Take by mouth 2 (two) times daily.     TRAMADOL (ULTRAM) 50 MG TABLET    TAKE 1 TABLET EVERY 12 HOURS AS NEEDED FOR PAIN    TRAZODONE (DESYREL) 100 MG TABLET    TAKE 1 TABLET (100 MG TOTAL) BY MOUTH EVERY EVENING.   Modified Medications    Modified Medication Previous Medication    AMLODIPINE (NORVASC) 10 MG TABLET amLODIPine (NORVASC) 5 MG tablet       Take 1 tablet (10 mg total) by mouth once daily.    Take 1 tablet (5 mg total) by mouth once daily.   Discontinued Medications    No medications on file

## 2019-04-29 RX ORDER — ALPRAZOLAM 0.5 MG/1
TABLET ORAL
Qty: 90 TABLET | Refills: 0 | Status: SHIPPED | OUTPATIENT
Start: 2019-04-29 | End: 2019-05-21

## 2019-05-08 ENCOUNTER — OFFICE VISIT (OUTPATIENT)
Dept: ORTHOPEDICS | Facility: CLINIC | Age: 64
End: 2019-05-08
Payer: MEDICARE

## 2019-05-08 VITALS
DIASTOLIC BLOOD PRESSURE: 70 MMHG | SYSTOLIC BLOOD PRESSURE: 105 MMHG | WEIGHT: 169 LBS | HEART RATE: 74 BPM | BODY MASS INDEX: 27.16 KG/M2 | HEIGHT: 66 IN

## 2019-05-08 DIAGNOSIS — G56.21 ENTRAPMENT OF RIGHT ULNAR NERVE: ICD-10-CM

## 2019-05-08 DIAGNOSIS — G56.00 CARPAL TUNNEL SYNDROME, UNSPECIFIED LATERALITY: Primary | ICD-10-CM

## 2019-05-08 DIAGNOSIS — Z47.89 ORTHOPEDIC AFTERCARE: ICD-10-CM

## 2019-05-08 PROCEDURE — 99999 PR PBB SHADOW E&M-EST. PATIENT-LVL IV: ICD-10-PCS | Mod: PBBFAC,,, | Performed by: PHYSICIAN ASSISTANT

## 2019-05-08 PROCEDURE — 99999 PR PBB SHADOW E&M-EST. PATIENT-LVL IV: CPT | Mod: PBBFAC,,, | Performed by: PHYSICIAN ASSISTANT

## 2019-05-08 PROCEDURE — 99024 PR POST-OP FOLLOW-UP VISIT: ICD-10-PCS | Mod: S$GLB,,, | Performed by: PHYSICIAN ASSISTANT

## 2019-05-08 PROCEDURE — 99024 POSTOP FOLLOW-UP VISIT: CPT | Mod: S$GLB,,, | Performed by: PHYSICIAN ASSISTANT

## 2019-05-08 NOTE — PROGRESS NOTES
"Ms. Iverson is here today for a post-operative visit.  She is 42 days status post Right ulnar nerve decompression at the elbow with Clarix injectable and Right carpal tunnel release by Dr. Waller on 3/27/19. She reports that she is doing well.  Pain is intermittent, 5/10. She denies fever, chills, and sweats since the time of the surgery.     Physical exam:    Vitals:    05/08/19 1201   BP: 105/70   Pulse: 74   Weight: 76.7 kg (169 lb)   Height: 5' 6" (1.676 m)   PainSc:   5     Vital signs are stable, patient is afebrile.  Patient is well dressed and well groomed, no acute distress.  Alert and oriented to person, place, and time.  Incisions healing well - clean, dry and intact. Mild thickening at the scar right palm.  Right elbow scar is mildly tender to palpation.  There is no erythema or exudate.  There is no sign of any infection. She is NVI.  Improving finger and wrist range of motion.    Assessment: 42 days status post Right ulnar nerve decompression at the elbow with Clarix injectable and Right carpal tunnel release        Plan:  Madalyn was seen today for post-op evaluation.    Diagnoses and all orders for this visit:    Carpal tunnel syndrome, unspecified laterality    Entrapment of right ulnar nerve    Orthopedic aftercare        - Continue regular OT  - Gradual increase in activity  - Discussed scar massage  - Follow up in 6 weeks if needed  - Call with questions or concerns      "

## 2019-05-13 RX ORDER — METOPROLOL SUCCINATE 50 MG/1
TABLET, EXTENDED RELEASE ORAL
Qty: 30 TABLET | Refills: 2 | Status: SHIPPED | OUTPATIENT
Start: 2019-05-13 | End: 2019-08-11 | Stop reason: SDUPTHER

## 2019-05-17 RX ORDER — FOLIC ACID 1 MG/1
TABLET ORAL
Qty: 30 TABLET | Refills: 2 | Status: SHIPPED | OUTPATIENT
Start: 2019-05-17 | End: 2019-05-21

## 2019-05-17 RX ORDER — TIZANIDINE 4 MG/1
TABLET ORAL
Qty: 360 TABLET | Refills: 3 | Status: SHIPPED | OUTPATIENT
Start: 2019-05-17 | End: 2019-05-21

## 2019-05-20 RX ORDER — TRAZODONE HYDROCHLORIDE 100 MG/1
100 TABLET ORAL NIGHTLY
Qty: 30 TABLET | Refills: 2 | Status: SHIPPED | OUTPATIENT
Start: 2019-05-20 | End: 2019-05-21

## 2019-05-21 ENCOUNTER — OFFICE VISIT (OUTPATIENT)
Dept: RHEUMATOLOGY | Facility: CLINIC | Age: 64
End: 2019-05-21
Payer: MEDICARE

## 2019-05-21 VITALS
HEART RATE: 86 BPM | BODY MASS INDEX: 27.95 KG/M2 | HEIGHT: 66 IN | DIASTOLIC BLOOD PRESSURE: 74 MMHG | SYSTOLIC BLOOD PRESSURE: 138 MMHG | WEIGHT: 173.94 LBS

## 2019-05-21 DIAGNOSIS — M79.7 FIBROMYALGIA: Chronic | ICD-10-CM

## 2019-05-21 DIAGNOSIS — M19.90 INFLAMMATORY ARTHRITIS: Primary | ICD-10-CM

## 2019-05-21 PROCEDURE — 99999 PR PBB SHADOW E&M-EST. PATIENT-LVL III: CPT | Mod: PBBFAC,,, | Performed by: INTERNAL MEDICINE

## 2019-05-21 PROCEDURE — 3078F PR MOST RECENT DIASTOLIC BLOOD PRESSURE < 80 MM HG: ICD-10-PCS | Mod: CPTII,S$GLB,, | Performed by: INTERNAL MEDICINE

## 2019-05-21 PROCEDURE — 3075F SYST BP GE 130 - 139MM HG: CPT | Mod: CPTII,S$GLB,, | Performed by: INTERNAL MEDICINE

## 2019-05-21 PROCEDURE — 99999 PR PBB SHADOW E&M-EST. PATIENT-LVL III: ICD-10-PCS | Mod: PBBFAC,,, | Performed by: INTERNAL MEDICINE

## 2019-05-21 PROCEDURE — 99214 OFFICE O/P EST MOD 30 MIN: CPT | Mod: S$GLB,,, | Performed by: INTERNAL MEDICINE

## 2019-05-21 PROCEDURE — 1101F PR PT FALLS ASSESS DOC 0-1 FALLS W/OUT INJ PAST YR: ICD-10-PCS | Mod: CPTII,S$GLB,, | Performed by: INTERNAL MEDICINE

## 2019-05-21 PROCEDURE — 3075F PR MOST RECENT SYSTOLIC BLOOD PRESS GE 130-139MM HG: ICD-10-PCS | Mod: CPTII,S$GLB,, | Performed by: INTERNAL MEDICINE

## 2019-05-21 PROCEDURE — 1101F PT FALLS ASSESS-DOCD LE1/YR: CPT | Mod: CPTII,S$GLB,, | Performed by: INTERNAL MEDICINE

## 2019-05-21 PROCEDURE — 3078F DIAST BP <80 MM HG: CPT | Mod: CPTII,S$GLB,, | Performed by: INTERNAL MEDICINE

## 2019-05-21 PROCEDURE — 99214 PR OFFICE/OUTPT VISIT, EST, LEVL IV, 30-39 MIN: ICD-10-PCS | Mod: S$GLB,,, | Performed by: INTERNAL MEDICINE

## 2019-05-21 RX ORDER — TRAZODONE HYDROCHLORIDE 100 MG/1
100 TABLET ORAL NIGHTLY
Qty: 30 TABLET | Refills: 3 | Status: SHIPPED | OUTPATIENT
Start: 2019-05-21 | End: 2019-11-20 | Stop reason: SDUPTHER

## 2019-05-21 RX ORDER — METHOTREXATE 2.5 MG/1
TABLET ORAL
Qty: 120 TABLET | Refills: 1 | Status: SHIPPED | OUTPATIENT
Start: 2019-05-21 | End: 2019-09-24

## 2019-05-21 RX ORDER — ALPRAZOLAM 0.5 MG/1
0.5 TABLET ORAL 3 TIMES DAILY PRN
Qty: 90 TABLET | Refills: 3 | Status: SHIPPED | OUTPATIENT
Start: 2019-05-21 | End: 2019-10-28 | Stop reason: SDUPTHER

## 2019-05-21 RX ORDER — TIZANIDINE 4 MG/1
4 TABLET ORAL 2 TIMES DAILY
Qty: 60 TABLET | Refills: 3 | Status: SHIPPED | OUTPATIENT
Start: 2019-05-21 | End: 2019-06-20

## 2019-05-21 RX ORDER — TRAMADOL HYDROCHLORIDE 50 MG/1
TABLET ORAL
Qty: 90 TABLET | Refills: 3 | Status: SHIPPED | OUTPATIENT
Start: 2019-05-21 | End: 2019-09-25 | Stop reason: SDUPTHER

## 2019-05-21 RX ORDER — FOLIC ACID 1 MG/1
1000 TABLET ORAL DAILY
Qty: 30 TABLET | Refills: 3 | Status: SHIPPED | OUTPATIENT
Start: 2019-05-21 | End: 2019-09-24

## 2019-05-21 ASSESSMENT — ROUTINE ASSESSMENT OF PATIENT INDEX DATA (RAPID3)
PAIN SCORE: 6
PSYCHOLOGICAL DISTRESS SCORE: 2.2
TOTAL RAPID3 SCORE: 5.11
PATIENT GLOBAL ASSESSMENT SCORE: 9
FATIGUE SCORE: 9
MDHAQ FUNCTION SCORE: .1
AM STIFFNESS SCORE: 0, NO

## 2019-05-21 NOTE — PROGRESS NOTES
Chief Complaint   Patient presents with    Disease Management     fibromyalgia and inflammatory arthritis        Patient with fibromyalgia for a follow up    History of presenting illness    64 year old white female has fibromyalgia : for 2 years now    She used to see :   : rheumatology    Tried :  lyrica : light headed,dizziness    She used to take cymbalta,she weaned off  She weaned off the celexa    Takes xanax to sleep  Takes tizanidine 4 mg x 2 daily   She takes trazodone 100 mg to sleep    She had severe pain,swelling and stiffness in her hands  She has done a CTS surgery and ulnar nerve decompression on the right side    She is on mtx 8 tabs weekly/folic acid   Hands feel better   She is doing OT/PT now     Labs : CBC,CMP nml  ESR nml now  CRP high 13.7 STILL     She had xrays : bulging discs low back,L4-5  Hand xrays : OA   C spine : OA   She had feet spurs : injected     Headaches +  Takes 4 BP meds : metoprolol,lasix,diovan,norvasc   Migraines not on treatment  Light headedness+  Sees neurology : CT brains normal    Episodes of nausea and feeling of sickness  Diarrhea+  Abdominal pain +  GI : they think this is irritable bowel syndrome   Only once in 2004 she had ischemic colitis  EUS all planned   Colonoscopy 2 years ago nml  EGD this year nml  There was no Crohn's on the recent w/u    Crestor gives her charley horses     Past history : pancreatitis,IBS  ADHD  Htn,migraine,anxiety,hypothyroidism,FMS,HLD,IBS    Cause of pancreatitis : unclear   Has had high ALPs for a while now     Blood work    High ESR,CRP    Negative RF,CCP  HLA B27 negative    Xrays    Hands : deg changes + punctate erosions proximal phalanx of the little finger   Knees : nml  C spine : deg changes  LS spine : deg changes,facet arthritis    MRI hands :  Right and left hands: Multifocal degenerative changes noted, most prominent in the IP joints, noting joint space loss and osteophytosis.  Prominent degenerative changes also  noted at the 1st carpometacarpal and triscaphe joint of the wrist.  Subcortical erosive change noted in the scaphoid, capitate, and triquetrum.    There is prominent synovitis/enhancement at the MCP joints, carpal carpal joints, and proximal IP joint.  Periarticular erosions noted..  Enhancement also noted around the flexor tendon sheath, suggesting tenosynovitis. The flexor tendons demonstrate normal size and signal.    No fracture fracture.  No marrow replacement process.      Impression       Degenerative arthropathy of both hands with enhancement/ synovitis involving the MCP, carpal-carpal, and proximal IP joints, consistent with superimposed inflammatory component.     UE EMG/NCS :    Mild to moderately severe bilateral carpal tunnel syndrome;   2. A primarily axonal right ulnar sensory neuropathy with some secondary demyelination;   3. Chronic denervation in the right C7 myotome consistent with chronic radiculopathy.     We have her on mtx 8 tabs weekly and folic acid  Labs normal CBC,CMP  Normal ESR  CRP mild elevation    Family history : mom heart problems    Social history : quit smoking 1997      Review of Systems   Constitutional: Negative for activity change, appetite change, chills, diaphoresis, fatigue, fever and unexpected weight change.   HENT: Negative for congestion, dental problem, drooling, ear discharge, ear pain, facial swelling, hearing loss, mouth sores, nosebleeds, postnasal drip, rhinorrhea, sinus pressure, sinus pain, sneezing, sore throat, tinnitus, trouble swallowing and voice change.    Eyes: Negative for photophobia, pain, discharge, redness, itching and visual disturbance.   Respiratory: Negative for apnea, cough, choking, chest tightness, shortness of breath, wheezing and stridor.    Cardiovascular: Negative for chest pain, palpitations and leg swelling.   Gastrointestinal: Positive for abdominal pain, diarrhea and nausea. Negative for abdominal distention, anal bleeding, blood in  stool, constipation, rectal pain and vomiting.   Endocrine: Negative for cold intolerance, heat intolerance, polydipsia, polyphagia and polyuria.   Genitourinary: Negative for decreased urine volume, difficulty urinating, dysuria, enuresis, flank pain, frequency, genital sores, hematuria and urgency.   Musculoskeletal: Positive for arthralgias. Negative for back pain, gait problem, joint swelling, myalgias, neck pain and neck stiffness.   Skin: Negative for color change, pallor, rash and wound.   Allergic/Immunologic: Negative for environmental allergies, food allergies and immunocompromised state.   Neurological: Negative for dizziness, tremors, seizures, syncope, facial asymmetry, speech difficulty, weakness, light-headedness, numbness and headaches.   Hematological: Negative for adenopathy. Does not bruise/bleed easily.   Psychiatric/Behavioral: Negative for agitation, behavioral problems, confusion, decreased concentration, dysphoric mood, hallucinations, self-injury, sleep disturbance and suicidal ideas. The patient is not nervous/anxious and is not hyperactive.      Physical Exam     SANTIAGO-28 tender joint count: 0  SANTIAGO-28 swollen joint count: 0    All PIPs have osteophytes  Tender C/LS spine    Physical Exam   Constitutional: She is oriented to person, place, and time and well-developed, well-nourished, and in no distress. No distress.   HENT:   Head: Normocephalic.   Mouth/Throat: Oropharynx is clear and moist.   Eyes: Conjunctivae are normal. Pupils are equal, round, and reactive to light. Right eye exhibits no discharge. Left eye exhibits no discharge. No scleral icterus.   Neck: Normal range of motion. No thyromegaly present.   Cardiovascular: Normal rate, regular rhythm, normal heart sounds and intact distal pulses.    Pulmonary/Chest: Effort normal and breath sounds normal. No stridor.   Abdominal: Soft. Bowel sounds are normal.   Lymphadenopathy:     She has no cervical adenopathy.   Neurological: She is  alert and oriented to person, place, and time.   Skin: Skin is warm. No rash noted. She is not diaphoretic.     Psychiatric: Affect and judgment normal.   Musculoskeletal: Normal range of motion.           Assessment     64 year old white female comes with    -polyarthralgias in the hands,knees,low back and neck  -GI symptoms of nausea,diarrhea,abdominal pain  -anxiety > depression  -poor sleep  -headaches and light headedness    She has a diagnosis of fibromyalgia,osteoarthritis of hands,C,LS spine    She also has a diagnosis of IBS  Gi ruled out crohn's and celiac disease  Recent pancreatitis,cause not known  Noted high ALP,but also low vit d,not sure if ALP is from liver or bone    She has high inflammatory markers  She has erosive changes on the xrays    She definitely has osteoarthritis of her C/LS spine    She has inflammatory arthritis in her hands/wrists  She has b/l CTS and right ulnar neuropathy and she has cervical radiculopathy  S/P surgery for the CTS repair and ulnar decompression     1. Inflammatory arthritis    2. Fibromyalgia        Reviewed labs/xrays  Reviewed medications    F/u    Plan    Continue mtx to 8 tabs weekly  Folic acid daily     Continue vit d replacement  Vit b12     Offered pain management,PT for her C/LS spine issues  OT hands    Tramadol for pain management   50 mg bid to tid     BP meds adjustments    Anxiety :Xanax 0.5 mg daily tid  Trazodone to sleep  Tizanidine bid     Offered counselling    Crestor and Lipitor give charley horses  Try changing from crestor to something else  She has family h/o intolerance to statins    GI says she has IBS  She is on meds    Neurology w/u  To evaluate her C/LS spine issues and the neuropathy     Madalyn was seen today for disease management.    Diagnoses and all orders for this visit:    Inflammatory arthritis  -     CBC auto differential; Future  -     Comprehensive metabolic panel; Future  -     Sedimentation rate; Future  -     C-reactive  protein; Future    Fibromyalgia  -     traMADol (ULTRAM) 50 mg tablet; TAKE 2 TO 3 TABS DAILY    Other orders  -     folic acid (FOLVITE) 1 MG tablet; Take 1 tablet (1,000 mcg total) by mouth once daily.  -     methotrexate 2.5 MG Tab; Take 8 tabs once a week only  -     traZODone (DESYREL) 100 MG tablet; Take 1 tablet (100 mg total) by mouth every evening.  -     ALPRAZolam (XANAX) 0.5 MG tablet; Take 1 tablet (0.5 mg total) by mouth 3 (three) times daily as needed.  -     tiZANidine (ZANAFLEX) 4 MG tablet; Take 1 tablet (4 mg total) by mouth 2 (two) times daily.          rtc in 3 months

## 2019-05-31 RX ORDER — ALPRAZOLAM 0.5 MG/1
TABLET ORAL
Qty: 90 TABLET | Refills: 0 | OUTPATIENT
Start: 2019-05-31

## 2019-06-03 DIAGNOSIS — I10 ESSENTIAL HYPERTENSION: ICD-10-CM

## 2019-06-04 RX ORDER — FUROSEMIDE 40 MG/1
TABLET ORAL
Qty: 30 TABLET | Refills: 1 | Status: SHIPPED | OUTPATIENT
Start: 2019-06-04 | End: 2019-06-28 | Stop reason: SDUPTHER

## 2019-06-11 DIAGNOSIS — E78.2 MIXED HYPERLIPIDEMIA: ICD-10-CM

## 2019-06-11 RX ORDER — EZETIMIBE 10 MG/1
TABLET ORAL
Qty: 90 TABLET | Refills: 0 | Status: SHIPPED | OUTPATIENT
Start: 2019-06-11 | End: 2019-09-12 | Stop reason: SDUPTHER

## 2019-06-18 ENCOUNTER — TELEPHONE (OUTPATIENT)
Dept: FAMILY MEDICINE | Facility: CLINIC | Age: 64
End: 2019-06-18

## 2019-06-18 NOTE — TELEPHONE ENCOUNTER
Called pt to schedule follow up visit for lab results. Pt stated that she would call the office back when she was ready to schedule.

## 2019-06-26 ENCOUNTER — TELEPHONE (OUTPATIENT)
Dept: FAMILY MEDICINE | Facility: CLINIC | Age: 64
End: 2019-06-26

## 2019-06-26 NOTE — TELEPHONE ENCOUNTER
Called pt to schedule follow-up appt to go over lab results. Pt stated that she is not ready to schedule the appt and that she will call back at another time to schedule.

## 2019-06-28 DIAGNOSIS — I10 ESSENTIAL HYPERTENSION: ICD-10-CM

## 2019-06-28 RX ORDER — FUROSEMIDE 40 MG/1
TABLET ORAL
Qty: 30 TABLET | Refills: 0 | Status: SHIPPED | OUTPATIENT
Start: 2019-06-28 | End: 2019-07-22 | Stop reason: SDUPTHER

## 2019-06-28 NOTE — TELEPHONE ENCOUNTER
Advise patient that if she does not schedule appointment to follow up for lab results - I will have to deny her medication refills - schedule patient follow up appointment within the next couple weeks - this is the last refill I will send without seeing patient.

## 2019-07-13 DIAGNOSIS — I10 ESSENTIAL HYPERTENSION: ICD-10-CM

## 2019-07-14 RX ORDER — POTASSIUM CHLORIDE 750 MG/1
CAPSULE, EXTENDED RELEASE ORAL
Qty: 180 CAPSULE | Refills: 0 | Status: SHIPPED | OUTPATIENT
Start: 2019-07-14 | End: 2019-10-17 | Stop reason: SDUPTHER

## 2019-07-15 RX ORDER — AMLODIPINE BESYLATE 10 MG/1
TABLET ORAL
Qty: 30 TABLET | Refills: 0 | Status: SHIPPED | OUTPATIENT
Start: 2019-07-15 | End: 2020-10-29 | Stop reason: SDUPTHER

## 2019-07-15 NOTE — TELEPHONE ENCOUNTER
Advise patient she is overdue for follow up - had fasting labs last month and was supposed to return for results. - check my notes - this may be her wellness exam

## 2019-07-17 ENCOUNTER — OFFICE VISIT (OUTPATIENT)
Dept: FAMILY MEDICINE | Facility: CLINIC | Age: 64
End: 2019-07-17
Payer: MEDICARE

## 2019-07-17 VITALS
SYSTOLIC BLOOD PRESSURE: 136 MMHG | HEIGHT: 66 IN | BODY MASS INDEX: 27.83 KG/M2 | DIASTOLIC BLOOD PRESSURE: 80 MMHG | RESPIRATION RATE: 17 BRPM | OXYGEN SATURATION: 95 % | WEIGHT: 173.19 LBS | HEART RATE: 107 BPM | TEMPERATURE: 98 F

## 2019-07-17 DIAGNOSIS — Z23 NEED FOR STREPTOCOCCUS PNEUMONIAE VACCINATION: ICD-10-CM

## 2019-07-17 DIAGNOSIS — E03.9 ACQUIRED HYPOTHYROIDISM: ICD-10-CM

## 2019-07-17 DIAGNOSIS — R73.03 PREDIABETES: ICD-10-CM

## 2019-07-17 DIAGNOSIS — G43.909 MIGRAINE WITHOUT STATUS MIGRAINOSUS, NOT INTRACTABLE, UNSPECIFIED MIGRAINE TYPE: ICD-10-CM

## 2019-07-17 DIAGNOSIS — M79.7 FIBROMYALGIA: ICD-10-CM

## 2019-07-17 DIAGNOSIS — Z78.0 POSTMENOPAUSAL: ICD-10-CM

## 2019-07-17 DIAGNOSIS — E55.9 VITAMIN D DEFICIENCY: ICD-10-CM

## 2019-07-17 DIAGNOSIS — E78.2 MIXED HYPERLIPIDEMIA: ICD-10-CM

## 2019-07-17 DIAGNOSIS — I10 ESSENTIAL HYPERTENSION: Primary | ICD-10-CM

## 2019-07-17 PROCEDURE — 90732 PNEUMOCOCCAL POLYSACCHARIDE VACCINE 23-VALENT =>2YO SQ IM: ICD-10-PCS | Mod: S$GLB,,, | Performed by: NURSE PRACTITIONER

## 2019-07-17 PROCEDURE — 3079F PR MOST RECENT DIASTOLIC BLOOD PRESSURE 80-89 MM HG: ICD-10-PCS | Mod: CPTII,S$GLB,, | Performed by: NURSE PRACTITIONER

## 2019-07-17 PROCEDURE — 99214 OFFICE O/P EST MOD 30 MIN: CPT | Mod: 25,S$GLB,, | Performed by: NURSE PRACTITIONER

## 2019-07-17 PROCEDURE — G0009 ADMIN PNEUMOCOCCAL VACCINE: HCPCS | Mod: S$GLB,,, | Performed by: NURSE PRACTITIONER

## 2019-07-17 PROCEDURE — 3075F SYST BP GE 130 - 139MM HG: CPT | Mod: CPTII,S$GLB,, | Performed by: NURSE PRACTITIONER

## 2019-07-17 PROCEDURE — 99999 PR PBB SHADOW E&M-EST. PATIENT-LVL V: CPT | Mod: PBBFAC,,, | Performed by: NURSE PRACTITIONER

## 2019-07-17 PROCEDURE — 3075F PR MOST RECENT SYSTOLIC BLOOD PRESS GE 130-139MM HG: ICD-10-PCS | Mod: CPTII,S$GLB,, | Performed by: NURSE PRACTITIONER

## 2019-07-17 PROCEDURE — G0009 PNEUMOCOCCAL POLYSACCHARIDE VACCINE 23-VALENT =>2YO SQ IM: ICD-10-PCS | Mod: S$GLB,,, | Performed by: NURSE PRACTITIONER

## 2019-07-17 PROCEDURE — 3079F DIAST BP 80-89 MM HG: CPT | Mod: CPTII,S$GLB,, | Performed by: NURSE PRACTITIONER

## 2019-07-17 PROCEDURE — 1101F PR PT FALLS ASSESS DOC 0-1 FALLS W/OUT INJ PAST YR: ICD-10-PCS | Mod: CPTII,S$GLB,, | Performed by: NURSE PRACTITIONER

## 2019-07-17 PROCEDURE — 90732 PPSV23 VACC 2 YRS+ SUBQ/IM: CPT | Mod: S$GLB,,, | Performed by: NURSE PRACTITIONER

## 2019-07-17 PROCEDURE — 99999 PR PBB SHADOW E&M-EST. PATIENT-LVL V: ICD-10-PCS | Mod: PBBFAC,,, | Performed by: NURSE PRACTITIONER

## 2019-07-17 PROCEDURE — 99214 PR OFFICE/OUTPT VISIT, EST, LEVL IV, 30-39 MIN: ICD-10-PCS | Mod: 25,S$GLB,, | Performed by: NURSE PRACTITIONER

## 2019-07-17 PROCEDURE — 1101F PT FALLS ASSESS-DOCD LE1/YR: CPT | Mod: CPTII,S$GLB,, | Performed by: NURSE PRACTITIONER

## 2019-07-17 RX ORDER — ACETAMINOPHEN 500 MG
10000 TABLET ORAL DAILY
COMMUNITY
Start: 2019-07-17 | End: 2020-01-29 | Stop reason: SDUPTHER

## 2019-07-18 NOTE — PROGRESS NOTES
"Subjective:       Patient ID: Madalyn Iverson is a 64 y.o. female.    Chief Complaint: Follow-up (Pt here for a f/u appt )    Patient is a 64-year-old white female with history of  ADHD, fibromyalgia, hypertension, hyperlipidemia, hypothyroidism, impaired fasting glucose, migraines, vitamin D deficiency, osteoarthritis to both hands and chronic GI problem that patient reported was ulcerative colitis that is now questionable that is here today for follow up with fasting lab results.     Patient has Hypertension and is currently taking Amlodipine 10 mg daily, Metoprolol 50 mg daily, and Lasix 40 mg with potassium supplement.   Blood pressure is controlled on present medications. Patient complains her blood pressure sometimes is too low because her diastolic goes down in the 60s and she feels she has feet swelling at present time and patient has NO edema present at all.  Explained that a normal blood pressure as long as > 100/50 is okay.  She reports her blood pressure has never gotten below this so advised to leave medications alone as she is on the higher end of normal today.  /80   Pulse 107   Temp 98.3 °F (36.8 °C)   Resp 17   Ht 5' 6" (1.676 m)   Wt 78.5 kg (173 lb 2.7 oz)   SpO2 95%   BMI 27.95 kg/m²     Patient has chronic abdominal pain with chronic diarrhea that was previously been being followed by Dr. Kelly and Dr. Lee.  Patient had reported that she had Ulcerative Colitis but repeat colonoscopies do not support this.  ####Of note based on medical records, patient has been being followed by GI Dr. Lee/Robin since 2004.  The colonoscopy in April 2004 showed a few 8mm ulcers to the descending colon that could be suggestive of ulcerative colitis BUT repeat sigmoidoscopy in June 2004 showed no ulcerations present and patient has had normal colonoscopies since that time - please review media file for all colonoscopy reports from Dr. Lee and Keiko######  Patient is now being " followed by [facility] for chronic complaints and had recent diagnosis of Pancreatitis and had Gallbladder removed in March 2019.    Patient has Fibromyalgia and chronic pains that was being followed by Rheumatology, Dr. Thorpe in the past BUT now being followed and treated by [facility]. She continue to complain of chronic pains and fatigue - advised to discuss with her specialist.     Patient has chronic Migraines that is followed by Neurologist, Dr. Florez, whom she states has been prescribing her Tizanidine in the past..  She reported at July 2018 visit that she had been approved for Botox injections.  Patient reports it has been awhile since she seen Dr. Florez.  Patient reported at previous visit of having problems with memory - very forgetful - walking into room but forgets what she was going to get, etc.  Advised patient to discuss the memory issues with neurology to rule out neurological etiology and if rule out and suspect it is secondary to ADD - then she will need to see psychiatry for further evaluation and management because due to patient age of 64 and cardiovascular risk of HTN and uncontrolled Hyperlipidemia, I am no longer willing to prescribe an amphetamine for ADD. Patient verbalized understanding.     Patient has Hypothyroidism that is followed by her GYN MD, Dr. Mathew.      Patient has Hyperlipidemia.  Patient has known NONCOMPLIANCE with statin medication due to report of leg cramping and does not take the medication as prescribed.  She has been tried on several statin medications and has an intolerance.  In March 2019, I started patient on Zetia 10 mg daily.  Total cholesterol improved from 241 down to 202.  Her LDL improved from 155 down to 114.2.  Advised to continue the Zetia and work on lifestyle modifications.      Patient has ADHD that has been treated for multiple years by Dr. Randhawa in the past and myself over the past several years.  I have titrated patient  down from Adderall 30 mg twice daily over the past several years down to Adderall 10 mg twice daily and titrated patient off of medication with last prescription being written in May 2018.  Advised patient that Adderall is NOT recommended to patient over age 65 due to cardiovascular risk.  I advised patient that if she feels like her attention deficit is unmanageable with behavioral modifications - she should seek treatment from a psychiatrist for management as that is their specialty.     Patient has Anxiety and reports that her rheumatologist now prescribes the Xanax.       Patient has history of  with HgbA1C of 5.9%  In March 2019 visit.  Fasting blood sugars now improved at 98 but the hemoglobin A1c worsen to 6.1%.- advised on lifestyle modifications.     Patient has Vitamin D deficiency with a level of 17 - acute deficiency.  She was prescribed Vitamin D2 50,000 units weekly by her Rheumatologist BUT patient reports that Dr. Mathew, her GYN MD,  told her that she should be on D3 supplementation so took D3 5000 units every single day and level only improved from 14 to 17.  Advised patient to increase supplement to 31509 units daily and we will recheck in 6 months.      Component      Latest Ref Rng & Units 6/17/2019 2/28/2019 8/8/2018   Sodium      136 - 145 mmol/L 140 142 140   Potassium      3.5 - 5.1 mmol/L 3.8 4.3 4.3   Chloride      95 - 110 mmol/L 104 105 105   CO2      23 - 29 mmol/L 27 25 25   Glucose      70 - 110 mg/dL 98 116 (H) 112 (H)   BUN, Bld      7 - 17 mg/dL 14 16 18 (H)   Creatinine      0.50 - 1.40 mg/dL 0.70 0.67 0.65   Calcium      8.7 - 10.5 mg/dL 9.2 9.4 9.6   PROTEIN TOTAL      6.0 - 8.4 g/dL 7.2 8.1 7.9   Albumin      3.5 - 5.2 g/dL 3.7 4.1 4.2   BILIRUBIN TOTAL      0.1 - 1.0 mg/dL 0.4 0.3 0.4   Alkaline Phosphatase      38 - 126 U/L 144 (H) 147 (H) 144 (H)   AST      15 - 46 U/L 23 23 25   ALT      10 - 44 U/L 20 20 19   Anion Gap      8 - 16 mmol/L 9 12 10   eGFR if African  American      >60 mL/min/1.73 m:2 >60.0 >60.0 >60.0   eGFR if non African American      >60 mL/min/1.73 m:2 >60.0 >60.0 >60.0   Cholesterol      120 - 199 mg/dL 202 (H) 241 (H) 167   Triglycerides      30 - 150 mg/dL 189 (H) 150 192 (H)   HDL      40 - 75 mg/dL 50 56 61   LDL Cholesterol External      63.0 - 159.0 mg/dL 114.2 155.0 67.6   Hdl/Cholesterol Ratio      20.0 - 50.0 % 24.8 23.2 36.5   Total Cholesterol/HDL Ratio      2.0 - 5.0 4.0 4.3 2.7   Non-HDL Cholesterol      mg/dL 152 185 106   Hemoglobin A1C External      4.0 - 5.6 % 6.1 (H) 5.9 (H)    Estimated Avg Glucose      68 - 131 mg/dL 128 123    TSH      0.400 - 4.000 uIU/mL  0.574    Free T4      0.71 - 1.51 ng/dL  1.24    Vit D, 25-Hydroxy      30 - 96 ng/mL 17 (L) 14 (L)        Current Outpatient Medications   Medication Sig Dispense Refill    ALPRAZolam (XANAX) 0.5 MG tablet Take 1 tablet (0.5 mg total) by mouth 3 (three) times daily as needed. 90 tablet 3    amLODIPine (NORVASC) 10 MG tablet TAKE 1 TABLET BY MOUTH EVERY DAY 30 tablet 0    cholecalciferol, vitamin D3, (VITAMIN D3) 5,000 unit Tab Take 2 tablets (10,000 Units total) by mouth once daily.      CYANOCOBALAMIN, VITAMIN B-12, (VITAMIN B-12 INJ) Inject 1,000 mcg as directed every 30 days.      dicyclomine (BENTYL) 20 mg tablet TAKE 1 TABLET (20 MG TOTAL) BY MOUTH 4 (FOUR) TIMES DAILY. 120 tablet 5    estradiol (ESTRACE) 2 MG tablet Take 2 mg by mouth once daily.  3    ezetimibe (ZETIA) 10 mg tablet TAKE 1 TABLET BY MOUTH EVERY DAY 90 tablet 0    folic acid (FOLVITE) 1 MG tablet Take 1 tablet (1,000 mcg total) by mouth once daily. 30 tablet 3    furosemide (LASIX) 40 MG tablet TAKE 1 TABLET BY MOUTH EVERY DAY 30 tablet 0    methotrexate 2.5 MG Tab Take 8 tabs once a week only 120 tablet 1    metoprolol succinate (TOPROL-XL) 50 MG 24 hr tablet TAKE 1 TABLET BY MOUTH EVERY DAY 30 tablet 2    potassium chloride (MICRO-K) 10 MEQ CpSR TAKE 1 CAPSULE BY MOUTH TWICE A  capsule 0     thyroid, pork, (ARMOUR THYROID) 60 mg Tab [place] Thyroid 60 mg tablet      tiZANidine 4 mg Cap Take by mouth 2 (two) times daily.      traMADol (ULTRAM) 50 mg tablet TAKE 2 TO 3 TABS DAILY 90 tablet 3    traZODone (DESYREL) 100 MG tablet Take 1 tablet (100 mg total) by mouth every evening. 30 tablet 3    progesterone (PROMETRIUM) 100 MG capsule Take 100 mg by mouth once daily.       No current facility-administered medications for this visit.      Facility-Administered Medications Ordered in Other Visits   Medication Dose Route Frequency Provider Last Rate Last Dose    0.9%  NaCl infusion   Intravenous Continuous Jermaine Chatman MD   Stopped at 19 1040    mupirocin 2 % ointment   Nasal On Call Procedure Jermaine Chatman MD           Past Medical History:   Diagnosis Date    Acute biliary pancreatitis without infection or necrosis 2018    Acute pancreatitis     2018 - [facility]    ADHD (attention deficit hyperactivity disorder), inattentive type     Fibromyalgia     treated by Dr. Thorpe in past and now treated by Dr. Yates - Rheumatologist    Hyperlipidemia     High triglycerides    Hypertension     Hypothyroidism     Treated by Dr. Mathew    IFG (impaired fasting glucose)     Migraine     Treated by neurologist - Dr. Destiny Florez    Ulcerative colitis     Patient reported treated by Dr. Lee and Aaron in past  but on admit in 2018 - no UC seen on recent colonoscopy.  It was noted on colonoscopy in 2004 there were ulcers noted to descending colon but not present on sigmoidoscopy in 2004.    Vitamin D deficiency 3/31/2016       Past Surgical History:   Procedure Laterality Date     SECTION      CHOLECYSTECTOMY, LAPAROSCOPIC N/A 3/6/2019    Per[facility] Hill Palacios [facility] at [facility]    COLONOSCOPY  10/08/2010    Dr. Lee - repeat in 5 years - has appointment for colonoscopy 2016     COLONOSCOPY  2016    Dr. Kelly - normal  colon - repeat in 10 years - scanned report to media file.    DECOMPRESSION, NERVE, ULNAR right elbow Right 3/27/2019    Performed [facility]er [facility] at [facility] OR [facility]    HYSTERECTOMY      OOPHORECTOMY      RELEASE, CARPAL TUNNEL right Right 3/27/2019    Performed [facility] at [facility]    SHOULDER SURGERY Left     TONSILLECTOMY      ULTRASOUND, ENDOSCOPIC, UPPER GI TRACT N/A 2018    Performed by Reji Russell [facility] at [facility] ENDO    ULTRASOUND, UPPER GI TRACT, ENDOSCOPIC N/A 3/4/2019    Performed by Randy Boyd MD at [facility] ENDO    upper and lower GI  2016       Family History   Problem Relation Age of Onset    Hypertension Mother     Heart disease Mother     Hyperlipidemia Mother     Cancer Sister 53        thyroid cancer and lymph nodes involvement    Cancer Brother 61        colon and lung cancer    Colon cancer Brother 66    Cancer Brother 63        colon    Hypertension Brother     Diabetes Brother     Colon cancer Brother 60    Esophageal cancer Neg Hx     Stomach cancer Neg Hx     Ulcerative colitis Neg Hx     Crohn's disease Neg Hx     Irritable bowel syndrome Neg Hx     Celiac disease Neg Hx        Social History     Socioeconomic History    Marital status:      Spouse name: Not on file    Number of children: Not on file    Years of education: Not on file    Highest education level: Not on file   Occupational History    Not on file   Social Needs    Financial resource strain: Not on file    Food insecurity:     Worry: Not on file     Inability: Not on file    Transportation needs:     Medical: Not on file     Non-medical: Not on file   Tobacco Use    Smoking status: Former Smoker     Packs/day: 1.00     Years: 20.00     Pack years: 20.00     Last attempt to quit: 1997     Years since quittin.4    Smokeless tobacco: Former User   Substance and Sexual Activity     Alcohol use: No    Drug use: No    Sexual activity: Never   Lifestyle    Physical activity:     Days per week: Not on file     Minutes per session: Not on file    Stress: Not on file   Relationships    Social connections:     Talks on phone: Not on file     Gets together: Not on file     Attends [employer] service: Not on file     Active member of club or organization: Not on file     Attends meetings of clubs or organizations: Not on file     Relationship status: Not on file   Other Topics Concern    Not on file   Social History Narrative    Not on file       Review of Systems   Constitutional: Positive for fatigue. Negative for appetite change, chills, fever and unexpected weight change.   HENT: Negative for congestion, ear pain, mouth sores, nosebleeds, postnasal drip, rhinorrhea, sinus pressure, sneezing, sore throat, trouble swallowing and voice change.    Eyes: Negative for photophobia, pain, discharge, redness, itching and visual disturbance.   Respiratory: Negative for cough, chest tightness and shortness of breath.    Cardiovascular: Negative for chest pain, palpitations and leg swelling.   Gastrointestinal: Negative for abdominal pain, blood in stool, constipation, diarrhea, nausea and vomiting.   Genitourinary: Negative for dysuria, frequency, hematuria and urgency.   Musculoskeletal: Positive for arthralgias, back pain and myalgias. Negative for joint swelling.   Skin: Negative for color change and rash.   Allergic/Immunologic: Negative for immunocompromised state.   Neurological: Negative for dizziness, seizures, syncope, weakness and headaches.   Hematological: Negative for adenopathy. Does not bruise/bleed easily.   Psychiatric/Behavioral: Positive for decreased concentration. Negative for agitation, dysphoric mood, sleep disturbance and suicidal ideas.         Objective:     Vitals:    07/17/19 1602 07/17/19 1643   BP: 138/82 136/80   Pulse: 107    Resp: 17    Temp: 98.3 °F (36.8 °C)    SpO2:  "95%    Weight: 78.5 kg (173 lb 2.7 oz)    Height: 5' 6" (1.676 m)           Physical Exam   Constitutional: She is oriented to person, place, and time. She appears well-developed and well-nourished. No distress.   Body mass index is 27.95 kg/m².   HENT:   Head: Normocephalic and atraumatic.   Right Ear: External ear normal.   Left Ear: External ear normal.   Nose: Nose normal.   Mouth/Throat: Oropharynx is clear and moist. No oropharyngeal exudate.   Eyes: Pupils are equal, round, and reactive to light. Conjunctivae and EOM are normal.   Neck: Normal range of motion. Neck supple. No JVD present. No tracheal deviation present. No thyromegaly present.   Cardiovascular: Normal rate, regular rhythm and normal heart sounds.   No murmur heard.  Pulmonary/Chest: Effort normal and breath sounds normal. No stridor. No respiratory distress. She has no wheezes. She has no rales.   Abdominal: Soft. Bowel sounds are normal. She exhibits no distension and no mass. There is no rebound and no guarding.   Musculoskeletal: Normal range of motion. She exhibits no edema.   Lymphadenopathy:     She has no cervical adenopathy.   Neurological: She is alert and oriented to person, place, and time. No cranial nerve deficit. Coordination normal.   Skin: Skin is warm and dry. No rash noted. She is not diaphoretic.   Psychiatric: Her behavior is normal. Her mood appears anxious.         Assessment:         ICD-10-CM ICD-9-CM   1. Essential hypertension I10 401.9   2. Mixed hyperlipidemia E78.2 272.2   3. Vitamin D deficiency E55.9 268.9   4. Prediabetes R73.03 790.29   5. Acquired hypothyroidism E03.9 244.9   6. Migraine without status migrainosus, not intractable, unspecified migraine type G43.909 346.90   7. Fibromyalgia M79.7 729.1   8. Postmenopausal Z78.0 V49.81   9. Need for Streptococcus pneumoniae vaccination Z23 V03.82       Plan:       Essential hypertension  Continue amlodipine 10 mg daily, Lasix 40 mg daily, and metoprolol XL 50 mg " daily.  Also continue the potassium 10 mEq daily.  Recheck blood pressure in 6 months.  Send refill request when needed    Mixed hyperlipidemia  -  continue the Zetia 10 mg daily and follow a low-fat diet.  Recheck in 6 months  -     Lipid panel; Future; Expected date: 07/17/2019    Vitamin D deficiency  -  increase the vitamin-D 3 supplement to 04151 units daily and recheck in 6 months  -     Vitamin D; Future; Expected date: 07/17/2019  -     cholecalciferol, vitamin D3, (VITAMIN D3) 5,000 unit Tab; Take 2 tablets (10,000 Units total) by mouth once daily.    Prediabetes  -  must cut back on sugars and carbs and will recheck in 6 months  -     Hemoglobin A1c; Future; Expected date: 07/17/2019    Acquired hypothyroidism  -  followed by gyn MD    Migraine without status migrainosus, not intractable, unspecified migraine type  -  treated by neurologist    Fibromyalgia  -  treated by Rheumatology    Postmenopausal  -  treated by gyn    Need for Streptococcus pneumoniae vaccination  -     Pneumococcal Polysaccharide Vaccine (23 Valent) (SQ/IM)      Follow up in about 6 months (around 1/17/2020) for fasting labs and follow up.     Patient's Medications   New Prescriptions    No medications on file   Previous Medications    ALPRAZOLAM (XANAX) 0.5 MG TABLET    Take 1 tablet (0.5 mg total) by mouth 3 (three) times daily as needed.    AMLODIPINE (NORVASC) 10 MG TABLET    TAKE 1 TABLET BY MOUTH EVERY DAY    CYANOCOBALAMIN, VITAMIN B-12, (VITAMIN B-12 INJ)    Inject 1,000 mcg as directed every 30 days.    DICYCLOMINE (BENTYL) 20 MG TABLET    TAKE 1 TABLET (20 MG TOTAL) BY MOUTH 4 (FOUR) TIMES DAILY.    ESTRADIOL (ESTRACE) 2 MG TABLET    Take 2 mg by mouth once daily.    EZETIMIBE (ZETIA) 10 MG TABLET    TAKE 1 TABLET BY MOUTH EVERY DAY    FOLIC ACID (FOLVITE) 1 MG TABLET    Take 1 tablet (1,000 mcg total) by mouth once daily.    FUROSEMIDE (LASIX) 40 MG TABLET    TAKE 1 TABLET BY MOUTH EVERY DAY    METHOTREXATE 2.5 MG TAB     Take 8 tabs once a week only    METOPROLOL SUCCINATE (TOPROL-XL) 50 MG 24 HR TABLET    TAKE 1 TABLET BY MOUTH EVERY DAY    POTASSIUM CHLORIDE (MICRO-K) 10 MEQ CPSR    TAKE 1 CAPSULE BY MOUTH TWICE A DAY    PROGESTERONE (PROMETRIUM) 100 MG CAPSULE    Take 100 mg by mouth once daily.    THYROID, PORK, (ARMOUR THYROID) 60 MG TAB    [place] Thyroid 60 mg tablet    TIZANIDINE 4 MG CAP    Take by mouth 2 (two) times daily.    TRAMADOL (ULTRAM) 50 MG TABLET    TAKE 2 TO 3 TABS DAILY    TRAZODONE (DESYREL) 100 MG TABLET    Take 1 tablet (100 mg total) by mouth every evening.   Modified Medications    Modified Medication Previous Medication    CHOLECALCIFEROL, VITAMIN D3, (VITAMIN D3) 5,000 UNIT TAB cholecalciferol, vitamin D3, (VITAMIN D3) 5,000 unit Tab       Take 2 tablets (10,000 Units total) by mouth once daily.    Take 1 tablet (5,000 Units total) by mouth once daily.   Discontinued Medications    No medications on file

## 2019-07-22 DIAGNOSIS — I10 ESSENTIAL HYPERTENSION: ICD-10-CM

## 2019-07-23 RX ORDER — FUROSEMIDE 40 MG/1
TABLET ORAL
Qty: 30 TABLET | Refills: 0 | Status: SHIPPED | OUTPATIENT
Start: 2019-07-23 | End: 2019-09-16 | Stop reason: SDUPTHER

## 2019-08-12 RX ORDER — METOPROLOL SUCCINATE 50 MG/1
TABLET, EXTENDED RELEASE ORAL
Qty: 90 TABLET | Refills: 0 | Status: SHIPPED | OUTPATIENT
Start: 2019-08-12 | End: 2019-11-04 | Stop reason: SDUPTHER

## 2019-08-16 ENCOUNTER — TELEPHONE (OUTPATIENT)
Dept: RHEUMATOLOGY | Facility: CLINIC | Age: 64
End: 2019-08-16

## 2019-08-16 NOTE — TELEPHONE ENCOUNTER
----- Message from Tami Jones sent at 8/16/2019  9:45 AM CDT -----  Contact: Patient  Patient Returning Call from [facility]    Who Left Message for Patient: Team    Communication Preference: [phone]    Additional Information:

## 2019-09-12 DIAGNOSIS — E78.2 MIXED HYPERLIPIDEMIA: ICD-10-CM

## 2019-09-12 RX ORDER — EZETIMIBE 10 MG/1
TABLET ORAL
Qty: 90 TABLET | Refills: 0 | Status: SHIPPED | OUTPATIENT
Start: 2019-09-12 | End: 2020-06-11

## 2019-09-16 DIAGNOSIS — I10 ESSENTIAL HYPERTENSION: ICD-10-CM

## 2019-09-16 RX ORDER — FUROSEMIDE 40 MG/1
TABLET ORAL
Qty: 30 TABLET | Refills: 5 | Status: SHIPPED | OUTPATIENT
Start: 2019-09-16 | End: 2020-03-22

## 2019-09-20 ENCOUNTER — PATIENT OUTREACH (OUTPATIENT)
Dept: ADMINISTRATIVE | Facility: OTHER | Age: 64
End: 2019-09-20

## 2019-09-24 ENCOUNTER — LAB VISIT (OUTPATIENT)
Dept: LAB | Facility: HOSPITAL | Age: 64
End: 2019-09-24
Attending: INTERNAL MEDICINE
Payer: MEDICARE

## 2019-09-24 ENCOUNTER — OFFICE VISIT (OUTPATIENT)
Dept: RHEUMATOLOGY | Facility: CLINIC | Age: 64
End: 2019-09-24
Payer: MEDICARE

## 2019-09-24 VITALS
HEART RATE: 105 BPM | BODY MASS INDEX: 28.03 KG/M2 | DIASTOLIC BLOOD PRESSURE: 84 MMHG | HEIGHT: 66 IN | WEIGHT: 174.38 LBS | SYSTOLIC BLOOD PRESSURE: 128 MMHG

## 2019-09-24 DIAGNOSIS — M19.90 INFLAMMATORY ARTHRITIS: ICD-10-CM

## 2019-09-24 DIAGNOSIS — M06.00 SERONEGATIVE RHEUMATOID ARTHRITIS: ICD-10-CM

## 2019-09-24 DIAGNOSIS — M79.7 FIBROMYALGIA: Chronic | ICD-10-CM

## 2019-09-24 DIAGNOSIS — M19.90 INFLAMMATORY ARTHRITIS: Primary | ICD-10-CM

## 2019-09-24 LAB
HBV CORE AB SERPL QL IA: NEGATIVE
HBV SURFACE AB SER-ACNC: NEGATIVE M[IU]/ML
HBV SURFACE AG SERPL QL IA: NEGATIVE
HCV AB SERPL QL IA: NEGATIVE
HEPATITIS A ANTIBODY, IGG: NEGATIVE
HIV 1+2 AB+HIV1 P24 AG SERPL QL IA: NEGATIVE

## 2019-09-24 PROCEDURE — 1101F PR PT FALLS ASSESS DOC 0-1 FALLS W/OUT INJ PAST YR: ICD-10-PCS | Mod: CPTII,S$GLB,, | Performed by: INTERNAL MEDICINE

## 2019-09-24 PROCEDURE — 99214 PR OFFICE/OUTPT VISIT, EST, LEVL IV, 30-39 MIN: ICD-10-PCS | Mod: S$GLB,,, | Performed by: INTERNAL MEDICINE

## 2019-09-24 PROCEDURE — 87340 HEPATITIS B SURFACE AG IA: CPT

## 2019-09-24 PROCEDURE — 86704 HEP B CORE ANTIBODY TOTAL: CPT

## 2019-09-24 PROCEDURE — 1101F PT FALLS ASSESS-DOCD LE1/YR: CPT | Mod: CPTII,S$GLB,, | Performed by: INTERNAL MEDICINE

## 2019-09-24 PROCEDURE — 86706 HEP B SURFACE ANTIBODY: CPT

## 2019-09-24 PROCEDURE — 3074F SYST BP LT 130 MM HG: CPT | Mod: CPTII,S$GLB,, | Performed by: INTERNAL MEDICINE

## 2019-09-24 PROCEDURE — 86803 HEPATITIS C AB TEST: CPT

## 2019-09-24 PROCEDURE — 3079F PR MOST RECENT DIASTOLIC BLOOD PRESSURE 80-89 MM HG: ICD-10-PCS | Mod: CPTII,S$GLB,, | Performed by: INTERNAL MEDICINE

## 2019-09-24 PROCEDURE — 86790 VIRUS ANTIBODY NOS: CPT

## 2019-09-24 PROCEDURE — 86682 HELMINTH ANTIBODY: CPT

## 2019-09-24 PROCEDURE — 86787 VARICELLA-ZOSTER ANTIBODY: CPT

## 2019-09-24 PROCEDURE — 99999 PR PBB SHADOW E&M-EST. PATIENT-LVL III: ICD-10-PCS | Mod: PBBFAC,,, | Performed by: INTERNAL MEDICINE

## 2019-09-24 PROCEDURE — 99999 PR PBB SHADOW E&M-EST. PATIENT-LVL III: CPT | Mod: PBBFAC,,, | Performed by: INTERNAL MEDICINE

## 2019-09-24 PROCEDURE — 86592 SYPHILIS TEST NON-TREP QUAL: CPT

## 2019-09-24 PROCEDURE — 99214 OFFICE O/P EST MOD 30 MIN: CPT | Mod: S$GLB,,, | Performed by: INTERNAL MEDICINE

## 2019-09-24 PROCEDURE — 86703 HIV-1/HIV-2 1 RESULT ANTBDY: CPT

## 2019-09-24 PROCEDURE — 3079F DIAST BP 80-89 MM HG: CPT | Mod: CPTII,S$GLB,, | Performed by: INTERNAL MEDICINE

## 2019-09-24 PROCEDURE — 3074F PR MOST RECENT SYSTOLIC BLOOD PRESSURE < 130 MM HG: ICD-10-PCS | Mod: CPTII,S$GLB,, | Performed by: INTERNAL MEDICINE

## 2019-09-24 RX ORDER — ADALIMUMAB 40MG/0.8ML
40 KIT SUBCUTANEOUS
Qty: 2 PEN | Refills: 11 | Status: SHIPPED | OUTPATIENT
Start: 2019-09-24 | End: 2020-01-29 | Stop reason: ALTCHOICE

## 2019-09-24 ASSESSMENT — ROUTINE ASSESSMENT OF PATIENT INDEX DATA (RAPID3)
WHEN YOU AWAKENED IN THE MORNING OVER THE LAST WEEK, PLEASE INDICATE THE AMOUNT OF TIME IT TAKES UNTIL YOU ARE AS LIMBER AS YOU WILL BE FOR THE DAY: 45MINUTES
TOTAL RAPID3 SCORE: 3.67
AM STIFFNESS SCORE: 1, YES
MDHAQ FUNCTION SCORE: 0
PSYCHOLOGICAL DISTRESS SCORE: 3
PATIENT GLOBAL ASSESSMENT SCORE: 6
PAIN SCORE: 5
FATIGUE SCORE: 10

## 2019-09-25 ENCOUNTER — TELEPHONE (OUTPATIENT)
Dept: PHARMACY | Facility: CLINIC | Age: 64
End: 2019-09-25

## 2019-09-25 DIAGNOSIS — M79.7 FIBROMYALGIA: Chronic | ICD-10-CM

## 2019-09-25 LAB
RPR SER QL: NORMAL
STRONGYLOIDES ANTIBODY IGG: NEGATIVE
VARICELLA INTERPRETATION: POSITIVE
VARICELLA ZOSTER IGG: 3.98 ISR (ref 0–0.9)

## 2019-09-25 RX ORDER — TRAMADOL HYDROCHLORIDE 50 MG/1
TABLET ORAL
Qty: 90 TABLET | Refills: 3 | Status: SHIPPED | OUTPATIENT
Start: 2019-09-25 | End: 2020-01-22

## 2019-10-01 NOTE — TELEPHONE ENCOUNTER
DOCUMENTATION ONLY:  Prior authorization for HUMIRA PEN 40mg/0.8mL approved from 10/01/2019 to 10/01/2021.   Case ID# [id number]    Co-pay: $80    Patient Assistance IS required.      Forward to patient assistance for review.

## 2019-10-02 NOTE — TELEPHONE ENCOUNTER
Patient returned call and was notified of Humira approval and $80 copay. She reports she is unable to afford this and would like to apply to [employer] for financial assistance. Will mail application and patient will return with proof of income and list of expenses. Will continue to follow up.

## 2019-10-17 RX ORDER — POTASSIUM CHLORIDE 750 MG/1
CAPSULE, EXTENDED RELEASE ORAL
Qty: 180 CAPSULE | Refills: 0 | Status: SHIPPED | OUTPATIENT
Start: 2019-10-17 | End: 2020-02-04

## 2019-10-28 RX ORDER — ALPRAZOLAM 0.5 MG/1
TABLET ORAL
Qty: 90 TABLET | Refills: 3 | Status: SHIPPED | OUTPATIENT
Start: 2019-10-28 | End: 2020-03-17

## 2019-10-29 NOTE — TELEPHONE ENCOUNTER
Contacted patient to check status of Humira assistance application. She reports she put it in the mail 10/27 but did not send income docs as she did not feel comfortable sending that in the mail. Explained that the program will require this so she states if she qualifies based on the amount she wrote down she will bring the tax return in. Will continue to follow up.

## 2019-11-04 RX ORDER — METOPROLOL SUCCINATE 50 MG/1
TABLET, EXTENDED RELEASE ORAL
Qty: 90 TABLET | Refills: 0 | Status: SHIPPED | OUTPATIENT
Start: 2019-11-04 | End: 2020-02-04

## 2019-11-20 RX ORDER — TRAZODONE HYDROCHLORIDE 100 MG/1
100 TABLET ORAL NIGHTLY
Qty: 90 TABLET | Refills: 1 | Status: SHIPPED | OUTPATIENT
Start: 2019-11-20 | End: 2020-03-19

## 2019-12-23 ENCOUNTER — HOSPITAL ENCOUNTER (EMERGENCY)
Facility: HOSPITAL | Age: 64
Discharge: HOME | End: 2019-12-23
Attending: EMERGENCY MEDICINE
Payer: MEDICARE

## 2019-12-23 VITALS
HEART RATE: 87 BPM | WEIGHT: 140 LBS | TEMPERATURE: 99 F | SYSTOLIC BLOOD PRESSURE: 137 MMHG | RESPIRATION RATE: 18 BRPM | OXYGEN SATURATION: 98 % | DIASTOLIC BLOOD PRESSURE: 47 MMHG | BODY MASS INDEX: 22.5 KG/M2 | HEIGHT: 66 IN

## 2019-12-23 DIAGNOSIS — R04.0 EPISTAXIS: Primary | ICD-10-CM

## 2019-12-23 DIAGNOSIS — R04.0 ACUTE ANTERIOR EPISTAXIS: ICD-10-CM

## 2019-12-23 DIAGNOSIS — G43.809 OTHER MIGRAINE WITHOUT STATUS MIGRAINOSUS, NOT INTRACTABLE: ICD-10-CM

## 2019-12-23 PROCEDURE — 96375 TX/PRO/DX INJ NEW DRUG ADDON: CPT

## 2019-12-23 PROCEDURE — 63600175 PHARM REV CODE 636 W HCPCS: Performed by: NURSE PRACTITIONER

## 2019-12-23 PROCEDURE — 99284 EMERGENCY DEPT VISIT MOD MDM: CPT | Mod: 25

## 2019-12-23 PROCEDURE — 25000003 PHARM REV CODE 250: Performed by: NURSE PRACTITIONER

## 2019-12-23 PROCEDURE — 96374 THER/PROPH/DIAG INJ IV PUSH: CPT

## 2019-12-23 PROCEDURE — 96361 HYDRATE IV INFUSION ADD-ON: CPT

## 2019-12-23 RX ORDER — AMLODIPINE BESYLATE 5 MG/1
10 TABLET ORAL
Status: COMPLETED | OUTPATIENT
Start: 2019-12-23 | End: 2019-12-23

## 2019-12-23 RX ORDER — DIPHENHYDRAMINE HYDROCHLORIDE 50 MG/ML
25 INJECTION INTRAMUSCULAR; INTRAVENOUS
Status: COMPLETED | OUTPATIENT
Start: 2019-12-23 | End: 2019-12-23

## 2019-12-23 RX ORDER — ACETAMINOPHEN 325 MG/1
650 TABLET ORAL
Status: COMPLETED | OUTPATIENT
Start: 2019-12-23 | End: 2019-12-23

## 2019-12-23 RX ORDER — BUTALBITAL, ACETAMINOPHEN AND CAFFEINE 50; 325; 40 MG/1; MG/1; MG/1
1 TABLET ORAL
Status: COMPLETED | OUTPATIENT
Start: 2019-12-23 | End: 2019-12-23

## 2019-12-23 RX ORDER — METOPROLOL TARTRATE 50 MG/1
50 TABLET ORAL
Status: COMPLETED | OUTPATIENT
Start: 2019-12-23 | End: 2019-12-23

## 2019-12-23 RX ORDER — METOCLOPRAMIDE 10 MG/1
10 TABLET ORAL EVERY 6 HOURS PRN
Qty: 12 TABLET | Refills: 0 | Status: SHIPPED | OUTPATIENT
Start: 2019-12-23 | End: 2022-08-15 | Stop reason: ALTCHOICE

## 2019-12-23 RX ORDER — OXYMETAZOLINE HCL 0.05 %
2 SPRAY, NON-AEROSOL (ML) NASAL
Status: COMPLETED | OUTPATIENT
Start: 2019-12-23 | End: 2019-12-23

## 2019-12-23 RX ORDER — KETOROLAC TROMETHAMINE 30 MG/ML
15 INJECTION, SOLUTION INTRAMUSCULAR; INTRAVENOUS
Status: COMPLETED | OUTPATIENT
Start: 2019-12-23 | End: 2019-12-23

## 2019-12-23 RX ORDER — ACETAMINOPHEN 500 MG
500 TABLET ORAL EVERY 6 HOURS PRN
Qty: 28 TABLET | Refills: 0 | Status: SHIPPED | OUTPATIENT
Start: 2019-12-23 | End: 2020-10-29 | Stop reason: ALTCHOICE

## 2019-12-23 RX ADMIN — BUTALBITAL, ACETAMINOPHEN, AND CAFFEINE 1 TABLET: 50; 325; 40 TABLET ORAL at 06:12

## 2019-12-23 RX ADMIN — AMLODIPINE BESYLATE 10 MG: 5 TABLET ORAL at 03:12

## 2019-12-23 RX ADMIN — OXYMETAZOLINE HCL 2 SPRAY: 0.05 SPRAY NASAL at 03:12

## 2019-12-23 RX ADMIN — SODIUM CHLORIDE 1000 ML: 0.9 INJECTION, SOLUTION INTRAVENOUS at 08:12

## 2019-12-23 RX ADMIN — DIPHENHYDRAMINE HYDROCHLORIDE 25 MG: 50 INJECTION, SOLUTION INTRAMUSCULAR; INTRAVENOUS at 08:12

## 2019-12-23 RX ADMIN — KETOROLAC TROMETHAMINE 15 MG: 30 INJECTION, SOLUTION INTRAMUSCULAR at 08:12

## 2019-12-23 RX ADMIN — METOPROLOL TARTRATE 50 MG: 50 TABLET ORAL at 03:12

## 2019-12-23 RX ADMIN — ACETAMINOPHEN 650 MG: 325 TABLET ORAL at 04:12

## 2019-12-23 NOTE — ED TRIAGE NOTES
"Pt presents to the ED with complaints of a nose bleed for the past 2 days.  Pt reports that she sees blood clots when she blows her nose and while her nose is bleeding.  Pt also notes a HA that started today.  Pt reports not taking any of her meds for the past 4 days stating,"I'm so tired of taking all these meds". Pt denies any other symptoms at this time.    "

## 2019-12-23 NOTE — ED PROVIDER NOTES
Encounter Date: 12/23/2019    SCRIBE #1 NOTE: I, Michelle Ahumada, am scribing for, and in the presence of,  REDD Nielsen. I have scribed the following portions of the note - Other sections scribed: HPI, ROS, PE.       History     Chief Complaint   Patient presents with    Epistaxis     Nose bleed for 2 days.  Blows her nose everytime she feels a clot and bleeds.  Has not taken any of her medications today.     Time seen by provider: 3:43 PM    64 y.o. female with a history of HLD, hypothryoidism, and HTN who presents to the ED with complaint of epistaxis for 2 days. Patient reports she has noted blood and blood clots whenever she blows her nose. She also complains of a headache that started today. She has a history of migraines and states that her headache feels exactly like her migraine.  She usually takes tramadol for her headaches. Patient admits she has not taken her blood pressure medications for 4 days. Patient denies fever, chills, chest pain, shortness of breath, dizziness, nausea, vomiting, visual disturbances, weakness, or any other complaints at this time.      The history is provided by the patient.     Review of patient's allergies indicates:   Allergen Reactions    Statins-hmg-coa reductase inhibitors      myalgias     Past Medical History:   Diagnosis Date    Acute biliary pancreatitis without infection or necrosis 6/13/2018    Acute pancreatitis     June 2018 - [facility] at [facility]    ADHD (attention deficit hyperactivity disorder), inattentive type     Fibromyalgia     treated by Dr. Thorpe in past and now treated by Dr. Yates - Rheumatologist    Hyperlipidemia     High triglycerides    Hypertension     Hypothyroidism     Treated by Dr. Mathew    IFG (impaired fasting glucose)     Migraine     Treated by neurologist - Dr. Destiny Florez    Ulcerative colitis     Patient reported treated by Dr. Mcfadden in past  but on admit in 2018 - no  UC seen on recent colonoscopy.  It was noted on colonoscopy in 2004 there were ulcers noted to descending colon but not present on sigmoidoscopy in 2004.    Vitamin D deficiency 3/31/2016     Past Surgical History:   Procedure Laterality Date    CARPAL TUNNEL RELEASE Right 3/27/2019    Procedure: RELEASE, CARPAL TUNNEL right;  Surgeon: Li Waller MD;  Location: [address];  Service: Orthopedics;  Laterality: Right;  stretcher, supine, hand pan 1 and 2     SECTION      COLONOSCOPY  10/08/2010    Dr. Lee - repeat in 5 years - has appointment for colonoscopy 2016    COLONOSCOPY  2016    Dr. Kelly - normal  colon - repeat in 10 years - scanned report to media file.    ENDOSCOPIC ULTRASOUND OF UPPER GASTROINTESTINAL TRACT N/A 2018    Procedure: ULTRASOUND, ENDOSCOPIC, UPPER GI TRACT;  Surgeon: Reji Russell MD;  Location: [facility];  Service: Endoscopy;  Laterality: N/A;    ENDOSCOPIC ULTRASOUND OF UPPER GASTROINTESTINAL TRACT N/A 3/4/2019    Procedure: ULTRASOUND, UPPER GI TRACT, ENDOSCOPIC;  Surgeon: Randy Boyd MD;  Location: [facility];  Service: Endoscopy;  Laterality: N/A;    HYSTERECTOMY      LAPAROSCOPIC CHOLECYSTECTOMY N/A 3/6/2019    Procedure: CHOLECYSTECTOMY, LAPAROSCOPIC;  Surgeon: Hill Palacios MD;  Location: [facility];  Service: General;  Laterality: N/A;    OOPHORECTOMY      SHOULDER SURGERY Left     TONSILLECTOMY      upper and lower GI  2016     Family History   Problem Relation Age of Onset    Hypertension Mother     Heart disease Mother     Hyperlipidemia Mother     Cancer Sister 53        thyroid cancer and lymph nodes involvement    Cancer Brother 61        colon and lung cancer    Colon cancer Brother 66    Cancer Brother 63        colon    Hypertension Brother     Diabetes Brother     Colon cancer Brother 60    Esophageal cancer Neg Hx     Stomach cancer Neg Hx     Ulcerative colitis Neg Hx     Crohn's  disease Neg Hx     Irritable bowel syndrome Neg Hx     Celiac disease Neg Hx      Social History     Tobacco Use    Smoking status: Former Smoker     Packs/day: 1.00     Years: 20.00     Pack years: 20.00     Last attempt to quit: 1997     Years since quittin.9    Smokeless tobacco: Former User   Substance Use Topics    Alcohol use: No    Drug use: No     Review of Systems   Constitutional: Negative for fever.   HENT: Positive for nosebleeds. Negative for sore throat.    Respiratory: Negative for shortness of breath.    Cardiovascular: Negative for chest pain.   Gastrointestinal: Negative for nausea.   Genitourinary: Negative for dysuria.   Musculoskeletal: Negative for back pain.   Skin: Negative for rash.   Neurological: Positive for headaches. Negative for weakness.   Hematological: Does not bruise/bleed easily.   All other systems reviewed and are negative.    Physical Exam     Initial Vitals [19 1515]   BP Pulse Resp Temp SpO2   (!) 182/90 90 12 98.5 °F (36.9 °C) 98 %      MAP       --         Physical Exam    Nursing note and vitals reviewed.  Constitutional: She appears well-developed and well-nourished.   HENT:   Head: Normocephalic and atraumatic.   Nose: No nasal septal hematoma. Epistaxis is observed.   Eyes: Conjunctivae and EOM are normal. Pupils are equal, round, and reactive to light.   Cardiovascular: Normal rate, regular rhythm, normal heart sounds and intact distal pulses. Exam reveals no gallop and no friction rub.    No murmur heard.  Pulmonary/Chest: Breath sounds normal. No respiratory distress. She has no wheezes. She has no rhonchi. She has no rales. She exhibits no tenderness.   Abdominal: Soft. Bowel sounds are normal.   Musculoskeletal: Normal range of motion. She exhibits no edema or tenderness.   Neurological: She is alert and oriented to person, place, and time. She has normal strength. GCS score is 15. GCS eye subscore is 4. GCS verbal subscore is 5. GCS motor  subscore is 6.   Skin: Skin is warm. Capillary refill takes less than 2 seconds. No erythema.   Psychiatric: She has a normal mood and affect.       ED Course   Procedures  Labs Reviewed - No data to display          Medical Decision Making:   Initial Assessment:   64 y.o. female with a history of HLD, hypothryoidism, and HTN who presents to the ED with complaint of epistaxis for 2 days. Patient reports she has noted blood and blood clots whenever she blows her nose. She also complains of a headache that started today. She has a history of migraines and states that her headache feels exactly like her migraine.  She usually takes tramadol for her headaches. Patient admits she has not taken her blood pressure medications for 4 days. Patient denies fever, chills, chest pain, shortness of breath, dizziness, nausea, vomiting, visual disturbances, weakness, or any other complaints at this time.  Differential Diagnosis:   Epistaxis, hypertension, migraine, septal hematoma, allergic rhinitis  ED Management:  After taking into careful account the historical factors and physical exam findings of the patient's presentation today, in conjunction with the empirical and objective data obtained on ED workup, no acute emergent medical condition has been identified. The patient appears to be low risk for an emergent medical condition and I feel it is safe and appropriate at this time for the patient to be discharged to follow-up as detailed in their discharge instructions for reevaluation and possible continued outpatient workup and management. I have discussed the specifics of the workup with the patient and the patient has verbalized understanding of the details of the workup, the diagnosis, the treatment plan, and the need for outpatient follow-up.  Although the patient has no emergent etiology today this does not preclude the development of an emergent condition so in addition, I have advised the patient that they can return to  the ED and/or activate EMS at any time with worsening of their symptoms, change of their symptoms, or with any other medical complaint.  The patient remained comfortable and stable during their visit in the ED.  Discharge and follow-up instructions discussed with the patient who expressed understanding and willingness to comply with my recommendations.                     ED Course as of Dec 24 1936   Mon Dec 23, 2019   1519 BP(!): 182/90 [AT]   1519 Temp: 98.5 °F (36.9 °C) [AT]   1519 Temp src: Oral [AT]   1519 Pulse: 90 [AT]   1519 Resp: 12 [AT]   1519 SpO2: 98 % [AT]   1843 Pt is not tachycardic- her pulse is 76 on the pulse ox with an O2 sat of 97%    [AT]   2130 Patient currently reports Improvement of her headache but that it is starting to return.  Patient would like to go home.  No evidence of acute epistaxis.  Blood pressure is within normal limits here.  Will discharge patient with Tylenol Reglan as needed for headache referral for ENT.  Discussed for stayed instructions return precautions given for worsening symptoms. Patient is comfortable discharge.    [RN]      ED Course User Index  [AT] REDD Smalls  [RN] Brian De La O Jr., MD        Clinical Impression:     1. Epistaxis    2. Other migraine without status migrainosus, not intractable    Disposition:   Disposition: Discharged  Condition: Stable      Scribe attestation This document was produced by a scribe under my direction and in my presence. I agree with the content of the note and have made any necessary edits.     Jen Ramirez DNP, FNP-BC                 Brian De La O Jr., MD  12/24/19 1937

## 2019-12-23 NOTE — ED NOTES
CARDIAC: Normal rate and rhythm, no murmur heard. Pt BP elevated upon arrival  PERIPHERAL VASCULAR: peripheral pulses present. Normal cap refill. No edema. Warm to touch.    RESPIRATORY:Normal rate and effort, breath sounds clear bilaterally throughout chest. Respirations are equal and unlabored no obvious signs of distress.  GASTRO: soft, bowel sounds normal, no tenderness, no abdominal distention.  MUSC: No bony tenderness or soft tissue tenderness. No obvious deformity.  SKIN: Skin is warm and dry, normal skin turgor, mucous membranes moist.  NEURO: 5/5 strength major flexors/extensors bilaterally. Sensory intact to light touch bilaterally. Loyd coma scale: eyes open spontaneously-4, oriented & converses-5, obeys commands-6. No neurological abnormalities.   MENTAL STATUS: awake, alert and aware of environment.

## 2019-12-23 NOTE — ED NOTES
Pt cleaned upon arrival and provided with soft gauze, clean towels and wipes.  Pt also provided with ice pack and applied to bridge of the nose.  Pt instructed to lean head forward and not to tilt head backward.  Small clots of blood noted to vero when pt wiped nose.  Bleeding stops intermittently but starts up again with seconds.  Bleeding noted to both nostrils.

## 2019-12-23 NOTE — ED NOTES
Pt nose currently not bleeding for about 20 minutes.  Pt instructed to not keep wiping her nose as to not dislodge any clotting.

## 2019-12-24 NOTE — ED NOTES
"Pt states that she isn't sure if she wants to stay for extra medications.  Pt states she still has a HA but does not want to go home with a HA because that "will cause so much more anxiety".  Pt says ,"I don't understand why they didn't do all this in the first place"  MD notified and will be at the bedside shortly.  "

## 2019-12-24 NOTE — ED NOTES
Pt lying in bed, sitting up, resting comfortably, currently no bloody discharge from nose,  at the bedside, in no acute distress.  Pt aware of plan of care to see if Fiorecet helps her HA.  Bed locked and in lowest position, side rails up x 2, call light within reach, VSS, will continue to monitor

## 2019-12-24 NOTE — ED NOTES
Pt lying in bed resting with  at the bedside.  Pt states her HA pain is better but that it is still 8/10. Pt able to wipe blood from her left nostril every few minutes with scant amount noted to soft gauze provided.  Pt states she is nervous to go home and that her nose will start bleeding like it did PTA. Bed locked and in lowest position, side rails up x 2, call light within reach, VSS will continue to monitor

## 2020-01-10 RX ORDER — METHOTREXATE 2.5 MG/1
TABLET ORAL
Qty: 32 TABLET | Refills: 7 | Status: SHIPPED | OUTPATIENT
Start: 2020-01-10 | End: 2020-05-04

## 2020-01-22 DIAGNOSIS — M79.7 FIBROMYALGIA: Chronic | ICD-10-CM

## 2020-01-22 RX ORDER — TRAMADOL HYDROCHLORIDE 50 MG/1
TABLET ORAL
Qty: 90 TABLET | Refills: 3 | Status: SHIPPED | OUTPATIENT
Start: 2020-01-22 | End: 2020-07-16

## 2020-01-29 ENCOUNTER — OFFICE VISIT (OUTPATIENT)
Dept: FAMILY MEDICINE | Facility: CLINIC | Age: 65
End: 2020-01-29
Payer: MEDICARE

## 2020-01-29 VITALS
OXYGEN SATURATION: 96 % | DIASTOLIC BLOOD PRESSURE: 80 MMHG | SYSTOLIC BLOOD PRESSURE: 134 MMHG | TEMPERATURE: 98 F | BODY MASS INDEX: 27.53 KG/M2 | WEIGHT: 171.31 LBS | HEART RATE: 89 BPM | RESPIRATION RATE: 18 BRPM | HEIGHT: 66 IN

## 2020-01-29 DIAGNOSIS — Z12.39 BREAST CANCER SCREENING: ICD-10-CM

## 2020-01-29 DIAGNOSIS — M79.7 FIBROMYALGIA: ICD-10-CM

## 2020-01-29 DIAGNOSIS — R73.03 PREDIABETES: ICD-10-CM

## 2020-01-29 DIAGNOSIS — E03.9 ACQUIRED HYPOTHYROIDISM: ICD-10-CM

## 2020-01-29 DIAGNOSIS — E55.9 VITAMIN D DEFICIENCY: ICD-10-CM

## 2020-01-29 DIAGNOSIS — M06.00 SERONEGATIVE RHEUMATOID ARTHRITIS: ICD-10-CM

## 2020-01-29 DIAGNOSIS — Z87.19 HISTORY OF PANCREATITIS: ICD-10-CM

## 2020-01-29 DIAGNOSIS — I10 ESSENTIAL HYPERTENSION: Primary | ICD-10-CM

## 2020-01-29 DIAGNOSIS — G89.29 CHRONIC BILATERAL LOW BACK PAIN, UNSPECIFIED WHETHER SCIATICA PRESENT: ICD-10-CM

## 2020-01-29 DIAGNOSIS — G43.909 MIGRAINE WITHOUT STATUS MIGRAINOSUS, NOT INTRACTABLE, UNSPECIFIED MIGRAINE TYPE: ICD-10-CM

## 2020-01-29 DIAGNOSIS — M54.50 CHRONIC BILATERAL LOW BACK PAIN, UNSPECIFIED WHETHER SCIATICA PRESENT: ICD-10-CM

## 2020-01-29 DIAGNOSIS — Z78.0 POSTMENOPAUSAL: ICD-10-CM

## 2020-01-29 DIAGNOSIS — E78.2 MIXED HYPERLIPIDEMIA: ICD-10-CM

## 2020-01-29 DIAGNOSIS — Z12.31 ENCOUNTER FOR SCREENING MAMMOGRAM FOR MALIGNANT NEOPLASM OF BREAST: ICD-10-CM

## 2020-01-29 DIAGNOSIS — K52.9 CHRONIC DIARRHEA: ICD-10-CM

## 2020-01-29 PROCEDURE — 99214 PR OFFICE/OUTPT VISIT, EST, LEVL IV, 30-39 MIN: ICD-10-PCS | Mod: S$GLB,,, | Performed by: NURSE PRACTITIONER

## 2020-01-29 PROCEDURE — 1101F PR PT FALLS ASSESS DOC 0-1 FALLS W/OUT INJ PAST YR: ICD-10-PCS | Mod: CPTII,S$GLB,, | Performed by: NURSE PRACTITIONER

## 2020-01-29 PROCEDURE — 1159F MED LIST DOCD IN RCRD: CPT | Mod: S$GLB,,, | Performed by: NURSE PRACTITIONER

## 2020-01-29 PROCEDURE — 1159F PR MEDICATION LIST DOCUMENTED IN MEDICAL RECORD: ICD-10-PCS | Mod: S$GLB,,, | Performed by: NURSE PRACTITIONER

## 2020-01-29 PROCEDURE — 3075F SYST BP GE 130 - 139MM HG: CPT | Mod: CPTII,S$GLB,, | Performed by: NURSE PRACTITIONER

## 2020-01-29 PROCEDURE — 1126F PR PAIN SEVERITY QUANTIFIED, NO PAIN PRESENT: ICD-10-PCS | Mod: S$GLB,,, | Performed by: NURSE PRACTITIONER

## 2020-01-29 PROCEDURE — 3079F PR MOST RECENT DIASTOLIC BLOOD PRESSURE 80-89 MM HG: ICD-10-PCS | Mod: CPTII,S$GLB,, | Performed by: NURSE PRACTITIONER

## 2020-01-29 PROCEDURE — 1126F AMNT PAIN NOTED NONE PRSNT: CPT | Mod: S$GLB,,, | Performed by: NURSE PRACTITIONER

## 2020-01-29 PROCEDURE — 99999 PR PBB SHADOW E&M-EST. PATIENT-LVL V: CPT | Mod: PBBFAC,,, | Performed by: NURSE PRACTITIONER

## 2020-01-29 PROCEDURE — 1101F PT FALLS ASSESS-DOCD LE1/YR: CPT | Mod: CPTII,S$GLB,, | Performed by: NURSE PRACTITIONER

## 2020-01-29 PROCEDURE — 99999 PR PBB SHADOW E&M-EST. PATIENT-LVL V: ICD-10-PCS | Mod: PBBFAC,,, | Performed by: NURSE PRACTITIONER

## 2020-01-29 PROCEDURE — 99214 OFFICE O/P EST MOD 30 MIN: CPT | Mod: S$GLB,,, | Performed by: NURSE PRACTITIONER

## 2020-01-29 PROCEDURE — 3075F PR MOST RECENT SYSTOLIC BLOOD PRESS GE 130-139MM HG: ICD-10-PCS | Mod: CPTII,S$GLB,, | Performed by: NURSE PRACTITIONER

## 2020-01-29 PROCEDURE — 3079F DIAST BP 80-89 MM HG: CPT | Mod: CPTII,S$GLB,, | Performed by: NURSE PRACTITIONER

## 2020-01-29 RX ORDER — ACETAMINOPHEN 500 MG
5000 TABLET ORAL DAILY
COMMUNITY
Start: 2020-01-29 | End: 2020-04-29 | Stop reason: SDUPTHER

## 2020-01-29 RX ORDER — ERGOCALCIFEROL 1.25 MG/1
50000 CAPSULE ORAL
Qty: 4 CAPSULE | Refills: 5 | Status: SHIPPED | OUTPATIENT
Start: 2020-01-29 | End: 2020-04-29 | Stop reason: SDUPTHER

## 2020-01-29 NOTE — PROGRESS NOTES
"Subjective:       Patient ID: Madalyn Iverson is a 65 y.o. female.    Chief Complaint: Follow-up (6 months F/U)    Patient is a 65-year-old white female with history of  ADHD, fibromyalgia, hypertension, hyperlipidemia, hypothyroidism, impaired fasting glucose, migraines, vitamin D deficiency, osteoarthritis to both hands and chronic GI problem that patient reported was ulcerative colitis that is now questionable that is here today for follow up with fasting lab results.     Patient has Hypertension and is currently taking Amlodipine 10 mg daily, Metoprolol 50 mg daily, and Lasix 40 mg with potassium supplement.   Blood pressure is controlled on present medications.  /80 (BP Location: Right arm, Patient Position: Sitting, BP Method: Large (Manual))   Pulse 89   Temp 97.7 °F (36.5 °C) (Oral)   Resp 18   Ht 5' 6" (1.676 m)   Wt 77.7 kg (171 lb 4.8 oz)   SpO2 96%   BMI 27.65 kg/m²      Patient has chronic abdominal pain with chronic diarrhea that was previously been being followed by Dr. Kelly and Dr. Lee.  Patient had reported that she had Ulcerative Colitis but repeat colonoscopies do not support this.  ####Of note based on medical records, patient has been being followed by GI Dr. Lee/Robin since 2004.  The colonoscopy in April 2004 showed a few 8mm ulcers to the descending colon that could be suggestive of ulcerative colitis BUT repeat sigmoidoscopy in June 2004 showed no ulcerations present and patient has had normal colonoscopies since that time - please review media file for all colonoscopy reports from Dr. Lee and [facility]######  Patient is now being followed by [facility] for chronic complaints and had recent diagnosis of Pancreatitis and had Gallbladder removed in March 2019.     Patient has Fibromyalgia and chronic pains that was being followed by Rheumatology, Dr. Thorpe in the past BUT now being followed and treated by [facility]. She continue to complain " of chronic pains and fatigue - advised to discuss with her specialist.     Patient has chronic Migraines that was followed by Neurologist, Dr. Florez, in the past. Patient reports it has been awhile since she seen Dr. Florez.  Patient reported at previous visit of having problems with memory - very forgetful - walking into room but forgets what she was going to get, etc.  Advised patient to discuss the memory issues with neurology to rule out neurological etiology and if rule out and suspect it is secondary to ADD - then she will need to see psychiatry for further evaluation and management because due to patient age> 65 and cardiovascular risk of HTN and uncontrolled Hyperlipidemia, I am no longer willing to prescribe an amphetamine for ADD. Patient verbalized understanding.     Patient has Hypothyroidism that is followed by her GYN MD, Dr. Mathew.       Patient has Hyperlipidemia.  Patient has known STATIN INTOLERANCE due to report of leg cramping. She has been tried on several statin medications and has an intolerance.  In March 2019, I started patient on Zetia 10 mg daily.  Total cholesterol 212 with .8.      Patient has ADHD that has been treated for multiple years by Dr. Randhawa in the past and myself over the past several years.  I have titrated patient down from Adderall 30 mg twice daily over the past several years down to Adderall 10 mg twice daily and titrated patient off of medication with last prescription being written in May 2018.  Advised patient that Adderall is NOT recommended to patient over age 65 due to cardiovascular risk.  I advised patient that if she feels like her attention deficit is unmanageable with behavioral modifications - she should seek treatment from a psychiatrist for management as that is their specialty.     Patient has Anxiety and reports that her rheumatologist now prescribes the Xanax.       Patient has history of  with HgbA1C of 5.9% since March 2019 visit.   Hemoglobin A1c worsened to 6.1% in June 2019 and now with lifestyle modifications, HgbA1C is now down to 5.6%.       Patient has Vitamin D deficiency with a level of 13 - acute deficiency.  She was prescribed Vitamin D2 50,000 units weekly by her Rheumatologist BUT patient reports that Dr. Mathew, her GYN MD,  told her that she should be on D3 supplementation so took D3 55371 units every single day and level remains low at 13 - will changed patient back on Vitamin D 50,000 every week and continue Vitamin D3 5000 units daily and will recheck in 3 months.    Component      Latest Ref Rng & Units 1/22/2020 6/17/2019 3/3/2019 2/28/2019   Cholesterol      120 - 199 mg/dL 212 (H) 202 (H)  241 (H)   Triglycerides      30 - 150 mg/dL 161 (H) 189 (H) 89 150   HDL      40 - 75 mg/dL 52 50  56   LDL Cholesterol External      63.0 - 159.0 mg/dL 127.8 114.2  155.0   Hdl/Cholesterol Ratio      20.0 - 50.0 % 24.5 24.8  23.2   Total Cholesterol/HDL Ratio      2.0 - 5.0 4.1 4.0  4.3   Non-HDL Cholesterol      mg/dL 160 152  185   Hemoglobin A1C External      4.0 - 5.6 % 5.6 6.1 (H)  5.9 (H)   Estimated Avg Glucose      68 - 131 mg/dL 114 128  123   Vit D, 25-Hydroxy      30 - 96 ng/mL 13 (L) 17 (L)  14 (L)     Current Outpatient Medications   Medication Sig Dispense Refill    acetaminophen (TYLENOL) 500 MG tablet Take 1 tablet (500 mg total) by mouth every 6 (six) hours as needed for Pain. 28 tablet 0    ALPRAZolam (XANAX) 0.5 MG tablet TAKE 1 TABLET BY MOUTH THREE TIMES A DAY AS NEEDED 90 tablet 3    amLODIPine (NORVASC) 10 MG tablet TAKE 1 TABLET BY MOUTH EVERY DAY 30 tablet 0    cholecalciferol, vitamin D3, (VITAMIN D3) 5,000 unit Tab Take 2 tablets (10,000 Units total) by mouth once daily.      CYANOCOBALAMIN, VITAMIN B-12, (VITAMIN B-12 INJ) Inject 1,000 mcg as directed every 30 days.      dicyclomine (BENTYL) 20 mg tablet TAKE 1 TABLET (20 MG TOTAL) BY MOUTH 4 (FOUR) TIMES DAILY. 120 tablet 5    estradiol (ESTRACE) 2  MG tablet Take 2 mg by mouth once daily.  3    ezetimibe (ZETIA) 10 mg tablet TAKE 1 TABLET BY MOUTH EVERY DAY 90 tablet 0    furosemide (LASIX) 40 MG tablet TAKE 1 TABLET BY MOUTH EVERY DAY 30 tablet 5    methotrexate 2.5 MG Tab TAKE 8 TABS ONCE A WEEK ONLY 32 tablet 7    metoclopramide HCl (REGLAN) 10 MG tablet Take 1 tablet (10 mg total) by mouth every 6 (six) hours as needed. 12 tablet 0    metoprolol succinate (TOPROL-XL) 50 MG 24 hr tablet TAKE 1 TABLET BY MOUTH EVERY DAY 90 tablet 0    potassium chloride (MICRO-K) 10 MEQ CpSR TAKE 1 CAPSULE BY MOUTH TWICE A  capsule 0    progesterone (PROMETRIUM) 100 MG capsule Take 100 mg by mouth once daily.      thyroid, pork, (ARMOUR THYROID) 60 mg Tab [place] Thyroid 60 mg tablet      tiZANidine 4 mg Cap Take by mouth 2 (two) times daily.      traMADol (ULTRAM) 50 mg tablet TAKE 2-3 TABLETS BY MOUTH DAILY 90 tablet 3    traZODone (DESYREL) 100 MG tablet TAKE 1 TABLET (100 MG TOTAL) BY MOUTH EVERY EVENING. 90 tablet 1     No current facility-administered medications for this visit.      Facility-Administered Medications Ordered in Other Visits   Medication Dose Route Frequency Provider Last Rate Last Dose    0.9%  NaCl infusion   Intravenous Continuous Jermaine Chatman MD   Stopped at 03/27/19 1040    mupirocin 2 % ointment   Nasal On Call Procedure Jermaine Chatman MD           Past Medical History:   Diagnosis Date    Acute biliary pancreatitis without infection or necrosis 6/13/2018    Acute pancreatitis     June 2018 - [facility]    ADHD (attention deficit hyperactivity disorder), inattentive type     Fibromyalgia     treated by Dr. Thorpe in past and now treated by Dr. Yates - Rheumatologist    Hyperlipidemia     High triglycerides    Hypertension     Hypothyroidism     Treated by Dr. Mathew    IFG (impaired fasting glucose)     Migraine     Treated by neurologist - Dr. Destiny Florez     Ulcerative colitis     Patient reported treated by Dr. Lee and Aaron in past  but on admit in 2018 - no UC seen on recent colonoscopy.  It was noted on colonoscopy in 2004 there were ulcers noted to descending colon but not present on sigmoidoscopy in 2004.    Vitamin D deficiency 3/31/2016       Past Surgical History:   Procedure Laterality Date    CARPAL TUNNEL RELEASE Right 3/27/2019    Procedure: RELEASE, CARPAL TUNNEL right;  Surgeon: Li Waller MD;  Location: [address];  Service: Orthopedics;  Laterality: Right;  stretcher, supine, hand pan 1 and 2     SECTION      COLONOSCOPY  10/08/2010    Dr. Lee - repeat in 5 years - has appointment for colonoscopy 2016    COLONOSCOPY  2016    Dr. Kelly - normal  colon - repeat in 10 years - scanned report to media file.    ENDOSCOPIC ULTRASOUND OF UPPER GASTROINTESTINAL TRACT N/A 2018    Procedure: ULTRASOUND, ENDOSCOPIC, UPPER GI TRACT;  Surgeon: Reji Russell MD;  Location: [facility] ENDO;  Service: Endoscopy;  Laterality: N/A;    ENDOSCOPIC ULTRASOUND OF UPPER GASTROINTESTINAL TRACT N/A 3/4/2019    Procedure: ULTRASOUND, UPPER GI TRACT, ENDOSCOPIC;  Surgeon: Randy Boyd MD;  Location: [facility] ENDO;  Service: Endoscopy;  Laterality: N/A;    HYSTERECTOMY      LAPAROSCOPIC CHOLECYSTECTOMY N/A 3/6/2019    Procedure: CHOLECYSTECTOMY, LAPAROSCOPIC;  Surgeon: Hill Palacios MD;  Location: [facility] OR;  Service: General;  Laterality: N/A;    OOPHORECTOMY      SHOULDER SURGERY Left     TONSILLECTOMY      upper and lower GI  2016       Family History   Problem Relation Age of Onset    Hypertension Mother     Heart disease Mother     Hyperlipidemia Mother     Cancer Sister 53        thyroid cancer and lymph nodes involvement    Cancer Brother 61        colon and lung cancer    Colon cancer Brother 66    Cancer Brother 63        colon    Hypertension Brother     Diabetes Brother     Colon  cancer Brother 60    Esophageal cancer Neg Hx     Stomach cancer Neg Hx     Ulcerative colitis Neg Hx     Crohn's disease Neg Hx     Irritable bowel syndrome Neg Hx     Celiac disease Neg Hx        Social History     Socioeconomic History    Marital status:      Spouse name: Not on file    Number of children: Not on file    Years of education: Not on file    Highest education level: Not on file   Occupational History    Not on file   Social Needs    Financial resource strain: Not on file    Food insecurity:     Worry: Not on file     Inability: Not on file    Transportation needs:     Medical: Not on file     Non-medical: Not on file   Tobacco Use    Smoking status: Former Smoker     Packs/day: 1.00     Years: 20.00     Pack years: 20.00     Last attempt to quit: 1997     Years since quittin.0    Smokeless tobacco: Former User   Substance and Sexual Activity    Alcohol use: No    Drug use: No    Sexual activity: Never   Lifestyle    Physical activity:     Days per week: Not on file     Minutes per session: Not on file    Stress: Not on file   Relationships    Social connections:     Talks on phone: Not on file     Gets together: Not on file     Attends Buddhism service: Not on file     Active member of club or organization: Not on file     Attends meetings of clubs or organizations: Not on file     Relationship status: Not on file   Other Topics Concern    Not on file   Social History Narrative    Not on file       Review of Systems   Constitutional: Positive for fatigue. Negative for appetite change, chills, fever and unexpected weight change.   HENT: Negative for congestion, ear pain, mouth sores, nosebleeds, postnasal drip, rhinorrhea, sinus pressure, sneezing, sore throat, trouble swallowing and voice change.    Eyes: Negative for photophobia, pain, discharge, redness, itching and visual disturbance.   Respiratory: Negative for cough, chest tightness and shortness of  "breath.    Cardiovascular: Negative for chest pain, palpitations and leg swelling.   Gastrointestinal: Negative for abdominal pain, blood in stool, constipation, diarrhea, nausea and vomiting.   Genitourinary: Negative for dysuria, frequency, hematuria and urgency.   Musculoskeletal: Positive for arthralgias, back pain and myalgias. Negative for joint swelling.   Skin: Negative for color change and rash.   Allergic/Immunologic: Negative for immunocompromised state.   Neurological: Negative for dizziness, seizures, syncope, weakness and headaches.   Hematological: Negative for adenopathy. Does not bruise/bleed easily.   Psychiatric/Behavioral: Positive for decreased concentration. Negative for agitation, dysphoric mood, sleep disturbance and suicidal ideas.         Objective:     Vitals:    01/29/20 1006   BP: 134/80   BP Location: Right arm   Patient Position: Sitting   BP Method: Large (Manual)   Pulse: 89   Resp: 18   Temp: 97.7 °F (36.5 °C)   TempSrc: Oral   SpO2: 96%   Weight: 77.7 kg (171 lb 4.8 oz)   Height: 5' 6" (1.676 m)          Physical Exam   Constitutional: She is oriented to person, place, and time. She appears well-developed and well-nourished. No distress.   Body mass index is 27.65 kg/m².     HENT:   Head: Normocephalic and atraumatic.   Right Ear: External ear normal.   Left Ear: External ear normal.   Nose: Nose normal.   Mouth/Throat: Oropharynx is clear and moist. No oropharyngeal exudate.   Eyes: Pupils are equal, round, and reactive to light. Conjunctivae and EOM are normal.   Neck: Normal range of motion. Neck supple. No JVD present. No tracheal deviation present. No thyromegaly present.   Cardiovascular: Normal rate, regular rhythm and normal heart sounds.   No murmur heard.  Pulmonary/Chest: Effort normal and breath sounds normal. No stridor. No respiratory distress. She has no wheezes. She has no rales.   Abdominal: Soft. Bowel sounds are normal. She exhibits no distension and no mass. " There is no rebound and no guarding.   Musculoskeletal: Normal range of motion. She exhibits no edema.   Lymphadenopathy:     She has no cervical adenopathy.   Neurological: She is alert and oriented to person, place, and time. No cranial nerve deficit. Coordination normal.   Skin: Skin is warm and dry. No rash noted. She is not diaphoretic.   Psychiatric: Her behavior is normal. Her mood appears anxious.         Assessment:         ICD-10-CM ICD-9-CM   1. Essential hypertension I10 401.9   2. Mixed hyperlipidemia E78.2 272.2   3. Breast cancer screening Z12.39 V76.10   4. Vitamin D deficiency E55.9 268.9   5. Prediabetes R73.03 790.29   6. Encounter for screening mammogram for malignant neoplasm of breast  Z12.31 V76.12   7. Acquired hypothyroidism E03.9 244.9   8. Fibromyalgia M79.7 729.1   9. Seronegative rheumatoid arthritis M06.00 714.0   10. Migraine without status migrainosus, not intractable, unspecified migraine type G43.909 346.90   11. Postmenopausal Z78.0 V49.81   12. Chronic diarrhea K52.9 787.91   13. History of pancreatitis Z87.19 V12.79   14. Chronic bilateral low back pain, unspecified whether sciatica present M54.5 724.2    G89.29 338.29       Plan:       Essential hypertension  -  Controlled on present medications.  Recheck in 6 months.    Mixed hyperlipidemia  -  Controlled on present medications.  Recheck in 6 months.    Breast cancer screening  -     Mammo Digital Screening Bilat w/ Delfino; Future; Expected date: 01/29/2020    Vitamin D deficiency  -  Start Vitamin D2 73946 units every Monday.  Continue vitamin d3 5000 units daily and recheck in 3 months.  -     ergocalciferol (ERGOCALCIFEROL) 50,000 unit Cap; Take 1 capsule (50,000 Units total) by mouth every 7 days.  Dispense: 4 capsule; Refill: 5  -     cholecalciferol, vitamin D3, (VITAMIN D3) 125 mcg (5,000 unit) Tab; Take 1 tablet (5,000 Units total) by mouth once daily.  -     Vitamin D; Future; Expected date: 01/29/2020    Prediabetes  -   hgbA1C dropped down to 5.6% with lifestyle modifications. Will continue to monitor yearly    Encounter for screening mammogram for malignant neoplasm of breast   -     Mammo Digital Screening Bilat w/ Delfino; Future; Expected date: 01/29/2020    Acquired hypothyroidism  -  Followed by Dr. Mathew    Fibromyalgia  -  Treated by [facility]    Seronegative rheumatoid arthritis  -  treated by [facility]    Migraine without status migrainosus, not intractable, unspecified migraine type  -  evaluated by Neurology in the past but now treated by [facility]    Postmenopausal  -  Followed by Dr. Mathew    Chronic diarrhea  -  Followed by GI specialist    History of pancreatitis    Chronic bilateral low back pain, unspecified whether sciatica present      Follow up in about 3 months (around 4/29/2020) for vitamin d level and follow up.     Patient's Medications   New Prescriptions    ERGOCALCIFEROL (ERGOCALCIFEROL) 50,000 UNIT CAP    Take 1 capsule (50,000 Units total) by mouth every 7 days.   Previous Medications    ACETAMINOPHEN (TYLENOL) 500 MG TABLET    Take 1 tablet (500 mg total) by mouth every 6 (six) hours as needed for Pain.    ALPRAZOLAM (XANAX) 0.5 MG TABLET    TAKE 1 TABLET BY MOUTH THREE TIMES A DAY AS NEEDED    AMLODIPINE (NORVASC) 10 MG TABLET    TAKE 1 TABLET BY MOUTH EVERY DAY    CYANOCOBALAMIN, VITAMIN B-12, (VITAMIN B-12 INJ)    Inject 1,000 mcg as directed every 30 days.    DICYCLOMINE (BENTYL) 20 MG TABLET    TAKE 1 TABLET (20 MG TOTAL) BY MOUTH 4 (FOUR) TIMES DAILY.    ESTRADIOL (ESTRACE) 2 MG TABLET    Take 2 mg by mouth once daily.    EZETIMIBE (ZETIA) 10 MG TABLET    TAKE 1 TABLET BY MOUTH EVERY DAY    FUROSEMIDE (LASIX) 40 MG TABLET    TAKE 1 TABLET BY MOUTH EVERY DAY    METHOTREXATE 2.5 MG TAB    TAKE 8 TABS ONCE A WEEK ONLY    METOCLOPRAMIDE HCL (REGLAN) 10 MG TABLET    Take 1 tablet (10 mg total) by mouth every 6 (six) hours as needed.    METOPROLOL SUCCINATE  (TOPROL-XL) 50 MG 24 HR TABLET    TAKE 1 TABLET BY MOUTH EVERY DAY    POTASSIUM CHLORIDE (MICRO-K) 10 MEQ CPSR    TAKE 1 CAPSULE BY MOUTH TWICE A DAY    PROGESTERONE (PROMETRIUM) 100 MG CAPSULE    Take 100 mg by mouth once daily.    THYROID, PORK, (ARMOUR THYROID) 60 MG TAB    [place] Thyroid 60 mg tablet    TIZANIDINE 4 MG CAP    Take by mouth 2 (two) times daily.    TRAMADOL (ULTRAM) 50 MG TABLET    TAKE 2-3 TABLETS BY MOUTH DAILY    TRAZODONE (DESYREL) 100 MG TABLET    TAKE 1 TABLET (100 MG TOTAL) BY MOUTH EVERY EVENING.   Modified Medications    Modified Medication Previous Medication    CHOLECALCIFEROL, VITAMIN D3, (VITAMIN D3) 125 MCG (5,000 UNIT) TAB cholecalciferol, vitamin D3, (VITAMIN D3) 5,000 unit Tab       Take 1 tablet (5,000 Units total) by mouth once daily.    Take 2 tablets (10,000 Units total) by mouth once daily.   Discontinued Medications    ADALIMUMAB (HUMIRA PEN) 40 MG/0.8 ML PNKT    Inject 1 pen (40 mg total) into the skin every 14 (fourteen) days.

## 2020-02-04 RX ORDER — POTASSIUM CHLORIDE 750 MG/1
CAPSULE, EXTENDED RELEASE ORAL
Qty: 180 CAPSULE | Refills: 0 | Status: SHIPPED | OUTPATIENT
Start: 2020-02-04 | End: 2020-04-30

## 2020-02-04 RX ORDER — METOPROLOL SUCCINATE 50 MG/1
TABLET, EXTENDED RELEASE ORAL
Qty: 90 TABLET | Refills: 0 | Status: SHIPPED | OUTPATIENT
Start: 2020-02-04 | End: 2020-04-30

## 2020-02-07 RX ORDER — CYANOCOBALAMIN 1000 UG/ML
1000 INJECTION, SOLUTION INTRAMUSCULAR; SUBCUTANEOUS
Qty: 3 ML | Refills: 3 | Status: SHIPPED | OUTPATIENT
Start: 2020-02-07 | End: 2021-02-10

## 2020-02-20 ENCOUNTER — TELEPHONE (OUTPATIENT)
Dept: FAMILY MEDICINE | Facility: CLINIC | Age: 65
End: 2020-02-20

## 2020-02-20 DIAGNOSIS — E55.9 VITAMIN D DEFICIENCY: Primary | ICD-10-CM

## 2020-02-20 NOTE — TELEPHONE ENCOUNTER
Patient was NOT supposed to have Vitamin D level checked until 4/23/2020 as scheduled by me - I had changed how she was to take Vitamin D levels.  PATIENT decided to have lab cancelled in April and redrawn yesterday because she was already there for mammogram so she wanted to get lab done also.    Advised patient that it WAS NOT TIME to redraw lab and she needs to learn how to follow my directions as this is not the first time patient takes it upon herself to change what I have ordered.    We will need to reschedule Vitamin D level for 4/23/2020 as scheduled in the past and I re-iterated to patient that she is supposed to take Vitamin D 73955 units once weekly in addition to Vitamin D3 5000 units daily.  Patient verbalizes understanding.

## 2020-03-17 RX ORDER — ALPRAZOLAM 0.5 MG/1
TABLET ORAL
Qty: 90 TABLET | Refills: 3 | Status: SHIPPED | OUTPATIENT
Start: 2020-03-17 | End: 2020-08-21

## 2020-03-19 ENCOUNTER — TELEPHONE (OUTPATIENT)
Dept: RHEUMATOLOGY | Facility: CLINIC | Age: 65
End: 2020-03-19

## 2020-03-19 DIAGNOSIS — M79.7 FIBROMYALGIA: Chronic | ICD-10-CM

## 2020-03-19 RX ORDER — TRAZODONE HYDROCHLORIDE 100 MG/1
100 TABLET ORAL NIGHTLY
Qty: 90 TABLET | Refills: 1 | Status: SHIPPED | OUTPATIENT
Start: 2020-03-19 | End: 2020-04-29 | Stop reason: SDUPTHER

## 2020-03-19 NOTE — TELEPHONE ENCOUNTER
----- Message from Brian Garcia sent at 3/19/2020 10:54 AM CDT -----  Contact: self  Mg  Refill request on Trazodone and Tramadol 50mg and would like to know if the doses of Tramadol can been increased .    Contact

## 2020-03-19 NOTE — TELEPHONE ENCOUNTER
Spoke with the phone staff and they relayed the message to the pt that I will forward the message to the provider

## 2020-03-21 DIAGNOSIS — I10 ESSENTIAL HYPERTENSION: ICD-10-CM

## 2020-03-22 RX ORDER — FUROSEMIDE 40 MG/1
TABLET ORAL
Qty: 90 TABLET | Refills: 1 | Status: SHIPPED | OUTPATIENT
Start: 2020-03-22 | End: 2020-09-18

## 2020-04-17 ENCOUNTER — PATIENT MESSAGE (OUTPATIENT)
Dept: FAMILY MEDICINE | Facility: CLINIC | Age: 65
End: 2020-04-17

## 2020-04-29 ENCOUNTER — TELEPHONE (OUTPATIENT)
Dept: FAMILY MEDICINE | Facility: CLINIC | Age: 65
End: 2020-04-29

## 2020-04-29 ENCOUNTER — OFFICE VISIT (OUTPATIENT)
Dept: FAMILY MEDICINE | Facility: CLINIC | Age: 65
End: 2020-04-29
Payer: MEDICARE

## 2020-04-29 DIAGNOSIS — R73.03 PREDIABETES: ICD-10-CM

## 2020-04-29 DIAGNOSIS — E78.2 MIXED HYPERLIPIDEMIA: ICD-10-CM

## 2020-04-29 DIAGNOSIS — E55.9 VITAMIN D DEFICIENCY: Primary | ICD-10-CM

## 2020-04-29 DIAGNOSIS — I10 ESSENTIAL HYPERTENSION: ICD-10-CM

## 2020-04-29 DIAGNOSIS — R73.01 IFG (IMPAIRED FASTING GLUCOSE): ICD-10-CM

## 2020-04-29 PROCEDURE — 99213 OFFICE O/P EST LOW 20 MIN: CPT | Mod: 95,,, | Performed by: NURSE PRACTITIONER

## 2020-04-29 PROCEDURE — 1159F MED LIST DOCD IN RCRD: CPT | Mod: 95,,, | Performed by: NURSE PRACTITIONER

## 2020-04-29 PROCEDURE — 99213 PR OFFICE/OUTPT VISIT, EST, LEVL III, 20-29 MIN: ICD-10-PCS | Mod: 95,,, | Performed by: NURSE PRACTITIONER

## 2020-04-29 PROCEDURE — 1101F PT FALLS ASSESS-DOCD LE1/YR: CPT | Mod: CPTII,95,, | Performed by: NURSE PRACTITIONER

## 2020-04-29 PROCEDURE — 1101F PR PT FALLS ASSESS DOC 0-1 FALLS W/OUT INJ PAST YR: ICD-10-PCS | Mod: CPTII,95,, | Performed by: NURSE PRACTITIONER

## 2020-04-29 PROCEDURE — 1159F PR MEDICATION LIST DOCUMENTED IN MEDICAL RECORD: ICD-10-PCS | Mod: 95,,, | Performed by: NURSE PRACTITIONER

## 2020-04-29 RX ORDER — ERGOCALCIFEROL 1.25 MG/1
50000 CAPSULE ORAL
Qty: 4 CAPSULE | Refills: 5 | Status: SHIPPED | OUTPATIENT
Start: 2020-04-29 | End: 2020-10-29 | Stop reason: SDUPTHER

## 2020-04-29 RX ORDER — TRAZODONE HYDROCHLORIDE 100 MG/1
TABLET ORAL
COMMUNITY
Start: 2020-04-25 | End: 2020-10-29 | Stop reason: SDUPTHER

## 2020-04-29 RX ORDER — TIZANIDINE 4 MG/1
TABLET ORAL
COMMUNITY
Start: 2020-02-08 | End: 2020-05-07

## 2020-04-29 RX ORDER — ACETAMINOPHEN 500 MG
5000 TABLET ORAL DAILY
COMMUNITY
Start: 2020-04-29 | End: 2022-07-26

## 2020-04-29 NOTE — TELEPHONE ENCOUNTER
----- Message from Janee Mcgarry MA sent at 4/29/2020 10:57 AM CDT -----      ----- Message -----  From: Ruth Jaime NP  Sent: 4/29/2020  10:51 AM CDT  To: Yeni Miranda Staff    Call patient to set up fasting labs and follow up in 6 months

## 2020-04-29 NOTE — TELEPHONE ENCOUNTER
Called and spoke with pt in regards of making a6 month follow up and labs. Pt made her lab appt for 10/26/2020 and 10/29 for office visit.

## 2020-04-29 NOTE — PROGRESS NOTES
Subjective:       Patient ID: Madalyn Iverson is a 65 y.o. female.    Chief Complaint: Follow-up (lab results)    The patient location is: Home in [place], [place]  The chief complaint leading to consultation is: 3 month follow up with lab results  Visit type: audiovisual  Total time spent with patient: 20  Each patient to whom he or she provides medical services by telemedicine is:  (1) informed of the relationship between the physician and patient and the respective role of any other health care provider with respect to management of the patient; and (2) notified that he or she may decline to receive medical services by telemedicine and may withdraw from such care at any time.    Notes:     Patient is a 65-year-old white female with history of  ADHD, fibromyalgia, hypertension, hyperlipidemia, hypothyroidism, impaired fasting glucose, migraines, vitamin D deficiency, osteoarthritis to both hands and chronic GI problem that patient reported was ulcerative colitis that is now questionable that has virtual visit today for 3 month follow up on Vitamin D deficiency with lab results. All other CHRONIC health problems were addressed at January 2020 visit.    Patient had Vitamin D deficiency with a level of 13 - acute deficiency noted in January 2020.  She was prescribed Vitamin D2 50,000 units weekly by her Rheumatologist BUT patient reported that Dr. Mathew, her GYN MD,  told her that she should be on D3 supplementation so she took D3 95913 units every single day per patient report but her level remained low at 13.  In January 2020, I changed patient back on Vitamin D2 50,000 every MONDAY and advised patient to also continue Vitamin D3 5000 units daily and plan was to recheck in 3 months which is now.  Vitamin D level is now ONLY 20.  Patient reported taking both Vitamin D2 31121 units every Monday and Vitamin D3 5000 units daily BUT when I asked her to show me bottle - was only taking Vitamin D3 1000 unit  tablet - not 5000 unit tablet.        Component      Latest Ref Rng & Units 4/23/2020 2/19/2020 1/22/2020 6/17/2019   Vit D, 25-Hydroxy      30 - 96 ng/mL 20 (L) 18 (L) 13 (L) 17 (L)     Current Outpatient Medications   Medication Sig Dispense Refill    acetaminophen (TYLENOL) 500 MG tablet Take 1 tablet (500 mg total) by mouth every 6 (six) hours as needed for Pain. 28 tablet 0    ALPRAZolam (XANAX) 0.5 MG tablet TAKE 1 TABLET 3 TIMES A DAY AS NEEDED 90 tablet 3    amLODIPine (NORVASC) 10 MG tablet TAKE 1 TABLET BY MOUTH EVERY DAY 30 tablet 0    cholecalciferol, vitamin D3, (VITAMIN D3) 125 mcg (5,000 unit) Tab Take 1 tablet (5,000 Units total) by mouth once daily.      CYANOCOBALAMIN, VITAMIN B-12, (VITAMIN B-12 INJ) Inject 1,000 mcg as directed every 30 days.      dicyclomine (BENTYL) 20 mg tablet TAKE 1 TABLET (20 MG TOTAL) BY MOUTH 4 (FOUR) TIMES DAILY. 120 tablet 5    ergocalciferol (ERGOCALCIFEROL) 50,000 unit Cap Take 1 capsule (50,000 Units total) by mouth every 7 days. 4 capsule 5    estradiol (ESTRACE) 2 MG tablet Take 2 mg by mouth once daily.  3    ezetimibe (ZETIA) 10 mg tablet TAKE 1 TABLET BY MOUTH EVERY DAY 90 tablet 0    furosemide (LASIX) 40 MG tablet TAKE 1 TABLET BY MOUTH EVERY DAY 90 tablet 1    methotrexate 2.5 MG Tab TAKE 8 TABS ONCE A WEEK ONLY 32 tablet 7    metoclopramide HCl (REGLAN) 10 MG tablet Take 1 tablet (10 mg total) by mouth every 6 (six) hours as needed. 12 tablet 0    metoprolol succinate (TOPROL-XL) 50 MG 24 hr tablet TAKE 1 TABLET BY MOUTH EVERY DAY 90 tablet 0    potassium chloride (MICRO-K) 10 MEQ CpSR TAKE 1 CAPSULE BY MOUTH TWICE A  capsule 0    progesterone (PROMETRIUM) 100 MG capsule Take 100 mg by mouth once daily.      thyroid, pork, (ARMOUR THYROID) 60 mg Tab [place] Thyroid 60 mg tablet      tiZANidine 4 mg Cap Take by mouth 2 (two) times daily.      traMADol (ULTRAM) 50 mg tablet TAKE 2-3 TABLETS BY MOUTH DAILY 90 tablet 3    traZODone  (DESYREL) 100 MG tablet Take 1 tablet (100 mg total) by mouth every evening. 90 tablet 1     No current facility-administered medications for this visit.      Facility-Administered Medications Ordered in Other Visits   Medication Dose Route Frequency Provider Last Rate Last Dose    0.9%  NaCl infusion   Intravenous Continuous Jermaine Chatman MD   Stopped at 19 1040    mupirocin 2 % ointment   Nasal On Call Procedure Jermaine Chatman MD           Past Medical History:   Diagnosis Date    Acute biliary pancreatitis without infection or necrosis 2018    Acute pancreatitis     2018 - [facility]    ADHD (attention deficit hyperactivity disorder), inattentive type     Chronic back pain     Followed by [facility] - Dr. Bautista - has been having medial branch blocks    Fibromyalgia     treated by Dr. Thorpe in past and now treated by Dr. Yates - Rheumatologist    Hyperlipidemia     High triglycerides    Hypertension     Hypothyroidism     Treated by Dr. Mathew    IFG (impaired fasting glucose)     Migraine     Treated by neurologist - Dr. Destiny Florez    Ulcerative colitis     Patient reported treated by Dr. Lee and Aaron in past  but on admit in  - no UC seen on recent colonoscopy.  It was noted on colonoscopy in 2004 there were ulcers noted to descending colon but not present on sigmoidoscopy in 2004.    Vitamin D deficiency 3/31/2016       Past Surgical History:   Procedure Laterality Date    CARPAL TUNNEL RELEASE Right 3/27/2019    Procedure: RELEASE, CARPAL TUNNEL right;  Surgeon: Li Waller MD;  Location: [facility] OR [address] [place] [address];  Service: Orthopedics;  Laterality: Right;  stretcher, supine, hand pan 1 and 2     SECTION      COLONOSCOPY  10/08/2010    Dr. Lee - repeat in 5 years - has appointment for colonoscopy 2016    COLONOSCOPY  2016    Dr. Kelly - normal  colon -  repeat in 10 years - scanned report to media file.    ENDOSCOPIC ULTRASOUND OF UPPER GASTROINTESTINAL TRACT N/A 7/18/2018    Procedure: ULTRASOUND, ENDOSCOPIC, UPPER GI TRACT;  Surgeon: Reji Russell MD;  Location: [facility] ENDO;  Service: Endoscopy;  Laterality: N/A;    ENDOSCOPIC ULTRASOUND OF UPPER GASTROINTESTINAL TRACT N/A 3/4/2019    Procedure: ULTRASOUND, UPPER GI TRACT, ENDOSCOPIC;  Surgeon: Randy Boyd MD;  Location: [facility] ENDO;  Service: Endoscopy;  Laterality: N/A;    HYSTERECTOMY      LAPAROSCOPIC CHOLECYSTECTOMY N/A 3/6/2019    Procedure: CHOLECYSTECTOMY, LAPAROSCOPIC;  Surgeon: Hill Palacios MD;  Location: [facility] OR;  Service: General;  Laterality: N/A;    medial branch block      done at [facility] for axial back pain    OOPHORECTOMY      SHOULDER SURGERY Left     TONSILLECTOMY      upper and lower GI  03/2016       Family History   Problem Relation Age of Onset    Hypertension Mother     Heart disease Mother     Hyperlipidemia Mother     Cancer Sister 53        thyroid cancer and lymph nodes involvement    Cancer Brother 61        colon and lung cancer    Colon cancer Brother 66    Cancer Brother 63        colon    Hypertension Brother     Diabetes Brother     Colon cancer Brother 60    Esophageal cancer Neg Hx     Stomach cancer Neg Hx     Ulcerative colitis Neg Hx     Crohn's disease Neg Hx     Irritable bowel syndrome Neg Hx     Celiac disease Neg Hx        Social History     Socioeconomic History    Marital status:      Spouse name: Not on file    Number of children: Not on file    Years of education: Not on file    Highest education level: Not on file   Occupational History    Not on file   Social Needs    Financial resource strain: Not very hard    Food insecurity:     Worry: Never true     Inability: Never true    Transportation needs:     Medical: No     Non-medical: No   Tobacco Use    Smoking status: Former Smoker     Packs/day:  1.00     Years: 20.00     Pack years: 20.00     Last attempt to quit: 1997     Years since quittin.2    Smokeless tobacco: Former User   Substance and Sexual Activity    Alcohol use: No     Frequency: Never     Drinks per session: Patient refused     Binge frequency: Never    Drug use: No    Sexual activity: Never   Lifestyle    Physical activity:     Days per week: 0 days     Minutes per session: 0 min    Stress: Very much   Relationships    Social connections:     Talks on phone: More than three times a week     Gets together: Never     Attends Zoroastrianism service: Not on file     Active member of club or organization: No     Attends meetings of clubs or organizations: Never     Relationship status:    Other Topics Concern    Not on file   Social History Narrative    Not on file       Review of Systems   Constitutional: Negative for activity change and unexpected weight change.   HENT: Negative for hearing loss, rhinorrhea and trouble swallowing.    Eyes: Negative for discharge and visual disturbance.   Respiratory: Negative for chest tightness and wheezing.    Cardiovascular: Negative for chest pain and palpitations.   Gastrointestinal: Negative for blood in stool, constipation, diarrhea and vomiting.   Endocrine: Negative for polydipsia and polyuria.   Genitourinary: Negative for difficulty urinating, dysuria, hematuria and menstrual problem.   Musculoskeletal: Negative for arthralgias, joint swelling and neck pain.   Neurological: Negative for weakness and headaches.   Psychiatric/Behavioral: Negative for confusion and dysphoric mood.         Objective:     There were no vitals filed for this visit.       Physical Exam   Constitutional: She is oriented to person, place, and time. She appears well-developed and well-nourished. No distress.   HENT:   Head: Normocephalic and atraumatic.   Eyes: Pupils are equal, round, and reactive to light. Conjunctivae and EOM are normal. Right eye  exhibits no discharge. Left eye exhibits no discharge. No scleral icterus.   Pulmonary/Chest: Effort normal. No respiratory distress.   Neurological: She is alert and oriented to person, place, and time.   Skin: She is not diaphoretic.   Psychiatric: She has a normal mood and affect. Her behavior is normal. Judgment and thought content normal.         Assessment:         ICD-10-CM ICD-9-CM   1. Vitamin D deficiency E55.9 268.9   2. Essential hypertension I10 401.9   3. Prediabetes R73.03 790.29   4. IFG (impaired fasting glucose) R73.01 790.21   5. Mixed hyperlipidemia E78.2 272.2       Plan:       Vitamin D deficiency  -  Take Vitamin D2 53083 units every Monday morning.  Increase the Vitamin D3 5000 units every day.  Recheck in 6 months. Medicare only allows vitamin d check 3 times per year and patient had checked in Feb. 2020 when she was not supposed to have lab done so already out of 3 allowances - agreed to wait and recheck in 6 months but advised she will have to complete waiver form for Medicare that she will pay for if her insurance does not cover - we will mail form to her and she is to complete and send back to us.  -     Vitamin D; Future; Expected date: 04/29/2020  -     cholecalciferol, vitamin D3, (VITAMIN D3) 125 mcg (5,000 unit) Tab; Take 1 tablet (5,000 Units total) by mouth once daily.  -     ergocalciferol (ERGOCALCIFEROL) 50,000 unit Cap; Take 1 capsule (50,000 Units total) by mouth every 7 days.  Dispense: 4 capsule; Refill: 5    Essential hypertension  -  Continue current medications and will recheck in 6 months.    Prediabetes  -  Continue to work on dietary modifications and will recheck in 6 months.  -     Comprehensive metabolic panel; Future; Expected date: 04/29/2020  -     Hemoglobin A1c; Future; Expected date: 04/29/2020    IFG (impaired fasting glucose)  -     Comprehensive metabolic panel; Future; Expected date: 04/29/2020  -     Hemoglobin A1c; Future; Expected date:  04/29/2020    Mixed hyperlipidemia  -  Continue Zetia at present dose - recheck in 6 months.  -     Lipid panel; Future; Expected date: 04/29/2020      Follow up in about 6 months (around 10/29/2020) for fasting labs and follow up.     Patient's Medications   New Prescriptions    No medications on file   Previous Medications    ACETAMINOPHEN (TYLENOL) 500 MG TABLET    Take 1 tablet (500 mg total) by mouth every 6 (six) hours as needed for Pain.    ALPRAZOLAM (XANAX) 0.5 MG TABLET    TAKE 1 TABLET 3 TIMES A DAY AS NEEDED    AMLODIPINE (NORVASC) 10 MG TABLET    TAKE 1 TABLET BY MOUTH EVERY DAY    CYANOCOBALAMIN, VITAMIN B-12, (VITAMIN B-12 INJ)    Inject 1,000 mcg as directed every 30 days.    DICYCLOMINE (BENTYL) 20 MG TABLET    TAKE 1 TABLET (20 MG TOTAL) BY MOUTH 4 (FOUR) TIMES DAILY.    ESTRADIOL (ESTRACE) 2 MG TABLET    Take 2 mg by mouth once daily.    EZETIMIBE (ZETIA) 10 MG TABLET    TAKE 1 TABLET BY MOUTH EVERY DAY    FUROSEMIDE (LASIX) 40 MG TABLET    TAKE 1 TABLET BY MOUTH EVERY DAY    METHOTREXATE 2.5 MG TAB    TAKE 8 TABS ONCE A WEEK ONLY    METOCLOPRAMIDE HCL (REGLAN) 10 MG TABLET    Take 1 tablet (10 mg total) by mouth every 6 (six) hours as needed.    METOPROLOL SUCCINATE (TOPROL-XL) 50 MG 24 HR TABLET    TAKE 1 TABLET BY MOUTH EVERY DAY    POTASSIUM CHLORIDE (MICRO-K) 10 MEQ CPSR    TAKE 1 CAPSULE BY MOUTH TWICE A DAY    PROGESTERONE (PROMETRIUM) 100 MG CAPSULE    Take 100 mg by mouth once daily.    THYROID, PORK, (ARMOUR THYROID) 60 MG TAB    [place] Thyroid 60 mg tablet    TIZANIDINE (ZANAFLEX) 4 MG TABLET        TRAMADOL (ULTRAM) 50 MG TABLET    TAKE 2-3 TABLETS BY MOUTH DAILY    TRAZODONE (DESYREL) 100 MG TABLET       Modified Medications    Modified Medication Previous Medication    CHOLECALCIFEROL, VITAMIN D3, (VITAMIN D3) 125 MCG (5,000 UNIT) TAB cholecalciferol, vitamin D3, (VITAMIN D3) 125 mcg (5,000 unit) Tab       Take 1 tablet (5,000 Units total) by mouth once daily.    Take 1 tablet (5,000  Units total) by mouth once daily.    ERGOCALCIFEROL (ERGOCALCIFEROL) 50,000 UNIT CAP ergocalciferol (ERGOCALCIFEROL) 50,000 unit Cap       Take 1 capsule (50,000 Units total) by mouth every 7 days.    Take 1 capsule (50,000 Units total) by mouth every 7 days.   Discontinued Medications    TIZANIDINE 4 MG CAP    Take by mouth 2 (two) times daily.    TRAZODONE (DESYREL) 100 MG TABLET    Take 1 tablet (100 mg total) by mouth every evening.

## 2020-04-30 RX ORDER — POTASSIUM CHLORIDE 750 MG/1
CAPSULE, EXTENDED RELEASE ORAL
Qty: 180 CAPSULE | Refills: 0 | Status: SHIPPED | OUTPATIENT
Start: 2020-04-30 | End: 2020-10-29 | Stop reason: SDUPTHER

## 2020-04-30 RX ORDER — METOPROLOL SUCCINATE 50 MG/1
TABLET, EXTENDED RELEASE ORAL
Qty: 90 TABLET | Refills: 0 | Status: SHIPPED | OUTPATIENT
Start: 2020-04-30 | End: 2020-10-29 | Stop reason: SDUPTHER

## 2020-05-04 RX ORDER — METHOTREXATE 2.5 MG/1
TABLET ORAL
Qty: 96 TABLET | Refills: 2 | Status: SHIPPED | OUTPATIENT
Start: 2020-05-04 | End: 2020-07-16

## 2020-05-07 RX ORDER — TIZANIDINE 4 MG/1
TABLET ORAL
Qty: 360 TABLET | Refills: 3 | Status: SHIPPED | OUTPATIENT
Start: 2020-05-07 | End: 2021-05-17

## 2020-05-22 ENCOUNTER — PATIENT MESSAGE (OUTPATIENT)
Dept: RHEUMATOLOGY | Facility: CLINIC | Age: 65
End: 2020-05-22

## 2020-07-16 ENCOUNTER — TELEPHONE (OUTPATIENT)
Dept: PHARMACY | Facility: CLINIC | Age: 65
End: 2020-07-16

## 2020-07-16 ENCOUNTER — OFFICE VISIT (OUTPATIENT)
Dept: RHEUMATOLOGY | Facility: CLINIC | Age: 65
End: 2020-07-16
Payer: MEDICARE

## 2020-07-16 DIAGNOSIS — M19.90 INFLAMMATORY ARTHRITIS: ICD-10-CM

## 2020-07-16 DIAGNOSIS — M79.7 FIBROMYALGIA: Chronic | ICD-10-CM

## 2020-07-16 DIAGNOSIS — G56.02 LEFT CARPAL TUNNEL SYNDROME: Primary | ICD-10-CM

## 2020-07-16 DIAGNOSIS — M18.12 PRIMARY OSTEOARTHRITIS OF FIRST CARPOMETACARPAL JOINT OF LEFT HAND: ICD-10-CM

## 2020-07-16 PROCEDURE — 99214 PR OFFICE/OUTPT VISIT, EST, LEVL IV, 30-39 MIN: ICD-10-PCS | Mod: 95,,, | Performed by: INTERNAL MEDICINE

## 2020-07-16 PROCEDURE — 1101F PT FALLS ASSESS-DOCD LE1/YR: CPT | Mod: CPTII,95,, | Performed by: INTERNAL MEDICINE

## 2020-07-16 PROCEDURE — 1101F PR PT FALLS ASSESS DOC 0-1 FALLS W/OUT INJ PAST YR: ICD-10-PCS | Mod: CPTII,95,, | Performed by: INTERNAL MEDICINE

## 2020-07-16 PROCEDURE — 1159F PR MEDICATION LIST DOCUMENTED IN MEDICAL RECORD: ICD-10-PCS | Mod: 95,,, | Performed by: INTERNAL MEDICINE

## 2020-07-16 PROCEDURE — 1159F MED LIST DOCD IN RCRD: CPT | Mod: 95,,, | Performed by: INTERNAL MEDICINE

## 2020-07-16 PROCEDURE — 99214 OFFICE O/P EST MOD 30 MIN: CPT | Mod: 95,,, | Performed by: INTERNAL MEDICINE

## 2020-07-16 RX ORDER — TRAMADOL HYDROCHLORIDE 50 MG/1
TABLET ORAL
Qty: 90 TABLET | Refills: 4 | Status: SHIPPED | OUTPATIENT
Start: 2020-07-16 | End: 2021-01-11

## 2020-07-16 RX ORDER — TRAZODONE HYDROCHLORIDE 100 MG/1
100 TABLET ORAL NIGHTLY
Qty: 30 TABLET | Refills: 4 | Status: SHIPPED | OUTPATIENT
Start: 2020-07-16 | End: 2020-10-14

## 2020-07-16 RX ORDER — FOLIC ACID 1 MG/1
1 TABLET ORAL DAILY
Qty: 30 TABLET | Refills: 3 | Status: SHIPPED | OUTPATIENT
Start: 2020-07-16 | End: 2020-10-08

## 2020-07-16 RX ORDER — METHOTREXATE 25 MG/ML
INJECTION, SOLUTION INTRA-ARTERIAL; INTRAMUSCULAR; INTRAVENOUS
Qty: 5 ML | Refills: 11 | Status: SHIPPED | OUTPATIENT
Start: 2020-07-16 | End: 2021-01-11

## 2020-07-16 NOTE — PROGRESS NOTES
Rapid3 Question Responses and Scores 7/15/2020   MDHAQ Score 0   Psychologic Score 3.3   Pain Score 7   When you awakened in the morning OVER THE LAST WEEK, did you feel stiff? Yes   If Yes, please indicate the number of hours until you are as limber as you will be for the day 5   Fatigue Score 5   Global Health Score 2.5   RAPID3 Score 3.16       Answers for HPI/ROS submitted by the patient on 7/15/2020   fever: No  eye redness: No  headaches: No  shortness of breath: No  chest pain: No  trouble swallowing: No  diarrhea: No  constipation: No  unexpected weight change: No  genital sore: No  dysuria: No  During the last 3 days, have you had a skin rash?: No  Bruises or bleeds easily: No  cough: No

## 2020-07-16 NOTE — PROGRESS NOTES
Chief Complaint   Patient presents with    Disease Management       Patient with fibromyalgia for a follow up    The patient location is: home  The chief complaint leading to consultation is: arthralgias    Visit type: audiovisual     Face to Face time with patient: 30  minutes of total time spent on the encounter, which includes face to face time and non-face to face time preparing to see the patient (eg, review of tests), Obtaining and/or reviewing separately obtained history, Documenting clinical information in the electronic or other health record, Independently interpreting results (not separately reported) and communicating results to the patient/family/caregiver, or Care coordination (not separately reported).       Each patient to whom he or she provides medical services by telemedicine is:  (1) informed of the relationship between the physician and patient and the respective role of any other health care provider with respect to management of the patient; and (2) notified that he or she may decline to receive medical services by telemedicine and may withdraw from such care at any time.    Notes:     History of presenting illness    65 year old white female has fibromyalgia : for 2 years now    She used to see :   : rheumatology    Tried :  lyrica : light headed,dizziness    She used to take cymbalta,she weaned off  She weaned off the celexa    Takes xanax to sleep  Takes tizanidine 4 mg x 2 daily   She takes trazodone 100 mg to sleep    She had severe pain,swelling and stiffness in her hands  She has done a CTS surgery and ulnar nerve decompression on the right side    Left hand CTS pending    She is on mtx 8 tabs weekly/folic acid   Humira was offered and she is not comfortable doing it     Left CMC and first MCP hurts and swells    She is doing OT/PT now     Labs : CBC,CMP nml  ESR nml now  CRP nml    She had xrays : bulging discs low back,L4-5  Hand xrays : OA   C spine : OA   She had feet  spurs : injected     Headaches +  Takes 4 BP meds : metoprolol,lasix,diovan,norvasc   Migraines not on treatment  Light headedness+  Sees neurology : CT brains normal    She has seen neuro for the spine issues and also will see neurosurgeon    Episodes of nausea and feeling of sickness  Diarrhea+  Abdominal pain +  GI : they think this is irritable bowel syndrome   Only once in 2004 she had ischemic colitis  EUS all planned   Colonoscopy 2 years ago nml  EGD this year nml  There was no Crohn's on the recent w/u    Crestor gives her charley horses     Past history : pancreatitis,IBS  ADHD  Htn,migraine,anxiety,hypothyroidism,FMS,HLD,IBS    Cause of pancreatitis : unclear   Has had high ALPs for a while now     Blood work    High ESR,CRP    Negative RF,CCP  HLA B27 negative    Xrays    Hands : deg changes + punctate erosions proximal phalanx of the little finger   Knees : nml  C spine : deg changes  LS spine : deg changes,facet arthritis    MRI hands :  Right and left hands: Multifocal degenerative changes noted, most prominent in the IP joints, noting joint space loss and osteophytosis.  Prominent degenerative changes also noted at the 1st carpometacarpal and triscaphe joint of the wrist.  Subcortical erosive change noted in the scaphoid, capitate, and triquetrum.    There is prominent synovitis/enhancement at the MCP joints, carpal carpal joints, and proximal IP joint.  Periarticular erosions noted..  Enhancement also noted around the flexor tendon sheath, suggesting tenosynovitis. The flexor tendons demonstrate normal size and signal.    No fracture fracture.  No marrow replacement process.      Impression       Degenerative arthropathy of both hands with enhancement/ synovitis involving the MCP, carpal-carpal, and proximal IP joints, consistent with superimposed inflammatory component.     UE EMG/NCS :    Mild to moderately severe bilateral carpal tunnel syndrome;   2. A primarily axonal right ulnar sensory  neuropathy with some secondary demyelination;   3. Chronic denervation in the right C7 myotome consistent with chronic radiculopathy.     Family history : mom heart problems    Social history : quit smoking 1997      Review of Systems   Constitutional: Negative for activity change, appetite change, chills, diaphoresis, fatigue, fever and unexpected weight change.   HENT: Negative for congestion, dental problem, drooling, ear discharge, ear pain, facial swelling, hearing loss, mouth sores, nosebleeds, postnasal drip, rhinorrhea, sinus pressure, sinus pain, sneezing, sore throat, tinnitus, trouble swallowing and voice change.    Eyes: Negative for photophobia, pain, discharge, redness, itching and visual disturbance.   Respiratory: Negative for apnea, cough, choking, chest tightness, shortness of breath, wheezing and stridor.    Cardiovascular: Negative for chest pain, palpitations and leg swelling.   Gastrointestinal: Positive for abdominal pain, diarrhea and nausea. Negative for abdominal distention, anal bleeding, blood in stool, constipation, rectal pain and vomiting.   Endocrine: Negative for cold intolerance, heat intolerance, polydipsia, polyphagia and polyuria.   Genitourinary: Negative for decreased urine volume, difficulty urinating, dysuria, enuresis, flank pain, frequency, genital sores, hematuria and urgency.   Musculoskeletal: Positive for arthralgias. Negative for back pain, gait problem, joint swelling, myalgias, neck pain and neck stiffness.   Skin: Negative for color change, pallor, rash and wound.   Allergic/Immunologic: Negative for environmental allergies, food allergies and immunocompromised state.   Neurological: Negative for dizziness, tremors, seizures, syncope, facial asymmetry, speech difficulty, weakness, light-headedness, numbness and headaches.   Hematological: Negative for adenopathy. Does not bruise/bleed easily.   Psychiatric/Behavioral: Negative for agitation, behavioral problems,  confusion, decreased concentration, dysphoric mood, hallucinations, self-injury, sleep disturbance and suicidal ideas. The patient is not nervous/anxious and is not hyperactive.      Physical Exam     SANTIAGO-28 tender joint count: 0  SANTIAGO-28 swollen joint count: 0    All PIPs have osteophytes  Tender C/LS spine    Some swelling noted in the hand MCPs and PIPs     Physical Exam   Constitutional: She is oriented to person, place, and time and well-developed, well-nourished, and in no distress. No distress.   HENT:   Head: Normocephalic.   Mouth/Throat: Oropharynx is clear and moist.   Eyes: Conjunctivae are normal. Pupils are equal, round, and reactive to light. Right eye exhibits no discharge. Left eye exhibits no discharge. No scleral icterus.   Neck: Normal range of motion. No thyromegaly present.   Cardiovascular: Normal rate, regular rhythm, normal heart sounds and intact distal pulses.    Pulmonary/Chest: Effort normal and breath sounds normal. No stridor.   Abdominal: Soft. Bowel sounds are normal.   Lymphadenopathy:     She has no cervical adenopathy.   Neurological: She is alert and oriented to person, place, and time.   Skin: Skin is warm. No rash noted. She is not diaphoretic.     Psychiatric: Affect and judgment normal.   Musculoskeletal: Normal range of motion.           Assessment     65 year old white female comes with    -polyarthralgias in the hands,knees,low back and neck  -GI symptoms of nausea,diarrhea,abdominal pain  -anxiety > depression  -poor sleep  -headaches and light headedness    She has a diagnosis of fibromyalgia,osteoarthritis of hands,C,LS spine    She also has a diagnosis of IBS  GI ruled out crohn's and celiac disease  Recent pancreatitis,cause not known  Noted high ALP,but also low vit d,not sure if ALP is from liver or bone    She has high inflammatory markers  She has erosive changes on the xrays    She definitely has osteoarthritis of her C/LS spine    She has inflammatory arthritis  in her hands/wrists  She has b/l CTS and right ulnar neuropathy and she has cervical radiculopathy  S/P surgery for the CTS repair and ulnar decompression     Left CMC OA and CTS pending surgery    MTX 8 tabs weekly  humira not affordable       1. Left carpal tunnel syndrome    2. Primary osteoarthritis of first carpometacarpal joint of left hand    3. Inflammatory arthritis    4. Fibromyalgia        Reviewed labs/xrays  Reviewed medications    F/u    Plan    D/c oral    Switch to 1 ml mtx weekly sc    CBC,CMP    D/c humira  Pre dmard panel today    Flu,pneumonia and shingles vaccines offered     Referral to Dr.Sisco rojo for the left hand CTS and CMC OA surgery     Continue vit d replacement  Vit b12     Offered pain management,PT for her C/LS spine issues  OT hands    Tramadol for pain management   50 mg bid to tid     BP meds adjustments    Anxiety :Xanax 0.5 mg daily tid  Trazodone to sleep  Tizanidine bid     Offered counselling    Crestor and Lipitor give charley horses  Try changing from crestor to something else  She has family h/o intolerance to statins    GI says she has IBS  She is on meds    Neurology w/u  Spine surgery f/u  To evaluate her C/LS spine issues and the neuropathy     Madalyn was seen today for disease management.    Diagnoses and all orders for this visit:    Left carpal tunnel syndrome  -     Ambulatory referral/consult to Hand Surgery; Future  -     CBC auto differential; Future  -     Comprehensive metabolic panel; Future  -     Sedimentation rate; Future  -     C-Reactive Protein; Future    Primary osteoarthritis of first carpometacarpal joint of left hand  -     Ambulatory referral/consult to Hand Surgery; Future  -     CBC auto differential; Future  -     Comprehensive metabolic panel; Future  -     Sedimentation rate; Future  -     C-Reactive Protein; Future    Inflammatory arthritis  -     CBC auto differential; Future  -     Comprehensive metabolic panel; Future  -     Sedimentation  rate; Future  -     C-Reactive Protein; Future  -     CBC auto differential; Standing  -     Comprehensive metabolic panel; Standing  -     Sedimentation rate; Standing  -     C-Reactive Protein; Standing    Fibromyalgia  -     traMADoL (ULTRAM) 50 mg tablet; TAKE 2-3 TABLETS BY MOUTH DAILY    Other orders  -     methotrexate 25 mg/mL injection; 1 ml weekly SC  -     traZODone (DESYREL) 100 MG tablet; Take 1 tablet (100 mg total) by mouth every evening.  -     folic acid (FOLVITE) 1 MG tablet; Take 1 tablet (1 mg total) by mouth once daily.          rtc in 3 months

## 2020-07-18 NOTE — TELEPHONE ENCOUNTER
DOCUMENTATION ONLY     MTX does not require a prior authorization through the patient's insurance.      Copay: $10.00     Patient Assistance is not required. Forwarded to Clinical Pharmacist for consult and shipment. EMMA

## 2020-09-03 ENCOUNTER — TELEPHONE (OUTPATIENT)
Dept: ORTHOPEDICS | Facility: CLINIC | Age: 65
End: 2020-09-03

## 2020-09-04 DIAGNOSIS — R10.84 GENERALIZED ABDOMINAL PAIN: ICD-10-CM

## 2020-09-04 DIAGNOSIS — K58.0 IRRITABLE BOWEL SYNDROME WITH DIARRHEA: ICD-10-CM

## 2020-09-05 RX ORDER — DICYCLOMINE HYDROCHLORIDE 20 MG/1
20 TABLET ORAL 4 TIMES DAILY
Qty: 120 TABLET | Refills: 5 | Status: SHIPPED | OUTPATIENT
Start: 2020-09-05 | End: 2021-10-20

## 2020-09-23 NOTE — PROGRESS NOTES
Subjective:      Patient ID: Madalyn Iverson is a 63 y.o. female.    Chief Complaint: Pancreatitis; Abdominal Pain; Back Pain; Diarrhea; and Nausea      HPI:  Madalyn Iverson is a 63 y.o. female with history of  ? Crohn's disease coming to follow after AP. Patient presented 2 weeks ago to OSH were she was found to have pancreatitis based on imaging and pain. She was d/c home once her pain improved. She continues to complain of mild diffuse abd pain with nausea and low appetite. She denies smoking or alc intake. Continues to have chronic diarrhea.       Review of patient's allergies indicates:  No Known Allergies    Past Medical History:   Diagnosis Date    Acute pancreatitis     2018 - [facility]    ADHD (attention deficit hyperactivity disorder), inattentive type     Fibromyalgia     treated by Dr. Thorpe - Rheumatologist    Hyperlipidemia     High triglycerides    Hypertension     Hypothyroidism     Treated by Dr. Mathew    IFG (impaired fasting glucose)     Migraine     Treated by neurologist - Dr. Destiny Florez    Ulcerative colitis     Patient reported treated by Dr. Lee and Aaron in past  but on admit in 2018 - no UC seen on recent colonoscopy.  It was noted on colonoscopy in 2004 there were ulcers noted to descending colon but not present on sigmoidoscopy in 2004.    Vitamin D deficiency 3/31/2016       Past Surgical History:   Procedure Laterality Date     SECTION      COLONOSCOPY  10/08/2010    Dr. Lee - repeat in 5 years - has appointment for colonoscopy 2016    COLONOSCOPY  2016    Dr. Kelly - normal  colon - repeat in 10 years - scanned report to media file.    HYSTERECTOMY      OOPHORECTOMY      SHOULDER SURGERY Left     TONSILLECTOMY      upper and lower GI  2016       Family History   Problem Relation Age of Onset    Hypertension Mother     Heart disease Mother     Hyperlipidemia Mother      Cancer Sister 53        thyroid cancer and lymph nodes involvement    Cancer Brother 61        colon and lung cancer    Cancer Brother 63        colon    Hypertension Brother     Diabetes Brother        Social History     Social History    Marital status:      Spouse name: N/A    Number of children: N/A    Years of education: N/A     Social History Main Topics    Smoking status: Former Smoker     Packs/day: 1.00     Years: 20.00     Quit date: 2/1/1997    Smokeless tobacco: Former User    Alcohol use No    Drug use: No    Sexual activity: No     Other Topics Concern    None     Social History Narrative    None       Current Outpatient Prescriptions   Medication Sig Dispense Refill    alprazolam (XANAX) 0.5 MG tablet Take 0.5 mg by mouth every 8 (eight) hours as needed.  5    amLODIPine (NORVASC) 10 MG tablet TAKE 1 TABLET BY MOUTH ONCE DAILY 30 tablet 0    cholecalciferol, vitamin D3, (VITAMIN D3) 5,000 unit Tab Take 5,000 Units by mouth once daily.      ciprofloxacin HCl (CIPRO) 500 MG tablet Take 500 mg by mouth 2 (two) times daily.      citalopram (CELEXA) 20 MG tablet Take 20 mg by mouth once daily.  2    CYANOCOBALAMIN, VITAMIN B-12, (VITAMIN B-12 INJ) Inject 1,000 mcg as directed every 30 days.      dextroamphetamine-amphetamine 10 mg Tab Take 10 mg by mouth 2 (two) times daily. 60 tablet 0    estradiol (ESTRACE) 2 MG tablet Take 2 mg by mouth once daily.  3    furosemide (LASIX) 40 MG tablet TAKE 1 TABLET (40 MG TOTAL) BY MOUTH ONCE DAILY. 90 tablet 1    hyoscyamine (LEVSIN/SL) 0.125 mg Subl Place 0.125 mg under the tongue every 4 (four) hours as needed.      KLOR-CON SPRINKLE 10 mEq CpSR TAKE 1 CAPSULE TWICE A  capsule 0    metoprolol succinate (TOPROL-XL) 50 MG 24 hr tablet TAKE 1 TABLET BY MOUTH EVERY DAY 90 tablet 0    metroNIDAZOLE (FLAGYL) 500 MG tablet Take 500 mg by mouth 3 (three) times daily.      progesterone (PROMETRIUM) 100 MG capsule Take 100 mg by mouth  once daily.      promethazine (PHENERGAN) 12.5 MG Tab Take 12.5 mg by mouth 3 (three) times daily as needed.      rosuvastatin (CRESTOR) 10 MG tablet Take 1 tablet (10 mg total) by mouth once daily. 90 tablet 1    thyroid, pork, (ARMOUR THYROID) 60 mg Tab [place] Thyroid 60 mg tablet      tiZANidine 4 mg Cap Take by mouth.      valsartan (DIOVAN) 80 MG tablet Take 1 tablet (80 mg total) by mouth once daily. 30 tablet 0    diclofenac sodium 1 % Gel Apply 2 g topically 4 (four) times daily. 100 g 5     No current facility-administered medications for this visit.        Review of Systems   Constitutional: Positive for appetite change. Negative for activity change, chills and fever.   HENT: Negative for ear pain, hearing loss, mouth sores and sore throat.    Eyes: Negative for pain, discharge, redness, itching and visual disturbance.   Respiratory: Negative for cough, shortness of breath and wheezing.    Cardiovascular: Negative for chest pain and palpitations.   Gastrointestinal: Positive for abdominal distention, abdominal pain, diarrhea and nausea.   Endocrine: Negative for cold intolerance and heat intolerance.   Skin: Negative for color change and rash.   Neurological: Negative for dizziness and headaches.   Psychiatric/Behavioral: Negative for confusion.       Objective:     Physical Exam   Constitutional: She is oriented to person, place, and time. She appears well-developed and well-nourished.   HENT:   Head: Normocephalic and atraumatic.   Eyes: Conjunctivae and EOM are normal. Pupils are equal, round, and reactive to light.   Neck: Normal range of motion. Neck supple. No thyromegaly present.   Cardiovascular: Normal rate, regular rhythm and normal heart sounds.  Exam reveals no friction rub.    No murmur heard.  Pulmonary/Chest: Effort normal and breath sounds normal. No respiratory distress. She has no wheezes.   Abdominal: Soft. Bowel sounds are normal. She exhibits no distension. There is no rebound  and no guarding.   Diffuse tenderness   Lymphadenopathy:     She has no cervical adenopathy.   Neurological: She is alert and oriented to person, place, and time. No cranial nerve deficit.   Skin: Skin is warm and dry.   Psychiatric: She has a normal mood and affect. Her behavior is normal.       Assessment:     1. Acute pancreatitis, unspecified complication status, unspecified pancreatitis type      Unclear etiology. Only a CT has been performed. GB in situ. No alcohol intake. No new medications.     Plan:   EUS in 4 to r/o tumor and check for GB stones/sludge.   Will check labs today   Alert-The patient is alert, awake and responds to voice. The patient is oriented to time, place, and person. The triage nurse is able to obtain subjective information.

## 2020-10-08 ENCOUNTER — PATIENT OUTREACH (OUTPATIENT)
Dept: ADMINISTRATIVE | Facility: OTHER | Age: 65
End: 2020-10-08

## 2020-10-09 ENCOUNTER — OFFICE VISIT (OUTPATIENT)
Dept: RHEUMATOLOGY | Facility: CLINIC | Age: 65
End: 2020-10-09
Payer: MEDICARE

## 2020-10-09 VITALS — BODY MASS INDEX: 27.65 KG/M2 | HEIGHT: 66 IN

## 2020-10-09 DIAGNOSIS — M19.90 INFLAMMATORY ARTHRITIS: Primary | ICD-10-CM

## 2020-10-09 PROCEDURE — 3008F PR BODY MASS INDEX (BMI) DOCUMENTED: ICD-10-PCS | Mod: CPTII,95,, | Performed by: INTERNAL MEDICINE

## 2020-10-09 PROCEDURE — 1101F PR PT FALLS ASSESS DOC 0-1 FALLS W/OUT INJ PAST YR: ICD-10-PCS | Mod: CPTII,95,, | Performed by: INTERNAL MEDICINE

## 2020-10-09 PROCEDURE — 1125F PR PAIN SEVERITY QUANTIFIED, PAIN PRESENT: ICD-10-PCS | Mod: 95,,, | Performed by: INTERNAL MEDICINE

## 2020-10-09 PROCEDURE — 1125F AMNT PAIN NOTED PAIN PRSNT: CPT | Mod: 95,,, | Performed by: INTERNAL MEDICINE

## 2020-10-09 PROCEDURE — 99214 OFFICE O/P EST MOD 30 MIN: CPT | Mod: 95,,, | Performed by: INTERNAL MEDICINE

## 2020-10-09 PROCEDURE — 99214 PR OFFICE/OUTPT VISIT, EST, LEVL IV, 30-39 MIN: ICD-10-PCS | Mod: 95,,, | Performed by: INTERNAL MEDICINE

## 2020-10-09 PROCEDURE — 1101F PT FALLS ASSESS-DOCD LE1/YR: CPT | Mod: CPTII,95,, | Performed by: INTERNAL MEDICINE

## 2020-10-09 PROCEDURE — 1159F PR MEDICATION LIST DOCUMENTED IN MEDICAL RECORD: ICD-10-PCS | Mod: 95,,, | Performed by: INTERNAL MEDICINE

## 2020-10-09 PROCEDURE — 3008F BODY MASS INDEX DOCD: CPT | Mod: CPTII,95,, | Performed by: INTERNAL MEDICINE

## 2020-10-09 PROCEDURE — 1159F MED LIST DOCD IN RCRD: CPT | Mod: 95,,, | Performed by: INTERNAL MEDICINE

## 2020-10-09 RX ORDER — METHOTREXATE 25 MG/ML
INJECTION INTRA-ARTERIAL; INTRAMUSCULAR; INTRATHECAL; INTRAVENOUS
COMMUNITY
Start: 2020-09-26 | End: 2020-10-29 | Stop reason: SDUPTHER

## 2020-10-09 RX ORDER — SYRINGE AND NEEDLE,INSULIN,1ML 25GX1"
SYRINGE, EMPTY DISPOSABLE MISCELLANEOUS
COMMUNITY
Start: 2020-08-07 | End: 2022-12-19 | Stop reason: SDUPTHER

## 2020-10-09 NOTE — PROGRESS NOTES
Chief Complaint   Patient presents with    Follow-up       Patient with fibromyalgia for a follow up    The patient location is: home  The chief complaint leading to consultation is: arthralgias    Visit type: audiovisual     Face to Face time with patient: 20  minutes of total time spent on the encounter, which includes face to face time and non-face to face time preparing to see the patient (eg, review of tests), Obtaining and/or reviewing separately obtained history, Documenting clinical information in the electronic or other health record, Independently interpreting results (not separately reported) and communicating results to the patient/family/caregiver, or Care coordination (not separately reported).       Each patient to whom he or she provides medical services by telemedicine is:  (1) informed of the relationship between the physician and patient and the respective role of any other health care provider with respect to management of the patient; and (2) notified that he or she may decline to receive medical services by telemedicine and may withdraw from such care at any time.    Notes:     History of presenting illness    65 year old white female has fibromyalgia : for 2 years now    She used to see :   : rheumatology    Tried :  lyrica : light headed,dizziness    She used to take cymbalta,she weaned off  She weaned off the celexa    Takes xanax to sleep  Takes tizanidine 4 mg x 2 daily   She takes trazodone 100 mg to sleep        She had severe pain,swelling and stiffness in her hands  She has done a CTS surgery and ulnar nerve decompression on the right side    Left hand CTS pending  She was on mtx 8 tabs weekly/folic acid   Humira was offered and she is not comfortable doing it   So we switched to mtx 1 ml weekly sc  Labs   CRP 14.2/elevated but better than 25.7  ESR nml  CMP nml  CBC nml  Left hand thumb and index finger MCP jb and rosannalls  Cortisone shots help   She did OT/PT      She had  xrays : bulging discs low back,L4-5  Hand xrays : OA   C spine : OA   She had feet spurs : injected     Headaches +  Takes 4 BP meds : metoprolol,lasix,diovan,norvasc   Migraines not on treatment  Light headedness+  Sees neurology : CT brains normal    She has seen neuro for the spine issues and also will see neurosurgeon    Episodes of nausea and feeling of sickness  Diarrhea+  Abdominal pain +  GI : they think this is irritable bowel syndrome   Only once in 2004 she had ischemic colitis  EUS all planned   Colonoscopy 2 years ago nml  EGD this year nml  There was no Crohn's on the recent w/u    Crestor gives her charley horses     Past history : pancreatitis,IBS  ADHD  Htn,migraine,anxiety,hypothyroidism,FMS,HLD,IBS    Cause of pancreatitis : unclear   Has had high ALPs for a while now     Blood work    High ESR,CRP    Negative RF,CCP  HLA B27 negative    Xrays    Hands : deg changes + punctate erosions proximal phalanx of the little finger   Knees : nml  C spine : deg changes  LS spine : deg changes,facet arthritis    MRI hands :  Right and left hands: Multifocal degenerative changes noted, most prominent in the IP joints, noting joint space loss and osteophytosis.  Prominent degenerative changes also noted at the 1st carpometacarpal and triscaphe joint of the wrist.  Subcortical erosive change noted in the scaphoid, capitate, and triquetrum.    There is prominent synovitis/enhancement at the MCP joints, carpal carpal joints, and proximal IP joint.  Periarticular erosions noted..  Enhancement also noted around the flexor tendon sheath, suggesting tenosynovitis. The flexor tendons demonstrate normal size and signal.    No fracture fracture.  No marrow replacement process.      Impression       Degenerative arthropathy of both hands with enhancement/ synovitis involving the MCP, carpal-carpal, and proximal IP joints, consistent with superimposed inflammatory component.     UE EMG/NCS :    Mild to moderately severe  bilateral carpal tunnel syndrome;   2. A primarily axonal right ulnar sensory neuropathy with some secondary demyelination;   3. Chronic denervation in the right C7 myotome consistent with chronic radiculopathy.     Family history : mom heart problems    Social history : quit smoking 1997      Review of Systems   Constitutional: Negative for activity change, appetite change, chills, diaphoresis, fatigue, fever and unexpected weight change.   HENT: Negative for congestion, dental problem, drooling, ear discharge, ear pain, facial swelling, hearing loss, mouth sores, nosebleeds, postnasal drip, rhinorrhea, sinus pressure, sinus pain, sneezing, sore throat, tinnitus, trouble swallowing and voice change.    Eyes: Negative for photophobia, pain, discharge, redness, itching and visual disturbance.   Respiratory: Negative for apnea, cough, choking, chest tightness, shortness of breath, wheezing and stridor.    Cardiovascular: Negative for chest pain, palpitations and leg swelling.   Gastrointestinal: Positive for abdominal pain, diarrhea and nausea. Negative for abdominal distention, anal bleeding, blood in stool, constipation, rectal pain and vomiting.   Endocrine: Negative for cold intolerance, heat intolerance, polydipsia, polyphagia and polyuria.   Genitourinary: Negative for decreased urine volume, difficulty urinating, dysuria, enuresis, flank pain, frequency, genital sores, hematuria and urgency.   Musculoskeletal: Positive for arthralgias. Negative for back pain, gait problem, joint swelling, myalgias, neck pain and neck stiffness.   Skin: Negative for color change, pallor, rash and wound.   Allergic/Immunologic: Negative for environmental allergies, food allergies and immunocompromised state.   Neurological: Negative for dizziness, tremors, seizures, syncope, facial asymmetry, speech difficulty, weakness, light-headedness, numbness and headaches.   Hematological: Negative for adenopathy. Does not bruise/bleed  easily.   Psychiatric/Behavioral: Negative for agitation, behavioral problems, confusion, decreased concentration, dysphoric mood, hallucinations, self-injury, sleep disturbance and suicidal ideas. The patient is not nervous/anxious and is not hyperactive.      Physical Exam     SANTIAGO-28 tender joint count: 0  SANTIAGO-28 swollen joint count: 0    All PIPs have osteophytes  Tender C/LS spine    Some swelling noted in the hand MCPs and PIPs     Physical Exam   Constitutional: She is oriented to person, place, and time and well-developed, well-nourished, and in no distress. No distress.   HENT:   Head: Normocephalic.   Mouth/Throat: Oropharynx is clear and moist.   Eyes: Conjunctivae are normal. Pupils are equal, round, and reactive to light. Right eye exhibits no discharge. Left eye exhibits no discharge. No scleral icterus.   Neck: Normal range of motion. No thyromegaly present.   Cardiovascular: Normal rate, regular rhythm, normal heart sounds and intact distal pulses.    Pulmonary/Chest: Effort normal and breath sounds normal. No stridor.   Abdominal: Soft. Bowel sounds are normal.   Lymphadenopathy:     She has no cervical adenopathy.   Neurological: She is alert and oriented to person, place, and time.   Skin: Skin is warm. No rash noted. She is not diaphoretic.     Psychiatric: Affect and judgment normal.   Musculoskeletal: Normal range of motion.           Assessment     65 year old white female comes with    -polyarthralgias in the hands,knees,low back and neck  -GI symptoms of nausea,diarrhea,abdominal pain  -anxiety > depression  -poor sleep  -headaches and light headedness    She has a diagnosis of fibromyalgia,osteoarthritis of hands,C,LS spine    She also has a diagnosis of IBS  GI ruled out crohn's and celiac disease  Recent pancreatitis,cause not known  Noted high ALP,but also low vit d,not sure if ALP is from liver or bone  Has ongoing diarrhea +  Waiting to get a second opinion     She has high inflammatory  markers  She has erosive changes on the xrays/MRI    She definitely has osteoarthritis of her C/LS spine    She has inflammatory arthritis in her hands/wrists  She has b/l CTS and right ulnar neuropathy and she has cervical radiculopathy  S/P surgery for the CTS repair and ulnar decompression   She had severe pain,swelling and stiffness in her hands  She has done a CTS surgery and ulnar nerve decompression on the right side    Left hand CTS pending  She was on mtx 8 tabs weekly/folic acid   Humira was offered and she is not comfortable doing it /not wiling to make any changes to her treatment at all  So we switched to mtx 1 ml weekly sc  She has some nausea   Labs   CRP 14.2/elevated but better than 25.7  ESR nml  CMP nml  CBC nml  Left hand thumb and index finger MCP hurts and swells  Cortisone shots were given to the C[facility] and tenosynovitis    She did OT/PT      1. Inflammatory arthritis        Reviewed labs/xrays  Reviewed medications    F/u    Plan    continue 1 ml mtx weekly sc    Nausea with mtx : increase folic acid to 3 tabs daily  Some blahs     Suggestions     Benadryl  25 to 50 mg with every mtx  And then 12 hours later benadryl    2 cups coffee am and 2 cups coffee pm ,3 hours later mtx  And again next day cofffee    Vitamin A 8000 IU/day      CBC,CMP,ESR,CRP in 3 months    In 3 months if joint pains persist and she has elevated inflammatory markers,we will once again revisit use of biologics    Flu,pneumonia and shingles vaccines offered     Referral to Dr.Sisco rojo for the left hand CTS and CMC OA surgery   Right now she only wants conservative measures   She also has a hand surgeon    Continue vit d replacement  Vit b12     Offered pain management,PT for her C/LS spine issues  OT hands    Tramadol for pain management   50 mg bid to tid     BP meds adjustments    Anxiety :Xanax 0.5 mg daily tid  Trazodone to sleep  Tizanidine bid     Offered counselling    Crestor and Lipitor give charley horses  Try  changing from crestor to something else  She has family h/o intolerance to statins    GI says she has IBS  She is on meds    Neurology w/u  Spine surgery f/u  To evaluate her C/LS spine issues and the neuropathy     Madalyn was seen today for follow-up.    Diagnoses and all orders for this visit:    Inflammatory arthritis  -     Sedimentation rate; Future  -     C-Reactive Protein; Future  -     CBC auto differential; Future  -     Comprehensive Metabolic Panel; Future          rtc in 3 months

## 2020-10-29 ENCOUNTER — OFFICE VISIT (OUTPATIENT)
Dept: FAMILY MEDICINE | Facility: CLINIC | Age: 65
End: 2020-10-29
Payer: MEDICARE

## 2020-10-29 VITALS
BODY MASS INDEX: 27.4 KG/M2 | DIASTOLIC BLOOD PRESSURE: 78 MMHG | OXYGEN SATURATION: 97 % | HEART RATE: 98 BPM | TEMPERATURE: 96 F | HEIGHT: 66 IN | SYSTOLIC BLOOD PRESSURE: 126 MMHG | WEIGHT: 170.5 LBS

## 2020-10-29 DIAGNOSIS — I10 ESSENTIAL HYPERTENSION: Primary | ICD-10-CM

## 2020-10-29 DIAGNOSIS — E78.2 MIXED HYPERLIPIDEMIA: ICD-10-CM

## 2020-10-29 DIAGNOSIS — R73.01 IFG (IMPAIRED FASTING GLUCOSE): ICD-10-CM

## 2020-10-29 DIAGNOSIS — E55.9 VITAMIN D DEFICIENCY: ICD-10-CM

## 2020-10-29 PROCEDURE — 3078F DIAST BP <80 MM HG: CPT | Mod: CPTII,S$GLB,, | Performed by: NURSE PRACTITIONER

## 2020-10-29 PROCEDURE — 1159F PR MEDICATION LIST DOCUMENTED IN MEDICAL RECORD: ICD-10-PCS | Mod: S$GLB,,, | Performed by: NURSE PRACTITIONER

## 2020-10-29 PROCEDURE — 3008F BODY MASS INDEX DOCD: CPT | Mod: CPTII,S$GLB,, | Performed by: NURSE PRACTITIONER

## 2020-10-29 PROCEDURE — 3078F PR MOST RECENT DIASTOLIC BLOOD PRESSURE < 80 MM HG: ICD-10-PCS | Mod: CPTII,S$GLB,, | Performed by: NURSE PRACTITIONER

## 2020-10-29 PROCEDURE — 1101F PT FALLS ASSESS-DOCD LE1/YR: CPT | Mod: CPTII,S$GLB,, | Performed by: NURSE PRACTITIONER

## 2020-10-29 PROCEDURE — 1101F PR PT FALLS ASSESS DOC 0-1 FALLS W/OUT INJ PAST YR: ICD-10-PCS | Mod: CPTII,S$GLB,, | Performed by: NURSE PRACTITIONER

## 2020-10-29 PROCEDURE — 1159F MED LIST DOCD IN RCRD: CPT | Mod: S$GLB,,, | Performed by: NURSE PRACTITIONER

## 2020-10-29 PROCEDURE — 99999 PR PBB SHADOW E&M-EST. PATIENT-LVL V: CPT | Mod: PBBFAC,,, | Performed by: NURSE PRACTITIONER

## 2020-10-29 PROCEDURE — 99999 PR PBB SHADOW E&M-EST. PATIENT-LVL V: ICD-10-PCS | Mod: PBBFAC,,, | Performed by: NURSE PRACTITIONER

## 2020-10-29 PROCEDURE — 1125F PR PAIN SEVERITY QUANTIFIED, PAIN PRESENT: ICD-10-PCS | Mod: S$GLB,,, | Performed by: NURSE PRACTITIONER

## 2020-10-29 PROCEDURE — 3074F SYST BP LT 130 MM HG: CPT | Mod: CPTII,S$GLB,, | Performed by: NURSE PRACTITIONER

## 2020-10-29 PROCEDURE — 99214 OFFICE O/P EST MOD 30 MIN: CPT | Mod: S$GLB,,, | Performed by: NURSE PRACTITIONER

## 2020-10-29 PROCEDURE — 3008F PR BODY MASS INDEX (BMI) DOCUMENTED: ICD-10-PCS | Mod: CPTII,S$GLB,, | Performed by: NURSE PRACTITIONER

## 2020-10-29 PROCEDURE — 1125F AMNT PAIN NOTED PAIN PRSNT: CPT | Mod: S$GLB,,, | Performed by: NURSE PRACTITIONER

## 2020-10-29 PROCEDURE — 3074F PR MOST RECENT SYSTOLIC BLOOD PRESSURE < 130 MM HG: ICD-10-PCS | Mod: CPTII,S$GLB,, | Performed by: NURSE PRACTITIONER

## 2020-10-29 PROCEDURE — 99214 PR OFFICE/OUTPT VISIT, EST, LEVL IV, 30-39 MIN: ICD-10-PCS | Mod: S$GLB,,, | Performed by: NURSE PRACTITIONER

## 2020-10-29 RX ORDER — METOPROLOL SUCCINATE 50 MG/1
50 TABLET, EXTENDED RELEASE ORAL DAILY
Qty: 90 TABLET | Refills: 1 | Status: SHIPPED | OUTPATIENT
Start: 2020-10-29 | End: 2022-04-08

## 2020-10-29 RX ORDER — POTASSIUM CHLORIDE 750 MG/1
10 CAPSULE, EXTENDED RELEASE ORAL 2 TIMES DAILY
Qty: 180 CAPSULE | Refills: 1 | Status: SHIPPED | OUTPATIENT
Start: 2020-10-29 | End: 2021-04-27

## 2020-10-29 RX ORDER — ERGOCALCIFEROL 1.25 MG/1
50000 CAPSULE ORAL
Qty: 12 CAPSULE | Refills: 3 | Status: SHIPPED | OUTPATIENT
Start: 2020-10-29 | End: 2021-04-28 | Stop reason: SDUPTHER

## 2020-10-29 RX ORDER — AMLODIPINE BESYLATE 10 MG/1
10 TABLET ORAL DAILY
Qty: 90 TABLET | Refills: 1 | Status: SHIPPED | OUTPATIENT
Start: 2020-10-29 | End: 2022-07-29

## 2020-10-29 RX ORDER — FUROSEMIDE 40 MG/1
40 TABLET ORAL DAILY
Qty: 90 TABLET | Refills: 1 | Status: SHIPPED | OUTPATIENT
Start: 2020-10-29 | End: 2020-12-17

## 2020-10-29 NOTE — PROGRESS NOTES
"Subjective:       Patient ID: Madalyn Iverson is a 65 y.o. female.    Chief Complaint: Follow-up (6 month follow up)    Patient is a 65-year-old white female with history of  ADHD, fibromyalgia, hypertension, hyperlipidemia, hypothyroidism, impaired fasting glucose, migraines, vitamin D deficiency, osteoarthritis to both hands and chronic GI problem that patient reported was ulcerative colitis that is now questionable that is here today for follow up with fasting lab results.     Patient has Hypertension and is currently taking Amlodipine 10 mg daily, Metoprolol 50 mg daily, and Lasix 40 mg with potassium supplement.   Blood pressure is controlled on present medications.  /78   Pulse 98   Temp 96 °F (35.6 °C) (Oral)   Ht 5' 6" (1.676 m)   Wt 77.3 kg (170 lb 8 oz)   SpO2 97%   BMI 27.52 kg/m²      Patient has Hyperlipidemia.  Patient has known STATIN INTOLERANCE due to report of leg cramping. She has been tried on several statin medications and has an intolerance.  In March 2019, I started patient on Zetia 10 mg daily.  Total cholesterol 213 with .6.     Patient has IFG/ Prediabetes with HgbA1C of 5.9% since March 2019 visit. With lifestyle modifications, HgbA1C remians 5.9%.  The CMP shows a blood sugar of 135 earlier this month but it is NOT fasting.       Patient has Vitamin D deficiency with a level of 13 - acute deficiency.  She was prescribed Vitamin D2 50,000 units weekly by her Rheumatologist BUT patient reported that Dr. Mathew, her GYN MD,  told her that she should be on D3 supplementation so took D3 43267 units every single day and level remained low at 13 - In April 2020, I changed patient back on Vitamin D2 50,000 every week and continued the oral Vitamin D3 5000 units daily and Vitamin D level is now 26 - advised to continue the same doses.    Patient has Anxiety that her rheumatologist  prescribes Xanax as needed.  She was on Citalopram 20 mg daily for years but stopped taking " around 2 years ago and states she was doing okay but now having increased anxiety again.  She reports she takes care of  and increased stressors.  She also reports she forgets things like when she walks in room, she forgets what she went to get.  She reports she went somewhere and forgot to turn her car off and left the engine running.  She states it is because she she is easily distracted with inability to focus and wants to get back on ADHD medication.  Patient does have a history of ADHD in the past that was treated for multiple years and was titrated down and off medication in 2018 due to uncontrolled hypertension and increased cardiovascular risks (see notes below). She does not feel like these events are secondary to memory loss/memory issues.  I advised patient that I would refer her to [facility] in [place] - gave number for patient to call and schedule appointment and the psychiatric specialist can decide on appropriate medication.     Patient has ADHD that has been treated for multiple years by Dr. Randhawa in the past and myself over the past several years.  I had titrated patient down from Adderall 30 mg twice daily (prescribed by Dr. Randhawa) over the past several years down to Adderall 10 mg twice daily and titrated patient off of medication with last prescription being written in May 2018 due to uncontrolled Hypertension and increased cardiovascular risks related to age >64.   Advised patient that Adderall is NOT recommended to patient over age 65 due to cardiovascular risk.  I advised patient that if she feels like her attention deficit is unmanageable with behavioral modifications - she should seek treatment from a psychiatrist for management as that is their specialty.     Patient has chronic abdominal pain with chronic diarrhea that was previously been being followed by Dr. Kelly and Dr. Lee.  Patient had reported that she had Ulcerative Colitis but repeat  colonoscopies do not support this.  ####Of note based on medical records, patient has been being followed by GI Dr. Lee/Robin since 2004.  The colonoscopy in April 2004 showed a few 8mm ulcers to the descending colon that could be suggestive of ulcerative colitis BUT repeat sigmoidoscopy in June 2004 showed no ulcerations present and patient has had normal colonoscopies since that time - please review media file for all colonoscopy reports from Dr. Lee and Keiko######  Patient is now being followed by [facility] Dr. Cesar PALM for chronic complaints.     Patient has Fibromyalgia and chronic pains that was being followed by Rheumatology, Dr. Thorpe in the past BUT now being followed and treated by [facility]. She continue to complain of chronic pains and fatigue - advised to discuss with her specialist.     Patient has chronic Migraines that was followed by Neurologist, Dr. Florez, in the past. Patient reports it has been awhile since she seen Dr. Florez.  Patient reported at previous visit of having problems with memory - very forgetful - walking into room but forgets what she was going to get, etc.  Advised patient to discuss the memory issues with neurology to rule out neurological etiology and if rule out and suspect it is secondary to ADD - then she will need to see psychiatry for further evaluation and management because due to patient age> 65 and cardiovascular risk of HTN and uncontrolled Hyperlipidemia, I am no longer willing to prescribe an amphetamine for ADD. Patient verbalized understanding.     Patient has Hypothyroidism that is followed by her GYN MD, Dr. Mathew.      Patient  Has chronic neck and back pain followed by Neurosurgery Dr. Bautista.         Component      Latest Ref Rng & Units 10/23/2020 10/2/2020 7/16/2020 1/22/2020   WBC      3.90 - 12.70 K/uL  10.72 10.66    RBC      4.00 - 5.40 M/uL  4.81 4.54    Hemoglobin      12.0 - 16.0 g/dL  15.0 14.2     Hematocrit      37.0 - 48.5 %  45.2 43.0    MCV      82 - 98 fL  94 95    MCH      27.0 - 31.0 pg  31.2 (H) 31.3 (H)    MCHC      32.0 - 36.0 g/dL  33.2 33.0    RDW      11.5 - 14.5 %  12.9 13.1    Platelets      150 - 350 K/uL  233 226    MPV      9.2 - 12.9 fL  11.7 11.8    Immature Granulocytes      0.0 - 0.5 %  0.4 0.4    Gran # (ANC)      1.8 - 7.7 K/uL  7.7 7.2    Immature Grans (Abs)      0.00 - 0.04 K/uL  0.04 0.04    Lymph #      1.0 - 4.8 K/uL  2.2 2.2    Mono #      0.3 - 1.0 K/uL  0.7 1.0    Eos #      0.0 - 0.5 K/uL  0.1 0.1    Baso #      0.00 - 0.20 K/uL  0.07 0.09    nRBC      0 /100 WBC  0 0    Gran %      38.0 - 73.0 %  71.4 67.9    Lymph %      18.0 - 48.0 %  20.2 20.3    Mono %      4.0 - 15.0 %  6.6 9.6    Eosinophil %      0.0 - 8.0 %  0.7 1.0    Basophil %      0.0 - 1.9 %  0.7 0.8    Differential Method        Automated Automated    Sodium      136 - 145 mmol/L  144 142    Potassium      3.5 - 5.1 mmol/L  4.3 4.4    Chloride      95 - 110 mmol/L  105 100    CO2      23 - 29 mmol/L  31 (H) 29    Glucose      70 - 110 mg/dL  135 (H) 135 (H)    BUN      7 - 17 mg/dL  11 22 (H)    Creatinine      0.50 - 1.40 mg/dL  0.72 0.89    Calcium      8.7 - 10.5 mg/dL  9.3 9.3    PROTEIN TOTAL      6.0 - 8.4 g/dL  7.6 7.8    Albumin      3.5 - 5.2 g/dL  4.1 4.3    BILIRUBIN TOTAL      0.1 - 1.0 mg/dL  0.5 0.4    Alkaline Phosphatase      38 - 126 U/L  137 (H) 133 (H)    AST      15 - 46 U/L  25 29    ALT      10 - 44 U/L  21 19    Anion Gap      8 - 16 mmol/L  8 13    eGFR if African American      >60 mL/min/1.73 m:2  >60.0 >60.0    eGFR if non African American      >60 mL/min/1.73 m:2  >60.0 >60.0    Cholesterol      120 - 199 mg/dL 213 (H)   212 (H)   Triglycerides      30 - 150 mg/dL 222 (H)   161 (H)   HDL      40 - 75 mg/dL 53   52   LDL Cholesterol External      63.0 - 159.0 mg/dL 115.6   127.8   HDL/Cholesterol Ratio      20.0 - 50.0 % 24.9   24.5   Total Cholesterol/HDL Ratio      2.0 - 5.0 4.0    "4.1   Non-HDL Cholesterol      mg/dL 160   160   Hemoglobin A1C External      4.0 - 5.6 % 5.9 (H)   5.6   Estimated Avg Glucose      68 - 131 mg/dL 123   114   Vit D, 25-Hydroxy      30 - 96 ng/mL 26 (L)   13 (L)     Current Outpatient Medications   Medication Sig Dispense Refill    acetaminophen (TYLENOL) 500 MG tablet Take 1 tablet (500 mg total) by mouth every 6 (six) hours as needed for Pain. 28 tablet 0    ALPRAZolam (XANAX) 0.5 MG tablet TAKE 1 TABLET BY MOUTH THREE TIMES A DAY AS NEEDED 90 tablet 3    amLODIPine (NORVASC) 10 MG tablet TAKE 1 TABLET BY MOUTH EVERY DAY 30 tablet 0    BD INSULIN SYRINGE 1 mL 25 gauge x 5/8" Syrg       cholecalciferol, vitamin D3, (VITAMIN D3) 125 mcg (5,000 unit) Tab Take 1 tablet (5,000 Units total) by mouth once daily.      CYANOCOBALAMIN, VITAMIN B-12, (VITAMIN B-12 INJ) Inject 1,000 mcg as directed every 30 days.      dicyclomine (BENTYL) 20 mg tablet Take 1 tablet (20 mg total) by mouth 4 (four) times daily. 120 tablet 5    ergocalciferol (ERGOCALCIFEROL) 50,000 unit Cap Take 1 capsule (50,000 Units total) by mouth every 7 days. 4 capsule 5    estradiol (ESTRACE) 2 MG tablet Take 2 mg by mouth once daily.  3    ezetimibe (ZETIA) 10 mg tablet TAKE 1 TABLET BY MOUTH EVERY DAY 90 tablet 1    folic acid (FOLVITE) 1 MG tablet TAKE 1 TABLET BY MOUTH EVERY DAY 90 tablet 1    furosemide (LASIX) 40 MG tablet TAKE 1 TABLET BY MOUTH EVERY DAY 90 tablet 0    methotrexate 25 mg/mL injection Inject 1 ml into the skin weekly. 5 mL 11    methotrexate, PF, 25 mg/mL Soln       metoclopramide HCl (REGLAN) 10 MG tablet Take 1 tablet (10 mg total) by mouth every 6 (six) hours as needed. 12 tablet 0    metoprolol succinate (TOPROL-XL) 50 MG 24 hr tablet TAKE 1 TABLET BY MOUTH EVERY DAY 90 tablet 0    potassium chloride (MICRO-K) 10 MEQ CpSR TAKE 1 CAPSULE BY MOUTH TWICE A  capsule 0    syringe-needle,safety,disp unt 1 mL 25 gauge x 5/8" Syrg To use with mtx injections 20 " Syringe 0    thyroid, pork, (ARMOUR THYROID) 60 mg Tab [place] Thyroid 60 mg tablet      tiZANidine (ZANAFLEX) 4 MG tablet TAKE 1 TABLET BY MOUTH FOUR TIMES A  tablet 3    traMADoL (ULTRAM) 50 mg tablet TAKE 2-3 TABLETS BY MOUTH DAILY 90 tablet 4    traZODone (DESYREL) 100 MG tablet       traZODone (DESYREL) 100 MG tablet TAKE 1 TABLET (100 MG TOTAL) BY MOUTH EVERY EVENING. 90 tablet 1     No current facility-administered medications for this visit.      Facility-Administered Medications Ordered in Other Visits   Medication Dose Route Frequency Provider Last Rate Last Dose    0.9%  NaCl infusion   Intravenous Continuous Jermaine Chatman MD   Stopped at 03/27/19 1040    mupirocin 2 % ointment   Nasal On Call Procedure Jermaine Chatman MD           Past Medical History:   Diagnosis Date    Acute biliary pancreatitis without infection or necrosis 6/13/2018    Acute pancreatitis     June 2018 - [facility]    ADHD (attention deficit hyperactivity disorder), inattentive type     Chronic back pain     Followed by [facility] - Dr. Bautista - has been having medial branch blocks    Fibromyalgia     treated by Dr. Thorpe in past and now treated by Dr. Yates - Rheumatologist    Hyperlipidemia     High triglycerides    Hypertension     Hypothyroidism     Treated by Dr. Mathew    IFG (impaired fasting glucose)     Migraine     Treated by neurologist - Dr. Destiny Florez    Ulcerative colitis     Patient reported treated by Dr. Mcfadden in past  but on admit in 2018 - no UC seen on recent colonoscopy.  It was noted on colonoscopy in April 2004 there were ulcers noted to descending colon but not present on sigmoidoscopy in June 2004.    Vitamin D deficiency 3/31/2016       Past Surgical History:   Procedure Laterality Date    CARPAL TUNNEL RELEASE Right 3/27/2019    Procedure: RELEASE, CARPAL TUNNEL right;  Surgeon: Li WYATT  MD Christin;  Location: [facility] OR 1ST FLR;  Service: Orthopedics;  Laterality: Right;  stretcher, supine, hand pan 1 and 2     SECTION      COLONOSCOPY  10/08/2010    Dr. Lee - repeat in 5 years - has appointment for colonoscopy 2016    COLONOSCOPY  2016    Dr. Kelly - normal  colon - repeat in 10 years - scanned report to media file.    ENDOSCOPIC ULTRASOUND OF UPPER GASTROINTESTINAL TRACT N/A 2018    Procedure: ULTRASOUND, ENDOSCOPIC, UPPER GI TRACT;  Surgeon: Reji Russell MD;  Location: [facility] ENDO;  Service: Endoscopy;  Laterality: N/A;    ENDOSCOPIC ULTRASOUND OF UPPER GASTROINTESTINAL TRACT N/A 3/4/2019    Procedure: ULTRASOUND, UPPER GI TRACT, ENDOSCOPIC;  Surgeon: Randy Boyd MD;  Location: [facility] ENDO;  Service: Endoscopy;  Laterality: N/A;    HYSTERECTOMY      LAPAROSCOPIC CHOLECYSTECTOMY N/A 3/6/2019    Procedure: CHOLECYSTECTOMY, LAPAROSCOPIC;  Surgeon: Hill Palacios MD;  Location: [facility] OR;  Service: General;  Laterality: N/A;    medial branch block      done at [facility] for axial back pain    OOPHORECTOMY      SHOULDER SURGERY Left     TONSILLECTOMY      upper and lower GI  2016       Family History   Problem Relation Age of Onset    Hypertension Mother     Heart disease Mother     Hyperlipidemia Mother     Cancer Sister 53        thyroid cancer and lymph nodes involvement    Cancer Brother 61        colon and lung cancer    Colon cancer Brother 66    Cancer Brother 63        colon    Hypertension Brother     Diabetes Brother     Colon cancer Brother 60    Esophageal cancer Neg Hx     Stomach cancer Neg Hx     Ulcerative colitis Neg Hx     Crohn's disease Neg Hx     Irritable bowel syndrome Neg Hx     Celiac disease Neg Hx        Social History     Socioeconomic History    Marital status:      Spouse name: Not on file    Number of children: Not on file    Years of education: Not on file    Highest  education level: Not on file   Occupational History    Not on file   Social Needs    Financial resource strain: Not very hard    Food insecurity     Worry: Never true     Inability: Never true    Transportation needs     Medical: No     Non-medical: No   Tobacco Use    Smoking status: Former Smoker     Packs/day: 1.00     Years: 20.00     Pack years: 20.00     Quit date: 1997     Years since quittin.7    Smokeless tobacco: Former User   Substance and Sexual Activity    Alcohol use: No     Frequency: Never     Drinks per session: Patient refused     Binge frequency: Never    Drug use: No    Sexual activity: Never   Lifestyle    Physical activity     Days per week: 0 days     Minutes per session: 0 min    Stress: Very much   Relationships    Social connections     Talks on phone: More than three times a week     Gets together: Never     Attends Islam service: Not on file     Active member of club or organization: No     Attends meetings of clubs or organizations: Never     Relationship status:    Other Topics Concern    Not on file   Social History Narrative    Not on file       Review of Systems   Constitutional: Negative for appetite change, chills, fatigue, fever and unexpected weight change.   HENT: Negative for congestion, ear pain, mouth sores, nosebleeds, postnasal drip, rhinorrhea, sinus pressure, sneezing, sore throat, trouble swallowing and voice change.    Eyes: Negative for photophobia, pain, discharge, redness, itching and visual disturbance.   Respiratory: Negative for cough, chest tightness and shortness of breath.    Cardiovascular: Negative for chest pain, palpitations and leg swelling.   Gastrointestinal: Negative for abdominal pain, blood in stool, constipation, diarrhea, nausea and vomiting.   Genitourinary: Negative for dysuria, frequency, hematuria and urgency.   Musculoskeletal: Negative for arthralgias, back pain, joint swelling and myalgias.   Skin: Negative  "for color change and rash.   Allergic/Immunologic: Negative for immunocompromised state.   Neurological: Negative for dizziness, seizures, syncope, weakness and headaches.        Memory issues   Hematological: Negative for adenopathy. Does not bruise/bleed easily.   Psychiatric/Behavioral: Positive for decreased concentration. Negative for agitation, dysphoric mood, sleep disturbance and suicidal ideas. The patient is nervous/anxious.          Objective:     Vitals:    10/29/20 1020   BP: 126/78   Pulse: 98   Temp: 96 °F (35.6 °C)   TempSrc: Oral   SpO2: 97%   Weight: 77.3 kg (170 lb 8 oz)   Height: 5' 6" (1.676 m)          Physical Exam  Constitutional:       General: She is not in acute distress.     Appearance: She is well-developed. She is not diaphoretic.      Comments: .Body mass index is 27.52 kg/m².       HENT:      Head: Normocephalic and atraumatic.      Right Ear: External ear normal.      Left Ear: External ear normal.      Nose: Nose normal.      Mouth/Throat:      Pharynx: No oropharyngeal exudate.   Eyes:      Conjunctiva/sclera: Conjunctivae normal.      Pupils: Pupils are equal, round, and reactive to light.   Neck:      Musculoskeletal: Normal range of motion and neck supple.      Thyroid: No thyromegaly.      Vascular: No JVD.      Trachea: No tracheal deviation.   Cardiovascular:      Rate and Rhythm: Normal rate and regular rhythm.      Heart sounds: Normal heart sounds. No murmur.   Pulmonary:      Effort: Pulmonary effort is normal. No respiratory distress.      Breath sounds: Normal breath sounds. No stridor. No wheezing or rales.   Abdominal:      General: Bowel sounds are normal. There is no distension.      Palpations: Abdomen is soft. There is no mass.      Tenderness: There is no guarding or rebound.   Musculoskeletal: Normal range of motion.   Lymphadenopathy:      Cervical: No cervical adenopathy.   Skin:     General: Skin is warm and dry.      Findings: No rash.   Neurological:      " Mental Status: She is alert and oriented to person, place, and time.      Cranial Nerves: No cranial nerve deficit.      Coordination: Coordination normal.   Psychiatric:         Mood and Affect: Mood is anxious.         Behavior: Behavior normal.           Assessment:         ICD-10-CM ICD-9-CM   1. Essential hypertension  I10 401.9   2. Vitamin D deficiency  E55.9 268.9   3. IFG (impaired fasting glucose)  R73.01 790.21   4. Mixed hyperlipidemia  E78.2 272.2       Plan:       Essential hypertension  -  Controlled on present medications.  Recheck in 6 months.  -     furosemide (LASIX) 40 MG tablet; Take 1 tablet (40 mg total) by mouth once daily.  Dispense: 90 tablet; Refill: 1  -     metoprolol succinate (TOPROL-XL) 50 MG 24 hr tablet; Take 1 tablet (50 mg total) by mouth once daily.  Dispense: 90 tablet; Refill: 1  -     amLODIPine (NORVASC) 10 MG tablet; Take 1 tablet (10 mg total) by mouth once daily.  Dispense: 90 tablet; Refill: 1  -     potassium chloride (MICRO-K) 10 MEQ CpSR; Take 1 capsule (10 mEq total) by mouth 2 (two) times daily.  Dispense: 180 capsule; Refill: 1    Vitamin D deficiency  -  Continue the Vitamin D2 50,000 units once weekly in addition to Vitamin D3 5000 units daily and recheck in 6 months.  -     ergocalciferol (ERGOCALCIFEROL) 50,000 unit Cap; Take 1 capsule (50,000 Units total) by mouth every 7 days.  Dispense: 12 capsule; Refill: 3  -     Vitamin D; Future; Expected date: 10/29/2020    IFG (impaired fasting glucose)  -  Decrease sugars and carbs in diet and recheck in 6 months.  -     Hemoglobin A1C; Future; Expected date: 10/29/2020    Mixed hyperlipidemia  -  STATIN INTOLERANCE; continue Zetia at present dose and work on diet  -     Lipid Panel; Future; Expected date: 10/29/2020      Follow up in about 6 months (around 4/29/2021) for fasting labs and follow up.     Patient's Medications   New Prescriptions    No medications on file   Previous Medications    ALPRAZOLAM (XANAX) 0.5  "MG TABLET    TAKE 1 TABLET BY MOUTH THREE TIMES A DAY AS NEEDED    BD INSULIN SYRINGE 1 ML 25 GAUGE X 5/8" SYRG        CHOLECALCIFEROL, VITAMIN D3, (VITAMIN D3) 125 MCG (5,000 UNIT) TAB    Take 1 tablet (5,000 Units total) by mouth once daily.    CYANOCOBALAMIN, VITAMIN B-12, (VITAMIN B-12 INJ)    Inject 1,000 mcg as directed every 30 days.    DICYCLOMINE (BENTYL) 20 MG TABLET    Take 1 tablet (20 mg total) by mouth 4 (four) times daily.    ESTRADIOL (ESTRACE) 2 MG TABLET    Take 2 mg by mouth once daily.    EZETIMIBE (ZETIA) 10 MG TABLET    TAKE 1 TABLET BY MOUTH EVERY DAY    FOLIC ACID (FOLVITE) 1 MG TABLET    TAKE 1 TABLET BY MOUTH EVERY DAY    METHOTREXATE 25 MG/ML INJECTION    Inject 1 ml into the skin weekly.    METOCLOPRAMIDE HCL (REGLAN) 10 MG TABLET    Take 1 tablet (10 mg total) by mouth every 6 (six) hours as needed.    SYRINGE-NEEDLE,SAFETY,DISP UNT 1 ML 25 GAUGE X 5/8" SYRG    To use with mtx injections    THYROID, PORK, (ARMOUR THYROID) 60 MG TAB    [place] Thyroid 60 mg tablet    TIZANIDINE (ZANAFLEX) 4 MG TABLET    TAKE 1 TABLET BY MOUTH FOUR TIMES A DAY    TRAMADOL (ULTRAM) 50 MG TABLET    TAKE 2-3 TABLETS BY MOUTH DAILY    TRAZODONE (DESYREL) 100 MG TABLET    TAKE 1 TABLET (100 MG TOTAL) BY MOUTH EVERY EVENING.   Modified Medications    Modified Medication Previous Medication    AMLODIPINE (NORVASC) 10 MG TABLET amLODIPine (NORVASC) 10 MG tablet       Take 1 tablet (10 mg total) by mouth once daily.    TAKE 1 TABLET BY MOUTH EVERY DAY    ERGOCALCIFEROL (ERGOCALCIFEROL) 50,000 UNIT CAP ergocalciferol (ERGOCALCIFEROL) 50,000 unit Cap       Take 1 capsule (50,000 Units total) by mouth every 7 days.    Take 1 capsule (50,000 Units total) by mouth every 7 days.    FUROSEMIDE (LASIX) 40 MG TABLET furosemide (LASIX) 40 MG tablet       Take 1 tablet (40 mg total) by mouth once daily.    TAKE 1 TABLET BY MOUTH EVERY DAY    METOPROLOL SUCCINATE (TOPROL-XL) 50 MG 24 HR TABLET metoprolol succinate (TOPROL-XL) " 50 MG 24 hr tablet       Take 1 tablet (50 mg total) by mouth once daily.    TAKE 1 TABLET BY MOUTH EVERY DAY    POTASSIUM CHLORIDE (MICRO-K) 10 MEQ CPSR potassium chloride (MICRO-K) 10 MEQ CpSR       Take 1 capsule (10 mEq total) by mouth 2 (two) times daily.    TAKE 1 CAPSULE BY MOUTH TWICE A DAY   Discontinued Medications    ACETAMINOPHEN (TYLENOL) 500 MG TABLET    Take 1 tablet (500 mg total) by mouth every 6 (six) hours as needed for Pain.    METHOTREXATE, PF, 25 MG/ML SOLN        TRAZODONE (DESYREL) 100 MG TABLET

## 2020-11-20 ENCOUNTER — TELEPHONE (OUTPATIENT)
Dept: RHEUMATOLOGY | Facility: CLINIC | Age: 65
End: 2020-11-20

## 2020-11-20 NOTE — TELEPHONE ENCOUNTER
----- Message from Aurora Bernal MA sent at 11/20/2020 10:52 AM CST -----    ----- Message -----  From: Lynette Barba  Sent: 11/20/2020  10:49 AM CST  To: Trudy Rodriguez Staff    Pt is calling to speak with the nurse about get her handicapp sticker renew and would like for the nurse to give her a call back at [phone]

## 2020-11-30 ENCOUNTER — TELEPHONE (OUTPATIENT)
Dept: RHEUMATOLOGY | Facility: CLINIC | Age: 65
End: 2020-11-30

## 2020-11-30 NOTE — TELEPHONE ENCOUNTER
Spoke with the patient and let her know that I put her letter in the mail on Friday and to call back later this week if she has not received it

## 2020-11-30 NOTE — TELEPHONE ENCOUNTER
----- Message from Lashanda Melton sent at 11/30/2020 10:30 AM CST -----  Pt asking for a callback regarding to paperwork about  handicapped sticker please contact pt         Contact info [phone]

## 2021-01-04 ENCOUNTER — TELEPHONE (OUTPATIENT)
Dept: RHEUMATOLOGY | Facility: CLINIC | Age: 66
End: 2021-01-04

## 2021-01-11 ENCOUNTER — OFFICE VISIT (OUTPATIENT)
Dept: RHEUMATOLOGY | Facility: CLINIC | Age: 66
End: 2021-01-11
Payer: MEDICARE

## 2021-01-11 DIAGNOSIS — M79.7 FIBROMYALGIA: Chronic | ICD-10-CM

## 2021-01-11 DIAGNOSIS — G56.02 CARPAL TUNNEL SYNDROME OF LEFT WRIST: ICD-10-CM

## 2021-01-11 DIAGNOSIS — M06.00 SERONEGATIVE RHEUMATOID ARTHRITIS: ICD-10-CM

## 2021-01-11 DIAGNOSIS — M19.042 PRIMARY OSTEOARTHRITIS OF BOTH HANDS: Primary | ICD-10-CM

## 2021-01-11 DIAGNOSIS — M19.041 PRIMARY OSTEOARTHRITIS OF BOTH HANDS: Primary | ICD-10-CM

## 2021-01-11 PROCEDURE — 99214 PR OFFICE/OUTPT VISIT, EST, LEVL IV, 30-39 MIN: ICD-10-PCS | Mod: 95,,, | Performed by: INTERNAL MEDICINE

## 2021-01-11 PROCEDURE — 1159F PR MEDICATION LIST DOCUMENTED IN MEDICAL RECORD: ICD-10-PCS | Mod: 95,,, | Performed by: INTERNAL MEDICINE

## 2021-01-11 PROCEDURE — 1159F MED LIST DOCD IN RCRD: CPT | Mod: 95,,, | Performed by: INTERNAL MEDICINE

## 2021-01-11 PROCEDURE — 99214 OFFICE O/P EST MOD 30 MIN: CPT | Mod: 95,,, | Performed by: INTERNAL MEDICINE

## 2021-01-11 RX ORDER — TRAMADOL HYDROCHLORIDE 50 MG/1
TABLET ORAL
Qty: 120 TABLET | Refills: 4 | Status: SHIPPED | OUTPATIENT
Start: 2021-01-11 | End: 2021-04-15

## 2021-01-11 RX ORDER — TRAZODONE HYDROCHLORIDE 100 MG/1
100 TABLET ORAL NIGHTLY
Qty: 90 TABLET | Refills: 1 | Status: SHIPPED | OUTPATIENT
Start: 2021-01-11 | End: 2021-10-07 | Stop reason: SDUPTHER

## 2021-01-11 RX ORDER — METHOTREXATE 25 MG/ML
INJECTION, SOLUTION INTRA-ARTERIAL; INTRAMUSCULAR; INTRAVENOUS
Qty: 10 ML | Refills: 11 | OUTPATIENT
Start: 2021-01-11 | End: 2021-04-24

## 2021-01-12 ENCOUNTER — SPECIALTY PHARMACY (OUTPATIENT)
Dept: PHARMACY | Facility: CLINIC | Age: 66
End: 2021-01-12

## 2021-02-04 ENCOUNTER — IMMUNIZATION (OUTPATIENT)
Dept: FAMILY MEDICINE | Facility: CLINIC | Age: 66
End: 2021-02-04
Payer: MEDICARE

## 2021-02-04 DIAGNOSIS — Z23 NEED FOR VACCINATION: Primary | ICD-10-CM

## 2021-02-04 PROCEDURE — 91300 COVID-19, MRNA, LNP-S, PF, 30 MCG/0.3 ML DOSE VACCINE: CPT | Mod: PBBFAC | Performed by: FAMILY MEDICINE

## 2021-02-23 ENCOUNTER — PATIENT MESSAGE (OUTPATIENT)
Dept: RHEUMATOLOGY | Facility: CLINIC | Age: 66
End: 2021-02-23

## 2021-02-25 ENCOUNTER — IMMUNIZATION (OUTPATIENT)
Dept: FAMILY MEDICINE | Facility: CLINIC | Age: 66
End: 2021-02-25
Payer: MEDICARE

## 2021-02-25 DIAGNOSIS — Z23 NEED FOR VACCINATION: Primary | ICD-10-CM

## 2021-02-25 PROCEDURE — 91300 COVID-19, MRNA, LNP-S, PF, 30 MCG/0.3 ML DOSE VACCINE: CPT | Mod: PBBFAC | Performed by: FAMILY MEDICINE

## 2021-02-25 PROCEDURE — 0002A COVID-19, MRNA, LNP-S, PF, 30 MCG/0.3 ML DOSE VACCINE: CPT | Mod: PBBFAC | Performed by: FAMILY MEDICINE

## 2021-03-31 ENCOUNTER — TELEPHONE (OUTPATIENT)
Dept: PHARMACY | Facility: CLINIC | Age: 66
End: 2021-03-31

## 2021-04-05 ENCOUNTER — PATIENT MESSAGE (OUTPATIENT)
Dept: ADMINISTRATIVE | Facility: HOSPITAL | Age: 66
End: 2021-04-05

## 2021-04-22 ENCOUNTER — PATIENT OUTREACH (OUTPATIENT)
Dept: ADMINISTRATIVE | Facility: OTHER | Age: 66
End: 2021-04-22

## 2021-04-22 ENCOUNTER — PATIENT MESSAGE (OUTPATIENT)
Dept: ADMINISTRATIVE | Facility: OTHER | Age: 66
End: 2021-04-22

## 2021-04-22 DIAGNOSIS — Z12.31 ENCOUNTER FOR SCREENING MAMMOGRAM FOR MALIGNANT NEOPLASM OF BREAST: Primary | ICD-10-CM

## 2021-04-24 RX ORDER — METHOTREXATE 25 MG/ML
INJECTION, SOLUTION INTRA-ARTERIAL; INTRAMUSCULAR; INTRAVENOUS
Qty: 10 ML | Refills: 11 | Status: SHIPPED | OUTPATIENT
Start: 2021-04-24 | End: 2023-01-25

## 2021-04-24 RX ORDER — METHOTREXATE 25 MG/ML
INJECTION, SOLUTION INTRA-ARTERIAL; INTRAMUSCULAR; INTRAVENOUS
Qty: 10 ML | Refills: 11 | Status: SHIPPED | OUTPATIENT
Start: 2021-04-24 | End: 2021-04-24

## 2021-04-24 RX ORDER — METHOTREXATE 25 MG/ML
INJECTION, SOLUTION INTRA-ARTERIAL; INTRAMUSCULAR; INTRAVENOUS
Qty: 12 ML | Refills: 3 | Status: SHIPPED | OUTPATIENT
Start: 2021-04-24 | End: 2021-04-28 | Stop reason: SDUPTHER

## 2021-04-26 ENCOUNTER — SPECIALTY PHARMACY (OUTPATIENT)
Dept: PHARMACY | Facility: CLINIC | Age: 66
End: 2021-04-26

## 2021-04-26 ENCOUNTER — OFFICE VISIT (OUTPATIENT)
Dept: RHEUMATOLOGY | Facility: CLINIC | Age: 66
End: 2021-04-26
Payer: MEDICARE

## 2021-04-26 DIAGNOSIS — M13.80 SERONEGATIVE ARTHRITIS: Primary | ICD-10-CM

## 2021-04-26 PROCEDURE — 1159F MED LIST DOCD IN RCRD: CPT | Mod: 95,,, | Performed by: INTERNAL MEDICINE

## 2021-04-26 PROCEDURE — 99214 OFFICE O/P EST MOD 30 MIN: CPT | Mod: 95,,, | Performed by: INTERNAL MEDICINE

## 2021-04-26 PROCEDURE — 99214 PR OFFICE/OUTPT VISIT, EST, LEVL IV, 30-39 MIN: ICD-10-PCS | Mod: 95,,, | Performed by: INTERNAL MEDICINE

## 2021-04-26 PROCEDURE — 1159F PR MEDICATION LIST DOCUMENTED IN MEDICAL RECORD: ICD-10-PCS | Mod: 95,,, | Performed by: INTERNAL MEDICINE

## 2021-04-26 PROCEDURE — 1125F AMNT PAIN NOTED PAIN PRSNT: CPT | Mod: 95,,, | Performed by: INTERNAL MEDICINE

## 2021-04-26 PROCEDURE — 1125F PR PAIN SEVERITY QUANTIFIED, PAIN PRESENT: ICD-10-PCS | Mod: 95,,, | Performed by: INTERNAL MEDICINE

## 2021-04-26 RX ORDER — SULFASALAZINE 500 MG/1
TABLET ORAL
Qty: 180 TABLET | Refills: 5 | Status: SHIPPED | OUTPATIENT
Start: 2021-04-26 | End: 2021-10-26

## 2021-04-26 RX ORDER — HYDROXYCHLOROQUINE SULFATE 200 MG/1
200 TABLET, FILM COATED ORAL 2 TIMES DAILY
Qty: 60 TABLET | Refills: 5 | Status: SHIPPED | OUTPATIENT
Start: 2021-04-26 | End: 2021-05-26

## 2021-04-28 ENCOUNTER — OFFICE VISIT (OUTPATIENT)
Dept: FAMILY MEDICINE | Facility: CLINIC | Age: 66
End: 2021-04-28
Payer: MEDICARE

## 2021-04-28 ENCOUNTER — IMMUNIZATION (OUTPATIENT)
Dept: PHARMACY | Facility: CLINIC | Age: 66
End: 2021-04-28
Payer: MEDICARE

## 2021-04-28 VITALS
DIASTOLIC BLOOD PRESSURE: 78 MMHG | TEMPERATURE: 98 F | HEIGHT: 66 IN | WEIGHT: 167.56 LBS | RESPIRATION RATE: 18 BRPM | OXYGEN SATURATION: 98 % | HEART RATE: 108 BPM | BODY MASS INDEX: 26.93 KG/M2 | SYSTOLIC BLOOD PRESSURE: 138 MMHG

## 2021-04-28 DIAGNOSIS — R73.01 IFG (IMPAIRED FASTING GLUCOSE): ICD-10-CM

## 2021-04-28 DIAGNOSIS — E78.2 MIXED HYPERLIPIDEMIA: ICD-10-CM

## 2021-04-28 DIAGNOSIS — I10 ESSENTIAL HYPERTENSION: ICD-10-CM

## 2021-04-28 DIAGNOSIS — I70.0 AORTIC ATHEROSCLEROSIS: Primary | ICD-10-CM

## 2021-04-28 DIAGNOSIS — M06.00 SERONEGATIVE RHEUMATOID ARTHRITIS: ICD-10-CM

## 2021-04-28 DIAGNOSIS — Z78.9 STATIN INTOLERANCE: ICD-10-CM

## 2021-04-28 DIAGNOSIS — E55.9 VITAMIN D DEFICIENCY: ICD-10-CM

## 2021-04-28 PROCEDURE — 3288F FALL RISK ASSESSMENT DOCD: CPT | Mod: CPTII,S$GLB,, | Performed by: NURSE PRACTITIONER

## 2021-04-28 PROCEDURE — 99214 PR OFFICE/OUTPT VISIT, EST, LEVL IV, 30-39 MIN: ICD-10-PCS | Mod: S$GLB,,, | Performed by: NURSE PRACTITIONER

## 2021-04-28 PROCEDURE — 99214 OFFICE O/P EST MOD 30 MIN: CPT | Mod: S$GLB,,, | Performed by: NURSE PRACTITIONER

## 2021-04-28 PROCEDURE — 99999 PR PBB SHADOW E&M-EST. PATIENT-LVL V: ICD-10-PCS | Mod: PBBFAC,,, | Performed by: NURSE PRACTITIONER

## 2021-04-28 PROCEDURE — 1159F MED LIST DOCD IN RCRD: CPT | Mod: S$GLB,,, | Performed by: NURSE PRACTITIONER

## 2021-04-28 PROCEDURE — 3008F PR BODY MASS INDEX (BMI) DOCUMENTED: ICD-10-PCS | Mod: CPTII,S$GLB,, | Performed by: NURSE PRACTITIONER

## 2021-04-28 PROCEDURE — 99999 PR PBB SHADOW E&M-EST. PATIENT-LVL V: CPT | Mod: PBBFAC,,, | Performed by: NURSE PRACTITIONER

## 2021-04-28 PROCEDURE — 1126F PR PAIN SEVERITY QUANTIFIED, NO PAIN PRESENT: ICD-10-PCS | Mod: S$GLB,,, | Performed by: NURSE PRACTITIONER

## 2021-04-28 PROCEDURE — 3288F PR FALLS RISK ASSESSMENT DOCUMENTED: ICD-10-PCS | Mod: CPTII,S$GLB,, | Performed by: NURSE PRACTITIONER

## 2021-04-28 PROCEDURE — 3008F BODY MASS INDEX DOCD: CPT | Mod: CPTII,S$GLB,, | Performed by: NURSE PRACTITIONER

## 2021-04-28 PROCEDURE — 1101F PR PT FALLS ASSESS DOC 0-1 FALLS W/OUT INJ PAST YR: ICD-10-PCS | Mod: CPTII,S$GLB,, | Performed by: NURSE PRACTITIONER

## 2021-04-28 PROCEDURE — 1101F PT FALLS ASSESS-DOCD LE1/YR: CPT | Mod: CPTII,S$GLB,, | Performed by: NURSE PRACTITIONER

## 2021-04-28 PROCEDURE — 1126F AMNT PAIN NOTED NONE PRSNT: CPT | Mod: S$GLB,,, | Performed by: NURSE PRACTITIONER

## 2021-04-28 PROCEDURE — 1159F PR MEDICATION LIST DOCUMENTED IN MEDICAL RECORD: ICD-10-PCS | Mod: S$GLB,,, | Performed by: NURSE PRACTITIONER

## 2021-04-28 RX ORDER — ERGOCALCIFEROL 1.25 MG/1
50000 CAPSULE ORAL
Qty: 12 CAPSULE | Refills: 3 | Status: SHIPPED | OUTPATIENT
Start: 2021-04-28 | End: 2022-02-16

## 2021-04-28 RX ORDER — METHOTREXATE 25 MG/ML
INJECTION INTRA-ARTERIAL; INTRAMUSCULAR; INTRATHECAL; INTRAVENOUS
COMMUNITY
Start: 2021-04-15 | End: 2021-04-28 | Stop reason: SDUPTHER

## 2021-05-18 ENCOUNTER — OFFICE VISIT (OUTPATIENT)
Dept: CARDIOLOGY | Facility: CLINIC | Age: 66
End: 2021-05-18
Payer: MEDICARE

## 2021-05-18 VITALS
WEIGHT: 168.13 LBS | BODY MASS INDEX: 27.02 KG/M2 | HEART RATE: 101 BPM | OXYGEN SATURATION: 96 % | HEIGHT: 66 IN | DIASTOLIC BLOOD PRESSURE: 99 MMHG | SYSTOLIC BLOOD PRESSURE: 153 MMHG

## 2021-05-18 DIAGNOSIS — Z78.9 STATIN INTOLERANCE: ICD-10-CM

## 2021-05-18 DIAGNOSIS — I10 ESSENTIAL HYPERTENSION: Chronic | ICD-10-CM

## 2021-05-18 DIAGNOSIS — I70.0 AORTIC ATHEROSCLEROSIS: ICD-10-CM

## 2021-05-18 DIAGNOSIS — E78.2 MIXED HYPERLIPIDEMIA: ICD-10-CM

## 2021-05-18 DIAGNOSIS — E74.39 GLUCOSE INTOLERANCE: ICD-10-CM

## 2021-05-18 DIAGNOSIS — R01.1 UNDIAGNOSED CARDIAC MURMURS: Primary | ICD-10-CM

## 2021-05-18 DIAGNOSIS — I10 ESSENTIAL HYPERTENSION: Primary | ICD-10-CM

## 2021-05-18 DIAGNOSIS — R01.1 HEART MURMUR: ICD-10-CM

## 2021-05-18 PROCEDURE — 93005 EKG 12-LEAD: ICD-10-PCS | Mod: S$GLB,,, | Performed by: INTERNAL MEDICINE

## 2021-05-18 PROCEDURE — 3288F PR FALLS RISK ASSESSMENT DOCUMENTED: ICD-10-PCS | Mod: CPTII,S$GLB,, | Performed by: INTERNAL MEDICINE

## 2021-05-18 PROCEDURE — 99204 PR OFFICE/OUTPT VISIT, NEW, LEVL IV, 45-59 MIN: ICD-10-PCS | Mod: S$GLB,,, | Performed by: INTERNAL MEDICINE

## 2021-05-18 PROCEDURE — 93005 ELECTROCARDIOGRAM TRACING: CPT | Mod: S$GLB,,, | Performed by: INTERNAL MEDICINE

## 2021-05-18 PROCEDURE — 3008F PR BODY MASS INDEX (BMI) DOCUMENTED: ICD-10-PCS | Mod: CPTII,S$GLB,, | Performed by: INTERNAL MEDICINE

## 2021-05-18 PROCEDURE — 1159F PR MEDICATION LIST DOCUMENTED IN MEDICAL RECORD: ICD-10-PCS | Mod: S$GLB,,, | Performed by: INTERNAL MEDICINE

## 2021-05-18 PROCEDURE — 99999 PR PBB SHADOW E&M-EST. PATIENT-LVL III: CPT | Mod: PBBFAC,,, | Performed by: INTERNAL MEDICINE

## 2021-05-18 PROCEDURE — 1125F PR PAIN SEVERITY QUANTIFIED, PAIN PRESENT: ICD-10-PCS | Mod: S$GLB,,, | Performed by: INTERNAL MEDICINE

## 2021-05-18 PROCEDURE — 99999 PR PBB SHADOW E&M-EST. PATIENT-LVL III: ICD-10-PCS | Mod: PBBFAC,,, | Performed by: INTERNAL MEDICINE

## 2021-05-18 PROCEDURE — 3288F FALL RISK ASSESSMENT DOCD: CPT | Mod: CPTII,S$GLB,, | Performed by: INTERNAL MEDICINE

## 2021-05-18 PROCEDURE — 93010 EKG 12-LEAD: ICD-10-PCS | Mod: S$GLB,,, | Performed by: INTERNAL MEDICINE

## 2021-05-18 PROCEDURE — 1125F AMNT PAIN NOTED PAIN PRSNT: CPT | Mod: S$GLB,,, | Performed by: INTERNAL MEDICINE

## 2021-05-18 PROCEDURE — 3008F BODY MASS INDEX DOCD: CPT | Mod: CPTII,S$GLB,, | Performed by: INTERNAL MEDICINE

## 2021-05-18 PROCEDURE — 99204 OFFICE O/P NEW MOD 45 MIN: CPT | Mod: S$GLB,,, | Performed by: INTERNAL MEDICINE

## 2021-05-18 PROCEDURE — 1159F MED LIST DOCD IN RCRD: CPT | Mod: S$GLB,,, | Performed by: INTERNAL MEDICINE

## 2021-05-18 PROCEDURE — 1101F PT FALLS ASSESS-DOCD LE1/YR: CPT | Mod: CPTII,S$GLB,, | Performed by: INTERNAL MEDICINE

## 2021-05-18 PROCEDURE — 1101F PR PT FALLS ASSESS DOC 0-1 FALLS W/OUT INJ PAST YR: ICD-10-PCS | Mod: CPTII,S$GLB,, | Performed by: INTERNAL MEDICINE

## 2021-05-18 PROCEDURE — 93010 ELECTROCARDIOGRAM REPORT: CPT | Mod: S$GLB,,, | Performed by: INTERNAL MEDICINE

## 2021-06-17 ENCOUNTER — OFFICE VISIT (OUTPATIENT)
Dept: ORTHOPEDICS | Facility: CLINIC | Age: 66
End: 2021-06-17
Payer: MEDICARE

## 2021-06-17 ENCOUNTER — HOSPITAL ENCOUNTER (OUTPATIENT)
Dept: RADIOLOGY | Facility: HOSPITAL | Age: 66
Discharge: HOME | End: 2021-06-17
Attending: ORTHOPAEDIC SURGERY
Payer: MEDICARE

## 2021-06-17 VITALS — BODY MASS INDEX: 27 KG/M2 | WEIGHT: 168 LBS | HEIGHT: 66 IN

## 2021-06-17 DIAGNOSIS — M79.642 PAIN OF LEFT HAND: Primary | ICD-10-CM

## 2021-06-17 DIAGNOSIS — M18.12 PRIMARY OSTEOARTHRITIS OF FIRST CARPOMETACARPAL JOINT OF LEFT HAND: ICD-10-CM

## 2021-06-17 DIAGNOSIS — G56.02 CARPAL TUNNEL SYNDROME OF LEFT WRIST: ICD-10-CM

## 2021-06-17 DIAGNOSIS — M79.642 PAIN OF LEFT HAND: ICD-10-CM

## 2021-06-17 PROCEDURE — 3008F PR BODY MASS INDEX (BMI) DOCUMENTED: ICD-10-PCS | Mod: CPTII,S$GLB,, | Performed by: ORTHOPAEDIC SURGERY

## 2021-06-17 PROCEDURE — 3288F PR FALLS RISK ASSESSMENT DOCUMENTED: ICD-10-PCS | Mod: CPTII,S$GLB,, | Performed by: ORTHOPAEDIC SURGERY

## 2021-06-17 PROCEDURE — 1125F AMNT PAIN NOTED PAIN PRSNT: CPT | Mod: S$GLB,,, | Performed by: ORTHOPAEDIC SURGERY

## 2021-06-17 PROCEDURE — 3288F FALL RISK ASSESSMENT DOCD: CPT | Mod: CPTII,S$GLB,, | Performed by: ORTHOPAEDIC SURGERY

## 2021-06-17 PROCEDURE — 73120 X-RAY EXAM OF HAND: CPT | Mod: TC,FY,LT

## 2021-06-17 PROCEDURE — 73120 XR HAND 2 VIEW LEFT: ICD-10-PCS | Mod: 26,LT,, | Performed by: RADIOLOGY

## 2021-06-17 PROCEDURE — 99999 PR PBB SHADOW E&M-EST. PATIENT-LVL V: CPT | Mod: PBBFAC,,, | Performed by: ORTHOPAEDIC SURGERY

## 2021-06-17 PROCEDURE — 1125F PR PAIN SEVERITY QUANTIFIED, PAIN PRESENT: ICD-10-PCS | Mod: S$GLB,,, | Performed by: ORTHOPAEDIC SURGERY

## 2021-06-17 PROCEDURE — 3008F BODY MASS INDEX DOCD: CPT | Mod: CPTII,S$GLB,, | Performed by: ORTHOPAEDIC SURGERY

## 2021-06-17 PROCEDURE — 73120 X-RAY EXAM OF HAND: CPT | Mod: 26,LT,, | Performed by: RADIOLOGY

## 2021-06-17 PROCEDURE — 99999 PR PBB SHADOW E&M-EST. PATIENT-LVL V: ICD-10-PCS | Mod: PBBFAC,,, | Performed by: ORTHOPAEDIC SURGERY

## 2021-06-17 PROCEDURE — 1159F MED LIST DOCD IN RCRD: CPT | Mod: S$GLB,,, | Performed by: ORTHOPAEDIC SURGERY

## 2021-06-17 PROCEDURE — 1159F PR MEDICATION LIST DOCUMENTED IN MEDICAL RECORD: ICD-10-PCS | Mod: S$GLB,,, | Performed by: ORTHOPAEDIC SURGERY

## 2021-06-17 PROCEDURE — 1101F PT FALLS ASSESS-DOCD LE1/YR: CPT | Mod: CPTII,S$GLB,, | Performed by: ORTHOPAEDIC SURGERY

## 2021-06-17 PROCEDURE — 99214 PR OFFICE/OUTPT VISIT, EST, LEVL IV, 30-39 MIN: ICD-10-PCS | Mod: S$GLB,,, | Performed by: ORTHOPAEDIC SURGERY

## 2021-06-17 PROCEDURE — 99214 OFFICE O/P EST MOD 30 MIN: CPT | Mod: S$GLB,,, | Performed by: ORTHOPAEDIC SURGERY

## 2021-06-17 PROCEDURE — 1101F PR PT FALLS ASSESS DOC 0-1 FALLS W/OUT INJ PAST YR: ICD-10-PCS | Mod: CPTII,S$GLB,, | Performed by: ORTHOPAEDIC SURGERY

## 2021-06-17 RX ORDER — HYDROCODONE BITARTRATE AND ACETAMINOPHEN 5; 325 MG/1; MG/1
1 TABLET ORAL EVERY 6 HOURS PRN
Qty: 30 TABLET | Refills: 0 | Status: SHIPPED | OUTPATIENT
Start: 2021-06-17 | End: 2021-06-27

## 2021-06-22 ENCOUNTER — PATIENT MESSAGE (OUTPATIENT)
Dept: ORTHOPEDICS | Facility: CLINIC | Age: 66
End: 2021-06-22

## 2021-06-25 ENCOUNTER — ANESTHESIA EVENT (OUTPATIENT)
Dept: SURGERY | Facility: HOSPITAL | Age: 66
End: 2021-06-25
Payer: MEDICARE

## 2021-06-28 NOTE — OR NURSING
Bite block inserted  
Bite block intact  
Family not available to be updated as to patient's status.  
Report called to attending floor RN Flori , Patients vital sign stable   
ambulatory

## 2021-06-30 ENCOUNTER — TELEPHONE (OUTPATIENT)
Dept: ORTHOPEDICS | Facility: CLINIC | Age: 66
End: 2021-06-30

## 2021-07-01 NOTE — SUBJECTIVE & OBJECTIVE
Interval History: Has been having symptoms for the past 6 months.    Review of Systems   Constitutional: Negative for chills and fever.   Respiratory: Positive for shortness of breath. Negative for cough.    Gastrointestinal: Positive for abdominal pain.     Objective:     Vital Signs (Most Recent):  Temp: 98.5 °F (36.9 °C) (03/05/19 1152)  Pulse: 79 (03/05/19 1209)  Resp: 18 (03/05/19 1152)  BP: 125/72 (03/05/19 1152)  SpO2: 95 % (03/05/19 1210) Vital Signs (24h Range):  Temp:  [96.9 °F (36.1 °C)-98.5 °F (36.9 °C)] 98.5 °F (36.9 °C)  Pulse:  [79-96] 79  Resp:  [16-18] 18  SpO2:  [92 %-98 %] 95 %  BP: (125-157)/(65-76) 125/72     Weight: 79 kg (174 lb 2.6 oz)  Body mass index is 28.11 kg/m².    Intake/Output Summary (Last 24 hours) at 3/5/2019 1519  Last data filed at 3/5/2019 0957  Gross per 24 hour   Intake 325 ml   Output 202 ml   Net 123 ml      Physical Exam   Constitutional: She is oriented to person, place, and time. She appears well-developed. No distress.   Pulmonary/Chest: Effort normal. No respiratory distress.   Abdominal: Soft. There is tenderness.   Neurological: She is alert and oriented to person, place, and time.   Psychiatric: She has a normal mood and affect.   Nursing note and vitals reviewed.      Significant Labs: All pertinent labs within the past 24 hours have been reviewed.    Significant Imaging: I have reviewed all pertinent imaging results/findings within the past 24 hours.   CT Abdomen Pelvis With Contrast 3/02/19: FINDINGS:  The visualized portion of the heart is unremarkable.  Mild atelectatic changes are seen at the lung bases.  Peripancreatic inflammatory changes are seen consistent with acute pancreatitis.  Pancreatic parenchyma enhances normally.  No evidence of pancreatic necrosis or pseudocyst.  Portal vein and splenic vein are patent.  No significant hepatic abnormalities are identified.  There is no intra-or extrahepatic biliary ductal dilatation.  The gallbladder is  unremarkable.  The stomach, spleen, and adrenal glands are unremarkable.  Kidneys enhance normally with no evidence of hydronephrosis.  No abnormalities are seen along the ureteral courses.  Urinary bladder is nondistended.  Uterus has been removed.  Appendix is visualized and is unremarkable.  The visualized loops of small and large bowel show no evidence of obstruction or inflammation.  Scattered colonic diverticula are seen without evidence of acute diverticulitis.  No free air or free fluid.  Aorta tapers normally with mild to moderate atherosclerosis.  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  No acute osseous abnormality identified.  Multilevel degenerative changes are visualized within the spine.  Subcutaneous soft tissue structures are unremarkable.  Impression:  1. Peripancreatic inflammatory changes consistent with acute pancreatitis.  2. Additional findings as detailed above.  US Abdomen Limited 3/03/19: FINDINGS:  The liver is normal in size measuring approximately 15.4 cmat the mid clavicular line, no focal abnormal hepatic echogenicity to suggest focal lesion..No evidence for intra or extra biliary ductal dilatation common duct measuring approximately 5-6 mm.  No gallstones, wall thickening or evidence for pericholecystic fluid.  Pancreas is partially obscured by bowel gas, visualized portions of the pancreas are within normal limits without evidence for peripancreatic fluid collection. No evidence for right-sided hydronephrosis.  Impression:  Unremarkable right upper quadrant abdominal ultrasound specifically without evidence for cholelithiasis or secondary signs to suggest acute cholecystitis.  Please note pancreas is partially obscured by bowel gas, no definite peripancreatic fluid collection in the visualized pancreas.  Please see abdominal CT report for further details.   Yes

## 2021-07-06 ENCOUNTER — TELEPHONE (OUTPATIENT)
Dept: ORTHOPEDICS | Facility: CLINIC | Age: 66
End: 2021-07-06

## 2021-07-06 RX ORDER — TRAMADOL HYDROCHLORIDE 50 MG/1
50 TABLET ORAL EVERY 6 HOURS PRN
Qty: 30 TABLET | Refills: 0 | Status: SHIPPED | OUTPATIENT
Start: 2021-07-06 | End: 2021-07-16

## 2021-07-07 ENCOUNTER — TELEPHONE (OUTPATIENT)
Dept: ORTHOPEDICS | Facility: CLINIC | Age: 66
End: 2021-07-07

## 2021-07-09 ENCOUNTER — TELEPHONE (OUTPATIENT)
Dept: ORTHOPEDICS | Facility: CLINIC | Age: 66
End: 2021-07-09

## 2021-07-09 ENCOUNTER — HOSPITAL ENCOUNTER (OUTPATIENT)
Facility: HOSPITAL | Age: 66
Discharge: HOME | End: 2021-07-09
Attending: ORTHOPAEDIC SURGERY | Admitting: ORTHOPAEDIC SURGERY
Payer: MEDICARE

## 2021-07-09 ENCOUNTER — ANESTHESIA (OUTPATIENT)
Dept: SURGERY | Facility: HOSPITAL | Age: 66
End: 2021-07-09
Payer: MEDICARE

## 2021-07-09 VITALS
SYSTOLIC BLOOD PRESSURE: 141 MMHG | TEMPERATURE: 98 F | BODY MASS INDEX: 27.32 KG/M2 | HEART RATE: 79 BPM | HEIGHT: 66 IN | WEIGHT: 170 LBS | OXYGEN SATURATION: 97 % | DIASTOLIC BLOOD PRESSURE: 74 MMHG | RESPIRATION RATE: 16 BRPM

## 2021-07-09 DIAGNOSIS — G56.02 CARPAL TUNNEL SYNDROME OF LEFT WRIST: ICD-10-CM

## 2021-07-09 DIAGNOSIS — M79.642 PAIN OF LEFT HAND: ICD-10-CM

## 2021-07-09 DIAGNOSIS — M18.12 PRIMARY OSTEOARTHRITIS OF FIRST CARPOMETACARPAL JOINT OF LEFT HAND: ICD-10-CM

## 2021-07-09 PROCEDURE — 25447 ARTHRP NTRCRP/CRP/MTCR NTRPS: CPT | Mod: LT,,, | Performed by: ORTHOPAEDIC SURGERY

## 2021-07-09 PROCEDURE — 63600175 PHARM REV CODE 636 W HCPCS: Performed by: STUDENT IN AN ORGANIZED HEALTH CARE EDUCATION/TRAINING PROGRAM

## 2021-07-09 PROCEDURE — 64721 CARPAL TUNNEL SURGERY: CPT | Mod: 51,LT,, | Performed by: ORTHOPAEDIC SURGERY

## 2021-07-09 PROCEDURE — C1713 ANCHOR/SCREW BN/BN,TIS/BN: HCPCS | Performed by: ORTHOPAEDIC SURGERY

## 2021-07-09 PROCEDURE — 27201423 OPTIME MED/SURG SUP & DEVICES STERILE SUPPLY: Performed by: ORTHOPAEDIC SURGERY

## 2021-07-09 PROCEDURE — 25447 PR REPAIR INTERCARP/CARP-METACARP JT: ICD-10-PCS | Mod: LT,,, | Performed by: ORTHOPAEDIC SURGERY

## 2021-07-09 PROCEDURE — 64721 PR REVISE MEDIAN N/CARPAL TUNNEL SURG: ICD-10-PCS | Mod: 51,LT,, | Performed by: ORTHOPAEDIC SURGERY

## 2021-07-09 PROCEDURE — 63600175 PHARM REV CODE 636 W HCPCS: Performed by: ANESTHESIOLOGY

## 2021-07-09 PROCEDURE — 36000708 HC OR TIME LEV III 1ST 15 MIN: Performed by: ORTHOPAEDIC SURGERY

## 2021-07-09 PROCEDURE — 63600175 PHARM REV CODE 636 W HCPCS: Performed by: ORTHOPAEDIC SURGERY

## 2021-07-09 PROCEDURE — 37000009 HC ANESTHESIA EA ADD 15 MINS: Performed by: ORTHOPAEDIC SURGERY

## 2021-07-09 PROCEDURE — 63600175 PHARM REV CODE 636 W HCPCS: Performed by: NURSE ANESTHETIST, CERTIFIED REGISTERED

## 2021-07-09 PROCEDURE — 25000003 PHARM REV CODE 250: Performed by: NURSE ANESTHETIST, CERTIFIED REGISTERED

## 2021-07-09 PROCEDURE — 36000709 HC OR TIME LEV III EA ADD 15 MIN: Performed by: ORTHOPAEDIC SURGERY

## 2021-07-09 PROCEDURE — 25447 ARTHRP NTRCRP/CRP/MTCR NTRPS: CPT | Mod: AS,LT,, | Performed by: ORTHOPAEDIC SURGERY

## 2021-07-09 PROCEDURE — 37000008 HC ANESTHESIA 1ST 15 MINUTES: Performed by: ORTHOPAEDIC SURGERY

## 2021-07-09 PROCEDURE — 25447 PR REPAIR INTERCARP/CARP-METACARP JT: ICD-10-PCS | Mod: AS,LT,, | Performed by: ORTHOPAEDIC SURGERY

## 2021-07-09 PROCEDURE — 25000003 PHARM REV CODE 250: Performed by: ORTHOPAEDIC SURGERY

## 2021-07-09 PROCEDURE — 71000015 HC POSTOP RECOV 1ST HR: Performed by: ORTHOPAEDIC SURGERY

## 2021-07-09 DEVICE — ANCHOR 2-0 FIBERWIRE 2.4X8.5MM: Type: IMPLANTABLE DEVICE | Site: HAND | Status: FUNCTIONAL

## 2021-07-09 DEVICE — SYS IMPLANT CMC MIN TR 1.1MM: Type: IMPLANTABLE DEVICE | Site: HAND | Status: FUNCTIONAL

## 2021-07-09 RX ORDER — ROPIVACAINE HYDROCHLORIDE 5 MG/ML
INJECTION, SOLUTION EPIDURAL; INFILTRATION; PERINEURAL
Status: COMPLETED | OUTPATIENT
Start: 2021-07-09 | End: 2021-07-09

## 2021-07-09 RX ORDER — ACETAMINOPHEN 325 MG/1
650 TABLET ORAL EVERY 4 HOURS PRN
Status: DISCONTINUED | OUTPATIENT
Start: 2021-07-09 | End: 2021-07-09 | Stop reason: HOSPADM

## 2021-07-09 RX ORDER — ONDANSETRON 8 MG/1
8 TABLET, ORALLY DISINTEGRATING ORAL EVERY 8 HOURS PRN
Status: DISCONTINUED | OUTPATIENT
Start: 2021-07-09 | End: 2021-07-09 | Stop reason: HOSPADM

## 2021-07-09 RX ORDER — PROPOFOL 10 MG/ML
VIAL (ML) INTRAVENOUS CONTINUOUS PRN
Status: DISCONTINUED | OUTPATIENT
Start: 2021-07-09 | End: 2021-07-09

## 2021-07-09 RX ORDER — MIDAZOLAM HYDROCHLORIDE 1 MG/ML
INJECTION, SOLUTION INTRAMUSCULAR; INTRAVENOUS
Status: DISCONTINUED | OUTPATIENT
Start: 2021-07-09 | End: 2021-07-09

## 2021-07-09 RX ORDER — OXYCODONE AND ACETAMINOPHEN 7.5; 325 MG/1; MG/1
1 TABLET ORAL EVERY 4 HOURS PRN
Qty: 40 TABLET | Refills: 0 | Status: SHIPPED | OUTPATIENT
Start: 2021-07-09 | End: 2021-07-16 | Stop reason: SDUPTHER

## 2021-07-09 RX ORDER — METOPROLOL SUCCINATE 50 MG/1
50 TABLET, EXTENDED RELEASE ORAL DAILY
Status: COMPLETED | OUTPATIENT
Start: 2021-07-09 | End: 2021-07-09

## 2021-07-09 RX ORDER — LIDOCAINE HCL/PF 100 MG/5ML
SYRINGE (ML) INTRAVENOUS
Status: DISCONTINUED | OUTPATIENT
Start: 2021-07-09 | End: 2021-07-09

## 2021-07-09 RX ORDER — CEFAZOLIN SODIUM 2 G/50ML
2 SOLUTION INTRAVENOUS
Status: DISCONTINUED | OUTPATIENT
Start: 2021-07-09 | End: 2021-07-09 | Stop reason: HOSPADM

## 2021-07-09 RX ORDER — PHENYLEPHRINE HYDROCHLORIDE 10 MG/ML
INJECTION INTRAVENOUS
Status: DISCONTINUED | OUTPATIENT
Start: 2021-07-09 | End: 2021-07-09

## 2021-07-09 RX ORDER — OXYCODONE HYDROCHLORIDE 5 MG/1
10 TABLET ORAL EVERY 4 HOURS PRN
Status: DISCONTINUED | OUTPATIENT
Start: 2021-07-09 | End: 2021-07-09 | Stop reason: HOSPADM

## 2021-07-09 RX ORDER — KETOROLAC TROMETHAMINE 30 MG/ML
15 INJECTION, SOLUTION INTRAMUSCULAR; INTRAVENOUS ONCE
Status: COMPLETED | OUTPATIENT
Start: 2021-07-09 | End: 2021-07-09

## 2021-07-09 RX ADMIN — LIDOCAINE HYDROCHLORIDE 50 MG: 20 INJECTION, SOLUTION INTRAVENOUS at 01:07

## 2021-07-09 RX ADMIN — MIDAZOLAM 5 MG: 1 INJECTION INTRAMUSCULAR; INTRAVENOUS at 12:07

## 2021-07-09 RX ADMIN — CEFAZOLIN SODIUM 2 G: 2 SOLUTION INTRAVENOUS at 01:07

## 2021-07-09 RX ADMIN — ROPIVACAINE HYDROCHLORIDE 30 ML: 5 INJECTION, SOLUTION EPIDURAL; INFILTRATION; PERINEURAL at 12:07

## 2021-07-09 RX ADMIN — KETOROLAC TROMETHAMINE 15 MG: 30 INJECTION, SOLUTION INTRAMUSCULAR; INTRAVENOUS at 02:07

## 2021-07-09 RX ADMIN — PHENYLEPHRINE HYDROCHLORIDE 60 MCG: 10 INJECTION INTRAVENOUS at 01:07

## 2021-07-09 RX ADMIN — METOPROLOL SUCCINATE 50 MG: 50 TABLET, EXTENDED RELEASE ORAL at 12:07

## 2021-07-09 RX ADMIN — PROPOFOL 150 MCG/KG/MIN: 10 INJECTION, EMULSION INTRAVENOUS at 01:07

## 2021-07-09 RX ADMIN — OXYCODONE HYDROCHLORIDE 10 MG: 5 TABLET ORAL at 02:07

## 2021-07-09 RX ADMIN — GLYCOPYRROLATE 0.2 MG: 0.2 INJECTION, SOLUTION INTRAMUSCULAR; INTRAVITREAL at 01:07

## 2021-07-16 ENCOUNTER — TELEPHONE (OUTPATIENT)
Dept: ORTHOPEDICS | Facility: CLINIC | Age: 66
End: 2021-07-16

## 2021-07-16 RX ORDER — OXYCODONE AND ACETAMINOPHEN 7.5; 325 MG/1; MG/1
1 TABLET ORAL EVERY 12 HOURS PRN
Qty: 30 TABLET | Refills: 0 | Status: SHIPPED | OUTPATIENT
Start: 2021-07-16 | End: 2021-10-26

## 2021-07-16 RX ORDER — OXYCODONE AND ACETAMINOPHEN 7.5; 325 MG/1; MG/1
1 TABLET ORAL EVERY 4 HOURS PRN
Qty: 40 TABLET | Refills: 0 | Status: CANCELLED | OUTPATIENT
Start: 2021-07-16

## 2021-07-22 ENCOUNTER — TELEPHONE (OUTPATIENT)
Dept: ORTHOPEDICS | Facility: CLINIC | Age: 66
End: 2021-07-22

## 2021-07-28 ENCOUNTER — OFFICE VISIT (OUTPATIENT)
Dept: ORTHOPEDICS | Facility: CLINIC | Age: 66
End: 2021-07-28
Payer: MEDICARE

## 2021-07-28 VITALS — BODY MASS INDEX: 27.32 KG/M2 | WEIGHT: 170 LBS | HEIGHT: 66 IN

## 2021-07-28 DIAGNOSIS — G56.02 CARPAL TUNNEL SYNDROME OF LEFT WRIST: ICD-10-CM

## 2021-07-28 DIAGNOSIS — M18.12 PRIMARY OSTEOARTHRITIS OF FIRST CARPOMETACARPAL JOINT OF LEFT HAND: Primary | ICD-10-CM

## 2021-07-28 PROCEDURE — 1160F PR REVIEW ALL MEDS BY PRESCRIBER/CLIN PHARMACIST DOCUMENTED: ICD-10-PCS | Mod: CPTII,S$GLB,, | Performed by: PHYSICIAN ASSISTANT

## 2021-07-28 PROCEDURE — 99024 PR POST-OP FOLLOW-UP VISIT: ICD-10-PCS | Mod: S$GLB,,, | Performed by: PHYSICIAN ASSISTANT

## 2021-07-28 PROCEDURE — 1125F PR PAIN SEVERITY QUANTIFIED, PAIN PRESENT: ICD-10-PCS | Mod: CPTII,S$GLB,, | Performed by: PHYSICIAN ASSISTANT

## 2021-07-28 PROCEDURE — 99999 PR PBB SHADOW E&M-EST. PATIENT-LVL IV: CPT | Mod: PBBFAC,,, | Performed by: PHYSICIAN ASSISTANT

## 2021-07-28 PROCEDURE — 3008F PR BODY MASS INDEX (BMI) DOCUMENTED: ICD-10-PCS | Mod: CPTII,S$GLB,, | Performed by: PHYSICIAN ASSISTANT

## 2021-07-28 PROCEDURE — 99999 PR PBB SHADOW E&M-EST. PATIENT-LVL IV: ICD-10-PCS | Mod: PBBFAC,,, | Performed by: PHYSICIAN ASSISTANT

## 2021-07-28 PROCEDURE — 1160F RVW MEDS BY RX/DR IN RCRD: CPT | Mod: CPTII,S$GLB,, | Performed by: PHYSICIAN ASSISTANT

## 2021-07-28 PROCEDURE — 1101F PR PT FALLS ASSESS DOC 0-1 FALLS W/OUT INJ PAST YR: ICD-10-PCS | Mod: CPTII,S$GLB,, | Performed by: PHYSICIAN ASSISTANT

## 2021-07-28 PROCEDURE — 3288F FALL RISK ASSESSMENT DOCD: CPT | Mod: CPTII,S$GLB,, | Performed by: PHYSICIAN ASSISTANT

## 2021-07-28 PROCEDURE — 1159F PR MEDICATION LIST DOCUMENTED IN MEDICAL RECORD: ICD-10-PCS | Mod: CPTII,S$GLB,, | Performed by: PHYSICIAN ASSISTANT

## 2021-07-28 PROCEDURE — 1125F AMNT PAIN NOTED PAIN PRSNT: CPT | Mod: CPTII,S$GLB,, | Performed by: PHYSICIAN ASSISTANT

## 2021-07-28 PROCEDURE — 3288F PR FALLS RISK ASSESSMENT DOCUMENTED: ICD-10-PCS | Mod: CPTII,S$GLB,, | Performed by: PHYSICIAN ASSISTANT

## 2021-07-28 PROCEDURE — 3008F BODY MASS INDEX DOCD: CPT | Mod: CPTII,S$GLB,, | Performed by: PHYSICIAN ASSISTANT

## 2021-07-28 PROCEDURE — 1159F MED LIST DOCD IN RCRD: CPT | Mod: CPTII,S$GLB,, | Performed by: PHYSICIAN ASSISTANT

## 2021-07-28 PROCEDURE — 1101F PT FALLS ASSESS-DOCD LE1/YR: CPT | Mod: CPTII,S$GLB,, | Performed by: PHYSICIAN ASSISTANT

## 2021-07-28 PROCEDURE — 99024 POSTOP FOLLOW-UP VISIT: CPT | Mod: S$GLB,,, | Performed by: PHYSICIAN ASSISTANT

## 2021-08-17 PROBLEM — M25.642 STIFFNESS OF LEFT HAND JOINT: Status: ACTIVE | Noted: 2021-08-17

## 2021-08-17 PROBLEM — R53.1 WEAKNESS: Status: ACTIVE | Noted: 2021-08-17

## 2021-08-18 ENCOUNTER — TELEPHONE (OUTPATIENT)
Dept: RHEUMATOLOGY | Facility: CLINIC | Age: 66
End: 2021-08-18

## 2021-08-23 ENCOUNTER — TELEPHONE (OUTPATIENT)
Dept: ORTHOPEDICS | Facility: CLINIC | Age: 66
End: 2021-08-23

## 2021-08-23 ENCOUNTER — OFFICE VISIT (OUTPATIENT)
Dept: RHEUMATOLOGY | Facility: CLINIC | Age: 66
End: 2021-08-23
Payer: MEDICARE

## 2021-08-23 DIAGNOSIS — Z13.820 SCREENING FOR OSTEOPOROSIS: ICD-10-CM

## 2021-08-23 DIAGNOSIS — M13.80 SERONEGATIVE ARTHRITIS: Primary | ICD-10-CM

## 2021-08-23 PROCEDURE — 1159F MED LIST DOCD IN RCRD: CPT | Mod: CPTII,95,, | Performed by: INTERNAL MEDICINE

## 2021-08-23 PROCEDURE — 1160F RVW MEDS BY RX/DR IN RCRD: CPT | Mod: CPTII,95,, | Performed by: INTERNAL MEDICINE

## 2021-08-23 PROCEDURE — 3044F HG A1C LEVEL LT 7.0%: CPT | Mod: CPTII,95,, | Performed by: INTERNAL MEDICINE

## 2021-08-23 PROCEDURE — 3044F PR MOST RECENT HEMOGLOBIN A1C LEVEL <7.0%: ICD-10-PCS | Mod: CPTII,95,, | Performed by: INTERNAL MEDICINE

## 2021-08-23 PROCEDURE — 99214 PR OFFICE/OUTPT VISIT, EST, LEVL IV, 30-39 MIN: ICD-10-PCS | Mod: 95,,, | Performed by: INTERNAL MEDICINE

## 2021-08-23 PROCEDURE — 1160F PR REVIEW ALL MEDS BY PRESCRIBER/CLIN PHARMACIST DOCUMENTED: ICD-10-PCS | Mod: CPTII,95,, | Performed by: INTERNAL MEDICINE

## 2021-08-23 PROCEDURE — 99214 OFFICE O/P EST MOD 30 MIN: CPT | Mod: 95,,, | Performed by: INTERNAL MEDICINE

## 2021-08-23 PROCEDURE — 1159F PR MEDICATION LIST DOCUMENTED IN MEDICAL RECORD: ICD-10-PCS | Mod: CPTII,95,, | Performed by: INTERNAL MEDICINE

## 2021-08-25 DIAGNOSIS — E55.9 VITAMIN D DEFICIENCY: ICD-10-CM

## 2021-08-25 RX ORDER — ERGOCALCIFEROL 1.25 MG/1
50000 CAPSULE ORAL
Qty: 12 CAPSULE | Refills: 0 | Status: SHIPPED | OUTPATIENT
Start: 2021-08-25 | End: 2021-10-28

## 2021-09-28 ENCOUNTER — OFFICE VISIT (OUTPATIENT)
Dept: ORTHOPEDICS | Facility: CLINIC | Age: 66
End: 2021-09-28
Payer: MEDICARE

## 2021-09-28 ENCOUNTER — HOSPITAL ENCOUNTER (OUTPATIENT)
Dept: RADIOLOGY | Facility: HOSPITAL | Age: 66
Discharge: HOME | End: 2021-09-28
Attending: ORTHOPAEDIC SURGERY
Payer: MEDICARE

## 2021-09-28 VITALS — HEIGHT: 66 IN | BODY MASS INDEX: 27.32 KG/M2 | WEIGHT: 170 LBS

## 2021-09-28 DIAGNOSIS — M18.12 PRIMARY OSTEOARTHRITIS OF FIRST CARPOMETACARPAL JOINT OF LEFT HAND: Primary | ICD-10-CM

## 2021-09-28 DIAGNOSIS — M18.12 PRIMARY OSTEOARTHRITIS OF FIRST CARPOMETACARPAL JOINT OF LEFT HAND: ICD-10-CM

## 2021-09-28 PROCEDURE — 3008F PR BODY MASS INDEX (BMI) DOCUMENTED: ICD-10-PCS | Mod: CPTII,S$GLB,, | Performed by: ORTHOPAEDIC SURGERY

## 2021-09-28 PROCEDURE — 3044F HG A1C LEVEL LT 7.0%: CPT | Mod: CPTII,S$GLB,, | Performed by: ORTHOPAEDIC SURGERY

## 2021-09-28 PROCEDURE — 1101F PR PT FALLS ASSESS DOC 0-1 FALLS W/OUT INJ PAST YR: ICD-10-PCS | Mod: CPTII,S$GLB,, | Performed by: ORTHOPAEDIC SURGERY

## 2021-09-28 PROCEDURE — 1160F PR REVIEW ALL MEDS BY PRESCRIBER/CLIN PHARMACIST DOCUMENTED: ICD-10-PCS | Mod: CPTII,S$GLB,, | Performed by: ORTHOPAEDIC SURGERY

## 2021-09-28 PROCEDURE — 99024 PR POST-OP FOLLOW-UP VISIT: ICD-10-PCS | Mod: S$GLB,,, | Performed by: ORTHOPAEDIC SURGERY

## 2021-09-28 PROCEDURE — 3008F BODY MASS INDEX DOCD: CPT | Mod: CPTII,S$GLB,, | Performed by: ORTHOPAEDIC SURGERY

## 2021-09-28 PROCEDURE — 1159F MED LIST DOCD IN RCRD: CPT | Mod: CPTII,S$GLB,, | Performed by: ORTHOPAEDIC SURGERY

## 2021-09-28 PROCEDURE — 3288F PR FALLS RISK ASSESSMENT DOCUMENTED: ICD-10-PCS | Mod: CPTII,S$GLB,, | Performed by: ORTHOPAEDIC SURGERY

## 2021-09-28 PROCEDURE — 1125F PR PAIN SEVERITY QUANTIFIED, PAIN PRESENT: ICD-10-PCS | Mod: CPTII,S$GLB,, | Performed by: ORTHOPAEDIC SURGERY

## 2021-09-28 PROCEDURE — 1101F PT FALLS ASSESS-DOCD LE1/YR: CPT | Mod: CPTII,S$GLB,, | Performed by: ORTHOPAEDIC SURGERY

## 2021-09-28 PROCEDURE — 99999 PR PBB SHADOW E&M-EST. PATIENT-LVL V: CPT | Mod: PBBFAC,,, | Performed by: ORTHOPAEDIC SURGERY

## 2021-09-28 PROCEDURE — 99999 PR PBB SHADOW E&M-EST. PATIENT-LVL V: ICD-10-PCS | Mod: PBBFAC,,, | Performed by: ORTHOPAEDIC SURGERY

## 2021-09-28 PROCEDURE — 1160F RVW MEDS BY RX/DR IN RCRD: CPT | Mod: CPTII,S$GLB,, | Performed by: ORTHOPAEDIC SURGERY

## 2021-09-28 PROCEDURE — 99024 POSTOP FOLLOW-UP VISIT: CPT | Mod: S$GLB,,, | Performed by: ORTHOPAEDIC SURGERY

## 2021-09-28 PROCEDURE — 3044F PR MOST RECENT HEMOGLOBIN A1C LEVEL <7.0%: ICD-10-PCS | Mod: CPTII,S$GLB,, | Performed by: ORTHOPAEDIC SURGERY

## 2021-09-28 PROCEDURE — 1159F PR MEDICATION LIST DOCUMENTED IN MEDICAL RECORD: ICD-10-PCS | Mod: CPTII,S$GLB,, | Performed by: ORTHOPAEDIC SURGERY

## 2021-09-28 PROCEDURE — 73120 X-RAY EXAM OF HAND: CPT | Mod: TC,FY,LT

## 2021-09-28 PROCEDURE — 3288F FALL RISK ASSESSMENT DOCD: CPT | Mod: CPTII,S$GLB,, | Performed by: ORTHOPAEDIC SURGERY

## 2021-09-28 PROCEDURE — 73120 X-RAY EXAM OF HAND: CPT | Mod: 26,LT,, | Performed by: RADIOLOGY

## 2021-09-28 PROCEDURE — 1125F AMNT PAIN NOTED PAIN PRSNT: CPT | Mod: CPTII,S$GLB,, | Performed by: ORTHOPAEDIC SURGERY

## 2021-09-28 PROCEDURE — 73120 XR HAND 2 VIEW LEFT: ICD-10-PCS | Mod: 26,LT,, | Performed by: RADIOLOGY

## 2021-09-28 RX ORDER — GABAPENTIN 300 MG/1
300 CAPSULE ORAL NIGHTLY
Qty: 14 CAPSULE | Refills: 0 | Status: SHIPPED | OUTPATIENT
Start: 2021-09-28 | End: 2021-10-26

## 2021-09-29 DIAGNOSIS — M79.7 FIBROMYALGIA: Chronic | ICD-10-CM

## 2021-09-29 RX ORDER — TRAMADOL HYDROCHLORIDE 50 MG/1
TABLET ORAL
Qty: 90 TABLET | Refills: 1 | Status: SHIPPED | OUTPATIENT
Start: 2021-09-29 | End: 2021-10-07 | Stop reason: SDUPTHER

## 2021-10-06 PROBLEM — R52 PAIN AGGRAVATED BY ACTIVITIES OF DAILY LIVING: Status: ACTIVE | Noted: 2021-10-06

## 2021-10-06 PROBLEM — M25.642 STIFFNESS OF LEFT HAND JOINT: Status: RESOLVED | Noted: 2021-08-17 | Resolved: 2021-10-06

## 2021-10-06 PROBLEM — R53.1 WEAKNESS: Status: RESOLVED | Noted: 2021-08-17 | Resolved: 2021-10-06

## 2021-10-07 DIAGNOSIS — M79.7 FIBROMYALGIA: Chronic | ICD-10-CM

## 2021-10-07 RX ORDER — TRAZODONE HYDROCHLORIDE 100 MG/1
100 TABLET ORAL NIGHTLY
Qty: 90 TABLET | Refills: 1 | Status: SHIPPED | OUTPATIENT
Start: 2021-10-07 | End: 2022-01-19

## 2021-10-07 RX ORDER — TRAMADOL HYDROCHLORIDE 50 MG/1
TABLET ORAL
Qty: 90 TABLET | Refills: 1 | Status: SHIPPED | OUTPATIENT
Start: 2021-10-07 | End: 2021-10-11 | Stop reason: SDUPTHER

## 2021-10-07 RX ORDER — TRAMADOL HYDROCHLORIDE 50 MG/1
TABLET ORAL
Qty: 90 TABLET | Refills: 1 | Status: SHIPPED | OUTPATIENT
Start: 2021-10-07 | End: 2021-10-07

## 2021-10-11 DIAGNOSIS — M79.7 FIBROMYALGIA: Chronic | ICD-10-CM

## 2021-10-12 RX ORDER — TRAMADOL HYDROCHLORIDE 50 MG/1
TABLET ORAL
Qty: 90 TABLET | Refills: 1 | Status: SHIPPED | OUTPATIENT
Start: 2021-10-12 | End: 2021-10-18 | Stop reason: SDUPTHER

## 2021-10-18 ENCOUNTER — PATIENT MESSAGE (OUTPATIENT)
Dept: RHEUMATOLOGY | Facility: CLINIC | Age: 66
End: 2021-10-18
Payer: MEDICARE

## 2021-10-18 DIAGNOSIS — M79.7 FIBROMYALGIA: Chronic | ICD-10-CM

## 2021-10-19 RX ORDER — TRAMADOL HYDROCHLORIDE 50 MG/1
TABLET ORAL
Qty: 90 TABLET | Refills: 1 | Status: SHIPPED | OUTPATIENT
Start: 2021-10-19 | End: 2022-01-09

## 2021-10-20 DIAGNOSIS — K58.0 IRRITABLE BOWEL SYNDROME WITH DIARRHEA: ICD-10-CM

## 2021-10-20 DIAGNOSIS — R10.84 GENERALIZED ABDOMINAL PAIN: ICD-10-CM

## 2021-10-20 RX ORDER — DICYCLOMINE HYDROCHLORIDE 20 MG/1
20 TABLET ORAL 4 TIMES DAILY
Qty: 120 TABLET | Refills: 0 | Status: SHIPPED | OUTPATIENT
Start: 2021-10-20 | End: 2021-11-17

## 2021-10-26 ENCOUNTER — PATIENT MESSAGE (OUTPATIENT)
Dept: ORTHOPEDICS | Facility: CLINIC | Age: 66
End: 2021-10-26

## 2021-10-26 ENCOUNTER — OFFICE VISIT (OUTPATIENT)
Dept: ORTHOPEDICS | Facility: CLINIC | Age: 66
End: 2021-10-26
Payer: MEDICARE

## 2021-10-26 VITALS — WEIGHT: 169 LBS | BODY MASS INDEX: 27.16 KG/M2 | HEIGHT: 66 IN

## 2021-10-26 DIAGNOSIS — M18.12 PRIMARY OSTEOARTHRITIS OF FIRST CARPOMETACARPAL JOINT OF LEFT HAND: Primary | ICD-10-CM

## 2021-10-26 PROCEDURE — 1159F MED LIST DOCD IN RCRD: CPT | Mod: CPTII,S$GLB,, | Performed by: ORTHOPAEDIC SURGERY

## 2021-10-26 PROCEDURE — 1101F PT FALLS ASSESS-DOCD LE1/YR: CPT | Mod: CPTII,S$GLB,, | Performed by: ORTHOPAEDIC SURGERY

## 2021-10-26 PROCEDURE — 3008F BODY MASS INDEX DOCD: CPT | Mod: CPTII,S$GLB,, | Performed by: ORTHOPAEDIC SURGERY

## 2021-10-26 PROCEDURE — 3044F PR MOST RECENT HEMOGLOBIN A1C LEVEL <7.0%: ICD-10-PCS | Mod: CPTII,S$GLB,, | Performed by: ORTHOPAEDIC SURGERY

## 2021-10-26 PROCEDURE — 1160F PR REVIEW ALL MEDS BY PRESCRIBER/CLIN PHARMACIST DOCUMENTED: ICD-10-PCS | Mod: CPTII,S$GLB,, | Performed by: ORTHOPAEDIC SURGERY

## 2021-10-26 PROCEDURE — 3288F PR FALLS RISK ASSESSMENT DOCUMENTED: ICD-10-PCS | Mod: CPTII,S$GLB,, | Performed by: ORTHOPAEDIC SURGERY

## 2021-10-26 PROCEDURE — 1160F RVW MEDS BY RX/DR IN RCRD: CPT | Mod: CPTII,S$GLB,, | Performed by: ORTHOPAEDIC SURGERY

## 2021-10-26 PROCEDURE — 99999 PR PBB SHADOW E&M-EST. PATIENT-LVL IV: CPT | Mod: PBBFAC,,, | Performed by: ORTHOPAEDIC SURGERY

## 2021-10-26 PROCEDURE — 3288F FALL RISK ASSESSMENT DOCD: CPT | Mod: CPTII,S$GLB,, | Performed by: ORTHOPAEDIC SURGERY

## 2021-10-26 PROCEDURE — 3008F PR BODY MASS INDEX (BMI) DOCUMENTED: ICD-10-PCS | Mod: CPTII,S$GLB,, | Performed by: ORTHOPAEDIC SURGERY

## 2021-10-26 PROCEDURE — 1125F PR PAIN SEVERITY QUANTIFIED, PAIN PRESENT: ICD-10-PCS | Mod: CPTII,S$GLB,, | Performed by: ORTHOPAEDIC SURGERY

## 2021-10-26 PROCEDURE — 99213 PR OFFICE/OUTPT VISIT, EST, LEVL III, 20-29 MIN: ICD-10-PCS | Mod: S$GLB,,, | Performed by: ORTHOPAEDIC SURGERY

## 2021-10-26 PROCEDURE — 1159F PR MEDICATION LIST DOCUMENTED IN MEDICAL RECORD: ICD-10-PCS | Mod: CPTII,S$GLB,, | Performed by: ORTHOPAEDIC SURGERY

## 2021-10-26 PROCEDURE — 99999 PR PBB SHADOW E&M-EST. PATIENT-LVL IV: ICD-10-PCS | Mod: PBBFAC,,, | Performed by: ORTHOPAEDIC SURGERY

## 2021-10-26 PROCEDURE — 99213 OFFICE O/P EST LOW 20 MIN: CPT | Mod: S$GLB,,, | Performed by: ORTHOPAEDIC SURGERY

## 2021-10-26 PROCEDURE — 1125F AMNT PAIN NOTED PAIN PRSNT: CPT | Mod: CPTII,S$GLB,, | Performed by: ORTHOPAEDIC SURGERY

## 2021-10-26 PROCEDURE — 3044F HG A1C LEVEL LT 7.0%: CPT | Mod: CPTII,S$GLB,, | Performed by: ORTHOPAEDIC SURGERY

## 2021-10-26 PROCEDURE — 1101F PR PT FALLS ASSESS DOC 0-1 FALLS W/OUT INJ PAST YR: ICD-10-PCS | Mod: CPTII,S$GLB,, | Performed by: ORTHOPAEDIC SURGERY

## 2021-10-26 RX ORDER — ESCITALOPRAM OXALATE 20 MG/1
TABLET ORAL
COMMUNITY
Start: 2021-10-08 | End: 2021-10-28

## 2021-10-26 RX ORDER — HYDROXYCHLOROQUINE SULFATE 200 MG/1
TABLET, FILM COATED ORAL
COMMUNITY
Start: 2021-07-27 | End: 2023-04-25

## 2021-10-26 RX ORDER — CARVEDILOL 12.5 MG/1
TABLET ORAL
COMMUNITY
Start: 2021-10-08 | End: 2021-10-28

## 2021-10-26 RX ORDER — CLONIDINE 0.1 MG/24H
PATCH, EXTENDED RELEASE TRANSDERMAL
COMMUNITY
Start: 2021-10-08 | End: 2022-07-26

## 2021-10-26 RX ORDER — METHYLPREDNISOLONE 4 MG/1
TABLET ORAL
Qty: 1 EACH | Refills: 1 | Status: SHIPPED | OUTPATIENT
Start: 2021-10-26 | End: 2021-10-28 | Stop reason: ALTCHOICE

## 2021-10-28 ENCOUNTER — TELEPHONE (OUTPATIENT)
Dept: CARDIOLOGY | Facility: CLINIC | Age: 66
End: 2021-10-28
Payer: MEDICARE

## 2021-10-28 ENCOUNTER — PATIENT MESSAGE (OUTPATIENT)
Dept: CARDIOLOGY | Facility: CLINIC | Age: 66
End: 2021-10-28
Payer: MEDICARE

## 2021-10-28 ENCOUNTER — OFFICE VISIT (OUTPATIENT)
Dept: FAMILY MEDICINE | Facility: CLINIC | Age: 66
End: 2021-10-28
Payer: MEDICARE

## 2021-10-28 VITALS
HEART RATE: 99 BPM | TEMPERATURE: 98 F | WEIGHT: 168.19 LBS | RESPIRATION RATE: 18 BRPM | DIASTOLIC BLOOD PRESSURE: 78 MMHG | SYSTOLIC BLOOD PRESSURE: 138 MMHG | OXYGEN SATURATION: 98 % | BODY MASS INDEX: 27.03 KG/M2 | HEIGHT: 66 IN

## 2021-10-28 DIAGNOSIS — M06.00 SERONEGATIVE RHEUMATOID ARTHRITIS: ICD-10-CM

## 2021-10-28 DIAGNOSIS — E78.00 PURE HYPERCHOLESTEROLEMIA: Primary | ICD-10-CM

## 2021-10-28 DIAGNOSIS — R73.03 PREDIABETES: ICD-10-CM

## 2021-10-28 DIAGNOSIS — R73.01 IFG (IMPAIRED FASTING GLUCOSE): ICD-10-CM

## 2021-10-28 DIAGNOSIS — E03.9 ACQUIRED HYPOTHYROIDISM: ICD-10-CM

## 2021-10-28 DIAGNOSIS — Z79.890 HORMONE REPLACEMENT THERAPY (HRT): ICD-10-CM

## 2021-10-28 DIAGNOSIS — G43.909 MIGRAINE WITHOUT STATUS MIGRAINOSUS, NOT INTRACTABLE, UNSPECIFIED MIGRAINE TYPE: ICD-10-CM

## 2021-10-28 DIAGNOSIS — Z78.9 STATIN INTOLERANCE: ICD-10-CM

## 2021-10-28 DIAGNOSIS — E55.9 VITAMIN D DEFICIENCY: ICD-10-CM

## 2021-10-28 DIAGNOSIS — I51.89 VENTRICULAR DIASTOLIC DYSFUNCTION DETERMINED BY ECHOCARDIOGRAPHY: ICD-10-CM

## 2021-10-28 DIAGNOSIS — R01.1 HEART MURMUR: ICD-10-CM

## 2021-10-28 DIAGNOSIS — Z78.0 POSTMENOPAUSAL: ICD-10-CM

## 2021-10-28 DIAGNOSIS — I70.0 AORTIC ATHEROSCLEROSIS: Primary | ICD-10-CM

## 2021-10-28 DIAGNOSIS — K58.0 IRRITABLE BOWEL SYNDROME WITH DIARRHEA: ICD-10-CM

## 2021-10-28 DIAGNOSIS — K52.9 CHRONIC COLITIS: ICD-10-CM

## 2021-10-28 DIAGNOSIS — I10 ESSENTIAL HYPERTENSION: ICD-10-CM

## 2021-10-28 DIAGNOSIS — M79.7 FIBROMYALGIA: ICD-10-CM

## 2021-10-28 DIAGNOSIS — E78.2 MIXED HYPERLIPIDEMIA: ICD-10-CM

## 2021-10-28 PROCEDURE — 1101F PT FALLS ASSESS-DOCD LE1/YR: CPT | Mod: CPTII,S$GLB,, | Performed by: NURSE PRACTITIONER

## 2021-10-28 PROCEDURE — 1101F PR PT FALLS ASSESS DOC 0-1 FALLS W/OUT INJ PAST YR: ICD-10-PCS | Mod: CPTII,S$GLB,, | Performed by: NURSE PRACTITIONER

## 2021-10-28 PROCEDURE — 1160F RVW MEDS BY RX/DR IN RCRD: CPT | Mod: CPTII,S$GLB,, | Performed by: NURSE PRACTITIONER

## 2021-10-28 PROCEDURE — 1126F AMNT PAIN NOTED NONE PRSNT: CPT | Mod: CPTII,S$GLB,, | Performed by: NURSE PRACTITIONER

## 2021-10-28 PROCEDURE — 3078F DIAST BP <80 MM HG: CPT | Mod: CPTII,S$GLB,, | Performed by: NURSE PRACTITIONER

## 2021-10-28 PROCEDURE — 3075F PR MOST RECENT SYSTOLIC BLOOD PRESS GE 130-139MM HG: ICD-10-PCS | Mod: CPTII,S$GLB,, | Performed by: NURSE PRACTITIONER

## 2021-10-28 PROCEDURE — 1159F MED LIST DOCD IN RCRD: CPT | Mod: CPTII,S$GLB,, | Performed by: NURSE PRACTITIONER

## 2021-10-28 PROCEDURE — 3075F SYST BP GE 130 - 139MM HG: CPT | Mod: CPTII,S$GLB,, | Performed by: NURSE PRACTITIONER

## 2021-10-28 PROCEDURE — 3044F PR MOST RECENT HEMOGLOBIN A1C LEVEL <7.0%: ICD-10-PCS | Mod: CPTII,S$GLB,, | Performed by: NURSE PRACTITIONER

## 2021-10-28 PROCEDURE — 3288F PR FALLS RISK ASSESSMENT DOCUMENTED: ICD-10-PCS | Mod: CPTII,S$GLB,, | Performed by: NURSE PRACTITIONER

## 2021-10-28 PROCEDURE — 99999 PR PBB SHADOW E&M-EST. PATIENT-LVL V: CPT | Mod: PBBFAC,,, | Performed by: NURSE PRACTITIONER

## 2021-10-28 PROCEDURE — 1159F PR MEDICATION LIST DOCUMENTED IN MEDICAL RECORD: ICD-10-PCS | Mod: CPTII,S$GLB,, | Performed by: NURSE PRACTITIONER

## 2021-10-28 PROCEDURE — 3288F FALL RISK ASSESSMENT DOCD: CPT | Mod: CPTII,S$GLB,, | Performed by: NURSE PRACTITIONER

## 2021-10-28 PROCEDURE — 1160F PR REVIEW ALL MEDS BY PRESCRIBER/CLIN PHARMACIST DOCUMENTED: ICD-10-PCS | Mod: CPTII,S$GLB,, | Performed by: NURSE PRACTITIONER

## 2021-10-28 PROCEDURE — 99214 PR OFFICE/OUTPT VISIT, EST, LEVL IV, 30-39 MIN: ICD-10-PCS | Mod: S$GLB,,, | Performed by: NURSE PRACTITIONER

## 2021-10-28 PROCEDURE — 99214 OFFICE O/P EST MOD 30 MIN: CPT | Mod: S$GLB,,, | Performed by: NURSE PRACTITIONER

## 2021-10-28 PROCEDURE — 1126F PR PAIN SEVERITY QUANTIFIED, NO PAIN PRESENT: ICD-10-PCS | Mod: CPTII,S$GLB,, | Performed by: NURSE PRACTITIONER

## 2021-10-28 PROCEDURE — 3008F PR BODY MASS INDEX (BMI) DOCUMENTED: ICD-10-PCS | Mod: CPTII,S$GLB,, | Performed by: NURSE PRACTITIONER

## 2021-10-28 PROCEDURE — 3078F PR MOST RECENT DIASTOLIC BLOOD PRESSURE < 80 MM HG: ICD-10-PCS | Mod: CPTII,S$GLB,, | Performed by: NURSE PRACTITIONER

## 2021-10-28 PROCEDURE — 3044F HG A1C LEVEL LT 7.0%: CPT | Mod: CPTII,S$GLB,, | Performed by: NURSE PRACTITIONER

## 2021-10-28 PROCEDURE — 99999 PR PBB SHADOW E&M-EST. PATIENT-LVL V: ICD-10-PCS | Mod: PBBFAC,,, | Performed by: NURSE PRACTITIONER

## 2021-10-28 PROCEDURE — 3008F BODY MASS INDEX DOCD: CPT | Mod: CPTII,S$GLB,, | Performed by: NURSE PRACTITIONER

## 2021-10-28 RX ORDER — ESTRADIOL 2 MG/1
2 TABLET ORAL DAILY
COMMUNITY
End: 2022-04-08

## 2021-10-28 RX ORDER — CARVEDILOL 12.5 MG/1
12.5 TABLET ORAL 2 TIMES DAILY WITH MEALS
COMMUNITY
End: 2023-04-25

## 2021-10-29 ENCOUNTER — SPECIALTY PHARMACY (OUTPATIENT)
Dept: PHARMACY | Facility: CLINIC | Age: 66
End: 2021-10-29
Payer: MEDICARE

## 2021-11-12 ENCOUNTER — TELEPHONE (OUTPATIENT)
Dept: ORTHOPEDICS | Facility: CLINIC | Age: 66
End: 2021-11-12
Payer: MEDICARE

## 2021-11-12 ENCOUNTER — PATIENT MESSAGE (OUTPATIENT)
Dept: CARDIOLOGY | Facility: CLINIC | Age: 66
End: 2021-11-12
Payer: MEDICARE

## 2021-11-12 ENCOUNTER — PATIENT MESSAGE (OUTPATIENT)
Dept: ORTHOPEDICS | Facility: CLINIC | Age: 66
End: 2021-11-12
Payer: MEDICARE

## 2021-11-15 ENCOUNTER — PATIENT OUTREACH (OUTPATIENT)
Dept: ADMINISTRATIVE | Facility: OTHER | Age: 66
End: 2021-11-15
Payer: MEDICARE

## 2021-11-17 PROBLEM — R52 PAIN AGGRAVATED BY ACTIVITIES OF DAILY LIVING: Status: RESOLVED | Noted: 2021-10-06 | Resolved: 2021-11-17

## 2021-12-01 ENCOUNTER — OFFICE VISIT (OUTPATIENT)
Dept: ORTHOPEDICS | Facility: CLINIC | Age: 66
End: 2021-12-01
Payer: MEDICARE

## 2021-12-01 VITALS — WEIGHT: 168 LBS | HEIGHT: 66 IN | BODY MASS INDEX: 27 KG/M2

## 2021-12-01 DIAGNOSIS — M18.12 PRIMARY OSTEOARTHRITIS OF FIRST CARPOMETACARPAL JOINT OF LEFT HAND: Primary | ICD-10-CM

## 2021-12-01 PROCEDURE — 99213 OFFICE O/P EST LOW 20 MIN: CPT | Mod: S$GLB,,, | Performed by: ORTHOPAEDIC SURGERY

## 2021-12-01 PROCEDURE — 99213 PR OFFICE/OUTPT VISIT, EST, LEVL III, 20-29 MIN: ICD-10-PCS | Mod: S$GLB,,, | Performed by: ORTHOPAEDIC SURGERY

## 2021-12-01 PROCEDURE — 99999 PR PBB SHADOW E&M-EST. PATIENT-LVL IV: CPT | Mod: PBBFAC,,, | Performed by: ORTHOPAEDIC SURGERY

## 2021-12-01 PROCEDURE — 99999 PR PBB SHADOW E&M-EST. PATIENT-LVL IV: ICD-10-PCS | Mod: PBBFAC,,, | Performed by: ORTHOPAEDIC SURGERY

## 2021-12-20 ENCOUNTER — TELEPHONE (OUTPATIENT)
Dept: GASTROENTEROLOGY | Facility: CLINIC | Age: 66
End: 2021-12-20
Payer: MEDICARE

## 2022-01-05 ENCOUNTER — PATIENT MESSAGE (OUTPATIENT)
Dept: ADMINISTRATIVE | Facility: OTHER | Age: 67
End: 2022-01-05
Payer: MEDICARE

## 2022-01-07 ENCOUNTER — OFFICE VISIT (OUTPATIENT)
Dept: ENDOCRINOLOGY | Facility: CLINIC | Age: 67
End: 2022-01-07
Payer: MEDICARE

## 2022-01-07 VITALS
SYSTOLIC BLOOD PRESSURE: 172 MMHG | HEIGHT: 66 IN | WEIGHT: 172.31 LBS | HEART RATE: 89 BPM | RESPIRATION RATE: 18 BRPM | BODY MASS INDEX: 27.69 KG/M2 | TEMPERATURE: 98 F | DIASTOLIC BLOOD PRESSURE: 96 MMHG

## 2022-01-07 DIAGNOSIS — E55.9 VITAMIN D DEFICIENCY: ICD-10-CM

## 2022-01-07 DIAGNOSIS — R73.03 PREDIABETES: ICD-10-CM

## 2022-01-07 DIAGNOSIS — E78.2 MIXED HYPERLIPIDEMIA: ICD-10-CM

## 2022-01-07 DIAGNOSIS — Z78.0 POST-MENOPAUSAL: ICD-10-CM

## 2022-01-07 DIAGNOSIS — E03.9 ACQUIRED HYPOTHYROIDISM: ICD-10-CM

## 2022-01-07 DIAGNOSIS — I10 ESSENTIAL HYPERTENSION: Primary | ICD-10-CM

## 2022-01-07 PROCEDURE — 1101F PR PT FALLS ASSESS DOC 0-1 FALLS W/OUT INJ PAST YR: ICD-10-PCS | Mod: CPTII,S$GLB,, | Performed by: INTERNAL MEDICINE

## 2022-01-07 PROCEDURE — 1160F PR REVIEW ALL MEDS BY PRESCRIBER/CLIN PHARMACIST DOCUMENTED: ICD-10-PCS | Mod: CPTII,S$GLB,, | Performed by: INTERNAL MEDICINE

## 2022-01-07 PROCEDURE — 1159F PR MEDICATION LIST DOCUMENTED IN MEDICAL RECORD: ICD-10-PCS | Mod: CPTII,S$GLB,, | Performed by: INTERNAL MEDICINE

## 2022-01-07 PROCEDURE — 1125F PR PAIN SEVERITY QUANTIFIED, PAIN PRESENT: ICD-10-PCS | Mod: CPTII,S$GLB,, | Performed by: INTERNAL MEDICINE

## 2022-01-07 PROCEDURE — 3080F PR MOST RECENT DIASTOLIC BLOOD PRESSURE >= 90 MM HG: ICD-10-PCS | Mod: CPTII,S$GLB,, | Performed by: INTERNAL MEDICINE

## 2022-01-07 PROCEDURE — 1125F AMNT PAIN NOTED PAIN PRSNT: CPT | Mod: CPTII,S$GLB,, | Performed by: INTERNAL MEDICINE

## 2022-01-07 PROCEDURE — 3008F BODY MASS INDEX DOCD: CPT | Mod: CPTII,S$GLB,, | Performed by: INTERNAL MEDICINE

## 2022-01-07 PROCEDURE — 3077F PR MOST RECENT SYSTOLIC BLOOD PRESSURE >= 140 MM HG: ICD-10-PCS | Mod: CPTII,S$GLB,, | Performed by: INTERNAL MEDICINE

## 2022-01-07 PROCEDURE — 99205 OFFICE O/P NEW HI 60 MIN: CPT | Mod: S$GLB,,, | Performed by: INTERNAL MEDICINE

## 2022-01-07 PROCEDURE — 99999 PR PBB SHADOW E&M-EST. PATIENT-LVL V: ICD-10-PCS | Mod: PBBFAC,,, | Performed by: INTERNAL MEDICINE

## 2022-01-07 PROCEDURE — 3080F DIAST BP >= 90 MM HG: CPT | Mod: CPTII,S$GLB,, | Performed by: INTERNAL MEDICINE

## 2022-01-07 PROCEDURE — 1159F MED LIST DOCD IN RCRD: CPT | Mod: CPTII,S$GLB,, | Performed by: INTERNAL MEDICINE

## 2022-01-07 PROCEDURE — 99999 PR PBB SHADOW E&M-EST. PATIENT-LVL V: CPT | Mod: PBBFAC,,, | Performed by: INTERNAL MEDICINE

## 2022-01-07 PROCEDURE — 3077F SYST BP >= 140 MM HG: CPT | Mod: CPTII,S$GLB,, | Performed by: INTERNAL MEDICINE

## 2022-01-07 PROCEDURE — 3288F PR FALLS RISK ASSESSMENT DOCUMENTED: ICD-10-PCS | Mod: CPTII,S$GLB,, | Performed by: INTERNAL MEDICINE

## 2022-01-07 PROCEDURE — 1160F RVW MEDS BY RX/DR IN RCRD: CPT | Mod: CPTII,S$GLB,, | Performed by: INTERNAL MEDICINE

## 2022-01-07 PROCEDURE — 3008F PR BODY MASS INDEX (BMI) DOCUMENTED: ICD-10-PCS | Mod: CPTII,S$GLB,, | Performed by: INTERNAL MEDICINE

## 2022-01-07 PROCEDURE — 3288F FALL RISK ASSESSMENT DOCD: CPT | Mod: CPTII,S$GLB,, | Performed by: INTERNAL MEDICINE

## 2022-01-07 PROCEDURE — 99205 PR OFFICE/OUTPT VISIT, NEW, LEVL V, 60-74 MIN: ICD-10-PCS | Mod: S$GLB,,, | Performed by: INTERNAL MEDICINE

## 2022-01-07 PROCEDURE — 1101F PT FALLS ASSESS-DOCD LE1/YR: CPT | Mod: CPTII,S$GLB,, | Performed by: INTERNAL MEDICINE

## 2022-01-07 NOTE — PROGRESS NOTES
Endocrine Clinic Note    Reason for Visit: Hypothyroidism    History of Present Illness: Madalyn Iverson, a 66 y.o. female pmh Rheumatoid arthritis who presents today for evaluation and management of hypothyroidism.     She was diagnosed with hypothyroidism at age 16. She is on armour 68 mg daily. She tried synthroid 10- 15 years ago. She had sluggishness, weight gain, swelling, and anxiety on Synthroid. She has been prescribed [place] by her GYN who has recently retired. She denies history of thyroid nodules.  She denies anterior neck swelling, tenderness, or tightness.  She feels well on current dose of [place]. She has chronic diarrhea which she attributes to colitis. She has anxiety and irritability which she attributes to caring for her  after group home. She denies palpitations, tremor, nausea, vomiting. She does not have hot flashes on estrace. Denies VTE, stroke, breast cancer. She takes Repatha and Zetia for HLD and hx of statin intolerance but denies history of CVD.  The patient denies history of head/neck irradiation. Patient has family history of thyroid disease in her sister who had thyroid cancer and hypothyroidism.     Past Medical History:   Diagnosis Date    Acute biliary pancreatitis without infection or necrosis 6/13/2018    Acute pancreatitis     June 2018 - [facility]    ADHD (attention deficit hyperactivity disorder), inattentive type     Aortic atherosclerosis 3/2/2019    Mild to moderate aortic atherosclerosis seen on CT abd/pelvis 3/2/2019    Chronic back pain     Followed by [facility] - Dr. Bautista - has been having medial branch blocks    Fibromyalgia     treated by Dr. Thorpe in past and now treated by Dr. Yates - Rheumatologist    Hyperlipidemia     High triglycerides    Hypertension     Hypothyroidism     Treated by Dr. Mathew    IFG (impaired fasting glucose)     Migraine     Treated by neurologist -   Destiny Florez    Ulcerative colitis     Patient reported treated by Dr. Lee and Aaron in past  but on admit in 2018 - no UC seen on recent colonoscopy.  It was noted on colonoscopy in 2004 there were ulcers noted to descending colon but not present on sigmoidoscopy in 2004.    Ventricular diastolic dysfunction determined by echocardiography 2021    Followed by Cardiologist Dr. Ghotra    Vitamin D deficiency 3/31/2016       Past Surgical History:   Procedure Laterality Date    CARPAL TUNNEL RELEASE Right 3/27/2019    Procedure: RELEASE, CARPAL TUNNEL right;  Surgeon: Li Waller MD;  Location: [address];  Service: Orthopedics;  Laterality: Right;  stretcher, supine, hand pan 1 and 2    CARPAL TUNNEL RELEASE Left 2021    Procedure: RELEASE, CARPAL TUNNEL;  Surgeon: Brian Reed Jr., MD;  Location: [facility];  Service: Orthopedics;  Laterality: Left;     SECTION      COLONOSCOPY  10/08/2010    Dr. Lee - repeat in 5 years - has appointment for colonoscopy 2016    COLONOSCOPY  2016    Dr. Kelly - normal  colon - repeat in 10 years - scanned report to media file.    ENDOSCOPIC ULTRASOUND OF UPPER GASTROINTESTINAL TRACT N/A 2018    Procedure: ULTRASOUND, ENDOSCOPIC, UPPER GI TRACT;  Surgeon: Reji Russell MD;  Location: [facility];  Service: Endoscopy;  Laterality: N/A;    ENDOSCOPIC ULTRASOUND OF UPPER GASTROINTESTINAL TRACT N/A 3/4/2019    Procedure: ULTRASOUND, UPPER GI TRACT, ENDOSCOPIC;  Surgeon: Randy Boyd MD;  Location: [facility];  Service: Endoscopy;  Laterality: N/A;    HYSTERECTOMY      INTERPOSITION ARTHROPLASTY OF CARPOMETACARPAL JOINTS Left 2021    Procedure: INTERPOSITION ARTHROPLASTY, CMC JOINT;  Surgeon: Brian Reed Jr., MD;  Location: [facility];  Service: Orthopedics;  Laterality: Left;  need arthrex tightrope   Brit notified  LB, Brit confirmed 21 AM    LAPAROSCOPIC CHOLECYSTECTOMY N/A  "3/6/2019    Procedure: CHOLECYSTECTOMY, LAPAROSCOPIC;  Surgeon: Hill Palacios MD;  Location: [facility];  Service: General;  Laterality: N/A;    medial branch block      done at [facility] for axial back pain    OOPHORECTOMY      SHOULDER SURGERY Left     TONSILLECTOMY      upper and lower GI  2016       Family History   Problem Relation Age of Onset    Hypertension Mother     Heart disease Mother     Hyperlipidemia Mother     Cancer Sister 53        thyroid cancer and lymph nodes involvement    Cancer Brother 61        colon and lung cancer    Colon cancer Brother 66    Cancer Brother 63        colon    Hypertension Brother     Diabetes Brother     Colon cancer Brother 60    Esophageal cancer Neg Hx     Stomach cancer Neg Hx     Ulcerative colitis Neg Hx     Crohn's disease Neg Hx     Irritable bowel syndrome Neg Hx     Celiac disease Neg Hx        Social History     Socioeconomic History    Marital status:    Tobacco Use    Smoking status: Former Smoker     Packs/day: 1.00     Years: 20.00     Pack years: 20.00     Quit date: 1997     Years since quittin.9    Smokeless tobacco: Former User   Substance and Sexual Activity    Alcohol use: No    Drug use: No    Sexual activity: Never       Current Outpatient Medications on File Prior to Visit   Medication Sig Dispense Refill    ALPRAZolam (XANAX) 0.5 MG tablet TAKE 1 TABLET BY MOUTH THREE TIMES A DAY AS NEEDED 90 tablet 0    amLODIPine (NORVASC) 10 MG tablet Take 1 tablet (10 mg total) by mouth once daily. 90 tablet 1    BD INSULIN SYRINGE 1 mL 25 gauge x 5/8" Syrg       carvediloL (COREG) 12.5 MG tablet Take 12.5 mg by mouth 2 (two) times daily with meals.      cholecalciferol, vitamin D3, (VITAMIN D3) 125 mcg (5,000 unit) Tab Take 1 tablet (5,000 Units total) by mouth once daily.      cloNIDine 0.1 mg/24 hr td ptwk (CATAPRES) 0.1 mg/24 hr       CYANOCOBALAMIN, VITAMIN B-12, (VITAMIN B-12 INJ) " "Inject 1,000 mcg as directed every 30 days.      dicyclomine (BENTYL) 20 mg tablet TAKE 1 TABLET BY MOUTH FOUR TIMES A  tablet 0    ergocalciferol (ERGOCALCIFEROL) 50,000 unit Cap Take 1 capsule (50,000 Units total) by mouth every 7 days. 12 capsule 3    estradioL (ESTRACE) 2 MG tablet Take 2 mg by mouth once daily.      evolocumab (REPATHA SURECLICK) 140 mg/mL PnIj Inject 1 mL (140 mg total) into the skin every 2 weeks 2 mL 11    ezetimibe (ZETIA) 10 mg tablet TAKE 1 TABLET BY MOUTH EVERY DAY 90 tablet 1    folic acid (FOLVITE) 1 MG tablet TAKE 1 TABLET BY MOUTH EVERY DAY 90 tablet 1    furosemide (LASIX) 40 MG tablet TAKE 1 TABLET BY MOUTH EVERY DAY 90 tablet 0    hydrOXYchloroQUINE (PLAQUENIL) 200 mg tablet       methotrexate 25 mg/mL injection Inject 1 ml into the skin weekly. 10 mL 11    metoclopramide HCl (REGLAN) 10 MG tablet Take 1 tablet (10 mg total) by mouth every 6 (six) hours as needed. 12 tablet 0    metoprolol succinate (TOPROL-XL) 50 MG 24 hr tablet Take 1 tablet (50 mg total) by mouth once daily. 90 tablet 1    potassium chloride (MICRO-K) 10 MEQ CpSR TAKE 1 CAPSULE BY MOUTH TWICE A  capsule 1    syringe-needle,safety,disp unt 1 mL 25 gauge x 5/8" Syrg To use with mtx injections 20 Syringe 0    thyroid, pork, (ARMOUR THYROID) 60 mg Tab [place] Thyroid 60 mg tablet      tiZANidine (ZANAFLEX) 4 MG tablet TAKE 1 TABLET BY MOUTH FOUR TIMES A  tablet 3    traMADoL (ULTRAM) 50 mg tablet Take 1-3 tablets daily as directed as needed for pain (Patient taking differently: Take 1-3 tablets daily as directed as needed for pain) 90 tablet 1    traZODone (DESYREL) 100 MG tablet Take 1 tablet (100 mg total) by mouth every evening. 90 tablet 1     Current Facility-Administered Medications on File Prior to Visit   Medication Dose Route Frequency Provider Last Rate Last Admin    0.9%  NaCl infusion   Intravenous Continuous Jermaine Chatman MD   Stopped at 03/27/19 1040    " "evolocumab PnIj 140 mg  140 mg Subcutaneous 1 time in Clinic/HOD Ethan Ghotra MD        evolocumab PnIj 140 mg  140 mg Subcutaneous 1 time in Clinic/HOD Ethan Ghotra MD        mupirocin 2 % ointment   Nasal On Call Procedure Jermaine Chatman MD   Given at 03/27/19 0848       Review of patient's allergies indicates:  No Known Allergies    Physical Exam:   BP (!) 172/96   Pulse 89   Temp 97.5 °F (36.4 °C)   Resp 18   Ht 5' 6" (1.676 m)   Wt 78.1 kg (172 lb 4.6 oz)   BMI 27.81 kg/m²  Body mass index is 27.81 kg/m².  General: Well developed, well-nourished female in NAD  Head: Normocephalic, atraumatic, no obvious deformity  Eyes: PERRL, EOMI, Normal conjunctivae  Neck: No thyromegaly. No discrete nodules. No cervical adenopathy appreciated  CVS: RRR no murmurs, rubs, or gallops.   Chest: Clear to auscultation bilaterally. No wheezes, rales or rhonchi  Abdomen: Soft, nontender, nondistended. Normal bowel sounds  Extremities: No clubbing, cyanosis or edema  Neuro:Strength symmetric bilaterally. Gross sensation intact. No tremor of outstretched hands. Biceps reflexes wnl.   Skin: No rashes or lesions  Psych: Normal mood. Normal affect. clear     Labs:    Component      Latest Ref Rng & Units 8/24/2021   WBC      3.90 - 12.70 K/uL 5.53   RBC      4.00 - 5.40 M/uL 4.35   Hemoglobin      12.0 - 16.0 g/dL 13.5   Hematocrit      37.0 - 48.5 % 40.6   MCV      82 - 98 fL 93   MCH      27.0 - 31.0 pg 31.0   MCHC      32.0 - 36.0 g/dL 33.3   RDW      11.5 - 14.5 % 13.2   Platelets      150 - 450 K/uL 185   MPV      9.2 - 12.9 fL 11.9   Immature Granulocytes      0.0 - 0.5 % 0.4   Gran # (ANC)      1.8 - 7.7 K/uL 3.0   Immature Grans (Abs)      0.00 - 0.04 K/uL 0.02   Lymph #      1.0 - 4.8 K/uL 1.7   Mono #      0.3 - 1.0 K/uL 0.6   Eos #      0.0 - 0.5 K/uL 0.2   Baso #      0.00 - 0.20 K/uL 0.07   nRBC      0 /100 WBC 0   Gran %      38.0 - 73.0 % 54.2   Lymph %      18.0 - 48.0 % 30.6   Mono %      4.0 - " 15.0 % 10.8   Eosinophil %      0.0 - 8.0 % 2.7   Basophil %      0.0 - 1.9 % 1.3   Differential Method       Automated   Sodium      136 - 145 mmol/L 139   Potassium      3.5 - 5.1 mmol/L 3.8   Chloride      95 - 110 mmol/L 105   CO2      23 - 29 mmol/L 27   Glucose      70 - 110 mg/dL 140 (H)   BUN      7 - 17 mg/dL 20 (H)   Creatinine      0.50 - 1.40 mg/dL 0.66   Calcium      8.7 - 10.5 mg/dL 9.4   PROTEIN TOTAL      6.0 - 8.4 g/dL 7.4   Albumin      3.5 - 5.2 g/dL 4.1   BILIRUBIN TOTAL      0.1 - 1.0 mg/dL 0.2   Alkaline Phosphatase      38 - 126 U/L 142 (H)   AST      15 - 46 U/L 33   ALT      10 - 44 U/L 24   Anion Gap      8 - 16 mmol/L 7 (L)   eGFR if African American      >60 mL/min/1.73 m:2 >60.0   eGFR if non African American      >60 mL/min/1.73 m:2 >60.0   Cholesterol      120 - 199 mg/dL    Triglycerides      30 - 150 mg/dL    HDL      40 - 75 mg/dL    LDL Cholesterol External      63.0 - 159.0 mg/dL    HDL/Cholesterol Ratio      20.0 - 50.0 %    Total Cholesterol/HDL Ratio      2.0 - 5.0    Non-HDL Cholesterol      mg/dL    Hemoglobin A1C External      4.0 - 5.6 %    Estimated Avg Glucose      68 - 131 mg/dL    Sed Rate      0 - 20 mm/Hr 50 (H)   CRP      0.0 - 8.2 mg/L 10.6 (H)   Vit D, 25-Hydroxy      30 - 96 ng/mL 18 (L)     Component      Latest Ref Rng & Units 10/21/2021   Cholesterol      120 - 199 mg/dL 239 (H)   Triglycerides      30 - 150 mg/dL 174 (H)   HDL      40 - 75 mg/dL 57   LDL Cholesterol External      63.0 - 159.0 mg/dL 147.2   HDL/Cholesterol Ratio      20.0 - 50.0 % 23.8   Total Cholesterol/HDL Ratio      2.0 - 5.0 4.2   Non-HDL Cholesterol      mg/dL 182   Hemoglobin A1C External      4.0 - 5.6 % 5.8 (H)   Estimated Avg Glucose      68 - 131 mg/dL 120   Sed Rate      0 - 20 mm/Hr    CRP      0.0 - 8.2 mg/L    Vit D, 25-Hydroxy      30 - 96 ng/mL 17 (L)       Assessment/ Plan:   Madalyn Iverson, a 66 y.o. female pmh RA who presents today for evaluation and management of  hypothyroidism.     Hypothyroidism  Currently on [place] which was prescribed by her GYN who retired.. She reports thyroid labs have been stable over the years. She has chronic diarrhea and anxiety. Will need to repeat now as there is risk of TSH suppression on [place]. She reports not feeling well on Synthroid but she is also on a number of other medications and has comorbidities that could be contributing to her symptoms.   -Check TSH, free T4, Total T3    Hypertension, uncontrolled   Nonadherent with her medications. Reports HTN has never been controlled.  Asked her to discuss regimen with her PCP and cardiology asap. She is on two BBs so favor stopping Metoprolol and staying on Carvedilol which has more potent BP lowering effect. She reports edema so can stay on Furosemide with potassium.  She reports good control with Verapamil 240mg ER BID so potentially could go back on this and stop amlodipine. Discussed that clonidine patch needs to be worn daily as she is not wearing it today. Will work her up for secondary causes of HTN. Discussed importance of compliance and risks of uncontrolled BP.   -Check AM PRA, Aldosterone, Plasma fractionated free [place]  -Fu with cardiology     Hyperlipidemia:   Reports statin intolerance and saw cardiology. Currently on Repatha and Zetia    Pre-DM   Last A1c was 5.8% in 10/2021. Can repeat in 4/2022    Post -menopausal HRT  -Explained risks of ongoing estrogen use given her comorbidites. Asked her to taper to 1mg daily and then off after 4 weeks. DEXA scan normal.  Discussed need for breast cancer screening.     Colitis  She is not sure if she needs her colitis medications and not clear on her diagnosis because she reports diagnosis of both IBS and Crohns. She has elevated ESR and CRP but she has RA as well and is on methotrexate and plaquenil. She does not find relief on current GI regimen so encouraged her to see GI again to manage her symptoms of dairrhea. Reglan is not ideal given  uncontrolled hypertension so told to see if this can be tapered off without any adverse GI side fx.      Vitamin D deficiency  Last Vit D was 17 ng/ml  -Cont D2 weekly and D3 daily    The patient was concerned about polypharmacy so reviewed her medications with her and provided written list

## 2022-01-07 NOTE — PATIENT INSTRUCTIONS
Hypothyroidism: Currently on [place]. Check labs today    Vitamin D deficiency:  D2 weekly and D3 daily    Hyperlipidemia: Repatha and Zetia    Hormone therapy: You need to taper off estradiol    Hypertension: We will check hormones today. You need to see a cardiologist.  Furosemide and potassium  Clonidine patch daily  Amlodipine--> Potentially switch back to Verapamil  You are on two beta blockers (carvedilol and metoprolol).     Colitis- Reglan and Dicyclomine (can try tapering off Reglan to see if you need it)

## 2022-01-11 PROBLEM — Z78.0 MENOPAUSE: Status: ACTIVE | Noted: 2022-01-11

## 2022-01-19 ENCOUNTER — OFFICE VISIT (OUTPATIENT)
Dept: ORTHOPEDICS | Facility: CLINIC | Age: 67
End: 2022-01-19
Payer: MEDICARE

## 2022-01-19 VITALS — WEIGHT: 172 LBS | HEIGHT: 66 IN | BODY MASS INDEX: 27.64 KG/M2

## 2022-01-19 DIAGNOSIS — M18.12 PRIMARY OSTEOARTHRITIS OF FIRST CARPOMETACARPAL JOINT OF LEFT HAND: Primary | ICD-10-CM

## 2022-01-19 PROCEDURE — 99213 PR OFFICE/OUTPT VISIT, EST, LEVL III, 20-29 MIN: ICD-10-PCS | Mod: 25,S$GLB,, | Performed by: ORTHOPAEDIC SURGERY

## 2022-01-19 PROCEDURE — 99999 PR PBB SHADOW E&M-EST. PATIENT-LVL III: CPT | Mod: PBBFAC,,, | Performed by: ORTHOPAEDIC SURGERY

## 2022-01-19 PROCEDURE — 1125F AMNT PAIN NOTED PAIN PRSNT: CPT | Mod: CPTII,S$GLB,, | Performed by: ORTHOPAEDIC SURGERY

## 2022-01-19 PROCEDURE — 20605 DRAIN/INJ JOINT/BURSA W/O US: CPT | Mod: LT,S$GLB,, | Performed by: ORTHOPAEDIC SURGERY

## 2022-01-19 PROCEDURE — 1125F PR PAIN SEVERITY QUANTIFIED, PAIN PRESENT: ICD-10-PCS | Mod: CPTII,S$GLB,, | Performed by: ORTHOPAEDIC SURGERY

## 2022-01-19 PROCEDURE — 3008F BODY MASS INDEX DOCD: CPT | Mod: CPTII,S$GLB,, | Performed by: ORTHOPAEDIC SURGERY

## 2022-01-19 PROCEDURE — 3288F PR FALLS RISK ASSESSMENT DOCUMENTED: ICD-10-PCS | Mod: CPTII,S$GLB,, | Performed by: ORTHOPAEDIC SURGERY

## 2022-01-19 PROCEDURE — 3288F FALL RISK ASSESSMENT DOCD: CPT | Mod: CPTII,S$GLB,, | Performed by: ORTHOPAEDIC SURGERY

## 2022-01-19 PROCEDURE — 99999 PR PBB SHADOW E&M-EST. PATIENT-LVL III: ICD-10-PCS | Mod: PBBFAC,,, | Performed by: ORTHOPAEDIC SURGERY

## 2022-01-19 PROCEDURE — 1101F PR PT FALLS ASSESS DOC 0-1 FALLS W/OUT INJ PAST YR: ICD-10-PCS | Mod: CPTII,S$GLB,, | Performed by: ORTHOPAEDIC SURGERY

## 2022-01-19 PROCEDURE — 99213 OFFICE O/P EST LOW 20 MIN: CPT | Mod: 25,S$GLB,, | Performed by: ORTHOPAEDIC SURGERY

## 2022-01-19 PROCEDURE — 1159F MED LIST DOCD IN RCRD: CPT | Mod: CPTII,S$GLB,, | Performed by: ORTHOPAEDIC SURGERY

## 2022-01-19 PROCEDURE — 20605 PR DRAIN/INJECT INTERMEDIATE JOINT/BURSA: ICD-10-PCS | Mod: LT,S$GLB,, | Performed by: ORTHOPAEDIC SURGERY

## 2022-01-19 PROCEDURE — 1159F PR MEDICATION LIST DOCUMENTED IN MEDICAL RECORD: ICD-10-PCS | Mod: CPTII,S$GLB,, | Performed by: ORTHOPAEDIC SURGERY

## 2022-01-19 PROCEDURE — 1101F PT FALLS ASSESS-DOCD LE1/YR: CPT | Mod: CPTII,S$GLB,, | Performed by: ORTHOPAEDIC SURGERY

## 2022-01-19 PROCEDURE — 3008F PR BODY MASS INDEX (BMI) DOCUMENTED: ICD-10-PCS | Mod: CPTII,S$GLB,, | Performed by: ORTHOPAEDIC SURGERY

## 2022-01-19 RX ORDER — TRIAMCINOLONE ACETONIDE 40 MG/ML
20 INJECTION, SUSPENSION INTRA-ARTICULAR; INTRAMUSCULAR
Status: COMPLETED | OUTPATIENT
Start: 2022-01-19 | End: 2022-01-19

## 2022-01-19 RX ORDER — MELOXICAM 15 MG/1
15 TABLET ORAL DAILY
Qty: 30 TABLET | Refills: 1 | Status: SHIPPED | OUTPATIENT
Start: 2022-01-19 | End: 2022-02-16

## 2022-01-19 RX ADMIN — TRIAMCINOLONE ACETONIDE 20 MG: 40 INJECTION, SUSPENSION INTRA-ARTICULAR; INTRAMUSCULAR at 02:01

## 2022-01-19 NOTE — PROGRESS NOTES
"Subjective:      Patient ID: Madalyn Iverson is a 66 y.o. female.  Chief Complaint: Follow-up (Left CMC ( SX 7/9). pain and swelling)      HPI  Madalyn Iverson is a  66 y.o. female presenting today for follow up of left hand and wrist arthritis.  She reports that she is having some pain mainly in the left wrist  She is status post CMC arthroplasty left thumb now more than 6 months postop not really having any pain in this area but very close by in the wrist joint  No numbness or tingling is reported.    Review of patient's allergies indicates:  No Known Allergies      Current Outpatient Medications   Medication Sig Dispense Refill    ALPRAZolam (XANAX) 0.5 MG tablet TAKE 1 TABLET BY MOUTH THREE TIMES A DAY AS NEEDED 90 tablet 0    amLODIPine (NORVASC) 10 MG tablet Take 1 tablet (10 mg total) by mouth once daily. 90 tablet 1    BD INSULIN SYRINGE 1 mL 25 gauge x 5/8" Syrg       carvediloL (COREG) 12.5 MG tablet Take 12.5 mg by mouth 2 (two) times daily with meals.      cholecalciferol, vitamin D3, (VITAMIN D3) 125 mcg (5,000 unit) Tab Take 1 tablet (5,000 Units total) by mouth once daily.      cloNIDine 0.1 mg/24 hr td ptwk (CATAPRES) 0.1 mg/24 hr       cyanocobalamin 1,000 mcg/mL injection INJECT 1 ML (1,000 MCG TOTAL) INTO THE SKIN EVERY 30 DAYS 3 mL 3    CYANOCOBALAMIN, VITAMIN B-12, (VITAMIN B-12 INJ) Inject 1,000 mcg as directed every 30 days.      ergocalciferol (ERGOCALCIFEROL) 50,000 unit Cap Take 1 capsule (50,000 Units total) by mouth every 7 days. 12 capsule 3    evolocumab (REPATHA SURECLICK) 140 mg/mL PnIj Inject 1 mL (140 mg total) into the skin every 2 weeks 2 mL 11    ezetimibe (ZETIA) 10 mg tablet TAKE 1 TABLET BY MOUTH EVERY DAY 90 tablet 1    folic acid (FOLVITE) 1 MG tablet TAKE 1 TABLET BY MOUTH EVERY DAY 90 tablet 1    furosemide (LASIX) 40 MG tablet TAKE 1 TABLET BY MOUTH EVERY DAY 90 tablet 0    hydrOXYchloroQUINE (PLAQUENIL) 200 mg tablet       methotrexate 25 mg/mL " "injection Inject 1 ml into the skin weekly. 10 mL 11    metoclopramide HCl (REGLAN) 10 MG tablet Take 1 tablet (10 mg total) by mouth every 6 (six) hours as needed. 12 tablet 0    potassium chloride (MICRO-K) 10 MEQ CpSR TAKE 1 CAPSULE BY MOUTH TWICE A  capsule 1    syringe-needle,safety,disp unt 1 mL 25 gauge x 5/8" Syrg To use with mtx injections 20 Syringe 0    thyroid, pork, (ARMOUR THYROID) 60 mg Tab Ovando Thyroid 60 mg tablet      tiZANidine (ZANAFLEX) 4 MG tablet TAKE 1 TABLET BY MOUTH FOUR TIMES A  tablet 3    traMADoL (ULTRAM) 50 mg tablet TAKE 2 TO 3 TABLETS BY MOUTH DAILY 90 tablet 1    traZODone (DESYREL) 100 MG tablet TAKE 1 TABLET (100 MG TOTAL) BY MOUTH EVERY EVENING. 90 tablet 1    dicyclomine (BENTYL) 20 mg tablet TAKE 1 TABLET BY MOUTH FOUR TIMES A  tablet 0    estradioL (ESTRACE) 2 MG tablet Take 2 mg by mouth once daily.      metoprolol succinate (TOPROL-XL) 50 MG 24 hr tablet Take 1 tablet (50 mg total) by mouth once daily. 90 tablet 1     Current Facility-Administered Medications   Medication Dose Route Frequency Provider Last Rate Last Admin    evolocumab PnIj 140 mg  140 mg Subcutaneous 1 time in Clinic/HOD Ethan Ghotra MD        evolocumab PnIj 140 mg  140 mg Subcutaneous 1 time in Clinic/HOD Ethan Ghotra MD         Facility-Administered Medications Ordered in Other Visits   Medication Dose Route Frequency Provider Last Rate Last Admin    0.9%  NaCl infusion   Intravenous Continuous Jermaine Chatman MD   Stopped at 03/27/19 1040    mupirocin 2 % ointment   Nasal On Call Procedure Jermaine Chatman MD   Given at 03/27/19 0848       Past Medical History:   Diagnosis Date    Acute biliary pancreatitis without infection or necrosis 6/13/2018    Acute pancreatitis     June 2018 - [facility]    ADHD (attention deficit hyperactivity disorder), inattentive type     Aortic atherosclerosis 3/2/2019    Mild to " moderate aortic atherosclerosis seen on CT abd/pelvis 3/2/2019    Chronic back pain     Followed by [facility] - Dr. Bautista - has been having medial branch blocks    Fibromyalgia     treated by Dr. Thorpe in past and now treated by Dr. Yates - Rheumatologist    Hyperlipidemia     High triglycerides    Hypertension     Hypothyroidism     Treated by Dr. Mathew    IFG (impaired fasting glucose)     Migraine     Treated by neurologist - Dr. Destiny Florez    Ulcerative colitis     Patient reported treated by Dr. Lee and Aaron in past  but on admit in 2018 - no UC seen on recent colonoscopy.  It was noted on colonoscopy in 2004 there were ulcers noted to descending colon but not present on sigmoidoscopy in 2004.    Ventricular diastolic dysfunction determined by echocardiography 2021    Followed by Cardiologist Dr. Ghotra    Vitamin D deficiency 3/31/2016       Past Surgical History:   Procedure Laterality Date    CARPAL TUNNEL RELEASE Right 3/27/2019    Procedure: RELEASE, CARPAL TUNNEL right;  Surgeon: Li Waller MD;  Location: [address];  Service: Orthopedics;  Laterality: Right;  stretcher, supine, hand pan 1 and 2    CARPAL TUNNEL RELEASE Left 2021    Procedure: RELEASE, CARPAL TUNNEL;  Surgeon: Brian Reed Jr., MD;  Location: [facility];  Service: Orthopedics;  Laterality: Left;     SECTION      COLONOSCOPY  10/08/2010    Dr. Lee - repeat in 5 years - has appointment for colonoscopy 2016    COLONOSCOPY  2016    Dr. Kelly - normal  colon - repeat in 10 years - scanned report to media file.    ENDOSCOPIC ULTRASOUND OF UPPER GASTROINTESTINAL TRACT N/A 2018    Procedure: ULTRASOUND, ENDOSCOPIC, UPPER GI TRACT;  Surgeon: eRji Russell MD;  Location: [facility];  Service: Endoscopy;  Laterality: N/A;    ENDOSCOPIC ULTRASOUND OF UPPER GASTROINTESTINAL TRACT N/A 3/4/2019    Procedure: ULTRASOUND, UPPER GI  "TRACT, ENDOSCOPIC;  Surgeon: Randy Boyd MD;  Location: [facility];  Service: Endoscopy;  Laterality: N/A;    HYSTERECTOMY      INTERPOSITION ARTHROPLASTY OF CARPOMETACARPAL JOINTS Left 7/9/2021    Procedure: INTERPOSITION ARTHROPLASTY, CMC JOINT;  Surgeon: Brian Reed Jr., MD;  Location: [facility] OR;  Service: Orthopedics;  Laterality: Left;  need arthrex tightrope   Brit notified 6/22 LB, Brit confirmed 7/8/21 AM    LAPAROSCOPIC CHOLECYSTECTOMY N/A 3/6/2019    Procedure: CHOLECYSTECTOMY, LAPAROSCOPIC;  Surgeon: Hill Palacios MD;  Location: [facility] OR;  Service: General;  Laterality: N/A;    medial branch block      done at [facility] for axial back pain    OOPHORECTOMY      SHOULDER SURGERY Left     TONSILLECTOMY      upper and lower GI  03/2016       OBJECTIVE:   PHYSICAL EXAM:  Height: 5' 6" (167.6 cm) Weight: 78 kg (172 lb)  Vitals:    01/19/22 1417   Weight: 78 kg (172 lb)   Height: 5' 6" (1.676 m)   PainSc:   7     Ortho/SPM Exam  Examination left hand and wrist previous incision well healed no swelling no tenderness  No evidence of infection  Mild tenderness at the wrist joint of the radio scaphoid articulation slight swelling  Range of motion wrist fingers full   strength slightly decreased  Tinel sign negative    RADIOGRAPHS:  None  Comments: I have personally reviewed the imaging and I agree with the above radiologist's report.    ASSESSMENT/PLAN:     IMPRESSION:  Left wrist pain after previous CMC arthroplasty    PLAN:  Her symptoms seem to be coming from more the wrist joint area near the radioscaphoid articulation so I recommended a wrist brace  Also injection today  After pause for time-out identified the left wrist injected radioscaphoid articulation combination Kenalog 20 mg 0.5 cc xylocaine sterile technique  Tolerated the procedure well without complication  Continue current medications    FOLLOW UP:  2-3 months or as needed  If symptoms persist we will need " to get an x-ray next visit    Disclaimer: This note has been generated using voice-recognition software. There may be typographical errors that have been missed during proof-reading.

## 2022-02-23 DIAGNOSIS — D84.9 IMMUNOSUPPRESSED STATUS: ICD-10-CM

## 2022-04-07 ENCOUNTER — PATIENT OUTREACH (OUTPATIENT)
Dept: ADMINISTRATIVE | Facility: OTHER | Age: 67
End: 2022-04-07
Payer: MEDICARE

## 2022-04-07 NOTE — PROGRESS NOTES
Health Maintenance Due   Topic Date Due    Shingles Vaccine (3 of 3) 06/23/2021    COVID-19 Vaccine (3 - Booster for Pfizer series) 07/25/2021    Mammogram  05/06/2022     Updates were requested from care everywhere.  Chart was reviewed for overdue [facility] Encounters (KENDRA) topics (CRS, Breast Cancer Screening, Eye exam)  Health Maintenance has been updated.  LIN[place] immunization registry triggered.  Immunizations were reconciled.

## 2022-04-08 ENCOUNTER — OFFICE VISIT (OUTPATIENT)
Dept: RHEUMATOLOGY | Facility: CLINIC | Age: 67
End: 2022-04-08
Payer: MEDICARE

## 2022-04-08 DIAGNOSIS — M13.80 SERONEGATIVE ARTHRITIS: ICD-10-CM

## 2022-04-08 DIAGNOSIS — M06.00 SERONEGATIVE RHEUMATOID ARTHRITIS: ICD-10-CM

## 2022-04-08 DIAGNOSIS — G56.02 CARPAL TUNNEL SYNDROME OF LEFT WRIST: ICD-10-CM

## 2022-04-08 DIAGNOSIS — R63.5 WEIGHT GAIN: Primary | ICD-10-CM

## 2022-04-08 DIAGNOSIS — G56.02 LEFT CARPAL TUNNEL SYNDROME: ICD-10-CM

## 2022-04-08 PROCEDURE — 1159F MED LIST DOCD IN RCRD: CPT | Mod: CPTII,95,, | Performed by: INTERNAL MEDICINE

## 2022-04-08 PROCEDURE — 99214 OFFICE O/P EST MOD 30 MIN: CPT | Mod: 95,,, | Performed by: INTERNAL MEDICINE

## 2022-04-08 PROCEDURE — 1125F AMNT PAIN NOTED PAIN PRSNT: CPT | Mod: CPTII,95,, | Performed by: INTERNAL MEDICINE

## 2022-04-08 PROCEDURE — 99214 PR OFFICE/OUTPT VISIT, EST, LEVL IV, 30-39 MIN: ICD-10-PCS | Mod: 95,,, | Performed by: INTERNAL MEDICINE

## 2022-04-08 PROCEDURE — 1125F PR PAIN SEVERITY QUANTIFIED, PAIN PRESENT: ICD-10-PCS | Mod: CPTII,95,, | Performed by: INTERNAL MEDICINE

## 2022-04-08 PROCEDURE — 1159F PR MEDICATION LIST DOCUMENTED IN MEDICAL RECORD: ICD-10-PCS | Mod: CPTII,95,, | Performed by: INTERNAL MEDICINE

## 2022-04-08 RX ORDER — HYDROCODONE BITARTRATE AND ACETAMINOPHEN 5; 325 MG/1; MG/1
TABLET ORAL
Qty: 21 TABLET | Refills: 0 | Status: SHIPPED | OUTPATIENT
Start: 2022-04-08 | End: 2022-07-26

## 2022-04-08 NOTE — PROGRESS NOTES
Chief Complaint   Patient presents with    Disease Management       Patient with fibromyalgia for a follow up  Seronegative arthritis    She has seronegative RA    The patient location is: home  The chief complaint leading to consultation is: arthralgias    Visit type: audiovisual     Face to Face time with patient: 20  minutes of total time spent on the encounter, which includes face to face time and non-face to face time preparing to see the patient (eg, review of tests), Obtaining and/or reviewing separately obtained history, Documenting clinical information in the electronic or other health record, Independently interpreting results (not separately reported) and communicating results to the patient/family/caregiver, or Care coordination (not separately reported).       Each patient to whom he or she provides medical services by telemedicine is:  (1) informed of the relationship between the physician and patient and the respective role of any other health care provider with respect to management of the patient; and (2) notified that he or she may decline to receive medical services by telemedicine and may withdraw from such care at any time.    Notes:     History of presenting illness    67 year old white female has fibromyalgia : for 2 years now    She used to see :   : rheumatology    Tried :  lyrica : light headed,dizziness    She used to take cymbalta,she weaned off  She weaned off the celexa    Takes xanax to sleep  Takes tizanidine 4 mg x 2 daily   She takes trazodone 100 mg to sleep      She had severe pain,swelling and stiffness in her hands  She has done a CTS surgery and ulnar nerve decompression on the right side    Left hand CTS was pending for a while     She was on mtx 8 tabs weekly/folic acid   Humira was offered and she is not comfortable doing it   So we switched to mtx 1 ml weekly sc  We then added plaquenil 200 mg bid and ssz 1500 mg bid     Labs     CRP 13/elevated but better than  25.7  ESR nml  CMP nml  CBC nml       7/2021    She finally had left CMC arthroplasty as well as left carpal tunnel release    Pain is much better   She has some swelling at the site of the surgery  She will follow with hand surgery    Now on mtx 1 ml sc weekly+ plaquenil 200 mg bid+ ssz 1500 mg bid     Right hand continues to hurt       4/2022    Weight gain-20 pounds over the past year  She goes to the gym,still doesn't lose weight    Thumbs-hurt bad -both are crippled  Fingers get stuck-cannot open the fingers without using the other hand  Fingers are stiff  She drops things again    mtx 1 ml weekly,plaquenil 200 mg bid, ssz 1500 mg bid  -doesn't help        She had xrays : bulging discs low back,L4-5  Hand xrays : OA   C spine : OA   She had feet spurs : injected     Headaches +  Takes 4 BP meds : metoprolol,lasix,diovan,norvasc   Migraines not on treatment  Light headedness+  Sees neurology : CT brains normal    She has seen neuro for the spine issues and also will see neurosurgeon    Episodes of nausea and feeling of sickness  Diarrhea+  Abdominal pain +  GI : they think this is irritable bowel syndrome   Only once in 2004 she had ischemic colitis  EUS all planned   Colonoscopy 2 years ago nml  EGD this year nml  There was no Crohn's on the recent w/u    Crestor gives her charley horses     Past history : pancreatitis,IBS  ADHD  Htn,migraine,anxiety,hypothyroidism,FMS,HLD,IBS    Cause of pancreatitis : unclear   Has had high ALPs for a while now     Blood work    High ESR,CRP    Negative RF,CCP  HLA B27 negative    Xrays    Hands : deg changes + punctate erosions proximal phalanx of the little finger   Knees : nml  C spine : deg changes  LS spine : deg changes,facet arthritis    MRI hands :  Right and left hands: Multifocal degenerative changes noted, most prominent in the IP joints, noting joint space loss and osteophytosis.  Prominent degenerative changes also noted at the 1st carpometacarpal and triscaphe  joint of the wrist.  Subcortical erosive change noted in the scaphoid, capitate, and triquetrum.    There is prominent synovitis/enhancement at the MCP joints, carpal carpal joints, and proximal IP joint.  Periarticular erosions noted..  Enhancement also noted around the flexor tendon sheath, suggesting tenosynovitis. The flexor tendons demonstrate normal size and signal.    No fracture fracture.  No marrow replacement process.      Impression       Degenerative arthropathy of both hands with enhancement/ synovitis involving the MCP, carpal-carpal, and proximal IP joints, consistent with superimposed inflammatory component.     UE EMG/NCS :    Mild to moderately severe bilateral carpal tunnel syndrome;   2. A primarily axonal right ulnar sensory neuropathy with some secondary demyelination;   3. Chronic denervation in the right C7 myotome consistent with chronic radiculopathy.     Family history : mom heart problems    Social history : quit smoking 1997      Review of Systems   Constitutional: Negative for activity change, appetite change, chills, diaphoresis, fatigue, fever and unexpected weight change.   HENT: Negative for congestion, dental problem, drooling, ear discharge, ear pain, facial swelling, hearing loss, mouth sores, nosebleeds, postnasal drip, rhinorrhea, sinus pressure, sinus pain, sneezing, sore throat, tinnitus, trouble swallowing and voice change.    Eyes: Negative for photophobia, pain, discharge, redness, itching and visual disturbance.   Respiratory: Negative for apnea, cough, choking, chest tightness, shortness of breath, wheezing and stridor.    Cardiovascular: Negative for chest pain, palpitations and leg swelling.   Gastrointestinal: Positive for abdominal pain, diarrhea and nausea. Negative for abdominal distention, anal bleeding, blood in stool, constipation, rectal pain and vomiting.   Endocrine: Negative for cold intolerance, heat intolerance, polydipsia, polyphagia and polyuria.    Genitourinary: Negative for decreased urine volume, difficulty urinating, dysuria, enuresis, flank pain, frequency, genital sores, hematuria and urgency.   Musculoskeletal: Positive for arthralgias. Negative for back pain, gait problem, joint swelling, myalgias, neck pain and neck stiffness.   Skin: Negative for color change, pallor, rash and wound.   Allergic/Immunologic: Negative for environmental allergies, food allergies and immunocompromised state.   Neurological: Negative for dizziness, tremors, seizures, syncope, facial asymmetry, speech difficulty, weakness, light-headedness, numbness and headaches.   Hematological: Negative for adenopathy. Does not bruise/bleed easily.   Psychiatric/Behavioral: Negative for agitation, behavioral problems, confusion, decreased concentration, dysphoric mood, hallucinations, self-injury, sleep disturbance and suicidal ideas. The patient is not nervous/anxious and is not hyperactive.      Physical Exam     All PIPs have osteophytes  Tender C/LS spine    Some swelling noted in the hand MCPs and PIPs     Physical Exam   Constitutional: She is oriented to person, place, and time. No distress.   HENT:   Head: Normocephalic.   Mouth/Throat: Oropharynx is clear and moist.   Eyes: Pupils are equal, round, and reactive to light. Conjunctivae are normal. Right eye exhibits no discharge. Left eye exhibits no discharge. No scleral icterus.   Neck: No thyromegaly present.   Cardiovascular: Normal rate, regular rhythm and normal heart sounds.   Pulmonary/Chest: Effort normal and breath sounds normal. No stridor.   Abdominal: Soft. Bowel sounds are normal.   Musculoskeletal:         General: Normal range of motion.      Cervical back: Normal range of motion.   Lymphadenopathy:     She has no cervical adenopathy.   Neurological: She is alert and oriented to person, place, and time.   Skin: Skin is warm. No rash noted. She is not diaphoretic.   Psychiatric: Affect and judgment normal.          Assessment     66 year old white female comes with    -polyarthralgias in the hands,knees,low back and neck  -GI symptoms of nausea,diarrhea,abdominal pain  -anxiety > depression  -poor sleep  -headaches and light headedness    She has a diagnosis of fibromyalgia,osteoarthritis of hands,C,LS spine    She also has a diagnosis of IBS  GI ruled out crohn's and celiac disease  Recent pancreatitis,cause not known  Noted high ALP,but also low vit d,not sure if ALP is from liver or bone  Has ongoing diarrhea +  Waiting to get a second opinion     She has high inflammatory markers  She has erosive changes on the xrays/MRI    She definitely has osteoarthritis of her C/LS spine    She has inflammatory arthritis in her hands/wrists    She has b/l CTS and right ulnar neuropathy and she has cervical radiculopathy  S/P surgery for the CTS repair and ulnar decompression       She has done a CTS surgery and ulnar nerve decompression on the right side    Recent right hand CTS surgery and CMC arthroplasty,healing         4/2022    Weight gain-20 pounds over the past year  She goes to the gym,still doesn't lose weight    Thumbs-hurt bad -both are crippled  Fingers get stuck-cannot open the fingers without using the other hand  Fingers are stiff  She drops things again    mtx 1 ml weekly,plaquenil 200 mg bid, ssz 1500 mg bid  -doesn't help      On triple therapy  On pain management     1. Weight gain    2. Seronegative arthritis    3. Seronegative rheumatoid arthritis    4. Carpal tunnel syndrome of left wrist    5. Left carpal tunnel syndrome        Reviewed labs/xrays  Reviewed medications    F/u    Plan    MRI hands/wrists  EMG/NCS    Bariatric medicine    Continue 1 ml mtx weekly sc  Nausea with mtx : increase folic acid to 3 tabs daily  Some blamorro     Suggestions     Benadryl  25 to 50 mg with every mtx  And then 12 hours later benadryl    2 cups coffee am and 2 cups coffee pm ,3 hours later mtx  And again next day  coffee    Vitamin A 8000 IU/day    Continue  plaquenil 200 mg bid + ssz 1500 mg bid    CBC,CMP,ESR,CRP today    In 3 months if joint pains persist and she has elevated inflammatory markers,we will once again revisit use of biologics     Flu,pneumonia and shingles vaccines offered     She has accomplished the left hand CTS and CMC OA surgery   Right now she only wants conservative measures   She also has a hand surgeon    Continue vit d replacement  Vit b12     Offered pain management,PT for her C/LS spine issues  OT hands    Tramadol for pain management   50 mg bid to tid to qid   Mobic 15 mg    BP meds adjustments    Anxiety :Xanax 0.5 mg daily tid  Trazodone to sleep-100 mg  Tizanidine qid     Offered counselling    Crestor and Lipitor give charley horses  Try changing from crestor to something else  She has family h/o intolerance to statins    GI says she has IBS  She is on meds    Neurology w/u  Spine surgery f/u  To evaluate her C/LS spine issues and the neuropathy     Madalyn was seen today for disease management.    Diagnoses and all orders for this visit:    Weight gain  -     Ambulatory referral/consult to Bariatric Medicine; Future    Seronegative arthritis  -     MRI Hand Wrist W WO Bilat_Rheumatoid; Future  -     EMG W/ ULTRASOUND AND NERVE CONDUCTION TEST 2 Extremities; Future    Seronegative rheumatoid arthritis  -     MRI Hand Wrist W WO Bilat_Rheumatoid; Future  -     EMG W/ ULTRASOUND AND NERVE CONDUCTION TEST 2 Extremities; Future  -     CBC Auto Differential; Future  -     Comprehensive Metabolic Panel; Future  -     Sedimentation rate; Future  -     C-Reactive Protein; Future    Carpal tunnel syndrome of left wrist  -     MRI Hand Wrist W WO Bilat_Rheumatoid; Future  -     EMG W/ ULTRASOUND AND NERVE CONDUCTION TEST 2 Extremities; Future    Left carpal tunnel syndrome  -     MRI Hand Wrist W WO Bilat_Rheumatoid; Future  -     EMG W/ ULTRASOUND AND NERVE CONDUCTION TEST 2 Extremities;  Future          rtc in 3 months

## 2022-04-11 NOTE — MR AVS SNAPSHOT
[facility]  [address]  [place] [address]  Phone: [phone]  Fax: [phone]                  Madalyn Iverson   5/15/2017 3:00 PM   Office Visit    Description:  Female : 1955   Provider:  Ruth Jaime NP   Department:  [facility]           Reason for Visit     Annual Exam           Diagnoses this Visit        Comments    Annual physical exam    -  Primary     Essential hypertension         Vitamin D deficiency         ADHD (attention deficit hyperactivity disorder), inattentive type         Fibromyalgia         IFG (impaired fasting glucose)         Acquired hypothyroidism         Anxiety         Ulcerative colitis with complication, unspecified location         Breast cancer screening         Hyperlipidemia, unspecified hyperlipidemia type                To Do List           Goals (5 Years of Data)     None      Follow-Up and Disposition     Return in about 2 weeks (around 2017) for blood pressure check.       These Medications        Disp Refills Start End    rosuvastatin (CRESTOR) 5 MG tablet 30 tablet 2 5/15/2017 5/15/2018    Take 1 tablet (5 mg total) by mouth once daily. - Oral    Pharmacy: [facility]/pharmacy #[id number] - [place] - [address] [facility] Ph #: [phone]         [facility] On Call     [facility] On Call Nurse Care Line -  Assistance  Unless otherwise directed by your provider, please contact [facility], our nurse care line that is available for  assistance.     Registered nurses in the [facility] provide: appointment scheduling, clinical advisement, health education, and other advisory services.  Call: [phone] (toll free)               Medications           START taking these NEW medications        Refills    rosuvastatin (CRESTOR) 5 MG tablet 2    Sig: Take 1 tablet (5 mg total) by mouth once daily.    Class: Normal    Route: Oral      STOP taking these medications     carisoprodol (SOMA) 350 MG  Anesthesia Pre Eval Note    Anesthesia ROS/Med Hx        Anesthetic Complication History:  Patient does not have a history of anesthetic complications      Pulmonary Review:  Patient does not have a pulmonary history      Neuro/Psych Review:  Patient does not have a neuro/psych history       Cardiovascular Review:  Patient does not have a cardiovascular history   Exercise tolerance: good (>4 METS)    GI/HEPATIC/RENAL Review:  Patient does not have a GI/hepatic/renalhistory       End/Other Review:    Positive for obesity class I - 30.00 - 34.99      Relevant Problems   No relevant active problems       Physical Exam     Airway   Mallampati: II  TM Distance: >3 FB  Neck ROM: Full  Neck: Non-tender and Able to place in sniff position  TMJ Mobility: Good    Cardiovascular  Cardiovascular exam normal  Cardio Rhythm: Regular  Cardio Rate: Normal    Head Assessment  Head assessment: Normocephalic and Atraumatic    General Assessment  General Assessment: Alert and oriented and No acute distress    Dental Exam  Dental exam normal    Pulmonary Exam  Pulmonary exam normal  Breath sounds clear to auscultation:  Yes    Abdominal Exam  Abdominal exam normal      Anesthesia Plan:    ASA Status: 2  Anesthesia Type: General    Induction: Intravenous  Preferred Airway Type: ETT  Maintenance: Inhalational  Premedication: IV      Post-op Pain Management: Per Surgeon and Single Shot Block      Checklist  Reviewed: NPO Status, Allergies, Medications, Problem list and Past Med History  Consent/Risks Discussed Statement:  The proposed anesthetic plan, including its risks and benefits, have been discussed with the Patient along with the risks and benefits of alternatives. Questions were encouraged and answered and the patient and/or representative understands and agrees to proceed.        I discussed with the patient (and/or patient's legal representative) the risks and benefits of the proposed anesthesia plan, General, which may include  "tablet Take 350 mg by mouth 4 (four) times daily as needed for Muscle spasms.           Verify that the below list of medications is an accurate representation of the medications you are currently taking.  If none reported, the list may be blank. If incorrect, please contact your healthcare provider. Carry this list with you in case of emergency.           Current Medications     alprazolam (XANAX) 0.5 MG tablet Take 0.5 mg by mouth every 8 (eight) hours as needed.    amlodipine (NORVASC) 10 MG tablet TAKE 1 TABLET (10 MG TOTAL) BY MOUTH ONCE DAILY.    ARMOUR THYROID 60 mg Tab Take 60 mg by mouth once daily.    citalopram (CELEXA) 20 MG tablet Take 20 mg by mouth once daily.    CYANOCOBALAMIN, VITAMIN B-12, (VITAMIN B-12 INJ) Inject 1,000 mcg as directed every 30 days.    dextroamphetamine-amphetamine (ADDERALL) 20 mg tablet Take 1 tablet by mouth 2 (two) times daily.    ergocalciferol (ERGOCALCIFEROL) 50,000 unit Cap Take 1 capsule (50,000 Units total) by mouth every 7 days.    estradiol (ESTRACE) 2 MG tablet Take 2 mg by mouth once daily.    furosemide (LASIX) 40 MG tablet TAKE 1 TABLET (40 MG TOTAL) BY MOUTH ONCE DAILY.    hydrocodone-acetaminophen 10-325mg (NORCO)  mg Tab Take 1 tablet by mouth 4 (four) times daily as needed.    KLOR-CON SPRINKLE 10 mEq CpSR TAKE 1 CAPSULE TWICE A DAY    metoprolol succinate (TOPROL-XL) 50 MG 24 hr tablet TAKE 1 TABLET BY MOUTH EVERY DAY    rosuvastatin (CRESTOR) 5 MG tablet Take 1 tablet (5 mg total) by mouth once daily.           Clinical Reference Information           Your Vitals Were     BP Pulse Temp Height Weight SpO2    160/74 84 97.3 °F (36.3 °C) (Oral) 5' 6" (1.676 m) 69 kg (152 lb 1.9 oz) 97%    BMI                24.55 kg/m2          Blood Pressure          Most Recent Value    BP  (!)  160/74      Allergies as of 5/15/2017     No Known Allergies      Immunizations Administered on Date of Encounter - 5/15/2017     None      Orders Placed During Today's Visit  " services performed by other anesthesia providers.    Alternative anesthesia plans, if available, were reviewed with the patient (and/or patient's legal representative). Discussion has been held with the patient (and/or patient's legal representative) regarding risks of anesthesia, which include Nausea, Vomiting, Dental Injury, bleeding/hematoma and nerve injury and emergent situations that may require change in anesthesia plan.    The patient (and/or patient's legal representative) has indicated understanding, his/her questions have been answered, and he/she wishes to proceed with the planned anesthetic.    Blood Products: Not Anticipated    Comments  Plan Comments: I discussed planand risk with pt and his parents, including post block care and excpectations        Future Labs/Procedures Expected by Expires    Comprehensive metabolic panel  5/15/2017 7/14/2018    Lipid panel  5/15/2017 7/14/2018    Mammo Digital Screening Bilat with CAD  5/15/2017 7/16/2018      Language Assistance Services     ATTENTION: Language assistance services are available, free of charge. Please call [phone].      ATENCIÓN: Si habla español, tiene a real disposición servicios gratuitos de asistencia lingüística. Llame al [phone].     CHÚ Ý: N?u b?n nói Ti?ng Vi?t, có các d?ch v? h? tr? ngôn ng? mi?n phí dành cho b?n. G?i s? [phone].         [facility] complies with applicable Federal civil rights laws and does not discriminate on the basis of race, color, national origin, age, disability, or sex.

## 2022-04-20 ENCOUNTER — OFFICE VISIT (OUTPATIENT)
Dept: ORTHOPEDICS | Facility: CLINIC | Age: 67
End: 2022-04-20
Payer: MEDICARE

## 2022-04-20 VITALS — HEIGHT: 66 IN | WEIGHT: 171.94 LBS | BODY MASS INDEX: 27.63 KG/M2

## 2022-04-20 DIAGNOSIS — M18.12 PRIMARY OSTEOARTHRITIS OF FIRST CARPOMETACARPAL JOINT OF LEFT HAND: Primary | ICD-10-CM

## 2022-04-20 PROCEDURE — 3008F BODY MASS INDEX DOCD: CPT | Mod: CPTII,S$GLB,, | Performed by: ORTHOPAEDIC SURGERY

## 2022-04-20 PROCEDURE — 99999 PR PBB SHADOW E&M-EST. PATIENT-LVL IV: CPT | Mod: PBBFAC,,, | Performed by: ORTHOPAEDIC SURGERY

## 2022-04-20 PROCEDURE — 99999 PR PBB SHADOW E&M-EST. PATIENT-LVL IV: ICD-10-PCS | Mod: PBBFAC,,, | Performed by: ORTHOPAEDIC SURGERY

## 2022-04-20 PROCEDURE — 1101F PT FALLS ASSESS-DOCD LE1/YR: CPT | Mod: CPTII,S$GLB,, | Performed by: ORTHOPAEDIC SURGERY

## 2022-04-20 PROCEDURE — 99213 PR OFFICE/OUTPT VISIT, EST, LEVL III, 20-29 MIN: ICD-10-PCS | Mod: S$GLB,,, | Performed by: ORTHOPAEDIC SURGERY

## 2022-04-20 PROCEDURE — 1159F MED LIST DOCD IN RCRD: CPT | Mod: CPTII,S$GLB,, | Performed by: ORTHOPAEDIC SURGERY

## 2022-04-20 PROCEDURE — 1159F PR MEDICATION LIST DOCUMENTED IN MEDICAL RECORD: ICD-10-PCS | Mod: CPTII,S$GLB,, | Performed by: ORTHOPAEDIC SURGERY

## 2022-04-20 PROCEDURE — 1101F PR PT FALLS ASSESS DOC 0-1 FALLS W/OUT INJ PAST YR: ICD-10-PCS | Mod: CPTII,S$GLB,, | Performed by: ORTHOPAEDIC SURGERY

## 2022-04-20 PROCEDURE — 3288F PR FALLS RISK ASSESSMENT DOCUMENTED: ICD-10-PCS | Mod: CPTII,S$GLB,, | Performed by: ORTHOPAEDIC SURGERY

## 2022-04-20 PROCEDURE — 1125F AMNT PAIN NOTED PAIN PRSNT: CPT | Mod: CPTII,S$GLB,, | Performed by: ORTHOPAEDIC SURGERY

## 2022-04-20 PROCEDURE — 3008F PR BODY MASS INDEX (BMI) DOCUMENTED: ICD-10-PCS | Mod: CPTII,S$GLB,, | Performed by: ORTHOPAEDIC SURGERY

## 2022-04-20 PROCEDURE — 1125F PR PAIN SEVERITY QUANTIFIED, PAIN PRESENT: ICD-10-PCS | Mod: CPTII,S$GLB,, | Performed by: ORTHOPAEDIC SURGERY

## 2022-04-20 PROCEDURE — 99213 OFFICE O/P EST LOW 20 MIN: CPT | Mod: S$GLB,,, | Performed by: ORTHOPAEDIC SURGERY

## 2022-04-20 PROCEDURE — 3288F FALL RISK ASSESSMENT DOCD: CPT | Mod: CPTII,S$GLB,, | Performed by: ORTHOPAEDIC SURGERY

## 2022-04-20 RX ORDER — CELECOXIB 200 MG/1
200 CAPSULE ORAL DAILY
Qty: 30 CAPSULE | Refills: 3 | Status: SHIPPED | OUTPATIENT
Start: 2022-04-20 | End: 2022-05-20

## 2022-04-20 RX ORDER — ESCITALOPRAM OXALATE 20 MG/1
TABLET ORAL
COMMUNITY
Start: 2022-01-30 | End: 2022-07-26

## 2022-04-27 ENCOUNTER — HOSPITAL ENCOUNTER (OUTPATIENT)
Dept: RADIOLOGY | Facility: HOSPITAL | Age: 67
Discharge: HOME | End: 2022-04-27
Attending: INTERNAL MEDICINE
Payer: MEDICARE

## 2022-04-27 DIAGNOSIS — G56.02 CARPAL TUNNEL SYNDROME OF LEFT WRIST: ICD-10-CM

## 2022-04-27 DIAGNOSIS — M06.00 SERONEGATIVE RHEUMATOID ARTHRITIS: ICD-10-CM

## 2022-04-27 DIAGNOSIS — M13.80 SERONEGATIVE ARTHRITIS: ICD-10-CM

## 2022-04-27 DIAGNOSIS — G56.02 LEFT CARPAL TUNNEL SYNDROME: ICD-10-CM

## 2022-04-27 PROCEDURE — 73223 MRI JOINT UPR EXTR W/O&W/DYE: CPT | Mod: TC,50

## 2022-04-27 PROCEDURE — 73223 MRI HAND_WRIST W WO BILATERAL_RHEUMATOID ARTHRITIS: ICD-10-PCS | Mod: 26,50,, | Performed by: RADIOLOGY

## 2022-04-27 PROCEDURE — A9585 GADOBUTROL INJECTION: HCPCS | Performed by: INTERNAL MEDICINE

## 2022-04-27 PROCEDURE — 73223 MRI JOINT UPR EXTR W/O&W/DYE: CPT | Mod: 26,50,, | Performed by: RADIOLOGY

## 2022-04-27 PROCEDURE — 25500020 PHARM REV CODE 255: Performed by: INTERNAL MEDICINE

## 2022-04-27 RX ORDER — GADOBUTROL 604.72 MG/ML
8 INJECTION INTRAVENOUS
Status: COMPLETED | OUTPATIENT
Start: 2022-04-27 | End: 2022-04-27

## 2022-04-27 RX ADMIN — GADOBUTROL 8 ML: 604.72 INJECTION INTRAVENOUS at 02:04

## 2022-05-13 DIAGNOSIS — E78.00 PURE HYPERCHOLESTEROLEMIA: ICD-10-CM

## 2022-05-17 DIAGNOSIS — E78.00 PURE HYPERCHOLESTEROLEMIA: ICD-10-CM

## 2022-05-17 NOTE — TELEPHONE ENCOUNTER
Attempted to return pt's call    Detailed message left    [facility] has not approved her prescription     Reached out to [facility] for assistance with obtaining PA for continued use of this medication.

## 2022-05-17 NOTE — TELEPHONE ENCOUNTER
----- Message from Yeny Chung sent at 5/17/2022  2:22 PM CDT -----  Type:  Needs Medical Advice    Who Called: self  Reason:prior authorization for evolocumab (REPATHA SURECLICK) 140 mg/mL PnIj  Would the patient rather a call back or a response via [facility]? call  Best Call Back Number:[phone]  Additional Information: she is past due on this script over 2 weeks!!

## 2022-05-18 ENCOUNTER — SPECIALTY PHARMACY (OUTPATIENT)
Dept: PHARMACY | Facility: CLINIC | Age: 67
End: 2022-05-18
Payer: MEDICARE

## 2022-05-18 NOTE — TELEPHONE ENCOUNTER
"Provider's office requesting assistance with Repatha authorization. [employer] [facility]- "There is an EOC that was clinically denied within the last 60 days. This request needs to be sent to the Grievance and Appeals Dept. at [facility]. Appeal requests must come from the member or the provider."    Called [facility] ([phone]) to request copy of denial letter to proceed with appeal. Spoke with Francis. EOC [id number].  He sent PA denial letter, he said it should be received soon.  "

## 2022-05-19 ENCOUNTER — TELEPHONE (OUTPATIENT)
Dept: CARDIOLOGY | Facility: CLINIC | Age: 67
End: 2022-05-19
Payer: MEDICARE

## 2022-05-19 DIAGNOSIS — E78.2 MIXED HYPERLIPIDEMIA: Primary | ICD-10-CM

## 2022-05-19 NOTE — TELEPHONE ENCOUNTER
"Received fax that states for re-determination request, need to answer the question "Has there been a continuous LDL reduction since initiation of Repatha" response needed by 5/20/22 by 8 PM. Called to see if there is anyway to extend the time-frame (as patient could potentially get labs done and then this question be answered accordingly).   Ricarda stated that there is no way to extend the time frame for question response.  I asked her if this question is for PA reconsideration or appeal.  She said it is an appeal.  I told her that the office has not even initiated an appeal.  She said the patient (the member) initiated the appeal.         "

## 2022-05-19 NOTE — TELEPHONE ENCOUNTER
Spoke with Mackenzie at [facility]- lipid panel ordered.  She will call pt to get labs tomorrow AM.  Will F/U with result and submission to [employer].

## 2022-05-19 NOTE — TELEPHONE ENCOUNTER
Attempted to reach in regard to needed Lipid Panel lab for PA for Repatha.    Detailed message left on her mobile number and her spouse's Kendra

## 2022-05-19 NOTE — TELEPHONE ENCOUNTER
Have not received PA denial letter.  Called Dandre ([phone]) to request copy of PA denial letter again.   Spoke with Miguel- she said an appeal has already been started and is missing information.  I was transferred to Ricarda.  She said plan needs to know if pt's LDL has decreased on Repatha.  I requested for her to fax the question request/ information that is needed for review.

## 2022-05-20 NOTE — TELEPHONE ENCOUNTER
Received notice that Repatha appeal was denied bc no submission of cholesterol labs demonstrating LDL lowered on Repatha.     Opportunity for 2nd level is offered.  Will submit once pt gets labs. Called pt to update her.  MARLO

## 2022-05-23 ENCOUNTER — PATIENT MESSAGE (OUTPATIENT)
Dept: PHARMACY | Facility: CLINIC | Age: 67
End: 2022-05-23
Payer: MEDICARE

## 2022-05-23 NOTE — TELEPHONE ENCOUNTER
Received notice that Repatha appeal was denied bc no submission of cholesterol labs demonstrating LDL lowered on Repatha.     Opportunity for 2nd level is offered.  Will submit once pt gets labs. Called pt to update her.  LVM- attempt #2.  Sent [employer]

## 2022-05-23 NOTE — TELEPHONE ENCOUNTER
Incoming call from pt returning our call. Informed her that she needed cholesterol. She stated she will go to [facility] tomorrow. Alerting [facility] working on it.

## 2022-05-25 ENCOUNTER — PATIENT MESSAGE (OUTPATIENT)
Dept: CARDIOLOGY | Facility: CLINIC | Age: 67
End: 2022-05-25
Payer: MEDICARE

## 2022-05-25 NOTE — TELEPHONE ENCOUNTER
Repatha 2nd level appeal faxed to [facility]- attached labs indicating reduction in LDL on Repatha.

## 2022-05-26 ENCOUNTER — TELEPHONE (OUTPATIENT)
Dept: FAMILY MEDICINE | Facility: CLINIC | Age: 67
End: 2022-05-26
Payer: MEDICARE

## 2022-05-26 DIAGNOSIS — Z12.31 ENCOUNTER FOR SCREENING MAMMOGRAM FOR MALIGNANT NEOPLASM OF BREAST: Primary | ICD-10-CM

## 2022-05-26 NOTE — TELEPHONE ENCOUNTER
----- Message from Elizabet Roque sent at 5/26/2022 10:47 AM CDT -----  Regarding: call back  Contact: [phone]  Type:  Mammogram    Caller is requesting to schedule their annual mammogram appointment.  Order is not listed in EPIC.  Please enter order and contact patient to schedule.  Name of Caller: PT   Where would they like the mammogram performed? [facility]   Would the patient rather a call back or a response via [facility]? Call back   Best Call Back Number: [phone]  Additional Information: Mammo

## 2022-05-27 NOTE — TELEPHONE ENCOUNTER
Received fax that Repatha 2nd level appeal is approved with [employer].  Appeal #: [id number]  1/1/22-12/31/22    Called pt to let her know Repatha appeal is approved with [employer].  Test claim $20 copay with [employer] only.   Want to clarify if she still has 2ndary insurance with FEP (test claim invalid PCN, Gr, BIN/ID).    Also want to confirm where she would like to fill the medication.  Per chart review, pt normally fills at [facility]/pharmacy #[id number] - [place] - [address] [facility]

## 2022-05-30 NOTE — TELEPHONE ENCOUNTER
Attempt #2-  LVM  Called pt to let her know Repatha appeal is approved with [employer].  Test claim $20 copay with [employer] only.   Want to clarify if she still has 2ndary insurance with FEP (test claim invalid PCN, Gr, BIN/ID).    Also want to confirm where she would like to fill the medication.  Per chart review, pt normally fills at [facility]/pharmacy #[id number] - [place], [place] - [address]

## 2022-05-31 ENCOUNTER — PROCEDURE VISIT (OUTPATIENT)
Dept: NEUROLOGY | Facility: CLINIC | Age: 67
End: 2022-05-31
Payer: MEDICARE

## 2022-05-31 DIAGNOSIS — M13.80 SERONEGATIVE ARTHRITIS: ICD-10-CM

## 2022-05-31 DIAGNOSIS — G56.02 LEFT CARPAL TUNNEL SYNDROME: ICD-10-CM

## 2022-05-31 DIAGNOSIS — G56.02 CARPAL TUNNEL SYNDROME OF LEFT WRIST: ICD-10-CM

## 2022-05-31 DIAGNOSIS — M06.00 SERONEGATIVE RHEUMATOID ARTHRITIS: ICD-10-CM

## 2022-05-31 PROCEDURE — 95913 PR NERVE CONDUCTION STUDY; 13 OR MORE STUDIES: ICD-10-PCS | Mod: S$GLB,,, | Performed by: PSYCHIATRY & NEUROLOGY

## 2022-05-31 PROCEDURE — 95886 MUSC TEST DONE W/N TEST COMP: CPT | Mod: S$GLB,,, | Performed by: PSYCHIATRY & NEUROLOGY

## 2022-05-31 PROCEDURE — 95913 NRV CNDJ TEST 13/> STUDIES: CPT | Mod: S$GLB,,, | Performed by: PSYCHIATRY & NEUROLOGY

## 2022-05-31 PROCEDURE — 95886 PR EMG COMPLETE, W/ NERVE CONDUCTION STUDIES, 5+ MUSCLES: ICD-10-PCS | Mod: S$GLB,,, | Performed by: PSYCHIATRY & NEUROLOGY

## 2022-05-31 NOTE — PROCEDURES
Procedures       Please see NCS/EMG procedure report    Francis Zaragoza MD  [facility]

## 2022-06-01 NOTE — TELEPHONE ENCOUNTER
Attempt #2-  LVM  Called pt to let her know Repatha appeal is approved with [employer].  Test claim $20 copay with [employer] only.   Want to clarify if she still has 2ndary insurance with FEP (test claim invalid PCN, Gr, BIN/ID).    Also want to confirm where she would like to fill the medication.  Per chart review, pt normally fills at [facility]/pharmacy #[id number] - [place], [place] - [address]

## 2022-06-02 ENCOUNTER — PATIENT MESSAGE (OUTPATIENT)
Dept: PHARMACY | Facility: CLINIC | Age: 67
End: 2022-06-02
Payer: MEDICARE

## 2022-06-02 NOTE — TELEPHONE ENCOUNTER
3 call attempts to let patient know that [employer] approved her Repatha and see where she wants to fill.  Copay $20.      Per previous referral (Nov 2021), pt wanted Rx routed to [facility] in [place] because she did not want Rx shipped.  Pt was okay with $20 copay.      Routed Rx to [facility] retail  ([facility]/pharmacy #[id number] [facility], [place] - [address] [facility])  per patient previous documented preference and unable to reach patient.  Sent detailed [employer] message.      De-enrolling.

## 2022-06-03 ENCOUNTER — PATIENT MESSAGE (OUTPATIENT)
Dept: CARDIOLOGY | Facility: CLINIC | Age: 67
End: 2022-06-03
Payer: MEDICARE

## 2022-06-03 ENCOUNTER — TELEPHONE (OUTPATIENT)
Dept: BARIATRICS | Facility: CLINIC | Age: 67
End: 2022-06-03
Payer: MEDICARE

## 2022-06-07 ENCOUNTER — PATIENT MESSAGE (OUTPATIENT)
Dept: ENDOCRINOLOGY | Facility: CLINIC | Age: 67
End: 2022-06-07
Payer: MEDICARE

## 2022-06-14 ENCOUNTER — TELEPHONE (OUTPATIENT)
Dept: BARIATRICS | Facility: CLINIC | Age: 67
End: 2022-06-14
Payer: MEDICARE

## 2022-06-14 NOTE — TELEPHONE ENCOUNTER
Called and spoke to pt to schedule medical weightloss appt. Pt stated she spoke to FC on 6/6/22. Pt stated she has [employer] Medicare with [employer] as secondary and was told both cover bariatrics. Scheduled appt for pt on 8/22 at 9:30am and pt asked to be on waitlist. Pt stated her daughter starts college that day. Told pt if she needs to reschedule to let us know as soon as possible. Pt verbalized understanding and agreed.

## 2022-06-14 NOTE — TELEPHONE ENCOUNTER
----- Message from Jaimie Garcia sent at 6/14/2022 10:28 AM CDT -----  Contact: pt  Pt's requesting a call back regarding appt...    Confirmed contact info below:  Contact Name: Madalyn Iverson  Phone Number: [phone]

## 2022-06-15 ENCOUNTER — OFFICE VISIT (OUTPATIENT)
Dept: ORTHOPEDICS | Facility: CLINIC | Age: 67
End: 2022-06-15
Payer: MEDICARE

## 2022-06-15 VITALS — HEIGHT: 66 IN | WEIGHT: 171 LBS | BODY MASS INDEX: 27.48 KG/M2

## 2022-06-15 DIAGNOSIS — G56.02 CARPAL TUNNEL SYNDROME OF LEFT WRIST: Primary | ICD-10-CM

## 2022-06-15 PROCEDURE — 99213 PR OFFICE/OUTPT VISIT, EST, LEVL III, 20-29 MIN: ICD-10-PCS | Mod: 25,S$GLB,, | Performed by: ORTHOPAEDIC SURGERY

## 2022-06-15 PROCEDURE — 3008F BODY MASS INDEX DOCD: CPT | Mod: CPTII,S$GLB,, | Performed by: ORTHOPAEDIC SURGERY

## 2022-06-15 PROCEDURE — 3008F PR BODY MASS INDEX (BMI) DOCUMENTED: ICD-10-PCS | Mod: CPTII,S$GLB,, | Performed by: ORTHOPAEDIC SURGERY

## 2022-06-15 PROCEDURE — 20526 PR INJECT CARPAL TUNNEL: ICD-10-PCS | Mod: LT,S$GLB,, | Performed by: ORTHOPAEDIC SURGERY

## 2022-06-15 PROCEDURE — 1125F PR PAIN SEVERITY QUANTIFIED, PAIN PRESENT: ICD-10-PCS | Mod: CPTII,S$GLB,, | Performed by: ORTHOPAEDIC SURGERY

## 2022-06-15 PROCEDURE — 99999 PR PBB SHADOW E&M-EST. PATIENT-LVL IV: CPT | Mod: PBBFAC,,, | Performed by: ORTHOPAEDIC SURGERY

## 2022-06-15 PROCEDURE — 1159F MED LIST DOCD IN RCRD: CPT | Mod: CPTII,S$GLB,, | Performed by: ORTHOPAEDIC SURGERY

## 2022-06-15 PROCEDURE — 20526 THER INJECTION CARP TUNNEL: CPT | Mod: LT,S$GLB,, | Performed by: ORTHOPAEDIC SURGERY

## 2022-06-15 PROCEDURE — 99999 PR PBB SHADOW E&M-EST. PATIENT-LVL IV: ICD-10-PCS | Mod: PBBFAC,,, | Performed by: ORTHOPAEDIC SURGERY

## 2022-06-15 PROCEDURE — 1125F AMNT PAIN NOTED PAIN PRSNT: CPT | Mod: CPTII,S$GLB,, | Performed by: ORTHOPAEDIC SURGERY

## 2022-06-15 PROCEDURE — 99213 OFFICE O/P EST LOW 20 MIN: CPT | Mod: 25,S$GLB,, | Performed by: ORTHOPAEDIC SURGERY

## 2022-06-15 PROCEDURE — 1159F PR MEDICATION LIST DOCUMENTED IN MEDICAL RECORD: ICD-10-PCS | Mod: CPTII,S$GLB,, | Performed by: ORTHOPAEDIC SURGERY

## 2022-06-15 RX ORDER — GABAPENTIN 300 MG/1
300 CAPSULE ORAL NIGHTLY
Qty: 30 CAPSULE | Refills: 2 | Status: SHIPPED | OUTPATIENT
Start: 2022-06-15 | End: 2022-07-26

## 2022-06-15 RX ORDER — TRIAMCINOLONE ACETONIDE 40 MG/ML
20 INJECTION, SUSPENSION INTRA-ARTICULAR; INTRAMUSCULAR
Status: COMPLETED | OUTPATIENT
Start: 2022-06-15 | End: 2022-06-15

## 2022-06-15 RX ORDER — CELECOXIB 200 MG/1
CAPSULE ORAL
COMMUNITY
Start: 2022-05-20 | End: 2022-07-26

## 2022-06-15 RX ADMIN — TRIAMCINOLONE ACETONIDE 20 MG: 40 INJECTION, SUSPENSION INTRA-ARTICULAR; INTRAMUSCULAR at 03:06

## 2022-06-15 NOTE — PROGRESS NOTES
"Subjective:      Patient ID: Madalyn Iverson is a 67 y.o. female.  Chief Complaint: Pain of the Right Hand and Follow-up (Left CMC ( Sx 7/9/21), c/o pain, pins and needles)      HPI  Madalyn Ievrson is a  67 y.o. female presenting today for follow up of left hand pain after previous CMC arthroplasty.  She reports that she is still having some pain in the left hand and wrist  However she describes sort of a burning tingling pain which radiates into the palm of the hand  Recent nerve conduction study does confirm that she has mild left carpal tunnel syndrome  She did have carpal tunnel surgery several years ago  .    Review of patient's allergies indicates:   Allergen Reactions    Clonidine (pf) Swelling     Clonidine patch- causes redness and swelling at patch location         Current Outpatient Medications   Medication Sig Dispense Refill    ALPRAZolam (XANAX) 0.5 MG tablet TAKE 1 TABLET BY MOUTH THREE TIMES A DAY AS NEEDED 90 tablet 0    amLODIPine (NORVASC) 10 MG tablet Take 1 tablet (10 mg total) by mouth once daily. 90 tablet 1    BD INSULIN SYRINGE 1 mL 25 gauge x 5/8" Syrg       carvediloL (COREG) 12.5 MG tablet Take 12.5 mg by mouth 2 (two) times daily with meals.      cholecalciferol, vitamin D3, (VITAMIN D3) 125 mcg (5,000 unit) Tab Take 1 tablet (5,000 Units total) by mouth once daily.      cloNIDine 0.1 mg/24 hr td ptwk (CATAPRES) 0.1 mg/24 hr       CYANOCOBALAMIN, VITAMIN B-12, (VITAMIN B-12 INJ) Inject 1,000 mcg as directed every 30 days.      ergocalciferol (ERGOCALCIFEROL) 50,000 unit Cap TAKE 1 CAPSULE (50,000 UNITS TOTAL) BY MOUTH EVERY 7 DAYS. FOR 12 DOSES 12 capsule 3    EScitalopram oxalate (LEXAPRO) 20 MG tablet       evolocumab (REPATHA SURECLICK) 140 mg/mL PnIj Inject 1 mL (140 mg total) into the skin every 14 (fourteen) days. 2 mL 11    ezetimibe (ZETIA) 10 mg tablet TAKE 1 TABLET BY MOUTH EVERY DAY 90 tablet 1    folic acid (FOLVITE) 1 MG tablet TAKE 1 TABLET BY MOUTH " "EVERY DAY 90 tablet 1    furosemide (LASIX) 40 MG tablet TAKE 1 TABLET BY MOUTH EVERY DAY 90 tablet 0    HYDROcodone-acetaminophen (NORCO) 5-325 mg per tablet Please take 1 to 3 tablets a day for the 7 days you are at [place] for vacation,that week dont take tramadol 21 tablet 0    hydrOXYchloroQUINE (PLAQUENIL) 200 mg tablet       meloxicam (MOBIC) 15 MG tablet TAKE 1 TABLET BY MOUTH EVERY DAY 30 tablet 1    methotrexate 25 mg/mL injection Inject 1 ml into the skin weekly. 10 mL 11    metoclopramide HCl (REGLAN) 10 MG tablet Take 1 tablet (10 mg total) by mouth every 6 (six) hours as needed. 12 tablet 0    potassium chloride (MICRO-K) 10 MEQ CpSR TAKE 1 CAPSULE BY MOUTH TWICE A  capsule 1    syringe-needle,safety,disp unt 1 mL 25 gauge x 5/8" Syrg To use with mtx injections 20 Syringe 0    thyroid, pork, (ARMOUR THYROID) 60 mg Tab [place] Thyroid 60 mg tablet      tiZANidine (ZANAFLEX) 4 MG tablet TAKE 1 TABLET BY MOUTH FOUR TIMES A  tablet 3    traMADoL (ULTRAM) 50 mg tablet TAKE 2 TO 3 TABLETS BY MOUTH DAILY 90 tablet 1    traZODone (DESYREL) 100 MG tablet TAKE 1 TABLET BY MOUTH EVERY EVENING. 90 tablet 1    celecoxib (CELEBREX) 200 MG capsule       gabapentin (NEURONTIN) 300 MG capsule Take 1 capsule (300 mg total) by mouth every evening. 30 capsule 2     Current Facility-Administered Medications   Medication Dose Route Frequency Provider Last Rate Last Admin    evolocumab PnIj 140 mg  140 mg Subcutaneous 1 time in Clinic/HOD Ethan Ghotra MD        evolocumab PnIj 140 mg  140 mg Subcutaneous 1 time in Clinic/HOD Ethan Ghotra MD         Facility-Administered Medications Ordered in Other Visits   Medication Dose Route Frequency Provider Last Rate Last Admin    0.9%  NaCl infusion   Intravenous Continuous Jermaine Chatman MD   Stopped at 03/27/19 1040    mupirocin 2 % ointment   [facility] On Call Procedure Jermaine Chatman MD   Given at 03/27/19 0848       Past Medical " History:   Diagnosis Date    Acute biliary pancreatitis without infection or necrosis 2018    Acute pancreatitis     2018 - [facility]    ADHD (attention deficit hyperactivity disorder), inattentive type     Aortic atherosclerosis 3/2/2019    Mild to moderate aortic atherosclerosis seen on CT abd/pelvis 3/2/2019    Chronic back pain     Followed by [facility] - Dr. Bautista - has been having medial branch blocks    Fibromyalgia     treated by Dr. Thorpe in past and now treated by Dr. Yates - Rheumatologist    Hyperlipidemia     High triglycerides    Hypertension     Hypothyroidism     Treated by Dr. Mathew    IFG (impaired fasting glucose)     Migraine     Treated by neurologist - Dr. Destiny Florez    Ulcerative colitis     Patient reported treated by Dr. Lee and Aaron in past  but on admit in 2018 - no UC seen on recent colonoscopy.  It was noted on colonoscopy in 2004 there were ulcers noted to descending colon but not present on sigmoidoscopy in 2004.    Ventricular diastolic dysfunction determined by echocardiography 2021    Followed by Cardiologist Dr. Ghotra    Vitamin D deficiency 3/31/2016       Past Surgical History:   Procedure Laterality Date    CARPAL TUNNEL RELEASE Right 3/27/2019    Procedure: RELEASE, CARPAL TUNNEL right;  Surgeon: Li Waller MD;  Location: [address];  Service: Orthopedics;  Laterality: Right;  stretcher, supine, hand pan 1 and 2    CARPAL TUNNEL RELEASE Left 2021    Procedure: RELEASE, CARPAL TUNNEL;  Surgeon: Brian Reed Jr., MD;  Location: [facility];  Service: Orthopedics;  Laterality: Left;     SECTION      COLONOSCOPY  10/08/2010    Dr. Lee - repeat in 5 years - has appointment for colonoscopy 2016    COLONOSCOPY  2016    Dr. Kelly - normal  colon - repeat in 10 years - scanned report to media file.    ENDOSCOPIC ULTRASOUND  "OF UPPER GASTROINTESTINAL TRACT N/A 7/18/2018    Procedure: ULTRASOUND, ENDOSCOPIC, UPPER GI TRACT;  Surgeon: Reji Russell MD;  Location: [facility];  Service: Endoscopy;  Laterality: N/A;    ENDOSCOPIC ULTRASOUND OF UPPER GASTROINTESTINAL TRACT N/A 3/4/2019    Procedure: ULTRASOUND, UPPER GI TRACT, ENDOSCOPIC;  Surgeon: Randy Boyd MD;  Location: [facility];  Service: Endoscopy;  Laterality: N/A;    HYSTERECTOMY      INTERPOSITION ARTHROPLASTY OF CARPOMETACARPAL JOINTS Left 7/9/2021    Procedure: INTERPOSITION ARTHROPLASTY, CMC JOINT;  Surgeon: Brian Reed Jr., MD;  Location: [facility] OR;  Service: Orthopedics;  Laterality: Left;  need arthrex tightrope   Brit notified 6/22 LB, Brit confirmed 7/8/21 AM    LAPAROSCOPIC CHOLECYSTECTOMY N/A 3/6/2019    Procedure: CHOLECYSTECTOMY, LAPAROSCOPIC;  Surgeon: Hill Palacios MD;  Location: [facility] OR;  Service: General;  Laterality: N/A;    medial branch block      done at [facility] for axial back pain    OOPHORECTOMY      SHOULDER SURGERY Left     TONSILLECTOMY      upper and lower GI  03/2016       OBJECTIVE:   PHYSICAL EXAM:  Height: 5' 6" (167.6 cm) Weight: 77.6 kg (171 lb)  Vitals:    06/15/22 1426   Weight: 77.6 kg (171 lb)   Height: 5' 6" (1.676 m)   PainSc:   6     Ortho/SPM Exam  Examination left hand no significant swelling  She does have full range of motion left thumb good stability CMC joint really no pain at the CMC  She has a mildly positive Tinel sign sensation intact all digits    RADIOGRAPHS:  None  Comments: I have personally reviewed the imaging and I agree with the above radiologist's report.    ASSESSMENT/PLAN:     IMPRESSION:  1. Left hand pain after previous CMC arthroplasty of the thumb    2. Mild left carpal tunnel syndrome after previous carpal tunnel release    PLAN:  Not really sure which is causing her pain whether it is more arthritis or more carpal tunnel but I recommended we try a carpal tunnel " injection today  After pause for time-out identified the left carpal tunnel injected with Kenalog 20 mg 0.5 cc xylocaine sterile technique  She tolerated the procedure well without complication  I have also recommended a wrist splint at night I have started her on Neurontin 300 mg at bedtime  We can increase the Neurontin in the future if we need to  She has also been treated for fibromyalgia in the past and that may be having some relationship to her current symptoms    FOLLOW UP:  6-8 weeks    Disclaimer: This note has been generated using voice-recognition software. There may be typographical errors that have been missed during proof-reading.

## 2022-06-24 ENCOUNTER — PATIENT MESSAGE (OUTPATIENT)
Dept: ENDOCRINOLOGY | Facility: CLINIC | Age: 67
End: 2022-06-24
Payer: MEDICARE

## 2022-07-09 ENCOUNTER — PATIENT MESSAGE (OUTPATIENT)
Dept: ENDOCRINOLOGY | Facility: CLINIC | Age: 67
End: 2022-07-09
Payer: MEDICARE

## 2022-07-24 ENCOUNTER — PATIENT MESSAGE (OUTPATIENT)
Dept: ENDOCRINOLOGY | Facility: CLINIC | Age: 67
End: 2022-07-24
Payer: MEDICARE

## 2022-07-26 ENCOUNTER — OFFICE VISIT (OUTPATIENT)
Dept: RHEUMATOLOGY | Facility: CLINIC | Age: 67
End: 2022-07-26
Payer: COMMERCIAL

## 2022-07-26 DIAGNOSIS — M13.80 SERONEGATIVE ARTHRITIS: Primary | ICD-10-CM

## 2022-07-26 DIAGNOSIS — M06.00 SERONEGATIVE RHEUMATOID ARTHRITIS: ICD-10-CM

## 2022-07-26 PROCEDURE — 1159F PR MEDICATION LIST DOCUMENTED IN MEDICAL RECORD: ICD-10-PCS | Mod: CPTII,95,, | Performed by: INTERNAL MEDICINE

## 2022-07-26 PROCEDURE — 1160F RVW MEDS BY RX/DR IN RCRD: CPT | Mod: CPTII,95,, | Performed by: INTERNAL MEDICINE

## 2022-07-26 PROCEDURE — 99214 OFFICE O/P EST MOD 30 MIN: CPT | Mod: 95,,, | Performed by: INTERNAL MEDICINE

## 2022-07-26 PROCEDURE — 1125F PR PAIN SEVERITY QUANTIFIED, PAIN PRESENT: ICD-10-PCS | Mod: CPTII,95,, | Performed by: INTERNAL MEDICINE

## 2022-07-26 PROCEDURE — 1160F PR REVIEW ALL MEDS BY PRESCRIBER/CLIN PHARMACIST DOCUMENTED: ICD-10-PCS | Mod: CPTII,95,, | Performed by: INTERNAL MEDICINE

## 2022-07-26 PROCEDURE — 1125F AMNT PAIN NOTED PAIN PRSNT: CPT | Mod: CPTII,95,, | Performed by: INTERNAL MEDICINE

## 2022-07-26 PROCEDURE — 99214 PR OFFICE/OUTPT VISIT, EST, LEVL IV, 30-39 MIN: ICD-10-PCS | Mod: 95,,, | Performed by: INTERNAL MEDICINE

## 2022-07-26 PROCEDURE — 1159F MED LIST DOCD IN RCRD: CPT | Mod: CPTII,95,, | Performed by: INTERNAL MEDICINE

## 2022-07-26 RX ORDER — THYROID 60 MG/1
TABLET ORAL
Qty: 30 TABLET | Refills: 5 | Status: SHIPPED | OUTPATIENT
Start: 2022-07-26 | End: 2022-07-26

## 2022-07-26 RX ORDER — ETANERCEPT 50 MG/ML
50 SOLUTION SUBCUTANEOUS WEEKLY
Qty: 4 ML | Refills: 11 | Status: SHIPPED | OUTPATIENT
Start: 2022-07-26 | End: 2022-08-10 | Stop reason: SDUPTHER

## 2022-07-26 ASSESSMENT — ROUTINE ASSESSMENT OF PATIENT INDEX DATA (RAPID3)
PATIENT GLOBAL ASSESSMENT SCORE: 7
TOTAL RAPID3 SCORE: 5.78
WHEN YOU AWAKENED IN THE MORNING OVER THE LAST WEEK, PLEASE INDICATE THE AMOUNT OF TIME IT TAKES UNTIL YOU ARE AS LIMBER AS YOU WILL BE FOR THE DAY: 3 HOURS
AM STIFFNESS SCORE: 1, YES
FATIGUE SCORE: 9
PSYCHOLOGICAL DISTRESS SCORE: 4.4
MDHAQ FUNCTION SCORE: 0.7
PAIN SCORE: 8

## 2022-07-26 NOTE — PROGRESS NOTES
Rapid3 Question Responses and Scores 7/23/2022   MDHAQ Score 0.7   Psychologic Score 4.4   Pain Score 8   When you awakened in the morning OVER THE LAST WEEK, did you feel stiff? Yes   If Yes, please indicate the number of hours until you are as limber as you will be for the day 3   Fatigue Score 9   Global Health Score 7   RAPID3 Score 5.78         Answers for HPI/ROS submitted by the patient on 7/23/2022  fever: No  eye redness: No  mouth sores: No  headaches: No  shortness of breath: No  chest pain: No  trouble swallowing: No  diarrhea: No  constipation: No  unexpected weight change: Yes  genital sore: No  dysuria: No  During the last 3 days, have you had a skin rash?: No  Bruises or bleeds easily: No  cough: No

## 2022-07-26 NOTE — PROGRESS NOTES
Chief Complaint   Patient presents with    Disease Management       Patient with fibromyalgia for a follow up  Seronegative arthritis    She has seronegative RA    The patient location is: home  The chief complaint leading to consultation is: arthralgias    Visit type: audiovisual     Face to Face time with patient: 20  minutes of total time spent on the encounter, which includes face to face time and non-face to face time preparing to see the patient (eg, review of tests), Obtaining and/or reviewing separately obtained history, Documenting clinical information in the electronic or other health record, Independently interpreting results (not separately reported) and communicating results to the patient/family/caregiver, or Care coordination (not separately reported).       Each patient to whom he or she provides medical services by telemedicine is:  (1) informed of the relationship between the physician and patient and the respective role of any other health care provider with respect to management of the patient; and (2) notified that he or she may decline to receive medical services by telemedicine and may withdraw from such care at any time.    Notes:     History of presenting illness    67 year old white female has fibromyalgia : for 2 years now    She used to see :   : rheumatology    Tried :  lyrica : light headed,dizziness    She used to take cymbalta,she weaned off  She weaned off the celexa    Takes xanax to sleep  Takes tizanidine 4 mg x 2 daily   She takes trazodone 100 mg to sleep      She had severe pain,swelling and stiffness in her hands  She has done a CTS surgery and ulnar nerve decompression on the right side    Left hand CTS was pending for a while     She was on mtx 8 tabs weekly/folic acid   Humira was offered and she is not comfortable doing it   So we switched to mtx 1 ml weekly sc  We then added plaquenil 200 mg bid and ssz 1500 mg bid     Labs     CRP 13/elevated but better than  25.7  ESR nml  CMP nml  CBC nml       7/2021    She finally had left CMC arthroplasty as well as left carpal tunnel release    Pain is much better   She has some swelling at the site of the surgery  She will follow with hand surgery    Now on mtx 1 ml sc weekly+ plaquenil 200 mg bid+ ssz 1500 mg bid     Right hand continues to hurt       4/2022    Weight gain-20 pounds over the past year  She goes to the gym,still doesn't lose weight    Thumbs-hurt bad -both are crippled  Fingers get stuck-cannot open the fingers without using the other hand  Fingers are stiff  She drops things again    mtx 1 ml weekly,plaquenil 200 mg bid, ssz 1500 mg bid  -doesn't help  CBC,CMP,ESR,CRP nml    7/2022    MRI hands/wrists     Pan-carpal and MCP synovitis nonspecific, potentially representing inflammatory arthritis.  Findings are likely similar to prior examination (08/03/2018) however difficult to fully characterize secondary to differences in technique.      mtx not working  On mtx 1 ml weekly,plaquenil 200 mg bid, ssz 1500 mg bid  -doesn't help    Hands hurt a lot  Low back hurts  Charley horses      EMG/NCS    Bilateral median neuropathies at the wrist with secondary chronic denervation of the APB muscles. This may represent residual pathology from her known previous bilateral carpal tunnel syndrome or may represent pathology that has developed since her carpal tunnel release procedures. This test cannot make that distinction and there are no previous studies available for comparison,  2. A right ulnar sensory neuropathy that is axonal and occurs distal to the take-off of the right dorsal ulnar cutaneous nerve.     I think what this means is  Some carpal tunnel syndrome still present  Right hand there is an ulnar nerve that is also compressed  Hand surgery should fix this as well    Hand surgery- suggested a rpt carpal tunnel surgery      She had xrays : bulging discs low back,L4-5  Hand xrays : OA   C spine : OA   She had feet  spurs : injected     Headaches +  Takes 4 BP meds : metoprolol,lasix,diovan,norvasc   Migraines not on treatment  Light headedness+  Sees neurology : CT brains normal    She has seen neuro for the spine issues and also will see neurosurgeon    Episodes of nausea and feeling of sickness  Diarrhea+  Abdominal pain +  GI : they think this is irritable bowel syndrome   Only once in 2004 she had ischemic colitis  EUS all planned   Colonoscopy 2 years ago nml  EGD this year nml  There was no Crohn's on the recent w/u    Crestor gives her charley horses     Past history : pancreatitis,IBS  ADHD  Htn,migraine,anxiety,hypothyroidism,FMS,HLD,IBS    Cause of pancreatitis : unclear   Has had high ALPs for a while now     Blood work    High ESR,CRP    Negative RF,CCP  HLA B27 negative    Xrays    Hands : deg changes + punctate erosions proximal phalanx of the little finger   Knees : nml  C spine : deg changes  LS spine : deg changes,facet arthritis    MRI hands :  Right and left hands: Multifocal degenerative changes noted, most prominent in the IP joints, noting joint space loss and osteophytosis.  Prominent degenerative changes also noted at the 1st carpometacarpal and triscaphe joint of the wrist.  Subcortical erosive change noted in the scaphoid, capitate, and triquetrum.    There is prominent synovitis/enhancement at the MCP joints, carpal carpal joints, and proximal IP joint.  Periarticular erosions noted..  Enhancement also noted around the flexor tendon sheath, suggesting tenosynovitis. The flexor tendons demonstrate normal size and signal.    No fracture fracture.  No marrow replacement process.      Impression       Degenerative arthropathy of both hands with enhancement/ synovitis involving the MCP, carpal-carpal, and proximal IP joints, consistent with superimposed inflammatory component.     UE EMG/NCS :    Mild to moderately severe bilateral carpal tunnel syndrome;   2. A primarily axonal right ulnar sensory  neuropathy with some secondary demyelination;   3. Chronic denervation in the right C7 myotome consistent with chronic radiculopathy.     Family history : mom heart problems    Social history : quit smoking 1997      Review of Systems   Constitutional: Negative for activity change, appetite change, chills, diaphoresis, fatigue, fever and unexpected weight change.   HENT: Negative for congestion, dental problem, drooling, ear discharge, ear pain, facial swelling, hearing loss, mouth sores, nosebleeds, postnasal drip, rhinorrhea, sinus pressure, sinus pain, sneezing, sore throat, tinnitus, trouble swallowing and voice change.    Eyes: Negative for photophobia, pain, discharge, redness, itching and visual disturbance.   Respiratory: Negative for apnea, cough, choking, chest tightness, shortness of breath, wheezing and stridor.    Cardiovascular: Negative for chest pain, palpitations and leg swelling.   Gastrointestinal: Positive for abdominal pain, diarrhea and nausea. Negative for abdominal distention, anal bleeding, blood in stool, constipation, rectal pain and vomiting.   Endocrine: Negative for cold intolerance, heat intolerance, polydipsia, polyphagia and polyuria.   Genitourinary: Negative for decreased urine volume, difficulty urinating, dysuria, enuresis, flank pain, frequency, genital sores, hematuria and urgency.   Musculoskeletal: Positive for arthralgias. Negative for back pain, gait problem, joint swelling, myalgias, neck pain and neck stiffness.   Skin: Negative for color change, pallor, rash and wound.   Allergic/Immunologic: Negative for environmental allergies, food allergies and immunocompromised state.   Neurological: Negative for dizziness, tremors, seizures, syncope, facial asymmetry, speech difficulty, weakness, light-headedness, numbness and headaches.   Hematological: Negative for adenopathy. Does not bruise/bleed easily.   Psychiatric/Behavioral: Negative for agitation, behavioral problems,  confusion, decreased concentration, dysphoric mood, hallucinations, self-injury, sleep disturbance and suicidal ideas. The patient is not nervous/anxious and is not hyperactive.      Physical Exam     All PIPs have osteophytes  Tender C/LS spine    Some swelling noted in the hand MCPs and PIPs     Physical Exam   Constitutional: She is oriented to person, place, and time. No distress.   HENT:   Head: Normocephalic.   Mouth/Throat: Oropharynx is clear and moist.   Eyes: Pupils are equal, round, and reactive to light. Conjunctivae are normal. Right eye exhibits no discharge. Left eye exhibits no discharge. No scleral icterus.   Neck: No thyromegaly present.   Cardiovascular: Normal rate, regular rhythm and normal heart sounds.   Pulmonary/Chest: Effort normal and breath sounds normal. No stridor.   Abdominal: Soft. Bowel sounds are normal.   Musculoskeletal:         General: Normal range of motion.      Cervical back: Normal range of motion.   Lymphadenopathy:     She has no cervical adenopathy.   Neurological: She is alert and oriented to person, place, and time.   Skin: Skin is warm. No rash noted. She is not diaphoretic.   Psychiatric: Affect and judgment normal.         Assessment     66 year old white female comes with    -polyarthralgias in the hands,knees,low back and neck  -GI symptoms of nausea,diarrhea,abdominal pain  -anxiety > depression  -poor sleep  -headaches and light headedness    She has a diagnosis of fibromyalgia,osteoarthritis of hands,C,LS spine    She also has a diagnosis of IBS  GI ruled out crohn's and celiac disease  Recent pancreatitis,cause not known  Noted high ALP,but also low vit d,not sure if ALP is from liver or bone  Has ongoing diarrhea +  Waiting to get a second opinion     She has high inflammatory markers  She has erosive changes on the xrays/MRI    She definitely has osteoarthritis of her C/LS spine    She has inflammatory arthritis in her hands/wrists    She has b/l CTS and  right ulnar neuropathy and she has cervical radiculopathy  S/P surgery for the CTS repair and ulnar decompression       She has done a CTS surgery and ulnar nerve decompression on the right side    Recent right hand CTS surgery and CMC arthroplasty,healing         4/2022    Weight gain-20 pounds over the past year  She goes to the gym,still doesn't lose weight    Thumbs-hurt bad -both are crippled  Fingers get stuck-cannot open the fingers without using the other hand  Fingers are stiff  She drops things again    mtx 1 ml weekly,plaquenil 200 mg bid, ssz 1500 mg bid  -doesn't help      On triple therapy  On pain management     7/2022    MRI hands/wrists     Pan-carpal and MCP synovitis nonspecific, potentially representing inflammatory arthritis.  Findings are likely similar to prior examination (08/03/2018) however difficult to fully characterize secondary to differences in technique.      mtx not working  On mtx 1 ml weekly,plaquenil 200 mg bid, ssz 1500 mg bid  -doesn't help    Hands hurt a lot  Low back hurts  Charley horses    EMG/NCS showed    Bilateral median neuropathies at the wrist with secondary chronic denervation of the APB muscles. This may represent residual pathology from her known previous bilateral carpal tunnel syndrome or may represent pathology that has developed since her carpal tunnel release procedures. This test cannot make that distinction and there are no previous studies available for comparison,  2. A right ulnar sensory neuropathy that is axonal and occurs distal to the take-off of the right dorsal ulnar cutaneous nerve.     I think what this means is  Some carpal tunnel syndrome still present  Right hand there is an ulnar nerve that is also compressed  Hand surgery should fix this as well      1. Seronegative arthritis    2. Seronegative rheumatoid arthritis        Reviewed labs/xrays  Reviewed medications    F/u    Plan    Add enbrel 50 mg weekly    Hand surgery- is giving injections    She has accomplished the left hand CTS and CMC OA surgery   Right now she only wants conservative measures   She also has a hand surgeon    Bariatric medicine    Continue 1 ml mtx weekly sc  Nausea with mtx : increased folic acid to 3 tabs daily- this helps very minimally  Some blahs     Suggestions     Benadryl  25 to 50 mg with every mtx  And then 12 hours later benadryl    2 cups coffee am and 2 cups coffee pm ,3 hours later mtx  And again next day coffee    Vitamin A 8000 IU/day    Continue  plaquenil 200 mg bid + ssz 1500 mg bid    CBC,CMP,ESR,CRP today    In 3 months if joint pains persist and she has elevated inflammatory markers,we will once again revisit use of biologics     Flu,pneumonia and shingles vaccines offered       Continue vit d replacement  Vit b12     Offered pain management,PT for her C/LS spine issues  OT hands    D/c Tramadol for pain management   50 mg bid to tid to qid     Mobic 15 mg d./c    BP meds adjustments    Anxiety :Xanax 0.5 mg daily tid  Trazodone to sleep-100 mg  Tizanidine qid     Offered counselling    Crestor and Lipitor give charley horses  Try changing from crestor to something else  She has family h/o intolerance to statins    GI says she has IBS  She is on meds    Neurology w/u  Spine surgery f/u  To evaluate her C/LS spine issues and the neuropathy   Off gabapentin,norco ,lexapro    Madalyn was seen today for disease management.    Diagnoses and all orders for this visit:    Seronegative arthritis    Seronegative rheumatoid arthritis  -     CBC Auto Differential; Future  -     Comprehensive Metabolic Panel; Future  -     Sedimentation rate; Future  -     C-Reactive Protein; Future  -     Quantiferon Gold TB; Future  -     Hepatitis B Surface Antigen; Future  -     Hepatitis B Core Antibody, Total; Future  -     Hepatitis B Surface Ab, Qualitative; Future    Other orders  -     thyroid, pork, (ARMOUR THYROID) 60 mg Tab; [place] Thyroid 60 mg tablet  -     etanercept  (ENBREL SURECLICK) 50 mg/mL (1 mL); Inject 1 mL (50 mg total) into the skin once a week.          rtc in 3 months

## 2022-07-28 ENCOUNTER — SPECIALTY PHARMACY (OUTPATIENT)
Dept: PHARMACY | Facility: CLINIC | Age: 67
End: 2022-07-28
Payer: MEDICARE

## 2022-07-28 NOTE — TELEPHONE ENCOUNTER
Primary - [employer]  Enbrel PA on file from 7/27/22 to 12/31/22  Case ID: [id number]    Secondary - FEP  Enbrel PA approved from 7/27/22 to 7/28/23.  Case ID: [id number]  * Must use [facility] 8 for [facility] billing

## 2022-07-28 NOTE — TELEPHONE ENCOUNTER
Outgoing call to notify patient of Enbrel approval and plan requirement to fill with [facility]. No answer, [facility].

## 2022-07-28 NOTE — TELEPHONE ENCOUNTER
Benefits Investigation - Enbrel    Insurance name: [facility] Medicare Part D   Copay: $80  LIS LVL: none      Insurance name: FEP  Copay: 0      FEP requires fill at [facility] [phone]

## 2022-07-29 ENCOUNTER — OFFICE VISIT (OUTPATIENT)
Dept: ENDOCRINOLOGY | Facility: CLINIC | Age: 67
End: 2022-07-29
Payer: MEDICARE

## 2022-07-29 ENCOUNTER — LAB VISIT (OUTPATIENT)
Dept: LAB | Facility: HOSPITAL | Age: 67
End: 2022-07-29
Attending: INTERNAL MEDICINE
Payer: MEDICARE

## 2022-07-29 VITALS
HEART RATE: 122 BPM | BODY MASS INDEX: 29.35 KG/M2 | WEIGHT: 182.63 LBS | SYSTOLIC BLOOD PRESSURE: 185 MMHG | DIASTOLIC BLOOD PRESSURE: 94 MMHG | RESPIRATION RATE: 18 BRPM | HEIGHT: 66 IN

## 2022-07-29 DIAGNOSIS — N39.46 MIXED STRESS AND URGE URINARY INCONTINENCE: ICD-10-CM

## 2022-07-29 DIAGNOSIS — R73.03 PREDIABETES: ICD-10-CM

## 2022-07-29 DIAGNOSIS — I10 ESSENTIAL HYPERTENSION: Primary | ICD-10-CM

## 2022-07-29 DIAGNOSIS — R35.0 URINARY FREQUENCY: ICD-10-CM

## 2022-07-29 DIAGNOSIS — E03.9 HYPOTHYROIDISM, UNSPECIFIED TYPE: ICD-10-CM

## 2022-07-29 LAB
ESTIMATED AVG GLUCOSE: 143 MG/DL (ref 68–131)
HBA1C MFR BLD: 6.6 % (ref 4–5.6)
T4 FREE SERPL-MCNC: 0.93 NG/DL (ref 0.71–1.51)
TSH SERPL DL<=0.005 MIU/L-ACNC: 0.45 UIU/ML (ref 0.4–4)

## 2022-07-29 PROCEDURE — 1160F PR REVIEW ALL MEDS BY PRESCRIBER/CLIN PHARMACIST DOCUMENTED: ICD-10-PCS | Mod: CPTII,S$GLB,, | Performed by: INTERNAL MEDICINE

## 2022-07-29 PROCEDURE — 3008F PR BODY MASS INDEX (BMI) DOCUMENTED: ICD-10-PCS | Mod: CPTII,S$GLB,, | Performed by: INTERNAL MEDICINE

## 2022-07-29 PROCEDURE — 1125F AMNT PAIN NOTED PAIN PRSNT: CPT | Mod: CPTII,S$GLB,, | Performed by: INTERNAL MEDICINE

## 2022-07-29 PROCEDURE — 99215 PR OFFICE/OUTPT VISIT, EST, LEVL V, 40-54 MIN: ICD-10-PCS | Mod: S$GLB,,, | Performed by: INTERNAL MEDICINE

## 2022-07-29 PROCEDURE — 3080F PR MOST RECENT DIASTOLIC BLOOD PRESSURE >= 90 MM HG: ICD-10-PCS | Mod: CPTII,S$GLB,, | Performed by: INTERNAL MEDICINE

## 2022-07-29 PROCEDURE — 1160F RVW MEDS BY RX/DR IN RCRD: CPT | Mod: CPTII,S$GLB,, | Performed by: INTERNAL MEDICINE

## 2022-07-29 PROCEDURE — 3077F PR MOST RECENT SYSTOLIC BLOOD PRESSURE >= 140 MM HG: ICD-10-PCS | Mod: CPTII,S$GLB,, | Performed by: INTERNAL MEDICINE

## 2022-07-29 PROCEDURE — 3044F PR MOST RECENT HEMOGLOBIN A1C LEVEL <7.0%: ICD-10-PCS | Mod: CPTII,S$GLB,, | Performed by: INTERNAL MEDICINE

## 2022-07-29 PROCEDURE — 84443 ASSAY THYROID STIM HORMONE: CPT | Performed by: INTERNAL MEDICINE

## 2022-07-29 PROCEDURE — 3288F PR FALLS RISK ASSESSMENT DOCUMENTED: ICD-10-PCS | Mod: CPTII,S$GLB,, | Performed by: INTERNAL MEDICINE

## 2022-07-29 PROCEDURE — 3008F BODY MASS INDEX DOCD: CPT | Mod: CPTII,S$GLB,, | Performed by: INTERNAL MEDICINE

## 2022-07-29 PROCEDURE — 1101F PR PT FALLS ASSESS DOC 0-1 FALLS W/OUT INJ PAST YR: ICD-10-PCS | Mod: CPTII,S$GLB,, | Performed by: INTERNAL MEDICINE

## 2022-07-29 PROCEDURE — 99999 PR PBB SHADOW E&M-EST. PATIENT-LVL V: CPT | Mod: PBBFAC,,, | Performed by: INTERNAL MEDICINE

## 2022-07-29 PROCEDURE — 84480 ASSAY TRIIODOTHYRONINE (T3): CPT | Performed by: INTERNAL MEDICINE

## 2022-07-29 PROCEDURE — 36415 COLL VENOUS BLD VENIPUNCTURE: CPT | Performed by: INTERNAL MEDICINE

## 2022-07-29 PROCEDURE — 1125F PR PAIN SEVERITY QUANTIFIED, PAIN PRESENT: ICD-10-PCS | Mod: CPTII,S$GLB,, | Performed by: INTERNAL MEDICINE

## 2022-07-29 PROCEDURE — 1101F PT FALLS ASSESS-DOCD LE1/YR: CPT | Mod: CPTII,S$GLB,, | Performed by: INTERNAL MEDICINE

## 2022-07-29 PROCEDURE — 3080F DIAST BP >= 90 MM HG: CPT | Mod: CPTII,S$GLB,, | Performed by: INTERNAL MEDICINE

## 2022-07-29 PROCEDURE — 84439 ASSAY OF FREE THYROXINE: CPT | Performed by: INTERNAL MEDICINE

## 2022-07-29 PROCEDURE — 1159F MED LIST DOCD IN RCRD: CPT | Mod: CPTII,S$GLB,, | Performed by: INTERNAL MEDICINE

## 2022-07-29 PROCEDURE — 99215 OFFICE O/P EST HI 40 MIN: CPT | Mod: S$GLB,,, | Performed by: INTERNAL MEDICINE

## 2022-07-29 PROCEDURE — 3077F SYST BP >= 140 MM HG: CPT | Mod: CPTII,S$GLB,, | Performed by: INTERNAL MEDICINE

## 2022-07-29 PROCEDURE — 83036 HEMOGLOBIN GLYCOSYLATED A1C: CPT | Performed by: INTERNAL MEDICINE

## 2022-07-29 PROCEDURE — 3288F FALL RISK ASSESSMENT DOCD: CPT | Mod: CPTII,S$GLB,, | Performed by: INTERNAL MEDICINE

## 2022-07-29 PROCEDURE — 1159F PR MEDICATION LIST DOCUMENTED IN MEDICAL RECORD: ICD-10-PCS | Mod: CPTII,S$GLB,, | Performed by: INTERNAL MEDICINE

## 2022-07-29 PROCEDURE — 99999 PR PBB SHADOW E&M-EST. PATIENT-LVL V: ICD-10-PCS | Mod: PBBFAC,,, | Performed by: INTERNAL MEDICINE

## 2022-07-29 PROCEDURE — 3044F HG A1C LEVEL LT 7.0%: CPT | Mod: CPTII,S$GLB,, | Performed by: INTERNAL MEDICINE

## 2022-07-29 RX ORDER — VERAPAMIL HYDROCHLORIDE 240 MG/1
240 CAPSULE, EXTENDED RELEASE ORAL 2 TIMES DAILY
Qty: 60 CAPSULE | Refills: 5 | Status: SHIPPED | OUTPATIENT
Start: 2022-07-29 | End: 2023-08-04

## 2022-07-29 NOTE — PATIENT INSTRUCTIONS
Hypothyroidism:   Currently on [place] but would like to switch to Levothyroxine+ Cytomel    Vitamin deficiency:    Vitamin D   Vitamin B12    Anxiety  -Discuss with Dr. CRISS Shirley    Hyperlipidemia:   Repatha and Zetia    Hypertension:   Verapamil 240 mg ER twice daily  Furosemide and potassium      ---LABS in 6 weeks (TSH, free T4, total T3, and A1c)

## 2022-07-29 NOTE — PROGRESS NOTES
Endocrine Clinic Note    Reason for Visit: Hypothyroidism    History of Present Illness: Madalyn Iverson, a 67 y.o. female pmh Rheumatoid arthritis who presents today for evaluation and management of hypothyroidism. Last visit 1/7/22    She was diagnosed with hypothyroidism at age 16. She is on armour 68 mg daily. She tried synthroid 10- 15 years ago. She had sluggishness, weight gain, swelling, and anxiety on Synthroid. She has been prescribed [place] by her GYN who has recently retired. She denies history of thyroid nodules.  She denies anterior neck swelling, tenderness, or tightness.  She feels well on current dose of [place]. She has chronic diarrhea which she attributes to colitis. She has anxiety and irritability which she attributes to caring for her  after jail. She denies palpitations, tremor, nausea, vomiting. She does not have hot flashes on estrace. Denies VTE, stroke, breast cancer. She takes Repatha and Zetia for HLD and hx of statin intolerance but denies history of CVD.  The patient denies history of head/neck irradiation. Patient has family history of thyroid disease in her sister who had thyroid cancer and hypothyroidism.     Interval history  She has gained 10lbs since last visit. She had [place] refilled by her rheumatologist. She is not adherent with her antihypertensives and stopped taking clonidine, amlodipine, metoprolol and coreg.. She weaned off estradiol. She stopped methotrexate. She says colitis still present but did not worsen after stopping bentyl and reglan.     Review of systems: (positive in bold)  GENERAL:  Fatigue, fevers, chills, night sweats  HEENT: vision changes, nasal congestion, sore throat, hearing loss  CARDIOVASCULAR:  chest pain, palpitations, leg swelling, claudication  RESPIRATORY: dyspnea, cough, wheezing, snoring  GASTROINTESTINAL: nausea, vomiting, diarrhea, constipation, abdominal pain, acid reflux  ALLERGY/IMMUNOLOGY/HEME: hay fever, hives, frequent  infections, easy bruising or bleeding  GENITOURINARY: change in urine frequency, dysuria, hematuria, urinary incontinence  MSK: joint pain, joint swelling, joint stiffness, muscle aches, muscle weakness  SKIN: Dry skin, rash, hair loss, brittle nails  NEUROLOGICAL: headaches, dizziness, fainting, paresthesias, tremor, unilateral weakness  PSYCHIATRIC: anxiety, depression, mental fogginess, sleep difficulty  ENDOCRINE: polyuria, polydipsia, temperature intolerance, weight gain, weight loss      Past Medical History:   Diagnosis Date    Acute biliary pancreatitis without infection or necrosis 6/13/2018    Acute pancreatitis     June 2018 - [facility] at [facility]    ADHD (attention deficit hyperactivity disorder), inattentive type     Aortic atherosclerosis 3/2/2019    Mild to moderate aortic atherosclerosis seen on CT abd/pelvis 3/2/2019    Chronic back pain     Followed by [facility] - Dr. Bautista - has been having medial branch blocks    Fibromyalgia     treated by Dr. Thorpe in past and now treated by Dr. Yates - Rheumatologist    Hyperlipidemia     High triglycerides    Hypertension     Hypothyroidism     Treated by Dr. Mathew    IFG (impaired fasting glucose)     Migraine     Treated by neurologist - Dr. Destiny Florez    Ulcerative colitis     Patient reported treated by Dr. Lee and Aaron in past  but on admit in 2018 - no UC seen on recent colonoscopy.  It was noted on colonoscopy in April 2004 there were ulcers noted to descending colon but not present on sigmoidoscopy in June 2004.    Ventricular diastolic dysfunction determined by echocardiography 5/1/2021    Followed by Cardiologist Dr. Ghotra    Vitamin D deficiency 3/31/2016       Past Surgical History:   Procedure Laterality Date    CARPAL TUNNEL RELEASE Right 3/27/2019    Procedure: RELEASE, CARPAL TUNNEL right;  Surgeon: Li Waller MD;  Location: [facility] OR [address] [place] [address];  Service:  Orthopedics;  Laterality: Right;  stretcher, supine, hand pan 1 and 2    CARPAL TUNNEL RELEASE Left 2021    Procedure: RELEASE, CARPAL TUNNEL;  Surgeon: Brian Reed Jr., MD;  Location: [facility] OR;  Service: Orthopedics;  Laterality: Left;     SECTION      COLONOSCOPY  10/08/2010    Dr. Lee - repeat in 5 years - has appointment for colonoscopy 2016    COLONOSCOPY  2016    Dr. Kelly - normal  colon - repeat in 10 years - scanned report to media file.    ENDOSCOPIC ULTRASOUND OF UPPER GASTROINTESTINAL TRACT N/A 2018    Procedure: ULTRASOUND, ENDOSCOPIC, UPPER GI TRACT;  Surgeon: Reji Russell MD;  Location: [facility] ENDO;  Service: Endoscopy;  Laterality: N/A;    ENDOSCOPIC ULTRASOUND OF UPPER GASTROINTESTINAL TRACT N/A 3/4/2019    Procedure: ULTRASOUND, UPPER GI TRACT, ENDOSCOPIC;  Surgeon: Randy Boyd MD;  Location: [facility] ENDO;  Service: Endoscopy;  Laterality: N/A;    HYSTERECTOMY      INTERPOSITION ARTHROPLASTY OF CARPOMETACARPAL JOINTS Left 2021    Procedure: INTERPOSITION ARTHROPLASTY, CMC JOINT;  Surgeon: Brian Reed Jr., MD;  Location: [facility] OR;  Service: Orthopedics;  Laterality: Left;  need arthrex tightrope   Brit notified  LB, Brit confirmed 21 AM    LAPAROSCOPIC CHOLECYSTECTOMY N/A 3/6/2019    Procedure: CHOLECYSTECTOMY, LAPAROSCOPIC;  Surgeon: Hill Palacios MD;  Location: [facility] OR;  Service: General;  Laterality: N/A;    medial branch block      done at [facility] for axial back pain    OOPHORECTOMY      SHOULDER SURGERY Left     TONSILLECTOMY      upper and lower GI  2016         Family History   Problem Relation Age of Onset    Hypertension Mother     Heart disease Mother     Hyperlipidemia Mother     Cancer Sister 53        thyroid cancer and lymph nodes involvement    Cancer Brother 61        colon and lung cancer    Colon cancer Brother 66    Cancer Brother 63        colon    Hypertension Brother   "   Diabetes Brother     Colon cancer Brother 60    Esophageal cancer Neg Hx     Stomach cancer Neg Hx     Ulcerative colitis Neg Hx     Crohn's disease Neg Hx     Irritable bowel syndrome Neg Hx     Celiac disease Neg Hx        Social History     Socioeconomic History    Marital status:    Tobacco Use    Smoking status: Former Smoker     Packs/day: 1.00     Years: 20.00     Pack years: 20.00     Quit date: 1997     Years since quittin.5    Smokeless tobacco: Former User   Substance and Sexual Activity    Alcohol use: No    Drug use: No    Sexual activity: Never       Current Outpatient Medications on File Prior to Visit   Medication Sig Dispense Refill    ALPRAZolam (XANAX) 0.5 MG tablet TAKE 1 TABLET BY MOUTH THREE TIMES A DAY AS NEEDED 90 tablet 0    amLODIPine (NORVASC) 10 MG tablet Take 1 tablet (10 mg total) by mouth once daily. 90 tablet 1    BD INSULIN SYRINGE 1 mL 25 gauge x 5/8" Syrg       carvediloL (COREG) 12.5 MG tablet Take 12.5 mg by mouth 2 (two) times daily with meals.      CYANOCOBALAMIN, VITAMIN B-12, (VITAMIN B-12 INJ) Inject 1,000 mcg as directed every 30 days.      ergocalciferol (ERGOCALCIFEROL) 50,000 unit Cap TAKE 1 CAPSULE (50,000 UNITS TOTAL) BY MOUTH EVERY 7 DAYS. FOR 12 DOSES 12 capsule 3    etanercept (ENBREL SURECLICK) 50 mg/mL (1 mL) Inject 1 mL (50 mg total) into the skin once a week. 4 mL 11    evolocumab (REPATHA SURECLICK) 140 mg/mL PnIj Inject 1 mL (140 mg total) into the skin every 14 (fourteen) days. 2 mL 11    ezetimibe (ZETIA) 10 mg tablet TAKE 1 TABLET BY MOUTH EVERY DAY 90 tablet 1    folic acid (FOLVITE) 1 MG tablet TAKE 1 TABLET BY MOUTH EVERY DAY 90 tablet 1    furosemide (LASIX) 40 MG tablet TAKE 1 TABLET BY MOUTH EVERY DAY 90 tablet 0    hydrOXYchloroQUINE (PLAQUENIL) 200 mg tablet       methotrexate 25 mg/mL injection Inject 1 ml into the skin weekly. 10 mL 11    potassium chloride (MICRO-K) 10 MEQ CpSR TAKE 1 CAPSULE BY " "MOUTH TWICE A  capsule 1    syringe-needle,safety,disp unt 1 mL 25 gauge x 5/8" Syrg To use with mtx injections 20 Syringe 0    thyroid, pork, (ARMOUR THYROID) 60 mg Tab TAKE 1 TABLET BY MOUTH EVERY DAY 30 tablet 5    tiZANidine (ZANAFLEX) 4 MG tablet TAKE 1 TABLET BY MOUTH FOUR TIMES A  tablet 3    traZODone (DESYREL) 100 MG tablet TAKE 1 TABLET BY MOUTH EVERY EVENING. 90 tablet 1    metoclopramide HCl (REGLAN) 10 MG tablet Take 1 tablet (10 mg total) by mouth every 6 (six) hours as needed. (Patient not taking: Reported on 7/29/2022) 12 tablet 0     Current Facility-Administered Medications on File Prior to Visit   Medication Dose Route Frequency Provider Last Rate Last Admin    0.9%  NaCl infusion   Intravenous Continuous Jermaine Chatman MD   Stopped at 03/27/19 1040    evolocumab PnIj 140 mg  140 mg Subcutaneous 1 time in Clinic/HOD Ethan Ghotra MD        evolocumab PnIj 140 mg  140 mg Subcutaneous 1 time in Clinic/HOD Ethan Ghotra MD        mupirocin 2 % ointment   Nasal On Call Procedure Jermaine Chatman MD   Given at 03/27/19 0848       Review of patient's allergies indicates:   Allergen Reactions    Clonidine (pf) Swelling     Clonidine patch- causes redness and swelling at patch location    Statins-hmg-coa reductase inhibitors Other (See Comments)     myalgias       Physical Exam:   BP (!) 185/94   Pulse (!) 122   Resp 18   Ht 5' 6" (1.676 m)   Wt 82.9 kg (182 lb 10.4 oz)   BMI 29.48 kg/m²  Body mass index is 29.48 kg/m².  General: Well developed, well-nourished female in NAD  Head: Normocephalic, atraumatic, no obvious deformity  Eyes: PERRL, EOMI, Normal conjunctivae  Neck: No thyromegaly. No discrete nodules. No cervical adenopathy appreciated  CVS: RRR no murmurs, rubs, or gallops.   Chest: Clear to auscultation bilaterally. No wheezes, rales or rhonchi  Extremities: No clubbing, cyanosis or edema  Neuro:No tremor of outstretched hands. Biceps reflexes " wnl.   Skin: No rashes or lesions  Psych: Normal mood. Normal affect. clear     Labs:    Component      Latest Ref Rng & Units 8/24/2021   WBC      3.90 - 12.70 K/uL 5.53   RBC      4.00 - 5.40 M/uL 4.35   Hemoglobin      12.0 - 16.0 g/dL 13.5   Hematocrit      37.0 - 48.5 % 40.6   MCV      82 - 98 fL 93   MCH      27.0 - 31.0 pg 31.0   MCHC      32.0 - 36.0 g/dL 33.3   RDW      11.5 - 14.5 % 13.2   Platelets      150 - 450 K/uL 185   MPV      9.2 - 12.9 fL 11.9   Immature Granulocytes      0.0 - 0.5 % 0.4   Gran # (ANC)      1.8 - 7.7 K/uL 3.0   Immature Grans (Abs)      0.00 - 0.04 K/uL 0.02   Lymph #      1.0 - 4.8 K/uL 1.7   Mono #      0.3 - 1.0 K/uL 0.6   Eos #      0.0 - 0.5 K/uL 0.2   Baso #      0.00 - 0.20 K/uL 0.07   nRBC      0 /100 WBC 0   Gran %      38.0 - 73.0 % 54.2   Lymph %      18.0 - 48.0 % 30.6   Mono %      4.0 - 15.0 % 10.8   Eosinophil %      0.0 - 8.0 % 2.7   Basophil %      0.0 - 1.9 % 1.3   Differential Method       Automated   Sodium      136 - 145 mmol/L 139   Potassium      3.5 - 5.1 mmol/L 3.8   Chloride      95 - 110 mmol/L 105   CO2      23 - 29 mmol/L 27   Glucose      70 - 110 mg/dL 140 (H)   BUN      7 - 17 mg/dL 20 (H)   Creatinine      0.50 - 1.40 mg/dL 0.66   Calcium      8.7 - 10.5 mg/dL 9.4   PROTEIN TOTAL      6.0 - 8.4 g/dL 7.4   Albumin      3.5 - 5.2 g/dL 4.1   BILIRUBIN TOTAL      0.1 - 1.0 mg/dL 0.2   Alkaline Phosphatase      38 - 126 U/L 142 (H)   AST      15 - 46 U/L 33   ALT      10 - 44 U/L 24   Anion Gap      8 - 16 mmol/L 7 (L)   eGFR if African American      >60 mL/min/1.73 m:2 >60.0   eGFR if non African American      >60 mL/min/1.73 m:2 >60.0   Cholesterol      120 - 199 mg/dL    Triglycerides      30 - 150 mg/dL    HDL      40 - 75 mg/dL    LDL Cholesterol External      63.0 - 159.0 mg/dL    HDL/Cholesterol Ratio      20.0 - 50.0 %    Total Cholesterol/HDL Ratio      2.0 - 5.0    Non-HDL Cholesterol      mg/dL    Hemoglobin A1C External      4.0 - 5.6 %     Estimated Avg Glucose      68 - 131 mg/dL    Sed Rate      0 - 20 mm/Hr 50 (H)   CRP      0.0 - 8.2 mg/L 10.6 (H)   Vit D, 25-Hydroxy      30 - 96 ng/mL 18 (L)     Component      Latest Ref Rng & Units 10/21/2021   Cholesterol      120 - 199 mg/dL 239 (H)   Triglycerides      30 - 150 mg/dL 174 (H)   HDL      40 - 75 mg/dL 57   LDL Cholesterol External      63.0 - 159.0 mg/dL 147.2   HDL/Cholesterol Ratio      20.0 - 50.0 % 23.8   Total Cholesterol/HDL Ratio      2.0 - 5.0 4.2   Non-HDL Cholesterol      mg/dL 182   Hemoglobin A1C External      4.0 - 5.6 % 5.8 (H)   Estimated Avg Glucose      68 - 131 mg/dL 120   Sed Rate      0 - 20 mm/Hr    CRP      0.0 - 8.2 mg/L    Vit D, 25-Hydroxy      30 - 96 ng/mL 17 (L)     Component      Latest Ref Rng & Units 5/24/2022   TSH      0.400 - 4.000 uIU/mL 1.100   T3, Total      60 - 180 ng/dL 138   Free T4      0.71 - 1.51 ng/dL 0.79     Assessment/ Plan:   Madalyn Iverson, a 67 y.o. female pmh RA who presents today for evaluation and management of hypothyroidism.     Hypothyroidism  Currently on [place] which was prescribed by her GYN who retired and most recent rheumatology Her last TSH was 1.1, free T4 0.79, Total T3 138. Discussed at length risks of armour including that it is not purified and that there is risk of thyrotoxicosis due to T3. Discussed that would prefer synthetic T4 and if needed can add low dose of T3.  She reports thyroid labs have been stable over the years. She has chronic diarrhea and anxiety.   She reports not feeling well on Synthroid but she is also on a number of other medications and has comorbidities that could be contributing to her symptoms. Advised her to discuss anxiety treatment options with psychiatrist.  -Check TSH, free T4, Total T3    Hypertension, uncontrolled   Nonadherent with her medications. She reports edema so can stay on Furosemide with potassium.  She reports good control with Verapamil 240mg ER BID in the past..  Discussed  importance of compliance and risks of uncontrolled BP and given longstanding, will resume Verapamil now as she denies prior adverse reaction  -Resume Verapamil 240 mg ER twice daily  -Cont lasix and potassium    Hyperlipidemia:   Reports statin intolerance and saw cardiology. Currently on Repatha and Zetia    Pre-DM   Last A1c was 5.8% in 10/2021, reports weight gain since last visit. She is having trouble moderating caloric intake .    Urinary incontinence, chronic  -Referral urogyn   -Repeat A1c       Vitamin D deficiency  Last Vit D was 17 ng/ml  -Cont D2 weekly and D3 daily    Time spent on this encounter: 74 minutes face-to-face time with patient obtaining history, documenting clinical information in the electronic health record, independently interpreting results and communicating results to the patient and discussing treatment plan.

## 2022-07-30 LAB — T3 SERPL-MCNC: 127 NG/DL (ref 60–180)

## 2022-08-02 ENCOUNTER — TELEPHONE (OUTPATIENT)
Dept: UROGYNECOLOGY | Facility: CLINIC | Age: 67
End: 2022-08-02
Payer: MEDICARE

## 2022-08-02 NOTE — TELEPHONE ENCOUNTER
----- Message from Rach Castro sent at 8/1/2022 10:58 AM CDT -----  Regarding: CONSULT    A referral was placed for this patient to see someone in Urogynecology . Can you please assist in scheduling this patient ?

## 2022-08-03 ENCOUNTER — PATIENT MESSAGE (OUTPATIENT)
Dept: ENDOCRINOLOGY | Facility: CLINIC | Age: 67
End: 2022-08-03
Payer: MEDICARE

## 2022-08-10 RX ORDER — ETANERCEPT 50 MG/ML
50 SOLUTION SUBCUTANEOUS WEEKLY
Qty: 4 ML | Refills: 11 | Status: SHIPPED | OUTPATIENT
Start: 2022-08-10 | End: 2022-10-14 | Stop reason: SDUPTHER

## 2022-08-12 ENCOUNTER — PATIENT MESSAGE (OUTPATIENT)
Dept: ENDOCRINOLOGY | Facility: CLINIC | Age: 67
End: 2022-08-12
Payer: MEDICARE

## 2022-08-12 DIAGNOSIS — E11.9 NEW ONSET TYPE 2 DIABETES MELLITUS: Primary | ICD-10-CM

## 2022-08-15 PROBLEM — N39.46 MIXED STRESS AND URGE URINARY INCONTINENCE: Status: ACTIVE | Noted: 2022-08-15

## 2022-08-15 PROBLEM — R35.0 URINARY FREQUENCY: Status: ACTIVE | Noted: 2022-08-15

## 2022-09-09 DIAGNOSIS — E11.9 NEW ONSET TYPE 2 DIABETES MELLITUS: Primary | ICD-10-CM

## 2022-09-09 RX ORDER — ORAL SEMAGLUTIDE 3 MG/1
3 TABLET ORAL DAILY
Qty: 30 TABLET | Refills: 3 | Status: SHIPPED | OUTPATIENT
Start: 2022-09-09 | End: 2023-01-06

## 2022-09-13 ENCOUNTER — CLINICAL SUPPORT (OUTPATIENT)
Dept: DIABETES | Facility: CLINIC | Age: 67
End: 2022-09-13
Payer: MEDICARE

## 2022-09-13 DIAGNOSIS — E11.9 NEW ONSET TYPE 2 DIABETES MELLITUS: ICD-10-CM

## 2022-09-13 PROCEDURE — 99999 PR PBB SHADOW E&M-EST. PATIENT-LVL I: ICD-10-PCS | Mod: PBBFAC,,, | Performed by: DIETITIAN, REGISTERED

## 2022-09-13 PROCEDURE — 99999 PR PBB SHADOW E&M-EST. PATIENT-LVL I: CPT | Mod: PBBFAC,,, | Performed by: DIETITIAN, REGISTERED

## 2022-09-13 NOTE — PROGRESS NOTES
Diabetes Care Specialist Progress Note  Author: Joanna Hunt RD  Date: 9/13/2022    Program Intake  Reason for Diabetes Program Visit:: Initial Diabetes Assessment  Current diabetes risk level:: low  In the last 12 months, have you:: none    Lab Results   Component Value Date    HGBA1C 6.6 (H) 07/29/2022       Clinical  Problem Review  Reviewed Problem List with Patient: yes  Active comorbidities affecting diabetes self-care.: yes  Comorbidities: Hypertension    Clinical Assessment  Current Diabetes Treatment: Oral Medication  Have you ever experienced hypoglycemia (low blood sugar)?: no  Have you ever experienced hyperglycemia (high blood sugar)?: no    Medication Information  How do you obtain your medications?: Patient drives  How many days a week do you miss your medications?: 2  Do you sometimes have difficulty refilling your medications?: No  Medication adherence impacting ability to self-manage diabetes?: No    Labs  Do you have regular lab work to monitor your medications?: Yes  Type of Regular Lab Work: A1c  Lab Compliance Barriers: No    Nutritional Status  Diet: Regular  Meal Plan 24 Hour Recall: Breakfast, Lunch, Dinner, Snack  Meal Plan 24 Hour Recall - Breakfast: biscuits, scrambled eggs, honey bun, coffee with splenda and coffee mate  Meal Plan 24 Hour Recall - Lunch: [employer]aco [employer], fresh sausage with snap beans with salt and pepper, coke 0  Meal Plan 24 Hour Recall - Dinner: dirty rice, jambalaya, chicken stew, red beans and rice, sandwich, SF gatorade, water  Meal Plan 24 Hour Recall - Snack: does not snack much but when she does it is usually sweets, ice cream  Change in appetite?: No  Dentation:: Intact  Recent Changes in Weight: Weight Gain  Current nutritional status an area of need that is impacting patient's ability to self-manage diabetes?: Yes    Additional Social History    Support  Does anyone support you with your diabetes care?: yes  Who supports you?: spouse  Who takes you to your medical  appointments?: spouse, self  Does the current support meet the patient's needs?: Yes  Is Support an area impacting ability to self-manage diabetes?: No    Access to Mass Media & Technology  Does the patient have access to any of the following devices or technologies?: Smart phone  Media or technology needs impacting ability to self-manage diabetes?: No    Cognitive/Behavioral Health  Alert and Oriented: Yes  Difficulty Thinking: No  Requires Prompting: No  Cognitive or behavioral barriers impacting ability to self-manage diabetes?: No         Communication  Language preference: English  Hearing Problems: No  Vision Problems: No  Communication needs impacting ability to self-manage diabetes?: No    Health Literacy  Preferred Learning Method: Face to Face, Reading Materials  How often do you need to have someone help you read instructions, pamphlets, or written material from your doctor or pharmacy?: Rarely  Health literacy needs impacting ability to self-manage diabetes?: No      Diabetes Self-Management Skills Assessment    Diabetes Disease Process/Treatment Options  Patient/caregiver able to state what happens when someone has diabetes.: no  Patient/caregiver knows what type of diabetes they have.: no  Patient/caregiver able to identify at least three signs and symptoms of diabetes.: no  Patient able to identify at least three risk factors for diabetes.: no  Diabetes Disease Process/Treatment Options: Skills Assessment Completed: Yes  Assessment indicates:: Instruction Needed, Knowledge deficit  Area of need?: Yes    Nutrition/Healthy Eating  Challenges to healthy eating:: portion control, lack of will power, eating when feeling depressed/stressed  Method of carbohydrate measurement:: no method  Patient can identify foods that impact blood sugar.: yes  Patient-identified foods:: starches (bread, pasta, rice, cereal), sweets, soda  Nutrition/Healthy Eating Skills Assessment Completed:: Yes  Assessment indicates::  Instruction Needed, Knowledge deficit  Area of need?: Yes    Physical Activity/Exercise  Patient's daily activity level:: lightly active  Patient formally exercises outside of work.: no  Reasons for not exercising:: time constraints  Patient can identify forms of physical activity.: yes  Stated forms of physical activity:: other (see comments) (walking or working out at the gym)  Patient can identify reasons why exercise/physical activity is important in diabetes management.: no  Physical Activity/Exercise Skills Assessment Completed: : Yes  Assessment indicates:: Instruction Needed, Knowledge deficit  Area of need?: Yes    Medications  Patient is able to describe current diabetes management routine.: yes  Diabetes management routine:: oral medications  Patient is able to identify current diabetes medications, dosages, and appropriate timing of medications.: yes  Patient understands the purpose of the medications taken for diabetes.: no  Patient reports problems or concerns with current medication regimen.: yes  Medication regimen problems/concerns:: concerned about side effects (cancer runs in her family and she is afraid that Rybelsus will give her cancer)  Medication Skills Assessment Completed:: Yes  Assessment indicates:: Instruction Needed  Area of need?: Yes    Home Blood Glucose Monitoring  Patient states that blood sugar is checked at home daily.: no  Reasons for not monitoring:: new diabetes diagnosis  Area of need?: No    Acute Complications  Acute Complications Skills Assessment Completed: : No  Deffered due to:: Time    Chronic Complications  Chronic Complications Skills Assessment Completed: : No  Deferred due to:: Time    Psychosocial/Coping  Psychosocial/Coping Skills Assessment Completed: : No  Deffered due to:: Time    Assessment Summary and Plan    Based on today's diabetes care assessment, the following areas of need were identified:      Social 9/13/2022   Support No   Access to Mass Media/Tech  No   Cognitive/Behavioral Health No   Communication No   Health Literacy No        Clinical 9/13/2022   Medication Adherence No   Lab Compliance No   Nutritional Status Yes, see care plan        Diabetes Self-Management Skills 9/13/2022   Diabetes Disease Process/Treatment Options Yes, taught pathos and phys of DM.   Nutrition/Healthy Eating Yes, see care plan   Physical Activity/Exercise Yes, see care plan   Medication Yes, reviewed MOA of Rybelsus and when to call MD if she has any side effects.   Home Blood Glucose Monitoring No          Today's interventions were provided through individual discussion, instruction, and written materials were provided.      Patient verbalized understanding of instruction and written materials.  Pt was able to return back demonstration of instructions today. Patient understood key points, needs reinforcement and further instruction.     Diabetes Self-Management Care Plan:    Today's Diabetes Self-Management Care Plan was developed with Madalyn's input. Madalyn has agreed to work toward the following goal(s) to improve his/her overall diabetes control.      Care Plan: Diabetes Management   Updates made since 8/14/2022 12:00 AM        Problem: Healthy Eating         Goal: Eat 3 meals daily with 2-3 servings of Carbohydrate per meal.    Start Date: 9/13/2022   Expected End Date: 11/15/2022   Priority: Medium   Barriers: Other (comments)   Note:    Pt admits to having a lot of anxiety and this may impede her progress.       Task: Reviewed the sources and role of Carbohydrate, Protein, and Fat and how each nutrient impacts blood sugar. Completed 9/13/2022       Task: Explained how to count carbohydrates using the food label and the use of dry measuring cups for accurate carb counting. Completed 9/13/2022     Task: Review the importance of balancing carbohydrates with each meal using portion control techniques to count servings of carbohydrate and label reading to identify serving size  and amount of total carbs per serving. Completed 9/13/2022        Task: Provided Sample plate method and reviewed the use of the plate to estimate amounts of carbohydrate per meal. Completed 9/13/2022        Problem: Disease Process         Problem: Physical Activity and Exercise         Goal: Patient agrees to increase physical activity.    Start Date: 9/13/2022   Expected End Date: 11/15/2022   Priority: Medium   Barriers: Other (comments)   Note:    Pt stated she does not know if she can make the time for physical activity, but will try. She would really like to go back to the gym.       Task: Discussed role of physical activity on reducing insulin resistance and improvement in overall glycemic control. Completed 9/13/2022        Task: Discussed role of physical activity as it relates to weight loss Completed 9/13/2022        Task: Offered suggestions on how patient could increase their regular physical activity Completed 9/13/2022            Follow Up Plan     Follow up in about 2 months (around 11/13/2022). Review any side effects from the meds that pt may be having, assess dietary changes and implementation of physical activity. Discuss copins skills.    Today's care plan and follow up schedule was discussed with patient.  Madalyn verbalized understanding of the care plan, goals, and agrees to follow up plan.        The patient was encouraged to communicate with his/her health care provider/physician and care team regarding his/her condition(s) and treatment.  I provided the patient with my contact information today and encouraged to contact me via phone or [facility] Patient Portal as needed.     Length of Visit   Total Time: 60 Minutes

## 2022-10-11 ENCOUNTER — TELEPHONE (OUTPATIENT)
Dept: CARDIOLOGY | Facility: CLINIC | Age: 67
End: 2022-10-11
Payer: MEDICARE

## 2022-10-11 ENCOUNTER — PATIENT MESSAGE (OUTPATIENT)
Dept: RHEUMATOLOGY | Facility: CLINIC | Age: 67
End: 2022-10-11
Payer: MEDICARE

## 2022-10-11 NOTE — TELEPHONE ENCOUNTER
----- Message from Ibeth Schilling sent at 10/11/2022  2:50 PM CDT -----  Type:  Needs Medical Advice    Who Called: pt  Symptoms (please be specific): pt is requesting a medicine change for  evolocumab (REPATHA SURECLICK) 140 mg/mL PnIj pt is requested something more affordable to be ordered in its place maybe a generic brand     Pharmacy name and phone #:  [id number] - [place], [place] - [address] [facility]   Phone: [phone]  Fax:  [phone]  Would the patient rather a call back or a response via [facility]? call  Best Call Back Number: [phone]  Additional Information:

## 2022-10-11 NOTE — TELEPHONE ENCOUNTER
Message left notifying patient that I sent a request to the specialty pharmacy to see if they could help her with the price.

## 2022-10-14 ENCOUNTER — SPECIALTY PHARMACY (OUTPATIENT)
Dept: PHARMACY | Facility: CLINIC | Age: 67
End: 2022-10-14
Payer: MEDICARE

## 2022-10-14 DIAGNOSIS — E78.00 PURE HYPERCHOLESTEROLEMIA: Primary | ICD-10-CM

## 2022-10-14 DIAGNOSIS — E78.00 PURE HYPERCHOLESTEROLEMIA: ICD-10-CM

## 2022-10-14 RX ORDER — ETANERCEPT 50 MG/ML
50 SOLUTION SUBCUTANEOUS WEEKLY
Qty: 4 ML | Refills: 11 | Status: SHIPPED | OUTPATIENT
Start: 2022-10-14 | End: 2022-10-25

## 2022-10-25 ENCOUNTER — SPECIALTY PHARMACY (OUTPATIENT)
Dept: PHARMACY | Facility: CLINIC | Age: 67
End: 2022-10-25
Payer: MEDICARE

## 2022-10-25 ENCOUNTER — OFFICE VISIT (OUTPATIENT)
Dept: RHEUMATOLOGY | Facility: CLINIC | Age: 67
End: 2022-10-25
Payer: MEDICARE

## 2022-10-25 DIAGNOSIS — M13.80 SERONEGATIVE ARTHRITIS: Primary | ICD-10-CM

## 2022-10-25 DIAGNOSIS — D64.9 ANEMIA, UNSPECIFIED TYPE: ICD-10-CM

## 2022-10-25 PROCEDURE — 1159F PR MEDICATION LIST DOCUMENTED IN MEDICAL RECORD: ICD-10-PCS | Mod: CPTII,95,, | Performed by: INTERNAL MEDICINE

## 2022-10-25 PROCEDURE — 3044F PR MOST RECENT HEMOGLOBIN A1C LEVEL <7.0%: ICD-10-PCS | Mod: CPTII,95,, | Performed by: INTERNAL MEDICINE

## 2022-10-25 PROCEDURE — 1159F MED LIST DOCD IN RCRD: CPT | Mod: CPTII,95,, | Performed by: INTERNAL MEDICINE

## 2022-10-25 PROCEDURE — 99214 OFFICE O/P EST MOD 30 MIN: CPT | Mod: 95,,, | Performed by: INTERNAL MEDICINE

## 2022-10-25 PROCEDURE — 1125F AMNT PAIN NOTED PAIN PRSNT: CPT | Mod: CPTII,95,, | Performed by: INTERNAL MEDICINE

## 2022-10-25 PROCEDURE — 1125F PR PAIN SEVERITY QUANTIFIED, PAIN PRESENT: ICD-10-PCS | Mod: CPTII,95,, | Performed by: INTERNAL MEDICINE

## 2022-10-25 PROCEDURE — 99214 PR OFFICE/OUTPT VISIT, EST, LEVL IV, 30-39 MIN: ICD-10-PCS | Mod: 95,,, | Performed by: INTERNAL MEDICINE

## 2022-10-25 PROCEDURE — 3044F HG A1C LEVEL LT 7.0%: CPT | Mod: CPTII,95,, | Performed by: INTERNAL MEDICINE

## 2022-10-25 PROCEDURE — 1160F RVW MEDS BY RX/DR IN RCRD: CPT | Mod: CPTII,95,, | Performed by: INTERNAL MEDICINE

## 2022-10-25 PROCEDURE — 1160F PR REVIEW ALL MEDS BY PRESCRIBER/CLIN PHARMACIST DOCUMENTED: ICD-10-PCS | Mod: CPTII,95,, | Performed by: INTERNAL MEDICINE

## 2022-10-25 RX ORDER — CYANOCOBALAMIN 1000 UG/ML
1000 INJECTION, SOLUTION INTRAMUSCULAR; SUBCUTANEOUS
COMMUNITY
Start: 2022-07-03 | End: 2022-12-11

## 2022-10-25 RX ORDER — ADALIMUMAB 40MG/0.8ML
40 KIT SUBCUTANEOUS
Qty: 2 PEN | Refills: 11 | Status: SHIPPED | OUTPATIENT
Start: 2022-10-25 | End: 2023-01-18 | Stop reason: SDUPTHER

## 2022-10-25 ASSESSMENT — ROUTINE ASSESSMENT OF PATIENT INDEX DATA (RAPID3)
MDHAQ FUNCTION SCORE: 0.3
PAIN SCORE: 8
AM STIFFNESS SCORE: 1, YES
TOTAL RAPID3 SCORE: 5.33
FATIGUE SCORE: 8
PATIENT GLOBAL ASSESSMENT SCORE: 7
PSYCHOLOGICAL DISTRESS SCORE: 3.3
WHEN YOU AWAKENED IN THE MORNING OVER THE LAST WEEK, PLEASE INDICATE THE AMOUNT OF TIME IT TAKES UNTIL YOU ARE AS LIMBER AS YOU WILL BE FOR THE DAY: 3 HOURS

## 2022-10-25 NOTE — TELEPHONE ENCOUNTER
Outgoing call to notify patient to [facility] approval and transfer to [facility] per secondary plan requirement. No answer, LVM.

## 2022-10-25 NOTE — TELEPHONE ENCOUNTER
Primary: [employer]   [employer] approved from 10/25/22 to 12/31/23.  Case ID: [id number]  Copay: $80    Secondary: [facility] [phone]  Kailey VALDERRAMA approved from 9/25/22 to 10/25/23.  Case ID: [id number]  Copay: $0

## 2022-10-25 NOTE — PROGRESS NOTES
Rapid3 Question Responses and Scores 10/23/2022   MDHAQ Score 0.3   Psychologic Score 3.3   Pain Score 8   When you awakened in the morning OVER THE LAST WEEK, did you feel stiff? Yes   If Yes, please indicate the number of hours until you are as limber as you will be for the day 3   Fatigue Score 8   Global Health Score 7   RAPID3 Score 5.33       Answers submitted by the patient for this visit:  Rheumatology Questionnaire (Submitted on 10/23/2022)  fever: No  eye redness: No  mouth sores: No  headaches: No  shortness of breath: Yes  chest pain: No  trouble swallowing: No  diarrhea: Yes  constipation: No  unexpected weight change: Yes  genital sore: No  dysuria: No  During the last 3 days, have you had a skin rash?: No  Bruises or bleeds easily: No  cough: No

## 2022-10-25 NOTE — PROGRESS NOTES
Chief Complaint   Patient presents with    Disease Management       Patient with fibromyalgia for a follow up  Seronegative arthritis    She has seronegative RA    The patient location is: home  The chief complaint leading to consultation is: arthralgias    Visit type: audiovisual     Face to Face time with patient: 20  minutes of total time spent on the encounter, which includes face to face time and non-face to face time preparing to see the patient (eg, review of tests), Obtaining and/or reviewing separately obtained history, Documenting clinical information in the electronic or other health record, Independently interpreting results (not separately reported) and communicating results to the patient/family/caregiver, or Care coordination (not separately reported).       Each patient to whom he or she provides medical services by telemedicine is:  (1) informed of the relationship between the physician and patient and the respective role of any other health care provider with respect to management of the patient; and (2) notified that he or she may decline to receive medical services by telemedicine and may withdraw from such care at any time.    Notes:     History of presenting illness    67 year old white female has fibromyalgia : for 2 years now    She used to see :   : rheumatology    Tried :  lyrica : light headed,dizziness    She used to take cymbalta,she weaned off  She weaned off the celexa    Takes xanax to sleep  Takes tizanidine 4 mg x 2 daily   She takes trazodone 100 mg to sleep      She had severe pain,swelling and stiffness in her hands  She has done a CTS surgery and ulnar nerve decompression on the right side    Left hand CTS was pending for a while     She was on mtx 8 tabs weekly/folic acid   Humira was offered and she is not comfortable doing it   So we switched to mtx 1 ml weekly sc  We then added plaquenil 200 mg bid and ssz 1500 mg bid     Labs     CRP 13/elevated but better than  25.7  ESR nml  CMP nml  CBC nml       7/2021    She finally had left CMC arthroplasty as well as left carpal tunnel release    Pain is much better   She has some swelling at the site of the surgery  She will follow with hand surgery    Now on mtx 1 ml sc weekly+ plaquenil 200 mg bid+ ssz 1500 mg bid     Right hand continues to hurt       4/2022    Weight gain-20 pounds over the past year  She goes to the gym,still doesn't lose weight    Thumbs-hurt bad -both are crippled  Fingers get stuck-cannot open the fingers without using the other hand  Fingers are stiff  She drops things again    mtx 1 ml weekly,plaquenil 200 mg bid, ssz 1500 mg bid  -doesn't help  CBC,CMP,ESR,CRP nml    7/2022    MRI hands/wrists     Pan-carpal and MCP synovitis nonspecific, potentially representing inflammatory arthritis.  Findings are likely similar to prior examination (08/03/2018) however difficult to fully characterize secondary to differences in technique.      mtx not working  On mtx 1 ml weekly,plaquenil 200 mg bid, ssz 1500 mg bid  -doesn't help    Hands hurt a lot  Low back hurts  Charley horses    CBC,CMP,ESR,CRP,hep and tb labs all negative      EMG/NCS    Bilateral median neuropathies at the wrist with secondary chronic denervation of the APB muscles. This may represent residual pathology from her known previous bilateral carpal tunnel syndrome or may represent pathology that has developed since her carpal tunnel release procedures. This test cannot make that distinction and there are no previous studies available for comparison,  2. A right ulnar sensory neuropathy that is axonal and occurs distal to the take-off of the right dorsal ulnar cutaneous nerve.     I think what this means is  Some carpal tunnel syndrome still present  Right hand there is an ulnar nerve that is also compressed  Hand surgery should fix this as well    Hand surgery- suggested a rpt carpal tunnel surgery      She had xrays : bulging discs low  back,L4-5  Hand xrays : OA   C spine : OA   She had feet spurs : injected     Headaches +  Takes 4 BP meds : metoprolol,lasix,diovan,norvasc   Migraines not on treatment  Light headedness+  Sees neurology : CT brains normal    She has seen neuro for the spine issues and also will see neurosurgeon    Episodes of nausea and feeling of sickness  Diarrhea+  Abdominal pain +  GI : they think this is irritable bowel syndrome   Only once in 2004 she had ischemic colitis  EUS all planned   Colonoscopy 2 years ago nml  EGD this year nml  There was no Crohn's on the recent w/u    Crestor gives her charley horses     Past history : pancreatitis,IBS  ADHD  Htn,migraine,anxiety,hypothyroidism,FMS,HLD,IBS    Cause of pancreatitis : unclear   Has had high ALPs for a while now     Blood work    High ESR,CRP    Negative RF,CCP  HLA B27 negative    Xrays    Hands : deg changes + punctate erosions proximal phalanx of the little finger   Knees : nml  C spine : deg changes  LS spine : deg changes,facet arthritis    MRI hands :  Right and left hands: Multifocal degenerative changes noted, most prominent in the IP joints, noting joint space loss and osteophytosis.  Prominent degenerative changes also noted at the 1st carpometacarpal and triscaphe joint of the wrist.  Subcortical erosive change noted in the scaphoid, capitate, and triquetrum.    There is prominent synovitis/enhancement at the MCP joints, carpal carpal joints, and proximal IP joint.  Periarticular erosions noted..  Enhancement also noted around the flexor tendon sheath, suggesting tenosynovitis. The flexor tendons demonstrate normal size and signal.    No fracture fracture.  No marrow replacement process.      Impression       Degenerative arthropathy of both hands with enhancement/ synovitis involving the MCP, carpal-carpal, and proximal IP joints, consistent with superimposed inflammatory component.     UE EMG/NCS :    Mild to moderately severe bilateral carpal tunnel  syndrome;   2. A primarily axonal right ulnar sensory neuropathy with some secondary demyelination;   3. Chronic denervation in the right C7 myotome consistent with chronic radiculopathy.         10/2022    Enbrel -started - allergies at the site of the injection  Took 4 weeks already- doesn't even have a slight benefit  Fingers get stuck and stiff - she doesn't have the strength in the fingers     On mtx 1 ml weekly,folic acid 3 mg daily,ssz 1500 mg bid,plaquenil 200 mg bid   Tramadol is not working-she doesn't take it    Family history : mom heart problems    Social history : quit smoking 1997      Review of Systems   Constitutional:  Negative for activity change, appetite change, chills, diaphoresis, fatigue, fever and unexpected weight change.   HENT:  Negative for congestion, dental problem, drooling, ear discharge, ear pain, facial swelling, hearing loss, mouth sores, nosebleeds, postnasal drip, rhinorrhea, sinus pressure, sinus pain, sneezing, sore throat, tinnitus, trouble swallowing and voice change.    Eyes:  Negative for photophobia, pain, discharge, redness, itching and visual disturbance.   Respiratory:  Negative for apnea, cough, choking, chest tightness, shortness of breath, wheezing and stridor.    Cardiovascular:  Negative for chest pain, palpitations and leg swelling.   Gastrointestinal:  Positive for abdominal pain, diarrhea and nausea. Negative for abdominal distention, anal bleeding, blood in stool, constipation, rectal pain and vomiting.   Endocrine: Negative for cold intolerance, heat intolerance, polydipsia, polyphagia and polyuria.   Genitourinary:  Negative for decreased urine volume, difficulty urinating, dysuria, enuresis, flank pain, frequency, genital sores, hematuria and urgency.   Musculoskeletal:  Positive for arthralgias. Negative for back pain, gait problem, joint swelling, myalgias, neck pain and neck stiffness.   Skin:  Negative for color change, pallor, rash and wound.    Allergic/Immunologic: Negative for environmental allergies, food allergies and immunocompromised state.   Neurological:  Negative for dizziness, tremors, seizures, syncope, facial asymmetry, speech difficulty, weakness, light-headedness, numbness and headaches.   Hematological:  Negative for adenopathy. Does not bruise/bleed easily.   Psychiatric/Behavioral:  Negative for agitation, behavioral problems, confusion, decreased concentration, dysphoric mood, hallucinations, self-injury, sleep disturbance and suicidal ideas. The patient is not nervous/anxious and is not hyperactive.    Physical Exam     All PIPs have osteophytes  Tender C/LS spine    Some swelling noted in the hand MCPs and PIPs     Physical Exam   Constitutional: She is oriented to person, place, and time. No distress.   HENT:   Head: Normocephalic.   Mouth/Throat: Oropharynx is clear and moist.   Eyes: Pupils are equal, round, and reactive to light. Conjunctivae are normal. Right eye exhibits no discharge. Left eye exhibits no discharge. No scleral icterus.   Neck: No thyromegaly present.   Cardiovascular: Normal rate, regular rhythm and normal heart sounds.   Pulmonary/Chest: Effort normal and breath sounds normal. No stridor.   Abdominal: Soft. Bowel sounds are normal.   Musculoskeletal:         General: Normal range of motion.      Cervical back: Normal range of motion.   Lymphadenopathy:     She has no cervical adenopathy.   Neurological: She is alert and oriented to person, place, and time.   Skin: Skin is warm. No rash noted. She is not diaphoretic.   Psychiatric: Affect and judgment normal.       Assessment     67 year old white female comes with    -polyarthralgias in the hands,knees,low back and neck  -GI symptoms of nausea,diarrhea,abdominal pain  -anxiety > depression  -poor sleep  -headaches and light headedness    She has a diagnosis of fibromyalgia,osteoarthritis of hands,C,LS spine    She also has a diagnosis of IBS  GI ruled out  crohn's and celiac disease  Recent pancreatitis,cause not known  Noted high ALP,but also low vit d,not sure if ALP is from liver or bone  Has ongoing diarrhea +  Waiting to get a second opinion     She has high inflammatory markers  She has erosive changes on the xrays/MRI    She definitely has osteoarthritis of her C/LS spine    She has inflammatory arthritis in her hands/wrists    She has b/l CTS and right ulnar neuropathy and she has cervical radiculopathy  S/P surgery for the CTS repair and ulnar decompression       She has done a CTS surgery and ulnar nerve decompression on the right side    Recent right hand CTS surgery and CMC arthroplasty,healing         4/2022    Weight gain-20 pounds over the past year  She goes to the gym,still doesn't lose weight    Thumbs-hurt bad -both are crippled  Fingers get stuck-cannot open the fingers without using the other hand  Fingers are stiff  She drops things again    mtx 1 ml weekly,plaquenil 200 mg bid, ssz 1500 mg bid  -doesn't help      On triple therapy  On pain management     7/2022    MRI hands/wrists     Pan-carpal and MCP synovitis nonspecific, potentially representing inflammatory arthritis.  Findings are likely similar to prior examination (08/03/2018) however difficult to fully characterize secondary to differences in technique.      mtx not working  On mtx 1 ml weekly,plaquenil 200 mg bid, ssz 1500 mg bid  -doesn't help    Hands hurt a lot  Low back hurts  Charley horses    EMG/NCS showed    Bilateral median neuropathies at the wrist with secondary chronic denervation of the APB muscles. This may represent residual pathology from her known previous bilateral carpal tunnel syndrome or may represent pathology that has developed since her carpal tunnel release procedures. This test cannot make that distinction and there are no previous studies available for comparison,  2. A right ulnar sensory neuropathy that is axonal and occurs distal to the take-off of the  right dorsal ulnar cutaneous nerve.     I think what this means is  Some carpal tunnel syndrome still present  Right hand there is an ulnar nerve that is also compressed  Hand surgery should fix this as well      10/2022    Enbrel -started - allergies at the site of the injection  Took 4 weeks already- doesn't even have a slight benefit  Fingers get stuck and stiff - she doesn't have the strength in the fingers     On mtx 1 ml weekly,folic acid 3 mg daily,ssz 1500 mg bid,plaquenil 200 mg bid   Tramadol is not working-she doesn't take it    1. Seronegative arthritis    2. Anemia, unspecified type          Reviewed labs/xrays  Reviewed medications    F/u    Plan    D/c enbrel 50 mg weekly  Add humira 40 mg every other week    Hand surgery- is giving injections   She has accomplished the left hand CTS and CMC OA surgery   Left one needs rpt surgery  Right now she only wants conservative measures   She also has a hand surgeon    Bariatric medicine    Continue 1 ml mtx weekly sc  Nausea with mtx : increased folic acid to 3 tabs daily- this helps very minimally  Some blahs     Suggestions     Benadryl  25 to 50 mg with every mtx  And then 12 hours later benadryl    2 cups coffee am and 2 cups coffee pm ,3 hours later mtx  And again next day coffee    Vitamin A 8000 IU/day    Continue  plaquenil 200 mg bid + ssz 1500 mg bid    CBC,CMP,ESR,CRP today    In 3 months if joint pains persist and she has elevated inflammatory markers,we will once again revisit use of biologics     Flu,pneumonia and shingles vaccines offered     Continue vit d replacement  Vit b12     Offered pain management,PT for her C/LS spine issues  OT hands    D/c Tramadol for pain management   50 mg bid to tid to qid     Mobic 15 mg d./c    BP meds adjustments    Anxiety :Xanax 0.5 mg daily tid  Trazodone to sleep-100 mg  Tizanidine qid     Offered counselling    Crestor and Lipitor give ciarra horses  Try changing from crestor to something else  She has  family h/o intolerance to statins    GI says she has IBS  She is on meds    Neurology w/u  Spine surgery f/u  To evaluate her C/LS spine issues and the neuropathy   Off gabapentin,norco ,lexaprmoira Bergman was seen today for disease management.    Diagnoses and all orders for this visit:    Seronegative arthritis  -     Comprehensive Metabolic Panel; Future  -     Sedimentation rate; Future  -     C-Reactive Protein; Future    Anemia, unspecified type  -     CBC Auto Differential; Future    Other orders  -     adalimumab (HUMIRA PEN) PnKt injection; Inject 1 pen (40 mg total) into the skin every 14 (fourteen) days.          rtc in 3 months

## 2022-11-01 ENCOUNTER — TELEPHONE (OUTPATIENT)
Dept: CARDIOLOGY | Facility: CLINIC | Age: 67
End: 2022-11-01
Payer: MEDICARE

## 2022-11-01 ENCOUNTER — PATIENT MESSAGE (OUTPATIENT)
Dept: RHEUMATOLOGY | Facility: CLINIC | Age: 67
End: 2022-11-01
Payer: MEDICARE

## 2022-11-01 ENCOUNTER — TELEPHONE (OUTPATIENT)
Dept: BARIATRICS | Facility: CLINIC | Age: 67
End: 2022-11-01
Payer: MEDICARE

## 2022-11-01 NOTE — TELEPHONE ENCOUNTER
Called pt to follow up in regards to message received from [facility]  No answer  Left detailed voice message as well as call back number    ND

## 2022-11-01 NOTE — TELEPHONE ENCOUNTER
Returned pt call in regard to scheduling consult for medical wl. Pt stated she spoke to the  in June and was advised her insurance covers Medical wl . Pt stated she has a $0 co pay. Informed pt of the next available consult date . Pt questioned why is consult appts so far away. Informed pt there I only 2 medical wl providers in clinic and offered to add pt to waiting list.    Pt stated she will fo elsewhere for wl medication because appt are too far way.

## 2022-11-01 NOTE — TELEPHONE ENCOUNTER
----- Message from Flori Tipton PharmD sent at 11/1/2022 12:30 PM CDT -----  Regarding: [facility]  Patient has not been responding to our call attempts. We enrolled her in the Rep[facility] vero that was opened today. Patient will be disenrolled with [facility] until she calls us back. Thanks.

## 2022-11-01 NOTE — TELEPHONE ENCOUNTER
----- Message from Chuck Del Real MA sent at 11/1/2022 11:58 AM CDT -----  Contact: [phone]  Pt is calling to rescheduled appt with Dr. Yeny Spencer. Pt cancelled previous appt 8/22/22 with her. Informed pt that she would need financial appt first. Pt states she had a financial appt already before office visit scheduled. Pt can be reached at [phone].

## 2022-11-02 ENCOUNTER — TELEPHONE (OUTPATIENT)
Dept: CARDIOLOGY | Facility: CLINIC | Age: 67
End: 2022-11-02
Payer: MEDICARE

## 2022-11-16 ENCOUNTER — TELEPHONE (OUTPATIENT)
Dept: ENDOCRINOLOGY | Facility: CLINIC | Age: 67
End: 2022-11-16
Payer: MEDICARE

## 2022-11-16 NOTE — TELEPHONE ENCOUNTER
----- Message from Kourtney Gaspar sent at 11/16/2022  2:14 PM CST -----  Pt would like to get a call back to be sure Dr. Lewis would be the right person to see before scheduling appt   ----- Message -----  From: Renee Thayer LPN  Sent: 11/16/2022   1:20 PM CST  To: Kourtney Gaspar    Please advise  ----- Message -----  From: Kourtney Gaspar  Sent: 11/16/2022  12:18 PM CST  To: [facility] Staff    Type:  Needs Medical Advice    Who Called:  pt  Symptoms (please be specific):  would like to discuss weight management   How long has patient had these symptoms:   pt said she is normally 150lbs and now 180lbs    Would the patient rather a call back or a response via [facility]? Call   Best Call Back Number: [phone]  Additional Information:

## 2022-11-18 ENCOUNTER — PATIENT MESSAGE (OUTPATIENT)
Dept: ENDOCRINOLOGY | Facility: CLINIC | Age: 67
End: 2022-11-18
Payer: MEDICARE

## 2022-11-21 DIAGNOSIS — M13.80 SERONEGATIVE ARTHRITIS: Primary | ICD-10-CM

## 2022-11-21 DIAGNOSIS — M79.7 FIBROMYALGIA: ICD-10-CM

## 2022-11-21 DIAGNOSIS — M13.80 SERONEGATIVE ARTHRITIS: ICD-10-CM

## 2022-11-21 RX ORDER — HYDROCODONE BITARTRATE AND ACETAMINOPHEN 5; 325 MG/1; MG/1
TABLET ORAL
Qty: 60 TABLET | Refills: 0 | Status: SHIPPED | OUTPATIENT
Start: 2022-11-21 | End: 2022-11-21 | Stop reason: SDUPTHER

## 2022-11-21 NOTE — TELEPHONE ENCOUNTER
----- Message from Makeda Fabian sent at 11/21/2022 12:37 PM CST -----  Regarding: Medication Status  Medication Refill   HYDROcodone-acetaminophen (NORCO) 5-325 mg per tablet  Pt states Pharmacy has not received Medication Script please Call to discuss further.      [phone]

## 2022-11-22 RX ORDER — HYDROCODONE BITARTRATE AND ACETAMINOPHEN 5; 325 MG/1; MG/1
TABLET ORAL
Qty: 60 TABLET | Refills: 0 | Status: SHIPPED | OUTPATIENT
Start: 2022-11-22 | End: 2023-01-25

## 2022-11-22 NOTE — TELEPHONE ENCOUNTER
----- Message from Mahad Gaming sent at 11/22/2022 11:28 AM CST -----  Regarding: Refill  Contact: Dandre [phone]  Rx Refill/Request    Is this a Refill or New Rx:  refill    Rx Name and Strength:  HYDROcodone-acetaminophen (NORCO) 5-325 mg per tablet 60 tablet     Preferred Pharmacy with phone number:  [facility]      Communication Preference: [id number]    Additional Information: please call to submit prior auth  [phone]

## 2022-12-19 RX ORDER — SYRINGE AND NEEDLE,INSULIN,1ML 25GX1"
1 SYRINGE, EMPTY DISPOSABLE MISCELLANEOUS WEEKLY
Qty: 30 EACH | Refills: 1 | Status: SHIPPED | OUTPATIENT
Start: 2022-12-19 | End: 2023-07-11 | Stop reason: SDUPTHER

## 2023-01-25 ENCOUNTER — OFFICE VISIT (OUTPATIENT)
Dept: RHEUMATOLOGY | Facility: CLINIC | Age: 68
End: 2023-01-25
Payer: MEDICARE

## 2023-01-25 ENCOUNTER — TELEPHONE (OUTPATIENT)
Dept: RHEUMATOLOGY | Facility: CLINIC | Age: 68
End: 2023-01-25
Payer: MEDICARE

## 2023-01-25 DIAGNOSIS — M13.80 SERONEGATIVE ARTHRITIS: Primary | ICD-10-CM

## 2023-01-25 DIAGNOSIS — M79.7 FIBROMYALGIA: ICD-10-CM

## 2023-01-25 DIAGNOSIS — D64.9 ANEMIA, UNSPECIFIED TYPE: ICD-10-CM

## 2023-01-25 PROCEDURE — 1159F MED LIST DOCD IN RCRD: CPT | Mod: CPTII,95,, | Performed by: INTERNAL MEDICINE

## 2023-01-25 PROCEDURE — 1160F PR REVIEW ALL MEDS BY PRESCRIBER/CLIN PHARMACIST DOCUMENTED: ICD-10-PCS | Mod: CPTII,95,, | Performed by: INTERNAL MEDICINE

## 2023-01-25 PROCEDURE — 1159F PR MEDICATION LIST DOCUMENTED IN MEDICAL RECORD: ICD-10-PCS | Mod: CPTII,95,, | Performed by: INTERNAL MEDICINE

## 2023-01-25 PROCEDURE — 99214 OFFICE O/P EST MOD 30 MIN: CPT | Mod: 95,,, | Performed by: INTERNAL MEDICINE

## 2023-01-25 PROCEDURE — 99214 PR OFFICE/OUTPT VISIT, EST, LEVL IV, 30-39 MIN: ICD-10-PCS | Mod: 95,,, | Performed by: INTERNAL MEDICINE

## 2023-01-25 PROCEDURE — 1160F RVW MEDS BY RX/DR IN RCRD: CPT | Mod: CPTII,95,, | Performed by: INTERNAL MEDICINE

## 2023-01-25 RX ORDER — HYDROCODONE BITARTRATE AND ACETAMINOPHEN 5; 325 MG/1; MG/1
TABLET ORAL
Qty: 60 TABLET | Refills: 0 | Status: SHIPPED | OUTPATIENT
Start: 2023-01-25 | End: 2023-02-27 | Stop reason: SDUPTHER

## 2023-01-25 RX ORDER — ALPRAZOLAM 0.5 MG/1
TABLET ORAL
Qty: 90 TABLET | Refills: 0 | Status: SHIPPED | OUTPATIENT
Start: 2023-01-25 | End: 2023-03-27 | Stop reason: SDUPTHER

## 2023-01-25 NOTE — TELEPHONE ENCOUNTER
Spoke with patient to confirm allergy and medications prior to virtual appointment.    Informed pt Dr is running behind

## 2023-01-25 NOTE — PROGRESS NOTES
Chief Complaint   Patient presents with    Disease Management       Patient with fibromyalgia for a follow up  Seronegative arthritis    She has seronegative RA    The patient location is: home  The chief complaint leading to consultation is: arthralgias    Visit type: audiovisual     Face to Face time with patient: 20  minutes of total time spent on the encounter, which includes face to face time and non-face to face time preparing to see the patient (eg, review of tests), Obtaining and/or reviewing separately obtained history, Documenting clinical information in the electronic or other health record, Independently interpreting results (not separately reported) and communicating results to the patient/family/caregiver, or Care coordination (not separately reported).       Each patient to whom he or she provides medical services by telemedicine is:  (1) informed of the relationship between the physician and patient and the respective role of any other health care provider with respect to management of the patient; and (2) notified that he or she may decline to receive medical services by telemedicine and may withdraw from such care at any time.    Notes:     History of presenting illness    68 year old white female has fibromyalgia : for 2 years now    She used to see :   : rheumatology    Tried :  lyrica : light headed,dizziness    She used to take cymbalta,she weaned off  She weaned off the celexa    Takes xanax to sleep  Takes tizanidine 4 mg x 2 daily   She takes trazodone 100 mg to sleep      She had severe pain,swelling and stiffness in her hands  She has done a CTS surgery and ulnar nerve decompression on the right side    Left hand CTS was pending for a while     She was on mtx 8 tabs weekly/folic acid   Humira was offered and she is not comfortable doing it   So we switched to mtx 1 ml weekly sc  We then added plaquenil 200 mg bid and ssz 1500 mg bid     Labs     CRP 13/elevated but better than  25.7  ESR nml  CMP nml  CBC nml       7/2021    She finally had left CMC arthroplasty as well as left carpal tunnel release    Pain is much better   She has some swelling at the site of the surgery  She will follow with hand surgery    Now on mtx 1 ml sc weekly+ plaquenil 200 mg bid+ ssz 1500 mg bid     Right hand continues to hurt       4/2022    Weight gain-20 pounds over the past year  She goes to the gym,still doesn't lose weight    Thumbs-hurt bad -both are crippled  Fingers get stuck-cannot open the fingers without using the other hand  Fingers are stiff  She drops things again    mtx 1 ml weekly,plaquenil 200 mg bid, ssz 1500 mg bid  -doesn't help  CBC,CMP,ESR,CRP nml    7/2022    MRI hands/wrists     Pan-carpal and MCP synovitis nonspecific, potentially representing inflammatory arthritis.  Findings are likely similar to prior examination (08/03/2018) however difficult to fully characterize secondary to differences in technique.      mtx not working  On mtx 1 ml weekly,plaquenil 200 mg bid, ssz 1500 mg bid  -doesn't help    Hands hurt a lot  Low back hurts  Charley horses    CBC,CMP,ESR,CRP,hep and tb labs all negative      EMG/NCS    Bilateral median neuropathies at the wrist with secondary chronic denervation of the APB muscles. This may represent residual pathology from her known previous bilateral carpal tunnel syndrome or may represent pathology that has developed since her carpal tunnel release procedures. This test cannot make that distinction and there are no previous studies available for comparison,  2. A right ulnar sensory neuropathy that is axonal and occurs distal to the take-off of the right dorsal ulnar cutaneous nerve.     I think what this means is  Some carpal tunnel syndrome still present  Right hand there is an ulnar nerve that is also compressed  Hand surgery should fix this as well    Hand surgery- suggested a rpt carpal tunnel surgery      She had xrays : bulging discs low  back,L4-5  Hand xrays : OA   C spine : OA   She had feet spurs : injected     Headaches +  Takes 4 BP meds : metoprolol,lasix,diovan,norvasc   Migraines not on treatment  Light headedness+  Sees neurology : CT brains normal    She has seen neuro for the spine issues and also will see neurosurgeon    Episodes of nausea and feeling of sickness  Diarrhea+  Abdominal pain +  GI : they think this is irritable bowel syndrome   Only once in 2004 she had ischemic colitis  EUS all planned   Colonoscopy 2 years ago nml  EGD this year nml  There was no Crohn's on the recent w/u    Crestor gives her charley horses     Past history : pancreatitis,IBS  ADHD  Htn,migraine,anxiety,hypothyroidism,FMS,HLD,IBS    Cause of pancreatitis : unclear   Has had high ALPs for a while now     Blood work    High ESR,CRP    Negative RF,CCP  HLA B27 negative    Xrays    Hands : deg changes + punctate erosions proximal phalanx of the little finger   Knees : nml  C spine : deg changes  LS spine : deg changes,facet arthritis    MRI hands :  Right and left hands: Multifocal degenerative changes noted, most prominent in the IP joints, noting joint space loss and osteophytosis.  Prominent degenerative changes also noted at the 1st carpometacarpal and triscaphe joint of the wrist.  Subcortical erosive change noted in the scaphoid, capitate, and triquetrum.    There is prominent synovitis/enhancement at the MCP joints, carpal carpal joints, and proximal IP joint.  Periarticular erosions noted..  Enhancement also noted around the flexor tendon sheath, suggesting tenosynovitis. The flexor tendons demonstrate normal size and signal.    No fracture fracture.  No marrow replacement process.      Impression       Degenerative arthropathy of both hands with enhancement/ synovitis involving the MCP, carpal-carpal, and proximal IP joints, consistent with superimposed inflammatory component.     UE EMG/NCS :    Mild to moderately severe bilateral carpal tunnel  syndrome;   2. A primarily axonal right ulnar sensory neuropathy with some secondary demyelination;   3. Chronic denervation in the right C7 myotome consistent with chronic radiculopathy.         10/2022    Enbrel -started - allergies at the site of the injection  Took 4 weeks already- doesn't even have a slight benefit  Fingers get stuck and stiff - she doesn't have the strength in the fingers     On mtx 1 ml weekly,folic acid 3 mg daily,ssz 1500 mg bid,plaquenil 200 mg bid   Tramadol is not working-she doesn't take it    CBC,CMP,ESR,CRP nml    1/2023    Trouble with hands with cold weather  Her regimen is now    Humira 40 mg every other week,mtx 1 ml weekly,folic acid 3 mg daily, ssz 1500 mg bid, plaquenil 200 mg bid,hydrocodone 5-325 mg bid,tizanidine 4 mg qid, trazodone 100 mg,alprazolam 0.5 mg tid          Family history : mom heart problems    Social history : quit smoking 1997      Review of Systems   Constitutional:  Negative for activity change, appetite change, chills, diaphoresis, fatigue, fever and unexpected weight change.   HENT:  Negative for congestion, dental problem, drooling, ear discharge, ear pain, facial swelling, hearing loss, mouth sores, nosebleeds, postnasal drip, rhinorrhea, sinus pressure, sinus pain, sneezing, sore throat, tinnitus, trouble swallowing and voice change.    Eyes:  Negative for photophobia, pain, discharge, redness, itching and visual disturbance.   Respiratory:  Negative for apnea, cough, choking, chest tightness, shortness of breath, wheezing and stridor.    Cardiovascular:  Negative for chest pain, palpitations and leg swelling.   Gastrointestinal:  Positive for abdominal pain, diarrhea and nausea. Negative for abdominal distention, anal bleeding, blood in stool, constipation, rectal pain and vomiting.   Endocrine: Negative for cold intolerance, heat intolerance, polydipsia, polyphagia and polyuria.   Genitourinary:  Negative for decreased urine volume, difficulty  urinating, dysuria, enuresis, flank pain, frequency, genital sores, hematuria and urgency.   Musculoskeletal:  Positive for arthralgias. Negative for back pain, gait problem, joint swelling, myalgias, neck pain and neck stiffness.   Skin:  Negative for color change, pallor, rash and wound.   Allergic/Immunologic: Negative for environmental allergies, food allergies and immunocompromised state.   Neurological:  Negative for dizziness, tremors, seizures, syncope, facial asymmetry, speech difficulty, weakness, light-headedness, numbness and headaches.   Hematological:  Negative for adenopathy. Does not bruise/bleed easily.   Psychiatric/Behavioral:  Negative for agitation, behavioral problems, confusion, decreased concentration, dysphoric mood, hallucinations, self-injury, sleep disturbance and suicidal ideas. The patient is not nervous/anxious and is not hyperactive.    Physical Exam     All PIPs have osteophytes  Tender C/LS spine    Some swelling noted in the hand MCPs and PIPs     Physical Exam   Constitutional: She is oriented to person, place, and time. No distress.   HENT:   Head: Normocephalic.   Mouth/Throat: Oropharynx is clear and moist.   Eyes: Pupils are equal, round, and reactive to light. Conjunctivae are normal. Right eye exhibits no discharge. Left eye exhibits no discharge. No scleral icterus.   Neck: No thyromegaly present.   Cardiovascular: Normal rate, regular rhythm and normal heart sounds.   Pulmonary/Chest: Effort normal and breath sounds normal. No stridor.   Abdominal: Soft. Bowel sounds are normal.   Musculoskeletal:         General: Normal range of motion.      Cervical back: Normal range of motion.   Lymphadenopathy:     She has no cervical adenopathy.   Neurological: She is alert and oriented to person, place, and time.   Skin: Skin is warm. No rash noted. She is not diaphoretic.   Psychiatric: Affect and judgment normal.       Assessment     67 year old white female comes  with    -polyarthralgias in the hands,knees,low back and neck  -GI symptoms of nausea,diarrhea,abdominal pain  -anxiety > depression  -poor sleep  -headaches and light headedness    She has a diagnosis of fibromyalgia,osteoarthritis of hands,C,LS spine    She also has a diagnosis of IBS  GI ruled out crohn's and celiac disease  Recent pancreatitis,cause not known  Noted high ALP,but also low vit d,not sure if ALP is from liver or bone  Has ongoing diarrhea +  Waiting to get a second opinion     She has high inflammatory markers  She has erosive changes on the xrays/MRI    She definitely has osteoarthritis of her C/LS spine    She has inflammatory arthritis in her hands/wrists    She has b/l CTS and right ulnar neuropathy and she has cervical radiculopathy  S/P surgery for the CTS repair and ulnar decompression       She has done a CTS surgery and ulnar nerve decompression on the right side    Recent right hand CTS surgery and CMC arthroplasty,healing         4/2022    Weight gain-20 pounds over the past year  She goes to the gym,still doesn't lose weight    Thumbs-hurt bad -both are crippled  Fingers get stuck-cannot open the fingers without using the other hand  Fingers are stiff  She drops things again    mtx 1 ml weekly,plaquenil 200 mg bid, ssz 1500 mg bid  -doesn't help      On triple therapy  On pain management     7/2022    MRI hands/wrists     Pan-carpal and MCP synovitis nonspecific, potentially representing inflammatory arthritis.  Findings are likely similar to prior examination (08/03/2018) however difficult to fully characterize secondary to differences in technique.      mtx not working  On mtx 1 ml weekly,plaquenil 200 mg bid, ssz 1500 mg bid  -doesn't help    Hands hurt a lot  Low back hurts  Charley horses    EMG/NCS showed    Bilateral median neuropathies at the wrist with secondary chronic denervation of the APB muscles. This may represent residual pathology from her known previous bilateral  carpal tunnel syndrome or may represent pathology that has developed since her carpal tunnel release procedures. This test cannot make that distinction and there are no previous studies available for comparison,  2. A right ulnar sensory neuropathy that is axonal and occurs distal to the take-off of the right dorsal ulnar cutaneous nerve.     I think what this means is  Some carpal tunnel syndrome still present  Right hand there is an ulnar nerve that is also compressed  Hand surgery should fix this as well      10/2022    Enbrel -started - allergies at the site of the injection  Took 4 weeks already- doesn't even have a slight benefit  Fingers get stuck and stiff - she doesn't have the strength in the fingers     On mtx 1 ml weekly,folic acid 3 mg daily,ssz 1500 mg bid,plaquenil 200 mg bid   Tramadol is not working-she doesn't take it    1/2023    Trouble with hands with cold weather  Her regimen is now    Humira 40 mg every other week,mtx 1 ml weekly,folic acid 3 mg daily, ssz 1500 mg bid, plaquenil 200 mg bid,hydrocodone 5-325 mg bid,tizanidine 4 mg qid, trazodone 100 mg,alprazolam 0.5 mg tid    She has -fingers getting stuck/surgical site pain,pain in the hands      1. Seronegative arthritis    2. Anemia, unspecified type    3. Fibromyalgia            Reviewed labs/xrays  Reviewed medications    F/u    Plan      Continue humira 40 mg every other week    Hand surgery- is giving injections   She has accomplished the left hand CTS and CMC OA surgery   Left one needs rpt surgery  Right now she only wants conservative measures   She also has a hand surgeon    Occupational therapy - would definitely help    Bariatric medicine    Continue 1 ml mtx weekly sc  Nausea with mtx : increased folic acid to 3 tabs daily- this helps very minimally  Some blahs     Suggestions     Benadryl  25 to 50 mg with every mtx  And then 12 hours later benadryl    2 cups coffee am and 2 cups coffee pm ,3 hours later mtx  And again next day  coffee    Vitamin A 8000 IU/day    Continue  plaquenil 200 mg bid + ssz 1500 mg bid    CBC,CMP,ESR,CRP today    In 3 months if joint pains persist and she has elevated inflammatory markers,we will once again revisit use of biologics     Flu,pneumonia and shingles vaccines offered     Continue vit d replacement  Vit b12     Offered pain management,PT for her C/LS spine issues  OT hands    D/maricarmen Tramadol for pain management   50 mg bid to tid to qid     Mobic 15 mg d./c    BP meds adjustments    Anxiety :Xanax 0.5 mg daily tid  Trazodone to sleep-100 mg  Tizanidine qid     Offered counselling    Crestor and Lipitor give ciarra horses  Try changing from crestor to something else  She has family h/o intolerance to statins    GI says she has IBS  She is on meds    Neurology w/u  Spine surgery f/u  To evaluate her C/LS spine issues and the neuropathy   Off gabapentin,marisa    Madalyn was seen today for disease management.    Diagnoses and all orders for this visit:    Seronegative arthritis  -     Comprehensive Metabolic Panel; Future  -     Sedimentation rate; Future  -     C-Reactive Protein; Future  -     HYDROcodone-acetaminophen (NORCO) 5-325 mg per tablet; 1 to 2 tablets daily for pain    Anemia, unspecified type  -     CBC Auto Differential; Future    Fibromyalgia  -     HYDROcodone-acetaminophen (NORCO) 5-325 mg per tablet; 1 to 2 tablets daily for pain    Other orders  -     ALPRAZolam (XANAX) 0.5 MG tablet; TAKE 1 TABLET BY MOUTH 3 TIMES DAILY AS NEEDED.              rtc in 3 months

## 2023-02-03 DIAGNOSIS — E11.9 TYPE 2 DIABETES MELLITUS WITHOUT COMPLICATION, WITHOUT LONG-TERM CURRENT USE OF INSULIN: Primary | ICD-10-CM

## 2023-02-14 ENCOUNTER — PATIENT MESSAGE (OUTPATIENT)
Dept: ADMINISTRATIVE | Facility: HOSPITAL | Age: 68
End: 2023-02-14
Payer: MEDICARE

## 2023-02-14 ENCOUNTER — PATIENT OUTREACH (OUTPATIENT)
Dept: ADMINISTRATIVE | Facility: HOSPITAL | Age: 68
End: 2023-02-14
Payer: MEDICARE

## 2023-02-28 ENCOUNTER — SPECIALTY PHARMACY (OUTPATIENT)
Dept: PHARMACY | Facility: CLINIC | Age: 68
End: 2023-02-28
Payer: MEDICARE

## 2023-02-28 ENCOUNTER — PATIENT MESSAGE (OUTPATIENT)
Dept: RHEUMATOLOGY | Facility: CLINIC | Age: 68
End: 2023-02-28
Payer: MEDICARE

## 2023-02-28 RX ORDER — ADALIMUMAB 40MG/0.8ML
40 KIT SUBCUTANEOUS
Qty: 2 PEN | Refills: 11 | Status: SHIPPED | OUTPATIENT
Start: 2023-02-28 | End: 2023-11-07

## 2023-02-28 NOTE — TELEPHONE ENCOUNTER
Incoming messaged from Dr. KAHN - patient having trouble filling Humira at [facility].     Humira PA on file until 10/25/23. Test claim going through for $0.  Pending for initial consult and dispensing from [facility].

## 2023-03-02 ENCOUNTER — SPECIALTY PHARMACY (OUTPATIENT)
Dept: PHARMACY | Facility: CLINIC | Age: 68
End: 2023-03-02
Payer: MEDICARE

## 2023-03-02 ENCOUNTER — PATIENT MESSAGE (OUTPATIENT)
Dept: PHARMACY | Facility: CLINIC | Age: 68
End: 2023-03-02
Payer: MEDICARE

## 2023-03-02 NOTE — TELEPHONE ENCOUNTER
" Specialty Pharmacy - Initial Clinical Assessment    Specialty Medication Orders Linked to Encounter      Flowsheet Row Most Recent Value   Medication #1 adalimumab (HUMIRA PEN) PnKt injection (Order#[id number], Rx#[id number])          Patient Diagnosis   M06.00 - Seronegative rheumatoid arthritis    Subjective    Madalyn Iverson is a 68 y.o. female, who is followed by the specialty pharmacy service for management and education.    Recent Encounters       Date Type Provider Description    03/02/2023 Specialty Pharmacy Catia Scott, Saira Initial Clinical Assessment    02/28/2023 Specialty Pharmacy Minda Garcia PharmD Referral Authorization    10/25/2022 Specialty Pharmacy Minda Garcia PharmD Referral Authorization    10/14/2022 Specialty Pharmacy Flori Tipton, Saira Referral Authorization    07/28/2022 Specialty Pharmacy Minda Garcia PharmD Referral Authorization          Clinical call attempts since last clinical assessment   2/28/2023  9:59 PM - Specialty Pharmacy - Clinical Assessment by Minda Garcia PharmD  3/2/2023  6:51 PM - Specialty Pharmacy - Clinical Assessment by Minda Garcia PharmD     Current Outpatient Medications   Medication Sig    adalimumab (HUMIRA PEN) PnKt injection Inject 1 pen (40 mg total) into the skin every 14 (fourteen) days.    ALPRAZolam (XANAX) 0.5 MG tablet TAKE 1 TABLET BY MOUTH 3 TIMES DAILY AS NEEDED.    BD INSULIN SYRINGE 1 mL 25 gauge x 5/8" Syrg Inject 1 mL into the skin once a week.    carvediloL (COREG) 12.5 MG tablet Take 12.5 mg by mouth 2 (two) times daily with meals.    cyanocobalamin 1,000 mcg/mL injection INJECT 1 ML (1,000 MCG TOTAL) INTO THE SKIN EVERY 30 DAYS    ergocalciferol (ERGOCALCIFEROL) 50,000 unit Cap TAKE 1 CAPSULE (50,000 UNITS TOTAL) BY MOUTH EVERY 7 DAYS. FOR 12 DOSES    folic acid (FOLVITE) 1 MG tablet TAKE 1 TABLET BY MOUTH EVERY DAY    furosemide (LASIX) 40 MG tablet TAKE 1 TABLET BY MOUTH EVERY DAY    HYDROcodone-acetaminophen (NORCO) " "5-325 mg per tablet 1 to 2 tablets daily for pain    hydrOXYchloroQUINE (PLAQUENIL) 200 mg tablet     potassium chloride (MICRO-K) 10 MEQ CpSR TAKE 1 CAPSULE BY MOUTH TWICE A DAY    REPATHA SURECLICK 140 mg/mL PnIj INJECT 1 ML (140 MG TOTAL) INTO SKIN EVERY 2 WEEKS    syringe-needle,safety,disp unt 1 mL 25 gauge x 5/8" Syrg To use with mtx injections    thyroid, pork, (ARMOUR THYROID) 60 mg Tab TAKE 1 TABLET BY MOUTH EVERY DAY    tirzepatide 5 mg/0.5 mL PnIj Inject 5 mg into the skin every 7 days.    tiZANidine (ZANAFLEX) 4 MG tablet TAKE 1 TABLET BY MOUTH FOUR TIMES A DAY    traZODone (DESYREL) 100 MG tablet TAKE 1 TABLET BY MOUTH EVERY DAY IN THE EVENING    verapamiL (VERELAN) 240 MG C24P Take 1 capsule (240 mg total) by mouth 2 (two) times a day.   Last reviewed on 1/25/2023  8:39 AM by Michael Yates MD    Review of patient's allergies indicates:   Allergen Reactions    Clonidine (pf) Swelling     Clonidine patch- causes redness and swelling at patch location    Statins-hmg-coa reductase inhibitors Other (See Comments)     myalgias   Last reviewed on  3/2/2023 1:04 PM by Catia Scott        Adverse Effects    Arthralgias: Pos  Gait problem: Pos  Joint swelling: Pos       Assessment Questions - Documented Responses      Flowsheet Row Most Recent Value   Assessment    Medication Reconciliation completed for patient Yes   During the past 4 weeks, has patient missed any activities due to condition or medication? No   During the past 4 weeks, did patient have any of the following urgent care visits? None   Goals of Therapy Status Discussed (new start)   Status of the patients ability to self-administer: Is Able   All education points have been covered with patient? Yes, supplemental printed education provided   Welcome packet contents reviewed and discussed with patient? Yes   Assesment completed? Yes   Plan Therapy being initiated   Do you need to open a clinical intervention (i-vent)? No   Do you " "want to schedule first shipment? Yes          Refill Questions - Documented Responses      Flowsheet Row Most Recent Value   Refill Screening Questions    When does the patient need to receive the medication? 03/09/23   Refill Delivery Questions    How will the patient receive the medication? [facility]   When does the patient need to receive the medication? 03/09/23   Shipping Address Prescription   Address in [facility] confirmed and updated if neccessary? Yes   Expected Copay ($) 0   Is the patient able to afford the medication copay? Yes   Payment Method zero copay   Days supply of Refill 28   Supplies needed? No supplies needed   Refill activity completed? Yes   Refill activity plan Refill scheduled   Shipment/Pickup Date: 03/06/23            Objective    She has a past medical history of Acute biliary pancreatitis without infection or necrosis (6/13/2018), Acute pancreatitis, ADHD (attention deficit hyperactivity disorder), inattentive type, Aortic atherosclerosis (3/2/2019), Chronic back pain, Fibromyalgia, Hyperlipidemia, Hypertension, Hypothyroidism, IFG (impaired fasting glucose), Migraine, Ulcerative colitis, Ventricular diastolic dysfunction determined by echocardiography (5/1/2021), and Vitamin D deficiency (3/31/2016).    Tried/failed medications: HCQ, NSAIDs, prednisone, meloxicam, MTX, SSZ    BP Readings from Last 4 Encounters:   07/29/22 (!) 185/94   01/07/22 (!) 172/96   10/28/21 138/78   07/09/21 (!) 141/74     Ht Readings from Last 4 Encounters:   07/29/22 5' 6" (1.676 m)   06/15/22 5' 6" (1.676 m)   04/20/22 5' 6" (1.676 m)   01/19/22 5' 6" (1.676 m)     Wt Readings from Last 4 Encounters:   07/29/22 82.9 kg (182 lb 10.4 oz)   06/15/22 77.6 kg (171 lb)   04/20/22 78 kg (171 lb 15.3 oz)   01/19/22 78 kg (172 lb)     Recent Labs   Lab Result Units 01/26/23  1359   Creatinine mg/dL 0.68   ALT U/L 21   AST U/L 25     The goals of rheumatoid arthritis treatment include:  Relieving pain and " suppressing inflammation  Achieving remission and preventing joint and organ damage  Increasing joint mobility and strength  Preventing infection and other complications of treatment  Reducing long term complications of rheumatoid arthritis  Improving or maintaining physical function and optimal well-being  Improving or maintaining quality of life  Maintaining optimal therapy adherence  Minimizing and managing side effects    Goals of Therapy Status: Discussed (new start)    Assessment/Plan  Patient plans to continue therapy on 03/09/23 when next dose is due       Indication, dosage, appropriateness, effectiveness, safety and convenience of her specialty medication(s) were reviewed today.     Patient Education   Patient received education on the following:   Expectations and possible outcomes of therapy  Proper use, timely administration, and missed dose management  Duration of therapy  Side effects, including prevention, minimization, and management  Contraindications and safety precautions  New or changed medications, including prescribe and over the counter medications and supplements  Reviews recommended vaccinations, as appropriate  Storage, safe handling, and disposal    Patient has been self-administering and reports much easier to do in her stomach than her leg. Patient waived drug education and states she has read med info.     Tasks added this encounter   No tasks added.   Tasks due within next 3 months   2/28/2023 - Welcome Call  2/28/2023 - Clinical - Initial Assessment     Catia Scott, PharmD  [facility]  [address]  [place] [address]  Phone: [phone]  Fax: [phone]

## 2023-03-07 ENCOUNTER — SPECIALTY PHARMACY (OUTPATIENT)
Dept: PHARMACY | Facility: CLINIC | Age: 68
End: 2023-03-07
Payer: MEDICARE

## 2023-03-07 NOTE — TELEPHONE ENCOUNTER
Incoming call from patient to inquire about how to get Repatha and Mounjaro covered, as she received a letter from [facility] stating they were no longer going to be covered. Discussed they likely just need a PA. Advised [facility] can assist with the Repatha PA and to have her provider send us a Rx. Recommended she speak with her provider of the Mounjaro to complete PA. Patient verbalized understanding. No further questions or concerns.

## 2023-03-15 ENCOUNTER — TELEPHONE (OUTPATIENT)
Dept: PHARMACY | Facility: CLINIC | Age: 68
End: 2023-03-15
Payer: MEDICARE

## 2023-03-15 ENCOUNTER — PATIENT MESSAGE (OUTPATIENT)
Dept: PHARMACY | Facility: CLINIC | Age: 68
End: 2023-03-15
Payer: MEDICARE

## 2023-03-15 DIAGNOSIS — I10 ESSENTIAL HYPERTENSION: ICD-10-CM

## 2023-03-15 DIAGNOSIS — E78.00 PURE HYPERCHOLESTEROLEMIA: ICD-10-CM

## 2023-03-15 RX ORDER — EVOLOCUMAB 140 MG/ML
140 INJECTION, SOLUTION SUBCUTANEOUS
Qty: 6 EACH | Refills: 3 | OUTPATIENT
Start: 2023-03-15

## 2023-03-15 NOTE — TELEPHONE ENCOUNTER
Patient called to see when her next refill call for humira would be. She states that she is due for her next injection on 3/20. Advised patient that we would schedule a refill call for 3/27, a week before she would need her next refill. Patient acknowledged. No other questions or concerns.

## 2023-03-15 NOTE — TELEPHONE ENCOUNTER
Please see the attached refill request. Please see pt comment: she is asking for help with getting a medication due to her insurance not covering it.

## 2023-03-16 ENCOUNTER — PATIENT MESSAGE (OUTPATIENT)
Dept: FAMILY MEDICINE | Facility: CLINIC | Age: 68
End: 2023-03-16
Payer: MEDICARE

## 2023-03-16 RX ORDER — FUROSEMIDE 40 MG/1
40 TABLET ORAL DAILY
Qty: 90 TABLET | Refills: 0 | Status: SHIPPED | OUTPATIENT
Start: 2023-03-16

## 2023-03-16 NOTE — TELEPHONE ENCOUNTER
Called and left pt a voice mail to give the office a call back to get an appt scheduled. Also sent pt a message on the portal.

## 2023-03-30 DIAGNOSIS — M13.80 SERONEGATIVE ARTHRITIS: ICD-10-CM

## 2023-03-30 DIAGNOSIS — M79.7 FIBROMYALGIA: ICD-10-CM

## 2023-03-30 RX ORDER — HYDROCODONE BITARTRATE AND ACETAMINOPHEN 5; 325 MG/1; MG/1
1-2 TABLET ORAL DAILY PRN
Qty: 60 TABLET | Refills: 0 | Status: SHIPPED | OUTPATIENT
Start: 2023-03-30 | End: 2023-05-03 | Stop reason: SDUPTHER

## 2023-04-10 ENCOUNTER — PATIENT MESSAGE (OUTPATIENT)
Dept: PHARMACY | Facility: CLINIC | Age: 68
End: 2023-04-10
Payer: MEDICARE

## 2023-04-10 ENCOUNTER — SPECIALTY PHARMACY (OUTPATIENT)
Dept: PHARMACY | Facility: CLINIC | Age: 68
End: 2023-04-10
Payer: MEDICARE

## 2023-04-10 NOTE — TELEPHONE ENCOUNTER
Incoming call from pt regarding Humira refill. Pt stated she has one pen on hand for 4/17, therefore will need medication for 5/1. Pt sis state she has missed a dose before. Explained to pt that [facility] has to speak with her in order for her to receive the shipment. Pt verbalized understanding and knows to reach out to [facility] with any another questions. Routing to [facility] to pen refill date accordingly.

## 2023-04-10 NOTE — TELEPHONE ENCOUNTER
March refill not appropriate as patient still has medication on hand. Per message below, patient missed 1 dose. Refill call pended for 4/24 for delivery by 5/1.

## 2023-04-14 RX ORDER — NITROFURANTOIN 25; 75 MG/1; MG/1
100 CAPSULE ORAL 2 TIMES DAILY
Qty: 14 CAPSULE | Refills: 0 | Status: SHIPPED | OUTPATIENT
Start: 2023-04-14 | End: 2023-04-21

## 2023-04-25 ENCOUNTER — OFFICE VISIT (OUTPATIENT)
Dept: RHEUMATOLOGY | Facility: CLINIC | Age: 68
End: 2023-04-25
Payer: MEDICARE

## 2023-04-25 DIAGNOSIS — M06.00 SERONEGATIVE RHEUMATOID ARTHRITIS: ICD-10-CM

## 2023-04-25 DIAGNOSIS — M13.80 SERONEGATIVE ARTHRITIS: Primary | ICD-10-CM

## 2023-04-25 PROCEDURE — 99214 PR OFFICE/OUTPT VISIT, EST, LEVL IV, 30-39 MIN: ICD-10-PCS | Mod: 95,,, | Performed by: INTERNAL MEDICINE

## 2023-04-25 PROCEDURE — 99214 OFFICE O/P EST MOD 30 MIN: CPT | Mod: 95,,, | Performed by: INTERNAL MEDICINE

## 2023-04-25 PROCEDURE — 1159F PR MEDICATION LIST DOCUMENTED IN MEDICAL RECORD: ICD-10-PCS | Mod: CPTII,95,, | Performed by: INTERNAL MEDICINE

## 2023-04-25 PROCEDURE — 1160F RVW MEDS BY RX/DR IN RCRD: CPT | Mod: CPTII,95,, | Performed by: INTERNAL MEDICINE

## 2023-04-25 PROCEDURE — 1159F MED LIST DOCD IN RCRD: CPT | Mod: CPTII,95,, | Performed by: INTERNAL MEDICINE

## 2023-04-25 PROCEDURE — 1160F PR REVIEW ALL MEDS BY PRESCRIBER/CLIN PHARMACIST DOCUMENTED: ICD-10-PCS | Mod: CPTII,95,, | Performed by: INTERNAL MEDICINE

## 2023-04-25 NOTE — PROGRESS NOTES
Chief Complaint   Patient presents with    Disease Management       Patient with fibromyalgia for a follow up  Seronegative arthritis    She has seronegative RA    The patient location is: home  The chief complaint leading to consultation is: arthralgias    Visit type: audiovisual     Face to Face time with patient: 20  minutes of total time spent on the encounter, which includes face to face time and non-face to face time preparing to see the patient (eg, review of tests), Obtaining and/or reviewing separately obtained history, Documenting clinical information in the electronic or other health record, Independently interpreting results (not separately reported) and communicating results to the patient/family/caregiver, or Care coordination (not separately reported).       Each patient to whom he or she provides medical services by telemedicine is:  (1) informed of the relationship between the physician and patient and the respective role of any other health care provider with respect to management of the patient; and (2) notified that he or she may decline to receive medical services by telemedicine and may withdraw from such care at any time.    Notes:     History of presenting illness    68 year old white female has fibromyalgia : for 2 years now    She used to see :   : rheumatology    Tried :  lyrica : light headed,dizziness    She used to take cymbalta,she weaned off  She weaned off the celexa    Takes xanax to sleep  Takes tizanidine 4 mg x 2 daily   She takes trazodone 100 mg to sleep      She had severe pain,swelling and stiffness in her hands  She has done a CTS surgery and ulnar nerve decompression on the right side    Left hand CTS was pending for a while     She was on mtx 8 tabs weekly/folic acid   Humira was offered and she is not comfortable doing it   So we switched to mtx 1 ml weekly sc  We then added plaquenil 200 mg bid and ssz 1500 mg bid     Labs     CRP 13/elevated but better than  25.7  ESR nml  CMP nml  CBC nml       7/2021    She finally had left CMC arthroplasty as well as left carpal tunnel release    Pain is much better   She has some swelling at the site of the surgery  She will follow with hand surgery    Now on mtx 1 ml sc weekly+ plaquenil 200 mg bid+ ssz 1500 mg bid     Right hand continues to hurt       4/2022    Weight gain-20 pounds over the past year  She goes to the gym,still doesn't lose weight    Thumbs-hurt bad -both are crippled  Fingers get stuck-cannot open the fingers without using the other hand  Fingers are stiff  She drops things again    mtx 1 ml weekly,plaquenil 200 mg bid, ssz 1500 mg bid  -doesn't help  CBC,CMP,ESR,CRP nml    7/2022    MRI hands/wrists     Pan-carpal and MCP synovitis nonspecific, potentially representing inflammatory arthritis.  Findings are likely similar to prior examination (08/03/2018) however difficult to fully characterize secondary to differences in technique.      mtx not working  On mtx 1 ml weekly,plaquenil 200 mg bid, ssz 1500 mg bid  -doesn't help    Hands hurt a lot  Low back hurts  Charley horses    CBC,CMP,ESR,CRP,hep and tb labs all negative      EMG/NCS    Bilateral median neuropathies at the wrist with secondary chronic denervation of the APB muscles. This may represent residual pathology from her known previous bilateral carpal tunnel syndrome or may represent pathology that has developed since her carpal tunnel release procedures. This test cannot make that distinction and there are no previous studies available for comparison,  2. A right ulnar sensory neuropathy that is axonal and occurs distal to the take-off of the right dorsal ulnar cutaneous nerve.     I think what this means is  Some carpal tunnel syndrome still present  Right hand there is an ulnar nerve that is also compressed  Hand surgery should fix this as well    Hand surgery- suggested a rpt carpal tunnel surgery      She had xrays : bulging discs low  back,L4-5  Hand xrays : OA   C spine : OA   She had feet spurs : injected     Headaches +  Takes 4 BP meds : metoprolol,lasix,diovan,norvasc   Migraines not on treatment  Light headedness+  Sees neurology : CT brains normal    She has seen neuro for the spine issues and also will see neurosurgeon    Episodes of nausea and feeling of sickness  Diarrhea+  Abdominal pain +  GI : they think this is irritable bowel syndrome   Only once in 2004 she had ischemic colitis  EUS all planned   Colonoscopy 2 years ago nml  EGD this year nml  There was no Crohn's on the recent w/u    Crestor gives her charley horses     Past history : pancreatitis,IBS  ADHD  Htn,migraine,anxiety,hypothyroidism,FMS,HLD,IBS    Cause of pancreatitis : unclear   Has had high ALPs for a while now     Blood work    High ESR,CRP    Negative RF,CCP  HLA B27 negative    Xrays    Hands : deg changes + punctate erosions proximal phalanx of the little finger   Knees : nml  C spine : deg changes  LS spine : deg changes,facet arthritis    MRI hands :  Right and left hands: Multifocal degenerative changes noted, most prominent in the IP joints, noting joint space loss and osteophytosis.  Prominent degenerative changes also noted at the 1st carpometacarpal and triscaphe joint of the wrist.  Subcortical erosive change noted in the scaphoid, capitate, and triquetrum.    There is prominent synovitis/enhancement at the MCP joints, carpal carpal joints, and proximal IP joint.  Periarticular erosions noted..  Enhancement also noted around the flexor tendon sheath, suggesting tenosynovitis. The flexor tendons demonstrate normal size and signal.    No fracture fracture.  No marrow replacement process.      Impression       Degenerative arthropathy of both hands with enhancement/ synovitis involving the MCP, carpal-carpal, and proximal IP joints, consistent with superimposed inflammatory component.     UE EMG/NCS :    Mild to moderately severe bilateral carpal tunnel  syndrome;   2. A primarily axonal right ulnar sensory neuropathy with some secondary demyelination;   3. Chronic denervation in the right C7 myotome consistent with chronic radiculopathy.         10/2022    Enbrel -started - allergies at the site of the injection  Took 4 weeks already- doesn't even have a slight benefit  Fingers get stuck and stiff - she doesn't have the strength in the fingers     On mtx 1 ml weekly,folic acid 3 mg daily,ssz 1500 mg bid,plaquenil 200 mg bid   Tramadol is not working-she doesn't take it    CBC,CMP,ESR,CRP nml      1/2023    Trouble with hands with cold weather  Her regimen is now    Humira 40 mg every other week,mtx 1 ml weekly,folic acid 3 mg daily, ssz 1500 mg bid, plaquenil 200 mg bid,hydrocodone 5-325 mg bid,tizanidine 4 mg qid, trazodone 100 mg,alprazolam 0.5 mg tid      Family history : mom heart problems    Social history : quit smoking 1997      Review of Systems   Constitutional:  Negative for activity change, appetite change, chills, diaphoresis, fatigue, fever and unexpected weight change.   HENT:  Negative for congestion, dental problem, drooling, ear discharge, ear pain, facial swelling, hearing loss, mouth sores, nosebleeds, postnasal drip, rhinorrhea, sinus pressure, sinus pain, sneezing, sore throat, tinnitus, trouble swallowing and voice change.    Eyes:  Negative for photophobia, pain, discharge, redness, itching and visual disturbance.   Respiratory:  Negative for apnea, cough, choking, chest tightness, shortness of breath, wheezing and stridor.    Cardiovascular:  Negative for chest pain, palpitations and leg swelling.   Gastrointestinal:  Positive for abdominal pain, diarrhea and nausea. Negative for abdominal distention, anal bleeding, blood in stool, constipation, rectal pain and vomiting.   Endocrine: Negative for cold intolerance, heat intolerance, polydipsia, polyphagia and polyuria.   Genitourinary:  Negative for decreased urine volume, difficulty  urinating, dysuria, enuresis, flank pain, frequency, genital sores, hematuria and urgency.   Musculoskeletal:  Positive for arthralgias. Negative for back pain, gait problem, joint swelling, myalgias, neck pain and neck stiffness.   Skin:  Negative for color change, pallor, rash and wound.   Allergic/Immunologic: Negative for environmental allergies, food allergies and immunocompromised state.   Neurological:  Negative for dizziness, tremors, seizures, syncope, facial asymmetry, speech difficulty, weakness, light-headedness, numbness and headaches.   Hematological:  Negative for adenopathy. Does not bruise/bleed easily.   Psychiatric/Behavioral:  Negative for agitation, behavioral problems, confusion, decreased concentration, dysphoric mood, hallucinations, self-injury, sleep disturbance and suicidal ideas. The patient is not nervous/anxious and is not hyperactive.      Physical Exam     All PIPs have osteophytes  Tender C/LS spine    Some swelling noted in the hand MCPs and PIPs     Physical Exam   Constitutional: She is oriented to person, place, and time. No distress.   HENT:   Head: Normocephalic.   Mouth/Throat: Oropharynx is clear and moist.   Eyes: Pupils are equal, round, and reactive to light. Conjunctivae are normal. Right eye exhibits no discharge. Left eye exhibits no discharge. No scleral icterus.   Neck: No thyromegaly present.   Cardiovascular: Normal rate, regular rhythm and normal heart sounds.   Pulmonary/Chest: Effort normal and breath sounds normal. No stridor.   Abdominal: Soft. Bowel sounds are normal.   Musculoskeletal:         General: Normal range of motion.      Cervical back: Normal range of motion.   Lymphadenopathy:     She has no cervical adenopathy.   Neurological: She is alert and oriented to person, place, and time.   Skin: Skin is warm. No rash noted. She is not diaphoretic.   Psychiatric: Affect and judgment normal.       Assessment     68 year old white female comes  with    -polyarthralgias in the hands,knees,low back and neck  -GI symptoms of nausea,diarrhea,abdominal pain  -anxiety > depression  -poor sleep  -headaches and light headedness    She has a diagnosis of fibromyalgia,osteoarthritis of hands,C,LS spine    She also has a diagnosis of IBS  GI ruled out crohn's and celiac disease  Recent pancreatitis,cause not known  Noted high ALP,but also low vit d,not sure if ALP is from liver or bone  Has ongoing diarrhea +  Waiting to get a second opinion     She has high inflammatory markers  She has erosive changes on the xrays/MRI    She definitely has osteoarthritis of her C/LS spine    She has inflammatory arthritis in her hands/wrists    She has b/l CTS and right ulnar neuropathy and she has cervical radiculopathy  S/P surgery for the CTS repair and ulnar decompression       She has done a CTS surgery and ulnar nerve decompression on the right side    Recent right hand CTS surgery and CMC arthroplasty,healing         4/2022    Weight gain-20 pounds over the past year  She goes to the gym,still doesn't lose weight    Thumbs-hurt bad -both are crippled  Fingers get stuck-cannot open the fingers without using the other hand  Fingers are stiff  She drops things again    mtx 1 ml weekly,plaquenil 200 mg bid, ssz 1500 mg bid  -doesn't help      On triple therapy  On pain management     7/2022    MRI hands/wrists     Pan-carpal and MCP synovitis nonspecific, potentially representing inflammatory arthritis.  Findings are likely similar to prior examination (08/03/2018) however difficult to fully characterize secondary to differences in technique.      mtx not working  On mtx 1 ml weekly,plaquenil 200 mg bid, ssz 1500 mg bid  -doesn't help    Hands hurt a lot  Low back hurts  Charley horses    EMG/NCS showed    Bilateral median neuropathies at the wrist with secondary chronic denervation of the APB muscles. This may represent residual pathology from her known previous bilateral  carpal tunnel syndrome or may represent pathology that has developed since her carpal tunnel release procedures. This test cannot make that distinction and there are no previous studies available for comparison,  2. A right ulnar sensory neuropathy that is axonal and occurs distal to the take-off of the right dorsal ulnar cutaneous nerve.     I think what this means is  Some carpal tunnel syndrome still present  Right hand there is an ulnar nerve that is also compressed  Hand surgery should fix this as well      10/2022    Enbrel -started - allergies at the site of the injection  Took 4 weeks already- doesn't even have a slight benefit  Fingers get stuck and stiff - she doesn't have the strength in the fingers     On mtx 1 ml weekly,folic acid 3 mg daily,ssz 1500 mg bid,plaquenil 200 mg bid   Tramadol is not working-she doesn't take it    1/2023    Trouble with hands with cold weather  Her regimen is now    CBC,CMP,ESR,CRP- elevated CRP 8.3    Humira 40 mg every other week,mtx 1 ml weekly,folic acid 3 mg daily, ssz 1500 mg bid, plaquenil 200 mg bid,hydrocodone 5-325 mg bid,tizanidine 4 mg qid, trazodone 100 mg,alprazolam 0.5 mg tid    She has -fingers getting stuck/surgical site pain,pain in the hands    4/2023    Drops things-not as frequent  Thumbs get swollen  Fingers get stuck and dont open  Medications are working but not great    Humira 40 mg every other week,mtx 1 ml weekly,folic acid 3 mg daily, ssz 1500 mg bid, plaquenil 200 mg bid,hydrocodone 5-325 mg bid,tizanidine 4 mg qid, trazodone 100 mg,alprazolam 0.5 mg tid    1. Seronegative arthritis    2. Seronegative rheumatoid arthritis              Reviewed labs/xrays  Reviewed medications    F/u    Plan    MRI hands/wrists with contrast  Is this OA,RA or all neuropathic pain?    Continue humira 40 mg every other week    Hand surgery- is giving injections   She has accomplished the left hand CTS and CMC OA surgery   Left one needs rpt surgery  Right now she  only wants conservative measures   She also has a hand surgeon    Occupational therapy - would definitely help    Bariatric medicine    Continue 1 ml mtx weekly sc  Nausea with mtx : increased folic acid to 3 tabs daily- this helps very minimally  Some blahs     Suggestions     Benadryl  25 to 50 mg with every mtx  And then 12 hours later benadryl    2 cups coffee am and 2 cups coffee pm ,3 hours later mtx  And again next day coffee    Vitamin A 8000 IU/day    Continue  plaquenil 200 mg bid + ssz 1500 mg bid    CBC,CMP,ESR,CRP today    In 3 months if joint pains persist and she has elevated inflammatory markers,we will once again revisit use of biologics     Flu,pneumonia and shingles vaccines offered     Continue vit d replacement  Vit b12     Offered pain management,PT for her C/LS spine issues  OT hands    D/maricarmen Tramadol for pain management   50 mg bid to tid to qid     Mobic 15 mg d./c    BP meds adjustments    Anxiety :Xanax 0.5 mg daily tid  Trazodone to sleep-100 mg  Tizanidine qid     Offered counselling    Crestor and Lipitor give charley horses  Try changing from crestor to something else  She has family h/o intolerance to statins    GI says she has IBS  She is on meds    Neurology w/u  Spine surgery f/u  To evaluate her C/LS spine issues and the neuropathy   Off gabapentin,marisa Bergman was seen today for disease management.    Diagnoses and all orders for this visit:    Seronegative arthritis  -     CBC Auto Differential; Future  -     Comprehensive Metabolic Panel; Future  -     Sedimentation rate; Future  -     C-Reactive Protein; Future    Seronegative rheumatoid arthritis  -     MRI Hand Wrist W WO Bilat_Rheumatoid; Future  -     CBC Auto Differential; Future  -     Comprehensive Metabolic Panel; Future  -     Sedimentation rate; Future  -     C-Reactive Protein; Future                rtc in 3 months

## 2023-05-03 DIAGNOSIS — M13.80 SERONEGATIVE ARTHRITIS: ICD-10-CM

## 2023-05-03 DIAGNOSIS — M79.7 FIBROMYALGIA: ICD-10-CM

## 2023-05-03 RX ORDER — ALPRAZOLAM 0.5 MG/1
TABLET ORAL
Qty: 90 TABLET | Refills: 0 | Status: SHIPPED | OUTPATIENT
Start: 2023-05-03 | End: 2023-05-12

## 2023-05-03 RX ORDER — HYDROCODONE BITARTRATE AND ACETAMINOPHEN 5; 325 MG/1; MG/1
1-2 TABLET ORAL DAILY PRN
Qty: 60 TABLET | Refills: 0 | Status: SHIPPED | OUTPATIENT
Start: 2023-05-03 | End: 2023-06-06 | Stop reason: SDUPTHER

## 2023-05-04 RX ORDER — HYDROCODONE BITARTRATE AND ACETAMINOPHEN 5; 325 MG/1; MG/1
1-2 TABLET ORAL DAILY PRN
Qty: 60 TABLET | Refills: 0 | OUTPATIENT
Start: 2023-05-04

## 2023-05-04 RX ORDER — ALPRAZOLAM 0.5 MG/1
TABLET ORAL
Qty: 90 TABLET | Refills: 0 | OUTPATIENT
Start: 2023-05-04

## 2023-05-08 ENCOUNTER — SPECIALTY PHARMACY (OUTPATIENT)
Dept: PHARMACY | Facility: CLINIC | Age: 68
End: 2023-05-08
Payer: MEDICARE

## 2023-05-08 NOTE — TELEPHONE ENCOUNTER
Specialty Pharmacy - Refill Coordination    Specialty Medication Orders Linked to Encounter      Flowsheet Row Most Recent Value   Medication #1 adalimumab (HUMIRA PEN) PnKt injection (Order#[id number], Rx#[id number])          Refill Questions - Documented Responses      Flowsheet Row Most Recent Value   Patient Availability and HIPAA Verification    Does patient want to proceed with activity? Unable to Reach          We have had multiple attempts to the patient and have been unsuccessful to reach the patient. We will stop reaching out to the patient but in the event that the patient needs the med and contacts us, we will communicate and begin dispensing for the patient. At your next visit with the patient, please review the importance of being in contact with our specialty pharmacy as a part of our care team.    Interventions added this encounter   Closed: [facility] Provider Intervention - Drug therapy adherence: adalimumab (HUMIRA PEN) PnKt injection     Minda Garcia, PharmD  [facility]  [address] [facility] [address]  Phone: [phone]  Fax: [phone]

## 2023-05-12 RX ORDER — ALPRAZOLAM 0.5 MG/1
TABLET ORAL
Qty: 90 TABLET | Refills: 0 | Status: SHIPPED | OUTPATIENT
Start: 2023-05-12 | End: 2023-06-21

## 2023-05-12 RX ORDER — TRAZODONE HYDROCHLORIDE 100 MG/1
TABLET ORAL
Qty: 90 TABLET | Refills: 1 | Status: SHIPPED | OUTPATIENT
Start: 2023-05-12 | End: 2023-12-02 | Stop reason: SDUPTHER

## 2023-05-18 ENCOUNTER — HOSPITAL ENCOUNTER (OUTPATIENT)
Dept: RADIOLOGY | Facility: HOSPITAL | Age: 68
Discharge: HOME | End: 2023-05-18
Attending: INTERNAL MEDICINE
Payer: MEDICARE

## 2023-05-18 DIAGNOSIS — M06.00 SERONEGATIVE RHEUMATOID ARTHRITIS: ICD-10-CM

## 2023-05-18 DIAGNOSIS — M13.80 SERONEGATIVE ARTHRITIS: Primary | ICD-10-CM

## 2023-05-18 PROCEDURE — 73223 MRI JOINT UPR EXTR W/O&W/DYE: CPT | Mod: 26,50,, | Performed by: RADIOLOGY

## 2023-05-18 PROCEDURE — 73223 MRI HAND_WRIST W WO BILATERAL_RHEUMATOID ARTHRITIS: ICD-10-PCS | Mod: 26,50,, | Performed by: RADIOLOGY

## 2023-05-18 PROCEDURE — 73223 MRI JOINT UPR EXTR W/O&W/DYE: CPT | Mod: TC,50

## 2023-05-18 PROCEDURE — A9585 GADOBUTROL INJECTION: HCPCS | Performed by: INTERNAL MEDICINE

## 2023-05-18 PROCEDURE — 25500020 PHARM REV CODE 255: Performed by: INTERNAL MEDICINE

## 2023-05-18 RX ORDER — GADOBUTROL 604.72 MG/ML
9 INJECTION INTRAVENOUS
Status: COMPLETED | OUTPATIENT
Start: 2023-05-18 | End: 2023-05-18

## 2023-05-18 RX ADMIN — GADOBUTROL 9 ML: 604.72 INJECTION INTRAVENOUS at 12:05

## 2023-06-01 ENCOUNTER — TELEPHONE (OUTPATIENT)
Dept: PHARMACY | Facility: CLINIC | Age: 68
End: 2023-06-01
Payer: MEDICARE

## 2023-06-01 NOTE — TELEPHONE ENCOUNTER
Anabel, this is HARSHAL EASTMAN, clinical pharmacist with [facility] that is part of your care team.  We have begun working on your prescription that your doctor has sent us. Our next steps include:     Working with your insurance company to obtain approval for your medication  Working with you to ensure your medication is affordable     We will be calling you along the way with updates on your medication but if you have any concerns or receive information that you would like to discuss please reach us at [phone].    Welcome call outcome: Patient/caregiver reached

## 2023-06-05 ENCOUNTER — SPECIALTY PHARMACY (OUTPATIENT)
Dept: PHARMACY | Facility: CLINIC | Age: 68
End: 2023-06-05
Payer: MEDICARE

## 2023-06-06 DIAGNOSIS — M79.7 FIBROMYALGIA: ICD-10-CM

## 2023-06-06 DIAGNOSIS — E11.9 TYPE 2 DIABETES MELLITUS WITHOUT COMPLICATION, WITHOUT LONG-TERM CURRENT USE OF INSULIN: ICD-10-CM

## 2023-06-06 DIAGNOSIS — M13.80 SERONEGATIVE ARTHRITIS: ICD-10-CM

## 2023-06-07 DIAGNOSIS — M79.7 FIBROMYALGIA: ICD-10-CM

## 2023-06-07 DIAGNOSIS — M13.80 SERONEGATIVE ARTHRITIS: ICD-10-CM

## 2023-06-07 RX ORDER — HYDROCODONE BITARTRATE AND ACETAMINOPHEN 5; 325 MG/1; MG/1
1-2 TABLET ORAL DAILY PRN
Qty: 60 TABLET | Refills: 0 | Status: SHIPPED | OUTPATIENT
Start: 2023-06-07 | End: 2023-11-07

## 2023-06-07 RX ORDER — HYDROCODONE BITARTRATE AND ACETAMINOPHEN 5; 325 MG/1; MG/1
1-2 TABLET ORAL DAILY PRN
Qty: 60 TABLET | Refills: 0 | Status: SHIPPED | OUTPATIENT
Start: 2023-06-07 | End: 2023-07-11 | Stop reason: SDUPTHER

## 2023-06-08 ENCOUNTER — PATIENT MESSAGE (OUTPATIENT)
Dept: ENDOCRINOLOGY | Facility: CLINIC | Age: 68
End: 2023-06-08
Payer: MEDICARE

## 2023-06-16 ENCOUNTER — SPECIALTY PHARMACY (OUTPATIENT)
Dept: PHARMACY | Facility: CLINIC | Age: 68
End: 2023-06-16
Payer: MEDICARE

## 2023-06-16 NOTE — TELEPHONE ENCOUNTER
4 call attempts have been made to contact patient for Repatha initial consult. Unable to reach. Closing enrollment. Routing to [facility] to make aware. Will reopen if patient contacts [facility].

## 2023-06-21 RX ORDER — ALPRAZOLAM 0.5 MG/1
TABLET ORAL
Qty: 90 TABLET | Refills: 0 | Status: SHIPPED | OUTPATIENT
Start: 2023-06-21 | End: 2023-07-29

## 2023-06-29 ENCOUNTER — HOSPITAL ENCOUNTER (OUTPATIENT)
Dept: RADIOLOGY | Facility: HOSPITAL | Age: 68
Discharge: HOME | End: 2023-06-29
Attending: PHYSICIAN ASSISTANT
Payer: MEDICARE

## 2023-06-29 ENCOUNTER — OFFICE VISIT (OUTPATIENT)
Dept: ORTHOPEDICS | Facility: CLINIC | Age: 68
End: 2023-06-29
Payer: MEDICARE

## 2023-06-29 VITALS — WEIGHT: 182.75 LBS | HEIGHT: 66 IN | BODY MASS INDEX: 29.37 KG/M2

## 2023-06-29 DIAGNOSIS — M75.51 SUBACROMIAL BURSITIS OF RIGHT SHOULDER JOINT: Primary | ICD-10-CM

## 2023-06-29 DIAGNOSIS — R52 PAIN: ICD-10-CM

## 2023-06-29 PROCEDURE — 1159F PR MEDICATION LIST DOCUMENTED IN MEDICAL RECORD: ICD-10-PCS | Mod: CPTII,S$GLB,, | Performed by: PHYSICIAN ASSISTANT

## 2023-06-29 PROCEDURE — 99999 PR PBB SHADOW E&M-EST. PATIENT-LVL IV: CPT | Mod: PBBFAC,,, | Performed by: PHYSICIAN ASSISTANT

## 2023-06-29 PROCEDURE — 73030 X-RAY EXAM OF SHOULDER: CPT | Mod: TC,RT

## 2023-06-29 PROCEDURE — 1125F PR PAIN SEVERITY QUANTIFIED, PAIN PRESENT: ICD-10-PCS | Mod: CPTII,S$GLB,, | Performed by: PHYSICIAN ASSISTANT

## 2023-06-29 PROCEDURE — 20610 DRAIN/INJ JOINT/BURSA W/O US: CPT | Mod: RT,S$GLB,, | Performed by: PHYSICIAN ASSISTANT

## 2023-06-29 PROCEDURE — 99999 PR PBB SHADOW E&M-EST. PATIENT-LVL IV: ICD-10-PCS | Mod: PBBFAC,,, | Performed by: PHYSICIAN ASSISTANT

## 2023-06-29 PROCEDURE — 73030 XR SHOULDER COMPLETE 2 OR MORE VIEWS RIGHT: ICD-10-PCS | Mod: 26,RT,, | Performed by: RADIOLOGY

## 2023-06-29 PROCEDURE — 3288F FALL RISK ASSESSMENT DOCD: CPT | Mod: CPTII,S$GLB,, | Performed by: PHYSICIAN ASSISTANT

## 2023-06-29 PROCEDURE — 1101F PT FALLS ASSESS-DOCD LE1/YR: CPT | Mod: CPTII,S$GLB,, | Performed by: PHYSICIAN ASSISTANT

## 2023-06-29 PROCEDURE — 3288F PR FALLS RISK ASSESSMENT DOCUMENTED: ICD-10-PCS | Mod: CPTII,S$GLB,, | Performed by: PHYSICIAN ASSISTANT

## 2023-06-29 PROCEDURE — 1160F RVW MEDS BY RX/DR IN RCRD: CPT | Mod: CPTII,S$GLB,, | Performed by: PHYSICIAN ASSISTANT

## 2023-06-29 PROCEDURE — 20610 LARGE JOINT ASPIRATION/INJECTION: R SUBACROMIAL BURSA: ICD-10-PCS | Mod: RT,S$GLB,, | Performed by: PHYSICIAN ASSISTANT

## 2023-06-29 PROCEDURE — 1160F PR REVIEW ALL MEDS BY PRESCRIBER/CLIN PHARMACIST DOCUMENTED: ICD-10-PCS | Mod: CPTII,S$GLB,, | Performed by: PHYSICIAN ASSISTANT

## 2023-06-29 PROCEDURE — 1101F PR PT FALLS ASSESS DOC 0-1 FALLS W/OUT INJ PAST YR: ICD-10-PCS | Mod: CPTII,S$GLB,, | Performed by: PHYSICIAN ASSISTANT

## 2023-06-29 PROCEDURE — 99213 PR OFFICE/OUTPT VISIT, EST, LEVL III, 20-29 MIN: ICD-10-PCS | Mod: 25,S$GLB,, | Performed by: PHYSICIAN ASSISTANT

## 2023-06-29 PROCEDURE — 73030 X-RAY EXAM OF SHOULDER: CPT | Mod: 26,RT,, | Performed by: RADIOLOGY

## 2023-06-29 PROCEDURE — 99213 OFFICE O/P EST LOW 20 MIN: CPT | Mod: 25,S$GLB,, | Performed by: PHYSICIAN ASSISTANT

## 2023-06-29 PROCEDURE — 3008F BODY MASS INDEX DOCD: CPT | Mod: CPTII,S$GLB,, | Performed by: PHYSICIAN ASSISTANT

## 2023-06-29 PROCEDURE — 3008F PR BODY MASS INDEX (BMI) DOCUMENTED: ICD-10-PCS | Mod: CPTII,S$GLB,, | Performed by: PHYSICIAN ASSISTANT

## 2023-06-29 PROCEDURE — 1125F AMNT PAIN NOTED PAIN PRSNT: CPT | Mod: CPTII,S$GLB,, | Performed by: PHYSICIAN ASSISTANT

## 2023-06-29 PROCEDURE — 1159F MED LIST DOCD IN RCRD: CPT | Mod: CPTII,S$GLB,, | Performed by: PHYSICIAN ASSISTANT

## 2023-06-29 RX ADMIN — TRIAMCINOLONE ACETONIDE 40 MG: 40 INJECTION, SUSPENSION INTRA-ARTICULAR; INTRAMUSCULAR at 05:06

## 2023-06-29 RX ADMIN — LIDOCAINE HYDROCHLORIDE 2 ML: 10 INJECTION INFILTRATION; PERINEURAL at 05:06

## 2023-06-29 NOTE — PROGRESS NOTES
"  SUBJECTIVE:     Chief Complaint & History of Present Illness:  Madalyn Iverson is a 68 y.o. year old female with RA who presents today with constant right shoulder pain that started 1 month ago.  She is Right hand dominant.  There is not a history of injury.  The pain is located in the  lateral and  upper arm aspect of the shoulder.  The pain is described as achy.  It is aggravated by activity, lifting, reaching, overhead activity.   Previous treatments include rest and pain medication which have provided minimal relief.  There is not a history of previous injury or surgery to the shoulder.      Review of patient's allergies indicates:   Allergen Reactions    Clonidine (pf) Swelling     Clonidine patch- causes redness and swelling at patch location    Statins-hmg-coa reductase inhibitors Other (See Comments)     myalgias         Current Outpatient Medications   Medication Sig Dispense Refill    ALPRAZolam (XANAX) 0.5 MG tablet TAKE 1 TABLET BY MOUTH THREE TIMES A DAY AS NEEDED 90 tablet 0    BD INSULIN SYRINGE 1 mL 25 gauge x 5/8" Syrg Inject 1 mL into the skin once a week. 30 each 1    cyanocobalamin 1,000 mcg/mL injection INJECT 1 ML (1,000 MCG TOTAL) INTO THE SKIN EVERY 30 DAYS 3 mL 3    ergocalciferol (ERGOCALCIFEROL) 50,000 unit Cap TAKE 1 CAPSULE (50,000 UNITS TOTAL) BY MOUTH EVERY 7 DAYS. FOR 12 DOSES 12 capsule 3    evolocumab (REPATHA SURECLICK) 140 mg/mL PnIj Take 1 mL (140 mg total) by mouth every 14 (fourteen) days. 6 each 3    folic acid (FOLVITE) 1 MG tablet TAKE 1 TABLET BY MOUTH EVERY DAY 90 tablet 1    furosemide (LASIX) 40 MG tablet Take 1 tablet (40 mg total) by mouth once daily. 90 tablet 0    HYDROcodone-acetaminophen (NORCO) 5-325 mg per tablet Take 1 to 2 tablets by mouth daily as needed for pain 60 tablet 0    potassium chloride (MICRO-K) 10 MEQ CpSR TAKE 1 CAPSULE BY MOUTH TWICE A  capsule 1    syringe-needle,safety,disp unt 1 mL 25 gauge x 5/8" Syrg To use with mtx injections 20 " Syringe 0    thyroid, pork, (ARMOUR THYROID) 60 mg Tab TAKE 1 TABLET BY MOUTH EVERY DAY 30 tablet 5    tirzepatide 5 mg/0.5 mL PnIj Inject 5 mg into the skin every 7 days. 4 pen 3    tirzepatide 5 mg/0.5 mL PnIj Inject 5 mg into the skin every 7 days. 4 pen 3    tiZANidine (ZANAFLEX) 4 MG tablet TAKE 1 TABLET BY MOUTH FOUR TIMES A  tablet 3    traZODone (DESYREL) 100 MG tablet TAKE 1 TABLET BY MOUTH EVERY DAY IN THE EVENING 90 tablet 1    verapamiL (VERELAN) 240 MG C24P Take 1 capsule (240 mg total) by mouth 2 (two) times a day. 60 capsule 5    adalimumab (HUMIRA PEN) PnKt injection Inject 1 pen (40 mg total) into the skin every 14 (fourteen) days. 2 pen 11    HYDROcodone-acetaminophen (NORCO) 5-325 mg per tablet Take 1 to 2 tablets by mouth daily as needed for pain 60 tablet 0     Current Facility-Administered Medications   Medication Dose Route Frequency Provider Last Rate Last Admin    evolocumab PnIj 140 mg  140 mg Subcutaneous 1 time in Clinic/HOD Ethan Ghotra MD        evolocumab PnIj 140 mg  140 mg Subcutaneous 1 time in Clinic/HOD Ethan Ghotra MD         Facility-Administered Medications Ordered in Other Visits   Medication Dose Route Frequency Provider Last Rate Last Admin    0.9%  NaCl infusion   Intravenous Continuous Jermaine Chatman MD   Stopped at 03/27/19 1040    mupirocin 2 % ointment   Nasal On Call Procedure Jermaine Chatman MD   Given at 03/27/19 0848       Past Medical History:   Diagnosis Date    Acute biliary pancreatitis without infection or necrosis 6/13/2018    Acute pancreatitis     June 2018 - admitted at [facility]    ADHD (attention deficit hyperactivity disorder), inattentive type     Aortic atherosclerosis 3/2/2019    Mild to moderate aortic atherosclerosis seen on CT abd/pelvis 3/2/2019    Chronic back pain     Followed by [facility] - Dr. Bautista - has been having medial branch blocks    Fibromyalgia     treated by   Ila in past and now treated by Dr. Yates - Rheumatologist    Hyperlipidemia     High triglycerides    Hypertension     Hypothyroidism     Treated by Dr. Mathew    IFG (impaired fasting glucose)     Migraine     Treated by neurologist - Dr. Destiny Florez    Ulcerative colitis     Patient reported treated by Dr. Lee and Aaron in past  but on admit in 2018 - no UC seen on recent colonoscopy.  It was noted on colonoscopy in 2004 there were ulcers noted to descending colon but not present on sigmoidoscopy in 2004.    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 Ghotra    Vitamin D deficiency 3/31/2016       Past Surgical History:   Procedure Laterality Date    CARPAL TUNNEL RELEASE Right 3/27/2019    Procedure: RELEASE, CARPAL TUNNEL right;  Surgeon: Li Waller MD;  Location: [address];  Service: Orthopedics;  Laterality: Right;  stretcher, supine, hand pan 1 and 2    CARPAL TUNNEL RELEASE Left 2021    Procedure: RELEASE, CARPAL TUNNEL;  Surgeon: Brian Reed Jr., MD;  Location: [facility];  Service: Orthopedics;  Laterality: Left;     SECTION      COLONOSCOPY  10/08/2010    Dr. Lee - repeat in 5 years - has appointment for colonoscopy 2016    COLONOSCOPY  2016    Dr. Kelly - normal  colon - repeat in 10 years - scanned report to media file.    ENDOSCOPIC ULTRASOUND OF UPPER GASTROINTESTINAL TRACT N/A 2018    Procedure: ULTRASOUND, ENDOSCOPIC, UPPER GI TRACT;  Surgeon: Reji Russell MD;  Location: [facility];  Service: Endoscopy;  Laterality: N/A;    ENDOSCOPIC ULTRASOUND OF UPPER GASTROINTESTINAL TRACT N/A 3/4/2019    Procedure: ULTRASOUND, UPPER GI TRACT, ENDOSCOPIC;  Surgeon: Randy Boyd MD;  Location: [facility];  Service: Endoscopy;  Laterality: N/A;    HYSTERECTOMY      INTERPOSITION ARTHROPLASTY OF CARPOMETACARPAL JOINTS Left 2021    Procedure: INTERPOSITION  ARTHROPLASTY, CMC JOINT;  Surgeon: Brian Reed Jr., MD;  Location: [facility] OR;  Service: Orthopedics;  Laterality: Left;  need arthrex tightrope   Brit notified 6/22 LB, Brit confirmed 7/8/21 AM    LAPAROSCOPIC CHOLECYSTECTOMY N/A 3/6/2019    Procedure: CHOLECYSTECTOMY, LAPAROSCOPIC;  Surgeon: Hill Palacios MD;  Location: [facility] OR;  Service: General;  Laterality: N/A;    medial branch block      done at [facility] for axial back pain    OOPHORECTOMY      SHOULDER SURGERY Left     TONSILLECTOMY      upper and lower GI  03/2016       Review of Systems:  ROS:  Constitutional: no fever or chills  Eyes: no visual changes  ENT: no nasal congestion or sore throat  Respiratory: no cough or shortness of breath  Musculoskeletal: no arthralgias or myalgias      OBJECTIVE:     PHYSICAL EXAM:    General: Weight: 82.9 kg (182 lb 12.2 oz) Body mass index is 29.5 kg/m².  Patient is alert, awake and oriented to time, place and person. Mood and affect are appropriate.  Patient does not appear to be in any distress, denies any constitutional symptoms and appears stated age.   HEENT: Pupils are equal and round, sclera are not injected. External examination of ears and nose reveals no abnormalities. Cranial nerves II-X are grossly intact  Neck: examination demonstrates painless active range of motion. Spurling's sign is negative  Skin: no rashes, abrasions or open wounds on the affected extremity   Resp: No respiratory distress or audible wheezing   Psych:  normal mood and behavior  CV: 2+  pulses, all extremities warm and well perfused   Right Shoulder   Skin intact  No warmth or effusion  Tenderness: globally  Range of motion is painful   ROM: limited due to pain   passive      Crossbody test: negative    Neer's positive  Hawkin's positive    Elba's positive  Drop arm negative    IMAGING:  X-rays of the right shoulder, personally reviewed by me, demonstrate mild degenerative changes.  No fracture or  dislocation.     ASSESSMENT     1. Subacromial bursitis of right shoulder joint        PLAN:     Discussed with the patient at length all the different treatment options available for her right shoulder including anti-inflammatories, acetaminophen, rest, ice, Physical therapy, occasional cortisone injections for temporary relief, and shoulder arthroscopy    - Right shoulder CSI today  - rest, ice as needed  - HEP, activity modification  - Follow up if symptoms worsen or fail to improve

## 2023-07-03 RX ORDER — LIDOCAINE HYDROCHLORIDE 10 MG/ML
2 INJECTION INFILTRATION; PERINEURAL
Status: DISCONTINUED | OUTPATIENT
Start: 2023-06-29 | End: 2023-07-03 | Stop reason: HOSPADM

## 2023-07-03 RX ORDER — TRIAMCINOLONE ACETONIDE 40 MG/ML
40 INJECTION, SUSPENSION INTRA-ARTICULAR; INTRAMUSCULAR
Status: DISCONTINUED | OUTPATIENT
Start: 2023-06-29 | End: 2023-07-03 | Stop reason: HOSPADM

## 2023-07-03 NOTE — PROCEDURES
Large Joint Aspiration/Injection: R subacromial bursa    Date/Time: 6/29/2023 5:30 PM  Performed by: Galina Roman PA-C  Authorized by: Galina Roman PA-C     Consent Done?:  Yes (Verbal)  Indications:  Pain  Prep: patient was prepped and draped in usual sterile fashion    Local anesthetic:  Topical anesthetic    Details:  Needle Size:  22 G  Approach:  Posterior  Location:  Shoulder  Site:  R subacromial bursa  Medications:  40 mg triamcinolone acetonide 40 mg/mL; 2 mL LIDOcaine HCL 10 mg/ml (1%) 10 mg/mL (1 %)  Patient tolerance:  Patient tolerated the procedure well with no immediate complications

## 2023-07-08 ENCOUNTER — PATIENT MESSAGE (OUTPATIENT)
Dept: ORTHOPEDICS | Facility: CLINIC | Age: 68
End: 2023-07-08
Payer: MEDICARE

## 2023-07-10 DIAGNOSIS — M25.511 CHRONIC RIGHT SHOULDER PAIN: Primary | ICD-10-CM

## 2023-07-10 DIAGNOSIS — G89.29 CHRONIC RIGHT SHOULDER PAIN: Primary | ICD-10-CM

## 2023-07-10 NOTE — PROGRESS NOTES
Worsening right shoulder pain and weakness that has failed to respond to conservative treatment- MRI of right shoulder ordered

## 2023-07-11 DIAGNOSIS — M13.80 SERONEGATIVE ARTHRITIS: ICD-10-CM

## 2023-07-11 DIAGNOSIS — M79.7 FIBROMYALGIA: ICD-10-CM

## 2023-07-13 ENCOUNTER — TELEPHONE (OUTPATIENT)
Dept: PHARMACY | Facility: CLINIC | Age: 68
End: 2023-07-13
Payer: MEDICARE

## 2023-07-13 DIAGNOSIS — E78.00 PURE HYPERCHOLESTEROLEMIA: ICD-10-CM

## 2023-07-13 NOTE — TELEPHONE ENCOUNTER
Incoming call from Adela with the primary plan of [facility]. States that patient wants Repatha to be transferred to [facility]. Adela states the member is unsure how [facility] received the rx but informed her we have a telephone encounter from 6/1/23 declined another medication but pt requested it to be transferred to [facility]. Informed rep we then attempted to contact the patient multiple times with no response.     Informed Adela that [facility] received a message that a PA is needed from [facility] so once [facility] performs and has determination we can transfer back to [facility].

## 2023-07-13 NOTE — TELEPHONE ENCOUNTER
Incoming call from patient inquiring about Repatha. Test claim today is going through on only primary, [employer], for $88. Secondary FEP indicating PA needed. Informed patient that I will route the Primary Care team for PA on secondary insurance. If [facility] is OON for FEP, patient is ok with transfer back to [facility].

## 2023-07-14 RX ORDER — HYDROCODONE BITARTRATE AND ACETAMINOPHEN 5; 325 MG/1; MG/1
1-2 TABLET ORAL DAILY PRN
Qty: 60 TABLET | Refills: 0 | Status: SHIPPED | OUTPATIENT
Start: 2023-07-14 | End: 2023-08-13 | Stop reason: SDUPTHER

## 2023-07-14 RX ORDER — EVOLOCUMAB 140 MG/ML
140 INJECTION, SOLUTION SUBCUTANEOUS
Qty: 2 EACH | Refills: 11 | Status: SHIPPED | OUTPATIENT
Start: 2023-07-14

## 2023-07-14 RX ORDER — SYRINGE AND NEEDLE,INSULIN,1ML 25GX1"
1 SYRINGE, EMPTY DISPOSABLE MISCELLANEOUS WEEKLY
Qty: 30 EACH | Refills: 1 | Status: SHIPPED | OUTPATIENT
Start: 2023-07-14 | End: 2023-07-31 | Stop reason: SDUPTHER

## 2023-07-18 ENCOUNTER — TELEPHONE (OUTPATIENT)
Dept: ORTHOPEDICS | Facility: CLINIC | Age: 68
End: 2023-07-18
Payer: MEDICARE

## 2023-07-18 NOTE — TELEPHONE ENCOUNTER
Secondary insurance [facility] denied PA. Awaiting denial fax. Outgoing call to patient. No answer, LVM. Plan wants an updated lipid panel from within 6 months. Last panel was one year ago.

## 2023-07-18 NOTE — TELEPHONE ENCOUNTER
Called patient to discuss MRI results  Will monitor for now  Declined PT  Discussed following up with Dr. Reed if she is getting worse- she is not interested in surgery at this time.

## 2023-07-20 ENCOUNTER — SPECIALTY PHARMACY (OUTPATIENT)
Dept: PHARMACY | Facility: CLINIC | Age: 68
End: 2023-07-20
Payer: MEDICARE

## 2023-07-20 NOTE — TELEPHONE ENCOUNTER
Provider discontinued Repatha Rx at [facility] and send to [facility]. Outgoing calls to patient go straight to voicemail. Closing enrollment.

## 2023-07-31 DIAGNOSIS — I10 ESSENTIAL HYPERTENSION: ICD-10-CM

## 2023-08-01 RX ORDER — SYRINGE AND NEEDLE,INSULIN,1ML 25GX1"
1 SYRINGE, EMPTY DISPOSABLE MISCELLANEOUS WEEKLY
Qty: 30 EACH | Refills: 1 | Status: SHIPPED | OUTPATIENT
Start: 2023-08-01

## 2023-08-04 ENCOUNTER — PATIENT MESSAGE (OUTPATIENT)
Dept: ENDOCRINOLOGY | Facility: CLINIC | Age: 68
End: 2023-08-04
Payer: MEDICARE

## 2023-08-04 RX ORDER — VERAPAMIL HYDROCHLORIDE 240 MG/1
240 CAPSULE, EXTENDED RELEASE ORAL 2 TIMES DAILY
Qty: 180 CAPSULE | Refills: 1 | Status: SHIPPED | OUTPATIENT
Start: 2023-08-04 | End: 2024-08-03

## 2023-08-13 DIAGNOSIS — M79.7 FIBROMYALGIA: ICD-10-CM

## 2023-08-13 DIAGNOSIS — M13.80 SERONEGATIVE ARTHRITIS: ICD-10-CM

## 2023-08-13 RX ORDER — HYDROCODONE BITARTRATE AND ACETAMINOPHEN 5; 325 MG/1; MG/1
1-2 TABLET ORAL DAILY PRN
Qty: 60 TABLET | Refills: 0 | Status: SHIPPED | OUTPATIENT
Start: 2023-08-13 | End: 2023-09-10 | Stop reason: SDUPTHER

## 2023-08-31 RX ORDER — ALPRAZOLAM 0.5 MG/1
0.5 TABLET ORAL 3 TIMES DAILY PRN
Qty: 90 TABLET | Refills: 0 | Status: SHIPPED | OUTPATIENT
Start: 2023-08-31 | End: 2023-10-10

## 2023-09-06 ENCOUNTER — HOSPITAL ENCOUNTER (OUTPATIENT)
Dept: CARDIOLOGY | Facility: HOSPITAL | Age: 68
Discharge: HOME | End: 2023-09-06
Payer: MEDICARE

## 2023-09-06 DIAGNOSIS — I10 ESSENTIAL HYPERTENSION, MALIGNANT: ICD-10-CM

## 2023-09-06 PROCEDURE — 93010 ELECTROCARDIOGRAM REPORT: CPT | Mod: ,,, | Performed by: INTERNAL MEDICINE

## 2023-09-06 PROCEDURE — 93010 PR ELECTROCARDIOGRAM REPORT: ICD-10-PCS | Mod: ,,, | Performed by: INTERNAL MEDICINE

## 2023-09-06 PROCEDURE — 93005 ELECTROCARDIOGRAM TRACING: CPT | Mod: PO

## 2023-09-10 DIAGNOSIS — M13.80 SERONEGATIVE ARTHRITIS: ICD-10-CM

## 2023-09-10 DIAGNOSIS — M79.7 FIBROMYALGIA: ICD-10-CM

## 2023-09-10 RX ORDER — HYDROCODONE BITARTRATE AND ACETAMINOPHEN 5; 325 MG/1; MG/1
1-2 TABLET ORAL DAILY PRN
Qty: 60 TABLET | Refills: 0 | Status: SHIPPED | OUTPATIENT
Start: 2023-09-10 | End: 2023-10-11

## 2023-09-15 ENCOUNTER — PATIENT MESSAGE (OUTPATIENT)
Dept: RHEUMATOLOGY | Facility: CLINIC | Age: 68
End: 2023-09-15
Payer: COMMERCIAL

## 2023-10-10 RX ORDER — ALPRAZOLAM 0.5 MG/1
0.5 TABLET ORAL 3 TIMES DAILY PRN
Qty: 90 TABLET | Refills: 0 | Status: SHIPPED | OUTPATIENT
Start: 2023-10-10 | End: 2023-11-12 | Stop reason: SDUPTHER

## 2023-10-11 DIAGNOSIS — M13.80 SERONEGATIVE ARTHRITIS: ICD-10-CM

## 2023-10-11 DIAGNOSIS — M79.7 FIBROMYALGIA: ICD-10-CM

## 2023-10-11 RX ORDER — HYDROCODONE BITARTRATE AND ACETAMINOPHEN 5; 325 MG/1; MG/1
1-2 TABLET ORAL DAILY PRN
Qty: 60 TABLET | Refills: 0 | Status: SHIPPED | OUTPATIENT
Start: 2023-10-11 | End: 2023-11-12 | Stop reason: SDUPTHER

## 2023-11-07 ENCOUNTER — LAB VISIT (OUTPATIENT)
Dept: LAB | Facility: HOSPITAL | Age: 68
End: 2023-11-07
Attending: INTERNAL MEDICINE
Payer: MEDICARE

## 2023-11-07 ENCOUNTER — OFFICE VISIT (OUTPATIENT)
Dept: RHEUMATOLOGY | Facility: CLINIC | Age: 68
End: 2023-11-07
Payer: MEDICARE

## 2023-11-07 VITALS
BODY MASS INDEX: 27.25 KG/M2 | HEIGHT: 66 IN | WEIGHT: 169.56 LBS | HEART RATE: 102 BPM | SYSTOLIC BLOOD PRESSURE: 184 MMHG | DIASTOLIC BLOOD PRESSURE: 98 MMHG

## 2023-11-07 DIAGNOSIS — M18.12 PRIMARY OSTEOARTHRITIS OF FIRST CARPOMETACARPAL JOINT OF LEFT HAND: ICD-10-CM

## 2023-11-07 DIAGNOSIS — M79.7 FIBROMYALGIA: Chronic | ICD-10-CM

## 2023-11-07 DIAGNOSIS — M06.00 SERONEGATIVE RHEUMATOID ARTHRITIS: ICD-10-CM

## 2023-11-07 DIAGNOSIS — M13.80 SERONEGATIVE ARTHRITIS: ICD-10-CM

## 2023-11-07 DIAGNOSIS — M13.80 SERONEGATIVE ARTHRITIS: Primary | ICD-10-CM

## 2023-11-07 LAB
ALBUMIN SERPL BCP-MCNC: 3.9 G/DL (ref 3.5–5.2)
ALP SERPL-CCNC: 158 U/L (ref 55–135)
ALT SERPL W/O P-5'-P-CCNC: 21 U/L (ref 10–44)
ANION GAP SERPL CALC-SCNC: 10 MMOL/L (ref 8–16)
AST SERPL-CCNC: 22 U/L (ref 10–40)
BASOPHILS # BLD AUTO: 0.08 K/UL (ref 0–0.2)
BASOPHILS NFR BLD: 0.8 % (ref 0–1.9)
BILIRUB SERPL-MCNC: 0.3 MG/DL (ref 0.1–1)
BUN SERPL-MCNC: 14 MG/DL (ref 8–23)
CALCIUM SERPL-MCNC: 9.7 MG/DL (ref 8.7–10.5)
CHLORIDE SERPL-SCNC: 103 MMOL/L (ref 95–110)
CO2 SERPL-SCNC: 28 MMOL/L (ref 23–29)
CREAT SERPL-MCNC: 0.7 MG/DL (ref 0.5–1.4)
CRP SERPL-MCNC: 10.2 MG/L (ref 0–8.2)
DIFFERENTIAL METHOD: NORMAL
EOSINOPHIL # BLD AUTO: 0.1 K/UL (ref 0–0.5)
EOSINOPHIL NFR BLD: 0.7 % (ref 0–8)
ERYTHROCYTE [DISTWIDTH] IN BLOOD BY AUTOMATED COUNT: 12.6 % (ref 11.5–14.5)
ERYTHROCYTE [SEDIMENTATION RATE] IN BLOOD BY PHOTOMETRIC METHOD: 35 MM/HR (ref 0–36)
EST. GFR  (NO RACE VARIABLE): >60 ML/MIN/1.73 M^2
GLUCOSE SERPL-MCNC: 99 MG/DL (ref 70–110)
HCT VFR BLD AUTO: 44.2 % (ref 37–48.5)
HGB BLD-MCNC: 14.8 G/DL (ref 12–16)
IMM GRANULOCYTES # BLD AUTO: 0.03 K/UL (ref 0–0.04)
IMM GRANULOCYTES NFR BLD AUTO: 0.3 % (ref 0–0.5)
LYMPHOCYTES # BLD AUTO: 2 K/UL (ref 1–4.8)
LYMPHOCYTES NFR BLD: 20.6 % (ref 18–48)
MCH RBC QN AUTO: 30.8 PG (ref 27–31)
MCHC RBC AUTO-ENTMCNC: 33.5 G/DL (ref 32–36)
MCV RBC AUTO: 92 FL (ref 82–98)
MONOCYTES # BLD AUTO: 0.7 K/UL (ref 0.3–1)
MONOCYTES NFR BLD: 7.7 % (ref 4–15)
NEUTROPHILS # BLD AUTO: 6.7 K/UL (ref 1.8–7.7)
NEUTROPHILS NFR BLD: 69.9 % (ref 38–73)
NRBC BLD-RTO: 0 /100 WBC
PLATELET # BLD AUTO: 201 K/UL (ref 150–450)
PMV BLD AUTO: 11.8 FL (ref 9.2–12.9)
POTASSIUM SERPL-SCNC: 4.6 MMOL/L (ref 3.5–5.1)
PROT SERPL-MCNC: 7.9 G/DL (ref 6–8.4)
RBC # BLD AUTO: 4.81 M/UL (ref 4–5.4)
SODIUM SERPL-SCNC: 141 MMOL/L (ref 136–145)
WBC # BLD AUTO: 9.52 K/UL (ref 3.9–12.7)

## 2023-11-07 PROCEDURE — 1125F PR PAIN SEVERITY QUANTIFIED, PAIN PRESENT: ICD-10-PCS | Mod: CPTII,S$GLB,, | Performed by: INTERNAL MEDICINE

## 2023-11-07 PROCEDURE — 3077F SYST BP >= 140 MM HG: CPT | Mod: CPTII,S$GLB,, | Performed by: INTERNAL MEDICINE

## 2023-11-07 PROCEDURE — 3080F PR MOST RECENT DIASTOLIC BLOOD PRESSURE >= 90 MM HG: ICD-10-PCS | Mod: CPTII,S$GLB,, | Performed by: INTERNAL MEDICINE

## 2023-11-07 PROCEDURE — 1159F MED LIST DOCD IN RCRD: CPT | Mod: CPTII,S$GLB,, | Performed by: INTERNAL MEDICINE

## 2023-11-07 PROCEDURE — 36415 COLL VENOUS BLD VENIPUNCTURE: CPT | Performed by: INTERNAL MEDICINE

## 2023-11-07 PROCEDURE — 3008F PR BODY MASS INDEX (BMI) DOCUMENTED: ICD-10-PCS | Mod: CPTII,S$GLB,, | Performed by: INTERNAL MEDICINE

## 2023-11-07 PROCEDURE — 1159F PR MEDICATION LIST DOCUMENTED IN MEDICAL RECORD: ICD-10-PCS | Mod: CPTII,S$GLB,, | Performed by: INTERNAL MEDICINE

## 2023-11-07 PROCEDURE — 99214 OFFICE O/P EST MOD 30 MIN: CPT | Mod: S$GLB,,, | Performed by: INTERNAL MEDICINE

## 2023-11-07 PROCEDURE — 3008F BODY MASS INDEX DOCD: CPT | Mod: CPTII,S$GLB,, | Performed by: INTERNAL MEDICINE

## 2023-11-07 PROCEDURE — 85025 COMPLETE CBC W/AUTO DIFF WBC: CPT | Performed by: INTERNAL MEDICINE

## 2023-11-07 PROCEDURE — 85652 RBC SED RATE AUTOMATED: CPT | Performed by: INTERNAL MEDICINE

## 2023-11-07 PROCEDURE — 86140 C-REACTIVE PROTEIN: CPT | Performed by: INTERNAL MEDICINE

## 2023-11-07 PROCEDURE — 99214 PR OFFICE/OUTPT VISIT, EST, LEVL IV, 30-39 MIN: ICD-10-PCS | Mod: S$GLB,,, | Performed by: INTERNAL MEDICINE

## 2023-11-07 PROCEDURE — 3077F PR MOST RECENT SYSTOLIC BLOOD PRESSURE >= 140 MM HG: ICD-10-PCS | Mod: CPTII,S$GLB,, | Performed by: INTERNAL MEDICINE

## 2023-11-07 PROCEDURE — 1125F AMNT PAIN NOTED PAIN PRSNT: CPT | Mod: CPTII,S$GLB,, | Performed by: INTERNAL MEDICINE

## 2023-11-07 PROCEDURE — 99999 PR PBB SHADOW E&M-EST. PATIENT-LVL III: CPT | Mod: PBBFAC,,, | Performed by: INTERNAL MEDICINE

## 2023-11-07 PROCEDURE — 80053 COMPREHEN METABOLIC PANEL: CPT | Performed by: INTERNAL MEDICINE

## 2023-11-07 PROCEDURE — 99999 PR PBB SHADOW E&M-EST. PATIENT-LVL III: ICD-10-PCS | Mod: PBBFAC,,, | Performed by: INTERNAL MEDICINE

## 2023-11-07 PROCEDURE — 3080F DIAST BP >= 90 MM HG: CPT | Mod: CPTII,S$GLB,, | Performed by: INTERNAL MEDICINE
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
## 2023-11-09 ENCOUNTER — PATIENT MESSAGE (OUTPATIENT)
Dept: RHEUMATOLOGY | Facility: CLINIC | Age: 68
End: 2023-11-09
Payer: MEDICARE

## 2023-11-12 DIAGNOSIS — M13.80 SERONEGATIVE ARTHRITIS: ICD-10-CM

## 2023-11-12 DIAGNOSIS — M79.7 FIBROMYALGIA: ICD-10-CM

## 2023-11-12 RX ORDER — HYDROCODONE BITARTRATE AND ACETAMINOPHEN 5; 325 MG/1; MG/1
1-2 TABLET ORAL DAILY PRN
Qty: 60 TABLET | Refills: 0 | Status: SHIPPED | OUTPATIENT
Start: 2023-11-12 | End: 2023-12-20 | Stop reason: SDUPTHER

## 2023-11-12 RX ORDER — ALPRAZOLAM 0.5 MG/1
0.5 TABLET ORAL 3 TIMES DAILY PRN
Qty: 90 TABLET | Refills: 0 | Status: SHIPPED | OUTPATIENT
Start: 2023-11-12 | End: 2023-12-21 | Stop reason: SDUPTHER

## 2023-12-02 RX ORDER — TRAZODONE HYDROCHLORIDE 100 MG/1
TABLET ORAL
Qty: 90 TABLET | Refills: 1 | Status: SHIPPED | OUTPATIENT
Start: 2023-12-02

## 2023-12-18 RX ORDER — CYANOCOBALAMIN 1000 UG/ML
INJECTION, SOLUTION INTRAMUSCULAR; SUBCUTANEOUS
Qty: 3 ML | Refills: 3 | Status: SHIPPED | OUTPATIENT
Start: 2023-12-18

## 2023-12-20 DIAGNOSIS — M13.80 SERONEGATIVE ARTHRITIS: ICD-10-CM

## 2023-12-20 DIAGNOSIS — M79.7 FIBROMYALGIA: ICD-10-CM

## 2023-12-20 RX ORDER — HYDROCODONE BITARTRATE AND ACETAMINOPHEN 5; 325 MG/1; MG/1
1-2 TABLET ORAL DAILY PRN
Qty: 60 TABLET | Refills: 0 | Status: SHIPPED | OUTPATIENT
Start: 2023-12-20 | End: 2024-01-21 | Stop reason: SDUPTHER

## 2023-12-21 RX ORDER — ALPRAZOLAM 0.5 MG/1
0.5 TABLET ORAL 3 TIMES DAILY PRN
Qty: 90 TABLET | Refills: 0 | Status: SHIPPED | OUTPATIENT
Start: 2023-12-21 | End: 2024-01-21 | Stop reason: SDUPTHER

## 2023-12-21 NOTE — TELEPHONE ENCOUNTER
----- Message from Danita Whitman RN sent at 12/20/2023  6:27 PM CST -----  Regarding: FW: refill  Contact: [phone]    ----- Message -----  From: Mana Malik  Sent: 12/20/2023   1:03 PM CST  To: Trudy Rodriguez Staff  Subject: refill                                           Rx Refill/Request         Is this a Refill or New Rx:  Refill          Rx Name and Strength:    -ALPRAZolam (XANAX) 0.5 MG tablet 90 tablet       Preferred Pharmacy with phone number:    [facility]/pharmacy #[id number] - [place], [place] - [address] [facility]  [address] [facility]  [place] [address]  Phone: [phone] Fax: [phone]        Communication Preference:         Additional Information Pt states she has been calling for the provider to send over medication and has not received an call back . Please call would like to discuss medication and would like for it to be sent over today if possible        No

## 2024-01-09 ENCOUNTER — TELEPHONE (OUTPATIENT)
Dept: FAMILY MEDICINE | Facility: CLINIC | Age: 69
End: 2024-01-09
Payer: COMMERCIAL

## 2024-01-09 DIAGNOSIS — Z12.31 ENCOUNTER FOR SCREENING MAMMOGRAM FOR MALIGNANT NEOPLASM OF BREAST: Primary | ICD-10-CM

## 2024-01-09 NOTE — TELEPHONE ENCOUNTER
----- Message from Nathanael Moreno sent at 1/9/2024  9:35 AM CST -----  .Type:  Mammogram    Caller is requesting to schedule their annual mammogram appointment.  Order is not listed in EPIC.  Please enter order and contact patient to schedule.  Name of Caller:pt  Where would they like the mammogram performed?[facility]  Would the patient rather a call back or a response via [facility]? Call back  Best Call Back Number:[phone]  Additional Information:

## 2024-01-09 NOTE — TELEPHONE ENCOUNTER
Spoke with patient- I schedule for 02/19 for Medicare Wellness- what labs do you want patient to have- I do not see any orders from you

## 2024-01-09 NOTE — TELEPHONE ENCOUNTER
The labs you have scheduled for 1/19/24 visit are the correct labs from her endocrinologist - I do not need any additional labs.

## 2024-01-09 NOTE — TELEPHONE ENCOUNTER
Mammogram ordered - okay to schedule.    Advise patient that I have not seen her since year 2021. She is OVERDUE for MEDICARE PHYSICAL and she has not had diabetic and thyroid labs since July 2022 so DUE NOW for labs for endocrinologist - use his lab orders for HgbA1C, TSH, free T4 and T3 levels.  Schedule labs and MEDICARE WELLNESS EXAM with me.

## 2024-01-18 ENCOUNTER — TELEPHONE (OUTPATIENT)
Dept: FAMILY MEDICINE | Facility: CLINIC | Age: 69
End: 2024-01-18
Payer: COMMERCIAL

## 2024-01-18 NOTE — TELEPHONE ENCOUNTER
----- Message from Nathanael Moreno sent at 1/18/2024  8:47 AM CST -----  .Type:  Sooner Apoointment Request    Caller is requesting a sooner appointment.  Caller declined first available appointment listed below.  Caller will not accept being placed on the waitlist and is requesting a message be sent to doctor.  Name of Caller:pt  When is the first available appointment?call back  Symptoms:flu like symptoms   Would the patient rather a call back or a response via [facility]? Call back  Best Call Back Number:[phone]  Additional Information:      Pt would like a call back to get medication prescribed if she can't come in today

## 2024-01-18 NOTE — TELEPHONE ENCOUNTER
Spoke with patient in regards to message, patient report she and  are both coughing, having congestion- will medication called in- I advised patient NP Ruth does not call in medication without being evaluated first- I advised patient that she and  both need to go to urgent care- patient refused said she will wait it out.

## 2024-01-19 ENCOUNTER — TELEPHONE (OUTPATIENT)
Dept: FAMILY MEDICINE | Facility: CLINIC | Age: 69
End: 2024-01-19

## 2024-01-19 ENCOUNTER — OFFICE VISIT (OUTPATIENT)
Dept: FAMILY MEDICINE | Facility: CLINIC | Age: 69
End: 2024-01-19
Payer: MEDICARE

## 2024-01-19 VITALS
TEMPERATURE: 98 F | HEIGHT: 66 IN | BODY MASS INDEX: 25.84 KG/M2 | SYSTOLIC BLOOD PRESSURE: 130 MMHG | WEIGHT: 160.81 LBS | HEART RATE: 107 BPM | OXYGEN SATURATION: 98 % | DIASTOLIC BLOOD PRESSURE: 76 MMHG

## 2024-01-19 DIAGNOSIS — J06.9 URI WITH COUGH AND CONGESTION: ICD-10-CM

## 2024-01-19 DIAGNOSIS — U07.1 COVID-19: Primary | ICD-10-CM

## 2024-01-19 DIAGNOSIS — U07.1 COVID-19: ICD-10-CM

## 2024-01-19 LAB
CTP QC/QA: YES
CTP QC/QA: YES
POC MOLECULAR INFLUENZA A AGN: NEGATIVE
POC MOLECULAR INFLUENZA B AGN: NEGATIVE
SARS-COV-2 RDRP RESP QL NAA+PROBE: POSITIVE

## 2024-01-19 PROCEDURE — 99214 OFFICE O/P EST MOD 30 MIN: CPT | Mod: S$PBB,,, | Performed by: NURSE PRACTITIONER

## 2024-01-19 PROCEDURE — 99999PBSHW POCT INFLUENZA A/B MOLECULAR: Mod: PBBFAC,,,

## 2024-01-19 PROCEDURE — 87635 SARS-COV-2 COVID-19 AMP PRB: CPT | Mod: PBBFAC,PN | Performed by: NURSE PRACTITIONER

## 2024-01-19 PROCEDURE — 99999PBSHW: Mod: PBBFAC,,,

## 2024-01-19 PROCEDURE — 99999 PR PBB SHADOW E&M-EST. PATIENT-LVL IV: CPT | Mod: PBBFAC,,, | Performed by: NURSE PRACTITIONER

## 2024-01-19 PROCEDURE — 99214 OFFICE O/P EST MOD 30 MIN: CPT | Mod: PBBFAC,PN | Performed by: NURSE PRACTITIONER

## 2024-01-19 PROCEDURE — 87502 INFLUENZA DNA AMP PROBE: CPT | Mod: PBBFAC,PN | Performed by: NURSE PRACTITIONER

## 2024-01-19 RX ORDER — METHOCARBAMOL 750 MG/1
750 TABLET, FILM COATED ORAL
COMMUNITY
Start: 2023-10-11 | End: 2024-01-19

## 2024-01-19 RX ORDER — FLUTICASONE PROPIONATE 50 MCG
2 SPRAY, SUSPENSION (ML) NASAL DAILY
Qty: 16 G | Refills: 1 | Status: SHIPPED | OUTPATIENT
Start: 2024-01-19 | End: 2024-03-19

## 2024-01-19 RX ORDER — BROMPHENIRAMINE MALEATE, PSEUDOEPHEDRINE HYDROCHLORIDE, AND DEXTROMETHORPHAN HYDROBROMIDE 2; 30; 10 MG/5ML; MG/5ML; MG/5ML
10 SYRUP ORAL EVERY 4 HOURS PRN
Qty: 240 ML | Refills: 0 | Status: SHIPPED | OUTPATIENT
Start: 2024-01-19 | End: 2024-01-23 | Stop reason: ALTCHOICE

## 2024-01-19 RX ORDER — BROMPHENIRAMINE MALEATE, PSEUDOEPHEDRINE HYDROCHLORIDE, AND DEXTROMETHORPHAN HYDROBROMIDE 2; 30; 10 MG/5ML; MG/5ML; MG/5ML
10 SYRUP ORAL EVERY 4 HOURS PRN
Qty: 240 ML | Refills: 0 | Status: SHIPPED | OUTPATIENT
Start: 2024-01-19 | End: 2024-01-19 | Stop reason: SDUPTHER

## 2024-01-19 NOTE — TELEPHONE ENCOUNTER
I called [facility] pharmacy- was told medication is in stock- can you send patient and  medication to the pharmacy

## 2024-01-19 NOTE — PROGRESS NOTES
"Subjective:       Patient ID: Madalyn Iverson is a 68 y.o. female.    Chief Complaint: URI (Fever, ear pain, severe headaches, sore throat, chest burns when coughing,runny nose, congestion)    URI   This is a new problem. The current episode started in the past 7 days. The problem has been gradually worsening. The maximum temperature recorded prior to her arrival was 100.4 - 100.9 F. The fever has been present for 3 to 4 days. Associated symptoms include congestion, coughing, ear pain, headaches, rhinorrhea, sneezing and a sore throat. Pertinent negatives include no vomiting. Associated symptoms comments: Lost of taste. She has tried NSAIDs, acetaminophen and antihistamine for the symptoms. The treatment provided mild relief.       Review of Systems   Constitutional:  Positive for appetite change, chills, fatigue and fever.   HENT:  Positive for congestion, ear pain, postnasal drip, rhinorrhea, sinus pressure, sneezing and sore throat.         Lost of taste   Respiratory:  Positive for cough.    Gastrointestinal:  Negative for vomiting.   Neurological:  Positive for headaches.         Objective:     Vitals:    01/19/24 0815   BP: 130/76   BP Location: Left arm   Patient Position: Sitting   BP Method: Large (Manual)   Pulse: 107   Temp: 97.5 °F (36.4 °C)   TempSrc: Temporal   SpO2: 98%   Weight: 73 kg (160 lb 13.2 oz)   Height: 5' 6" (1.676 m)          Physical Exam  Constitutional:       General: She is not in acute distress.     Appearance: Normal appearance. She is ill-appearing. She is not toxic-appearing or diaphoretic.      Comments: Body mass index is 25.96 kg/m².     HENT:      Right Ear: Tympanic membrane, ear canal and external ear normal.      Left Ear: Tympanic membrane, ear canal and external ear normal.      Nose: Congestion and rhinorrhea present.      Mouth/Throat:      Pharynx: Posterior oropharyngeal erythema present. No oropharyngeal exudate.   Eyes:      Extraocular Movements: Extraocular " movements intact.      Conjunctiva/sclera: Conjunctivae normal.   Cardiovascular:      Rate and Rhythm: Normal rate and regular rhythm.      Heart sounds: Normal heart sounds.   Pulmonary:      Effort: Pulmonary effort is normal. No respiratory distress.      Breath sounds: Normal breath sounds. No stridor. No wheezing, rhonchi or rales.   Abdominal:      General: There is no distension.   Neurological:      Mental Status: She is alert.   Psychiatric:         Mood and Affect: Mood normal.         Behavior: Behavior normal.         Thought Content: Thought content normal.         Judgment: Judgment normal.         Office Visit on 01/19/2024   Component Date Value Ref Range Status    POC Rapid COVID 01/19/2024 Positive (A)  Negative Final     Acceptable 01/19/2024 Yes   Final       Assessment:         ICD-10-CM ICD-9-CM   1. COVID-19  U07.1 079.89   2. URI with cough and congestion  J06.9 465.9       Plan:       COVID-19  I recommend using Bromfed DM, Flonase nasal spray as directed.  Rotate tylenol and ibuprofen every 3 hours.    Drink plenty of fluids to hydrate - hot liquids - soups, gumbos.  REST.     Take Vitamin C 500 mg daily.  Take Vitamin D 1000 units daily  Take Zinc 50 mg daily.       I do not recommend the Paxlovid for COVID - there has been a lot of side effects and drug interactions with medication and a lot of REBOUND worsening symptoms.         Quarantine for 5 daily and then wear mask for 5 days.  -     brompheniramine-pseudoeph-DM (BROMFED DM) 2-30-10 mg/5 mL Syrp; Take 10 mLs by mouth every 4 (four) hours as needed (congestion/cough).  Dispense: 240 mL; Refill: 0  -     fluticasone propionate (FLONASE) 50 mcg/actuation nasal spray; 2 sprays (100 mcg total) by Each Nostril route once daily.  Dispense: 16 g; Refill: 1    URI with cough and congestion  -     POCT COVID-19 Rapid Screening  -     POCT Influenza A/B Molecular  -     brompheniramine-pseudoeph-DM (BROMFED DM) 2-30-10 mg/5 mL  "Syrp; Take 10 mLs by mouth every 4 (four) hours as needed (congestion/cough).  Dispense: 240 mL; Refill: 0  -     fluticasone propionate (FLONASE) 50 mcg/actuation nasal spray; 2 sprays (100 mcg total) by Each Nostril route once daily.  Dispense: 16 g; Refill: 1      Follow up if symptoms worsen or fail to improve.     Patient's Medications   New Prescriptions    BROMPHENIRAMINE-PSEUDOEPH-DM (BROMFED DM) 2-30-10 MG/5 ML SYRP    Take 10 mLs by mouth every 4 (four) hours as needed (congestion/cough).    FLUTICASONE PROPIONATE (FLONASE) 50 MCG/ACTUATION NASAL SPRAY    2 sprays (100 mcg total) by Each Nostril route once daily.   Previous Medications    ALPRAZOLAM (XANAX) 0.5 MG TABLET    Take 1 tablet (0.5 mg total) by mouth 3 (three) times daily as needed for Anxiety.    BD INSULIN SYRINGE 1 ML 25 GAUGE X 5/8" SYRG    Inject 1 mL into the skin once a week.    CYANOCOBALAMIN 1,000 MCG/ML INJECTION    INJECT 1 ML (1,000 MCG TOTAL) INTO THE SKIN EVERY 30 DAYS    ERGOCALCIFEROL (ERGOCALCIFEROL) 50,000 UNIT CAP    TAKE 1 CAPSULE (50,000 UNITS TOTAL) BY MOUTH EVERY 7 DAYS. FOR 12 DOSES    FOLIC ACID (FOLVITE) 1 MG TABLET    TAKE 1 TABLET BY MOUTH EVERY DAY    FUROSEMIDE (LASIX) 40 MG TABLET    Take 1 tablet (40 mg total) by mouth once daily.    HYDROCODONE-ACETAMINOPHEN (NORCO) 5-325 MG PER TABLET    Take 1 to 2 tablets by mouth daily as needed for pain    METHOCARBAMOL (ROBAXIN) 750 MG TAB    Take 750 mg by mouth.    POTASSIUM CHLORIDE (MICRO-K) 10 MEQ CPSR    TAKE 1 CAPSULE BY MOUTH TWICE A DAY    REPATHA SURECLICK 140 MG/ML PNIJ    TAKE 1 ML (140 MG TOTAL) BY MOUTH EVERY 14 (FOURTEEN) DAYS.    SYRINGE-NEEDLE,SAFETY,DISP UNT 1 ML 25 GAUGE X 5/8" SYRG    To use with mtx injections    THYROID, PORK, (ARMOUR THYROID) 60 MG TAB    TAKE 1 TABLET BY MOUTH EVERY DAY    TIRZEPATIDE 5 MG/0.5 ML PNIJ    Inject 5 mg into the skin every 7 days.    TIRZEPATIDE 5 MG/0.5 ML PNIJ    Inject 5 mg into the skin every 7 days.    TIZANIDINE " (ZANAFLEX) 4 MG TABLET    TAKE 1 TABLET BY MOUTH FOUR TIMES A DAY    TRAZODONE (DESYREL) 100 MG TABLET    TAKE 1 TABLET BY MOUTH EVERY DAY IN THE EVENING    VERAPAMIL (VERELAN) 240 MG C24P    TAKE 1 CAPSULE (240 MG TOTAL) BY MOUTH 2 (TWO) TIMES A DAY.   Modified Medications    No medications on file   Discontinued Medications    IBUPROFEN (ADVIL,MOTRIN) 600 MG TABLET    Take 1 tablet by mouth 3 times daily for 3 days, then take 1 tablet 3 times daily as needed for pain       Past Medical History:   Diagnosis Date    Acute biliary pancreatitis without infection or necrosis 6/13/2018    Acute pancreatitis     June 2018 - admitted at [facility]    ADHD (attention deficit hyperactivity disorder), inattentive type     Aortic atherosclerosis 3/2/2019    Mild to moderate aortic atherosclerosis seen on CT abd/pelvis 3/2/2019    Chronic back pain     Followed by [facility] - Dr. Bautista - has been having medial branch blocks    Fibromyalgia     treated by Dr. Thorpe in past and now treated by Dr. Yates - Rheumatologist    Hyperlipidemia     High triglycerides    Hypertension     Hypothyroidism     Treated by Dr. Mahtew    IFG (impaired fasting glucose)     Migraine     Treated by neurologist - Dr. Destiny Florez    Ulcerative colitis     Patient reported treated by Dr. Lee and Aaron in past  but on admit in 2018 - no UC seen on recent colonoscopy.  It was noted on colonoscopy in April 2004 there were ulcers noted to descending colon but not present on sigmoidoscopy in June 2004.    Ventricular diastolic dysfunction determined by echocardiography 5/1/2021    Followed by Cardiologist Dr. Ghotra    Vitamin D deficiency 3/31/2016       Past Surgical History:   Procedure Laterality Date    CARPAL TUNNEL RELEASE Right 03/27/2019    Procedure: RELEASE, CARPAL TUNNEL right;  Surgeon: Li Waller MD;  Location: [facility] OR [address] [place] [address];  Service: Orthopedics;  Laterality: Right;   stretcher, supine, hand pan 1 and 2    CARPAL TUNNEL RELEASE Left 2021    Procedure: RELEASE, CARPAL TUNNEL;  Surgeon: Brian Reed Jr., MD;  Location: [facility] OR;  Service: Orthopedics;  Laterality: Left;     SECTION      COLONOSCOPY  10/08/2010    Dr. Lee - repeat in 5 years - has appointment for colonoscopy 2016    COLONOSCOPY  2016    Dr. Kelly - normal  colon - repeat in 10 years - scanned report to media file.    ENDOSCOPIC ULTRASOUND OF UPPER GASTROINTESTINAL TRACT N/A 2018    Procedure: ULTRASOUND, ENDOSCOPIC, UPPER GI TRACT;  Surgeon: Reji Russell MD;  Location: [facility] ENDO;  Service: Endoscopy;  Laterality: N/A;    ENDOSCOPIC ULTRASOUND OF UPPER GASTROINTESTINAL TRACT N/A 2019    Procedure: ULTRASOUND, UPPER GI TRACT, ENDOSCOPIC;  Surgeon: Randy Boyd MD;  Location: [facility] ENDO;  Service: Endoscopy;  Laterality: N/A;    HYSTERECTOMY      INTERPOSITION ARTHROPLASTY OF CARPOMETACARPAL JOINTS Left 2021    Procedure: INTERPOSITION ARTHROPLASTY, CMC JOINT;  Surgeon: Brian Reed Jr., MD;  Location: [facility] OR;  Service: Orthopedics;  Laterality: Left;  need arthrex tightrope   Brit notified  LB, Brit confirmed 21 AM    LAPAROSCOPIC CHOLECYSTECTOMY N/A 2019    Procedure: CHOLECYSTECTOMY, LAPAROSCOPIC;  Surgeon: Hill Palacios MD;  Location: [facility] OR;  Service: General;  Laterality: N/A;    medial branch block      done at [facility] for axial back pain    OOPHORECTOMY      SHOULDER ARTHROSCOPY W/ ROTATOR CUFF REPAIR Right     Dr. Castro - rotator cuff repair; 3 screws placed, biceps tear repair.    SHOULDER SURGERY Left     TONSILLECTOMY      upper and lower GI  2016       Family History   Problem Relation Age of Onset    Hypertension Mother     Heart disease Mother     Hyperlipidemia Mother     Cancer Sister 53        thyroid cancer and lymph nodes involvement    Cancer Brother 61        colon and lung cancer     Colon cancer Brother 66    Cancer Brother 63        colon    Hypertension Brother     Diabetes Brother     Colon cancer Brother 60    Esophageal cancer Neg Hx     Stomach cancer Neg Hx     Ulcerative colitis Neg Hx     Crohn's disease Neg Hx     Irritable bowel syndrome Neg Hx     Celiac disease Neg Hx        Social History     Socioeconomic History    Marital status:    Tobacco Use    Smoking status: Former     Current packs/day: 0.00     Average packs/day: 1 pack/day for 20.0 years (20.0 ttl pk-yrs)     Types: Cigarettes     Start date: 1977     Quit date: 1997     Years since quittin.9    Smokeless tobacco: Former   Substance and Sexual Activity    Alcohol use: No    Drug use: No    Sexual activity: Never     Social Determinants of Health     Financial Resource Strain: Patient Declined (2023)    Overall Financial Resource Strain (CARDIA)     Difficulty of Paying Living Expenses: Patient declined   Food Insecurity: Patient Declined (2023)    Hunger Vital Sign     Worried About Running Out of Food in the Last Year: Patient declined     Ran Out of Food in the Last Year: Patient declined   Transportation Needs: No Transportation Needs (2023)    PRAPARE - Transportation     Lack of Transportation (Medical): No     Lack of Transportation (Non-Medical): No   Physical Activity: Insufficiently Active (2023)    Exercise Vital Sign     Days of Exercise per Week: 2 days     Minutes of Exercise per Session: 20 min   Stress: Stress Concern Present (2023)    [employer] - Occupational Stress Questionnaire     Feeling of Stress : Very much   Social Connections: Unknown (2023)    Social Connection and Isolation Panel [NHANES]     Frequency of Communication with Friends and Family: More than three times a week     Frequency of Social Gatherings with Friends and Family: Twice a week     Active Member of Clubs or Organizations: No     Attends Club or  Organization Meetings: Never     Marital Status:    Housing Stability: Unknown (6/29/2023)    Housing Stability Vital Sign     Unable to Pay for Housing in the Last Year: Patient refused     Unstable Housing in the Last Year: Patient refused

## 2024-01-19 NOTE — PATIENT INSTRUCTIONS
I recommend using Bromfed DM, Flonase nasal spray as directed.  Rotate tylenol and ibuprofen every 3 hours.    Drink plenty of fluids to hydrate - hot liquids - soups, gumbos.  REST.     Take Vitamin C 500 mg daily.  Take Vitamin D 1000 units daily  Take Zinc 50 mg daily.       I do not recommend the Paxlovid for COVID - there has been a lot of side effects and drug interactions with medication and a lot of REBOUND worsening symptoms.         Quarantine for 5 daily and then wear mask for 5 days.

## 2024-01-19 NOTE — TELEPHONE ENCOUNTER
----- Message from Nathanael Moreno sent at 1/19/2024 11:12 AM CST -----  .Type:  Needs Medical Advice    Who Called: pt    Would the patient rather a call back or a response via [facility]? Call back  Best Call Back Number: [phone]  Additional Information:     Pt stated she would like a call back in reference to medication for a cough

## 2024-01-19 NOTE — TELEPHONE ENCOUNTER
----- Message from Riddhi Dumont sent at 1/19/2024  9:48 AM CST -----  Type:  Needs Medical Advice/pharmacy change     Who Called: pt    Would the patient rather a call back or a response via [facility]? Call   Best Call Back Number: [phone]    Additional Information  pt requesting a call back to discuss sending cough syrup to another pharmacy [facility] and her  hal burgess who was seen with her      Please send to if they have it      Located in [facility]  Address [address] [place] [address] [address] [place] [id number]  Phone [phone]

## 2024-01-21 DIAGNOSIS — M13.80 SERONEGATIVE ARTHRITIS: ICD-10-CM

## 2024-01-21 DIAGNOSIS — M79.7 FIBROMYALGIA: ICD-10-CM

## 2024-01-21 RX ORDER — HYDROCODONE BITARTRATE AND ACETAMINOPHEN 5; 325 MG/1; MG/1
1-2 TABLET ORAL DAILY PRN
Qty: 60 TABLET | Refills: 0 | Status: SHIPPED | OUTPATIENT
Start: 2024-01-21 | End: 2024-01-23

## 2024-01-21 RX ORDER — ALPRAZOLAM 0.5 MG/1
0.5 TABLET ORAL 3 TIMES DAILY PRN
Qty: 90 TABLET | Refills: 0 | Status: SHIPPED | OUTPATIENT
Start: 2024-01-21 | End: 2024-01-23

## 2024-01-23 ENCOUNTER — TELEPHONE (OUTPATIENT)
Dept: FAMILY MEDICINE | Facility: CLINIC | Age: 69
End: 2024-01-23
Payer: COMMERCIAL

## 2024-01-23 DIAGNOSIS — M13.80 SERONEGATIVE ARTHRITIS: ICD-10-CM

## 2024-01-23 DIAGNOSIS — I10 ESSENTIAL HYPERTENSION: ICD-10-CM

## 2024-01-23 DIAGNOSIS — M79.7 FIBROMYALGIA: ICD-10-CM

## 2024-01-23 DIAGNOSIS — R05.9 COUGH, UNSPECIFIED TYPE: Primary | ICD-10-CM

## 2024-01-23 RX ORDER — VERAPAMIL HYDROCHLORIDE 240 MG/1
240 CAPSULE, EXTENDED RELEASE ORAL 2 TIMES DAILY
Qty: 180 CAPSULE | Refills: 1 | OUTPATIENT
Start: 2024-01-23 | End: 2025-01-22

## 2024-01-23 RX ORDER — PROMETHAZINE HYDROCHLORIDE AND DEXTROMETHORPHAN HYDROBROMIDE 6.25; 15 MG/5ML; MG/5ML
5 SYRUP ORAL EVERY 6 HOURS PRN
Qty: 180 ML | Refills: 0 | Status: SHIPPED | OUTPATIENT
Start: 2024-01-23 | End: 2024-02-01 | Stop reason: SDUPTHER

## 2024-01-23 RX ORDER — ALPRAZOLAM 0.5 MG/1
0.5 TABLET ORAL 3 TIMES DAILY PRN
Qty: 90 TABLET | Refills: 0 | Status: SHIPPED | OUTPATIENT
Start: 2024-01-23 | End: 2024-02-25 | Stop reason: SDUPTHER

## 2024-01-23 RX ORDER — HYDROCODONE BITARTRATE AND ACETAMINOPHEN 5; 325 MG/1; MG/1
1-2 TABLET ORAL DAILY PRN
Qty: 60 TABLET | Refills: 0 | Status: SHIPPED | OUTPATIENT
Start: 2024-01-23 | End: 2024-01-24

## 2024-01-23 NOTE — TELEPHONE ENCOUNTER
Spoke with patient- states on last week the pharmacy only had enough cough meds for 1 bottle-patient states her and her  both has been taking medication still having the cough- she has a tight cough and he has a loose cough- will like like to know is there something else that can be called in.

## 2024-01-23 NOTE — TELEPHONE ENCOUNTER
----- Message from Matthew Carcamo sent at 1/23/2024  2:36 PM CST -----  Contact: pt  .Type:  Needs Medical Advice    Who Called: pt    Pharmacy name and phone #:  [facility]   Phone: [phone]  Fax: [phone]  Would the patient rather a call back or a response via [facility]?  Call back  Best Call Back Number:  [phone]  Additional Information: Pt. Is calling for Estefanía she would like you to know she did get her cough syrup but it does not help.  She is trying to see if something else will work brompheniramine-pseudoeph-DM (BROMFED DM) 2-30-10 mg/5 mL Syrp.  She is just picking up the medication she used her  but she just wants to let you know that it is not helping her.

## 2024-01-23 NOTE — TELEPHONE ENCOUNTER
Soledad Silva Staff  Caller: pt (Today, 10:11 AM)  Type:  Needs Medical Advice    Who Called: pt  Would the patient rather a call back or a response via [facility]? call  Best Call Back Number: [phone]  Additional Information:  Pt requesting a call regarding the cough medicine that was prescribed is not working

## 2024-01-24 DIAGNOSIS — M13.80 SERONEGATIVE ARTHRITIS: ICD-10-CM

## 2024-01-24 DIAGNOSIS — M79.7 FIBROMYALGIA: ICD-10-CM

## 2024-01-24 RX ORDER — HYDROCODONE BITARTRATE AND ACETAMINOPHEN 5; 325 MG/1; MG/1
1-2 TABLET ORAL DAILY PRN
Qty: 60 TABLET | Refills: 0 | Status: SHIPPED | OUTPATIENT
Start: 2024-01-24 | End: 2024-03-19

## 2024-02-01 DIAGNOSIS — R05.9 COUGH, UNSPECIFIED TYPE: ICD-10-CM

## 2024-02-01 RX ORDER — PROMETHAZINE HYDROCHLORIDE AND DEXTROMETHORPHAN HYDROBROMIDE 6.25; 15 MG/5ML; MG/5ML
5 SYRUP ORAL EVERY 6 HOURS PRN
Qty: 180 ML | Refills: 0 | Status: SHIPPED | OUTPATIENT
Start: 2024-02-01 | End: 2024-02-14

## 2024-02-25 RX ORDER — ALPRAZOLAM 0.5 MG/1
0.5 TABLET ORAL 3 TIMES DAILY PRN
Qty: 90 TABLET | Refills: 0 | Status: SHIPPED | OUTPATIENT
Start: 2024-02-25 | End: 2024-04-01 | Stop reason: SDUPTHER

## 2024-03-15 RX ORDER — THYROID 60 MG/1
TABLET ORAL
Qty: 30 TABLET | Refills: 5 | Status: SHIPPED | OUTPATIENT
Start: 2024-03-15 | End: 2024-04-17

## 2024-03-19 ENCOUNTER — OFFICE VISIT (OUTPATIENT)
Dept: RHEUMATOLOGY | Facility: CLINIC | Age: 69
End: 2024-03-19
Payer: MEDICARE

## 2024-03-19 DIAGNOSIS — M13.80 SERONEGATIVE ARTHRITIS: Primary | ICD-10-CM

## 2024-03-19 DIAGNOSIS — G56.02 LEFT CARPAL TUNNEL SYNDROME: ICD-10-CM

## 2024-03-19 DIAGNOSIS — G56.01 RIGHT CARPAL TUNNEL SYNDROME: ICD-10-CM

## 2024-03-19 DIAGNOSIS — M18.12 PRIMARY OSTEOARTHRITIS OF FIRST CARPOMETACARPAL JOINT OF LEFT HAND: ICD-10-CM

## 2024-03-19 DIAGNOSIS — G56.02 CARPAL TUNNEL SYNDROME OF LEFT WRIST: ICD-10-CM

## 2024-03-19 DIAGNOSIS — M79.7 FIBROMYALGIA: ICD-10-CM

## 2024-03-19 DIAGNOSIS — M04.9 AUTOINFLAMMATORY SYNDROME, UNSPECIFIED: ICD-10-CM

## 2024-03-19 PROCEDURE — 3044F HG A1C LEVEL LT 7.0%: CPT | Mod: CPTII,95,, | Performed by: INTERNAL MEDICINE

## 2024-03-19 PROCEDURE — 1159F MED LIST DOCD IN RCRD: CPT | Mod: CPTII,95,, | Performed by: INTERNAL MEDICINE

## 2024-03-19 PROCEDURE — 99214 OFFICE O/P EST MOD 30 MIN: CPT | Mod: 95,,, | Performed by: INTERNAL MEDICINE

## 2024-03-19 PROCEDURE — 1160F RVW MEDS BY RX/DR IN RCRD: CPT | Mod: CPTII,95,, | Performed by: INTERNAL MEDICINE

## 2024-03-19 RX ORDER — HYDROCODONE BITARTRATE AND ACETAMINOPHEN 5; 325 MG/1; MG/1
1-2 TABLET ORAL DAILY PRN
Qty: 60 TABLET | Refills: 0 | Status: SHIPPED | OUTPATIENT
Start: 2024-03-19 | End: 2024-04-16 | Stop reason: SDUPTHER

## 2024-03-23 ENCOUNTER — PATIENT MESSAGE (OUTPATIENT)
Dept: RHEUMATOLOGY | Facility: CLINIC | Age: 69
End: 2024-03-23
Payer: MEDICARE

## 2024-04-01 RX ORDER — ALPRAZOLAM 0.5 MG/1
0.5 TABLET ORAL 3 TIMES DAILY PRN
Qty: 90 TABLET | Refills: 0 | Status: SHIPPED | OUTPATIENT
Start: 2024-04-01 | End: 2024-04-17

## 2024-04-16 DIAGNOSIS — M79.7 FIBROMYALGIA: ICD-10-CM

## 2024-04-16 DIAGNOSIS — M13.80 SERONEGATIVE ARTHRITIS: ICD-10-CM

## 2024-04-17 DIAGNOSIS — E07.9 THYROID DISEASE: Primary | ICD-10-CM

## 2024-04-17 RX ORDER — THYROID 60 MG/1
TABLET ORAL
Qty: 90 TABLET | Refills: 1 | Status: SHIPPED | OUTPATIENT
Start: 2024-04-17

## 2024-04-17 RX ORDER — ALPRAZOLAM 0.5 MG/1
0.25 TABLET ORAL NIGHTLY PRN
Qty: 90 TABLET | Refills: 1 | Status: SHIPPED | OUTPATIENT
Start: 2024-04-17 | End: 2024-04-26

## 2024-04-17 RX ORDER — TRAZODONE HYDROCHLORIDE 100 MG/1
100 TABLET ORAL NIGHTLY
Qty: 90 TABLET | Refills: 1 | Status: SHIPPED | OUTPATIENT
Start: 2024-04-17 | End: 2024-06-13 | Stop reason: SDUPTHER

## 2024-04-17 RX ORDER — CYANOCOBALAMIN 1000 UG/ML
1000 INJECTION, SOLUTION INTRAMUSCULAR; SUBCUTANEOUS
Refills: 0 | OUTPATIENT
Start: 2024-04-17

## 2024-04-17 RX ORDER — HYDROCODONE BITARTRATE AND ACETAMINOPHEN 5; 325 MG/1; MG/1
1-2 TABLET ORAL DAILY PRN
Qty: 60 TABLET | Refills: 0 | Status: SHIPPED | OUTPATIENT
Start: 2024-04-17 | End: 2024-05-20 | Stop reason: SDUPTHER

## 2024-04-26 ENCOUNTER — TELEPHONE (OUTPATIENT)
Dept: ADMINISTRATIVE | Facility: OTHER | Age: 69
End: 2024-04-26
Payer: MEDICARE

## 2024-04-26 RX ORDER — ALPRAZOLAM 0.5 MG/1
0.5 TABLET ORAL NIGHTLY PRN
Qty: 90 TABLET | Refills: 1 | Status: SHIPPED | OUTPATIENT
Start: 2024-04-26 | End: 2024-06-11 | Stop reason: SDUPTHER

## 2024-04-26 NOTE — TELEPHONE ENCOUNTER
Called and spoke with Ms. Madalyn Boyce to notify her of an appointment cancellation on 5/2/2024 at 10 am with Dr. Zaragoza. Ms. Iverson was made aware that the physician would not be in office and a staff member would contact her to reschedule. Ms. Iverson verbalized understanding and stated that she was fine as long as she would not be scheduled at the [facility]. Ms. Boyce expressed thanks at the conclusion of the call.

## 2024-04-29 ENCOUNTER — TELEPHONE (OUTPATIENT)
Dept: NEUROLOGY | Facility: CLINIC | Age: 69
End: 2024-04-29
Payer: MEDICARE

## 2024-04-29 NOTE — TELEPHONE ENCOUNTER
Spoke to patient to get her EMG rescheduled. Patient was offered 1st available which was at our [facility] location, next Monday May 6th at 4pm. Patient declined saying that she only wanted the [place] location. I informed her that the next available in [place] was not until Wednesday, July 24th for 10am. She confirmed this appointment after expressing frustration that it was such a long time from now. I again asked if she would like to try another location but she declined. I did infom her that I was placing her on the wait list in case something should become available sooner. An appointment letter has been printed and placed in the mail.

## 2024-05-20 DIAGNOSIS — M79.7 FIBROMYALGIA: ICD-10-CM

## 2024-05-20 DIAGNOSIS — M13.80 SERONEGATIVE ARTHRITIS: ICD-10-CM

## 2024-05-20 RX ORDER — HYDROCODONE BITARTRATE AND ACETAMINOPHEN 5; 325 MG/1; MG/1
1-2 TABLET ORAL DAILY PRN
Qty: 60 TABLET | Refills: 0 | Status: SHIPPED | OUTPATIENT
Start: 2024-05-20

## 2024-06-12 ENCOUNTER — PATIENT MESSAGE (OUTPATIENT)
Dept: RHEUMATOLOGY | Facility: CLINIC | Age: 69
End: 2024-06-12
Payer: MEDICARE

## 2024-06-12 RX ORDER — ALPRAZOLAM 0.5 MG/1
0.5 TABLET ORAL NIGHTLY PRN
Qty: 90 TABLET | Refills: 0 | Status: SHIPPED | OUTPATIENT
Start: 2024-06-12 | End: 2024-06-12 | Stop reason: SDUPTHER

## 2024-06-12 RX ORDER — ALPRAZOLAM 0.5 MG/1
0.5 TABLET ORAL NIGHTLY PRN
Qty: 90 TABLET | Refills: 0 | Status: SHIPPED | OUTPATIENT
Start: 2024-06-12 | End: 2024-06-14 | Stop reason: SDUPTHER

## 2024-06-13 RX ORDER — TRAZODONE HYDROCHLORIDE 100 MG/1
100 TABLET ORAL NIGHTLY
Qty: 90 TABLET | Refills: 1 | Status: SHIPPED | OUTPATIENT
Start: 2024-06-13

## 2024-06-14 RX ORDER — ALPRAZOLAM 0.5 MG/1
0.5 TABLET ORAL 3 TIMES DAILY PRN
Qty: 60 TABLET | Refills: 0 | Status: SHIPPED | OUTPATIENT
Start: 2024-06-14

## 2024-06-14 RX ORDER — ALPRAZOLAM 0.5 MG/1
0.5 TABLET ORAL 3 TIMES DAILY PRN
Qty: 60 TABLET | Refills: 0 | Status: SHIPPED | OUTPATIENT
Start: 2024-06-14 | End: 2024-06-14 | Stop reason: SDUPTHER

## 2024-06-20 DIAGNOSIS — M13.80 SERONEGATIVE ARTHRITIS: ICD-10-CM

## 2024-06-20 DIAGNOSIS — M79.7 FIBROMYALGIA: ICD-10-CM

## 2024-06-20 RX ORDER — HYDROCODONE BITARTRATE AND ACETAMINOPHEN 5; 325 MG/1; MG/1
1-2 TABLET ORAL DAILY PRN
Qty: 60 TABLET | Refills: 0 | Status: SHIPPED | OUTPATIENT
Start: 2024-06-20 | End: 2024-06-21 | Stop reason: SDUPTHER

## 2024-06-20 RX ORDER — CYANOCOBALAMIN 1000 UG/ML
INJECTION, SOLUTION INTRAMUSCULAR; SUBCUTANEOUS
Qty: 3 ML | Refills: 3 | Status: SHIPPED | OUTPATIENT
Start: 2024-06-20

## 2024-06-20 RX ORDER — HYDROCODONE BITARTRATE AND ACETAMINOPHEN 5; 325 MG/1; MG/1
1-2 TABLET ORAL DAILY PRN
Qty: 60 TABLET | Refills: 0 | Status: SHIPPED | OUTPATIENT
Start: 2024-06-20 | End: 2024-06-20 | Stop reason: SDUPTHER

## 2024-06-21 DIAGNOSIS — M79.7 FIBROMYALGIA: ICD-10-CM

## 2024-06-21 DIAGNOSIS — M13.80 SERONEGATIVE ARTHRITIS: ICD-10-CM

## 2024-06-21 RX ORDER — HYDROCODONE BITARTRATE AND ACETAMINOPHEN 5; 325 MG/1; MG/1
1-2 TABLET ORAL DAILY PRN
Qty: 60 TABLET | Refills: 0 | Status: CANCELLED | OUTPATIENT
Start: 2024-06-21

## 2024-06-21 RX ORDER — HYDROCODONE BITARTRATE AND ACETAMINOPHEN 5; 325 MG/1; MG/1
1-2 TABLET ORAL DAILY PRN
Qty: 60 TABLET | Refills: 0 | Status: SHIPPED | OUTPATIENT
Start: 2024-06-21

## 2024-06-21 NOTE — TELEPHONE ENCOUNTER
----- Message from Lang Yepez sent at 6/21/2024  2:40 PM CDT -----  Regarding: RX  Contact: [phone]  Who call ? Madalyn Iverson     What is the request Details : Pt calling to speak with someone in provider office regarding HYDROcodone-acetaminophen (NORCO) 5-325 mg per tablet. States pharmacy did not received prescription and pharmacy close at 5:30 pm today, currently out of medication.      Can clinic  use patient portal  : Call       What number to call back : [phone]       [facility]  [address] [facility] [address]  [place] [address]  Phone: [phone] Fax: [phone]

## 2024-07-02 DIAGNOSIS — E11.9 TYPE 2 DIABETES MELLITUS WITHOUT COMPLICATION, WITHOUT LONG-TERM CURRENT USE OF INSULIN: ICD-10-CM

## 2024-07-02 RX ORDER — TIRZEPATIDE 5 MG/.5ML
5 INJECTION, SOLUTION SUBCUTANEOUS
Qty: 4 PEN | Refills: 3 | OUTPATIENT
Start: 2024-07-02

## 2024-07-04 DIAGNOSIS — E11.9 TYPE 2 DIABETES MELLITUS WITHOUT COMPLICATION, WITHOUT LONG-TERM CURRENT USE OF INSULIN: ICD-10-CM

## 2024-07-04 RX ORDER — TIRZEPATIDE 5 MG/.5ML
5 INJECTION, SOLUTION SUBCUTANEOUS
Qty: 4 PEN | Refills: 3 | Status: CANCELLED | OUTPATIENT
Start: 2024-07-04

## 2024-07-08 ENCOUNTER — PATIENT MESSAGE (OUTPATIENT)
Dept: ADMINISTRATIVE | Facility: OTHER | Age: 69
End: 2024-07-08
Payer: MEDICARE

## 2024-07-08 DIAGNOSIS — E11.9 TYPE 2 DIABETES MELLITUS WITHOUT COMPLICATION, WITHOUT LONG-TERM CURRENT USE OF INSULIN: ICD-10-CM

## 2024-07-08 RX ORDER — TIRZEPATIDE 5 MG/.5ML
5 INJECTION, SOLUTION SUBCUTANEOUS
Qty: 4 PEN | Refills: 3 | OUTPATIENT
Start: 2024-07-08

## 2024-07-09 DIAGNOSIS — E11.9 TYPE 2 DIABETES MELLITUS WITHOUT COMPLICATION, WITHOUT LONG-TERM CURRENT USE OF INSULIN: ICD-10-CM

## 2024-07-09 RX ORDER — TIRZEPATIDE 5 MG/.5ML
5 INJECTION, SOLUTION SUBCUTANEOUS
Qty: 4 PEN | Refills: 3 | OUTPATIENT
Start: 2024-07-09

## 2024-07-10 ENCOUNTER — TELEPHONE (OUTPATIENT)
Dept: GASTROENTEROLOGY | Facility: CLINIC | Age: 69
End: 2024-07-10
Payer: MEDICARE

## 2024-07-10 DIAGNOSIS — E11.9 TYPE 2 DIABETES MELLITUS WITHOUT COMPLICATION, WITHOUT LONG-TERM CURRENT USE OF INSULIN: ICD-10-CM

## 2024-07-10 RX ORDER — TIRZEPATIDE 5 MG/.5ML
5 INJECTION, SOLUTION SUBCUTANEOUS
Qty: 4 PEN | Refills: 3 | OUTPATIENT
Start: 2024-07-10

## 2024-07-10 RX ORDER — TIRZEPATIDE 5 MG/.5ML
5 INJECTION, SOLUTION SUBCUTANEOUS
Qty: 4 PEN | Refills: 3 | Status: CANCELLED | OUTPATIENT
Start: 2024-07-10

## 2024-07-11 ENCOUNTER — NURSE TRIAGE (OUTPATIENT)
Dept: ADMINISTRATIVE | Facility: CLINIC | Age: 69
End: 2024-07-11
Payer: MEDICARE

## 2024-07-11 ENCOUNTER — ON-DEMAND VIRTUAL (OUTPATIENT)
Dept: URGENT CARE | Facility: CLINIC | Age: 69
End: 2024-07-11
Payer: MEDICARE

## 2024-07-11 ENCOUNTER — PATIENT MESSAGE (OUTPATIENT)
Dept: ENDOCRINOLOGY | Facility: CLINIC | Age: 69
End: 2024-07-11
Payer: MEDICARE

## 2024-07-11 DIAGNOSIS — R73.01 IFG (IMPAIRED FASTING GLUCOSE): ICD-10-CM

## 2024-07-11 DIAGNOSIS — E74.39 GLUCOSE INTOLERANCE: ICD-10-CM

## 2024-07-11 DIAGNOSIS — Z76.0 MEDICATION REFILL: Primary | ICD-10-CM

## 2024-07-11 PROCEDURE — 99213 OFFICE O/P EST LOW 20 MIN: CPT | Mod: 95,,, | Performed by: PHYSICIAN ASSISTANT

## 2024-07-11 NOTE — TELEPHONE ENCOUNTER
Pt called and read this document and offered to make her an appt and she declines and would like a virtual. Pt told that MD said that she really needed assessment and possible labs. Pt told that she should be seen and to call back but she will do ODV with UC and call back as needed

## 2024-07-11 NOTE — PROGRESS NOTES
Subjective:      Patient ID: Madalyn Iverson is a 69 y.o. female.    Vitals:  vitals were not taken for this visit.     Chief Complaint: Medication Refill (Mounjaro)      Visit Type: TELE AUDIOVISUAL    Present with the patient at the time of consultation: TELEMED PRESENT WITH PATIENT: None at home in [place]    Past Medical History:   Diagnosis Date    Acute biliary pancreatitis without infection or necrosis 6/13/2018    Acute pancreatitis     June 2018 - admitted at [facility] and [facility]    ADHD (attention deficit hyperactivity disorder), inattentive type     Aortic atherosclerosis 3/2/2019    Mild to moderate aortic atherosclerosis seen on CT abd/pelvis 3/2/2019    Chronic back pain     Followed by [facility] - Dr. Bautista - has been having medial branch blocks    Fibromyalgia     treated by Dr. Thorep in past and now treated by Dr. Yates - Rheumatologist    Hyperlipidemia     High triglycerides    Hypertension     Hypothyroidism     Treated by Dr. Mathew    IFG (impaired fasting glucose)     Migraine     Treated by neurologist - Dr. Destiny Florez    Ulcerative colitis     Patient reported treated by Dr. Mcfadden in past  but on admit in 2018 - no UC seen on recent colonoscopy.  It was noted on colonoscopy in April 2004 there were ulcers noted to descending colon but not present on sigmoidoscopy in June 2004.    Ventricular diastolic dysfunction determined by echocardiography 5/1/2021    Followed by Cardiologist Dr. Ghotra    Vitamin D deficiency 3/31/2016     Past Surgical History:   Procedure Laterality Date    CARPAL TUNNEL RELEASE Right 03/27/2019    Procedure: RELEASE, CARPAL TUNNEL right;  Surgeon: Li Waller MD;  Location: [address];  Service: Orthopedics;  Laterality: Right;  stretcher, supine, hand pan 1 and 2    CARPAL TUNNEL RELEASE Left 07/09/2021    Procedure: RELEASE, CARPAL TUNNEL;  Surgeon: Brian Reed Jr., MD;  Location: [facility] OR;  " Service: Orthopedics;  Laterality: Left;     SECTION      COLONOSCOPY  10/08/2010    Dr. Lee - repeat in 5 years - has appointment for colonoscopy 2016    COLONOSCOPY  2016    Dr. Kelly - normal  colon - repeat in 10 years - scanned report to media file.    ENDOSCOPIC ULTRASOUND OF UPPER GASTROINTESTINAL TRACT N/A 2018    Procedure: ULTRASOUND, ENDOSCOPIC, UPPER GI TRACT;  Surgeon: Reji Russell MD;  Location: [facility] ENDO;  Service: Endoscopy;  Laterality: N/A;    ENDOSCOPIC ULTRASOUND OF UPPER GASTROINTESTINAL TRACT N/A 2019    Procedure: ULTRASOUND, UPPER GI TRACT, ENDOSCOPIC;  Surgeon: Randy Boyd MD;  Location: [facility] ENDO;  Service: Endoscopy;  Laterality: N/A;    HYSTERECTOMY      INTERPOSITION ARTHROPLASTY OF CARPOMETACARPAL JOINTS Left 2021    Procedure: INTERPOSITION ARTHROPLASTY, CMC JOINT;  Surgeon: Brian Reed Jr., MD;  Location: [facility] OR;  Service: Orthopedics;  Laterality: Left;  need arthrex tightrope   Brit notified  LB, Brit confirmed 21 AM    LAPAROSCOPIC CHOLECYSTECTOMY N/A 2019    Procedure: CHOLECYSTECTOMY, LAPAROSCOPIC;  Surgeon: Hill Palacios MD;  Location: [facility] OR;  Service: General;  Laterality: N/A;    medial branch block      done at [facility] for axial back pain    OOPHORECTOMY      SHOULDER ARTHROSCOPY W/ ROTATOR CUFF REPAIR Right     Dr. Castro - rotator cuff repair; 3 screws placed, biceps tear repair.    SHOULDER SURGERY Left     TONSILLECTOMY      upper and lower GI  2016     Review of patient's allergies indicates:  No Known Allergies  Current Outpatient Medications on File Prior to Visit   Medication Sig Dispense Refill    ALPRAZolam (XANAX) 0.5 MG tablet Take 1 tablet (0.5 mg total) by mouth 3 (three) times daily as needed for Anxiety. 60 tablet 0    BD INSULIN SYRINGE 1 mL 25 gauge x 5/8" Syrg Inject 1 mL into the skin once a week. 30 each 1    cyanocobalamin 1,000 mcg/mL injection " "INJECT 1 ML (1,000 MCG TOTAL) INTO THE SKIN EVERY 30 DAYS 3 mL 3    ergocalciferol (ERGOCALCIFEROL) 50,000 unit Cap TAKE 1 CAPSULE (50,000 UNITS TOTAL) BY MOUTH EVERY 7 DAYS. FOR 12 DOSES 12 capsule 3    folic acid (FOLVITE) 1 MG tablet TAKE 1 TABLET BY MOUTH EVERY DAY (Patient not taking: Reported on 1/19/2024) 90 tablet 1    furosemide (LASIX) 40 MG tablet Take 1 tablet (40 mg total) by mouth once daily. 90 tablet 0    HYDROcodone-acetaminophen (NORCO) 5-325 mg per tablet Take 1 to 2 tablets by mouth daily as needed. 60 tablet 0    potassium chloride (MICRO-K) 10 MEQ CpSR TAKE 1 CAPSULE BY MOUTH TWICE A  capsule 1    REPATHA SURECLICK 140 mg/mL PnIj TAKE 1 ML (140 MG TOTAL) BY MOUTH EVERY 14 (FOURTEEN) DAYS. 2 each 11    syringe-needle,safety,disp unt 1 mL 25 gauge x 5/8" Syrg To use with mtx injections 20 Syringe 0    thyroid, pork, (ARMOUR THYROID) 60 mg Tab One tab daily 90 tablet 1    tiZANidine (ZANAFLEX) 4 MG tablet TAKE 1 TABLET BY MOUTH FOUR TIMES A  tablet 3    traZODone (DESYREL) 100 MG tablet TAKE 1 TABLET BY MOUTH EVERY DAY IN THE EVENING 90 tablet 1    verapamiL (VERELAN) 240 MG C24P TAKE 1 CAPSULE (240 MG TOTAL) BY MOUTH 2 (TWO) TIMES A DAY. 180 capsule 1    [DISCONTINUED] tirzepatide 5 mg/0.5 mL PnIj Inject 5 mg into the skin every 7 days. 4 pen 3     Current Facility-Administered Medications on File Prior to Visit   Medication Dose Route Frequency Provider Last Rate Last Admin    0.9%  NaCl infusion   Intravenous Continuous Jermaine Chatman MD   Stopped at 03/27/19 1040    evolocumab PnIj 140 mg  140 mg Subcutaneous 1 time in Clinic/HOD Ethan Ghotra MD        evolocumab PnIj 140 mg  140 mg Subcutaneous 1 time in Clinic/HOD Ethan Ghotra MD        mupirocin 2 % ointment   Nasal On Call Procedure Jermaine Chatman MD   Given at 03/27/19 0848     Family History   Problem Relation Name Age of Onset    Hypertension Mother      Heart disease Mother      Hyperlipidemia " Mother      Cancer Sister  53        thyroid cancer and lymph nodes involvement    Cancer Brother  61        colon and lung cancer    Colon cancer Brother  66    Cancer Brother  63        colon    Hypertension Brother      Diabetes Brother      Colon cancer Brother  60    Esophageal cancer Neg Hx      Stomach cancer Neg Hx      Ulcerative colitis Neg Hx      Crohn's disease Neg Hx      Irritable bowel syndrome Neg Hx      Celiac disease Neg Hx         Medications Ordered                [facility]   [id number]    Telephone: [phone]   Fax: [phone]   Hours: Mon-Fri, 8a-5:30p                         Internal Pharmacy (1 of 1)              tirzepatide 5 mg/0.5 mL PnIj    Sig: Inject 5 mg into the skin every 7 days.       Start: 7/11/24     Quantity: 4 Pen Refills: 0                           Ohs Peq Odvv Intake    7/11/2024  1:24 PM CDT - Filed by Patient   What is your current physical address in the event of a medical emergency?    Are you able to take your vital signs? No   Please attach any relevant images or files          HPI  70yo female presents with request for medication refill, mounjaro. Has been on this medication for one year, no issues with side effects. A1C well controlled per patient.     Her endocrinologist Dr Lewis no longer there, unable to get refill through them.       ROS   Denies any complaints. Feels well.     Objective:   The physical exam was conducted virtually.  Physical Exam   Constitutional: She is oriented to person, place, and time.  Non-toxic appearance. She does not appear ill. No distress.   HENT:   Head: Normocephalic and atraumatic.   Neck: Neck supple.   Pulmonary/Chest: Effort normal. No respiratory distress.   Abdominal: Normal appearance.   Neurological: She is alert and oriented to person, place, and time. Coordination normal.   Skin: Skin is dry, not diaphoretic and not pale.   Psychiatric: Her behavior is normal.  Judgment and thought content normal.       Assessment:     1. Medication refill    2. Glucose intolerance    3. IFG (impaired fasting glucose)        Plan:       Medication refill    Glucose intolerance    IFG (impaired fasting glucose)    Other orders  -     tirzepatide 5 mg/0.5 mL PnIj; Inject 5 mg into the skin every 7 days.  Dispense: 4 Pen; Refill: 0      1. I have refilled your mounjaro.   2. Please call our referral line at [phone] to help schedule with new endocrinologist or primary care visit for continued management as we are only able to provide a one time refill of your medication.   3. You must understand that you've received a Telehealth Urgent Care treatment only and that you may be released before all your medical problems are known or treated. You, the patient, will arrange for follow up care as instructed.  ?Patient voiced understanding and agrees to plan.

## 2024-07-11 NOTE — TELEPHONE ENCOUNTER
I call patient to offer her appt to come in and labs- patient decline offer, patient states she feel that if she come in and do labs you will prescribe her medication, patient is schedule for telehealth appt today   Prednisone Counseling:  I discussed with the patient the risks of prolonged use of prednisone including but not limited to weight gain, insomnia, osteoporosis, mood changes, diabetes, susceptibility to infection, glaucoma and high blood pressure.  In cases where prednisone use is prolonged, patients should be monitored with blood pressure checks, serum glucose levels and an eye exam.  Additionally, the patient may need to be placed on GI prophylaxis, PCP prophylaxis, and calcium and vitamin D supplementation and/or a bisphosphonate.  The patient verbalized understanding of the proper use and the possible adverse effects of prednisone.  All of the patient's questions and concerns were addressed.

## 2024-07-11 NOTE — TELEPHONE ENCOUNTER
Pt calling about Mounjaro refill and sent medication refill and she got 1 year supply and tried to send refill to Shanique Tovar with [facility] and she got word back that this MD is no longer with [facility]. Would you be able to send in refill. Please reach out to pt. She said that she is leaving on vacation and trying to get it filled prior to leaving. Pt triaged and care advice to discuss with MD and get call back by nurse.                 Reason for Disposition   Prescription refill request for ESSENTIAL medicine (i.e., likelihood of harm to patient if not taken) and triager unable to refill per department policy    Protocols used: Medication Refill and Renewal Call-A-OH

## 2024-07-11 NOTE — TELEPHONE ENCOUNTER
I have not seen the patient for chronic health problems since October 2021.  I seen her for covid in January 2024 but that was for urgent care visit - I have not assessed her chronic problems since 2021.    Patient has not had an ENDOCRINOLOGY office visit since January 2023 - over 1 1/2 years ago.      I do NOT feel comfortable filling medication without updated labs and office visit with me for physical exam/evaluation.    She should reach out to the ENDOCRINOLOGIST office of Dr. Lewis - I did not confirm that Joshua is in fact gone but if so - there should be another Endocrinologist on call answering those messages - taking over patient care - CALL ENDO OFFICE and establish care/discuss refill with the endocrinology persons taking over.

## 2024-07-11 NOTE — PATIENT INSTRUCTIONS
1. I have refilled your mounjaro.   2. Please call our referral line at [phone] to help schedule with new endocrinologist or primary care visit for continued management as we are only able to provide a one time refill of your medication.   3. You must understand that you've received a Telehealth Urgent Care treatment only and that you may be released before all your medical problems are known or treated. You, the patient, will arrange for follow up care as instructed.

## 2024-07-22 DIAGNOSIS — E78.00 PURE HYPERCHOLESTEROLEMIA: ICD-10-CM

## 2024-07-22 RX ORDER — ALPRAZOLAM 0.5 MG/1
0.5 TABLET ORAL 3 TIMES DAILY PRN
Qty: 270 TABLET | Refills: 0 | OUTPATIENT
Start: 2024-07-22 | End: 2024-07-26

## 2024-07-22 RX ORDER — EVOLOCUMAB 140 MG/ML
140 INJECTION, SOLUTION SUBCUTANEOUS
Qty: 2 EACH | Refills: 11 | Status: CANCELLED | OUTPATIENT
Start: 2024-07-22

## 2024-07-23 DIAGNOSIS — E78.00 PURE HYPERCHOLESTEROLEMIA: ICD-10-CM

## 2024-07-23 RX ORDER — EVOLOCUMAB 140 MG/ML
140 INJECTION, SOLUTION SUBCUTANEOUS
Qty: 2 EACH | Refills: 11 | Status: SHIPPED | OUTPATIENT
Start: 2024-07-23

## 2024-07-24 ENCOUNTER — PROCEDURE VISIT (OUTPATIENT)
Dept: NEUROLOGY | Facility: CLINIC | Age: 69
End: 2024-07-24
Payer: MEDICARE

## 2024-07-24 DIAGNOSIS — M13.80 SERONEGATIVE ARTHRITIS: ICD-10-CM

## 2024-07-24 DIAGNOSIS — M18.12 PRIMARY OSTEOARTHRITIS OF FIRST CARPOMETACARPAL JOINT OF LEFT HAND: ICD-10-CM

## 2024-07-24 DIAGNOSIS — G56.02 LEFT CARPAL TUNNEL SYNDROME: ICD-10-CM

## 2024-07-24 DIAGNOSIS — G56.01 RIGHT CARPAL TUNNEL SYNDROME: ICD-10-CM

## 2024-07-24 DIAGNOSIS — M79.7 FIBROMYALGIA: ICD-10-CM

## 2024-07-24 NOTE — PROCEDURES
CC:  Oscar Lassiter is here today for   Chief Complaint   Patient presents with   • Office Visit     post op/ swelling and pain/ s/p Revision amputation, right great toe.    DOS 2/19/21-EOG 5/20/21      .    Referring MD Sarabjit Fontenot MD    PCP Sarabjit Fontenot MD    Medications: medications verified and updated  Refills needed today?  Yes  Maybe antibiotic and refill of the Percocet 10/325 MG   denies known Latex allergy or symptoms of Latex sensitivity.  Patient would like communication of their results via:    [facility]  Tobacco history: verified                  EMG W/ ULTRASOUND AND NERVE CONDUCTION TEST 2 Extremities    Date/Time: 7/24/2024 10:00 AM    Performed by: Lai Jerome MD  Authorized by: Michael Yates MD                                                                 [facility] [address]     Subjective:       Patient ID: Madalyn Iverson is a 69 y.o. female here for a EMG focused evaluation for arm and hand pain. Previous visits and diagnostic evaluation reviewed.  Patient with a complicated past medical history including rheumatoid arthritis as well as bilateral carpal tunnel release procedures and right ulnar nerve surgery.  She describes continued progressive bilateral hand pain and numbness.  She also describes right shoulder pain which radiates down her right arm.  Symptoms have been progressively worsening and severe at times.   HPI  Review of patient's allergies indicates:  No Known Allergies   There were no vitals filed for this visit.   Chief Complaint: No chief complaint on file.    Past Medical History:   Diagnosis Date    Acute biliary pancreatitis without infection or necrosis 6/13/2018    Acute pancreatitis     June 2018 - admitted at [facility]    ADHD (attention deficit hyperactivity disorder), inattentive type     Aortic atherosclerosis 3/2/2019    Mild to moderate aortic atherosclerosis seen on CT abd/pelvis 3/2/2019    Chronic back pain     Followed by [facility] - Dr. Bautista - has been having medial branch blocks    Fibromyalgia     treated by Dr. Thorpe in past and now treated by Dr. Yates - Rheumatologist    Hyperlipidemia     High triglycerides    Hypertension     Hypothyroidism     Treated by Dr. Mathew    IFG (impaired fasting glucose)     Migraine     Treated by neurologist - Dr. Destiny Florez    Ulcerative colitis     Patient reported treated by Dr. Lee and Aaron in past  but on admit in 2018 - no UC seen on recent colonoscopy.  It was noted on colonoscopy in  2004 there were ulcers noted to descending colon but not present on sigmoidoscopy in 2004.    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 Ghotra    Vitamin D deficiency 3/31/2016      Social History     Socioeconomic History    Marital status:    Tobacco Use    Smoking status: Former     Current packs/day: 0.00     Average packs/day: 1 pack/day for 20.0 years (20.0 ttl pk-yrs)     Types: Cigarettes     Start date: 1977     Quit date: 1997     Years since quittin.4    Smokeless tobacco: Former   Substance and Sexual Activity    Alcohol use: No    Drug use: No    Sexual activity: Never     Social Determinants of Health     Financial Resource Strain: Low Risk  (3/19/2024)    Overall Financial Resource Strain (CARDIA)     Difficulty of Paying Living Expenses: Not very hard   Food Insecurity: No Food Insecurity (3/19/2024)    Hunger Vital Sign     Worried About Running Out of Food in the Last Year: Never true     Ran Out of Food in the Last Year: Never true   Transportation Needs: No Transportation Needs (3/19/2024)    PRAPARE - Transportation     Lack of Transportation (Medical): No     Lack of Transportation (Non-Medical): No   Physical Activity: Insufficiently Active (3/19/2024)    Exercise Vital Sign     Days of Exercise per Week: 3 days     Minutes of Exercise per Session: 10 min   Stress: No Stress Concern Present (3/19/2024)    [employer] [place] [employer] - Occupational Stress Questionnaire     Feeling of Stress : Only a little   Housing Stability: Low Risk  (3/19/2024)    Housing Stability Vital Sign     Unable to Pay for Housing in the Last Year: No     Number of Places Lived in the Last Year: 1     Unstable Housing in the Last Year: No            Objective:      Physical Exam    Constitutional: Patient appears well-nourished.   Head: Normocephalic and atraumatic.   Mouth/Throat: Oropharynx is clear and  moist.   Pulmonary/Chest: Effort normal.   Abdominal: Soft.   Skin: Skin is warm and dry.      General:  Patient is alert and cooperative.  Affect:  Patient is appropriate to surroundings and environment.  Language:  Speech is fluent.  HEENT:  There are no outward signs of trauma to head or face.  Cranial Nerves:  Pupils are equal round and reactive to light. Extra-ocular movements are intact. Face, tongue, and palate are symmetrical.  Motor:  Patient exhibits normal strength testing in bilateral proximal and distal upper extremities.  Reflexes:  Symmetrical in bilateral upper extremities.  Gait:  Ambulation is independent without use of cane or walker without signs of ataxia or circumduction.  Cerebellar:  Normal finger to nose testing without dysmetria.  Sensory:  Intact to sensory modalities tested.  Musculoskeletal:  There is no severe tenderness to palpation and manipulation of the cervical spine region.   Assessment:       We reviewed and discussed at length results of EMG of bilateral upper extremities performed today notable for moderate left and mild right carpal tunnel syndrome without significant evidence of cervical radiculopathy. These findings are available via media section of chart review.   1. Seronegative arthritis    2. Primary osteoarthritis of first carpometacarpal joint of left hand    3. Left carpal tunnel syndrome    4. Right carpal tunnel syndrome              Plan:       We discussed treatment options at length. Recommend patient keep appointment with referring provider.      We specifically discussed the pathophysiology of carpal tunnel syndrome and treatment options including bracing, injections and possible surgical intervention.      I spent a total of 35 minutes on the day of the visit. This includes face to face time and non-face to face time preparing to see the patient (eg, review of tests), obtaining and/or reviewing separately obtained history, documenting clinical information in  the electronic or other health record, independently interpreting results and communicating results to the patient/family/caregiver, or care coordinator.    Lai Jerome MD, FAAN  07/24/2024   10:55 AM     A dictation device was used to produce this document. Use of such devices sometimes results in grammatical errors or replacement of words that sound similarly.

## 2024-07-25 ENCOUNTER — HOSPITAL ENCOUNTER (OUTPATIENT)
Facility: HOSPITAL | Age: 69
Discharge: HOME | End: 2024-07-26
Attending: EMERGENCY MEDICINE | Admitting: STUDENT IN AN ORGANIZED HEALTH CARE EDUCATION/TRAINING PROGRAM
Payer: MEDICARE

## 2024-07-25 DIAGNOSIS — S93.401A SPRAIN OF RIGHT ANKLE, UNSPECIFIED LIGAMENT, INITIAL ENCOUNTER: ICD-10-CM

## 2024-07-25 DIAGNOSIS — R91.8 LUNG NODULES: ICD-10-CM
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
## 2024-07-25 DIAGNOSIS — H43.812 POSTERIOR VITREOUS DETACHMENT OF LEFT EYE: Primary | ICD-10-CM

## 2024-07-25 DIAGNOSIS — R07.9 CHEST PAIN: ICD-10-CM

## 2024-07-25 DIAGNOSIS — G93.9 BRAIN LESION: ICD-10-CM

## 2024-07-25 DIAGNOSIS — G93.89 BRAIN MASS: ICD-10-CM

## 2024-07-25 DIAGNOSIS — S80.00XA CONTUSION OF KNEE, UNSPECIFIED LATERALITY, INITIAL ENCOUNTER: ICD-10-CM

## 2024-07-25 DIAGNOSIS — R55 SYNCOPE: ICD-10-CM

## 2024-07-25 PROBLEM — M89.9: Status: ACTIVE | Noted: 2024-07-25

## 2024-07-25 LAB
BUN SERPL-MCNC: 26 MG/DL (ref 6–30)
CHLORIDE SERPL-SCNC: 102 MMOL/L (ref 95–110)
CREAT SERPL-MCNC: 0.8 MG/DL (ref 0.5–1.4)
GLUCOSE SERPL-MCNC: 111 MG/DL (ref 70–110)
HCT VFR BLD CALC: 46 %PCV (ref 36–54)
POC IONIZED CALCIUM: 1.23 MMOL/L (ref 1.06–1.42)
POC TCO2 (MEASURED): 28 MMOL/L (ref 23–29)
POTASSIUM BLD-SCNC: 5 MMOL/L (ref 3.5–5.1)
SAMPLE: ABNORMAL
SODIUM BLD-SCNC: 140 MMOL/L (ref 136–145)

## 2024-07-25 PROCEDURE — 99285 EMERGENCY DEPT VISIT HI MDM: CPT | Mod: 25

## 2024-07-25 PROCEDURE — 99204 OFFICE O/P NEW MOD 45 MIN: CPT | Mod: ,,, | Performed by: NEUROLOGICAL SURGERY

## 2024-07-25 PROCEDURE — 96375 TX/PRO/DX INJ NEW DRUG ADDON: CPT

## 2024-07-25 PROCEDURE — 25000003 PHARM REV CODE 250: Performed by: PHYSICIAN ASSISTANT

## 2024-07-25 PROCEDURE — 96374 THER/PROPH/DIAG INJ IV PUSH: CPT

## 2024-07-25 PROCEDURE — 63600175 PHARM REV CODE 636 W HCPCS: Performed by: PHYSICIAN ASSISTANT

## 2024-07-25 RX ORDER — LORAZEPAM 2 MG/ML
1 INJECTION INTRAMUSCULAR
Status: COMPLETED | OUTPATIENT
Start: 2024-07-25 | End: 2024-07-25

## 2024-07-25 RX ORDER — GADOBUTROL 604.72 MG/ML
8 INJECTION INTRAVENOUS
Status: COMPLETED | OUTPATIENT
Start: 2024-07-26 | End: 2024-07-25

## 2024-07-25 RX ORDER — PROPARACAINE HYDROCHLORIDE 5 MG/ML
1 SOLUTION/ DROPS OPHTHALMIC
Status: COMPLETED | OUTPATIENT
Start: 2024-07-25 | End: 2024-07-25

## 2024-07-25 RX ORDER — LIDOCAINE 50 MG/G
2 PATCH TOPICAL
Status: COMPLETED | OUTPATIENT
Start: 2024-07-25 | End: 2024-07-26

## 2024-07-25 RX ORDER — HYDROCODONE BITARTRATE AND ACETAMINOPHEN 5; 325 MG/1; MG/1
1-2 TABLET ORAL DAILY PRN
Qty: 60 TABLET | Refills: 0 | Status: SHIPPED | OUTPATIENT
Start: 2024-07-25

## 2024-07-25 RX ORDER — ACETAMINOPHEN 500 MG
1000 TABLET ORAL
Status: COMPLETED | OUTPATIENT
Start: 2024-07-25 | End: 2024-07-25

## 2024-07-25 RX ADMIN — FLUORESCEIN SODIUM 1 EACH: 1 STRIP OPHTHALMIC at 05:07

## 2024-07-25 RX ADMIN — ACETAMINOPHEN 1000 MG: 500 TABLET ORAL at 06:07

## 2024-07-25 RX ADMIN — LORAZEPAM 1 MG: 2 INJECTION INTRAMUSCULAR; INTRAVENOUS at 11:07

## 2024-07-25 RX ADMIN — GADOBUTROL 8 ML: 604.72 INJECTION INTRAVENOUS at 11:07

## 2024-07-25 RX ADMIN — LIDOCAINE 5% 2 PATCH: 700 PATCH TOPICAL at 06:07

## 2024-07-25 RX ADMIN — PROPARACAINE HYDROCHLORIDE 1 DROP: 5 SOLUTION/ DROPS OPHTHALMIC at 05:07

## 2024-07-25 NOTE — ED TRIAGE NOTES
"Pt presents to the ED following a fall at a store. Pt states there were some tiles missing in the floor and she lost her footing. Pt reports she fell face down, she denies LOC, blood thinner use, and did not hit her head. Pt reports pain to bilateral knees, bilateral ankles, bilateral shoulders, and lower back pain. Pt is currently in 10/10 pain. Pt also endorses seeing a "straight white light" out of the corner of her L eye.   "

## 2024-07-25 NOTE — ED PROVIDER NOTES
"Encounter Date: 7/25/2024       History     Chief Complaint   Patient presents with    Fall     Pt hjad a fall today in store c/o bilateral knee pain and ankle pain.  States she is also seeing "flashes of light" to her left eye.  Denies LOC, does not remember if she hit floor.      69-year-old female with a history of chronic back pain, fibromyalgia, hypertension, hypothyroidism presents ER for evaluation after a trip and fall that occurred at around noon today.  Patient states she was at the store and she tripped over some loose tile on the floor.  She lost her balance and fell forward striking both her knees, hands, and possibly her head.  She is not sure of head injury or LOC.  States that she was able to ambulate after the injury.  States that about an hour after the injury she noticed a flashing light to the left lateral eye.  She also feels like there was a piece of hair that she can not removed from her visual site.  She has noticed some floaters to the left eye as well.  She has had a history of cataract surgery otherwise no other abnormalities.  She does not use contact lenses or glasses.  Denies any pain to the eye.  No vision loss otherwise.  No episodes of vomiting after the injury.  Denies any paresthesia focal weakness.  Does not use any blood thinners.    The history is provided by the patient.     Review of patient's allergies indicates:  No Known Allergies  Past Medical History:   Diagnosis Date    Acute biliary pancreatitis without infection or necrosis 6/13/2018    Acute pancreatitis     June 2018 - admitted at [facility]    ADHD (attention deficit hyperactivity disorder), inattentive type     Aortic atherosclerosis 3/2/2019    Mild to moderate aortic atherosclerosis seen on CT abd/pelvis 3/2/2019    Chronic back pain     Followed by [facility] - Dr. Bautista - has been having medial branch blocks    Fibromyalgia     treated by Dr. Thorpe in past and now " treated by Dr. Yates - Rheumatologist    Hyperlipidemia     High triglycerides    Hypertension     Hypothyroidism     Treated by Dr. Mathew    IFG (impaired fasting glucose)     Migraine     Treated by neurologist - Dr. Destiny Florez    Ulcerative colitis     Patient reported treated by Dr. Lee and Aaron in past  but on admit in 2018 - no UC seen on recent colonoscopy.  It was noted on colonoscopy in 2004 there were ulcers noted to descending colon but not present on sigmoidoscopy in 2004.    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 Ghotra    Vitamin D deficiency 3/31/2016     Past Surgical History:   Procedure Laterality Date    CARPAL TUNNEL RELEASE Right 2019    Procedure: RELEASE, CARPAL TUNNEL right;  Surgeon: Li Waller MD;  Location: [address];  Service: Orthopedics;  Laterality: Right;  stretcher, supine, hand pan 1 and 2    CARPAL TUNNEL RELEASE Left 2021    Procedure: RELEASE, CARPAL TUNNEL;  Surgeon: Brian Reed Jr., MD;  Location: [facility];  Service: Orthopedics;  Laterality: Left;     SECTION      COLONOSCOPY  10/08/2010    Dr. Lee - repeat in 5 years - has appointment for colonoscopy 2016    COLONOSCOPY  2016    Dr. Kelly - normal  colon - repeat in 10 years - scanned report to media file.    ENDOSCOPIC ULTRASOUND OF UPPER GASTROINTESTINAL TRACT N/A 2018    Procedure: ULTRASOUND, ENDOSCOPIC, UPPER GI TRACT;  Surgeon: Reji Russell MD;  Location: [facility];  Service: Endoscopy;  Laterality: N/A;    ENDOSCOPIC ULTRASOUND OF UPPER GASTROINTESTINAL TRACT N/A 2019    Procedure: ULTRASOUND, UPPER GI TRACT, ENDOSCOPIC;  Surgeon: Randy Boyd MD;  Location: [facility];  Service: Endoscopy;  Laterality: N/A;    HYSTERECTOMY      INTERPOSITION ARTHROPLASTY OF CARPOMETACARPAL JOINTS Left 2021    Procedure: INTERPOSITION ARTHROPLASTY, CMC  JOINT;  Surgeon: Brian Reed Jr., MD;  Location: [facility];  Service: Orthopedics;  Laterality: Left;  need arthrex tightrope   Brit notified  LB, Brit confirmed 21 AM    LAPAROSCOPIC CHOLECYSTECTOMY N/A 2019    Procedure: CHOLECYSTECTOMY, LAPAROSCOPIC;  Surgeon: Hill Palacios MD;  Location: [facility];  Service: General;  Laterality: N/A;    medial branch block      done at [facility] for axial back pain    OOPHORECTOMY      SHOULDER ARTHROSCOPY W/ ROTATOR CUFF REPAIR Right     Dr. Castro - rotator cuff repair; 3 screws placed, biceps tear repair.    SHOULDER SURGERY Left     TONSILLECTOMY      upper and lower GI  2016     Family History   Problem Relation Name Age of Onset    Hypertension Mother      Heart disease Mother      Hyperlipidemia Mother      Cancer Sister  53        thyroid cancer and lymph nodes involvement    Cancer Brother  61        colon and lung cancer    Colon cancer Brother  66    Cancer Brother  63        colon    Hypertension Brother      Diabetes Brother      Colon cancer Brother  60    Esophageal cancer Neg Hx      Stomach cancer Neg Hx      Ulcerative colitis Neg Hx      Crohn's disease Neg Hx      Irritable bowel syndrome Neg Hx      Celiac disease Neg Hx       Social History     Tobacco Use    Smoking status: Former     Current packs/day: 0.00     Average packs/day: 1 pack/day for 20.0 years (20.0 ttl pk-yrs)     Types: Cigarettes     Start date: 1977     Quit date: 1997     Years since quittin.4    Smokeless tobacco: Former   Substance Use Topics    Alcohol use: No    Drug use: No     Review of Systems   Constitutional:  Negative for chills and fever.   HENT:  Negative for congestion.    Eyes:  Positive for visual disturbance. Negative for photophobia, pain, discharge, redness and itching.   Respiratory:  Negative for shortness of breath.    Cardiovascular:  Negative for chest pain.   Gastrointestinal:  Negative for nausea and  vomiting.   Genitourinary:  Negative for dysuria and flank pain.   Musculoskeletal:  Positive for arthralgias and joint swelling. Negative for myalgias.   Skin:  Negative for rash and wound.   Allergic/Immunologic: Negative for immunocompromised state.   Neurological:  Negative for weakness and numbness.   Hematological:  Does not bruise/bleed easily.   Psychiatric/Behavioral:  Negative for confusion.        Physical Exam     Initial Vitals [07/25/24 1646]   BP Pulse Resp Temp SpO2   (!) 180/94 106 16 98.6 °F (37 °C) 99 %      MAP       --         Physical Exam    Vitals reviewed.  Constitutional: She appears well-developed and well-nourished. She is not diaphoretic. No distress.   HENT:   Head: Normocephalic and atraumatic.   Eyes: Conjunctivae and EOM are normal. Pupils are equal, round, and reactive to light.   Slit lamp exam:       The right eye shows no corneal abrasion, no corneal ulcer and no fluorescein uptake.        The left eye shows no corneal abrasion, no corneal ulcer and no fluorescein uptake.   Ocular pressure:  OS 23/20/19  OD17   Neck: Neck supple.   Cardiovascular:  Normal rate, regular rhythm, normal heart sounds and intact distal pulses.           Pulmonary/Chest: Breath sounds normal. No respiratory distress.   Musculoskeletal:         General: Normal range of motion.      Cervical back: Neck supple.      Thoracic back: Normal.      Lumbar back: Tenderness present. Negative right straight leg raise test and negative left straight leg raise test.      Right knee: Tenderness (diffuse) present. Normal pulse.      Left knee: Tenderness (diffuse) present. Normal pulse.      Right ankle: Tenderness present.     Neurological: She is alert and oriented to person, place, and time.   Skin: Skin is warm.         ED Course   Procedures  Labs Reviewed   COMPREHENSIVE METABOLIC PANEL - Abnormal       Result Value    Sodium 139      Potassium 4.0      Chloride 107      CO2 23      Glucose 98      BUN 18       Creatinine 0.8      Calcium 9.4      Total Protein 7.0      Albumin 3.5      Total Bilirubin 0.2      Alkaline Phosphatase 174 (*)     AST 22      ALT 61 (*)     eGFR >60.0      Anion Gap 9     URINALYSIS, REFLEX TO URINE CULTURE - Abnormal    Specimen UA Urine, Clean Catch      Color, UA Yellow      Appearance, UA Clear      pH, UA 5.0      Specific Gravity, UA 1.020      Protein, UA Negative      Glucose, UA Negative      Ketones, UA Negative      Bilirubin (UA) Negative      Occult Blood UA Negative      Nitrite, UA Negative      Leukocytes, UA Trace (*)     Narrative:     Specimen Source->Urine   COMPREHENSIVE METABOLIC PANEL - Abnormal    Sodium 138      Potassium 3.9      Chloride 104      CO2 25      Glucose 102      BUN 16      Creatinine 0.7      Calcium 9.3      Total Protein 6.9      Albumin 3.4 (*)     Total Bilirubin 0.3      Alkaline Phosphatase 167 (*)     AST 22      ALT 60 (*)     eGFR >60.0      Anion Gap 9     ISTAT PROCEDURE - Abnormal    POC Glucose 111 (*)     POC BUN 26      POC Creatinine 0.8      POC Sodium 140      POC Potassium 5.0      POC Chloride 102      POC TCO2 (MEASURED) 28      POC Ionized Calcium 1.23      POC Hematocrit 46      Sample JESSIE     CBC W/ AUTO DIFFERENTIAL    WBC 8.17      RBC 4.61      Hemoglobin 14.1      Hematocrit 43.4      MCV 94      MCH 30.6      MCHC 32.5      RDW 13.2      Platelets 198      MPV 11.5      Immature Granulocytes 0.2      Gran # (ANC) 5.2      Immature Grans (Abs) 0.02      Lymph # 2.2      Mono # 0.7      Eos # 0.1      Baso # 0.06      nRBC 0      Gran % 63.5      Lymph % 26.8      Mono % 8.2      Eosinophil % 0.6      Basophil % 0.7      Differential Method Automated     TROPONIN I    Troponin I <0.006     TSH    TSH 1.333     URINALYSIS MICROSCOPIC    RBC, UA 0      WBC, UA 4      Bacteria Occasional      Squam Epithel, UA 1      Microscopic Comment SEE COMMENT      Narrative:     Specimen Source->Urine   MAGNESIUM    Magnesium 2.2      PHOSPHORUS    Phosphorus 3.9     CBC W/ AUTO DIFFERENTIAL    WBC 7.17      RBC 4.35      Hemoglobin 13.3      Hematocrit 39.6      MCV 91      MCH 30.6      MCHC 33.6      RDW 13.1      Platelets 186      MPV 11.6      Immature Granulocytes 0.3      Gran # (ANC) 4.5      Immature Grans (Abs) 0.02      Lymph # 1.9      Mono # 0.6      Eos # 0.1      Baso # 0.05      nRBC 0      Gran % 62.7      Lymph % 26.6      Mono % 8.4      Eosinophil % 1.3      Basophil % 0.7      Differential Method Automated          ECG Results              EKG 12-lead (Final result)        Collection Time Result Time QRS Duration OHS QTC Calculation    07/26/24 02:31:14 07/26/24 08:03:04 84 452                     Final result by Interface, Lab In [facility] (07/26/24 10:59:02)                   Narrative:    Test Reason : R55,    Vent. Rate : 092 BPM     Atrial Rate : 092 BPM     P-R Int : 182 ms          QRS Dur : 084 ms      QT Int : 366 ms       P-R-T Axes : 052 025 060 degrees     QTc Int : 452 ms    Normal sinus rhythm  Possible Left atrial enlargement  Borderline Abnormal ECG  When compared with ECG of 06-SEP-2023 14:28,  No significant change was found  Confirmed by Keegan ALSTON MD ([id number]) on 7/26/2024 8:03:02 AM    Referred By: RORY   SELF           Confirmed By:Keegan ALSTON MD                                  Imaging Results              CT Chest Abdomen Pelvis With IV Contrast (XPD) NO Oral Contrast (Final result)  Result time 07/26/24 09:14:28      Final result by Mehnaz Monsivais MD (07/26/24 09:14:28)                   Impression:      No definite primary malignancy or metastasis visualized.    Several right lung nodules, indeterminate.  For multiple solid nodules with any 6 mm or greater, Fleischner Society guidelines recommend follow up with non-contrast chest CT at 3-6 months and 18-24 months after discovery.    Diverticulosis without diverticulitis.    Other stable findings as above.    Electronically signed by  resident: Néstor Valadez  Date:    07/26/2024  Time:    07:02    Electronically signed by: Mehnaz Monsivais MD  Date:    07/26/2024  Time:    09:14               Narrative:    EXAMINATION:  CT CHEST ABDOMEN PELVIS WITH IV CONTRAST (XPD)    CLINICAL HISTORY:  Metastatic disease evaluation;    TECHNIQUE:  Axial images of the chest, abdomen, and pelvis were acquired  after the use of 75 cc [id number] IV contrast. No oral contrast administered.  Coronal and sagittal reconstructions were also obtained.    COMPARISON:  Ultrasound abdomen 03/03/2019, CT abdomen pelvis 03/02/2019 and 06/05/2018    FINDINGS:  Thoracic soft tissues: Normal thyroid. No axillary lymphadenopathy.    Aorta: Thoracic aorta is normal in caliber and contour with mild calcific atherosclerosis.    Heart: Normal in size. No pericardial effusion. Mild calcific coronary atherosclerosis.    Kayla/Mediastinum: No mediastinal or hilar lymphadenopathy.    Lungs: Trachea and bronchi are patent.  Lungs symmetrically expanded without consolidation.  Several solid parenchymal and pleural based nodules in the right upper lobe posterior segment and right lower lobe superior segment, the largest of which measures 0.8 cm (series 6, images 138, 141, 153, and 175).  No pleural fluid or pneumothorax.    Liver: Normal in size and contour.  No focal hepatic lesion.    Gallbladder: Surgically absent.    Bile Ducts: No evidence of dilated ducts.    Pancreas: No mass or peripancreatic fat stranding.    Spleen: Unremarkable.    Esophagus: Unremarkable.    Stomach and duodenum: Unremarkable.    Adrenals: Unremarkable.    Kidneys/Ureters: Normal in size and location. Normal enhancement.  No hydronephrosis or nephrolithiasis. No ureteral dilatation.    Bladder: No evidence of wall thickening.    Reproductive organs: Uterus surgically absent    Bowel/Mesentery: Small bowel is normal in caliber with no evidence of obstruction.  No evidence of inflammation or wall thickening.  Colon  demonstrates no focal wall thickening.  The appendix is normal.  Diverticulosis coli without diverticulitis.  Normal appendix.    Peritoneum: No intraperitoneal free air or fluid.    Lymph nodes: No lymphadenopathy.    Abdominal wall: Unremarkable.  Small focus of subcutaneous air in the right abdominal wall soft tissues, likely from injection.    Vasculature: No aneurysm. Moderate circumferential calcific atherosclerosis.  Duplicate IVC.    Bones: No acute fracture. No suspicious osseous lesions. Degenerative disc disease with adjacent subchondral sclerotic changes most prominently at the levels of L2-3 and L4 -5.  Degenerative changes of the left sternoclavicular joint.                                        MRI Brain W WO Contrast (Final result)  Result time 07/26/24 03:23:16      Final result by Kun Bush MD (07/26/24 03:23:16)                   Impression:      Bilateral posterior fossa brightly enhancing, likely all extra-axial, nodular masses worrisome for meningeal metastatic disease and correspond to the findings on the preceding CT.  Other considerations might include multiple posterior fossa meningiomas.  Correlate clinically, and with continued imaging follow-up.    Chronic microvascular infarcts discussed above.    This report was flagged in Epic as abnormal.    Electronically signed by resident: Rebecca Carlson  Date:    07/26/2024  Time:    00:37    Electronically signed by: Kun Bush  Date:    07/26/2024  Time:    03:23               Narrative:    EXAMINATION:  MRI BRAIN W WO CONTRAST    CLINICAL HISTORY:  Brain metastases suspected;    TECHNIQUE:  Multiplanar multisequence MR imaging of the brain was performed pre and post 8 cc intravenous contrast.    COMPARISON:  CT head 07/25/2024    FINDINGS:  Ventricles are stable in size without evidence of hydrocephalus. No extra-axial blood or fluid collections.    The cerebral parenchyma demonstrates no mass effect, acute hemorrhage, or acute major  vascular distribution infarct.    However, in the posterior fossa, there are multiple nodular enhancing lesions measuring up to 21.8 cm demonstrating T1 isointense T2 intermediate signals these are also seen along the periphery of the cerebellum and are thought to represent extra-axial lesions.    Multifocal periventricular and centrum semiovale T2/FLAIR hyperintensities likely representing remote microvascular infarcts.    Normal vascular flow voids are preserved.    Skull/Extracranial Contents (limited evaluation): Mastoid air cells and paranasal sinuses are essentially clear.No acute abnormality of extracranial soft tissue.    Bone marrow signal intensity is normal.                                        CT Head Without Contrast (Final result)  Result time 07/25/24 21:38:22      Final result by González Petersen MD (07/25/24 21:38:22)                   Impression:      Findings most consistent with multiple dural-based lesions of the posterior fossa, as discussed above, as there is no prior examination available for comparison, follow-up MRI examination with contrast is recommended.    There is no evidence for cerebellar or cerebral edema, significant mass effect, midline shift or acute intracranial hemorrhage.    This report was flagged in Epic as abnormal.      Electronically signed by: González Petersen  Date:    07/25/2024  Time:    21:38               Narrative:    EXAMINATION:  CT HEAD WITHOUT CONTRAST    CLINICAL HISTORY:  Head trauma, minor (Age >= 65y);    TECHNIQUE:  Low dose axial images were obtained through the head.  Coronal and sagittal reformations were also performed. Contrast was not administered.    COMPARISON:  None.    FINDINGS:  There are multiple lesions of the posterior fossa noted, these appear to represent dural-based lesion with partial mineralization/calcification, as seen on axial images, axial image 16 on the right measuring 16 mm, axial image 17 posteriorly to the right measuring  14.2 mm, axial image 19 to the left measuring 13.3 mm.  There is a small area of focal density on axial image 21 measuring 6.3 mm on the left that appears to correspond to an area of focal calcifications/mineralization of the tentorium, similarly a tiny area posteriorly on axial image 21 as well.  These are not thought to represent areas of hemorrhage rather are most consistent with areas of dural-based lesions with calcifications/mineralization.    There is no appreciable cerebellar edema or cerebral edema on CT examination, and the 4th ventricle is patent.  Appropriate CSF spaces of the skull base and posterior fossa are noted.    The ventricular system and sulcal pattern otherwise demonstrate involutional change, chronic appearing white matter change noted.  There is no additional evidence for intracranial mass, and there is no evidence for acute intracranial hemorrhage.  There is no hydrocephalus.  There is no midline shift.    The mastoid air cells appear appropriate when accounting for averaging, there is mild mucosal thickening of the left sphenoid sinus.  The visualized orbits appear intact.  The osseous structures appear intact.                                       X-Ray Ankle Complete Right (Final result)  Result time 07/25/24 19:50:49      Final result by Brijesh Gómez MD (07/25/24 19:50:49)                   Impression:      Right ankle suspected mild nonspecific soft tissue swelling without acute displaced fracture-dislocation identified, which may represent sprain.      Electronically signed by: Brijesh Gómez MD  Date:    07/25/2024  Time:    19:50               Narrative:    EXAMINATION:  XR ANKLE COMPLETE 3 VIEW RIGHT    CLINICAL HISTORY:  Unspecified fall, initial encounter    TECHNIQUE:  AP, lateral, and oblique images of the right ankle were performed.    COMPARISON:  None    FINDINGS:  Bones are well mineralized. Overall alignment is within normal limits.  Mild prominence of the soft tissues  about the ankle suggesting nonspecific swelling.  Ankle mortise is otherwise intact.  No displaced fracture, dislocation or destructive osseous process.  Baseline minimal DJD at the ankle and dorsal hindfoot.  Prominent plantar calcaneal spur.  No subcutaneous emphysema or radiopaque foreign body.                                       X-Ray Knee 3 View Bilateral (Final result)  Result time 07/25/24 19:52:08      Final result by Brijesh Gómez MD (07/25/24 19:52:08)                   Impression:      No acute displaced fracture-dislocation identified at either knee.      Electronically signed by: Brijesh Gómez MD  Date:    07/25/2024  Time:    19:52               Narrative:    EXAMINATION:  XR KNEE 3 VIEW BILATERAL    CLINICAL HISTORY:  Unspecified fall, initial encounter    TECHNIQUE:  AP, lateral, and Merchant views of both knees were performed.    COMPARISON:  Bilateral knee series 07/09/2018    FINDINGS:  Overall alignment is within normal limits at each knee.  No displaced fracture, dislocation or destructive osseous process.  Relatively similar minimal tricompartmental degenerative change bilaterally.  No large suprapatellar joint effusion seen at either knee.  No subcutaneous emphysema or radiopaque foreign body.                                       X-Ray Shoulder Trauma Right (Final result)  Result time 07/25/24 19:16:58      Final result by Justin Tobar MD (07/25/24 19:16:58)                   Impression:      No acute radiographic abnormality.      Electronically signed by: Justin Tobar  Date:    07/25/2024  Time:    19:16               Narrative:    EXAMINATION:  XR SHOULDER TRAUMA 3 VIEW RIGHT    CLINICAL HISTORY:  Unspecified fall, initial encounter    TECHNIQUE:  Three or four views of the right shoulder were performed.    COMPARISON:  06/29/2023    FINDINGS:  The humeral head appears normally positioned.  No acute fracture, subluxation or dislocation.  Right hemithorax is clear.    Mild  degenerative changes of the humeral head and AC joint.    No significant change.                                       Medications   fluorescein ophthalmic strip 1 each (1 each Both Eyes Given by Provider 7/25/24 1721)   proparacaine 0.5 % ophthalmic solution 1 drop (1 drop Both Eyes Given by Provider 7/25/24 1723)   LIDOcaine 5 % patch 2 patch (2 patches Transdermal Patch Applied 7/25/24 1843)   acetaminophen tablet 1,000 mg (1,000 mg Oral Given 7/25/24 1842)   LORazepam injection 1 mg (1 mg Intravenous Given 7/25/24 2320)   gadobutroL (GADAVIST) injection 8 mL (8 mLs Intravenous Given 7/25/24 2336)   iohexoL (OMNIPAQUE 350) injection 75 mL (75 mLs Intravenous Given 7/26/24 0247)   ALPRAZolam tablet 0.5 mg (0.5 mg Oral Given 7/26/24 0326)   enalaprilat injection 1.25 mg (1.25 mg Intravenous Given 7/26/24 1456)     Medical Decision Making  Differential diagnosis:    Posterior vitreous detachment, retinal detachment, traumatic iritis, open globe, facial contusion, closed head injury, concussion, intracranial bleed, fracture, dislocation    Amount and/or Complexity of Data Reviewed  Labs: ordered. Decision-making details documented in ED Course.  Radiology: ordered. Decision-making details documented in ED Course.    Risk  OTC drugs.  Prescription drug management.         APC / Resident Notes:   Patient seen in the ER promptly upon arrival.  She is afebrile, no acute distress.  Physical examination reveals tenderness on palpation to the anterior aspect of bilateral knees and right ankle.  Range motion fully intact.  Patient is ambulatory with a steady gait.  No spinous process tenderness throughout the cervical, thoracic or lumbar spine.  She does have some tenderness to the musculature of the lumbar spine.  No pain on straight leg raise.  Eye exam was done with Wood's lamp and fluorescein.  No corneal abrasion or ulceration noted.  Pressure within normal limits.    Discussed findings of the eye exam with  Ophthalmology who will evaluate patient at bedside.  Pending evaluation at this time    Will obtain CT head, x-rays and color extremity.  Patient given Tylenol and patch in the ED.        ED Course as of 07/26/24 2313   Thu Jul 25, 2024 2100 X-Ray Ankle Complete Right  Impression:     Right ankle suspected mild nonspecific soft tissue swelling without acute displaced fracture-dislocation identified, which may represent sprain.         [AJ]   2100 X-Ray Shoulder Trauma Right  FINDINGS:  The humeral head appears normally positioned.  No acute fracture, subluxation or dislocation.  Right hemithorax is clear.     Mild degenerative changes of the humeral head and AC joint.     No significant change.   [AJ]   2100 X-Ray Knee 3 View Bilateral  FINDINGS:  Overall alignment is within normal limits at each knee.  No displaced fracture, dislocation or destructive osseous process.  Relatively similar minimal tricompartmental degenerative change bilaterally.  No large suprapatellar joint effusion seen at either knee.  No subcutaneous emphysema or radiopaque foreign body.      [AJ]   2100 Initial CT head read by radiologist during epic downtime.  Unable to visualize the CT image at this time.  There was concern for calcification versus intracranial bleed.    Discussed case with Neurosurgery who will view the CT image. [AJ]   2159 CT Head Without Contrast(!)  Impression:     Findings most consistent with multiple dural-based lesions of the posterior fossa, as discussed above, as there is no prior examination available for comparison, follow-up MRI examination with contrast is recommended.     There is no evidence for cerebellar or cerebral edema, significant mass effect, midline shift or acute intracranial hemorrhage.      [AJ]   2200 MRI of brain with contrast ordered [AJ]   2207 Ophthalmology has evaluated patient at bedside.  Exam reveals posterior vitreous detachment on ophthalmology evaluation.  Recommended close follow-up  with ophthalmology in clinic in 1 week.  Retinal detachment precautions were given [AJ]   2248 Patient evaluated by neurosurgery at bedside.  Further recommendation pending MRI results [AJ]   Fri Jul 26, 2024   0027 BP(!): 199/96  Reports that she is no longer taking her bp medication.    [AJ]   0046 Pending MRI read at this time.  Patient would like to be discharged home if stable findings and can be managed on outpatient basis.  Neurosurgery recommended has been medicine admission given syncopal episode however felt that this was an incidental finding and patient's fall was secondary to a trip and fall.  If stable feel that patient can be followed up on an outpatient basis [AJ]   0255 WBC: 8.17 [AC]   0255 Hemoglobin: 14.1 [AC]   0705 MRI Brain W WO Contrast(!) [SS]      ED Course User Index  [AC] Ad Olson DO  [AJ] Lamar Younger PA-C  [SS] Gil Quan MD                           Clinical Impression:  Final diagnoses:  [W19.XXXA] Fall  [H43.812] Posterior vitreous detachment of left eye (Primary)  [G93.9] Brain lesion  [S93.401A] Sprain of right ankle, unspecified ligament, initial encounter  [S80.00XA] Contusion of knee, unspecified laterality, initial encounter  [R55] Syncope  [G93.89] Brain mass          ED Disposition Condition    Observation Stable                Lamar Younger PA-C  07/26/24 0107       Lamar Younger PA-C  07/26/24 2313

## 2024-07-26 ENCOUNTER — TELEPHONE (OUTPATIENT)
Dept: FAMILY MEDICINE | Facility: CLINIC | Age: 69
End: 2024-07-26
Payer: MEDICARE

## 2024-07-26 ENCOUNTER — TELEPHONE (OUTPATIENT)
Dept: NEUROSURGERY | Facility: CLINIC | Age: 69
End: 2024-07-26
Payer: MEDICARE

## 2024-07-26 VITALS
OXYGEN SATURATION: 95 % | DIASTOLIC BLOOD PRESSURE: 75 MMHG | HEIGHT: 66 IN | SYSTOLIC BLOOD PRESSURE: 113 MMHG | WEIGHT: 166 LBS | TEMPERATURE: 98 F | RESPIRATION RATE: 18 BRPM | HEART RATE: 102 BPM | BODY MASS INDEX: 26.68 KG/M2

## 2024-07-26 PROBLEM — R91.8 LUNG NODULES: Status: ACTIVE | Noted: 2024-07-26

## 2024-07-26 PROBLEM — S93.409A ANKLE SPRAIN: Status: ACTIVE | Noted: 2024-07-26
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
## 2024-07-26 LAB
ALBUMIN SERPL BCP-MCNC: 3.4 G/DL (ref 3.5–5.2)
ALBUMIN SERPL BCP-MCNC: 3.5 G/DL (ref 3.5–5.2)
ALP SERPL-CCNC: 167 U/L (ref 55–135)
ALP SERPL-CCNC: 174 U/L (ref 55–135)
ALT SERPL W/O P-5'-P-CCNC: 60 U/L (ref 10–44)
ALT SERPL W/O P-5'-P-CCNC: 61 U/L (ref 10–44)
ANION GAP SERPL CALC-SCNC: 9 MMOL/L (ref 8–16)
ANION GAP SERPL CALC-SCNC: 9 MMOL/L (ref 8–16)
ASCENDING AORTA: 2.93 CM
AST SERPL-CCNC: 22 U/L (ref 10–40)
AST SERPL-CCNC: 22 U/L (ref 10–40)
AV INDEX (PROSTH): 0.43
AV MEAN GRADIENT: 14 MMHG
AV PEAK GRADIENT: 23 MMHG
AV VALVE AREA BY VELOCITY RATIO: 1.31 CM²
AV VALVE AREA: 1.39 CM²
AV VELOCITY RATIO: 0.4
BACTERIA #/AREA URNS AUTO: NORMAL /HPF
BASOPHILS # BLD AUTO: 0.05 K/UL (ref 0–0.2)
BASOPHILS # BLD AUTO: 0.06 K/UL (ref 0–0.2)
BASOPHILS NFR BLD: 0.7 % (ref 0–1.9)
BASOPHILS NFR BLD: 0.7 % (ref 0–1.9)
BILIRUB SERPL-MCNC: 0.2 MG/DL (ref 0.1–1)
BILIRUB SERPL-MCNC: 0.3 MG/DL (ref 0.1–1)
BILIRUB UR QL STRIP: NEGATIVE
BSA FOR ECHO PROCEDURE: 1.87 M2
BUN SERPL-MCNC: 16 MG/DL (ref 8–23)
BUN SERPL-MCNC: 18 MG/DL (ref 8–23)
CALCIUM SERPL-MCNC: 9.3 MG/DL (ref 8.7–10.5)
CALCIUM SERPL-MCNC: 9.4 MG/DL (ref 8.7–10.5)
CHLORIDE SERPL-SCNC: 104 MMOL/L (ref 95–110)
CHLORIDE SERPL-SCNC: 107 MMOL/L (ref 95–110)
CLARITY UR REFRACT.AUTO: CLEAR
CO2 SERPL-SCNC: 23 MMOL/L (ref 23–29)
CO2 SERPL-SCNC: 25 MMOL/L (ref 23–29)
COLOR UR AUTO: YELLOW
CREAT SERPL-MCNC: 0.7 MG/DL (ref 0.5–1.4)
CREAT SERPL-MCNC: 0.8 MG/DL (ref 0.5–1.4)
CV ECHO LV RWT: 0.41 CM
DIFFERENTIAL METHOD BLD: NORMAL
DIFFERENTIAL METHOD BLD: NORMAL
DOP CALC AO PEAK VEL: 2.42 M/S
DOP CALC AO VTI: 54.71 CM
DOP CALC LVOT AREA: 3.2 CM2
DOP CALC LVOT DIAMETER: 2.03 CM
DOP CALC LVOT PEAK VEL: 0.98 M/S
DOP CALC LVOT STROKE VOLUME: 76.31 CM3
DOP CALCLVOT PEAK VEL VTI: 23.59 CM
E/E' RATIO: 31.33 M/S
ECHO LV POSTERIOR WALL: 0.79 CM (ref 0.6–1.1)
EOSINOPHIL # BLD AUTO: 0.1 K/UL (ref 0–0.5)
EOSINOPHIL # BLD AUTO: 0.1 K/UL (ref 0–0.5)
EOSINOPHIL NFR BLD: 0.6 % (ref 0–8)
EOSINOPHIL NFR BLD: 1.3 % (ref 0–8)
ERYTHROCYTE [DISTWIDTH] IN BLOOD BY AUTOMATED COUNT: 13.1 % (ref 11.5–14.5)
ERYTHROCYTE [DISTWIDTH] IN BLOOD BY AUTOMATED COUNT: 13.2 % (ref 11.5–14.5)
EST. GFR  (NO RACE VARIABLE): >60 ML/MIN/1.73 M^2
EST. GFR  (NO RACE VARIABLE): >60 ML/MIN/1.73 M^2
FRACTIONAL SHORTENING: 29 % (ref 28–44)
GLUCOSE SERPL-MCNC: 102 MG/DL (ref 70–110)
GLUCOSE SERPL-MCNC: 98 MG/DL (ref 70–110)
GLUCOSE UR QL STRIP: NEGATIVE
HCT VFR BLD AUTO: 39.6 % (ref 37–48.5)
HCT VFR BLD AUTO: 43.4 % (ref 37–48.5)
HGB BLD-MCNC: 13.3 G/DL (ref 12–16)
HGB BLD-MCNC: 14.1 G/DL (ref 12–16)
HGB UR QL STRIP: NEGATIVE
IMM GRANULOCYTES # BLD AUTO: 0.02 K/UL (ref 0–0.04)
IMM GRANULOCYTES # BLD AUTO: 0.02 K/UL (ref 0–0.04)
IMM GRANULOCYTES NFR BLD AUTO: 0.2 % (ref 0–0.5)
IMM GRANULOCYTES NFR BLD AUTO: 0.3 % (ref 0–0.5)
INTERVENTRICULAR SEPTUM: 0.91 CM (ref 0.6–1.1)
KETONES UR QL STRIP: NEGATIVE
LA MAJOR: 4.79 CM
LA MINOR: 4.8 CM
LA WIDTH: 3.2 CM
LEFT ATRIUM SIZE: 3.75 CM
LEFT ATRIUM VOLUME INDEX MOD: 26.3 ML/M2
LEFT ATRIUM VOLUME INDEX: 26.4 ML/M2
LEFT ATRIUM VOLUME MOD: 48.72 CM3
LEFT ATRIUM VOLUME: 48.91 CM3
LEFT INTERNAL DIMENSION IN SYSTOLE: 2.76 CM (ref 2.1–4)
LEFT VENTRICLE DIASTOLIC VOLUME INDEX: 35.4 ML/M2
LEFT VENTRICLE DIASTOLIC VOLUME: 65.49 ML
LEFT VENTRICLE MASS INDEX: 52 G/M2
LEFT VENTRICLE SYSTOLIC VOLUME INDEX: 15.4 ML/M2
LEFT VENTRICLE SYSTOLIC VOLUME: 28.58 ML
LEFT VENTRICULAR INTERNAL DIMENSION IN DIASTOLE: 3.89 CM (ref 3.5–6)
LEFT VENTRICULAR MASS: 96.96 G
LEUKOCYTE ESTERASE UR QL STRIP: ABNORMAL
LV LATERAL E/E' RATIO: 28.2 M/S
LV SEPTAL E/E' RATIO: 35.25 M/S
LYMPHOCYTES # BLD AUTO: 1.9 K/UL (ref 1–4.8)
LYMPHOCYTES # BLD AUTO: 2.2 K/UL (ref 1–4.8)
LYMPHOCYTES NFR BLD: 26.6 % (ref 18–48)
LYMPHOCYTES NFR BLD: 26.8 % (ref 18–48)
MAGNESIUM SERPL-MCNC: 2.2 MG/DL (ref 1.6–2.6)
MCH RBC QN AUTO: 30.6 PG (ref 27–31)
MCH RBC QN AUTO: 30.6 PG (ref 27–31)
MCHC RBC AUTO-ENTMCNC: 32.5 G/DL (ref 32–36)
MCHC RBC AUTO-ENTMCNC: 33.6 G/DL (ref 32–36)
MCV RBC AUTO: 91 FL (ref 82–98)
MCV RBC AUTO: 94 FL (ref 82–98)
MICROSCOPIC COMMENT: NORMAL
MONOCYTES # BLD AUTO: 0.6 K/UL (ref 0.3–1)
MONOCYTES # BLD AUTO: 0.7 K/UL (ref 0.3–1)
MONOCYTES NFR BLD: 8.2 % (ref 4–15)
MONOCYTES NFR BLD: 8.4 % (ref 4–15)
MV PEAK E VEL: 1.41 M/S
NEUTROPHILS # BLD AUTO: 4.5 K/UL (ref 1.8–7.7)
NEUTROPHILS # BLD AUTO: 5.2 K/UL (ref 1.8–7.7)
NEUTROPHILS NFR BLD: 62.7 % (ref 38–73)
NEUTROPHILS NFR BLD: 63.5 % (ref 38–73)
NITRITE UR QL STRIP: NEGATIVE
NRBC BLD-RTO: 0 /100 WBC
NRBC BLD-RTO: 0 /100 WBC
OHS QRS DURATION: 84 MS
OHS QTC CALCULATION: 452 MS
PH UR STRIP: 5 [PH] (ref 5–8)
PHOSPHATE SERPL-MCNC: 3.9 MG/DL (ref 2.7–4.5)
PISA TR MAX VEL: 1.7 M/S
PLATELET # BLD AUTO: 186 K/UL (ref 150–450)
PLATELET # BLD AUTO: 198 K/UL (ref 150–450)
PMV BLD AUTO: 11.5 FL (ref 9.2–12.9)
PMV BLD AUTO: 11.6 FL (ref 9.2–12.9)
POTASSIUM SERPL-SCNC: 3.9 MMOL/L (ref 3.5–5.1)
POTASSIUM SERPL-SCNC: 4 MMOL/L (ref 3.5–5.1)
PROT SERPL-MCNC: 6.9 G/DL (ref 6–8.4)
PROT SERPL-MCNC: 7 G/DL (ref 6–8.4)
PROT UR QL STRIP: NEGATIVE
RA MAJOR: 3.9 CM
RA WIDTH: 3.4 CM
RBC # BLD AUTO: 4.35 M/UL (ref 4–5.4)
RBC # BLD AUTO: 4.61 M/UL (ref 4–5.4)
RBC #/AREA URNS AUTO: 0 /HPF (ref 0–4)
RIGHT VENTRICLE DIASTOLIC BASEL DIMENSION: 3.1 CM
SINUS: 2.92 CM
SODIUM SERPL-SCNC: 138 MMOL/L (ref 136–145)
SODIUM SERPL-SCNC: 139 MMOL/L (ref 136–145)
SP GR UR STRIP: 1.02 (ref 1–1.03)
SQUAMOUS #/AREA URNS AUTO: 1 /HPF
STJ: 1.84 CM
TDI LATERAL: 0.05 M/S
TDI SEPTAL: 0.04 M/S
TDI: 0.05 M/S
TR MAX PG: 12 MMHG
TRICUSPID ANNULAR PLANE SYSTOLIC EXCURSION: 1.72 CM
TROPONIN I SERPL DL<=0.01 NG/ML-MCNC: <0.006 NG/ML (ref 0–0.03)
TSH SERPL DL<=0.005 MIU/L-ACNC: 1.33 UIU/ML (ref 0.4–4)
URN SPEC COLLECT METH UR: ABNORMAL
WBC # BLD AUTO: 7.17 K/UL (ref 3.9–12.7)
WBC # BLD AUTO: 8.17 K/UL (ref 3.9–12.7)
WBC #/AREA URNS AUTO: 4 /HPF (ref 0–5)
Z-SCORE OF LEFT VENTRICULAR DIMENSION IN END DIASTOLE: -2.79
Z-SCORE OF LEFT VENTRICULAR DIMENSION IN END SYSTOLE: -1.1

## 2024-07-26 PROCEDURE — A9585 GADOBUTROL INJECTION: HCPCS | Performed by: EMERGENCY MEDICINE

## 2024-07-26 PROCEDURE — G0378 HOSPITAL OBSERVATION PER HR: HCPCS

## 2024-07-26 PROCEDURE — 84443 ASSAY THYROID STIM HORMONE: CPT | Performed by: STUDENT IN AN ORGANIZED HEALTH CARE EDUCATION/TRAINING PROGRAM

## 2024-07-26 PROCEDURE — 84484 ASSAY OF TROPONIN QUANT: CPT | Performed by: STUDENT IN AN ORGANIZED HEALTH CARE EDUCATION/TRAINING PROGRAM

## 2024-07-26 PROCEDURE — 25000003 PHARM REV CODE 250

## 2024-07-26 PROCEDURE — 83735 ASSAY OF MAGNESIUM: CPT

## 2024-07-26 PROCEDURE — 25000003 PHARM REV CODE 250: Performed by: STUDENT IN AN ORGANIZED HEALTH CARE EDUCATION/TRAINING PROGRAM

## 2024-07-26 PROCEDURE — 81001 URINALYSIS AUTO W/SCOPE: CPT | Performed by: STUDENT IN AN ORGANIZED HEALTH CARE EDUCATION/TRAINING PROGRAM

## 2024-07-26 PROCEDURE — 25500020 PHARM REV CODE 255: Performed by: EMERGENCY MEDICINE

## 2024-07-26 PROCEDURE — 80053 COMPREHEN METABOLIC PANEL: CPT | Performed by: STUDENT IN AN ORGANIZED HEALTH CARE EDUCATION/TRAINING PROGRAM

## 2024-07-26 PROCEDURE — 96372 THER/PROPH/DIAG INJ SC/IM: CPT | Mod: 59

## 2024-07-26 PROCEDURE — 93005 ELECTROCARDIOGRAM TRACING: CPT

## 2024-07-26 PROCEDURE — 93010 ELECTROCARDIOGRAM REPORT: CPT | Mod: ,,, | Performed by: INTERNAL MEDICINE

## 2024-07-26 PROCEDURE — 85025 COMPLETE CBC W/AUTO DIFF WBC: CPT | Performed by: STUDENT IN AN ORGANIZED HEALTH CARE EDUCATION/TRAINING PROGRAM

## 2024-07-26 PROCEDURE — 63600175 PHARM REV CODE 636 W HCPCS

## 2024-07-26 PROCEDURE — 84100 ASSAY OF PHOSPHORUS: CPT

## 2024-07-26 PROCEDURE — 85025 COMPLETE CBC W/AUTO DIFF WBC: CPT | Mod: 91

## 2024-07-26 PROCEDURE — 25000003 PHARM REV CODE 250: Performed by: INTERNAL MEDICINE

## 2024-07-26 PROCEDURE — 80053 COMPREHEN METABOLIC PANEL: CPT | Mod: 91

## 2024-07-26 PROCEDURE — 96375 TX/PRO/DX INJ NEW DRUG ADDON: CPT

## 2024-07-26 RX ORDER — SODIUM CHLORIDE 0.9 % (FLUSH) 0.9 %
10 SYRINGE (ML) INJECTION EVERY 12 HOURS PRN
Status: DISCONTINUED | OUTPATIENT
Start: 2024-07-26 | End: 2024-07-26 | Stop reason: HOSPADM

## 2024-07-26 RX ORDER — ALUMINUM HYDROXIDE, MAGNESIUM HYDROXIDE, AND SIMETHICONE 1200; 120; 1200 MG/30ML; MG/30ML; MG/30ML
30 SUSPENSION ORAL 4 TIMES DAILY PRN
Status: DISCONTINUED | OUTPATIENT
Start: 2024-07-26 | End: 2024-07-26 | Stop reason: HOSPADM

## 2024-07-26 RX ORDER — FUROSEMIDE 40 MG/1
40 TABLET ORAL DAILY
Status: DISCONTINUED | OUTPATIENT
Start: 2024-07-26 | End: 2024-07-26 | Stop reason: HOSPADM

## 2024-07-26 RX ORDER — LEVOTHYROXINE SODIUM 75 UG/1
75 TABLET ORAL
Status: DISCONTINUED | OUTPATIENT
Start: 2024-07-26 | End: 2024-07-26 | Stop reason: HOSPADM

## 2024-07-26 RX ORDER — GLUCAGON 1 MG
1 KIT INJECTION
Status: DISCONTINUED | OUTPATIENT
Start: 2024-07-26 | End: 2024-07-26 | Stop reason: HOSPADM

## 2024-07-26 RX ORDER — ONDANSETRON 8 MG/1
8 TABLET, ORALLY DISINTEGRATING ORAL EVERY 8 HOURS PRN
Status: DISCONTINUED | OUTPATIENT
Start: 2024-07-26 | End: 2024-07-26 | Stop reason: HOSPADM

## 2024-07-26 RX ORDER — CYANOCOBALAMIN 1000 UG/ML
1000 INJECTION, SOLUTION INTRAMUSCULAR; SUBCUTANEOUS
Status: DISCONTINUED | OUTPATIENT
Start: 2024-07-26 | End: 2024-07-26 | Stop reason: HOSPADM

## 2024-07-26 RX ORDER — LISINOPRIL 20 MG/1
40 TABLET ORAL DAILY
Status: DISCONTINUED | OUTPATIENT
Start: 2024-07-26 | End: 2024-07-26 | Stop reason: HOSPADM

## 2024-07-26 RX ORDER — LISINOPRIL 40 MG/1
40 TABLET ORAL DAILY
Qty: 30 TABLET | Refills: 0 | Status: SHIPPED | OUTPATIENT
Start: 2024-07-27 | End: 2025-07-27

## 2024-07-26 RX ORDER — ALPRAZOLAM 0.25 MG/1
0.5 TABLET ORAL
Status: COMPLETED | OUTPATIENT
Start: 2024-07-26 | End: 2024-07-26

## 2024-07-26 RX ORDER — TALC
6 POWDER (GRAM) TOPICAL NIGHTLY PRN
Status: CANCELLED | OUTPATIENT
Start: 2024-07-26

## 2024-07-26 RX ORDER — POLYETHYLENE GLYCOL 3350 17 G/17G
17 POWDER, FOR SOLUTION ORAL DAILY
Status: DISCONTINUED | OUTPATIENT
Start: 2024-07-26 | End: 2024-07-26 | Stop reason: HOSPADM

## 2024-07-26 RX ORDER — SIMETHICONE 80 MG
1 TABLET,CHEWABLE ORAL 4 TIMES DAILY PRN
Status: DISCONTINUED | OUTPATIENT
Start: 2024-07-26 | End: 2024-07-26 | Stop reason: HOSPADM

## 2024-07-26 RX ORDER — ACETAMINOPHEN 325 MG/1
650 TABLET ORAL EVERY 4 HOURS PRN
Status: DISCONTINUED | OUTPATIENT
Start: 2024-07-26 | End: 2024-07-26 | Stop reason: HOSPADM

## 2024-07-26 RX ORDER — ENOXAPARIN SODIUM 100 MG/ML
40 INJECTION SUBCUTANEOUS EVERY 24 HOURS
Status: DISCONTINUED | OUTPATIENT
Start: 2024-07-26 | End: 2024-07-26 | Stop reason: HOSPADM

## 2024-07-26 RX ORDER — SODIUM CHLORIDE 0.9 % (FLUSH) 0.9 %
10 SYRINGE (ML) INJECTION
Status: CANCELLED | OUTPATIENT
Start: 2024-07-26

## 2024-07-26 RX ORDER — IBUPROFEN 200 MG
16 TABLET ORAL
Status: DISCONTINUED | OUTPATIENT
Start: 2024-07-26 | End: 2024-07-26 | Stop reason: HOSPADM

## 2024-07-26 RX ORDER — VERAPAMIL HYDROCHLORIDE 240 MG/1
240 TABLET, FILM COATED, EXTENDED RELEASE ORAL 2 TIMES DAILY
Status: DISCONTINUED | OUTPATIENT
Start: 2024-07-26 | End: 2024-07-26

## 2024-07-26 RX ORDER — PROCHLORPERAZINE EDISYLATE 5 MG/ML
5 INJECTION INTRAMUSCULAR; INTRAVENOUS EVERY 6 HOURS PRN
Status: DISCONTINUED | OUTPATIENT
Start: 2024-07-26 | End: 2024-07-26 | Stop reason: HOSPADM

## 2024-07-26 RX ORDER — LISINOPRIL 40 MG/1
40 TABLET ORAL DAILY
Qty: 30 TABLET | Refills: 1 | Status: CANCELLED | OUTPATIENT
Start: 2024-07-26 | End: 2025-07-26

## 2024-07-26 RX ORDER — TALC
6 POWDER (GRAM) TOPICAL NIGHTLY PRN
Status: DISCONTINUED | OUTPATIENT
Start: 2024-07-26 | End: 2024-07-26 | Stop reason: HOSPADM

## 2024-07-26 RX ORDER — NALOXONE HCL 0.4 MG/ML
0.02 VIAL (ML) INJECTION
Status: DISCONTINUED | OUTPATIENT
Start: 2024-07-26 | End: 2024-07-26 | Stop reason: HOSPADM

## 2024-07-26 RX ORDER — ENALAPRILAT 1.25 MG/ML
1.25 INJECTION INTRAVENOUS ONCE
Status: COMPLETED | OUTPATIENT
Start: 2024-07-26 | End: 2024-07-26

## 2024-07-26 RX ORDER — IBUPROFEN 200 MG
24 TABLET ORAL
Status: DISCONTINUED | OUTPATIENT
Start: 2024-07-26 | End: 2024-07-26 | Stop reason: HOSPADM

## 2024-07-26 RX ADMIN — ENALAPRILAT 1.25 MG: 2.5 INJECTION INTRAVENOUS at 02:07

## 2024-07-26 RX ADMIN — IOHEXOL 75 ML: 350 INJECTION, SOLUTION INTRAVENOUS at 02:07

## 2024-07-26 RX ADMIN — LISINOPRIL 40 MG: 20 TABLET ORAL at 02:07

## 2024-07-26 RX ADMIN — ENOXAPARIN SODIUM 40 MG: 40 INJECTION SUBCUTANEOUS at 05:07

## 2024-07-26 RX ADMIN — FUROSEMIDE 40 MG: 40 TABLET ORAL at 11:07

## 2024-07-26 RX ADMIN — LEVOTHYROXINE SODIUM 75 MCG: 75 TABLET ORAL at 05:07

## 2024-07-26 RX ADMIN — CYANOCOBALAMIN 1000 MCG: 1000 INJECTION, SOLUTION INTRAMUSCULAR at 11:07

## 2024-07-26 RX ADMIN — ALPRAZOLAM 0.5 MG: 0.25 TABLET ORAL at 03:07

## 2024-07-26 NOTE — ASSESSMENT & PLAN NOTE
Better controlled after lisinopril 40mg.     Plan to discharge on Lisinopril 40mg daily.  She should follow up with her PCP for additional labs.

## 2024-07-26 NOTE — ASSESSMENT & PLAN NOTE
69F w pmhx of HTN, HLD who presents after a fall. NSGY was consulted for evaluation of multiple dural based posterior fossa lesions.    Imaging:  -CTH w/o 7/25: multiple dural based lesions of posterior fossa; no cerebellar edema, MLS, hydro, or bleed     Plan:  -NSGY has seen and evaluated pt at bedside  -All labs and diagnostics reviewed  -Recommend admitting to  for full syncope workup  -Recommend MRI brain w/wo for further evaluation  -No acute neurosurgical intervention required at this time  -ADAT  -NSGY will continue to follow, please notify if there are any abrupt changes in neurologic status

## 2024-07-26 NOTE — H&P
"  [facility] - Emergency Dept  [facility] Medicine  History & Physical    Patient Name: Madalyn Iverson  MRN: [id number]  Patient Class: OP- Observation  Admission Date: 7/25/2024  Attending Physician: Shawna Mendoza MD   Primary Care Provider: Ruth Jaime NP         Patient information was obtained from patient, past medical records, and ER records.     Subjective:     Principal Problem:Lesion of posterior fossa of cranial cavity    Chief Complaint:   Chief Complaint   Patient presents with    Fall     Pt hjad a fall today in store c/o bilateral knee pain and ankle pain.  States she is also seeing "flashes of light" to her left eye.  Denies LOC, does not remember if she hit floor.         HPI: Patient is a 69F with HTN/HLD here following fall initially concerning for syncopal episode at the [facility]. She states stumbled over a square of raised tile and then fell down. Did not lose consciousness or hit her head. No lightheadedness, dizziness, dyspnea, chest pain, or palpitations preceding fall. Does not typically struggle with balance. [place] [facility] [place] concern for fracture given mechanism of fall. Does not take blood thinners. Did reports some flashing light and floaters briefly in her L eye following the fall. 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 No medication changes recently. No fever or chills, no abdominal pain, nausea, vomiting, or diarrhea.     In ED, hypertensive up to 201/89, initial tachycardia to 106 which improved to 90s, afebrile, on RA.  CMP with no electrolyte derangements, no renal impairment, mild ALT elevation to 61, ALP mild elevation to 174. CBC with no anemia or leukocytosis. TSH normal. Troponin normal. EKG NSR, no AV block. 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 XR series for trauma including R ankle, R shoulder, and bilateral knees " negative for acute fracture. NSGY consulted for brain MRI findings and recommended staging CT CAP and  admission for syncopal workup.     Past Medical History:   Diagnosis Date    Acute biliary pancreatitis without infection or necrosis 2018    Acute pancreatitis     2018 - admitted at [facility]    ADHD (attention deficit hyperactivity disorder), inattentive type     Aortic atherosclerosis 3/2/2019    Mild to moderate aortic atherosclerosis seen on CT abd/pelvis 3/2/2019    Chronic back pain     Followed by [facility] - Dr. Bautista - has been having medial branch blocks    Fibromyalgia     treated by Dr. Thorpe in past and now treated by Dr. Yates - Rheumatologist    Hyperlipidemia     High triglycerides    Hypertension     Hypothyroidism     Treated by Dr. Mathew    IFG (impaired fasting glucose)     Migraine     Treated by neurologist - Dr. Destiny Florez    Ulcerative colitis     Patient reported treated by Dr. Lee and Aaron in past  but on admit in  - no UC seen on recent colonoscopy.  It was noted on colonoscopy in 2004 there were ulcers noted to descending colon but not present on sigmoidoscopy in 2004.    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 Ghotra    Vitamin D deficiency 3/31/2016       Past Surgical History:   Procedure Laterality Date    CARPAL TUNNEL RELEASE Right 2019    Procedure: RELEASE, CARPAL TUNNEL right;  Surgeon: Li Waller MD;  Location: [address];  Service: Orthopedics;  Laterality: Right;  stretcher, supine, hand pan 1 and 2    CARPAL TUNNEL RELEASE Left 2021    Procedure: RELEASE, CARPAL TUNNEL;  Surgeon: Brian Reed Jr., MD;  Location: [facility];  Service: Orthopedics;  Laterality: Left;     SECTION      COLONOSCOPY  10/08/2010    Dr. Lee - repeat in 5 years - has appointment for colonoscopy 2016  "   COLONOSCOPY  04/06/2016    Dr. eKlly - normal  colon - repeat in 10 years - scanned report to media file.    ENDOSCOPIC ULTRASOUND OF UPPER GASTROINTESTINAL TRACT N/A 07/18/2018    Procedure: ULTRASOUND, ENDOSCOPIC, UPPER GI TRACT;  Surgeon: Reji Russell MD;  Location: [facility] ENDO;  Service: Endoscopy;  Laterality: N/A;    ENDOSCOPIC ULTRASOUND OF UPPER GASTROINTESTINAL TRACT N/A 03/04/2019    Procedure: ULTRASOUND, UPPER GI TRACT, ENDOSCOPIC;  Surgeon: Randy Boyd MD;  Location: [facility] ENDO;  Service: Endoscopy;  Laterality: N/A;    HYSTERECTOMY      INTERPOSITION ARTHROPLASTY OF CARPOMETACARPAL JOINTS Left 07/09/2021    Procedure: INTERPOSITION ARTHROPLASTY, CMC JOINT;  Surgeon: Brian Reed Jr., MD;  Location: [facility] OR;  Service: Orthopedics;  Laterality: Left;  need arthrex tightrope   Brit notified 6/22 LB, Brit confirmed 7/8/21 AM    LAPAROSCOPIC CHOLECYSTECTOMY N/A 03/06/2019    Procedure: CHOLECYSTECTOMY, LAPAROSCOPIC;  Surgeon: Hill Palacios MD;  Location: [facility] OR;  Service: General;  Laterality: N/A;    medial branch block      done at [facility] for axial back pain    OOPHORECTOMY      SHOULDER ARTHROSCOPY W/ ROTATOR CUFF REPAIR Right     Dr. Castro - rotator cuff repair; 3 screws placed, biceps tear repair.    SHOULDER SURGERY Left     TONSILLECTOMY      upper and lower GI  03/2016       Review of patient's allergies indicates:  No Known Allergies    Current Facility-Administered Medications on File Prior to Encounter   Medication    0.9%  NaCl infusion    evolocumab PnIj 140 mg    evolocumab PnIj 140 mg    mupirocin 2 % ointment     Current Outpatient Medications on File Prior to Encounter   Medication Sig    ALPRAZolam (XANAX) 0.5 MG tablet Take 1 tablet (0.5 mg total) by mouth 3 (three) times daily as needed for Anxiety.    BD INSULIN SYRINGE 1 mL 25 gauge x 5/8" Syrg Inject 1 mL into the skin once a week.    cyanocobalamin 1,000 mcg/mL injection INJECT 1 ML " "(1,000 MCG TOTAL) INTO THE SKIN EVERY 30 DAYS    ergocalciferol (ERGOCALCIFEROL) 50,000 unit Cap TAKE 1 CAPSULE (50,000 UNITS TOTAL) BY MOUTH EVERY 7 DAYS. FOR 12 DOSES    evolocumab (REPATHA SURECLICK) 140 mg/mL PnIj Take 1 mL (140 mg total) by mouth every 14 (fourteen) days.    folic acid (FOLVITE) 1 MG tablet TAKE 1 TABLET BY MOUTH EVERY DAY (Patient not taking: Reported on 2024)    furosemide (LASIX) 40 MG tablet Take 1 tablet (40 mg total) by mouth once daily.    HYDROcodone-acetaminophen (NORCO) 5-325 mg per tablet Take 1 to 2 tablets by mouth daily as needed.    potassium chloride (MICRO-K) 10 MEQ CpSR TAKE 1 CAPSULE BY MOUTH TWICE A DAY    syringe-needle,safety,disp unt 1 mL 25 gauge x 5/8" Syrg To use with mtx injections    thyroid, pork, (ARMOUR THYROID) 60 mg Tab One tab daily    tirzepatide 5 mg/0.5 mL PnIj Inject 5 mg into the skin every 7 days.    tiZANidine (ZANAFLEX) 4 MG tablet TAKE 1 TABLET BY MOUTH FOUR TIMES A DAY    traZODone (DESYREL) 100 MG tablet TAKE 1 TABLET BY MOUTH EVERY DAY IN THE EVENING    verapamiL (VERELAN) 240 MG C24P TAKE 1 CAPSULE (240 MG TOTAL) BY MOUTH 2 (TWO) TIMES A DAY.     Family History       Problem Relation (Age of Onset)    Cancer Sister (53), Brother (61), Brother (63)    Colon cancer Brother (66), Brother (60)    Diabetes Brother    Heart disease Mother    Hyperlipidemia Mother    Hypertension Mother, Brother          Tobacco Use    Smoking status: Former     Current packs/day: 0.00     Average packs/day: 1 pack/day for 20.0 years (20.0 ttl pk-yrs)     Types: Cigarettes     Start date: 1977     Quit date: 1997     Years since quittin.4    Smokeless tobacco: Former   Substance and Sexual Activity    Alcohol use: No    Drug use: No    Sexual activity: Never     Review of Systems   Constitutional:  Negative for activity change, appetite change, chills and fever.   HENT:  Negative for congestion.    Respiratory:  Negative for cough, chest tightness and " shortness of breath.    Cardiovascular:  Negative for chest pain and palpitations.   Gastrointestinal:  Negative for abdominal pain.   Genitourinary:  Negative for dysuria.   Musculoskeletal:  Negative for arthralgias and joint swelling.   Neurological:  Negative for dizziness, syncope, speech difficulty, weakness, light-headedness, numbness and headaches.   Psychiatric/Behavioral:  Negative for agitation, behavioral problems and confusion.      Objective:     Vital Signs (Most Recent):  Temp: 97.4 °F (36.3 °C) (07/26/24 0353)  Pulse: 88 (07/26/24 0402)  Resp: 17 (07/26/24 0402)  BP: (!) 153/73 (07/26/24 0402)  SpO2: 97 % (07/26/24 0402) Vital Signs (24h Range):  Temp:  [97.4 °F (36.3 °C)-98.7 °F (37.1 °C)] 97.4 °F (36.3 °C)  Pulse:  [] 88  Resp:  [15-18] 17  SpO2:  [97 %-99 %] 97 %  BP: (153-201)/(73-96) 153/73     Weight: 75.3 kg (166 lb)  Body mass index is 26.79 kg/m².     Physical Exam  Vitals and nursing note reviewed.   Constitutional:       General: She is not in acute distress.     Appearance: Normal appearance. She is not ill-appearing.   HENT:      Head: Normocephalic and atraumatic.      Right Ear: External ear normal.      Left Ear: External ear normal.      Nose: Nose normal.      Mouth/Throat:      Mouth: Mucous membranes are moist.      Pharynx: Oropharynx is clear.   Eyes:      General: No scleral icterus.     Extraocular Movements: Extraocular movements intact.   Cardiovascular:      Rate and Rhythm: Normal rate and regular rhythm.      Pulses: Normal pulses.      Heart sounds: Murmur heard.      Comments: Soft systolic murmur heard primarily in aortic window, also heard in pulmonic and tricuspid windows  No JVD  Pulmonary:      Effort: Pulmonary effort is normal.      Breath sounds: Normal breath sounds. No wheezing or rales.   Abdominal:      General: Abdomen is flat.      Palpations: Abdomen is soft.   Genitourinary:     General: Normal vulva.      Rectum: Normal.   Musculoskeletal:          General: No swelling. Normal range of motion.      Cervical back: Normal range of motion and neck supple.   Skin:     General: Skin is warm and dry.      Capillary Refill: Capillary refill takes less than 2 seconds.   Neurological:      General: No focal deficit present.      Mental Status: She is alert and oriented to person, place, and time.      Sensory: No sensory deficit.      Motor: No weakness.      Coordination: Coordination normal.   Psychiatric:         Mood and Affect: Mood normal.         Behavior: Behavior normal.                Significant Labs: All pertinent labs within the past 24 hours have been reviewed.  CBC:   Recent Labs   Lab 07/25/24  2245 07/26/24  0230   WBC  --  8.17   HGB  --  14.1   HCT 46 43.4   PLT  --  198     CMP:   Recent Labs   Lab 07/26/24  0230      K 4.0      CO2 23   GLU 98   BUN 18   CREATININE 0.8   CALCIUM 9.4   PROT 7.0   ALBUMIN 3.5   BILITOT 0.2   ALKPHOS 174*   AST 22   ALT 61*   ANIONGAP 9     Troponin:   Recent Labs   Lab 07/26/24  0230   TROPONINI <0.006     TSH:   Recent Labs   Lab 07/26/24  0230   TSH 1.333     Urine Studies:   Recent Labs   Lab 07/26/24  0236   COLORU Yellow   APPEARANCEUA Clear   PHUR 5.0   SPECGRAV 1.020   PROTEINUA Negative   GLUCUA Negative   KETONESU Negative   BILIRUBINUA Negative   OCCULTUA Negative   NITRITE Negative   LEUKOCYTESUR Trace*   RBCUA 0   WBCUA 4   BACTERIA Occasional   SQUAMEPITHEL 1       Significant Imaging: I have reviewed all pertinent imaging results/findings within the past 24 hours.  Assessment/Plan:     * Lesion of posterior fossa of cranial cavity  Fall  Noted on CT head and then confirmed on brain MRI 7/26. Bilateral posterior fossa and along periphery of cerebellum. No cerebral edema, midline shift, or bleed noted. Concerning for neoplastic process. No B symptoms. No balance issues. NSGY following.     - NSGY following, appreciate recs   - F/u CT chest/abd/pelvis for staging if cerebral findings represent  metastatic process  - No steroids needed given lack of edema   - No BP parameters needed per NSGY      Fall  Mechanical fall from raised tile. No vasovagal prodrome including palpitations, lightheadedness, dizziness, or shortness of breath. Does not struggle with balance typically. No vertiginous symptoms. EKG w/o AV block or atrial arrhythmia. No electrolyte derangements. NSGY requesting syncopal workup, patient seemed to describe syncope at that time.     - TTE  - Telemetry   - CTM CMP       Hypothyroidism  On [place] 60mg. Equivalent to approx 75mcg.       Essential hypertension  Chronic, uncontrolled. Latest blood pressure and vitals reviewed-     Temp:  [97.4 °F (36.3 °C)-98.7 °F (37.1 °C)]   Pulse:  []   Resp:  [15-18]   BP: (171-201)/(84-96)   SpO2:  [97 %-99 %] .   Home meds for hypertension were reviewed and noted below.   Hypertension Medications               furosemide (LASIX) 40 MG tablet Take 1 tablet (40 mg total) by mouth once daily.    verapamiL (VERELAN) 240 MG C24P TAKE 1 CAPSULE (240 MG TOTAL) BY MOUTH 2 (TWO) TIMES A DAY.            While in the hospital, will manage blood pressure as follows; Adjust home antihypertensive regimen as follows- Continue verapamil (longstanding medication). Continue Lasix    Will utilize p.r.n. blood pressure medication only if patient's blood pressure greater than 220/110 and she develops symptoms such as worsening chest pain or shortness of breath.      VTE Risk Mitigation (From admission, onward)           Ordered     enoxaparin injection 40 mg  Daily         07/26/24 0355     IP VTE HIGH RISK PATIENT  Once         07/26/24 0355     Place sequential compression device  Until discontinued         07/26/24 0355                           On 07/26/2024, patient should be placed in hospital observation services under my care in collaboration with Dr. Mendoza.         Soledad Stacy MD  Department of Hospital Medicine  [facility] - Emergency Dept

## 2024-07-26 NOTE — PROGRESS NOTES
Procedure explained.  Bubble study done x 2 with and without valsalva via right a/c sl. Tolerated well. Sl flushed before and after with 10 cc ns.  Lumen clamped.

## 2024-07-26 NOTE — PLAN OF CARE
Evaluated patient at bedside.   Blood pressure 211/98.  Repeated and similar on multiple readings.     -Enalaprilat ordered  -Will follow with lisinopril.

## 2024-07-26 NOTE — CONSULTS
"Consultation Report  Ophthalmology Service    Date: 2024    Reason for Consult: "new flash of ligh/ visual disturbance- concern for posterior vitreous detachment/ retinal detachment s/p fall today"     History of Present Illness: Madalyn Iverson is a 69 y.o. female with history of CEIOL ou who presented to [facility] ED for intermittent flashes and floaters. Ophthalmology is being consulted to evaluate concern for retinal detachment. Patient denies any changes in vision or curtain-veil in visual field ou. Denies any ocular discomfort ou.    POcularHx: Denies history of ocular problems or past ocular surgeries other than cataract surgery.    Current eye gtts: Denies     Family Hx: Denies family history of glaucoma, macular degeneration, or blindness. family history includes Cancer (age of onset: 53) in her sister; Cancer (age of onset: 61) in her brother; Cancer (age of onset: 63) in her brother; Colon cancer (age of onset: 60) in her brother; Colon cancer (age of onset: 66) in her brother; Diabetes in her brother; Heart disease in her mother; Hyperlipidemia in her mother; Hypertension in her brother and mother.     PMHx:  has a past medical history of Acute biliary pancreatitis without infection or necrosis (2018), Acute pancreatitis, ADHD (attention deficit hyperactivity disorder), inattentive type, Aortic atherosclerosis (3/2/2019), Chronic back pain, Fibromyalgia, Hyperlipidemia, Hypertension, Hypothyroidism, IFG (impaired fasting glucose), Migraine, Ulcerative colitis, Ventricular diastolic dysfunction determined by echocardiography (2021), and Vitamin D deficiency (3/31/2016).     PSurgHx:  has a past surgical history that includes  section; Hysterectomy; Tonsillectomy; Shoulder surgery (Left); Colonoscopy (10/08/2010); upper and lower GI (2016); Colonoscopy (2016); Oophorectomy; Endoscopic ultrasound of upper gastrointestinal tract (N/A, 2018); Endoscopic ultrasound of upper " "gastrointestinal tract (N/A, 03/04/2019); Laparoscopic cholecystectomy (N/A, 03/06/2019); Carpal tunnel release (Right, 03/27/2019); medial branch block; Interposition arthroplasty of carpometacarpal joints (Left, 07/09/2021); Carpal tunnel release (Left, 07/09/2021); and Shoulder arthroscopy w/ rotator cuff repair (Right).     Home Medications:   Prior to Admission medications    Medication Sig Start Date End Date Taking? Authorizing Provider   ALPRAZolam (XANAX) 0.5 MG tablet Take 1 tablet (0.5 mg total) by mouth 3 (three) times daily as needed for Anxiety. 7/22/24 10/20/24  Michael Yates MD   BD INSULIN SYRINGE 1 mL 25 gauge x 5/8" Syrg Inject 1 mL into the skin once a week. 8/1/23   Yuri Wang MD   cyanocobalamin 1,000 mcg/mL injection INJECT 1 ML (1,000 MCG TOTAL) INTO THE SKIN EVERY 30 DAYS 6/20/24   Michael Yates MD   ergocalciferol (ERGOCALCIFEROL) 50,000 unit Cap TAKE 1 CAPSULE (50,000 UNITS TOTAL) BY MOUTH EVERY 7 DAYS. FOR 12 DOSES 2/9/23   Michael Yates MD   evolocumab (REPATHA SURECLICK) 140 mg/mL PnIj Take 1 mL (140 mg total) by mouth every 14 (fourteen) days. 7/23/24   Lissette Pitt, NP   folic acid (FOLVITE) 1 MG tablet TAKE 1 TABLET BY MOUTH EVERY DAY  Patient not taking: Reported on 1/19/2024 4/2/21   Michael Yates MD   furosemide (LASIX) 40 MG tablet Take 1 tablet (40 mg total) by mouth once daily. 3/16/23   Candace Dean, FNP-C   HYDROcodone-acetaminophen (NORCO) 5-325 mg per tablet Take 1 to 2 tablets by mouth daily as needed. 7/25/24   Michael Yates MD   potassium chloride (MICRO-K) 10 MEQ CpSR TAKE 1 CAPSULE BY MOUTH TWICE A DAY 4/27/21   Ruth Jaime, NP   syringe-needle,safety,disp unt 1 mL 25 gauge x 5/8" Syrg To use with mtx injections 1/11/21   Michael Yates MD   thyroid, pork, (ARMOUR THYROID) 60 mg Tab One tab daily 4/17/24   Michael Yates MD   tirzepatide 5 mg/0.5 " mL PnIj Inject 5 mg into the skin every 7 days. 7/11/24   Emmy Pelayo, RAMA   tiZANidine (ZANAFLEX) 4 MG tablet TAKE 1 TABLET BY MOUTH FOUR TIMES A DAY 5/17/21   Michael Yates MD   traZODone (DESYREL) 100 MG tablet TAKE 1 TABLET BY MOUTH EVERY DAY IN THE EVENING 6/13/24   Michael Yates MD   verapamiL (VERELAN) 240 MG C24P TAKE 1 CAPSULE (240 MG TOTAL) BY MOUTH 2 (TWO) TIMES A DAY. 8/4/23 8/3/24  Shanique Lewis MD        Medications this encounter:    cyanocobalamin  1,000 mcg Subcutaneous Q30 Days    enoxparin  40 mg Subcutaneous Daily    evolocumab  140 mg Subcutaneous 1 time in Clinic/HOD    evolocumab  140 mg Subcutaneous 1 time in Clinic/HOD    furosemide  40 mg Oral Daily    levothyroxine  75 mcg Oral Before breakfast    polyethylene glycol  17 g Oral Daily       Allergies: has No Known Allergies.     Social:  reports that she quit smoking about 27 years ago. Her smoking use included cigarettes. She started smoking about 47 years ago. She has a 20 pack-year smoking history. She has quit using smokeless tobacco. She reports that she does not drink alcohol and does not use drugs.     ROS: As per HPI    Ocular examination/Dilated fundus examination:  Base Eye Exam       Visual Acuity (Snellen - Linear)         Right Left    Dist sc 20/25 20/25              Tonometry (Tonopen, 8:17 AM)         Right Left    Pressure 16 16              Pupils         Dark Light Shape React APD    Right 2 1 Round Brisk None    Left 2 1 Round Brisk None              Visual Fields (Counting fingers)         Right Left     Full Full              Extraocular Movement         Right Left     Full, Ortho Full, Ortho              Neuro/Psych       Oriented x3: Yes    Mood/Affect: Normal              Dilation       Both eyes: 1% Mydriacyl, 2.5% Phenylephrine @ 1900                  Slit Lamp and Fundus Exam       External Exam         Right Left    External Normal Normal              Slit Lamp Exam          Right Left    Lids/Lashes Normal Normal    Conjunctiva/Sclera White and quiet White and quiet    Cornea Clear Clear    Anterior Chamber Deep and quiet Deep and quiet    Iris Round and reactive Round and reactive    Lens Posterior chamber intraocular lens, PCO Posterior chamber intraocular lens, PCO    Anterior Vitreous Vitreous syneresis Barnett ring, Vitreous syneresis, Posterior vitreous detachment, Nasal vitreous floaters              Fundus Exam         Right Left    Disc Pink and sharp Pink and sharp    C/D Ratio 0.1 0.1    Macula flat flat    Vessels Normal caliber and crossing Normal caliber and crossing    Periphery Flat 360, no H/T/D Flat 360, no H/T/D                      Assessment/Plan:     # Posterior vitreous detachment, left eye  - Pt presents with history of CEIOL OU ~5 years ago and recent fall after which she noticed 1-2 floaters and an intermittent flash in her temporal visual field  - No evidence of retinal tear, hole, or detachment on dilated fundoscopic exam with scleral depression  - Funez sign negative, Barnett ring noted nasally  - RD precautions given (increase in floaters or flashing lights, worsening vision, appearance of curtain or shadow- patient to return immediately)  - Pt to return for follow up in 2-3 weeks for repeat exam by retina specialist with scleral depression - message sent to staff for follow up appointment      Patient's Best Contact Number: [phone]     Valdemar Major MD   [facility] Ophthalmology PGY2  07/26/2024  8:19 AM        Common Ophthalmologic Abbreviations  OD: right eye  OS: left eye  OU: both eyes  IOP: intraocular pressure  VA: visual acuity  PH: pinhole  HM: hand motion  LP: light perception  NLP: no light perception  DFE: dilated fundus examination  SLE: slit lamp examination  RD: retinal detachment   AT: artificial tears  PFAT: preservative free artificial tears

## 2024-07-26 NOTE — DISCHARGE INSTRUCTIONS
Follow-up with ophthalmology next week as scheduled.  Ice, elevate, rest.  Use the ankle brace for comfort and support.

## 2024-07-26 NOTE — SUBJECTIVE & OBJECTIVE
(Not in a hospital admission)      Review of patient's allergies indicates:  No Known Allergies    Past Medical History:   Diagnosis Date    Acute biliary pancreatitis without infection or necrosis 2018    Acute pancreatitis     2018 - [facility] at [facility]    ADHD (attention deficit hyperactivity disorder), inattentive type     Aortic atherosclerosis 3/2/2019    Mild to moderate aortic atherosclerosis seen on CT abd/pelvis 3/2/2019    Chronic back pain     Followed by [facility] - Dr. Bautista - has been having medial branch blocks    Fibromyalgia     treated by Dr. Thorpe in past and now treated by Dr. Yates - Rheumatologist    Hyperlipidemia     High triglycerides    Hypertension     Hypothyroidism     Treated by Dr. Mathew    IFG (impaired fasting glucose)     Migraine     Treated by neurologist - Dr. Destiny Florez    Ulcerative colitis     Patient reported treated by Dr. Lee and Aaron in past  but on admit in  - no UC seen on recent colonoscopy.  It was noted on colonoscopy in 2004 there were ulcers noted to descending colon but not present on sigmoidoscopy in 2004.    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 Ghotra    Vitamin D deficiency 3/31/2016     Past Surgical History:   Procedure Laterality Date    CARPAL TUNNEL RELEASE Right 2019    Procedure: RELEASE, CARPAL TUNNEL right;  Surgeon: Li Waller MD;  Location: [address];  Service: Orthopedics;  Laterality: Right;  stretcher, supine, hand pan 1 and 2    CARPAL TUNNEL RELEASE Left 2021    Procedure: RELEASE, CARPAL TUNNEL;  Surgeon: Brian Reed Jr., MD;  Location: [facility];  Service: Orthopedics;  Laterality: Left;     SECTION      COLONOSCOPY  10/08/2010    Dr. Lee - repeat in 5 years - has appointment for colonoscopy 2016    COLONOSCOPY  2016    Dr. Kelly -  normal  colon - repeat in 10 years - scanned report to media file.    ENDOSCOPIC ULTRASOUND OF UPPER GASTROINTESTINAL TRACT N/A 2018    Procedure: ULTRASOUND, ENDOSCOPIC, UPPER GI TRACT;  Surgeon: Reji Russell MD;  Location: [facility] END[facility];  Service: Endoscopy;  Laterality: N/A;    ENDOSCOPIC ULTRASOUND OF UPPER GASTROINTESTINAL TRACT N/A 2019    Procedure: ULTRASOUND, UPPER GI TRACT, ENDOSCOPIC;  Surgeon: Randy Boyd MD;  Location: [facility] END[facility];  Service: Endoscopy;  Laterality: N/A;    HYSTERECTOMY      INTERPOSITION ARTHROPLASTY OF CARPOMETACARPAL JOINTS Left 2021    Procedure: INTERPOSITION ARTHROPLASTY, CMC JOINT;  Surgeon: Brian Reed Jr., MD;  Location: [facility] OR;  Service: Orthopedics;  Laterality: Left;  need arthrex tightrope   Brit notified  LB, Brit confirmed 21 AM    LAPAROSCOPIC CHOLECYSTECTOMY N/A 2019    Procedure: CHOLECYSTECTOMY, LAPAROSCOPIC;  Surgeon: Hill Palacios MD;  Location: [facility] OR;  Service: General;  Laterality: N/A;    medial branch block      done at [facility] for axial back pain    OOPHORECTOMY      SHOULDER ARTHROSCOPY W/ ROTATOR CUFF REPAIR Right     Dr. Castro - rotator cuff repair; 3 screws placed, biceps tear repair.    SHOULDER SURGERY Left     TONSILLECTOMY      upper and lower GI  2016     Family History       Problem Relation (Age of Onset)    Cancer Sister (53), Brother (61), Brother (63)    Colon cancer Brother (66), Brother (60)    Diabetes Brother    Heart disease Mother    Hyperlipidemia Mother    Hypertension Mother, Brother          Tobacco Use    Smoking status: Former     Current packs/day: 0.00     Average packs/day: 1 pack/day for 20.0 years (20.0 ttl pk-yrs)     Types: Cigarettes     Start date: 1977     Quit date: 1997     Years since quittin.4    Smokeless tobacco: Former   Substance and Sexual Activity    Alcohol use: No    Drug use: No    Sexual activity: Never     Review of  "Systems  Objective:     Weight: 75.3 kg (166 lb)  Body mass index is 26.79 kg/m².  Vital Signs (Most Recent):  Temp: 98.6 °F (37 °C) (07/25/24 1646)  Pulse: 91 (07/25/24 2050)  Resp: 18 (07/25/24 2050)  BP: (!) 199/96 (07/25/24 2050)  SpO2: 99 % (07/25/24 2050) Vital Signs (24h Range):  Temp:  [98.6 °F (37 °C)] 98.6 °F (37 °C)  Pulse:  [] 91  Resp:  [16-18] 18  SpO2:  [99 %] 99 %  BP: (180-199)/(94-96) 199/96                                 Physical Exam         Neurosurgery Physical Exam    NEURO EXAM:    GCS15  A/Ox4  CNi  Grossly full strength bilaterally  No drift  BSi   No dysmetria    Significant Labs:  No results for input(s): "GLU", "NA", "K", "CL", "CO2", "BUN", "CREATININE", "CALCIUM", "MG" in the last 48 hours.  Recent Labs   Lab 07/25/24  2245   HCT 46     No results for input(s): "LABPT", "INR", "APTT" in the last 48 hours.  Microbiology Results (last 7 days)       ** No results found for the last 168 hours. **          All pertinent labs from the last 24 hours have been reviewed.    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 There is no evidence for cerebellar or cerebral edema, significant mass effect, midline shift or acute intracranial hemorrhage. This report was flagged in Epic as abnormal. Electronically signed by: González Petersen Date:    07/25/2024 Time:    21:38     I have reviewed all pertinent imaging results/findings within the past 24 hours.  "

## 2024-07-26 NOTE — PLAN OF CARE
I have seen ./Mrs. Iverson, a new neurosurgical consult who presents to the hospital today for abnormal findings on CT head. I have reviewed all pertinent imaging and diagnostics. I have seen this patient will discuss a tentative plan with the NSGY team that is appropriate for this patient's medical care and acuity level. A full consult note will follow.

## 2024-07-26 NOTE — PLAN OF CARE
Patient examined at bedside and is neurologically intact. Appears that patient had a mechanical fall on Thursday and then began experiencing floaters and flashes, prompting her to come to the ED. She denies LOC, head trauma, headaches, dizziness, N/V, pain, sensory changes or seizure like activity. Her  and son at bedside deny any changes in mentation as well.     MRI shows bilateral posterior fossa extra axial, dural based masses that are not causing any significant mass effect or midline shift. There is no surrounding vasogenic edema.     Images reviewed with Dr. Walker. Patient can follow up with our cranial surgeon Dr. Hogan. We will arrange this appointment.     Plan discussed with primary team.    Glenda Mosquera PA-C  Neurosurgery   [facility]

## 2024-07-26 NOTE — HPI
Patient is a 69F with HTN/HLD here following fall initially concerning for syncopal episode at the [facility]. She states stumbled over a square of raised tile and then fell down. Did not lose consciousness or hit her head. No lightheadedness, dizziness, dyspnea, chest pain, or palpitations preceding fall. Does not typically struggle with balance. Came to [facility] d/t concern for fracture given mechanism of fall. Does not take blood thinners. Did reports some flashing light and floaters briefly in her L eye following the fall. 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 No medication changes recently. No fever or chills, no abdominal pain, nausea, vomiting, or diarrhea.     In ED, hypertensive up to 201/89, initial tachycardia to 106 which improved to 90s, afebrile, on RA.  CMP with no electrolyte derangements, no renal impairment, mild ALT elevation to 61, ALP mild elevation to 174. CBC with no anemia or leukocytosis. TSH normal. Troponin normal. EKG NSR, no AV block. 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 XR series for trauma including R ankle, R shoulder, and bilateral knees negative for acute fracture. NSGY consulted for brain MRI findings and recommended staging CT CAP and  admission for syncopal workup.

## 2024-07-26 NOTE — ED NOTES
I-STAT Chem-8+ Results:   Value Reference Range   Sodium 140 136-145 mmol/L   Potassium  5.0 3.5-5.1 mmol/L   Chloride 102  mmol/L   Ionized Calcium 1.23   1.06-1.42 mmol/L   CO2 (measured) 28 23-29 mmol/L   Glucose 111  mg/dL   BUN 26 6-30 mg/dL   Creatinine 0.8 0.5-1.4 mg/dL   Hematocrit 46 36-54%

## 2024-07-26 NOTE — ASSESSMENT & PLAN NOTE
Patient seen by neurosurgery.   Unlikely to have factored into her fall.  Plan for follow up as an outpatient for further evaluation.

## 2024-07-26 NOTE — ASSESSMENT & PLAN NOTE
Continue compression brace.   NSAIDs for pain control.  Elevation when possible.  Weight bearing as tolerated.

## 2024-07-26 NOTE — TELEPHONE ENCOUNTER
Pt scheduled for clinic f/u. Pls include in discharge ppwk.    ----- Message from Glenda Mosquera PA-C sent at 7/26/2024  2:34 PM CDT -----  Hi this patient needs follow up with Dr. Hogan in 3-4 weeks to discuss MRI findings. No further imaging needed. Thank you!

## 2024-07-26 NOTE — SUBJECTIVE & OBJECTIVE
Past Medical History:   Diagnosis Date    Acute biliary pancreatitis without infection or necrosis 2018    Acute pancreatitis     2018 - admitted at [facility]    ADHD (attention deficit hyperactivity disorder), inattentive type     Aortic atherosclerosis 3/2/2019    Mild to moderate aortic atherosclerosis seen on CT abd/pelvis 3/2/2019    Chronic back pain     Followed by [facility] - Dr. Bautista - has been having medial branch blocks    Fibromyalgia     treated by Dr. Thorpe in past and now treated by Dr. Yates - Rheumatologist    Hyperlipidemia     High triglycerides    Hypertension     Hypothyroidism     Treated by Dr. Mathew    IFG (impaired fasting glucose)     Migraine     Treated by neurologist - Dr. Destiny Florez    Ulcerative colitis     Patient reported treated by Dr. Lee and Aaron in past  but on admit in 2018 - no UC seen on recent colonoscopy.  It was noted on colonoscopy in 2004 there were ulcers noted to descending colon but not present on sigmoidoscopy in 2004.    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 Ghotra    Vitamin D deficiency 3/31/2016       Past Surgical History:   Procedure Laterality Date    CARPAL TUNNEL RELEASE Right 2019    Procedure: RELEASE, CARPAL TUNNEL right;  Surgeon: Li Waller MD;  Location: [address];  Service: Orthopedics;  Laterality: Right;  stretcher, supine, hand pan 1 and 2    CARPAL TUNNEL RELEASE Left 2021    Procedure: RELEASE, CARPAL TUNNEL;  Surgeon: Brian Reed Jr., MD;  Location: [facility];  Service: Orthopedics;  Laterality: Left;     SECTION      COLONOSCOPY  10/08/2010    Dr. Lee - repeat in 5 years - has appointment for colonoscopy 2016    COLONOSCOPY  2016    Dr. Kelly - normal  colon - repeat in 10 years - scanned report to media file.    ENDOSCOPIC ULTRASOUND OF  "UPPER GASTROINTESTINAL TRACT N/A 07/18/2018    Procedure: ULTRASOUND, ENDOSCOPIC, UPPER GI TRACT;  Surgeon: Reji Russell MD;  Location: [facility] ENDO;  Service: Endoscopy;  Laterality: N/A;    ENDOSCOPIC ULTRASOUND OF UPPER GASTROINTESTINAL TRACT N/A 03/04/2019    Procedure: ULTRASOUND, UPPER GI TRACT, ENDOSCOPIC;  Surgeon: Randy Boyd MD;  Location: [facility] ENDO;  Service: Endoscopy;  Laterality: N/A;    HYSTERECTOMY      INTERPOSITION ARTHROPLASTY OF CARPOMETACARPAL JOINTS Left 07/09/2021    Procedure: INTERPOSITION ARTHROPLASTY, CMC JOINT;  Surgeon: Brian Reed Jr., MD;  Location: [facility] OR;  Service: Orthopedics;  Laterality: Left;  need arthrex tightrope   Brit notified 6/22 LB, Brit confirmed 7/8/21 AM    LAPAROSCOPIC CHOLECYSTECTOMY N/A 03/06/2019    Procedure: CHOLECYSTECTOMY, LAPAROSCOPIC;  Surgeon: Hill Palacios MD;  Location: [facility] OR;  Service: General;  Laterality: N/A;    medial branch block      done at [facility] for axial back pain    OOPHORECTOMY      SHOULDER ARTHROSCOPY W/ ROTATOR CUFF REPAIR Right     Dr. Castro - rotator cuff repair; 3 screws placed, biceps tear repair.    SHOULDER SURGERY Left     TONSILLECTOMY      upper and lower GI  03/2016       Review of patient's allergies indicates:  No Known Allergies    Current Facility-Administered Medications on File Prior to Encounter   Medication    0.9%  NaCl infusion    evolocumab PnIj 140 mg    evolocumab PnIj 140 mg    mupirocin 2 % ointment     Current Outpatient Medications on File Prior to Encounter   Medication Sig    ALPRAZolam (XANAX) 0.5 MG tablet Take 1 tablet (0.5 mg total) by mouth 3 (three) times daily as needed for Anxiety.    BD INSULIN SYRINGE 1 mL 25 gauge x 5/8" Syrg Inject 1 mL into the skin once a week.    cyanocobalamin 1,000 mcg/mL injection INJECT 1 ML (1,000 MCG TOTAL) INTO THE SKIN EVERY 30 DAYS    ergocalciferol (ERGOCALCIFEROL) 50,000 unit Cap TAKE 1 CAPSULE (50,000 UNITS TOTAL) BY MOUTH " "EVERY 7 DAYS. FOR 12 DOSES    evolocumab (REPATHA SURECLICK) 140 mg/mL PnIj Take 1 mL (140 mg total) by mouth every 14 (fourteen) days.    folic acid (FOLVITE) 1 MG tablet TAKE 1 TABLET BY MOUTH EVERY DAY (Patient not taking: Reported on 2024)    furosemide (LASIX) 40 MG tablet Take 1 tablet (40 mg total) by mouth once daily.    HYDROcodone-acetaminophen (NORCO) 5-325 mg per tablet Take 1 to 2 tablets by mouth daily as needed.    potassium chloride (MICRO-K) 10 MEQ CpSR TAKE 1 CAPSULE BY MOUTH TWICE A DAY    syringe-needle,safety,disp unt 1 mL 25 gauge x 5/8" Syrg To use with mtx injections    thyroid, pork, (ARMOUR THYROID) 60 mg Tab One tab daily    tirzepatide 5 mg/0.5 mL PnIj Inject 5 mg into the skin every 7 days.    tiZANidine (ZANAFLEX) 4 MG tablet TAKE 1 TABLET BY MOUTH FOUR TIMES A DAY    traZODone (DESYREL) 100 MG tablet TAKE 1 TABLET BY MOUTH EVERY DAY IN THE EVENING    verapamiL (VERELAN) 240 MG C24P TAKE 1 CAPSULE (240 MG TOTAL) BY MOUTH 2 (TWO) TIMES A DAY.     Family History       Problem Relation (Age of Onset)    Cancer Sister (53), Brother (61), Brother (63)    Colon cancer Brother (66), Brother (60)    Diabetes Brother    Heart disease Mother    Hyperlipidemia Mother    Hypertension Mother, Brother          Tobacco Use    Smoking status: Former     Current packs/day: 0.00     Average packs/day: 1 pack/day for 20.0 years (20.0 ttl pk-yrs)     Types: Cigarettes     Start date: 1977     Quit date: 1997     Years since quittin.4    Smokeless tobacco: Former   Substance and Sexual Activity    Alcohol use: No    Drug use: No    Sexual activity: Never     Review of Systems   Constitutional:  Negative for activity change, appetite change, chills and fever.   HENT:  Negative for congestion.    Respiratory:  Negative for cough, chest tightness and shortness of breath.    Cardiovascular:  Negative for chest pain and palpitations.   Gastrointestinal:  Negative for abdominal pain. "   Genitourinary:  Negative for dysuria.   Musculoskeletal:  Negative for arthralgias and joint swelling.   Neurological:  Negative for dizziness, syncope, speech difficulty, weakness, light-headedness, numbness and headaches.   Psychiatric/Behavioral:  Negative for agitation, behavioral problems and confusion.      Objective:     Vital Signs (Most Recent):  Temp: 97.4 °F (36.3 °C) (07/26/24 0353)  Pulse: 88 (07/26/24 0402)  Resp: 17 (07/26/24 0402)  BP: (!) 153/73 (07/26/24 0402)  SpO2: 97 % (07/26/24 0402) Vital Signs (24h Range):  Temp:  [97.4 °F (36.3 °C)-98.7 °F (37.1 °C)] 97.4 °F (36.3 °C)  Pulse:  [] 88  Resp:  [15-18] 17  SpO2:  [97 %-99 %] 97 %  BP: (153-201)/(73-96) 153/73     Weight: 75.3 kg (166 lb)  Body mass index is 26.79 kg/m².     Physical Exam  Vitals and nursing note reviewed.   Constitutional:       General: She is not in acute distress.     Appearance: Normal appearance. She is not ill-appearing.   HENT:      Head: Normocephalic and atraumatic.      Right Ear: External ear normal.      Left Ear: External ear normal.      Nose: Nose normal.      Mouth/Throat:      Mouth: Mucous membranes are moist.      Pharynx: Oropharynx is clear.   Eyes:      General: No scleral icterus.     Extraocular Movements: Extraocular movements intact.   Cardiovascular:      Rate and Rhythm: Normal rate and regular rhythm.      Pulses: Normal pulses.      Heart sounds: Murmur heard.      Comments: Soft systolic murmur heard primarily in aortic window, also heard in pulmonic and tricuspid windows  No JVD  Pulmonary:      Effort: Pulmonary effort is normal.      Breath sounds: Normal breath sounds. No wheezing or rales.   Abdominal:      General: Abdomen is flat.      Palpations: Abdomen is soft.   Genitourinary:     General: Normal vulva.      Rectum: Normal.   Musculoskeletal:         General: No swelling. Normal range of motion.      Cervical back: Normal range of motion and neck supple.   Skin:     General: Skin  is warm and dry.      Capillary Refill: Capillary refill takes less than 2 seconds.   Neurological:      General: No focal deficit present.      Mental Status: She is alert and oriented to person, place, and time.      Sensory: No sensory deficit.      Motor: No weakness.      Coordination: Coordination normal.   Psychiatric:         Mood and Affect: Mood normal.         Behavior: Behavior normal.                Significant Labs: All pertinent labs within the past 24 hours have been reviewed.  CBC:   Recent Labs   Lab 07/25/24  2245 07/26/24  0230   WBC  --  8.17   HGB  --  14.1   HCT 46 43.4   PLT  --  198     CMP:   Recent Labs   Lab 07/26/24  0230      K 4.0      CO2 23   GLU 98   BUN 18   CREATININE 0.8   CALCIUM 9.4   PROT 7.0   ALBUMIN 3.5   BILITOT 0.2   ALKPHOS 174*   AST 22   ALT 61*   ANIONGAP 9     Troponin:   Recent Labs   Lab 07/26/24  0230   TROPONINI <0.006     TSH:   Recent Labs   Lab 07/26/24  0230   TSH 1.333     Urine Studies:   Recent Labs   Lab 07/26/24  0236   COLORU Yellow   APPEARANCEUA Clear   PHUR 5.0   SPECGRAV 1.020   PROTEINUA Negative   GLUCUA Negative   KETONESU Negative   BILIRUBINUA Negative   OCCULTUA Negative   NITRITE Negative   LEUKOCYTESUR Trace*   RBCUA 0   WBCUA 4   BACTERIA Occasional   SQUAMEPITHEL 1       Significant Imaging: I have reviewed all pertinent imaging results/findings within the past 24 hours.

## 2024-07-26 NOTE — HOSPITAL COURSE
69 year old woman admitted s/p trip and fall at the [facility].  Orthostatic measurements were negative.  No history or physical finding consistent with syncope.  Imaging of the head incidentally noted bilateral enhancing lesions in the posterior fossa. She was seen by neurosurgery who arranged for outpatient follow with with a cranial specialist Dr. Hogan.  Imaging also showed scattered micronodularilty in the lung unlikely to be related to her MRI Brain findings.  She was advised that she will need a follow scan in 6 months.  Additionally,  her uncontrolled blood pressure was addressed and improved after treatment with lisinopril.

## 2024-07-26 NOTE — DISCHARGE SUMMARY
[facility] - Emergency Dept  Hospital Medicine  Discharge Summary      Patient Name: Madalyn Iverson  MRN: [id number]  HEMANT: [id number]  Patient Class: OP- Observation  Admission Date: 7/25/2024  Hospital Length of Stay: 0 days  Discharge Date and Time:  07/26/2024 4:04 PM  Attending Physician: Shawna Mendoza MD   Discharging Provider: Shawna Mendoza MD  Primary Care Provider: Ruth Jaime NP  Hospital Medicine Team: [place] [address] Shawna Mendoza MD  Primary Care Team: [place] [address]    HPI:   Patient is a 69F with HTN/HLD here following fall initially concerning for syncopal episode at the [facility]. She states stumbled over a square of raised tile and then fell down. Did not lose consciousness or hit her head. No lightheadedness, dizziness, dyspnea, chest pain, or palpitations preceding fall. Does not typically struggle with balance. Came to [facility] d/t concern for fracture given mechanism of fall. Does not take blood thinners. Did reports some flashing light and floaters briefly in her L eye following the fall. 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 No medication changes recently. No fever or chills, no abdominal pain, nausea, vomiting, or diarrhea.     In ED, hypertensive up to 201/89, initial tachycardia to 106 which improved to 90s, afebrile, on RA.  CMP with no electrolyte derangements, no renal impairment, mild ALT elevation to 61, ALP mild elevation to 174. CBC with no anemia or leukocytosis. TSH normal. Troponin normal. EKG NSR, no AV block. 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 XR series for trauma including R ankle, R shoulder, and bilateral knees negative for acute fracture. NSGY consulted for brain MRI findings and recommended staging CT CAP and  admission for syncopal workup.     * No surgery found *      Hospital Course:   69 year old  woman admitted s/p trip and fall at the [facility].  Orthostatic measurements were negative.  No history or physical finding consistent with syncope.  Imaging of the head incidentally noted bilateral enhancing lesions in the posterior fossa. She was seen by neurosurgery who arranged for outpatient follow with with a cranial specialist Dr. Hogan.  Imaging also showed scattered micronodularilty in the lung unlikely to be related to her MRI Brain findings.  She was advised that she will need a follow scan in 6 months.  Additionally,  her uncontrolled blood pressure was addressed and improved after treatment with lisinopril.      Goals of Care Treatment Preferences:  Code Status: Full Code      Consults:   Consults (From admission, onward)          Status Ordering Provider     Inpatient consult to Neurosurgery  Once        Provider:  (Not yet assigned)    Completed JUAN CAMACHO     Inpatient consult to Pediatric Ophthalmology  Once        Provider:  (Not yet assigned)    Completed JUAN CAMACHO            Pulmonary  Lung nodules  Needs follow up CT Chest in 6 months and follow up with pulmonary.      Cardiac/Vascular  Essential hypertension  Better controlled after lisinopril 40mg.     Plan to discharge on Lisinopril 40mg daily.  She should follow up with her PCP for additional labs.     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   Unlikely to have factored into her fall.  Plan for follow up as an outpatient for further evaluation.    Ankle sprain  Continue compression brace.   NSAIDs for pain control.  Elevation when possible.  Weight bearing as tolerated.         Final Active Diagnoses:    Diagnosis Date Noted POA    PRINCIPAL PROBLEM:  Lesion of posterior fossa of cranial cavity [M89.9] 07/25/2024 Yes    Fall [W19.XXXA] 07/26/2024 Yes    Lung nodules [R91.8] 07/26/2024 Unknown    Ankle sprain [S93.409A] 07/26/2024 Unknown    Essential hypertension [I10]  Yes      Chronic      Problems Resolved During this Admission:       Discharged Condition: good    Disposition:     Follow Up:   Follow-up Information       Ruth Jaime NP .    Specialty: Family Medicine  Contact information:  [address]  [address]  [facility] [address]  [phone]               Lucho Hogan, DO Follow up.    Specialty: Neurosurgery  Why: Follow up will be arranged by neurosurgery.  Contact information:  [address] [place], [address]  [facility] [id number] [phone]               [facility] 9th Fl Follow up in 6 month(s).    Specialty: Pulmonology  Why: For follow up after CT Chest  Contact information:  [facility] [facility] [address] [phone]  Additional information:  Main Building, 9th Floor   Please park in [facility] and use Clinic elevator                         Patient Instructions:      CT Chest Without Contrast   Standing Status: Future Standing Exp. Date: 07/26/25     Order Specific Question Answer Comments   May the Radiologist modify the order per protocol to meet the clinical needs of the patient? Yes      Ambulatory referral/consult to Neurosurgery   Standing Status: Future   Referral Priority: Routine Referral Type: Consultation   Referral Reason: Specialty Services Required   Requested Specialty: Neurosurgery   Number of Visits Requested: 1     Ambulatory referral/consult to Ophthalmology   Standing Status: Future   Referral Priority: Routine Referral Type: Consultation   Referral Reason: Specialty Services Required   Requested Specialty: Ophthalmology   Number of Visits Requested: 1       Significant Diagnostic Studies: Radiology:     MRI:   Narrative & Impression  EXAMINATION:  MRI BRAIN W WO CONTRAST     CLINICAL HISTORY:  Brain metastases suspected;     TECHNIQUE:  Multiplanar multisequence MR imaging of the brain was performed pre and post 8 cc intravenous contrast.     COMPARISON:  CT head 07/25/2024      FINDINGS:  Ventricles are stable in size without evidence of hydrocephalus. No extra-axial blood or fluid collections.     The cerebral parenchyma demonstrates no mass effect, acute hemorrhage, or acute major vascular distribution infarct.     However, in the posterior fossa, there are multiple nodular enhancing lesions measuring up to 21.8 cm demonstrating T1 isointense T2 intermediate signals these are also seen along the periphery of the cerebellum and are thought to represent extra-axial lesions.     Multifocal periventricular and centrum semiovale T2/FLAIR hyperintensities likely representing remote microvascular infarcts.     Normal vascular flow voids are preserved.     Skull/Extracranial Contents (limited evaluation): Mastoid air cells and paranasal sinuses are essentially clear.No acute abnormality of extracranial soft tissue.     Bone marrow signal intensity is normal.     Impression:     Bilateral posterior fossa brightly enhancing, likely all extra-axial, nodular masses worrisome for meningeal metastatic disease and correspond to the findings on the preceding CT.  Other considerations might include multiple posterior fossa meningiomas.  Correlate clinically, and with continued imaging follow-up.     Chronic microvascular infarcts discussed above.     This report was flagged in Epic as abnormal.     Electronically signed by resident: Rebecca Carlson  Date:                                            07/26/2024  Time:                                           00:37     Electronically signed by:Kun Bush  Date:                                            07/26/2024  Time:                                           03:23        CT scan:   Narrative & Impression  EXAMINATION:  CT CHEST ABDOMEN PELVIS WITH IV CONTRAST (XPD)     CLINICAL HISTORY:  Metastatic disease evaluation;     TECHNIQUE:  Axial images of the chest, abdomen, and pelvis were acquired  after the use of 75 cc Oksy069 IV contrast. No oral contrast  administered.  Coronal and sagittal reconstructions were also obtained.     COMPARISON:  Ultrasound abdomen 03/03/2019, CT abdomen pelvis 03/02/2019 and 06/05/2018     FINDINGS:  Thoracic soft tissues: Normal thyroid. No axillary lymphadenopathy.     Aorta: Thoracic aorta is normal in caliber and contour with mild calcific atherosclerosis.     Heart: Normal in size. No pericardial effusion. Mild calcific coronary atherosclerosis.     Kayla/Mediastinum: No mediastinal or hilar lymphadenopathy.     Lungs: Trachea and bronchi are patent.  Lungs symmetrically expanded without consolidation.  Several solid parenchymal and pleural based nodules in the right upper lobe posterior segment and right lower lobe superior segment, the largest of which measures 0.8 cm (series 6, images 138, 141, 153, and 175).  No pleural fluid or pneumothorax.     Liver: Normal in size and contour.  No focal hepatic lesion.     Gallbladder: Surgically absent.     Bile Ducts: No evidence of dilated ducts.     Pancreas: No mass or peripancreatic fat stranding.     Spleen: Unremarkable.     Esophagus: Unremarkable.     Stomach and duodenum: Unremarkable.     Adrenals: Unremarkable.     Kidneys/Ureters: Normal in size and location. Normal enhancement.  No hydronephrosis or nephrolithiasis. No ureteral dilatation.     Bladder: No evidence of wall thickening.     Reproductive organs: Uterus surgically absent     Bowel/Mesentery: Small bowel is normal in caliber with no evidence of obstruction.  No evidence of inflammation or wall thickening.  Colon demonstrates no focal wall thickening.  The appendix is normal.  Diverticulosis coli without diverticulitis.  Normal appendix.     Peritoneum: No intraperitoneal free air or fluid.     Lymph nodes: No lymphadenopathy.     Abdominal wall: Unremarkable.  Small focus of subcutaneous air in the right abdominal wall soft tissues, likely from injection.     Vasculature: No aneurysm. Moderate circumferential  "calcific atherosclerosis.  Duplicate IVC.     Bones: No acute fracture. No suspicious osseous lesions. Degenerative disc disease with adjacent subchondral sclerotic changes most prominently at the levels of L2-3 and L4 -5.  Degenerative changes of the left sternoclavicular joint.     Impression:     No definite primary malignancy or metastasis visualized.     Several right lung nodules, indeterminate.  For multiple solid nodules with any 6 mm or greater, Fleischner Society guidelines recommend follow up with non-contrast chest CT at 3-6 months and 18-24 months after discovery.     Diverticulosis without diverticulitis.     Other stable findings as above.     Electronically signed by resident: Néstor Valadez  Date:                                            07/26/2024  Time:                                           07:02     Electronically signed by:Mehnaz Monsivais MD  Date:                                            07/26/2024  Time:                                           09:14    Pending Diagnostic Studies:       None           Medications:  Reconciled Home Medications:      Medication List        START taking these medications      lisinopriL 40 MG tablet  Commonly known as: PRINIVIL,ZESTRIL  Take 1 tablet (40 mg total) by mouth once daily.  Start taking on: July 27, 2024            CONTINUE taking these medications      BD INSULIN SYRINGE 1 mL 25 gauge x 5/8" Syrg  Generic drug: insulin syringe-needle U-100  Inject 1 mL into the skin once a week.     cyanocobalamin 1,000 mcg/mL injection  INJECT 1 ML (1,000 MCG TOTAL) INTO THE SKIN EVERY 30 DAYS     ergocalciferol 50,000 unit Cap  Commonly known as: ERGOCALCIFEROL  TAKE 1 CAPSULE (50,000 UNITS TOTAL) BY MOUTH EVERY 7 DAYS. FOR 12 DOSES     HYDROcodone-acetaminophen 5-325 mg per tablet  Commonly known as: NORCO  Take 1 to 2 tablets by mouth daily as needed.     MOUNJARO 5 mg/0.5 mL Pnij  Generic drug: tirzepatide  Inject 5 mg into the skin every 7 days.   " "  potassium chloride 10 MEQ Cpsr  Commonly known as: MICRO-K  TAKE 1 CAPSULE BY MOUTH TWICE A DAY     REPATHA SURECLICK 140 mg/mL Pnij  Generic drug: evolocumab  Take 1 mL (140 mg total) by mouth every 14 (fourteen) days.     syringe-needle,safety,disp unt 1 mL 25 gauge x 5/8" Syrg  To use with mtx injections     thyroid (pork) 60 mg Tab  Commonly known as: ARMOUR THYROID  One tab daily     tiZANidine 4 MG tablet  Commonly known as: ZANAFLEX  TAKE 1 TABLET BY MOUTH FOUR TIMES A DAY     traZODone 100 MG tablet  Commonly known as: DESYREL  TAKE 1 TABLET BY MOUTH EVERY DAY IN THE EVENING            STOP taking these medications      ALPRAZolam 0.5 MG tablet  Commonly known as: XANAX     folic acid 1 MG tablet  Commonly known as: FOLVITE     furosemide 40 MG tablet  Commonly known as: LASIX     verapamiL 240 MG C24p  Commonly known as: VERELAN              Indwelling Lines/Drains at time of discharge: None           Time spent on the discharge of patient: 40 minutes         Shawna Mendoza MD  Department of Hospital Medicine  [facility] Dept  "

## 2024-07-26 NOTE — ASSESSMENT & PLAN NOTE
Fall  Noted on CT head and then confirmed on brain MRI 7/26. Bilateral posterior fossa and along periphery of cerebellum. No cerebral edema, midline shift, or bleed noted. Concerning for neoplastic process. No B symptoms. No balance issues. NSGY following.     - NSGY following, appreciate recs   - F/u CT chest/abd/pelvis for staging if cerebral findings represent metastatic process  - No steroids needed given lack of edema   - No BP parameters needed per NSGY

## 2024-07-26 NOTE — CONSULTS
[facility] Dept  Neurosurgery  Consult Note    Inpatient consult to Neurosurgery  Consult performed by: Brandon Pedraza MD  Consult ordered by: Lamar Younger PA-C        Subjective:     Chief Complaint/Reason for Admission: fall    History of Present Illness: The pt is a 69F w pmhx of HTN, HLD who [place] [facility] [place] a [place]. fell in [employer] store today, remembers falling down and being on the floor but is unclear of what happened in between. 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 She does not take blood thinners. She reports flashes and floaters in the L eye since her fall but denies blurry vision, HA, focal weakness, or vertigo. Full work up in ED included CTH, which showed numerous posterior fossa lesions. NSGY was consulted for evaluation of abnormal CTH findings.    (Not in a hospital admission)      Review of patient's allergies indicates:  No Known Allergies    Past Medical History:   Diagnosis Date    Acute biliary pancreatitis without infection or necrosis 6/13/2018    Acute pancreatitis     June 2018 - admitted at [facility]    ADHD (attention deficit hyperactivity disorder), inattentive type     Aortic atherosclerosis 3/2/2019    Mild to moderate aortic atherosclerosis seen on CT abd/pelvis 3/2/2019    Chronic back pain     Followed by [facility] - Dr. Bautista - has been having medial branch blocks    Fibromyalgia     treated by Dr. Thorpe in past and now treated by Dr. Yates - Rheumatologist    Hyperlipidemia     High triglycerides    Hypertension     Hypothyroidism     Treated by Dr. Mathew    IFG (impaired fasting glucose)     Migraine     Treated by neurologist - Dr. Destiny Florez    Ulcerative colitis     Patient reported treated by Dr. Lee and Aaron in past  but on admit in 2018 - no UC seen on recent colonoscopy.  It was  noted on colonoscopy in 2004 there were ulcers noted to descending colon but not present on sigmoidoscopy in 2004.    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 Ghotra    Vitamin D deficiency 3/31/2016     Past Surgical History:   Procedure Laterality Date    CARPAL TUNNEL RELEASE Right 2019    Procedure: RELEASE, CARPAL TUNNEL right;  Surgeon: Li Waller MD;  Location: [address];  Service: Orthopedics;  Laterality: Right;  stretcher, supine, hand pan 1 and 2    CARPAL TUNNEL RELEASE Left 2021    Procedure: RELEASE, CARPAL TUNNEL;  Surgeon: Brian Reed Jr., MD;  Location: [facility];  Service: Orthopedics;  Laterality: Left;     SECTION      COLONOSCOPY  10/08/2010    Dr. Lee - repeat in 5 years - has appointment for colonoscopy 2016    COLONOSCOPY  2016    Dr. Kelly - normal  colon - repeat in 10 years - scanned report to media file.    ENDOSCOPIC ULTRASOUND OF UPPER GASTROINTESTINAL TRACT N/A 2018    Procedure: ULTRASOUND, ENDOSCOPIC, UPPER GI TRACT;  Surgeon: Reji Russell MD;  Location: [facility];  Service: Endoscopy;  Laterality: N/A;    ENDOSCOPIC ULTRASOUND OF UPPER GASTROINTESTINAL TRACT N/A 2019    Procedure: ULTRASOUND, UPPER GI TRACT, ENDOSCOPIC;  Surgeon: Randy Boyd MD;  Location: [facility];  Service: Endoscopy;  Laterality: N/A;    HYSTERECTOMY      INTERPOSITION ARTHROPLASTY OF CARPOMETACARPAL JOINTS Left 2021    Procedure: INTERPOSITION ARTHROPLASTY, CMC JOINT;  Surgeon: Brian Reed Jr., MD;  Location: [facility];  Service: Orthopedics;  Laterality: Left;  need arthrex tightrope   Brit notified  LB, Brit confirmed 21 AM    LAPAROSCOPIC CHOLECYSTECTOMY N/A 2019    Procedure: CHOLECYSTECTOMY, LAPAROSCOPIC;  Surgeon: Hill Palacios MD;  Location: [facility];  Service: General;  Laterality: N/A;    medial branch block       "done at [facility] for axial back pain    OOPHORECTOMY      SHOULDER ARTHROSCOPY W/ ROTATOR CUFF REPAIR Right     Dr. Castro - rotator cuff repair; 3 screws placed, biceps tear repair.    SHOULDER SURGERY Left     TONSILLECTOMY      upper and lower GI  2016     Family History       Problem Relation (Age of Onset)    Cancer Sister (53), Brother (61), Brother (63)    Colon cancer Brother (66), Brother (60)    Diabetes Brother    Heart disease Mother    Hyperlipidemia Mother    Hypertension Mother, Brother          Tobacco Use    Smoking status: Former     Current packs/day: 0.00     Average packs/day: 1 pack/day for 20.0 years (20.0 ttl pk-yrs)     Types: Cigarettes     Start date: 1977     Quit date: 1997     Years since quittin.4    Smokeless tobacco: Former   Substance and Sexual Activity    Alcohol use: No    Drug use: No    Sexual activity: Never     Review of Systems  Objective:     Weight: 75.3 kg (166 lb)  Body mass index is 26.79 kg/m².  Vital Signs (Most Recent):  Temp: 98.6 °F (37 °C) (24 1646)  Pulse: 91 (24)  Resp: 18 (24)  BP: (!) 199/96 (24)  SpO2: 99 % (24) Vital Signs (24h Range):  Temp:  [98.6 °F (37 °C)] 98.6 °F (37 °C)  Pulse:  [] 91  Resp:  [16-18] 18  SpO2:  [99 %] 99 %  BP: (180-199)/(94-96) 199/96                                 Physical Exam         Neurosurgery Physical Exam    NEURO EXAM:    GCS15  A/Ox4  CNi  Grossly full strength bilaterally  No drift  BSi   No dysmetria    Significant Labs:  No results for input(s): "GLU", "NA", "K", "CL", "CO2", "BUN", "CREATININE", "CALCIUM", "MG" in the last 48 hours.  Recent Labs   Lab 24  2245   HCT 46     No results for input(s): "LABPT", "INR", "APTT" in the last 48 hours.  Microbiology Results (last 7 days)       ** No results found for the last 168 hours. **          All pertinent labs from the last 24 hours have been reviewed.    Significant " Diagnostics:  CT: CT Head Without Contrast    Result Date: 7/25/2024  Findings most consistent with multiple dural-based lesions of the posterior fossa, as discussed above, as there is no prior examination available for comparison, follow-up MRI examination with contrast is recommended. There is no evidence for cerebellar or cerebral edema, significant mass effect, midline shift or acute intracranial hemorrhage. This report was flagged in Epic as abnormal. Electronically signed by: González Petersen Date:    07/25/2024 Time:    21:38     I have reviewed all pertinent imaging results/findings within the past 24 hours.  Assessment/Plan:     Lesion of posterior fossa of cranial cavity  69F w pmhx of HTN, HLD who presents after a fall. NSGY was consulted for evaluation of multiple dural based posterior fossa lesions.    Imaging:  -CTH w/o 7/25: multiple dural based lesions of posterior fossa; no cerebellar edema, MLS, hydro, or bleed     Plan:  -NSGY has seen and evaluated pt at bedside  -All labs and diagnostics reviewed  -Recommend admitting to  for full syncope workup  -Recommend MRI brain w/wo for further evaluation  -No acute neurosurgical intervention required at this time  -ADAT  -NSGY will continue to follow, please notify if there are any abrupt changes in neurologic status        Thank you for your consult. I will follow-up with patient. Please contact us if you have any additional questions.    Brandon Pedraza MD  Neurosurgery  [facility] - [facility]

## 2024-07-26 NOTE — PROVIDER PROGRESS NOTES - EMERGENCY DEPT.
"Encounter Date: 7/25/2024    ED Physician Progress Notes        Physician Note:   I received this patient in sign out from the previous team.  I have discussed the patient's history and presentation in the ED with Dr. Arnold  The patient is a 69 y.o. female who presented to the ED with Fall (Pt hjad a fall today in store c/o bilateral knee pain and ankle pain.  States she is also seeing "flashes of light" to her left eye.  Denies LOC, does not remember if she hit floor. )    Significant history and PE findings: 69F with fall without recalled LOC but does not remember hitting her head. Has L eye vitreous detachment and ankle pain. Imaging peformed, seen by ophtho.  Significant diagnostic results: new brain lesions on CT  Pending studies and/or consultations: MRI  Disposition:  Will continue to monitor, update patient on results of testing and determine appropriate additional treatment for the duration of stay in ED.  Pertinent lab and imaging findings are below.  Ad Olson DO 1:16 AM 7/26/2024    Impression:     Findings most consistent with multiple dural-based lesions of the posterior fossa, as discussed above, as there is no prior examination available for comparison, follow-up MRI examination with contrast is recommended.     There is no evidence for cerebellar or cerebral edema, significant mass effect, midline shift or acute intracranial hemorrhage.     This report was flagged in Epic as abnormal.        Electronically signed by:González Petersen  Date:                                            07/25/2024  Time:                                           21:38      Awaiting Nsx and MRI for discussion on disposition    2:34 AM  Nsx recommending CTCAP, syncope workup, admit to , and they will follow up    EKG independently interpreted by me.    Performed: 07/26/2024   Rate/Rhythm/Axis: 92 bpm, sinus rhythm, nml axis  QRS 84 ms  Qtc 452 ms  Impression:  Normal Sinus Rhythm.  EKG without evidence of Na " channel blockade, K channel blockade, there is no prolonged QTc or digoxin effect.  There is no STEMI or signs of ischemia.    Impression:     Bilateral posterior fossa brightly enhancing, likely all extra-axial, nodular masses worrisome for meningeal metastatic disease and correspond to the findings on the preceding CT.  Other considerations might include multiple posterior fossa meningiomas.  Correlate clinically, and with continued imaging follow-up.     Chronic microvascular infarcts discussed above.     This report was flagged in Epic as abnormal.     Electronically signed by resident: Rebecca Carlson  Date:                                            07/26/2024  Time:                                           00:37     Electronically signed by:Kun Bush  Date:                                            07/26/2024  Time:                                           03:23    3:34 AM  Admit to medicine  Given nightly alprazolam as requested by patient

## 2024-07-26 NOTE — TELEPHONE ENCOUNTER
----- Message from Kathi York sent at 7/26/2024  4:04 PM CDT -----  Type:  Same Day Appointment Request    Caller is requesting a same day appointment.  Caller declined first available appointment listed below.    Name of Caller:[facility] [place]   When is the first available appointment?08/22   Symptoms:[facility]   Best Call Back Number:[phone]     Additional Information: Pt needs appt within  7 days discharging today from    [phone]

## 2024-07-26 NOTE — ED NOTES
Nurses Note -- 4 Eyes      7/26/2024   5:42 AM      Skin assessed during: Admit      [x] No Altered Skin Integrity Present    [x]Prevention Measures Documented      [] Yes- Altered Skin Integrity Present or Discovered   [] LDA Added if Not in Epic (Describe Wound)   [] New Altered Skin Integrity was Present on Admit and Documented in LDA   [] Wound Image Taken    Wound Care Consulted? No    Attending Nurse:  Harika Gonzalez RN/Staff Member:   lakshmi

## 2024-07-26 NOTE — ASSESSMENT & PLAN NOTE
Mechanical fall from raised tile. No vasovagal prodrome including palpitations, lightheadedness, dizziness, or shortness of breath. Does not struggle with balance typically. No vertiginous symptoms. EKG w/o AV block or atrial arrhythmia. No electrolyte derangements. NSGY requesting syncopal workup, patient seemed to describe syncope at that time.     - TTE  - Telemetry   - CTM CMP

## 2024-07-26 NOTE — ASSESSMENT & PLAN NOTE
Chronic, uncontrolled. Latest blood pressure and vitals reviewed-     Temp:  [97.4 °F (36.3 °C)-98.7 °F (37.1 °C)]   Pulse:  []   Resp:  [15-18]   BP: (171-201)/(84-96)   SpO2:  [97 %-99 %] .   Home meds for hypertension were reviewed and noted below.   Hypertension Medications               furosemide (LASIX) 40 MG tablet Take 1 tablet (40 mg total) by mouth once daily.    verapamiL (VERELAN) 240 MG C24P TAKE 1 CAPSULE (240 MG TOTAL) BY MOUTH 2 (TWO) TIMES A DAY.            While in the hospital, will manage blood pressure as follows; Adjust home antihypertensive regimen as follows- Continue verapamil (longstanding medication). Continue Lasix    Will utilize p.r.n. blood pressure medication only if patient's blood pressure greater than 220/110 and she develops symptoms such as worsening chest pain or shortness of breath.

## 2024-07-27 NOTE — PLAN OF CARE
[facility] Dept  Discharge Final Note    Primary Care Provider: Ruth Jaime NP    Expected Discharge Date: 7/26/2024    Final Discharge Note (most recent)       Final Note - 07/26/24 1900          Final Note    Assessment Type Final Discharge Note (P)      Anticipated Discharge Disposition Home or Self Care (P)      Hospital Resources/Appts/Education Provided Provided patient/caregiver with written discharge plan information (P)         Post-Acute Status    Discharge Delays None known at this time (P)                      Important Message from Medicare             Contact Info       Ruth Jaime NP   Specialty: Family Medicine   Relationship: PCP - General    [address] [place] [address]  [address]  [facility] [address]   Phone: [phone]       Next Steps: Follow up    Lucho Hogan DO   Specialty: Neurosurgery    [address] [facility] [address]  [facility] [address]   Phone: [phone]       Next Steps: Follow up    Instructions: Follow up will be arranged by neurosurgery.    [facility]   Specialty: Pulmonology    [address]  [place] [address]   Phone: [phone]       Next Steps: Follow up in 6 month(s)    Instructions: For follow up after CT Chest

## 2024-07-27 NOTE — PLAN OF CARE
[facility] Dept  Initial Discharge Assessment       Primary Care Provider: Ruth Jaime NP    Admission Diagnosis: Brain mass [G93.89]    Admission Date: 7/25/2024  Expected Discharge Date: 7/26/2024         Payor: HU[employer] MANAGED MEDICARE / Plan: HU[employer] MEDICARE PPO / Product Type: Medicare Advantage /     Extended Emergency Contact Information  Primary Emergency Contact: Kendra Iverson  Address:  [address]           [place] [address] [place]  Mobile Phone: [phone]  Relation: Spouse    Discharge Plan A: (P) Home with family  Discharge Plan B: (P) Home with family      [id number] [place], [place] - [address]  [address]  [place] [address]  Phone: [phone] Fax: [phone]    [facility] [place] - [address]  [address]  [place] [address]  Phone: [phone] Fax: [phone]    [facility]  [address]  [place] [address]  Phone: [phone] Fax: [phone]    [facility]  [address]  [place] [address]  Phone: [phone] Fax: [phone]      Initial Assessment (most recent)       Adult Discharge Assessment - 07/26/24 1858          Discharge Assessment    Assessment Type Discharge Planning Assessment (P)      Confirmed/corrected address, phone number and insurance Yes (P)      Confirmed Demographics Correct on Facesheet (P)      Source of Information patient;health record;family (P)      Communicated MARY with patient/caregiver Yes (P)      People in Home spouse (P)      Prior to hospitilization cognitive status: Alert/Oriented (P)      Current cognitive status: Alert/Oriented (P)      Discharge Plan A Home with family (P)      Discharge Plan B Home with family (P)         Housing Stability    In the last 12 months, was there a time when you were not able to pay the mortgage or rent on time? No (P)          Transportation Needs    Has the lack of transportation kept you from medical appointments, meetings, work or from getting things needed for daily living? No (P)         Food Insecurity    Within the past 12 months, you worried that your food would run out before you got the money to buy more. Never true (P)         Stress    Do you feel stress - tense, restless, nervous, or anxious, or unable to sleep at night because your mind is troubled all the time - these days? Only a little (P)         Social Isolation    How often do you feel lonely or isolated from those around you?  Rarely (P)         Utilities    In the past 12 months has the electric, gas, oil, or water company threatened to shut off services in your home? No (P)         Health Literacy    How often do you need to have someone help you when you read instructions, pamphlets, or other written material from your doctor or pharmacy? Rarely (P)

## 2024-07-29 ENCOUNTER — OFFICE VISIT (OUTPATIENT)
Dept: OPHTHALMOLOGY | Facility: CLINIC | Age: 69
End: 2024-07-29
Payer: MEDICARE

## 2024-07-29 ENCOUNTER — TELEPHONE (OUTPATIENT)
Dept: NEUROSURGERY | Facility: CLINIC | Age: 69
End: 2024-07-29
Payer: MEDICARE

## 2024-07-29 ENCOUNTER — CLINICAL SUPPORT (OUTPATIENT)
Dept: OPHTHALMOLOGY | Facility: CLINIC | Age: 69
End: 2024-07-29
Payer: MEDICARE

## 2024-07-29 DIAGNOSIS — H43.812 PVD (POSTERIOR VITREOUS DETACHMENT), LEFT EYE: Primary | ICD-10-CM

## 2024-07-29 DIAGNOSIS — H35.372 EPIRETINAL MEMBRANE (ERM) OF LEFT EYE: ICD-10-CM

## 2024-07-29 PROCEDURE — 1100F PTFALLS ASSESS-DOCD GE2>/YR: CPT | Mod: CPTII,S$GLB,, | Performed by: OPHTHALMOLOGY

## 2024-07-29 PROCEDURE — 4010F ACE/ARB THERAPY RXD/TAKEN: CPT | Mod: CPTII,S$GLB,, | Performed by: OPHTHALMOLOGY

## 2024-07-29 PROCEDURE — 1126F AMNT PAIN NOTED NONE PRSNT: CPT | Mod: CPTII,S$GLB,, | Performed by: OPHTHALMOLOGY

## 2024-07-29 PROCEDURE — 99999 PR PBB SHADOW E&M-EST. PATIENT-LVL III: CPT | Mod: PBBFAC,,, | Performed by: OPHTHALMOLOGY

## 2024-07-29 PROCEDURE — 92201 OPSCPY EXTND RTA DRAW UNI/BI: CPT | Mod: S$GLB,,, | Performed by: OPHTHALMOLOGY

## 2024-07-29 PROCEDURE — 92004 COMPRE OPH EXAM NEW PT 1/>: CPT | Mod: S$GLB,,, | Performed by: OPHTHALMOLOGY

## 2024-07-29 PROCEDURE — 3288F FALL RISK ASSESSMENT DOCD: CPT | Mod: CPTII,S$GLB,, | Performed by: OPHTHALMOLOGY

## 2024-07-29 PROCEDURE — 92134 CPTRZ OPH DX IMG PST SGM RTA: CPT | Mod: S$GLB,,, | Performed by: OPHTHALMOLOGY

## 2024-07-29 PROCEDURE — 3044F HG A1C LEVEL LT 7.0%: CPT | Mod: CPTII,S$GLB,, | Performed by: OPHTHALMOLOGY

## 2024-07-29 NOTE — PROGRESS NOTES
Subjective:       Patient ID: Madalyn Iverson is a 69 y.o. female      Chief Complaint   Patient presents with    Spots and/or Floaters     History of Present Illness  HPI     Spots and/or Floaters    In left eye.  Characterized as large.  Duration of 4 days.  Since onset it   is stable.  Associated symptoms include flashes.           Comments    Consult PVD OS     DLS 07/26/2024 by Dr. Valdemar Major MD    CC; pt flashes/floaters OS last Thursday.  Started after a fall(onset x   3-4 days ago) .     NOTE: pt was told she has flashes/floaters and I fell and hit the cement   hard. She was told she had spots on her brain. When looking right or left   my head feels heavy.     -eye pain   --diplopia  ++flashes and floaters (left side)  ++headaches due to the fall and hitting my head  --veils /curtain/shadow    Eye Meds: NONE    POHx:   1. PVD OS           Last edited by Lucho Coffey MD on 7/29/2024 11:37 AM.        Imaging:    See report    Assessment/Plan:     1. PVD (posterior vitreous detachment), left eye  Pathology of PVD, Retinal Tear, Retinal Detachment reviewed in great detail  RD precautions discussed in detail, patient expressed understanding  RTC immediately PRN (especially ANY change flashes, floaters, vision, visual field)    - Posterior Segment OCT Retina-Both eyes    2. Epiretinal membrane (ERM) of left eye  Excellent [facility]  Observe    Follow up in about 1 month (around 8/29/2024), or if symptoms worsen or fail to improve, for OCT Mac, Dilated examination.

## 2024-07-29 NOTE — TELEPHONE ENCOUNTER
----- Message from Natacha Olivares sent at 7/29/2024  2:26 PM CDT -----  Regarding: sharif appt  Contact: @ [phone]  Pt is calling to reschedule appointment on 08/27 to a sooner date ...no available dates Pleaes call and adv @ [phone]

## 2024-07-30 ENCOUNTER — OFFICE VISIT (OUTPATIENT)
Dept: FAMILY MEDICINE | Facility: CLINIC | Age: 69
End: 2024-07-30
Payer: MEDICARE

## 2024-07-30 VITALS
DIASTOLIC BLOOD PRESSURE: 80 MMHG | BODY MASS INDEX: 26.19 KG/M2 | SYSTOLIC BLOOD PRESSURE: 164 MMHG | OXYGEN SATURATION: 96 % | WEIGHT: 162.94 LBS | HEIGHT: 66 IN | TEMPERATURE: 98 F | HEART RATE: 96 BPM

## 2024-07-30 DIAGNOSIS — M25.561 ACUTE PAIN OF RIGHT KNEE: ICD-10-CM

## 2024-07-30 DIAGNOSIS — E11.69 TYPE 2 DIABETES MELLITUS WITH OTHER SPECIFIED COMPLICATION, WITHOUT LONG-TERM CURRENT USE OF INSULIN: Primary | ICD-10-CM

## 2024-07-30 DIAGNOSIS — R74.8 ELEVATED LIVER ENZYMES: ICD-10-CM

## 2024-07-30 DIAGNOSIS — M06.00 SERONEGATIVE RHEUMATOID ARTHRITIS: ICD-10-CM

## 2024-07-30 PROCEDURE — 1160F RVW MEDS BY RX/DR IN RCRD: CPT | Mod: CPTII,S$GLB,, | Performed by: PEDIATRICS

## 2024-07-30 PROCEDURE — 1126F AMNT PAIN NOTED NONE PRSNT: CPT | Mod: CPTII,S$GLB,, | Performed by: PEDIATRICS

## 2024-07-30 PROCEDURE — 3077F SYST BP >= 140 MM HG: CPT | Mod: CPTII,S$GLB,, | Performed by: PEDIATRICS

## 2024-07-30 PROCEDURE — 4010F ACE/ARB THERAPY RXD/TAKEN: CPT | Mod: CPTII,S$GLB,, | Performed by: PEDIATRICS

## 2024-07-30 PROCEDURE — 99215 OFFICE O/P EST HI 40 MIN: CPT | Mod: S$GLB,,, | Performed by: PEDIATRICS

## 2024-07-30 PROCEDURE — 3288F FALL RISK ASSESSMENT DOCD: CPT | Mod: CPTII,S$GLB,, | Performed by: PEDIATRICS

## 2024-07-30 PROCEDURE — 3079F DIAST BP 80-89 MM HG: CPT | Mod: CPTII,S$GLB,, | Performed by: PEDIATRICS

## 2024-07-30 PROCEDURE — 99999 PR PBB SHADOW E&M-EST. PATIENT-LVL V: CPT | Mod: PBBFAC,,, | Performed by: PEDIATRICS

## 2024-07-30 PROCEDURE — 1100F PTFALLS ASSESS-DOCD GE2>/YR: CPT | Mod: CPTII,S$GLB,, | Performed by: PEDIATRICS

## 2024-07-30 PROCEDURE — 3008F BODY MASS INDEX DOCD: CPT | Mod: CPTII,S$GLB,, | Performed by: PEDIATRICS

## 2024-07-30 PROCEDURE — 1159F MED LIST DOCD IN RCRD: CPT | Mod: CPTII,S$GLB,, | Performed by: PEDIATRICS

## 2024-07-30 PROCEDURE — 3044F HG A1C LEVEL LT 7.0%: CPT | Mod: CPTII,S$GLB,, | Performed by: PEDIATRICS

## 2024-07-30 NOTE — TELEPHONE ENCOUNTER
Patient request call back no answer left message that she need to  get her Monjouro from her PCP .KAREN    ----- Message from Nathanael Moreno sent at 7/30/2024 10:41 AM CDT -----  .Type:  Needs Medical Advice    Who Called: pt    Would the patient rather a call back or a response via [facility]? Call back  Best Call Back Number: [phone]  Additional Information:     Pt stated she have an appointment on August 29 and will be out of her medication before her appointment and wants to know if the doctor will refill her medication for monjouro

## 2024-07-30 NOTE — PROGRESS NOTES
"Subjective:      Patient ID: Madalyn Iverson is a 69 y.o. female.    Chief Complaint: Hospital Follow Up    Patient went to ER after fall in [employer], injured ankle, knee and has sore back. ER found incidental finding of mass on brain, patient felt as if they forgot about her pain and her injuries due to this. She questions the findings, but also does not want to wait a month to have evaluated. Also feels significant beau at jknee and ankle on right side, has been wearing brace on ankle but it causes more pain. Has tried lidocaine patches, is not helping with knee/ankle pain. Takes percocet from pain management. Cannot take NSAIDS due to abdominal issues.      Review of Systems   Constitutional:  Negative for chills, fatigue and fever.   HENT:  Negative for congestion, ear pain, postnasal drip, rhinorrhea, sinus pressure, sinus pain, sneezing and sore throat.    Respiratory:  Negative for cough, chest tightness, shortness of breath and wheezing.    Cardiovascular:  Negative for chest pain and palpitations.   Gastrointestinal:  Negative for diarrhea, nausea and vomiting.   Genitourinary:  Negative for difficulty urinating.   Musculoskeletal:  Positive for back pain and gait problem. Negative for arthralgias.   Skin:  Negative for rash.   Neurological:  Negative for dizziness and headaches.   Psychiatric/Behavioral:  Negative for confusion.         Current Outpatient Medications on File Prior to Visit   Medication Sig    BD INSULIN SYRINGE 1 mL 25 gauge x 5/8" Syrg Inject 1 mL into the skin once a week.    cyanocobalamin 1,000 mcg/mL injection INJECT 1 ML (1,000 MCG TOTAL) INTO THE SKIN EVERY 30 DAYS    ergocalciferol (ERGOCALCIFEROL) 50,000 unit Cap TAKE 1 CAPSULE (50,000 UNITS TOTAL) BY MOUTH EVERY 7 DAYS. FOR 12 DOSES    evolocumab (REPATHA SURECLICK) 140 mg/mL PnIj Take 1 mL (140 mg total) by mouth every 14 (fourteen) days.    HYDROcodone-acetaminophen (NORCO) 5-325 mg per tablet Take 1 to 2 tablets by " "mouth daily as needed.    lisinopriL (PRINIVIL,ZESTRIL) 40 MG tablet Take 1 tablet (40 mg total) by mouth once daily.    potassium chloride (MICRO-K) 10 MEQ CpSR TAKE 1 CAPSULE BY MOUTH TWICE A DAY    syringe-needle,safety,disp unt 1 mL 25 gauge x 5/8" Syrg To use with mtx injections    thyroid, pork, (ARMOUR THYROID) 60 mg Tab One tab daily    tiZANidine (ZANAFLEX) 4 MG tablet TAKE 1 TABLET BY MOUTH FOUR TIMES A DAY    traZODone (DESYREL) 100 MG tablet TAKE 1 TABLET BY MOUTH EVERY DAY IN THE EVENING    [DISCONTINUED] tirzepatide 5 mg/0.5 mL PnIj Inject 5 mg into the skin every 7 days.     Current Facility-Administered Medications on File Prior to Visit   Medication    0.9%  NaCl infusion    evolocumab PnIj 140 mg    evolocumab PnIj 140 mg    mupirocin 2 % ointment        Objective:     Vitals:    07/30/24 1129   BP: (!) 164/80   Pulse: 96   Temp: 97.9 °F (36.6 °C)      Physical Exam  Constitutional:       General: She is not in acute distress.     Appearance: Normal appearance.   HENT:      Head: Normocephalic and atraumatic.      Right Ear: External ear normal.      Left Ear: External ear normal.      Nose: Nose normal.      Mouth/Throat:      Mouth: Mucous membranes are moist.      Pharynx: Oropharynx is clear.   Eyes:      Extraocular Movements: Extraocular movements intact.      Conjunctiva/sclera: Conjunctivae normal.      Pupils: Pupils are equal, round, and reactive to light.   Cardiovascular:      Rate and Rhythm: Normal rate and regular rhythm.      Pulses: Normal pulses.      Heart sounds: Normal heart sounds.   Pulmonary:      Effort: Pulmonary effort is normal. No respiratory distress.      Breath sounds: Normal breath sounds. No wheezing.   Abdominal:      General: Abdomen is flat. Bowel sounds are normal.   Musculoskeletal:         General: No swelling. Normal range of motion.      Cervical back: Normal range of motion and neck supple.      Right knee: Tenderness present.        Legs:         " Feet:       Comments: Tenderness as marked   Feet:      Comments: Pain as marked  Skin:     General: Skin is warm and dry.      Findings: No rash.   Neurological:      Mental Status: She is alert and oriented to person, place, and time.      Gait: Gait normal.   Psychiatric:         Mood and Affect: Mood normal.         Behavior: Behavior normal.        Assessment:         1. Type 2 diabetes mellitus with other specified complication, without long-term current use of insulin    2. Acute pain of right knee    3. Seronegative rheumatoid arthritis    4. Elevated liver enzymes          Plan:   1. Type 2 diabetes mellitus with other specified complication, without long-term current use of insulin  - Ambulatory referral/consult to Endocrinology; Future  - tirzepatide 5 mg/0.5 mL PnIj; Inject 5 mg into the skin every 7 days.  Dispense: 4 Pen; Refill: 1    2. Acute pain of right knee  - Ambulatory referral/consult to Orthopedics; Future    3. Seronegative rheumatoid arthritis  - Ambulatory referral/consult to Rheumatology; Future    4. Elevated liver enzymes  - US Abdomen Limited_Liver; Future       -Apply ice to painful area several times daily  -Cannot take NSAIDS, refuses voltaren topical gel.  -Rest, avoid heavy lifting or workouts x4 weeks  -Elevate feet when sitting  -Follow up as needed    Messaged Dr. Hogan regarding sooner follow up from suspicious brain lesions.  Has multiple follow up appts scheduled.  Instructed to follow up with Ruth next month.       Andrew Bruner NP   [facility]  7/30/24

## 2024-08-01 ENCOUNTER — HOSPITAL ENCOUNTER (OUTPATIENT)
Dept: RADIOLOGY | Facility: HOSPITAL | Age: 69
Discharge: HOME | End: 2024-08-01
Attending: PEDIATRICS
Payer: MEDICARE

## 2024-08-01 DIAGNOSIS — R74.8 ELEVATED LIVER ENZYMES: ICD-10-CM

## 2024-08-01 PROCEDURE — 76705 ECHO EXAM OF ABDOMEN: CPT | Mod: 26,,, | Performed by: RADIOLOGY

## 2024-08-01 PROCEDURE — 76705 ECHO EXAM OF ABDOMEN: CPT | Mod: TC

## 2024-08-02 ENCOUNTER — TELEPHONE (OUTPATIENT)
Dept: NEUROSURGERY | Facility: CLINIC | Age: 69
End: 2024-08-02
Payer: MEDICARE

## 2024-08-02 NOTE — TELEPHONE ENCOUNTER
Pt called. Appt moved to Tuesday 8/6 in 8th floor clinic.    ----- Message from Lucho Hogan DO sent at 8/2/2024  7:46 AM CDT -----  Regarding: RE: Suspicious Brain Lesions  Sorry, Dr Bruner, for just getting back to you.  I am out of town until Monday but we will find a spot for her next week when I am back.    Thanks  Marco Sanchez,    Can we schedule her on Tuesday morning?  Its ok to open up a slot.  ----- Message -----  From: Andrew Bruner NP  Sent: 7/30/2024  11:56 AM CDT  To: Lucho Hogan DO; Edison Yepez Staff  Subject: Suspicious Brain Lesions                         Dr. Hogan,    This patient has suspicious lesion on brain from ER (incidental finding) CT and follow up MRI. Has apt in late August, any chance you or a partner could see her sooner?      Andrew Bruner NP   [facility]  07/30/2024

## 2024-08-06 ENCOUNTER — OFFICE VISIT (OUTPATIENT)
Dept: NEUROSURGERY | Facility: CLINIC | Age: 69
End: 2024-08-06
Payer: MEDICARE

## 2024-08-06 VITALS
BODY MASS INDEX: 26.19 KG/M2 | DIASTOLIC BLOOD PRESSURE: 84 MMHG | SYSTOLIC BLOOD PRESSURE: 154 MMHG | WEIGHT: 162.94 LBS | HEIGHT: 66 IN | HEART RATE: 91 BPM

## 2024-08-06 DIAGNOSIS — D32.9 MENINGIOMA: ICD-10-CM

## 2024-08-06 PROCEDURE — 1160F RVW MEDS BY RX/DR IN RCRD: CPT | Mod: CPTII,S$GLB,, | Performed by: NEUROLOGICAL SURGERY

## 2024-08-06 PROCEDURE — 1126F AMNT PAIN NOTED NONE PRSNT: CPT | Mod: CPTII,S$GLB,, | Performed by: NEUROLOGICAL SURGERY

## 2024-08-06 PROCEDURE — 3079F DIAST BP 80-89 MM HG: CPT | Mod: CPTII,S$GLB,, | Performed by: NEUROLOGICAL SURGERY

## 2024-08-06 PROCEDURE — 3044F HG A1C LEVEL LT 7.0%: CPT | Mod: CPTII,S$GLB,, | Performed by: NEUROLOGICAL SURGERY

## 2024-08-06 PROCEDURE — 1159F MED LIST DOCD IN RCRD: CPT | Mod: CPTII,S$GLB,, | Performed by: NEUROLOGICAL SURGERY

## 2024-08-06 PROCEDURE — 99215 OFFICE O/P EST HI 40 MIN: CPT | Mod: S$GLB,,, | Performed by: NEUROLOGICAL SURGERY

## 2024-08-06 PROCEDURE — 3077F SYST BP >= 140 MM HG: CPT | Mod: CPTII,S$GLB,, | Performed by: NEUROLOGICAL SURGERY

## 2024-08-06 PROCEDURE — 99999 PR PBB SHADOW E&M-EST. PATIENT-LVL IV: CPT | Mod: PBBFAC,,, | Performed by: NEUROLOGICAL SURGERY

## 2024-08-06 PROCEDURE — 4010F ACE/ARB THERAPY RXD/TAKEN: CPT | Mod: CPTII,S$GLB,, | Performed by: NEUROLOGICAL SURGERY

## 2024-08-06 PROCEDURE — 3008F BODY MASS INDEX DOCD: CPT | Mod: CPTII,S$GLB,, | Performed by: NEUROLOGICAL SURGERY

## 2024-08-14 ENCOUNTER — PATIENT MESSAGE (OUTPATIENT)
Dept: RHEUMATOLOGY | Facility: CLINIC | Age: 69
End: 2024-08-14
Payer: MEDICARE

## 2024-08-14 RX ORDER — ALPRAZOLAM 0.5 MG/1
0.5 TABLET ORAL 3 TIMES DAILY
Qty: 90 TABLET | Refills: 0 | Status: SHIPPED | OUTPATIENT
Start: 2024-08-14 | End: 2024-09-13

## 2024-08-19 DIAGNOSIS — E78.00 PURE HYPERCHOLESTEROLEMIA: ICD-10-CM

## 2024-08-19 RX ORDER — CYANOCOBALAMIN 1000 UG/ML
INJECTION, SOLUTION INTRAMUSCULAR; SUBCUTANEOUS
Qty: 9 ML | Refills: 1 | Status: SHIPPED | OUTPATIENT
Start: 2024-08-19

## 2024-08-21 RX ORDER — EVOLOCUMAB 140 MG/ML
140 INJECTION, SOLUTION SUBCUTANEOUS
Qty: 2 EACH | Refills: 11 | Status: SHIPPED | OUTPATIENT
Start: 2024-08-21

## 2024-08-23 RX ORDER — LISINOPRIL 40 MG/1
40 TABLET ORAL DAILY
Qty: 30 TABLET | Refills: 0 | Status: SHIPPED | OUTPATIENT
Start: 2024-08-23 | End: 2025-08-23

## 2024-08-24 DIAGNOSIS — M79.7 FIBROMYALGIA: ICD-10-CM

## 2024-08-24 DIAGNOSIS — M13.80 SERONEGATIVE ARTHRITIS: ICD-10-CM

## 2024-08-25 RX ORDER — HYDROCODONE BITARTRATE AND ACETAMINOPHEN 5; 325 MG/1; MG/1
1-2 TABLET ORAL DAILY PRN
Qty: 60 TABLET | Refills: 0 | Status: SHIPPED | OUTPATIENT
Start: 2024-08-25

## 2024-08-27 NOTE — TELEPHONE ENCOUNTER
Message sent to Dr. Hogan's staff.   Psychology Follow-Up Note      Patient Name: Lorena Gomez   Age: 58 year old   Sex: female   : 1966   MRN: [id number]   Author: Lila Harley     Psychologist Attestation:  I have personally interviewed and examined the patient. I confirmed the findings listed below, including diagnoses of   Adjustment Disorder with anxiety and depressed mood  Hx of depression  This was discussed with the student Lila Harley. I have personally performed the gathering of history, mental status exam findings, and recommendations. The plan of care was discussed with the pt.    Impression: Pt was seen by clinical psychology doctoral student Lila Harley and [facility] psychologist, Dr. Mireles. Pt was referred to the psychology team initially due to adjustment concerns. Pt initially presented as frustrated with the psychology student for following up, but eventually was open to speaking. Pt endorsed anxiety and low mood due to her prolonged hospitalizations since earlier in the summer and \"awful\" experience at the SNF prior to [facility]. Pt presented as intermittently tearful. Pt endorsed hx of passive, sporadic suicidal ideation when frustrated, but denied any intent nor plan and stated she would never do anything to emotionally hurt her loved ones.    Mental Status Exam: Pt's attitude appeared guarded. Her mood appeared anxious and sad. Pt was oriented x 4. Concentration appeared intact. Speech was within normal limits. Thoughts appeared linear. Pt denied SI, HI, hallucinations. Behavior was observed to be within normal limits. Her insight and judgment appears fair.     Conclusion: Pt was seen for follow up for adjustment concerns. Pt initially appeared hesitant to identify her emotions as anxiety or depression, but eventually acknowledged feelings of low mood, depression, anger, and frustration. Pt is accustomed to living an independent lifestyle; as such, her medical conditions and the  hospitalization are not congruent with her sense of self. Pt verbalized frustration regarding mental health stigma and labels of diagnoses. Pt is likely experiencing symptoms of grief associated with her idealized life of residential which will now be challenging due to her medical conditions. Pt was provided psycho education on the mind/body connection and the importance of taking care of physical and emotional health. Pt was provided with emotional support and validation. Pt was encouraged to utilize healthy coping skills, in addition to utilizing her support system for emotional support. Pt was encouraged to utilize mindfulness skills to decrease symptoms of distress and more effectively manage her symptoms. Pt endorsed utilizing meditation and prayer. Pt was encouraged to focus on her goal of returning home in moments of distress. Pt verbalized understanding that the psychology team is available as needed.    Diagnoses:  Adjustment Disorder with anxiety and depressed mood  Hx of depression    Total Time Student Spent with Patient: 35 min of psychotherapy     Thank you for allowing the [facility] psychologist to participate in this patient's care. For additional questions or concerns, please contact the hub at [phone] or via [employer].      Lila Harley

## 2024-08-29 ENCOUNTER — OFFICE VISIT (OUTPATIENT)
Dept: CARDIOLOGY | Facility: CLINIC | Age: 69
End: 2024-08-29
Payer: MEDICARE

## 2024-08-29 VITALS
BODY MASS INDEX: 26.37 KG/M2 | SYSTOLIC BLOOD PRESSURE: 106 MMHG | DIASTOLIC BLOOD PRESSURE: 72 MMHG | WEIGHT: 163.38 LBS | HEART RATE: 102 BPM

## 2024-08-29 DIAGNOSIS — I10 ESSENTIAL HYPERTENSION: Primary | Chronic | ICD-10-CM

## 2024-08-29 DIAGNOSIS — I70.0 AORTIC ATHEROSCLEROSIS: ICD-10-CM

## 2024-08-29 DIAGNOSIS — E78.2 MIXED HYPERLIPIDEMIA: ICD-10-CM

## 2024-08-29 DIAGNOSIS — I35.0 AORTIC STENOSIS, MILD: ICD-10-CM

## 2024-08-29 PROCEDURE — 3078F DIAST BP <80 MM HG: CPT | Mod: CPTII,S$GLB,, | Performed by: INTERNAL MEDICINE

## 2024-08-29 PROCEDURE — 4010F ACE/ARB THERAPY RXD/TAKEN: CPT | Mod: CPTII,S$GLB,, | Performed by: INTERNAL MEDICINE

## 2024-08-29 PROCEDURE — 3044F HG A1C LEVEL LT 7.0%: CPT | Mod: CPTII,S$GLB,, | Performed by: INTERNAL MEDICINE

## 2024-08-29 PROCEDURE — 1126F AMNT PAIN NOTED NONE PRSNT: CPT | Mod: CPTII,S$GLB,, | Performed by: INTERNAL MEDICINE

## 2024-08-29 PROCEDURE — 3074F SYST BP LT 130 MM HG: CPT | Mod: CPTII,S$GLB,, | Performed by: INTERNAL MEDICINE

## 2024-08-29 PROCEDURE — 1100F PTFALLS ASSESS-DOCD GE2>/YR: CPT | Mod: CPTII,S$GLB,, | Performed by: INTERNAL MEDICINE

## 2024-08-29 PROCEDURE — 3288F FALL RISK ASSESSMENT DOCD: CPT | Mod: CPTII,S$GLB,, | Performed by: INTERNAL MEDICINE

## 2024-08-29 PROCEDURE — 99204 OFFICE O/P NEW MOD 45 MIN: CPT | Mod: S$GLB,,, | Performed by: INTERNAL MEDICINE

## 2024-08-29 PROCEDURE — 1160F RVW MEDS BY RX/DR IN RCRD: CPT | Mod: CPTII,S$GLB,, | Performed by: INTERNAL MEDICINE

## 2024-08-29 PROCEDURE — 1159F MED LIST DOCD IN RCRD: CPT | Mod: CPTII,S$GLB,, | Performed by: INTERNAL MEDICINE

## 2024-08-29 PROCEDURE — 3008F BODY MASS INDEX DOCD: CPT | Mod: CPTII,S$GLB,, | Performed by: INTERNAL MEDICINE

## 2024-08-29 PROCEDURE — 99999 PR PBB SHADOW E&M-EST. PATIENT-LVL III: CPT | Mod: PBBFAC,,, | Performed by: INTERNAL MEDICINE

## 2024-08-29 NOTE — PROGRESS NOTES
HISTORY:    69-year-old female with a history of hypertension, hyperlipidemia, aortic atherosclerosis, mild AS, rheumatoid arthritis presenting for initial evaluation by me.      Pt not sure why she is here. Recent admission for mechanical fall with no LOC. Had an abnormal brain MRI w masses. Following w neurosurgery w plan for repeat MRI in 3 months.     The patient denies any symptoms of chest pain.     Activity levels moderate. Walks a bit around her house.  has dementia and she does a lot for him.    The patient denies any previous history of myocardial infarction, coronary artery disease, peripheral arterial disease, stroke, congestive heart failure, or cardiomyopathy.    Tolerates lisinopril 40 x 1, evolocumab 140 q.2 weeks.  Also on GLP agonist.    PHYSICAL EXAM:    Vitals:    08/29/24 1449   BP: 106/72   Pulse: 102       NAD, A+Ox3.  No jvd, no bruit.  RRR nml s1,s2. No murmurs.  CTA B no wheezes or crackles.  No edema.    LABS/STUDIES (imaging reviewed during clinic visit):    July 2024 CBC CMP normal.  Troponin negative.  A1c and TSH normal.  2022 /HDL 65//.  ECG July 2024 demonstrates sinus rhythm with no Q-waves or ST changes.    TTE July 2024 demonstrates normal LV size with an EF of 60 65%.  Grade 2 diastology.  Mild AS with a peak velocity of 2.4 m/sec.  CT chest July 2024 aortic and coronary atherosclerosis.  Normal heart size.    ASSESSMENT & PLAN:    1. Essential hypertension    2. Aortic atherosclerosis    3. Aortic stenosis, mild    4. Mixed hyperlipidemia              No CV symptoms.    Bps better controlled on lisinopril.    On evolocumab. Follow-up lipid profile. Has not tolerated statins previously.    Mild AS on TTE. Repeat TTE in 2 years.     DM well controlled.       Follow up in about 2 years (around 8/29/2026).      Pierre Marshall MD

## 2024-09-05 ENCOUNTER — OFFICE VISIT (OUTPATIENT)
Dept: FAMILY MEDICINE | Facility: CLINIC | Age: 69
End: 2024-09-05
Payer: MEDICARE

## 2024-09-05 VITALS
HEIGHT: 66 IN | OXYGEN SATURATION: 95 % | HEART RATE: 91 BPM | DIASTOLIC BLOOD PRESSURE: 70 MMHG | WEIGHT: 164.88 LBS | BODY MASS INDEX: 26.5 KG/M2 | TEMPERATURE: 98 F | SYSTOLIC BLOOD PRESSURE: 120 MMHG

## 2024-09-05 DIAGNOSIS — I51.89 GRADE II DIASTOLIC DYSFUNCTION: ICD-10-CM

## 2024-09-05 DIAGNOSIS — G43.909 MIGRAINE WITHOUT STATUS MIGRAINOSUS, NOT INTRACTABLE, UNSPECIFIED MIGRAINE TYPE: ICD-10-CM

## 2024-09-05 DIAGNOSIS — E11.69 HYPERLIPIDEMIA ASSOCIATED WITH TYPE 2 DIABETES MELLITUS: ICD-10-CM

## 2024-09-05 DIAGNOSIS — I70.0 AORTIC ATHEROSCLEROSIS: ICD-10-CM

## 2024-09-05 DIAGNOSIS — R91.8 LUNG NODULES: ICD-10-CM

## 2024-09-05 DIAGNOSIS — G89.29 CHRONIC ABDOMINAL PAIN: ICD-10-CM

## 2024-09-05 DIAGNOSIS — E11.59 HYPERTENSION ASSOCIATED WITH TYPE 2 DIABETES MELLITUS: ICD-10-CM

## 2024-09-05 DIAGNOSIS — I35.0 AORTIC STENOSIS, MILD: ICD-10-CM

## 2024-09-05 DIAGNOSIS — E78.5 HYPERLIPIDEMIA ASSOCIATED WITH TYPE 2 DIABETES MELLITUS: ICD-10-CM

## 2024-09-05 DIAGNOSIS — R10.9 CHRONIC ABDOMINAL PAIN: ICD-10-CM

## 2024-09-05 DIAGNOSIS — D32.9 MENINGIOMA: Primary | ICD-10-CM

## 2024-09-05 DIAGNOSIS — G89.29 CHRONIC NECK AND BACK PAIN: ICD-10-CM

## 2024-09-05 DIAGNOSIS — H43.812 POSTERIOR VITREOUS DETACHMENT, LEFT EYE: ICD-10-CM

## 2024-09-05 DIAGNOSIS — E11.69 TYPE 2 DIABETES MELLITUS WITH OTHER SPECIFIED COMPLICATION, WITHOUT LONG-TERM CURRENT USE OF INSULIN: ICD-10-CM

## 2024-09-05 DIAGNOSIS — T63.461A WASP STING, ACCIDENTAL OR UNINTENTIONAL, INITIAL ENCOUNTER: ICD-10-CM

## 2024-09-05 DIAGNOSIS — M79.7 FIBROMYALGIA: Chronic | ICD-10-CM

## 2024-09-05 DIAGNOSIS — E55.9 VITAMIN D DEFICIENCY: ICD-10-CM

## 2024-09-05 DIAGNOSIS — M13.80 SERONEGATIVE ARTHRITIS: ICD-10-CM

## 2024-09-05 DIAGNOSIS — I15.2 HYPERTENSION ASSOCIATED WITH TYPE 2 DIABETES MELLITUS: ICD-10-CM

## 2024-09-05 DIAGNOSIS — M54.2 CHRONIC NECK AND BACK PAIN: ICD-10-CM

## 2024-09-05 DIAGNOSIS — M54.9 CHRONIC NECK AND BACK PAIN: ICD-10-CM

## 2024-09-05 DIAGNOSIS — E03.9 ACQUIRED HYPOTHYROIDISM: ICD-10-CM

## 2024-09-05 DIAGNOSIS — K52.9 CHRONIC DIARRHEA: ICD-10-CM

## 2024-09-05 DIAGNOSIS — R01.1 SYSTOLIC MURMUR: ICD-10-CM

## 2024-09-05 DIAGNOSIS — F41.9 ANXIETY: ICD-10-CM

## 2024-09-05 PROCEDURE — 3074F SYST BP LT 130 MM HG: CPT | Mod: CPTII,S$GLB,, | Performed by: NURSE PRACTITIONER

## 2024-09-05 PROCEDURE — 4010F ACE/ARB THERAPY RXD/TAKEN: CPT | Mod: CPTII,S$GLB,, | Performed by: NURSE PRACTITIONER

## 2024-09-05 PROCEDURE — 3288F FALL RISK ASSESSMENT DOCD: CPT | Mod: CPTII,S$GLB,, | Performed by: NURSE PRACTITIONER

## 2024-09-05 PROCEDURE — 1101F PT FALLS ASSESS-DOCD LE1/YR: CPT | Mod: CPTII,S$GLB,, | Performed by: NURSE PRACTITIONER

## 2024-09-05 PROCEDURE — 99999 PR PBB SHADOW E&M-EST. PATIENT-LVL V: CPT | Mod: PBBFAC,,, | Performed by: NURSE PRACTITIONER

## 2024-09-05 PROCEDURE — 1125F AMNT PAIN NOTED PAIN PRSNT: CPT | Mod: CPTII,S$GLB,, | Performed by: NURSE PRACTITIONER

## 2024-09-05 PROCEDURE — 3044F HG A1C LEVEL LT 7.0%: CPT | Mod: CPTII,S$GLB,, | Performed by: NURSE PRACTITIONER

## 2024-09-05 PROCEDURE — 3008F BODY MASS INDEX DOCD: CPT | Mod: CPTII,S$GLB,, | Performed by: NURSE PRACTITIONER

## 2024-09-05 PROCEDURE — 99215 OFFICE O/P EST HI 40 MIN: CPT | Mod: S$GLB,,, | Performed by: NURSE PRACTITIONER

## 2024-09-05 PROCEDURE — 3078F DIAST BP <80 MM HG: CPT | Mod: CPTII,S$GLB,, | Performed by: NURSE PRACTITIONER

## 2024-09-05 PROCEDURE — 1159F MED LIST DOCD IN RCRD: CPT | Mod: CPTII,S$GLB,, | Performed by: NURSE PRACTITIONER

## 2024-09-05 PROCEDURE — 1160F RVW MEDS BY RX/DR IN RCRD: CPT | Mod: CPTII,S$GLB,, | Performed by: NURSE PRACTITIONER

## 2024-09-05 RX ORDER — ERGOCALCIFEROL (VITAMIN D2) 50 MCG
1 CAPSULE ORAL DAILY
COMMUNITY
Start: 2024-09-05

## 2024-09-05 RX ORDER — PREDNISONE 20 MG/1
40 TABLET ORAL DAILY
Qty: 8 TABLET | Refills: 0 | Status: SHIPPED | OUTPATIENT
Start: 2024-09-05 | End: 2024-09-09

## 2024-09-05 NOTE — PROGRESS NOTES
Subjective:       Patient ID: Madalyn Iverson is a 69 y.o. female.    Chief Complaint: Follow-up        HPI WITH ASSESSMENT AND PLAN OF CARE:      Patient is a 69-year-old white female with Brain Mass/Meningiomas followed by [facility],  Eye Floaters followed by Ophthalmology, Aortic Atherosclerosis noted on CT 3/2019, Grade 2 diastolic dysfunction seen on echocardiogram with grade 2 systolic murmur followed by [facility], Hypertension, Hyperlipidemia intolerant of oral statins, Hypothyroidism followed by [facility], Type 2 Diabetes followed by [facility],  history of  ADHD, fibromyalgia and seronegative arthritis followed by Rheumatology, migraines, vitamin D deficiency, osteoarthritis to both hands and chronic GI problem that patient reported was ulcerative colitis that is now questionable that is here today for follow up.      BRAIN MASS/Meningiomas MRI 7/25/2024  Followed by [facility]  Incidentally discovered masses in the posterior fossa.  These were discovered on an MRI obtained after injuring her ankle and reporting that she had some left eye vision changes.  Other than floaters and flashes of light in the left eye, which are being monitored and managed by Ophthalmology, she denies any significant neurologic symptoms.   Seen Neurosurgery on 8/6/2024:   incidentally discovered masses in the posterior fossa.  These were discovered on an MRI obtained after injuring her ankle and reporting that she had some left eye vision changes.  Other than floaters and flashes of light in the left eye, which are being monitored and managed by Ophthalmology, she denies any significant neurologic symptoms.   Next MRI and Neurosurgery appt scheduled for 11/12/2024.        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  Largest measures 8mm.   Stopped smoking more than 15 years ago.   For multiple solid nodules with any 6 mm or greater, Fleischner Society guidelines recommend follow up with non-contrast chest CT at 3-6 months and 18-24 months after discovery.   REPEAT CHEST CT at 3 months @ 10/26/24.      Aortic Atherosclerosis  seen on CT abd/pelvis 3/2/2019   Chronic GI issues - can not take ASA   STATIN intolerance due to myalgias  Followed by [facility] Dr. Marshall - last 8/29/2024 - follow up in 2 years  On Repatha injections every 2 weeks.  LDL has not been check in over 2 years  Will get FASTING LABS with Medicare Wellness visit             Grade 2 diastolic dysfunction seen on echocardiogram with grade 2 systolic murmur   Diagnosed and followed by [facility] Brandin at 5/18/2021  Last seen by [facility] Dr. Marshall on 8/29/24 and documented NO murmur  Last Echo 7/26/24:  Summary  Show Result Comparison     Left Ventricle: The left ventricle is normal in size. Ventricular mass is normal. Mildly increased wall thickness. Regional wall motion abnormalities present. There is normal systolic function with a visually estimated ejection fraction of 60 - 65%. Grade II diastolic dysfunction.    Right Ventricle: Normal right ventricular cavity size. Systolic function is normal.    Aortic Valve: There is mild aortic valve sclerosis. There is moderate annular calcification present. Mildly restricted motion. There is mild stenosis. Aortic valve area by VTI is 1.39 cm². Aortic valve peak velocity is 2.42 m/s. Mean gradient is 14 mmHg. The dimensionless index is 0.43.    Tricuspid Valve: There is mild regurgitation.  Cardiology Dr. Marshall said follow up in 2 years 7/2026       Hyperlipidemia  STATIN intolerance due to myalgias  Followed by [facility] Dr. Marshall - last 8/29/2024 - follow up in 2 years  On Repatha injections every 2 weeks.  LDL has not been check in over 2 years  Will get FASTING LABS  "with Medicare Wellness visit             Hypertension  Currently taking Lisinopril 40 mg daily and Potassium 10 meq twice daily  /70 (BP Location: Left arm, Patient Position: Sitting, BP Method: Large (Manual))   Pulse 91   Temp 97.9 °F (36.6 °C)   Ht 5' 6" (1.676 m)   Wt 74.8 kg (164 lb 14.5 oz)   SpO2 95%   BMI 26.62 kg/m²   Stable.        Type 2 Diabetes  Diagnosed in July 2022 with A1C 6.6%  Followed by [facility] - last office visit 1/6/2023  Currently taking Mounjaro 5 mg every 7 days  HgbA1C 5.4% in Feb. 2024  Has appointment with new endocrinologist Dr. Rodriguez on 11/18/2024 for follow up.              Hypothyroidism  Followed by [facility]  Currently taking Gardner Thyroid 60 mg daily  Has appointment with new endocrinologist Dr. Rodriguez on 11/18/2024 for follow up.  Stable.            Fibromyalgia and Seronegative Arthritis   Followed by [facility] Dr. Yates - last appt 3/19/2024  Has appt with new Rheumatologist Dr. Rodriguez for 10/31/2024  Rheumatologist prescribes:  OPIATE THERAPY - Norco as needed for pain  Anxiety BENZODIAZEPINE - Xanax as needed.  I See note on March 2024 visit she has documents 1 ML MTX weekly SQ and Plaquenil 200 mg BID + SSZ 1500 mg BID BUT NOW ON MEDICATION LIST.  Advised to discuss what medications she should be on with NEW RHEUMATOLOGIST.            Anxiety  Was on Citalopram 20 mg daily for YEARS but stopped medication around 2019 because she was doing okay  Then in 2021 - increased anxiety present  Rheumatologist prescribes Xanax as needed for anxiety.        Migraine Headaches  Chronic Migraines that was followed by Neurologist, Dr. Florez, in the past.   No longer seeing neurologist for migraine headaches  I do not see any migraine headache medication on current med list.  Has not voiced any complaint of migraine at today's visit.          Vitamin D Deficiency  Since prior to 2016 with level 13  Patient was advised to take Vitamin D2 " 12465 units once weekly with Vitamin D3 5000 units daily but NONCOMPLIANT  ONLY taking the Vitamin D2 81711 units once weekly  Level last checked in 2021 when last seen by me  Will check Vitamin d with next blood draw.            Chronic Abdominal Pain with Chronic Diarrhea   Previously followed by Dr. Kelly and Dr. Lee.   Patient had reported that she had Ulcerative Colitis but repeat colonoscopies do not support this.    ####Of note based on medical records, patient has been being followed by GI Dr. Lee/Robin since 2004.  The colonoscopy in April 2004 showed a few 8mm ulcers to the descending colon that could be suggestive of ulcerative colitis BUT repeat sigmoidoscopy in June 2004 showed no ulcerations present and patient has had normal colonoscopies since that time - please review media file [facility]    Patient THEN followed by [facility] Dr. Cesar PALM for chronic complaints - last visit 7/9/2018  Did not voice complaints today        Chronic Neck and Back Pain   Followed by Neurosurgery Dr. Bautista in past.  Then pains related to fibromyalgia and seronegative arthritis by Rheumatology  Treated with Hydrocodone and Zanaflex 4 mg per Rheumatologist.      No visits with results within 30 Day(s) from this visit.   Latest known visit with results is:   Admission on 07/25/2024, Discharged on 07/26/2024   Component Date Value Ref Range Status    POC Glucose 07/25/2024 111 (H)  70 - 110 mg/dL Final    POC BUN 07/25/2024 26  6 - 30 mg/dL Final    POC Creatinine 07/25/2024 0.8  0.5 - 1.4 mg/dL Final    POC Sodium 07/25/2024 140  136 - 145 mmol/L Final    POC Potassium 07/25/2024 5.0  3.5 - 5.1 mmol/L Final    POC Chloride 07/25/2024 102  95 - 110 mmol/L Final    POC TCO2 (MEASURED) 07/25/2024 28  23 - 29 mmol/L Final    POC Ionized Calcium 07/25/2024 1.23  1.06 - 1.42 mmol/L Final    POC Hematocrit 07/25/2024 46  36 - 54 %PCV Final    Sample  07/25/2024 JESSIE   Final    WBC 07/26/2024 8.17  3.90 - 12.70 K/uL Final    RBC 07/26/2024 4.61  4.00 - 5.40 M/uL Final    Hemoglobin 07/26/2024 14.1  12.0 - 16.0 g/dL Final    Hematocrit 07/26/2024 43.4  37.0 - 48.5 % Final    MCV 07/26/2024 94  82 - 98 fL Final    MCH 07/26/2024 30.6  27.0 - 31.0 pg Final    MCHC 07/26/2024 32.5  32.0 - 36.0 g/dL Final    RDW 07/26/2024 13.2  11.5 - 14.5 % Final    Platelets 07/26/2024 198  150 - 450 K/uL Final    MPV 07/26/2024 11.5  9.2 - 12.9 fL Final    Immature Granulocytes 07/26/2024 0.2  0.0 - 0.5 % Final    Gran # (ANC) 07/26/2024 5.2  1.8 - 7.7 K/uL Final    Immature Grans (Abs) 07/26/2024 0.02  0.00 - 0.04 K/uL Final    Comment: Mild elevation in immature granulocytes is non specific and   can be seen in a variety of conditions including stress response,   acute inflammation, trauma and pregnancy. Correlation with other   laboratory and clinical findings is essential.      Lymph # 07/26/2024 2.2  1.0 - 4.8 K/uL Final    Mono # 07/26/2024 0.7  0.3 - 1.0 K/uL Final    Eos # 07/26/2024 0.1  0.0 - 0.5 K/uL Final    Baso # 07/26/2024 0.06  0.00 - 0.20 K/uL Final    nRBC 07/26/2024 0  0 /100 WBC Final    Gran % 07/26/2024 63.5  38.0 - 73.0 % Final    Lymph % 07/26/2024 26.8  18.0 - 48.0 % Final    Mono % 07/26/2024 8.2  4.0 - 15.0 % Final    Eosinophil % 07/26/2024 0.6  0.0 - 8.0 % Final    Basophil % 07/26/2024 0.7  0.0 - 1.9 % Final    Differential Method 07/26/2024 Automated   Final    Sodium 07/26/2024 139  136 - 145 mmol/L Final    Potassium 07/26/2024 4.0  3.5 - 5.1 mmol/L Final    Chloride 07/26/2024 107  95 - 110 mmol/L Final    CO2 07/26/2024 23  23 - 29 mmol/L Final    Glucose 07/26/2024 98  70 - 110 mg/dL Final    BUN 07/26/2024 18  8 - 23 mg/dL Final    Creatinine 07/26/2024 0.8  0.5 - 1.4 mg/dL Final    Calcium 07/26/2024 9.4  8.7 - 10.5 mg/dL Final    Total Protein 07/26/2024 7.0  6.0 - 8.4 g/dL Final    Albumin 07/26/2024 3.5  3.5 - 5.2 g/dL Final    Total  Bilirubin 07/26/2024 0.2  0.1 - 1.0 mg/dL Final    Comment: For infants and newborns, interpretation of results should be based  on gestational age, weight and in agreement with clinical  observations.    Premature Infant recommended reference ranges:  Up to 24 hours.............<8.0 mg/dL  Up to 48 hours............<12.0 mg/dL  3-5 days..................<15.0 mg/dL  6-29 days.................<15.0 mg/dL      Alkaline Phosphatase 07/26/2024 174 (H)  55 - 135 U/L Final    AST 07/26/2024 22  10 - 40 U/L Final    ALT 07/26/2024 61 (H)  10 - 44 U/L Final    eGFR 07/26/2024 >60.0  >60 mL/min/1.73 m^2 Final    Anion Gap 07/26/2024 9  8 - 16 mmol/L Final    Troponin I 07/26/2024 <0.006  0.000 - 0.026 ng/mL Final    Comment: The reference interval for Troponin I represents the 99th percentile   cutoff   for our facility and is consistent with 3rd generation assay   performance.      TSH 07/26/2024 1.333  0.400 - 4.000 uIU/mL Final    Specimen UA 07/26/2024 Urine, Clean Catch   Final    Color, UA 07/26/2024 Yellow  Yellow, Straw, Gabriela Final    Appearance, UA 07/26/2024 Clear  Clear Final    pH, UA 07/26/2024 5.0  5.0 - 8.0 Final    Specific Gravity, UA 07/26/2024 1.020  1.005 - 1.030 Final    Protein, UA 07/26/2024 Negative  Negative Final    Comment: Recommend a 24 hour urine protein or a urine   protein/creatinine ratio if globulin induced proteinuria is  clinically suspected.      Glucose, UA 07/26/2024 Negative  Negative Final    Ketones, UA 07/26/2024 Negative  Negative Final    Bilirubin (UA) 07/26/2024 Negative  Negative Final    Occult Blood UA 07/26/2024 Negative  Negative Final    Nitrite, UA 07/26/2024 Negative  Negative Final    Leukocytes, UA 07/26/2024 Trace (A)  Negative Final    QRS Duration 07/26/2024 84  ms Final    [facility] QTC Calculation 07/26/2024 452  ms Final    RBC, UA 07/26/2024 0  0 - 4 /hpf Final    WBC, UA 07/26/2024 4  0 - 5 /hpf Final    Bacteria 07/26/2024 Occasional  None-Occ /hpf Final    Squam  Epithel, UA 07/26/2024 1  /hpf Final    Microscopic Comment 07/26/2024 SEE COMMENT   Final    Comment: Other formed elements not mentioned in the report are not   present in the microscopic examination.       Sodium 07/26/2024 138  136 - 145 mmol/L Final    Potassium 07/26/2024 3.9  3.5 - 5.1 mmol/L Final    Chloride 07/26/2024 104  95 - 110 mmol/L Final    CO2 07/26/2024 25  23 - 29 mmol/L Final    Glucose 07/26/2024 102  70 - 110 mg/dL Final    BUN 07/26/2024 16  8 - 23 mg/dL Final    Creatinine 07/26/2024 0.7  0.5 - 1.4 mg/dL Final    Calcium 07/26/2024 9.3  8.7 - 10.5 mg/dL Final    Total Protein 07/26/2024 6.9  6.0 - 8.4 g/dL Final    Albumin 07/26/2024 3.4 (L)  3.5 - 5.2 g/dL Final    Total Bilirubin 07/26/2024 0.3  0.1 - 1.0 mg/dL Final    Comment: For infants and newborns, interpretation of results should be based  on gestational age, weight and in agreement with clinical  observations.    Premature Infant recommended reference ranges:  Up to 24 hours.............<8.0 mg/dL  Up to 48 hours............<12.0 mg/dL  3-5 days..................<15.0 mg/dL  6-29 days.................<15.0 mg/dL      Alkaline Phosphatase 07/26/2024 167 (H)  55 - 135 U/L Final    AST 07/26/2024 22  10 - 40 U/L Final    ALT 07/26/2024 60 (H)  10 - 44 U/L Final    eGFR 07/26/2024 >60.0  >60 mL/min/1.73 m^2 Final    Anion Gap 07/26/2024 9  8 - 16 mmol/L Final    Magnesium 07/26/2024 2.2  1.6 - 2.6 mg/dL Final    Phosphorus 07/26/2024 3.9  2.7 - 4.5 mg/dL Final    WBC 07/26/2024 7.17  3.90 - 12.70 K/uL Final    RBC 07/26/2024 4.35  4.00 - 5.40 M/uL Final    Hemoglobin 07/26/2024 13.3  12.0 - 16.0 g/dL Final    Hematocrit 07/26/2024 39.6  37.0 - 48.5 % Final    MCV 07/26/2024 91  82 - 98 fL Final    MCH 07/26/2024 30.6  27.0 - 31.0 pg Final    MCHC 07/26/2024 33.6  32.0 - 36.0 g/dL Final    RDW 07/26/2024 13.1  11.5 - 14.5 % Final    Platelets 07/26/2024 186  150 - 450 K/uL Final    MPV 07/26/2024 11.6  9.2 - 12.9 fL Final    Immature  Granulocytes 07/26/2024 0.3  0.0 - 0.5 % Final    Gran # (ANC) 07/26/2024 4.5  1.8 - 7.7 K/uL Final    Immature Grans (Abs) 07/26/2024 0.02  0.00 - 0.04 K/uL Final    Comment: Mild elevation in immature granulocytes is non specific and   can be seen in a variety of conditions including stress response,   acute inflammation, trauma and pregnancy. Correlation with other   laboratory and clinical findings is essential.      Lymph # 07/26/2024 1.9  1.0 - 4.8 K/uL Final    Mono # 07/26/2024 0.6  0.3 - 1.0 K/uL Final    Eos # 07/26/2024 0.1  0.0 - 0.5 K/uL Final    Baso # 07/26/2024 0.05  0.00 - 0.20 K/uL Final    nRBC 07/26/2024 0  0 /100 WBC Final    Gran % 07/26/2024 62.7  38.0 - 73.0 % Final    Lymph % 07/26/2024 26.6  18.0 - 48.0 % Final    Mono % 07/26/2024 8.4  4.0 - 15.0 % Final    Eosinophil % 07/26/2024 1.3  0.0 - 8.0 % Final    Basophil % 07/26/2024 0.7  0.0 - 1.9 % Final    Differential Method 07/26/2024 Automated   Final    BSA 07/26/2024 1.87  m2 Final    LVOT stroke volume 07/26/2024 76.31  cm3 Final    LVIDd 07/26/2024 3.89  3.5 - 6.0 cm Final    LV Systolic Volume 07/26/2024 28.58  mL Final    LV Systolic Volume Index 07/26/2024 15.4  mL/m2 Final    LVIDs 07/26/2024 2.76  2.1 - 4.0 cm Final    LV Diastolic Volume 07/26/2024 65.49  mL Final    LV Diastolic Volume Index 07/26/2024 35.40  mL/m2 Final    IVS 07/26/2024 0.91  0.6 - 1.1 cm Final    LVOT diameter 07/26/2024 2.03  cm Final    LVOT area 07/26/2024 3.2  cm2 Final    FS 07/26/2024 29  28 - 44 % Final    Left Ventricle Relative Wall Thick* 07/26/2024 0.41  cm Final    Posterior Wall 07/26/2024 0.79  0.6 - 1.1 cm Final    LV mass 07/26/2024 96.96  g Final    LV Mass Index 07/26/2024 52  g/m2 Final    MV Peak E Jm 07/26/2024 1.41  m/s Final    TDI LATERAL 07/26/2024 0.05  m/s Final    TDI SEPTAL 07/26/2024 0.04  m/s Final    E/E' ratio 07/26/2024 31.33  m/s Final    TR Max Jm 07/26/2024 1.70  m/s Final    LV SEPTAL E/E' RATIO 07/26/2024 35.25  m/s  "Final    LA Volume Index 07/26/2024 26.4  mL/m2 Final    LV LATERAL E/E' RATIO 07/26/2024 28.20  m/s Final    LA volume 07/26/2024 48.91  cm3 Final    LVOT peak braeden 07/26/2024 0.98  m/s Final    RV- velez basal diam 07/26/2024 3.1  cm Final    TAPSE 07/26/2024 1.72  cm Final    LA size 07/26/2024 3.75  cm Final    Left Atrium Minor Axis 07/26/2024 4.80  cm Final    Left Atrium Major Axis 07/26/2024 4.79  cm Final    LA volume (mod) 07/26/2024 48.72  cm3 Final    LA WIDTH 07/26/2024 3.20  cm Final    LA Volume Index (Mod) 07/26/2024 26.3  mL/m2 Final    RA Major Axis 07/26/2024 3.90  cm Final    RA Width 07/26/2024 3.40  cm Final    AV mean gradient 07/26/2024 14  mmHg Final    AV peak gradient 07/26/2024 23  mmHg Final    Ao peak braeden 07/26/2024 2.42  m/s Final    Ao VTI 07/26/2024 54.71  cm Final    LVOT peak VTI 07/26/2024 23.59  cm Final    AV valve area 07/26/2024 1.39  cm² Final    AV Velocity Ratio 07/26/2024 0.40   Final    AV index (prosthetic) 07/26/2024 0.43   Final    BRODIE by Velocity Ratio 07/26/2024 1.31  cm² Final    Triscuspid Valve Regurgitation Pea* 07/26/2024 12  mmHg Final    Sinus 07/26/2024 2.92  cm Final    STJ 07/26/2024 1.84  cm Final    Ascending aorta 07/26/2024 2.93  cm Final    Mean e' 07/26/2024 0.05  m/s Final    ZLVIDS 07/26/2024 -1.10   Final    ZLVIDD 07/26/2024 -2.79   Final         Vitals:    09/05/24 1431   BP: 120/70   BP Location: Left arm   Patient Position: Sitting   BP Method: Large (Manual)   Pulse: 91   Temp: 97.9 °F (36.6 °C)   SpO2: 95%   Weight: 74.8 kg (164 lb 14.5 oz)   Height: 5' 6" (1.676 m)         Diagnoses this Encounter:         ICD-10-CM ICD-9-CM   1. Meningioma  D32.9 225.2   2. Posterior vitreous detachment, left eye  H43.812 379.21   3. Aortic atherosclerosis  I70.0 440.0   4. Aortic stenosis, mild  I35.0 424.1   5. Grade II diastolic dysfunction  I51.89 429.9   6. Systolic murmur  R01.1 785.2   7. Hyperlipidemia associated with type 2 diabetes mellitus  E11.69 " "250.80    E78.5 272.4   8. Hypertension associated with type 2 diabetes mellitus  E11.59 250.80    I15.2 401.9   9. Type 2 diabetes mellitus with other specified complication, without long-term current use of insulin  E11.69 250.80   10. Lung nodules  R91.8 793.19   11. Acquired hypothyroidism  E03.9 244.9   12. Fibromyalgia  M79.7 729.1   13. Seronegative arthritis  M13.80 716.80   14. Anxiety  F41.9 300.00   15. Migraine without status migrainosus, not intractable, unspecified migraine type  G43.909 346.90   16. Vitamin D deficiency  E55.9 268.9   17. Chronic diarrhea  K52.9 787.91   18. Chronic abdominal pain  R10.9 789.00    G89.29 338.29   19. Chronic neck and back pain  M54.2 723.1    M54.9 724.5    G89.29 338.29   20. Wasp sting, accidental or unintentional, initial encounter  T63.461A 989.5     E905.3       Orders Placed This Encounter    TSH    T4, Free    T3    Vitamin D 25 hydroxy    Comprehensive Metabolic Panel    Hemoglobin A1C    Lipid Panel    Microalbumin/Creatinine Ratio, Urine    ergocalciferol, vitamin D2, 50 mcg (2,000 unit) Cap    predniSONE (DELTASONE) 20 MG tablet        I spent a total of 60 minutes on the day of the visit.This includes face to face time and non-face to face time preparing to see the patient (eg, review of tests), obtaining and/or reviewing separately obtained history, documenting clinical information in the electronic or other health record, independently interpreting results and communicating results to the patient/family/caregiver, or care coordinator.       Follow up in about 3 months (around 12/5/2024) for fasting labs and MEDICARE WELLNESS EXAM.     Patient's Medications   New Prescriptions    ERGOCALCIFEROL, VITAMIN D2, 50 MCG (2,000 UNIT) CAP    Take 1 capsule by mouth once daily.   Previous Medications    ALPRAZOLAM (XANAX) 0.5 MG TABLET    Take 1 tablet (0.5 mg total) by mouth 3 (three) times daily.    BD INSULIN SYRINGE 1 ML 25 GAUGE X 5/8" SYRG    Inject 1 mL " "into the skin once a week.    CYANOCOBALAMIN 1,000 MCG/ML INJECTION    INJECT 1 ML (1,000 MCG TOTAL) INTO THE SKIN EVERY 30 DAYS    ERGOCALCIFEROL (ERGOCALCIFEROL) 50,000 UNIT CAP    TAKE 1 CAPSULE (50,000 UNITS TOTAL) BY MOUTH EVERY 7 DAYS. FOR 12 DOSES    EVOLOCUMAB (REPATHA SURECLICK) 140 MG/ML PNIJ    Take 1 mL (140 mg total) by mouth every 14 (fourteen) days.    HYDROCODONE-ACETAMINOPHEN (NORCO) 5-325 MG PER TABLET    Take 1 to 2 tablets by mouth daily as needed.    LISINOPRIL (PRINIVIL,ZESTRIL) 40 MG TABLET    Take 1 tablet (40 mg total) by mouth once daily.    POTASSIUM CHLORIDE (MICRO-K) 10 MEQ CPSR    TAKE 1 CAPSULE BY MOUTH TWICE A DAY    SYRINGE-NEEDLE,SAFETY,DISP UNT 1 ML 25 GAUGE X 5/8" SYRG    To use with mtx injections    THYROID, PORK, (ARMOUR THYROID) 60 MG TAB    One tab daily    TIRZEPATIDE 5 MG/0.5 ML PNIJ    Inject 5 mg into the skin every 7 days.    TIZANIDINE (ZANAFLEX) 4 MG TABLET    TAKE 1 TABLET BY MOUTH FOUR TIMES A DAY    TRAZODONE (DESYREL) 100 MG TABLET    TAKE 1 TABLET BY MOUTH EVERY DAY IN THE EVENING   Modified Medications    No medications on file   Discontinued Medications    No medications on file         Review of Systems   HENT: Negative.     Respiratory: Negative.     Cardiovascular: Negative.    Musculoskeletal:  Positive for arthralgias, back pain, myalgias and neck pain.         Objective:        Physical Exam  Constitutional:       General: She is not in acute distress.     Appearance: Normal appearance. She is not toxic-appearing or diaphoretic.      Comments: Body mass index is 26.62 kg/m².     Cardiovascular:      Rate and Rhythm: Normal rate and regular rhythm.   Pulmonary:      Effort: Pulmonary effort is normal. No respiratory distress.   Neurological:      Mental Status: She is alert and oriented to person, place, and time.   Psychiatric:         Mood and Affect: Mood normal.         Behavior: Behavior normal.             Past Medical History:   Diagnosis Date    " Acute biliary pancreatitis without infection or necrosis 2018    Acute pancreatitis     2018 - admitted at [facility]    ADHD (attention deficit hyperactivity disorder), inattentive type     Aortic atherosclerosis 3/2/2019    Mild to moderate aortic atherosclerosis seen on CT abd/pelvis 3/2/2019    Chronic back pain     Followed by [facility] - Dr. Bautista - has been having medial branch blocks    Fibromyalgia     treated by Dr. Thorpe in past and now treated by Dr. Yates - Rheumatologist    Hyperlipidemia     High triglycerides    Hypertension     Hypothyroidism     Treated by Dr. Mathew    IFG (impaired fasting glucose)     Migraine     Treated by neurologist - Dr. Destiny Florez    Ulcerative colitis     Patient reported treated by Dr. Lee and Aaron in past  but on admit in 2018 - no UC seen on recent colonoscopy.  It was noted on colonoscopy in 2004 there were ulcers noted to descending colon but not present on sigmoidoscopy in 2004.    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 Ghotra    Vitamin D deficiency 3/31/2016       Past Surgical History:   Procedure Laterality Date    CARPAL TUNNEL RELEASE Right 2019    Procedure: RELEASE, CARPAL TUNNEL right;  Surgeon: Li Waller MD;  Location: [address];  Service: Orthopedics;  Laterality: Right;  stretcher, supine, hand pan 1 and 2    CARPAL TUNNEL RELEASE Left 2021    Procedure: RELEASE, CARPAL TUNNEL;  Surgeon: Brian Reed Jr., MD;  Location: [facility];  Service: Orthopedics;  Laterality: Left;     SECTION      COLONOSCOPY  10/08/2010    Dr. Lee - repeat in 5 years - has appointment for colonoscopy 2016    COLONOSCOPY  2016    Dr. Kelly - normal  colon - repeat in 10 years - scanned report to media file.    ENDOSCOPIC ULTRASOUND OF UPPER GASTROINTESTINAL TRACT N/A 2018     Procedure: ULTRASOUND, ENDOSCOPIC, UPPER GI TRACT;  Surgeon: Reji Russell MD;  Location: [facility] ENDO;  Service: Endoscopy;  Laterality: N/A;    ENDOSCOPIC ULTRASOUND OF UPPER GASTROINTESTINAL TRACT N/A 03/04/2019    Procedure: ULTRASOUND, UPPER GI TRACT, ENDOSCOPIC;  Surgeon: Randy Boyd MD;  Location: [facility] ENDO;  Service: Endoscopy;  Laterality: N/A;    HYSTERECTOMY      INTERPOSITION ARTHROPLASTY OF CARPOMETACARPAL JOINTS Left 07/09/2021    Procedure: INTERPOSITION ARTHROPLASTY, [facility] JOINT;  Surgeon: Brian Reed Jr., MD;  Location: [facility] OR;  Service: Orthopedics;  Laterality: Left;  need arthrex tightrope   Brit notified 6/22 LB, Brit confirmed 7/8/21 AM    LAPAROSCOPIC CHOLECYSTECTOMY N/A 03/06/2019    Procedure: CHOLECYSTECTOMY, LAPAROSCOPIC;  Surgeon: Hill Palacios MD;  Location: [facility] OR;  Service: General;  Laterality: N/A;    medial branch block      done at [facility] for axial back pain    OOPHORECTOMY      SHOULDER ARTHROSCOPY W/ ROTATOR CUFF REPAIR Right     Dr. Castro - rotator cuff repair; 3 screws placed, biceps tear repair.    SHOULDER SURGERY Left     TONSILLECTOMY      upper and lower GI  03/2016       Family History   Problem Relation Name Age of Onset    Hypertension Mother      Heart disease Mother      Hyperlipidemia Mother      Cancer Sister  53        thyroid cancer and lymph nodes involvement    Cancer Brother  61        colon and lung cancer    Colon cancer Brother  66    Cancer Brother  63        colon    Hypertension Brother      Diabetes Brother      Colon cancer Brother  60    Esophageal cancer Neg Hx      Stomach cancer Neg Hx      Ulcerative colitis Neg Hx      Crohn's disease Neg Hx      Irritable bowel syndrome Neg Hx      Celiac disease Neg Hx         Social History     Socioeconomic History    Marital status:    Tobacco Use    Smoking status: Former     Current packs/day: 0.00     Average packs/day: 1 pack/day for 20.0 years (20.0 ttl  pk-yrs)     Types: Cigarettes     Start date: 1977     Quit date: 1997     Years since quittin.6    Smokeless tobacco: Former   Substance and Sexual Activity    Alcohol use: No    Drug use: No    Sexual activity: Never     Social Determinants of Health     Financial Resource Strain: Low Risk  (3/19/2024)    Overall Financial Resource Strain (CARDIA)     Difficulty of Paying Living Expenses: Not very hard   Food Insecurity: No Food Insecurity (2024)    Hunger Vital Sign     Worried About Running Out of Food in the Last Year: Never true     Ran Out of Food in the Last Year: Never true   Transportation Needs: No Transportation Needs (2024)    TRANSPORTATION NEEDS     Transportation : No   Physical Activity: Insufficiently Active (3/19/2024)    Exercise Vital Sign     Days of Exercise per Week: 3 days     Minutes of Exercise per Session: 10 min   Stress: No Stress Concern Present (2024)    [place] [employer] - Occupational Stress Questionnaire     Feeling of Stress : Only a little   Housing Stability: Low Risk  (3/19/2024)    Housing Stability Vital Sign     Unable to Pay for Housing in the Last Year: No     Number of Places Lived in the Last Year: 1     Unstable Housing in the Last Year: No

## 2024-09-05 NOTE — Clinical Note
Call patient and schedule CT CHEST to re-evaluate lung nodules before her medicare wellness visit with me in NOvember.  Lung nodules were incidental finding on Chest CT in July - advised to repeat in 3 to 6 months.

## 2024-09-10 PROBLEM — E11.69 HYPERLIPIDEMIA ASSOCIATED WITH TYPE 2 DIABETES MELLITUS: Status: ACTIVE | Noted: 2017-05-15

## 2024-09-10 PROBLEM — E11.9 TYPE 2 DIABETES MELLITUS, WITHOUT LONG-TERM CURRENT USE OF INSULIN: Status: ACTIVE | Noted: 2024-09-10

## 2024-09-10 PROBLEM — H43.812 POSTERIOR VITREOUS DETACHMENT, LEFT EYE: Status: ACTIVE | Noted: 2024-09-10

## 2024-09-10 PROBLEM — M13.80 SERONEGATIVE ARTHRITIS: Status: ACTIVE | Noted: 2020-01-29

## 2024-09-10 PROBLEM — D32.9 MENINGIOMA: Status: ACTIVE | Noted: 2024-09-10

## 2024-09-10 PROBLEM — R04.0 EPISTAXIS: Status: RESOLVED | Noted: 2019-12-23 | Resolved: 2024-09-10

## 2024-09-10 PROBLEM — R01.1 SYSTOLIC MURMUR: Status: ACTIVE | Noted: 2024-09-10

## 2024-09-16 ENCOUNTER — TELEPHONE (OUTPATIENT)
Dept: FAMILY MEDICINE | Facility: CLINIC | Age: 69
End: 2024-09-16
Payer: MEDICARE

## 2024-09-16 NOTE — TELEPHONE ENCOUNTER
Patient advised of message, I advised Jeanne to call patient to schedule CT chest before visit on 11/25

## 2024-09-16 NOTE — TELEPHONE ENCOUNTER
----- Message from Ruth Jaime NP sent at 9/10/2024  3:14 PM CDT -----  Call patient and schedule CT CHEST to re-evaluate lung nodules before her medicare wellness visit with me in NOvember.  Lung nodules were incidental finding on Chest CT in July - advised to repeat in 3 to 6 months.

## 2024-09-26 DIAGNOSIS — F41.9 ANXIETY: Primary | ICD-10-CM

## 2024-09-26 DIAGNOSIS — M13.80 SERONEGATIVE ARTHRITIS: ICD-10-CM

## 2024-09-26 DIAGNOSIS — M79.7 FIBROMYALGIA: ICD-10-CM

## 2024-09-27 DIAGNOSIS — M13.80 SERONEGATIVE ARTHRITIS: ICD-10-CM

## 2024-09-27 DIAGNOSIS — M79.7 FIBROMYALGIA: ICD-10-CM

## 2024-09-27 RX ORDER — HYDROCODONE BITARTRATE AND ACETAMINOPHEN 5; 325 MG/1; MG/1
1-2 TABLET ORAL DAILY PRN
Qty: 60 TABLET | Refills: 0 | Status: SHIPPED | OUTPATIENT
Start: 2024-09-27 | End: 2024-09-27 | Stop reason: SDUPTHER

## 2024-09-27 RX ORDER — HYDROCODONE BITARTRATE AND ACETAMINOPHEN 5; 325 MG/1; MG/1
1-2 TABLET ORAL DAILY PRN
Qty: 60 TABLET | Refills: 0 | Status: SHIPPED | OUTPATIENT
Start: 2024-09-27

## 2024-09-27 RX ORDER — LISINOPRIL 40 MG/1
40 TABLET ORAL DAILY
Qty: 90 TABLET | Refills: 3 | Status: SHIPPED | OUTPATIENT
Start: 2024-09-27 | End: 2025-09-27

## 2024-09-27 NOTE — TELEPHONE ENCOUNTER
----- Message from Beverley Brice sent at 9/27/2024 12:41 PM CDT -----  Regarding: Urgent Refill/Wrong location  Contact: [phone]  Patient called requesting a refill on  HYDROcodone-acetaminophen (NORCO) 5-325 mg per tablet medication. She said the medication went to [facility] and not [facility] where she wants it at. She said she need this done on today and sent to [facility]. She said Dr. KAHN prescribed this medication. Please contact patient as soon as possible.       [facility]  [address]  [place] [address]  Phone: [phone] Fax: [phone]

## 2024-09-27 NOTE — TELEPHONE ENCOUNTER
Received a message from Rheumatologist  MD Roxana that Dr. Yates is no longer with [facility] and Rheumatologist will only prescribe pain medication for rheumatology issue but she WILL NOT prescribe non-rheumatologic medications - Trazodone and Xanax.    Advise patient of the message above from Rheumatologist.  Advise patient that I can prescribe her Trazodone BUT I can NOT prescribe her Xanax.  I do not prescribe long-term use of Benzodiazepines Xanax especially in patients already on Opiates/pain medications.    She can discuss these medications and use at her NEW Rheumatology appt with Dr. Rodriguez that is scheduled in October 2024 but just in case Dr. Rodriguez will also not prescribe Xanax:    I will refer her to Psychiatry for her XANAX/anxiety medications.  Referral to psychiatry placed.  Give patient the number to call to get psychiatric appt.    REDD Miranda Tamika A., MD Physician [id number]:08 AM     Addend     Copy     Message to primary doctor that Dr. Yates is not here with us and we cannot manage non rheumatologic medicines.  Patient will need to receive trazodone and alprazolam from the primary doctor.  We will refill the pain medicine.

## 2024-09-27 NOTE — TELEPHONE ENCOUNTER
Message to primary doctor that Dr. Yates is not here with us and we cannot manage non rheumatologic medicines.  Patient will need to receive trazodone and alprazolam from the primary doctor.  We will refill the pain medicine.

## 2024-09-27 NOTE — TELEPHONE ENCOUNTER
I can fill her Trazodone for insomnia/sleep.    I do not prescribe long-term benzodiazepines/controlled substances especially in patients also on opiates.  I will refer this patient to psychiatry for her Xanax or other options to treat anxiety.

## 2024-10-03 DIAGNOSIS — E11.69 TYPE 2 DIABETES MELLITUS WITH OTHER SPECIFIED COMPLICATION, WITHOUT LONG-TERM CURRENT USE OF INSULIN: ICD-10-CM

## 2024-10-07 ENCOUNTER — TELEPHONE (OUTPATIENT)
Dept: FAMILY MEDICINE | Facility: CLINIC | Age: 69
End: 2024-10-07
Payer: MEDICARE

## 2024-10-07 ENCOUNTER — TELEPHONE (OUTPATIENT)
Dept: RHEUMATOLOGY | Facility: CLINIC | Age: 69
End: 2024-10-07
Payer: MEDICARE

## 2024-10-07 NOTE — TELEPHONE ENCOUNTER
Give patient number to [facility] as well as all the other numbers for the other psychiatrist in area on my psychiatric handout.    Tell patient again that XANAX is an ANXIETY medication and psychiatry would be the one to prescribe this medication.  BOTH the new Rheumatologist and myself do not prescribe this medication - controlled substance and should be followed by specialisty and psychiatry is the specialty for anxiety.

## 2024-10-07 NOTE — TELEPHONE ENCOUNTER
----- Message from Kathi sent at 10/7/2024 12:57 PM CDT -----  Type:  RX Refill Request    Who Called: Pt   Refill or New Rx:Rx  RX Name and Strength:ALPRAZolam (XANAX) 0.5 MG tablet  How is the patient currently taking it? (ex. 1XDay):3x   Is this a 30 day or 90 day RX:30  Preferred Pharmacy with phone number:[facility]/pharmacy #[id number] - [place] - [address]  Phone: [phone]  Fax: [phone]  Would the patient rather a call back or a response via [facility]? Call back   Best Call Back Number:[phone]  Additional Information:

## 2024-10-07 NOTE — TELEPHONE ENCOUNTER
"----- Message from Pooja sent at 10/7/2024 12:09 PM CDT -----  Regarding: pt advice  Contact: [phone]  .Name Of Caller: Self     Contact Preference?:[phone]     What is the nature of the call?: pt calling in regards to needing a new  script for medication  ALPRAZolam (XANAX) 0.5 MG tablet.  90 day supply Pls call   Pharmacy    [facility]/PH[facility]ACY #[id number] - [place], [place] - [address]    .         Additional Notes:  "Thank you for all that you do for our patients"  "

## 2024-10-07 NOTE — TELEPHONE ENCOUNTER
Patient advised of message, said she never had a Psychiatrist and was not told that she needed to get this medication from a Psychiatrist. Please advised.

## 2024-10-07 NOTE — TELEPHONE ENCOUNTER
Problem: Hemodialysis  Goal: Dialysis: Safe, effective, and comfortable hemodialysis treatment (Hemodialysis nurse only)  Outcome: Outcome Met, Continue evaluating goal progress toward completion  Note: Completed 3 hours of 3 hour treatment. Removed 1 kg of 0-1 kg ordered.  Pt received a unit of blood during this treatment    Goal: Dialysis: Free of complications related to initiation/termination of dialysis (Hemodialysis nurse only)  Outcome: Outcome Met, Continue evaluating goal progress toward completion  Note: Obtained flow without any issues.  maintained.  Lines reversed mid treatment d/t high arterial pressures.lines capped with sodium citrate        Patient is requesting refill on XANAX that her RHEUMATOLOGIST prescribed before.    My STAFF - please call patient and advise:    I do NOT prescribe controlled substance BENZODIAZEPINE long-term therapy - she will have to see psychiatrist for this ESPECIALLY since patient is on DAILY OPIATE therapy as well and she is over age 65 - so multiple risks involved in prescribing this medication.    If your new rheumatologist does not agree with prescribing what your previous rheumatologist prescribed - you will have to see PSYCHIATRY as Xanax is for ANXIETY and that should have been followed by psychiatrist all the while with the dose she is taking.

## 2024-10-07 NOTE — TELEPHONE ENCOUNTER
Patient's NP placed referral for psych to continue rx'ing xanax. Patient frustrated about not having doctor to refill meds because they left [facility] she feels blind sided. Patient will reach out to NP Colwalt if she does not get appt with psych schedule soon

## 2024-10-08 RX ORDER — TIRZEPATIDE 5 MG/.5ML
5 INJECTION, SOLUTION SUBCUTANEOUS
Qty: 4 PEN | Refills: 1 | Status: SHIPPED | OUTPATIENT
Start: 2024-10-08

## 2024-10-16 NOTE — HPI
Addressed in 10/16 TE.  Will close encounter.     The pt is a 69F w pmhx of HTN, HLD who [place] [facility] [place]. fell in [employer] today, remembers falling down and being on the floor but is unclear of what happened in between. 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 She does not take blood thinners. She reports flashes and floaters in the L eye since her fall but reports no blurry vision, HA, or focal weakness. Full work up in ED included CTH, which showed numerous posterior fossa lesions. NSGY was consulted for evaluation of abnormal CTH findings.

## 2024-10-31 ENCOUNTER — OFFICE VISIT (OUTPATIENT)
Dept: RHEUMATOLOGY | Facility: CLINIC | Age: 69
End: 2024-10-31
Payer: MEDICARE

## 2024-10-31 VITALS
SYSTOLIC BLOOD PRESSURE: 166 MMHG | DIASTOLIC BLOOD PRESSURE: 95 MMHG | BODY MASS INDEX: 26.05 KG/M2 | HEART RATE: 105 BPM | WEIGHT: 162.06 LBS | HEIGHT: 66 IN

## 2024-10-31 DIAGNOSIS — D84.821 DRUG-INDUCED IMMUNODEFICIENCY: ICD-10-CM

## 2024-10-31 DIAGNOSIS — M06.00 SERONEGATIVE RHEUMATOID ARTHRITIS: Primary | ICD-10-CM

## 2024-10-31 DIAGNOSIS — M79.7 FIBROMYALGIA: ICD-10-CM

## 2024-10-31 DIAGNOSIS — Z79.899 DRUG-INDUCED IMMUNODEFICIENCY: ICD-10-CM

## 2024-10-31 DIAGNOSIS — M15.9 OSTEOARTHRITIS OF MULTIPLE JOINTS, UNSPECIFIED OSTEOARTHRITIS TYPE: ICD-10-CM

## 2024-10-31 PROCEDURE — 99999 PR PBB SHADOW E&M-EST. PATIENT-LVL IV: CPT | Mod: PBBFAC,,, | Performed by: STUDENT IN AN ORGANIZED HEALTH CARE EDUCATION/TRAINING PROGRAM

## 2024-10-31 PROCEDURE — 3080F DIAST BP >= 90 MM HG: CPT | Mod: CPTII,S$GLB,, | Performed by: STUDENT IN AN ORGANIZED HEALTH CARE EDUCATION/TRAINING PROGRAM

## 2024-10-31 PROCEDURE — 99215 OFFICE O/P EST HI 40 MIN: CPT | Mod: S$GLB,,, | Performed by: STUDENT IN AN ORGANIZED HEALTH CARE EDUCATION/TRAINING PROGRAM

## 2024-10-31 PROCEDURE — 4010F ACE/ARB THERAPY RXD/TAKEN: CPT | Mod: CPTII,S$GLB,, | Performed by: STUDENT IN AN ORGANIZED HEALTH CARE EDUCATION/TRAINING PROGRAM

## 2024-10-31 PROCEDURE — 3044F HG A1C LEVEL LT 7.0%: CPT | Mod: CPTII,S$GLB,, | Performed by: STUDENT IN AN ORGANIZED HEALTH CARE EDUCATION/TRAINING PROGRAM

## 2024-10-31 PROCEDURE — 1159F MED LIST DOCD IN RCRD: CPT | Mod: CPTII,S$GLB,, | Performed by: STUDENT IN AN ORGANIZED HEALTH CARE EDUCATION/TRAINING PROGRAM

## 2024-10-31 PROCEDURE — 1125F AMNT PAIN NOTED PAIN PRSNT: CPT | Mod: CPTII,S$GLB,, | Performed by: STUDENT IN AN ORGANIZED HEALTH CARE EDUCATION/TRAINING PROGRAM

## 2024-10-31 PROCEDURE — 3288F FALL RISK ASSESSMENT DOCD: CPT | Mod: CPTII,S$GLB,, | Performed by: STUDENT IN AN ORGANIZED HEALTH CARE EDUCATION/TRAINING PROGRAM

## 2024-10-31 PROCEDURE — 1101F PT FALLS ASSESS-DOCD LE1/YR: CPT | Mod: CPTII,S$GLB,, | Performed by: STUDENT IN AN ORGANIZED HEALTH CARE EDUCATION/TRAINING PROGRAM

## 2024-10-31 PROCEDURE — 3008F BODY MASS INDEX DOCD: CPT | Mod: CPTII,S$GLB,, | Performed by: STUDENT IN AN ORGANIZED HEALTH CARE EDUCATION/TRAINING PROGRAM

## 2024-10-31 PROCEDURE — 3077F SYST BP >= 140 MM HG: CPT | Mod: CPTII,S$GLB,, | Performed by: STUDENT IN AN ORGANIZED HEALTH CARE EDUCATION/TRAINING PROGRAM

## 2024-10-31 RX ORDER — HYDROCODONE BITARTRATE AND ACETAMINOPHEN 5; 325 MG/1; MG/1
2 TABLET ORAL EVERY 12 HOURS PRN
Qty: 60 TABLET | Refills: 0 | Status: SHIPPED | OUTPATIENT
Start: 2024-10-31

## 2024-10-31 RX ORDER — METHOTREXATE 25 MG/ML
25 INJECTION, SOLUTION INTRA-ARTERIAL; INTRAMUSCULAR; INTRAVENOUS
COMMUNITY

## 2024-10-31 RX ORDER — FOLIC ACID 1 MG/1
3 TABLET ORAL DAILY
COMMUNITY

## 2024-11-01 ENCOUNTER — OFFICE VISIT (OUTPATIENT)
Dept: PSYCHIATRY | Facility: CLINIC | Age: 69
End: 2024-11-01
Payer: MEDICARE

## 2024-11-01 VITALS
BODY MASS INDEX: 26.19 KG/M2 | WEIGHT: 162.94 LBS | HEART RATE: 93 BPM | SYSTOLIC BLOOD PRESSURE: 151 MMHG | DIASTOLIC BLOOD PRESSURE: 73 MMHG | HEIGHT: 66 IN

## 2024-11-01 DIAGNOSIS — F41.9 ANXIETY DISORDER, UNSPECIFIED TYPE: Primary | ICD-10-CM

## 2024-11-01 DIAGNOSIS — F34.1 PERSISTENT DEPRESSIVE DISORDER: ICD-10-CM

## 2024-11-01 DIAGNOSIS — F33.1 MAJOR DEPRESSIVE DISORDER, RECURRENT EPISODE, MODERATE: ICD-10-CM

## 2024-11-01 DIAGNOSIS — F13.20 BENZODIAZEPINE DEPENDENCE: ICD-10-CM

## 2024-11-01 PROCEDURE — 99999 PR PBB SHADOW E&M-EST. PATIENT-LVL II: CPT | Mod: PBBFAC,,, | Performed by: STUDENT IN AN ORGANIZED HEALTH CARE EDUCATION/TRAINING PROGRAM

## 2024-11-01 RX ORDER — VORTIOXETINE 10 MG/1
10 TABLET, FILM COATED ORAL DAILY
Qty: 30 TABLET | Refills: 1 | Status: SHIPPED | OUTPATIENT
Start: 2024-11-01 | End: 2024-12-31

## 2024-11-01 RX ORDER — DIAZEPAM 5 MG/1
TABLET ORAL
Qty: 60 TABLET | Refills: 0 | Status: SHIPPED | OUTPATIENT
Start: 2024-11-01

## 2024-11-12 ENCOUNTER — OFFICE VISIT (OUTPATIENT)
Dept: NEUROSURGERY | Facility: CLINIC | Age: 69
End: 2024-11-12
Payer: MEDICARE

## 2024-11-12 ENCOUNTER — HOSPITAL ENCOUNTER (OUTPATIENT)
Dept: RADIOLOGY | Facility: HOSPITAL | Age: 69
Discharge: HOME | End: 2024-11-12
Attending: NEUROLOGICAL SURGERY
Payer: MEDICARE

## 2024-11-12 VITALS — DIASTOLIC BLOOD PRESSURE: 80 MMHG | SYSTOLIC BLOOD PRESSURE: 127 MMHG | HEART RATE: 101 BPM

## 2024-11-12 DIAGNOSIS — D32.9 MENINGIOMA: ICD-10-CM

## 2024-11-12 DIAGNOSIS — D32.9 MENINGIOMA: Primary | ICD-10-CM

## 2024-11-12 PROCEDURE — 70553 MRI BRAIN STEM W/O & W/DYE: CPT | Mod: TC

## 2024-11-12 PROCEDURE — 99999 PR PBB SHADOW E&M-EST. PATIENT-LVL III: CPT | Mod: PBBFAC,,, | Performed by: NEUROLOGICAL SURGERY

## 2024-11-12 PROCEDURE — 3288F FALL RISK ASSESSMENT DOCD: CPT | Mod: CPTII,S$GLB,, | Performed by: NEUROLOGICAL SURGERY

## 2024-11-12 PROCEDURE — A9585 GADOBUTROL INJECTION: HCPCS | Performed by: NEUROLOGICAL SURGERY

## 2024-11-12 PROCEDURE — 3066F NEPHROPATHY DOC TX: CPT | Mod: CPTII,S$GLB,, | Performed by: NEUROLOGICAL SURGERY

## 2024-11-12 PROCEDURE — 99215 OFFICE O/P EST HI 40 MIN: CPT | Mod: S$GLB,,, | Performed by: NEUROLOGICAL SURGERY

## 2024-11-12 PROCEDURE — 1101F PT FALLS ASSESS-DOCD LE1/YR: CPT | Mod: CPTII,S$GLB,, | Performed by: NEUROLOGICAL SURGERY

## 2024-11-12 PROCEDURE — 4010F ACE/ARB THERAPY RXD/TAKEN: CPT | Mod: CPTII,S$GLB,, | Performed by: NEUROLOGICAL SURGERY

## 2024-11-12 PROCEDURE — 1159F MED LIST DOCD IN RCRD: CPT | Mod: CPTII,S$GLB,, | Performed by: NEUROLOGICAL SURGERY

## 2024-11-12 PROCEDURE — 3079F DIAST BP 80-89 MM HG: CPT | Mod: CPTII,S$GLB,, | Performed by: NEUROLOGICAL SURGERY

## 2024-11-12 PROCEDURE — 1160F RVW MEDS BY RX/DR IN RCRD: CPT | Mod: CPTII,S$GLB,, | Performed by: NEUROLOGICAL SURGERY

## 2024-11-12 PROCEDURE — 1126F AMNT PAIN NOTED NONE PRSNT: CPT | Mod: CPTII,S$GLB,, | Performed by: NEUROLOGICAL SURGERY

## 2024-11-12 PROCEDURE — 70553 MRI BRAIN STEM W/O & W/DYE: CPT | Mod: 26,,, | Performed by: RADIOLOGY

## 2024-11-12 PROCEDURE — 3074F SYST BP LT 130 MM HG: CPT | Mod: CPTII,S$GLB,, | Performed by: NEUROLOGICAL SURGERY

## 2024-11-12 PROCEDURE — 3061F NEG MICROALBUMINURIA REV: CPT | Mod: CPTII,S$GLB,, | Performed by: NEUROLOGICAL SURGERY

## 2024-11-12 PROCEDURE — 25500020 PHARM REV CODE 255: Performed by: NEUROLOGICAL SURGERY

## 2024-11-12 PROCEDURE — 3044F HG A1C LEVEL LT 7.0%: CPT | Mod: CPTII,S$GLB,, | Performed by: NEUROLOGICAL SURGERY

## 2024-11-12 RX ORDER — GADOBUTROL 604.72 MG/ML
7 INJECTION INTRAVENOUS
Status: COMPLETED | OUTPATIENT
Start: 2024-11-12 | End: 2024-11-12

## 2024-11-12 RX ADMIN — GADOBUTROL 7 ML: 604.72 INJECTION INTRAVENOUS at 09:11

## 2024-11-18 ENCOUNTER — OFFICE VISIT (OUTPATIENT)
Dept: ENDOCRINOLOGY | Facility: CLINIC | Age: 69
End: 2024-11-18
Payer: MEDICARE

## 2024-11-18 VITALS
SYSTOLIC BLOOD PRESSURE: 135 MMHG | BODY MASS INDEX: 25.73 KG/M2 | DIASTOLIC BLOOD PRESSURE: 82 MMHG | OXYGEN SATURATION: 97 % | WEIGHT: 159.38 LBS

## 2024-11-18 DIAGNOSIS — E11.65 TYPE 2 DIABETES MELLITUS WITH HYPERGLYCEMIA, WITHOUT LONG-TERM CURRENT USE OF INSULIN: ICD-10-CM

## 2024-11-18 DIAGNOSIS — E03.9 ACQUIRED HYPOTHYROIDISM: Primary | ICD-10-CM

## 2024-11-18 DIAGNOSIS — E11.69 HYPERLIPIDEMIA ASSOCIATED WITH TYPE 2 DIABETES MELLITUS: ICD-10-CM

## 2024-11-18 DIAGNOSIS — E78.5 HYPERLIPIDEMIA ASSOCIATED WITH TYPE 2 DIABETES MELLITUS: ICD-10-CM

## 2024-11-18 PROCEDURE — 3288F FALL RISK ASSESSMENT DOCD: CPT | Mod: CPTII,S$GLB,, | Performed by: INTERNAL MEDICINE

## 2024-11-18 PROCEDURE — 3008F BODY MASS INDEX DOCD: CPT | Mod: CPTII,S$GLB,, | Performed by: INTERNAL MEDICINE

## 2024-11-18 PROCEDURE — 3079F DIAST BP 80-89 MM HG: CPT | Mod: CPTII,S$GLB,, | Performed by: INTERNAL MEDICINE

## 2024-11-18 PROCEDURE — 3066F NEPHROPATHY DOC TX: CPT | Mod: CPTII,S$GLB,, | Performed by: INTERNAL MEDICINE

## 2024-11-18 PROCEDURE — 3061F NEG MICROALBUMINURIA REV: CPT | Mod: CPTII,S$GLB,, | Performed by: INTERNAL MEDICINE

## 2024-11-18 PROCEDURE — 3044F HG A1C LEVEL LT 7.0%: CPT | Mod: CPTII,S$GLB,, | Performed by: INTERNAL MEDICINE

## 2024-11-18 PROCEDURE — 99214 OFFICE O/P EST MOD 30 MIN: CPT | Mod: S$GLB,,, | Performed by: INTERNAL MEDICINE

## 2024-11-18 PROCEDURE — 1101F PT FALLS ASSESS-DOCD LE1/YR: CPT | Mod: CPTII,S$GLB,, | Performed by: INTERNAL MEDICINE

## 2024-11-18 PROCEDURE — 1160F RVW MEDS BY RX/DR IN RCRD: CPT | Mod: CPTII,S$GLB,, | Performed by: INTERNAL MEDICINE

## 2024-11-18 PROCEDURE — 4010F ACE/ARB THERAPY RXD/TAKEN: CPT | Mod: CPTII,S$GLB,, | Performed by: INTERNAL MEDICINE

## 2024-11-18 PROCEDURE — 1125F AMNT PAIN NOTED PAIN PRSNT: CPT | Mod: CPTII,S$GLB,, | Performed by: INTERNAL MEDICINE

## 2024-11-18 PROCEDURE — 1159F MED LIST DOCD IN RCRD: CPT | Mod: CPTII,S$GLB,, | Performed by: INTERNAL MEDICINE

## 2024-11-18 PROCEDURE — 99999 PR PBB SHADOW E&M-EST. PATIENT-LVL IV: CPT | Mod: PBBFAC,,, | Performed by: INTERNAL MEDICINE

## 2024-11-18 PROCEDURE — 3075F SYST BP GE 130 - 139MM HG: CPT | Mod: CPTII,S$GLB,, | Performed by: INTERNAL MEDICINE

## 2024-11-18 NOTE — PROGRESS NOTES
CHIEF COMPLAINT:  Post fossa masses    INTERVAL HISTORY (11/12/24):  Patient returns for review of surveillance MRI re: post fossa dural masses.  Patient denies any new symptoms including imbalance problems with coordination, gait disturbance, weakness or numbness.  She denies any headaches or vision changes.    Her main issue involves depression and anxiety related to the caretaking of her  who has advanced dementia.    HPI:  Madalyn Iverson is a 69 y.o.  female with below PMH, who is referred for evaluation of incidentally discovered masses in the posterior fossa.  These were discovered on an MRI obtained after injuring her ankle and reporting that she had some left eye vision changes.  Other than floaters and flashes of light in the left eye, which are being monitored and managed by Ophthalmology, she denies any significant neurologic symptoms.  She occasionally has dizziness when she stands up too fast and occasional mild headaches.  She denies any problems with balance, clumsiness, gait, weakness, numbness, coordination.    She has no known history of cancer or tumors in her body.  She has several small nodules in her lung.    Review of patient's allergies indicates:  No Known Allergies    Past Medical History:   Diagnosis Date    Acute biliary pancreatitis without infection or necrosis 6/13/2018    Acute pancreatitis     June 2018 - admitted at [facility]    ADHD (attention deficit hyperactivity disorder), inattentive type     Aortic atherosclerosis 3/2/2019    Mild to moderate aortic atherosclerosis seen on CT abd/pelvis 3/2/2019    Chronic back pain     Followed by [facility] - Dr. Bautista - has been having medial branch blocks    Fibromyalgia     treated by Dr. Thorpe in past and now treated by Dr. Yates - Rheumatologist    Hyperlipidemia     High triglycerides    Hypertension     Hypothyroidism     Treated by Dr. Mathew    IFG (impaired fasting  glucose)     Migraine     Treated by neurologist - Dr. Destiny Florez    Ulcerative colitis     Patient reported treated by Dr. Lee and Aaron in past  but on admit in 2018 - no UC seen on recent colonoscopy.  It was noted on colonoscopy in 2004 there were ulcers noted to descending colon but not present on sigmoidoscopy in 2004.    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 Ghotra    Vitamin D deficiency 3/31/2016     Past Surgical History:   Procedure Laterality Date    CARPAL TUNNEL RELEASE Right 2019    Procedure: RELEASE, CARPAL TUNNEL right;  Surgeon: Li Waller MD;  Location: [address];  Service: Orthopedics;  Laterality: Right;  stretcher, supine, hand pan 1 and 2    CARPAL TUNNEL RELEASE Left 2021    Procedure: RELEASE, CARPAL TUNNEL;  Surgeon: Brian Reed Jr., MD;  Location: [facility];  Service: Orthopedics;  Laterality: Left;     SECTION      COLONOSCOPY  10/08/2010    Dr. Lee - repeat in 5 years - has appointment for colonoscopy 2016    COLONOSCOPY  2016    Dr. Kelly - normal  colon - repeat in 10 years - scanned report to media file.    ENDOSCOPIC ULTRASOUND OF UPPER GASTROINTESTINAL TRACT N/A 2018    Procedure: ULTRASOUND, ENDOSCOPIC, UPPER GI TRACT;  Surgeon: Reji Russell MD;  Location: [facility];  Service: Endoscopy;  Laterality: N/A;    ENDOSCOPIC ULTRASOUND OF UPPER GASTROINTESTINAL TRACT N/A 2019    Procedure: ULTRASOUND, UPPER GI TRACT, ENDOSCOPIC;  Surgeon: Randy Boyd MD;  Location: [facility];  Service: Endoscopy;  Laterality: N/A;    HYSTERECTOMY      INTERPOSITION ARTHROPLASTY OF CARPOMETACARPAL JOINTS Left 2021    Procedure: INTERPOSITION ARTHROPLASTY, CMC JOINT;  Surgeon: Brian Reed Jr., MD;  Location: [facility];  Service: Orthopedics;  Laterality: Left;  need arthrex tightrope   Brit notified  LB, Brit confirmed  21 AM    LAPAROSCOPIC CHOLECYSTECTOMY N/A 2019    Procedure: CHOLECYSTECTOMY, LAPAROSCOPIC;  Surgeon: Hill Palacios MD;  Location: [facility] OR;  Service: General;  Laterality: N/A;    medial branch block      done at [facility] for axial back pain    OOPHORECTOMY      SHOULDER ARTHROSCOPY W/ ROTATOR CUFF REPAIR Right     Dr. Castro - rotator cuff repair; 3 screws placed, biceps tear repair.    SHOULDER SURGERY Left     TONSILLECTOMY      upper and lower GI  2016     Family History   Problem Relation Name Age of Onset    Hypertension Mother      Heart disease Mother      Hyperlipidemia Mother      Cancer Sister  53        thyroid cancer and lymph nodes involvement    Cancer Brother  61        colon and lung cancer    Colon cancer Brother  66    Cancer Brother  63        colon    Hypertension Brother      Diabetes Brother      Colon cancer Brother  60    Esophageal cancer Neg Hx      Stomach cancer Neg Hx      Ulcerative colitis Neg Hx      Crohn's disease Neg Hx      Irritable bowel syndrome Neg Hx      Celiac disease Neg Hx       Social History     Tobacco Use    Smoking status: Former     Current packs/day: 0.00     Average packs/day: 1 pack/day for 20.0 years (20.0 ttl pk-yrs)     Types: Cigarettes     Start date: 1977     Quit date: 1997     Years since quittin.8    Smokeless tobacco: Former   Substance Use Topics    Alcohol use: No    Drug use: No        Review of Systems   Constitutional: Negative.    HENT:  Negative for congestion, ear discharge, ear pain, hearing loss, nosebleeds, sinus pain and tinnitus.    Eyes: Negative.    Respiratory: Negative.     Cardiovascular:  Negative for chest pain, palpitations, claudication and leg swelling.   Gastrointestinal:  Negative for abdominal pain, blood in stool, constipation, diarrhea, melena and vomiting.   Genitourinary:  Negative for flank pain, frequency and urgency.   Musculoskeletal:  Negative for falls.   Skin:  Negative.    Neurological: Negative.    Endo/Heme/Allergies:  Does not bruise/bleed easily.   Psychiatric/Behavioral: Negative.         OBJECTIVE:   Vital Signs:  Pulse: 101 (11/12/24 1022)  BP: 127/80 (11/12/24 1022)    Physical Exam:  Constitutional: Patient sitting comfortably in chair. Appears well developed and well nourished.  Skin: Exposed areas are intact without abnormal markings, rashes or other lesions.  HEENT: Normocephalic. Normal conjunctivae.  Cardiovascular: Normal rate and regular rhythm.  Respiratory: Chest wall rises and falls symmetrically, without signs of respiratory distress.  Abdomen: Soft and non-tender.  Extremities: Warm and without edema. Calves supple, non-tender.  Psych/Behavior: Normal affect.    Neurological:    Mental status: Alert and oriented. Conversational and appropriate.       Cranial Nerves: VFF to confrontation. PERRL. EOMI without nystagmus. Facial STLT normal and symmetric. Strong, symmetric muscles of mastication. Facial strength full and symmetric. Hearing equal bilaterally to finger rub. Palate and uvula rise and fall normally in midline. Shoulder shrug 5/5 strength. Tongue midline.     Motor:    Upper:  Deltoids Triceps Biceps WE WF     R 5/5 5/5 5/5 5/5 5/5 5/5    L 5/5 5/5 5/5 5/5 5/5 5/5      Lower:  HF KE KF DF PF EHL    R 5/5 5/5 5/5 5/5 5/5 5/5    L 5/5 5/5 5/5 5/5 5/5 5/5     Sensory: Intact sensation to light touch in all extremities. Romberg negative.    Reflexes:          DTR: 2+ symmetrically throughout.     Austin's: Negative.     Babinski's: Negative.     Clonus: Negative.    Cerebellar: Finger-to-nose and rapid alternating movements normal.     Gait stable    Diagnostic Results:  All imaging was independently reviewed by me.    BMRI, 11/12/24:  Stable without significant changes  4th ventricle patent without hydrocephalus    MRI brain, dated 7/25/24:  1. Multiple bilateral extra-axial masses in posterior fossa  2. No flair signal change  4. 4th patent  without hydrocephalus    ASSESSMENT/PLAN:     Problem List Items Addressed This Visit          Oncology    Meningioma - Primary    Relevant Orders    MRI Brain W WO Contrast     Patient was incidentally found to have multiple bilateral extra-axial posterior fossa masses without any evidence of edema.  She has no history of prior cancers throughout her body.  I explained that given that she remains asymptomatic and the fact that there was no obvious swelling (as evidence by FLAIR change), I suspect these are meningiomas.  Repeat MRI remains stable.  I will repeat the MRI in 3 months to get a better gauge on their behavior.    - virtual visit/RTC in 1yr with repeat brain MRI    The patient understands and agrees with the plan of care. All questions were answered.    Time spent on this encounter: 40 minutes. This includes face-to-face time and non-face to face time preparing to see the patient (eg, review of tests), obtaining and/or reviewing separately obtained history, documenting clinical information in the electronic or other health record, independently interpreting results and communicating results to the patient/family/caregiver, or care coordinator.          .

## 2024-11-18 NOTE — PROGRESS NOTES
Endocrine Clinic Note    Reason for Visit: Type 2 DM, Hypothyroidism    History of Present Illness: Madayln Iverson, a 69 y.o. female who presents for follow-up of DM2 and hypothyroidism. Patient has history of rheumatoid arthritis, chronic colitis, IBS, fibromyalgia, meningioma. Patient was last seen by Dr. Lewis on 01/06/2023.      Hypothyroidism    Dx with hypothyroidism at age 16.   Currently on armour 60 mg daily.   She feels well on current dose of [place].     She tried synthroid 10- 15 years ago. She had sluggishness, weight gain, swelling, and anxiety on Synthroid. She has been prescribed [place] by her GYN.      She denies history of thyroid nodules.    She denies anterior neck swelling, tenderness, or tightness.     She has anxiety and irritability which she attributes to caring for her  after care home.   Recent diagnosis of meningioma.    The patient denies history of head/neck irradiation.   Patient has family history of thyroid disease in her sister who had thyroid cancer and hypothyroidism.       Lab Results   Component Value Date    TSH 0.425 11/07/2024    TSH 1.333 07/26/2024    TSH 1.469 02/05/2024    FREET4 1.03 11/07/2024    FREET4 1.00 02/05/2024    FREET4 0.93 07/29/2022       Type 2 diabetes      Diabetes Hx:  Diagnosed w/ DM: Prediabetes since 16 years, A1c in 6.6 in 2022.  Complications:   Retinopathy: Denies   Last eye exam: : 07/29/2024  Neuropathy: Numbness and pain in her hands, has RA.    Last foot exam: 11/18/2024   Nephropathy: Denies  Cardiovascular: HTN, HLD, aortic stenosis  DKA/HHS: Denies    Severe Hypoglycemia: Denies  Hypoglycemia unawareness: Denies  Hypoglycemic episodes: Denies    Current meds:   Mounjaro 5 mg once a week, started in 01/2023 by Dr. Lewis. Tolerating well.     Compliance with meds: Yes     Previous meds:  Semaglutide     Home glucose checks: Does not check her blood sugars at home.     Diet/Exercise:   Takes 3 meals a day, snacks on between  meals.  Trying to eat healthier.   Water intake:  Was taking coke zero frequently but has cut down to 1/day. 12 oz of water/day.     Diabetes education:  About 5 years back.     Last A1c:   Lab Results   Component Value Date    HGBA1C 5.3 11/07/2024    HGBA1C 5.4 02/05/2024    HGBA1C 6.6 (H) 07/29/2022     microalbumin:   Lab Results   Component Value Date    LABMICR 12.0 11/07/2024    CREATRANDUR 83.0 11/07/2024    MICALBCREAT 14.5 11/07/2024     Lab Results   Component Value Date    EGFRNORACEVR >60.0 11/07/2024    EGFRNORACEVR >60.0 11/07/2024    CREATININE 0.7 11/07/2024    CREATININE 0.7 11/07/2024     Lipids:   Lab Results   Component Value Date    CHOL 164 11/07/2024    TRIG 179 (H) 11/07/2024    HDL 48 11/07/2024    LDLCALC 80.2 11/07/2024    CHOLHDL 29.3 11/07/2024     TSH:  Lab Results   Component Value Date    TSH 0.425 11/07/2024     Lab Results   Component Value Date    UZPVVVQN60 1015 (H) 06/06/2018     Lab Results   Component Value Date    HGB 14.2 11/07/2024          Aspirin: No  Statins: On repatha, has statin intolerance.   ACEI/ARB: Yes    HTN:  On lisinopril    Fatty liver: No    Acute biliary pancreatitis without infection or necrosis in 6/13/2018   Has f/h of thyroid cancer in her sister, currently in remission.   History recurrent UTI/yeast infections: No, prior infections.     Polyuria: No  Polydipsia: No      FHx of DM: Brother and sister.    ROS: see HPI     Objective:     Physical Exam     /82 (BP Location: Left arm, Patient Position: Sitting)   Wt 72.3 kg (159 lb 6.3 oz)   SpO2 97%   BMI 25.73 kg/m²     Wt Readings from Last 3 Encounters:   11/18/24 72.3 kg (159 lb 6.3 oz)   10/31/24 73.5 kg (162 lb 0.6 oz)   09/05/24 74.8 kg (164 lb 14.5 oz)       Constitutional:  Pleasant,  in no acute distress.   HENT:   Head:    Normocephalic and atraumatic.   Eyes:    No scleral icterus.   Cardiovascular:  Normal rate  Respiratory:   Effort normal   Neurological:  No tremor  Skin:    Skin is  warm, dry  Extremity:  No edema    DIABETIC FOOT EXAM: 11/18/2024 - normal sensation to monofilament testing bilaterally.  No fissures, wounds, or ulcers.    LABORATORY REVIEW:  See HPI for other labs reviewed today      Chemistry        Component Value Date/Time     11/07/2024 0800     11/07/2024 0800    K 4.4 11/07/2024 0800    K 4.4 11/07/2024 0800     11/07/2024 0800     11/07/2024 0800    CO2 25 11/07/2024 0800    CO2 24 11/07/2024 0800    BUN 16 11/07/2024 0800    BUN 16 11/07/2024 0800    CREATININE 0.7 11/07/2024 0800    CREATININE 0.7 11/07/2024 0800    GLU 98 11/07/2024 0800    GLU 98 11/07/2024 0800        Component Value Date/Time    CALCIUM 9.7 11/07/2024 0800    CALCIUM 9.7 11/07/2024 0800    ALKPHOS 131 11/07/2024 0800    ALKPHOS 130 11/07/2024 0800    AST 17 11/07/2024 0800    AST 17 11/07/2024 0800    ALT 13 11/07/2024 0800    ALT 15 11/07/2024 0800    BILITOT 0.3 11/07/2024 0800    BILITOT 0.3 11/07/2024 0800    ESTGFRAFRICA >60.0 07/26/2022 0942    EGFRNONAA >60.0 07/26/2022 0942          Lab Results   Component Value Date    HGBA1C 5.3 11/07/2024    HGBA1C 5.4 02/05/2024    HGBA1C 6.6 (H) 07/29/2022     Other labs reviewed today in [facility]    Assessment/Plan:     1. Acquired hypothyroidism  Overview:  Treated by Dr. Mathew    Assessment & Plan:    Currently on [place] thyroid 60 mg daily.  Clinically and biochemically euthyroid.  Monitor TFTs periodically.      Orders:  -     TSH; Future; Expected date: 05/18/2025    2. Type 2 diabetes mellitus with hyperglycemia, without long-term current use of insulin  Assessment & Plan:      Last A1c was 5.3.    Continue Mounjaro, tolerating well.    Discussed side effects with the medication including pancreatitis and risk of medullary thyroid cancer.    Declines referral to diabetes educator.  Discussed importance of diet and lifestyle modifications for diabetes management  Hypoglycemia management discussed. Carry glucose tablets or  snacks at all times.     Discussed risk of complications.    Complications:  Follow up for regular diabetes eye exam  Daily self examination of feet.  Microalbumin: Monitor. On ACEI      Last A1c:   Lab Results   Component Value Date    HGBA1C 5.3 11/07/2024          Orders:  -     Ambulatory referral/consult to Endocrinology  -     Hemoglobin A1C; Future; Expected date: 05/18/2025  -     Comprehensive Metabolic Panel; Future    3. Hyperlipidemia associated with type 2 diabetes mellitus  Assessment & Plan:    H/o statin intolerance.  Currently on Repatha.           Follow up in about 6 months (around 5/18/2025).     Nicolasa Rodriguez MD

## 2024-11-18 NOTE — ASSESSMENT & PLAN NOTE
Last A1c was 5.3.    Continue Mounjaro, tolerating well.    Discussed side effects with the medication including pancreatitis and risk of medullary thyroid cancer.    Declines referral to diabetes educator.  Discussed importance of diet and lifestyle modifications for diabetes management  Hypoglycemia management discussed. Carry glucose tablets or snacks at all times.     Discussed risk of complications.    Complications:  Follow up for regular diabetes eye exam  Daily self examination of feet.  Microalbumin: Monitor. On ACEI      Last A1c:   Lab Results   Component Value Date    HGBA1C 5.3 11/07/2024

## 2024-11-18 NOTE — ASSESSMENT & PLAN NOTE
Currently on [place] thyroid 60 mg daily.  Clinically and biochemically euthyroid.  Monitor TFTs periodically.

## 2024-11-19 ENCOUNTER — HOSPITAL ENCOUNTER (OUTPATIENT)
Dept: RADIOLOGY | Facility: HOSPITAL | Age: 69
Discharge: HOME | End: 2024-11-19
Attending: STUDENT IN AN ORGANIZED HEALTH CARE EDUCATION/TRAINING PROGRAM
Payer: MEDICARE

## 2024-11-19 DIAGNOSIS — Z79.899 DRUG-INDUCED IMMUNODEFICIENCY: ICD-10-CM

## 2024-11-19 DIAGNOSIS — D84.821 DRUG-INDUCED IMMUNODEFICIENCY: ICD-10-CM

## 2024-11-19 DIAGNOSIS — M06.00 SERONEGATIVE RHEUMATOID ARTHRITIS: ICD-10-CM

## 2024-11-19 PROCEDURE — 73223 MRI JOINT UPR EXTR W/O&W/DYE: CPT | Mod: 26,50,, | Performed by: RADIOLOGY

## 2024-11-19 PROCEDURE — A9585 GADOBUTROL INJECTION: HCPCS | Performed by: STUDENT IN AN ORGANIZED HEALTH CARE EDUCATION/TRAINING PROGRAM

## 2024-11-19 PROCEDURE — 73223 MRI JOINT UPR EXTR W/O&W/DYE: CPT | Mod: TC,50

## 2024-11-19 PROCEDURE — 25500020 PHARM REV CODE 255: Performed by: STUDENT IN AN ORGANIZED HEALTH CARE EDUCATION/TRAINING PROGRAM

## 2024-11-19 RX ORDER — GADOBUTROL 604.72 MG/ML
7 INJECTION INTRAVENOUS
Status: COMPLETED | OUTPATIENT
Start: 2024-11-19 | End: 2024-11-19

## 2024-11-19 RX ADMIN — GADOBUTROL 7 ML: 604.72 INJECTION INTRAVENOUS at 03:11

## 2024-11-21 DIAGNOSIS — M15.9 OSTEOARTHRITIS OF MULTIPLE JOINTS, UNSPECIFIED OSTEOARTHRITIS TYPE: ICD-10-CM

## 2024-11-21 DIAGNOSIS — M79.7 FIBROMYALGIA: ICD-10-CM

## 2024-11-21 RX ORDER — HYDROCODONE BITARTRATE AND ACETAMINOPHEN 5; 325 MG/1; MG/1
2 TABLET ORAL EVERY 12 HOURS PRN
Qty: 60 TABLET | Refills: 0 | Status: SHIPPED | OUTPATIENT
Start: 2024-11-21 | End: 2024-11-22

## 2024-11-22 ENCOUNTER — TELEPHONE (OUTPATIENT)
Dept: RHEUMATOLOGY | Facility: CLINIC | Age: 69
End: 2024-11-22
Payer: MEDICARE

## 2024-11-22 DIAGNOSIS — M79.7 FIBROMYALGIA: ICD-10-CM

## 2024-11-22 DIAGNOSIS — M15.9 OSTEOARTHRITIS OF MULTIPLE JOINTS, UNSPECIFIED OSTEOARTHRITIS TYPE: ICD-10-CM

## 2024-11-22 RX ORDER — HYDROCODONE BITARTRATE AND ACETAMINOPHEN 5; 325 MG/1; MG/1
2 TABLET ORAL EVERY 12 HOURS PRN
Qty: 60 TABLET | Refills: 0 | OUTPATIENT
Start: 2024-11-22

## 2024-11-22 RX ORDER — HYDROCODONE BITARTRATE AND ACETAMINOPHEN 5; 325 MG/1; MG/1
1 TABLET ORAL EVERY 12 HOURS PRN
Start: 2024-11-22

## 2024-11-22 NOTE — TELEPHONE ENCOUNTER
Changed prescription of hydrocodonea/apap to ensure accuracy. Directions are 1 tablet every 12 hours as needed

## 2024-11-22 NOTE — TELEPHONE ENCOUNTER
Spoke with patient. She only received a 15 day supply on her hydrocodone. Directions say take 2 every 12 hours as needed for pain and dispensed 60 tablets. I told her I will get this message to Dr. Miner for clarification to see what can be done.

## 2024-11-22 NOTE — TELEPHONE ENCOUNTER
----- Message from Riddhi sent at 11/22/2024  8:37 AM CST -----  Type:  Needs Medical Advice/medication     Who Called: pt    Would the patient rather a call back or a response via [facility]? Call   Best Call Back Number: [phone]  Additional Information: pt requesting a call back to discuss HYDROcodone-acetaminophen (NORCO) 5-325 mg per tablet. Is the script correct ?

## 2024-11-23 ENCOUNTER — TELEPHONE (OUTPATIENT)
Dept: FAMILY MEDICINE | Facility: CLINIC | Age: 69
End: 2024-11-23
Payer: MEDICARE

## 2024-11-23 NOTE — TELEPHONE ENCOUNTER
Call patient - she scheduled herself for a virtual visit for lab results BUT THIS IS HER [facility]IC[facility] - must be in person - 1 hour visit - reschedule this patient.

## 2024-11-25 DIAGNOSIS — M79.7 FIBROMYALGIA: ICD-10-CM

## 2024-11-25 DIAGNOSIS — M15.9 OSTEOARTHRITIS OF MULTIPLE JOINTS, UNSPECIFIED OSTEOARTHRITIS TYPE: ICD-10-CM

## 2024-11-25 RX ORDER — HYDROCODONE BITARTRATE AND ACETAMINOPHEN 5; 325 MG/1; MG/1
1 TABLET ORAL EVERY 12 HOURS PRN
Start: 2024-11-25

## 2024-12-02 DIAGNOSIS — M15.9 OSTEOARTHRITIS OF MULTIPLE JOINTS, UNSPECIFIED OSTEOARTHRITIS TYPE: ICD-10-CM

## 2024-12-02 DIAGNOSIS — M79.7 FIBROMYALGIA: ICD-10-CM

## 2024-12-02 RX ORDER — HYDROCODONE BITARTRATE AND ACETAMINOPHEN 5; 325 MG/1; MG/1
1 TABLET ORAL EVERY 12 HOURS PRN
Qty: 60 TABLET | Refills: 0 | Status: SHIPPED | OUTPATIENT
Start: 2024-12-02

## 2024-12-02 NOTE — TELEPHONE ENCOUNTER
----- Message from Myrna sent at 12/2/2024  1:01 PM CST -----  Type:  RX Refill Request    Who Called:  Pt   Refill or New Rx: Refill   RX Name and Strength: HYDROcodone-acetaminophen (NORCO) 5-325 mg per tablet  Preferred Pharmacy with phone number:  [facility]  [address] [facility] [address] [facility] [address]  Phone: [phone] Fax: [phone]  Local or Mail Order: Local   Ordering Provider: Michael   Would the patient rather a call back or a response via [facility]? Call back   Best Call Back Number: [phone]  Additional Information: Please be advised, pt states that this would be third call regarding prescription being wrong, prescription was due on 12/02, correct prescription needs to be twice a day, pt states that the refill was canceled for 30 day supply, pt only received a 15 day supply, pt states that she is missing about 20 days worth of prescriptions, pt would like a call back as soon as possible

## 2024-12-05 DIAGNOSIS — E11.69 TYPE 2 DIABETES MELLITUS WITH OTHER SPECIFIED COMPLICATION, WITHOUT LONG-TERM CURRENT USE OF INSULIN: ICD-10-CM

## 2024-12-05 RX ORDER — TIRZEPATIDE 5 MG/.5ML
5 INJECTION, SOLUTION SUBCUTANEOUS
Qty: 4 PEN | Refills: 0 | Status: SHIPPED | OUTPATIENT
Start: 2024-12-05

## 2024-12-06 DIAGNOSIS — F41.9 ANXIETY DISORDER, UNSPECIFIED TYPE: ICD-10-CM

## 2024-12-06 RX ORDER — DIAZEPAM 5 MG/1
TABLET ORAL
Qty: 60 TABLET | Refills: 0 | Status: CANCELLED | OUTPATIENT
Start: 2024-12-06

## 2024-12-10 ENCOUNTER — OFFICE VISIT (OUTPATIENT)
Dept: PSYCHIATRY | Facility: CLINIC | Age: 69
End: 2024-12-10
Payer: MEDICARE

## 2024-12-10 VITALS
HEART RATE: 87 BPM | BODY MASS INDEX: 25.41 KG/M2 | SYSTOLIC BLOOD PRESSURE: 147 MMHG | WEIGHT: 157.44 LBS | DIASTOLIC BLOOD PRESSURE: 76 MMHG

## 2024-12-10 DIAGNOSIS — F13.20 BENZODIAZEPINE DEPENDENCE: ICD-10-CM

## 2024-12-10 DIAGNOSIS — F41.9 ANXIETY DISORDER, UNSPECIFIED TYPE: ICD-10-CM

## 2024-12-10 DIAGNOSIS — F34.1 PERSISTENT DEPRESSIVE DISORDER: ICD-10-CM

## 2024-12-10 DIAGNOSIS — F33.1 MAJOR DEPRESSIVE DISORDER, RECURRENT EPISODE, MODERATE: Primary | ICD-10-CM

## 2024-12-10 PROCEDURE — 4010F ACE/ARB THERAPY RXD/TAKEN: CPT | Mod: CPTII,S$GLB,, | Performed by: STUDENT IN AN ORGANIZED HEALTH CARE EDUCATION/TRAINING PROGRAM

## 2024-12-10 PROCEDURE — 3044F HG A1C LEVEL LT 7.0%: CPT | Mod: CPTII,S$GLB,, | Performed by: STUDENT IN AN ORGANIZED HEALTH CARE EDUCATION/TRAINING PROGRAM

## 2024-12-10 PROCEDURE — 99214 OFFICE O/P EST MOD 30 MIN: CPT | Mod: S$GLB,,, | Performed by: STUDENT IN AN ORGANIZED HEALTH CARE EDUCATION/TRAINING PROGRAM

## 2024-12-10 PROCEDURE — 99999 PR PBB SHADOW E&M-EST. PATIENT-LVL II: CPT | Mod: PBBFAC,,, | Performed by: STUDENT IN AN ORGANIZED HEALTH CARE EDUCATION/TRAINING PROGRAM

## 2024-12-10 PROCEDURE — 3078F DIAST BP <80 MM HG: CPT | Mod: CPTII,S$GLB,, | Performed by: STUDENT IN AN ORGANIZED HEALTH CARE EDUCATION/TRAINING PROGRAM

## 2024-12-10 PROCEDURE — 3066F NEPHROPATHY DOC TX: CPT | Mod: CPTII,S$GLB,, | Performed by: STUDENT IN AN ORGANIZED HEALTH CARE EDUCATION/TRAINING PROGRAM

## 2024-12-10 PROCEDURE — G2211 COMPLEX E/M VISIT ADD ON: HCPCS | Mod: S$GLB,,, | Performed by: STUDENT IN AN ORGANIZED HEALTH CARE EDUCATION/TRAINING PROGRAM

## 2024-12-10 PROCEDURE — 90833 PSYTX W PT W E/M 30 MIN: CPT | Mod: S$GLB,,, | Performed by: STUDENT IN AN ORGANIZED HEALTH CARE EDUCATION/TRAINING PROGRAM

## 2024-12-10 PROCEDURE — 3061F NEG MICROALBUMINURIA REV: CPT | Mod: CPTII,S$GLB,, | Performed by: STUDENT IN AN ORGANIZED HEALTH CARE EDUCATION/TRAINING PROGRAM

## 2024-12-10 PROCEDURE — 3008F BODY MASS INDEX DOCD: CPT | Mod: CPTII,S$GLB,, | Performed by: STUDENT IN AN ORGANIZED HEALTH CARE EDUCATION/TRAINING PROGRAM

## 2024-12-10 PROCEDURE — 3077F SYST BP >= 140 MM HG: CPT | Mod: CPTII,S$GLB,, | Performed by: STUDENT IN AN ORGANIZED HEALTH CARE EDUCATION/TRAINING PROGRAM

## 2024-12-10 RX ORDER — ALPRAZOLAM 0.5 MG/1
0.5 TABLET ORAL 3 TIMES DAILY
Qty: 90 TABLET | Refills: 0 | Status: SHIPPED | OUTPATIENT
Start: 2024-12-10 | End: 2025-01-09

## 2024-12-10 RX ORDER — LEVOMILNACIPRAN HYDROCHLORIDE 20 MG-40MG
KIT ORAL
Qty: 28 EACH | Refills: 0 | Status: SHIPPED | OUTPATIENT
Start: 2024-12-10

## 2024-12-10 NOTE — PROGRESS NOTES
Outpatient Psychiatry Follow-Up Visit (MD/NP)    12/10/2024    Clinical Status of Patient:  Outpatient (Ambulatory)    Chief Complaint:  Madalyn Iverson is a 69 y.o. female who presents today for follow-up of depression, anxiety, and attention problems.  Met with patient.      Interval History and Content of Current Session:  Interim Events/Subjective Report/Content of Current Session:   Presents today for follow-up after initial evaluation.  Reports continued depressed mood and excessive worry with no improvement.  Has been taking Trintellix but does not find it to be having any effect with respect to her mood or anxiety.  Prescription cost 40 dollars.  Does not find diazepam to work as quickly or be as effective as alprazolam. Found that when her anxiety was better managed she was less depressed. Chronic psychosocial stressors persist including chronic medical conditions of herself and her , 's dementia, caring for grandchild. No current suicidal or homicidal ideation. No plan nor intent to end her own life. Frustrated that she is no longer on stimulants for ADHD as she reports benefit. Offered trial of bupropion with rapid titration to effective dosage. Said that she was prescribed it 2-3 years ago and it was not effective. Has not taken Fetzima.     Psychotherapy:  Target symptoms: depression, anxiety , adjustment, caretaker related stress  Why chosen therapy is appropriate versus another modality: relevant to diagnosis, evidence based practice  Outcome monitoring methods: self-report, observation  Therapeutic intervention type: insight oriented psychotherapy, behavior modifying psychotherapy, supportive psychotherapy  Topics discussed/themes: relationships difficulties, work stress, illness/death of a loved one, stress related to medical comorbidities, symptom recognition  The patient's response to the intervention is accepting. The patient's progress toward treatment goals is fair.   Duration  of intervention: 18 minutes.    Review of Systems   PSYCHIATRIC: Pertinant items are noted in the narrative.  MUSCULOSKELETAL: Positive for pain, joint stiffness, and joint swelling.  CARDIOVASCULAR: No tachycardia or chest pain.  GASTROINTESTINAL: No nausea, vomiting, pain, constipation or diarrhea.    Past Medical, Family and Social History: The patient's past medical, family and social history have been reviewed and updated as appropriate within the electronic medical record - see encounter notes.    Compliance: yes    Side effects: None    Risk Parameters:  Patient reports no suicidal ideation  Patient reports no homicidal ideation  Patient reports no self-injurious behavior  Patient reports no violent behavior    Exam (detailed: at least 9 elements; comprehensive: all 15 elements)   Constitutional  Vitals:  Most recent vital signs, dated less than 90 days prior to this appointment, were reviewed.   Vitals:    12/10/24 0912 12/10/24 0913   BP: (!) 161/77 (!) 147/76   Pulse: 92 87   Weight:  71.4 kg (157 lb 6.5 oz)        General:  age appropriate, casually dressed, neatly groomed     Musculoskeletal  Muscle Strength/Tone:  no dyskinesia, no dystonia, no tremor, no tic   Gait & Station:  non-ataxic     Psychiatric  Speech:  no latency; no press, spontaneous   Mood & Affect:  depressed  congruent and appropriate, tearful appropriately at times   Thought Process:  normal and logical, goal-directed   Associations:  intact   Thought Content:  normal, no suicidality, no homicidality, delusions, or paranoia   Insight:  intact, has awareness of illness   Judgement: behavior is adequate to circumstances, age appropriate   Orientation:  grossly intact   Memory: intact for content of interview   Language: grossly intact   Attention Span & Concentration:  able to focus   Fund of Knowledge:  intact and appropriate to age and level of education, familiar with aspects of current personal life     Assessment and Diagnosis    Status/Progress: Based on the examination today, the patient's problem(s) is/are inadequately controlled.  New problems have not been presented today.   Co-morbidities are complicating management of the primary condition.  There are no active rule-out diagnoses for this patient at this time.     General Impression:         ICD-10-CM ICD-9-CM   1. Major depressive disorder, recurrent episode, moderate  F33.1 296.32   2. Persistent depressive disorder  F34.1 300.4   3. Anxiety disorder, unspecified type  F41.9 300.00   4. Benzodiazepine dependence  F13.20 304.10       Intervention/Counseling/Treatment Plan   Medication Management: The risks and benefits of medication were discussed with the patient.   Stop vortioxetine due to no perceived benefit. Not increasing dosage today as I feel that she is done with this medication. Also cost is high for an individual on a fixed income.  Start levomilnacipran for MDD w/ PDD, anxiety. 20mg x2 days then 40mg daily. Starter pack sent. Reviewed r/b/a. May also help with fibromyalgia/chronic pain.  Needs to get on a medication that is safe and effective in the long term for baseline depression and anxiety control  Stop diazepam due to lack of response. Restart alprazolam 0.5mg TID. Counseled patient extensively on risks including but not limited to dependence, addiction, tolerance, rebound anxiety, increased risk of falls, memory deficits, withdrawal syndrome. Patient informed that I would not be increasing dosage. If she is tapered off the medication during a hospitalization I will not restart it. At this time however, anxiety is severely impacting QOL and daily function which was previously somewhat improved with alprazolam.  I am aware of her prescriptions for opioids. She is tolerant and has been prescribed both hydrocodone products and alprazolam for many years without significant interaction.   Not restarting amphetamines. CV risks outweigh benefits.   Counseling provided  with patient as follows: importance of compliance with chosen treatment options was emphasized, risks and benefits of treatment options, including medications, were discussed with the patient, patient education  No indication for PEC  Treatment plan reviewed as above.      Return to Clinic: as scheduled    Rusty Muhammad MD  [facility]

## 2024-12-11 DIAGNOSIS — E78.00 PURE HYPERCHOLESTEROLEMIA: ICD-10-CM

## 2024-12-11 RX ORDER — EVOLOCUMAB 140 MG/ML
140 INJECTION, SOLUTION SUBCUTANEOUS
Qty: 2 EACH | Refills: 11 | Status: SHIPPED | OUTPATIENT
Start: 2024-12-11

## 2024-12-31 DIAGNOSIS — M15.9 OSTEOARTHRITIS OF MULTIPLE JOINTS, UNSPECIFIED OSTEOARTHRITIS TYPE: ICD-10-CM

## 2024-12-31 DIAGNOSIS — I10 ESSENTIAL HYPERTENSION: ICD-10-CM

## 2024-12-31 DIAGNOSIS — E11.69 TYPE 2 DIABETES MELLITUS WITH OTHER SPECIFIED COMPLICATION, WITHOUT LONG-TERM CURRENT USE OF INSULIN: ICD-10-CM

## 2024-12-31 DIAGNOSIS — M79.7 FIBROMYALGIA: ICD-10-CM

## 2024-12-31 NOTE — TELEPHONE ENCOUNTER
----- Message from Brigid Hernandez MD sent at 12/30/2024  3:52 PM CST -----  Please contact the patient with the attached results. The MRI shows inflammation and we will need to discuss next steps. Please schedule a follow up on 1/14 at 8 am (ok to overbook)

## 2025-01-02 RX ORDER — TIRZEPATIDE 5 MG/.5ML
5 INJECTION, SOLUTION SUBCUTANEOUS
Qty: 4 PEN | Refills: 0 | Status: SHIPPED | OUTPATIENT
Start: 2025-01-02

## 2025-01-02 RX ORDER — POTASSIUM CHLORIDE 750 MG/1
10 CAPSULE, EXTENDED RELEASE ORAL 2 TIMES DAILY
Qty: 180 CAPSULE | Refills: 1 | Status: SHIPPED | OUTPATIENT
Start: 2025-01-02

## 2025-01-02 RX ORDER — HYDROCODONE BITARTRATE AND ACETAMINOPHEN 5; 325 MG/1; MG/1
1 TABLET ORAL EVERY 12 HOURS PRN
Qty: 60 TABLET | Refills: 0 | Status: SHIPPED | OUTPATIENT
Start: 2025-01-02

## 2025-01-06 ENCOUNTER — OFFICE VISIT (OUTPATIENT)
Dept: FAMILY MEDICINE | Facility: CLINIC | Age: 70
End: 2025-01-06
Payer: COMMERCIAL

## 2025-01-06 ENCOUNTER — PATIENT MESSAGE (OUTPATIENT)
Dept: RHEUMATOLOGY | Facility: CLINIC | Age: 70
End: 2025-01-06
Payer: MEDICARE

## 2025-01-06 VITALS
DIASTOLIC BLOOD PRESSURE: 78 MMHG | HEART RATE: 106 BPM | HEIGHT: 63 IN | BODY MASS INDEX: 27.85 KG/M2 | WEIGHT: 157.19 LBS | OXYGEN SATURATION: 96 % | SYSTOLIC BLOOD PRESSURE: 160 MMHG | TEMPERATURE: 98 F

## 2025-01-06 DIAGNOSIS — F33.1 MAJOR DEPRESSIVE DISORDER, RECURRENT EPISODE, MODERATE: ICD-10-CM

## 2025-01-06 DIAGNOSIS — M06.00 SERONEGATIVE RHEUMATOID ARTHRITIS: Primary | ICD-10-CM

## 2025-01-06 DIAGNOSIS — M79.7 FIBROMYALGIA: Chronic | ICD-10-CM

## 2025-01-06 DIAGNOSIS — E11.9 TYPE 2 DIABETES MELLITUS WITHOUT COMPLICATION, WITHOUT LONG-TERM CURRENT USE OF INSULIN: ICD-10-CM

## 2025-01-06 DIAGNOSIS — E11.69 HYPERLIPIDEMIA ASSOCIATED WITH TYPE 2 DIABETES MELLITUS: ICD-10-CM

## 2025-01-06 DIAGNOSIS — F13.20 BENZODIAZEPINE DEPENDENCE: ICD-10-CM

## 2025-01-06 DIAGNOSIS — F11.20 OPIOID DEPENDENCE, DAILY USE: ICD-10-CM

## 2025-01-06 DIAGNOSIS — E55.9 VITAMIN D DEFICIENCY: ICD-10-CM

## 2025-01-06 DIAGNOSIS — Z12.31 ENCOUNTER FOR SCREENING MAMMOGRAM FOR MALIGNANT NEOPLASM OF BREAST: ICD-10-CM

## 2025-01-06 DIAGNOSIS — K58.0 IRRITABLE BOWEL SYNDROME WITH DIARRHEA: Chronic | ICD-10-CM

## 2025-01-06 DIAGNOSIS — M06.00 SERONEGATIVE RHEUMATOID ARTHRITIS: ICD-10-CM

## 2025-01-06 DIAGNOSIS — I70.0 AORTIC ATHEROSCLEROSIS: ICD-10-CM

## 2025-01-06 DIAGNOSIS — Z00.00 ENCOUNTER FOR PREVENTIVE HEALTH EXAMINATION: Primary | ICD-10-CM

## 2025-01-06 DIAGNOSIS — E78.5 HYPERLIPIDEMIA ASSOCIATED WITH TYPE 2 DIABETES MELLITUS: ICD-10-CM

## 2025-01-06 DIAGNOSIS — E03.9 ACQUIRED HYPOTHYROIDISM: ICD-10-CM

## 2025-01-06 DIAGNOSIS — D32.9 MENINGIOMA: ICD-10-CM

## 2025-01-06 DIAGNOSIS — R91.8 LUNG NODULES: ICD-10-CM

## 2025-01-06 DIAGNOSIS — I15.2 HYPERTENSION ASSOCIATED WITH TYPE 2 DIABETES MELLITUS: ICD-10-CM

## 2025-01-06 DIAGNOSIS — Z78.0 MENOPAUSE: ICD-10-CM

## 2025-01-06 DIAGNOSIS — F41.9 ANXIETY: ICD-10-CM

## 2025-01-06 DIAGNOSIS — I35.0 AORTIC STENOSIS, MILD: ICD-10-CM

## 2025-01-06 DIAGNOSIS — I51.89 GRADE II DIASTOLIC DYSFUNCTION: ICD-10-CM

## 2025-01-06 DIAGNOSIS — E11.59 HYPERTENSION ASSOCIATED WITH TYPE 2 DIABETES MELLITUS: ICD-10-CM

## 2025-01-06 PROBLEM — S93.409A ANKLE SPRAIN: Status: RESOLVED | Noted: 2024-07-26 | Resolved: 2025-01-06

## 2025-01-06 PROBLEM — M89.9: Status: RESOLVED | Noted: 2024-07-25 | Resolved: 2025-01-06

## 2025-01-06 PROBLEM — M79.642 LEFT HAND PAIN: Status: RESOLVED | Noted: 2021-07-09 | Resolved: 2025-01-06

## 2025-01-06 PROBLEM — R35.0 URINARY FREQUENCY: Status: RESOLVED | Noted: 2022-08-15 | Resolved: 2025-01-06

## 2025-01-06 PROCEDURE — 99214 OFFICE O/P EST MOD 30 MIN: CPT | Mod: 25,S$GLB,, | Performed by: NURSE PRACTITIONER

## 2025-01-06 PROCEDURE — 1158F ADVNC CARE PLAN TLK DOCD: CPT | Mod: CPTII,S$GLB,, | Performed by: NURSE PRACTITIONER

## 2025-01-06 PROCEDURE — 3288F FALL RISK ASSESSMENT DOCD: CPT | Mod: CPTII,S$GLB,, | Performed by: NURSE PRACTITIONER

## 2025-01-06 PROCEDURE — 3008F BODY MASS INDEX DOCD: CPT | Mod: CPTII,S$GLB,, | Performed by: NURSE PRACTITIONER

## 2025-01-06 PROCEDURE — G0439 PPPS, SUBSEQ VISIT: HCPCS | Mod: S$GLB,,, | Performed by: NURSE PRACTITIONER

## 2025-01-06 PROCEDURE — 3077F SYST BP >= 140 MM HG: CPT | Mod: CPTII,S$GLB,, | Performed by: NURSE PRACTITIONER

## 2025-01-06 PROCEDURE — 1100F PTFALLS ASSESS-DOCD GE2>/YR: CPT | Mod: CPTII,S$GLB,, | Performed by: NURSE PRACTITIONER

## 2025-01-06 PROCEDURE — 1170F FXNL STATUS ASSESSED: CPT | Mod: CPTII,S$GLB,, | Performed by: NURSE PRACTITIONER

## 2025-01-06 PROCEDURE — 1160F RVW MEDS BY RX/DR IN RCRD: CPT | Mod: CPTII,S$GLB,, | Performed by: NURSE PRACTITIONER

## 2025-01-06 PROCEDURE — 3078F DIAST BP <80 MM HG: CPT | Mod: CPTII,S$GLB,, | Performed by: NURSE PRACTITIONER

## 2025-01-06 PROCEDURE — 99999 PR PBB SHADOW E&M-EST. PATIENT-LVL V: CPT | Mod: PBBFAC,,, | Performed by: NURSE PRACTITIONER

## 2025-01-06 PROCEDURE — 1126F AMNT PAIN NOTED NONE PRSNT: CPT | Mod: CPTII,S$GLB,, | Performed by: NURSE PRACTITIONER

## 2025-01-06 PROCEDURE — 4010F ACE/ARB THERAPY RXD/TAKEN: CPT | Mod: CPTII,S$GLB,, | Performed by: NURSE PRACTITIONER

## 2025-01-06 PROCEDURE — 1159F MED LIST DOCD IN RCRD: CPT | Mod: CPTII,S$GLB,, | Performed by: NURSE PRACTITIONER

## 2025-01-06 RX ORDER — METHOTREXATE 25 MG/ML
25 INJECTION, SOLUTION INTRAMUSCULAR; INTRATHECAL; INTRAVENOUS; SUBCUTANEOUS
Qty: 4 ML | Refills: 2 | Status: SHIPPED | OUTPATIENT
Start: 2025-01-06

## 2025-01-06 RX ORDER — ERGOCALCIFEROL 1.25 MG/1
50000 CAPSULE ORAL
Qty: 24 CAPSULE | Refills: 3 | Status: SHIPPED | OUTPATIENT
Start: 2025-01-06

## 2025-01-06 NOTE — PROGRESS NOTES
Subjective:       Patient ID: Madalyn Iverson is a 69 y.o. female.    Chief Complaint: Health Risk Assessment (Medicare Wellness) and Follow-up (Follow up with PCP on all chronic health problems.)        HPI WITH ASSESSMENT AND PLAN OF CARE:      Patient is a 69-year-old white female with Brain Mass/Meningiomas followed by [facility],  Eye Floaters followed by Ophthalmology, Aortic Atherosclerosis noted on CT 3/2019, Grade 2 diastolic dysfunction seen on echocardiogram with grade 2 systolic murmur followed by [facility], Hypertension, Hyperlipidemia intolerant of oral statins, Hypothyroidism followed by [facility], Type 2 Diabetes followed by [facility],  history of  ADHD, fibromyalgia and seronegative arthritis followed by Rheumatology, migraines, vitamin D deficiency, osteoarthritis to both hands and chronic GI problem that patient reported was ulcerative colitis that is now questionable that is here today for MEDICARE AWV AS WELL AS FOLLOW UP ON ALL CHRONIC PROBLEMS. This note will address all chronic health problems.  Will follow with separate note for Medicare AWV.        BRAIN MASS/Meningiomas MRI 7/25/2024  Followed by [facility]  Incidentally discovered masses in the posterior fossa.  These were discovered on an MRI obtained after injuring her ankle and reporting that she had some left eye vision changes.  Other than floaters and flashes of light in the left eye, which are being monitored and managed by Ophthalmology, she denies any significant neurologic symptoms.   Seen Neurosurgery on 8/6/2024:   incidentally discovered masses in the posterior fossa.  These were discovered on an MRI obtained after injuring her ankle and reporting that she had some left eye vision changes.  Other than floaters and flashes of light in the left eye, which are being monitored and managed by Ophthalmology, she denies any significant neurologic symptoms.   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  Largest measures 8mm.   Stopped smoking more than 15 years ago.   For multiple solid nodules with any 6 mm or greater, Fleischner Society guidelines recommend follow up with non-contrast chest CT at 3-6 months and 18-24 months after discovery.   REPEAT CHEST CT 11/22/2024:  No detrimental change of the right lung pulmonary nodules. New left lower lobe sub 6 mm nodules as above. Follow-up per Fleischner criteria.   Will repeat Chest CT January 2026 - 18 months from month of discovery.        Aortic Atherosclerosis  seen on CT abd/pelvis 3/2/2019   Chronic GI issues - can not take ASA   STATIN intolerance due to myalgias  [facility] Dr[facility] Marshall - last 8/29/2024 - follow up in 2 years  On Repatha injections every 2 weeks.  LDL 80.2  Stable.            Grade 2 diastolic dysfunction seen on echocardiogram with grade 2 systolic murmur   Diagnosed and followed by [facility] at 5/18/2021  Last seen by [facility] Dr. Marshall on 8/29/24 and documented NO murmur  Last Echo 7/26/24:  Summary  Show Result Comparison     Left Ventricle: The left ventricle is normal in size. Ventricular mass is normal. Mildly increased wall thickness. Regional wall motion abnormalities present. There is normal systolic function with a visually estimated ejection fraction of 60 - 65%. Grade II diastolic dysfunction.    Right Ventricle: Normal right ventricular cavity size. Systolic function is normal.    Aortic Valve: There is mild aortic  "valve sclerosis. There is moderate annular calcification present. Mildly restricted motion. There is mild stenosis. Aortic valve area by VTI is 1.39 cm². Aortic valve peak velocity is 2.42 m/s. Mean gradient is 14 mmHg. The dimensionless index is 0.43.    Tricuspid Valve: There is mild regurgitation.  Cardiology Dr. Marshall said follow up in 2 years 7/2026        Hyperlipidemia  STATIN intolerance due to myalgias  Followed by [facility] Dr. Marshall - last 8/29/2024 - follow up in 2 years  On Repatha injections every 2 weeks.  LDL 80.2  Recheck in 1 year.                Hypertension  Currently taking Lisinopril 40 mg daily and Potassium 10 meq twice daily  BP (!) 160/78   Pulse 106   Temp 97.5 °F (36.4 °C) (Temporal)   Ht 5' 3" (1.6 m)   Wt 71.3 kg (157 lb 3 oz)   SpO2 96%   BMI 27.84 kg/m²   Did NOT take her medications today -   Will reschedule her for BP check in next couple weeks to ensure control.          Type 2 Diabetes  Diagnosed in July 2022 with A1C 6.6%  Followed by [facility] - last office visit 11/18/2024  Currently taking Mounjaro 5 mg every 7 days  HgbA1C 5.3%   Stable   Diabetic foot exam done today                  Hypothyroidism  Followed by [facility]  Currently taking L[place]ore Thyroid 60 mg daily  Stable.                Fibromyalgia and Seronegative Arthritis   Followed by [facility] Dr. Rodriguez - last appt 10/31/2024  Rheumatologist prescribes:  OPIATE THERAPY - Norco as needed for pain  Supposed to be on Methotrexate 25 mg SQ every week - states out of medication  Has MRI hands scheduled.      Opioid Dependence  Rheumatologist prescribes:  OPIATE THERAPY - Norco as needed for pain        Drug-induced immunodeficiency   - no live vaccines   - immunosuppression/infectious precautions reinforced           Recurrent Depression with Anxiety  Now followed by [facility] - last visit 12/10/2024  Taking Fetzima 40 mg daily  Taking Xanax 0.5 mg three " times daily by psychiatry  Stable.      Benzodiazepine dependence   Taking Xanax 0.5 mg three times daily by psychiatry  Stable.          Migraine Headaches  Chronic Migraines that was followed by Neurologist, Dr. Florez, in the past.   No longer seeing neurologist for migraine headaches  I do not see any migraine headache medication on current med list.  Has not voiced any complaint of migraine at today's visit.              Vitamin D Deficiency  Since prior to 2016 with level 13  Patient was advised to take Vitamin D2 68276 units once weekly with Vitamin D3 5000 units daily but NONCOMPLIANT  ONLY taking the Vitamin D2 13783 units once weekly  Level remains low at 26  Advised to INCREASE the Vitamin D2 to 26371 units TWICE WEEKLY  Recheck  with next blood draw.                Chronic Abdominal Pain with Chronic Diarrhea   Previously followed by Dr. Kelly and Dr. Lee.   Patient had reported that she had Ulcerative Colitis but repeat colonoscopies do not support this.    ####Of note based on medical records, patient has been being followed by GI Dr. Lee/Robin since 2004.  The colonoscopy in April 2004 showed a few 8mm ulcers to the descending colon that could be suggestive of ulcerative colitis BUT repeat sigmoidoscopy in June 2004 showed no ulcerations present and patient has had normal colonoscopies since that time - please review media file for all colonoscopy reports [facility] Dr. Lee [facility]######    Patient THEN followed by [facility] Dr. Cesar PALM for chronic complaints - last visit 7/9/2018  Did not voice complaints today           Chronic Neck and Back Pain   Followed by Neurosurgery Dr. Bautista in past.  Then pains related to fibromyalgia and seronegative arthritis by Rheumatology  Treated with Hydrocodone and Zanaflex 4 mg per Rheumatologist.       Review of Lab Results:  CBC WNL in November 2024  CMP WNL  TSH WNL  FBG 98 with A1C 5.3%  Urine microalbumin normal  Cholesterol  levels okay  Vitamin D low - see notes above         No visits with results within 30 Day(s) from this visit.   Latest known visit with results is:   Lab Visit on 11/07/2024   Component Date Value Ref Range Status    TSH 11/07/2024 0.425  0.400 - 4.000 uIU/mL Final    Free T4 11/07/2024 1.03  0.71 - 1.51 ng/dL Final    T3, Total 11/07/2024 118  60 - 180 ng/dL Final    Vit D, 25-Hydroxy 11/07/2024 26 (L)  30 - 96 ng/mL Final    Comment: Vitamin D deficiency.........<10 ng/mL                              Vitamin D insufficiency......10-29 ng/mL       Vitamin D sufficiency........> or equal to 30 ng/mL  Vitamin D toxicity............>100 ng/mL      Sodium 11/07/2024 142  136 - 145 mmol/L Final    Potassium 11/07/2024 4.4  3.5 - 5.1 mmol/L Final    Chloride 11/07/2024 108  95 - 110 mmol/L Final    CO2 11/07/2024 24  23 - 29 mmol/L Final    Glucose 11/07/2024 98  70 - 110 mg/dL Final    BUN 11/07/2024 16  8 - 23 mg/dL Final    Creatinine 11/07/2024 0.7  0.5 - 1.4 mg/dL Final    Calcium 11/07/2024 9.7  8.7 - 10.5 mg/dL Final    Total Protein 11/07/2024 7.6  6.0 - 8.4 g/dL Final    Albumin 11/07/2024 3.8  3.5 - 5.2 g/dL Final    Total Bilirubin 11/07/2024 0.3  0.1 - 1.0 mg/dL Final    Comment: For infants and newborns, interpretation of results should be based  on gestational age, weight and in agreement with clinical  observations.    Premature Infant recommended reference ranges:  Up to 24 hours.............<8.0 mg/dL  Up to 48 hours............<12.0 mg/dL  3-5 days..................<15.0 mg/dL  6-29 days.................<15.0 mg/dL      Alkaline Phosphatase 11/07/2024 130  40 - 150 U/L Final    AST 11/07/2024 17  10 - 40 U/L Final    ALT 11/07/2024 15  10 - 44 U/L Final    eGFR 11/07/2024 >60.0  >60 mL/min/1.73 m^2 Final    Anion Gap 11/07/2024 10  8 - 16 mmol/L Final    Hemoglobin A1C 11/07/2024 5.3  4.0 - 5.6 % Final    Comment: ADA Screening Guidelines:  5.7-6.4%  Consistent with prediabetes  >or=6.5%  Consistent  with diabetes    High levels of fetal hemoglobin interfere with the HbA1C  assay. Heterozygous hemoglobin variants (HbS, HgC, etc)do  not significantly interfere with this assay.   However, presence of multiple variants may affect accuracy.      Estimated Avg Glucose 11/07/2024 105  68 - 131 mg/dL Final    Cholesterol 11/07/2024 164  120 - 199 mg/dL Final    Comment: The National Cholesterol Education Program (NCEP) has set the  following guidelines (reference ranges) for Cholesterol:  Optimal.....................<200 mg/dL  Borderline High.............200-239 mg/dL  High........................> or = 240 mg/dL      Triglycerides 11/07/2024 179 (H)  30 - 150 mg/dL Final    Comment: The National Cholesterol Education Program (NCEP) has set the  following guidelines (reference values) for triglycerides:  Normal......................<150 mg/dL  Borderline High.............150-199 mg/dL  High........................200-499 mg/dL      HDL 11/07/2024 48  40 - 75 mg/dL Final    Comment: The National Cholesterol Education Program (NCEP) has set the  following guidelines (reference values) for HDL Cholesterol:  Low...............<40 mg/dL  Optimal...........>60 mg/dL      LDL Cholesterol 11/07/2024 80.2  63.0 - 159.0 mg/dL Final    Comment: The National Cholesterol Education Program (NCEP) has set the  following guidelines (reference values) for LDL Cholesterol:  Optimal.......................<130 mg/dL  Borderline High...............130-159 mg/dL  High..........................160-189 mg/dL  Very High.....................>190 mg/dL      HDL/Cholesterol Ratio 11/07/2024 29.3  20.0 - 50.0 % Final    Total Cholesterol/HDL Ratio 11/07/2024 3.4  2.0 - 5.0 Final    Non-HDL Cholesterol 11/07/2024 116  mg/dL Final    Comment: Risk category and Non-HDL cholesterol goals:  Coronary heart disease (CHD)or equivalent (10-year risk of CHD >20%):  Non-HDL cholesterol goal     <130 mg/dL  Two or more CHD risk factors and 10-year risk of  CHD <= 20%:  Non-HDL cholesterol goal     <160 mg/dL  0 to 1 CHD risk factor:  Non-HDL cholesterol goal     <190 mg/dL      Microalbumin, Urine 11/07/2024 12.0  ug/mL Final    Creatinine, Urine 11/07/2024 83.0  15.0 - 325.0 mg/dL Final    Microalb/Creat Ratio 11/07/2024 14.5  0.0 - 30.0 ug/mg Final    WBC 11/07/2024 6.59  3.90 - 12.70 K/uL Final    RBC 11/07/2024 4.66  4.00 - 5.40 M/uL Final    Hemoglobin 11/07/2024 14.2  12.0 - 16.0 g/dL Final    Hematocrit 11/07/2024 43.0  37.0 - 48.5 % Final    MCV 11/07/2024 92  82 - 98 fL Final    MCH 11/07/2024 30.5  27.0 - 31.0 pg Final    MCHC 11/07/2024 33.0  32.0 - 36.0 g/dL Final    RDW 11/07/2024 12.5  11.5 - 14.5 % Final    Platelets 11/07/2024 224  150 - 450 K/uL Final    MPV 11/07/2024 11.6  9.2 - 12.9 fL Final    Immature Granulocytes 11/07/2024 0.2  0.0 - 0.5 % Final    Gran # (ANC) 11/07/2024 3.9  1.8 - 7.7 K/uL Final    Immature Grans (Abs) 11/07/2024 0.01  0.00 - 0.04 K/uL Final    Comment: Mild elevation in immature granulocytes is non specific and   can be seen in a variety of conditions including stress response,   acute inflammation, trauma and pregnancy. Correlation with other   laboratory and clinical findings is essential.      Lymph # 11/07/2024 2.0  1.0 - 4.8 K/uL Final    Mono # 11/07/2024 0.5  0.3 - 1.0 K/uL Final    Eos # 11/07/2024 0.1  0.0 - 0.5 K/uL Final    Baso # 11/07/2024 0.08  0.00 - 0.20 K/uL Final    nRBC 11/07/2024 0  0 /100 WBC Final    Gran % 11/07/2024 59.7  38.0 - 73.0 % Final    Lymph % 11/07/2024 30.2  18.0 - 48.0 % Final    Mono % 11/07/2024 7.4  4.0 - 15.0 % Final    Eosinophil % 11/07/2024 1.5  0.0 - 8.0 % Final    Basophil % 11/07/2024 1.2  0.0 - 1.9 % Final    Differential Method 11/07/2024 Automated   Final    Sed Rate 11/07/2024 14  0 - 20 mm/Hr Final    CRP 11/07/2024 4.6  0.0 - 8.2 mg/L Final    Hepatitis C Ab 11/07/2024 Non-reactive  Non-reactive Final    Hepatitis B Surface Ag 11/07/2024 Non-reactive  Non-reactive Final     "Hep B S Ab 11/07/2024 <3.00  mIU/mL Final    Hep B S Ab 11/07/2024 Non-reactive   Final    Individual is considered not immune to HBV infection.    Hep B Core Total Ab 11/07/2024 Non-reactive  Non-reactive Final    NIL 11/07/2024 0.35467  IU/mL Final    Comment: The Nil tube value is used to determine if the patient   has a preexisting immune response which could cause a   false-positive reading on the test.   For a test to be valid, the NIL tube must have a value   of less than or equal to 8.0 IU/mL.  The mitogen control tube is used to assure the patient   has a healthy immune status and also serves as a control   for correct blood handling and incubation. It is used to   detect false negative readings.   The TB antigen tubes are coated with the M. tuberculosis   specific antigens. For a test to be considered positive,   at least one of the TB antigen tube values minus the Nil   tube value must be greater than or equal to 0.35 IU/mL   and be greater than or equal to 25% of the Nil tube value.  Diagnosing or excluding tuberculosis disease, and assessing   the probability of LTNI, requires a combination of   epidemiological, historical, medical, and diagnostic   findings that should be considered when interpreting   the test results.       TB1 - Nil 11/07/2024 0.006  IU/mL Final    TB2 - Nil 11/07/2024 0.006  IU/mL Final    Mitogen - Nil 11/07/2024 3.547  IU/mL Final    TB Gold Plus 11/07/2024 Negative  Negative Final    M. tuberculosis infection NOT likely.       Vitals:    01/06/25 0941 01/06/25 1101   BP: (!) 172/80 (!) 160/78   BP Location: Right arm    Patient Position: Sitting    Pulse: 106    Temp: 97.5 °F (36.4 °C)    TempSrc: Temporal    SpO2: 96%    Weight: 71.3 kg (157 lb 3 oz)    Height: 5' 3" (1.6 m)          Diagnoses this Encounter:         ICD-10-CM ICD-9-CM   1. Encounter for preventive health examination  Z00.00 V70.0   2. Meningioma  D32.9 225.2   3. Major depressive disorder, recurrent episode, " "moderate  F33.1 296.32   4. Benzodiazepine dependence  F13.20 304.10   5. Opioid dependence, daily use  F11.20 304.01   6. Seronegative rheumatoid arthritis  M06.00 714.0   7. Hyperlipidemia associated with type 2 diabetes mellitus  E11.69 250.80    E78.5 272.4   8. Hypertension associated with type 2 diabetes mellitus  E11.59 250.80    I15.2 401.9   9. Type 2 diabetes mellitus without complication, without long-term current use of insulin  E11.9 250.00   10. Aortic atherosclerosis  I70.0 440.0   11. Aortic stenosis, mild  I35.0 424.1   12. Grade II diastolic dysfunction  I51.89 429.9   13. Lung nodules  R91.8 793.19   14. Acquired hypothyroidism  E03.9 244.9   15. Fibromyalgia  M79.7 729.1   16. Anxiety  F41.9 300.00   17. Irritable bowel syndrome with diarrhea  K58.0 564.1   18. Menopause  Z78.0 627.2   19. Vitamin D deficiency  E55.9 268.9   20. Encounter for screening mammogram for malignant neoplasm of breast  Z12.31 V76.12       Orders Placed This Encounter    Mammo Digital Screening Bilat w/ Delfino    ergocalciferol (ERGOCALCIFEROL) 50,000 unit Cap        Follow up in about 3 weeks (around 1/27/2025) for blood pressure follow up ONLY; fasting labs and Medicare Wellness 1 year..     Patient's Medications   New Prescriptions    ERGOCALCIFEROL (ERGOCALCIFEROL) 50,000 UNIT CAP    Take 1 capsule (50,000 Units total) by mouth twice a week.   Previous Medications    ALPRAZOLAM (XANAX) 0.5 MG TABLET    Take 1 tablet (0.5 mg total) by mouth 3 (three) times daily.    BD INSULIN SYRINGE 1 ML 25 GAUGE X 5/8" SYRG    Inject 1 mL into the skin once a week.    CYANOCOBALAMIN 1,000 MCG/ML INJECTION    INJECT 1 ML (1,000 MCG TOTAL) INTO THE SKIN EVERY 30 DAYS    FOLIC ACID (FOLVITE) 1 MG TABLET    Take 3 mg by mouth once daily.    HYDROCODONE-ACETAMINOPHEN (NORCO) 5-325 MG PER TABLET    Take 1 tablet by mouth every 12 (twelve) hours as needed for Pain.    LEVOMILNACIPRAN (FETZIMA) 20 MG (2)- 40 MG (26) C24D    Take as " "directed--Take one 20 mg capsule daily for 2 days then one 40 mg capsule once daily thereafter    LISINOPRIL (PRINIVIL,ZESTRIL) 40 MG TABLET    Take 1 tablet (40 mg total) by mouth once daily.    POTASSIUM CHLORIDE (MICRO-K) 10 MEQ CPSR    Take 1 capsule (10 mEq total) by mouth 2 (two) times daily.    REPATHA SURECLICK 140 MG/ML PNIJ    TAKE 1 ML (140 MG TOTAL) BY MOUTH EVERY 14 (FOURTEEN) DAYS.    THYROID, PORK, (ARMOUR THYROID) 60 MG TAB    One tab daily    TIRZEPATIDE (MOUNJARO) 5 MG/0.5 ML PNIJ    Inject 5 mg into the skin every 7 days.    TIZANIDINE (ZANAFLEX) 4 MG TABLET    TAKE 1 TABLET BY MOUTH FOUR TIMES A DAY   Modified Medications    Modified Medication Previous Medication    METHOTREXATE 25 MG/ML INJECTION methotrexate 25 mg/mL injection       Inject 1 mL (25 mg total) into the skin every 7 days.    Inject 25 mg into the skin every 7 days.    SYRINGE-NEEDLE,SAFETY,DISP UNT 1 ML 25 GAUGE X 5/8" SYRG syringe-needle,safety,disp unt 1 mL 25 gauge x 5/8" Syrg       To use with mtx injections    To use with mtx injections   Discontinued Medications    ERGOCALCIFEROL (ERGOCALCIFEROL) 50,000 UNIT CAP    TAKE 1 CAPSULE (50,000 UNITS TOTAL) BY MOUTH EVERY 7 DAYS. FOR 12 DOSES    TRAZODONE (DESYREL) 100 MG TABLET    TAKE 1 TABLET BY MOUTH EVERY DAY IN THE EVENING         Review of Systems   Constitutional:  Positive for fatigue. Negative for fever.   HENT: Negative.     Respiratory: Negative.     Cardiovascular: Negative.    Musculoskeletal:  Positive for arthralgias, back pain, myalgias and neck pain.   Psychiatric/Behavioral:  Positive for dysphoric mood. The patient is nervous/anxious.          Objective:        Physical Exam  Constitutional:       General: She is not in acute distress.     Appearance: She is well-developed. She is not diaphoretic.      Comments: Body mass index is 27.84 kg/m².             HENT:      Head: Normocephalic and atraumatic.      Right Ear: External ear normal.      Left Ear: External " ear normal.      Nose: Nose normal.      Mouth/Throat:      Pharynx: No oropharyngeal exudate.   Eyes:      Conjunctiva/sclera: Conjunctivae normal.      Pupils: Pupils are equal, round, and reactive to light.   Neck:      Thyroid: No thyromegaly.      Vascular: No JVD.      Trachea: No tracheal deviation.   Cardiovascular:      Rate and Rhythm: Normal rate and regular rhythm.      Heart sounds: Murmur heard.      Comments: Grade 2 systolic murmur followed by [facility]  Pulmonary:      Effort: Pulmonary effort is normal. No respiratory distress.      Breath sounds: Normal breath sounds. No stridor. No wheezing or rales.   Abdominal:      General: Bowel sounds are normal. There is no distension.      Palpations: Abdomen is soft. There is no mass.      Tenderness: There is no guarding or rebound.   Musculoskeletal:         General: Normal range of motion.      Cervical back: Normal range of motion and neck supple.   Lymphadenopathy:      Cervical: No cervical adenopathy.   Skin:     General: Skin is warm and dry.      Findings: No rash.   Neurological:      Mental Status: She is alert and oriented to person, place, and time.      Cranial Nerves: No cranial nerve deficit.      Coordination: Coordination normal.   Psychiatric:         Mood and Affect: Mood is anxious.         Behavior: Behavior normal.             Past Medical History:   Diagnosis Date    Acute biliary pancreatitis without infection or necrosis 6/13/2018    Acute pancreatitis     June 2018 - [facility]    ADHD (attention deficit hyperactivity disorder), inattentive type     Aortic atherosclerosis 3/2/2019    Mild to moderate aortic atherosclerosis seen on CT abd/pelvis 3/2/2019    Chronic back pain     Followed by [facility] - Dr. Bautista - has been having medial branch blocks    Fibromyalgia     treated by Dr. Thorpe in past and now treated by Dr. aYtes - Rheumatologist     Hyperlipidemia     High triglycerides    Hypertension     Hypothyroidism     Treated by Dr. Mathew    IFG (impaired fasting glucose)     Migraine     Treated by neurologist - Dr. Destiny Florez    Ulcerative colitis     Patient reported treated by Dr. Lee and Aaron in past  but on admit in 2018 - no UC seen on recent colonoscopy.  It was noted on colonoscopy in 2004 there were ulcers noted to descending colon but not present on sigmoidoscopy in 2004.    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 Ghotra    Vitamin D deficiency 3/31/2016       Past Surgical History:   Procedure Laterality Date    CARPAL TUNNEL RELEASE Right 2019    Procedure: RELEASE, CARPAL TUNNEL right;  Surgeon: Li Waller MD;  Location: [address];  Service: Orthopedics;  Laterality: Right;  stretcher, supine, hand pan 1 and 2    CARPAL TUNNEL RELEASE Left 2021    Procedure: RELEASE, CARPAL TUNNEL;  Surgeon: Brian Reed Jr., MD;  Location: [facility];  Service: Orthopedics;  Laterality: Left;     SECTION      COLONOSCOPY  10/08/2010    Dr. Lee - repeat in 5 years - has appointment for colonoscopy 2016    COLONOSCOPY  2016    Dr. Kelly - normal  colon - repeat in 10 years - scanned report to media file.    ENDOSCOPIC ULTRASOUND OF UPPER GASTROINTESTINAL TRACT N/A 2018    Procedure: ULTRASOUND, ENDOSCOPIC, UPPER GI TRACT;  Surgeon: Reji Russell MD;  Location: [facility];  Service: Endoscopy;  Laterality: N/A;    ENDOSCOPIC ULTRASOUND OF UPPER GASTROINTESTINAL TRACT N/A 2019    Procedure: ULTRASOUND, UPPER GI TRACT, ENDOSCOPIC;  Surgeon: Randy Boyd MD;  Location: [facility];  Service: Endoscopy;  Laterality: N/A;    HYSTERECTOMY      INTERPOSITION ARTHROPLASTY OF CARPOMETACARPAL JOINTS Left 2021    Procedure: INTERPOSITION ARTHROPLASTY, CMC JOINT;  Surgeon: Brian Reed Jr., MD;   Location: [facility];  Service: Orthopedics;  Laterality: Left;  need arthrex tightrope   Brit notified  LB, Brit confirmed 21 AM    LAPAROSCOPIC CHOLECYSTECTOMY N/A 2019    Procedure: CHOLECYSTECTOMY, LAPAROSCOPIC;  Surgeon: Hill Palacios MD;  Location: [facility];  Service: General;  Laterality: N/A;    medial branch block      done at [facility] for axial back pain    OOPHORECTOMY      SHOULDER ARTHROSCOPY W/ ROTATOR CUFF REPAIR Right     Dr. Castro - rotator cuff repair; 3 screws placed, biceps tear repair.    SHOULDER SURGERY Left     TONSILLECTOMY      upper and lower GI  2016       Family History   Problem Relation Name Age of Onset    Hypertension Mother      Heart disease Mother      Hyperlipidemia Mother      Cancer Sister  53        thyroid cancer and lymph nodes involvement    Cancer Brother  61        colon and lung cancer    Colon cancer Brother  66    Cancer Brother  63        colon    Hypertension Brother      Diabetes Brother      Colon cancer Brother  60    Esophageal cancer Neg Hx      Stomach cancer Neg Hx      Ulcerative colitis Neg Hx      Crohn's disease Neg Hx      Irritable bowel syndrome Neg Hx      Celiac disease Neg Hx         Social History     Socioeconomic History    Marital status:    Tobacco Use    Smoking status: Former     Current packs/day: 0.00     Average packs/day: 1 pack/day for 20.0 years (20.0 ttl pk-yrs)     Types: Cigarettes     Start date: 1977     Quit date: 1997     Years since quittin.9    Smokeless tobacco: Former   Substance and Sexual Activity    Alcohol use: No    Drug use: No    Sexual activity: Never     Social Drivers of Health     Financial Resource Strain: Low Risk  (2025)    Overall Financial Resource Strain (CARDIA)     Difficulty of Paying Living Expenses: Not hard at all   Food Insecurity: No Food Insecurity (2025)    Hunger Vital Sign     Worried About Running Out of Food in the Last Year:  Never true     Ran Out of Food in the Last Year: Never true   Transportation Needs: No Transportation Needs (1/6/2025)    PRAPARE - Transportation     Lack of Transportation (Medical): No     Lack of Transportation (Non-Medical): No   Physical Activity: Inactive (1/6/2025)    Exercise Vital Sign     Days of Exercise per Week: 7 days     Minutes of Exercise per Session: 0 min   Stress: Stress Concern Present (1/6/2025)    [employer] [place] [employer] - Occupational Stress Questionnaire     Feeling of Stress : Very much   Housing Stability: Low Risk  (1/6/2025)    Housing Stability Vital Sign     Unable to Pay for Housing in the Last Year: No     Homeless in the Last Year: No

## 2025-01-06 NOTE — PATIENT INSTRUCTIONS
Counseling and Referral of Other Preventative  (Italic type indicates deductible and co-insurance are waived)    Patient Name: Madalyn Iverson  Today's Date: 1/6/2025    Health Maintenance       Date Due Completion Date    Mammogram 02/05/2025 2/5/2024    Sign Pain Contract 01/07/2025 (Originally 1/25/1973) ---    RSV Vaccine (Age 60+ and Pregnant patients) (1 - Risk 60-74 years 1-dose series) 01/30/2025 (Originally 1/25/2013) ---    Influenza Vaccine (1) 06/30/2025 (Originally 9/1/2024) 1/19/2024 (Declined)    Override on 1/19/2024: Declined    COVID-19 Vaccine (3 - Pfizer risk series) 09/05/2025 (Originally 3/25/2021) 2/25/2021    Hemoglobin A1c 05/07/2025 11/7/2024    Eye Exam 07/29/2025 7/29/2024    Diabetes Urine Screening 11/07/2025 11/7/2024    Lipid Panel 11/07/2025 11/7/2024    Foot Exam 01/06/2026 1/6/2025 (Done)    Override on 1/6/2025: Done    Colorectal Cancer Screening 04/06/2026 4/6/2016    Override on 9/28/2015: Done (followed by GI due to history of ulcerative colitis)    DEXA Scan 08/24/2026 8/24/2021    TETANUS VACCINE 05/15/2027 5/15/2017        Orders Placed This Encounter   Procedures    Mammo Digital Screening Bilat w/ Delfino     The following information is provided to all patients.  This information is to help you find resources for any of the problems found today that may be affecting your health:                  Living healthy guide: www.[facility].[place].gov      Understanding Diabetes: www.diabetes.org      Eating healthy: www.cdc.gov/healthyweight      CDC home safety checklist: www.cdc.gov/steadi/patient.html      Agency on Aging: www.goea.[place].gov      Alcoholics anonymous (AA): www.aa.org      Physical Activity: www.[place].nih.gov/lb6jqed      Tobacco use: www.quitwithusla.org

## 2025-01-06 NOTE — Clinical Note
Call patient - tell her that her vitamin D remains LOW - increase her vitamin D to TWICE WEEKLY - will send new prescription She will continue with chemotherapy at the direction of Oncology  She is scheduled for repeat imaging in the coming weeks  We will review those when available

## 2025-01-06 NOTE — PROGRESS NOTES
"Madalyn Iverson presented for a follow-up Medicare AWV today. The following components were reviewed and updated:    Medical history  Family History  Social history  Allergies and Current Medications  Health Risk Assessment  Health Maintenance  Care Team    **See Completed Assessments for Annual Wellness visit with in the encounter summary    The following assessments were completed:  Depression Screening  Cognitive function Screening  Timed Get Up Test  Whisper Test          Opioid documentation:      Patient does have a current opioid prescription.      Patient accepted further discussion regarding opioid medication use.      Patient is currently taking hydrocodone narcotic for generalized pain related to Seronegative Rheumatoid Arthritis.     Pain level today is 4/10.       In addition to narcotic pain medications, patient is also using Zanaflex for pain control.       Patient is followed by a specialist currently for their pain and will not be referred today.       Patient's opioid risk potential based on ORT-OUD tool:       Ethan each box that applies   No   Yes     Family history of substance abuse   Alcohol [x] []   Illegal drugs [x] []   Rx drugs [x] []     Personal history of substance abuse   Alcohol [x] []   Illegal drugs [x] []   Rx drugs [x] []     Age between 16-45 years   [x]   []     Patient with ADD, OCD, Bipolar disorder, schizoprenia   [x]   []     Patient with depression   []   [x]                         Scoring total                                                   1              Non-opioid treatment options have been discussed today and added to the patient's after visit summary.          Vitals:    01/06/25 0941 01/06/25 1101   BP: (!) 172/80 (!) 160/78   BP Location: Right arm    Patient Position: Sitting    Pulse: 106    Temp: 97.5 °F (36.4 °C)    TempSrc: Temporal    SpO2: 96%    Weight: 71.3 kg (157 lb 3 oz)    Height: 5' 3" (1.6 m)      Body mass index is 27.84 kg/m².       Physical " Exam  Constitutional:       General: She is not in acute distress.     Appearance: Normal appearance. She is not toxic-appearing or diaphoretic.      Comments: Body mass index is 27.84 kg/m².     HENT:      Head: Normocephalic and atraumatic.      Right Ear: Tympanic membrane, ear canal and external ear normal.      Left Ear: Tympanic membrane, ear canal and external ear normal.      Nose: No congestion.      Mouth/Throat:      Pharynx: No posterior oropharyngeal erythema.   Eyes:      Extraocular Movements: Extraocular movements intact.      Conjunctiva/sclera: Conjunctivae normal.   Cardiovascular:      Rate and Rhythm: Normal rate and regular rhythm.      Heart sounds: Murmur heard.      Comments: + systolic murmur  Pulmonary:      Effort: Pulmonary effort is normal. No respiratory distress.      Breath sounds: Normal breath sounds.   Abdominal:      General: There is no distension.   Musculoskeletal:         General: Normal range of motion.      Right lower leg: No edema.      Left lower leg: No edema.   Skin:     General: Skin is warm and dry.   Neurological:      Mental Status: She is alert and oriented to person, place, and time.   Psychiatric:         Mood and Affect: Mood is depressed.         Behavior: Behavior normal.      Comments: Followed by [facility]       Diagnoses and health risks identified today and associated recommendations/orders:  1. Encounter for preventive health examination     Health Maintenance Summary   Full History      Expand All  Collapse All  Scheduled - Mammogram   (Yearly)Scheduled for 2/6/2025 02/05/2024  Mammo Digital Screening Bilat w/ Delfino   06/21/2022  Mammo Digital Diagnostic Left with Delfino   05/27/2022  Mammo Digital Screening Bilat w/ Delfino   05/06/2021  Mammo Digital Screening Bilat w/ Delfino   02/19/2020  Mammo Digital Screening Bilat w/ Delfino   View More History   Postponed - Sign Pain Contract   (Once)Postponed until 1/7/2025  No completion history exists for  this topic.   Postponed - RSV Vaccine (Age 60+ and Pregnant patients)   (1 - Risk 60-74 years 1-dose series)Postponed until 1/30/2025  No completion history exists for this topic.   Postponed - Influenza Vaccine   (1)Postponed until 6/30/2025 01/19/2024  Declined   11/16/2020  Imm Admin: Influenza - Quadrivalent - High Dose - PF (65 years and older)   10/09/2020  SmartData: WORKFLOW - HEALTHY PLANET - EXTERNAL DATA - EXTERNAL PROCEDURE DATE - INFLUENZA   11/18/2019  Imm Admin: Influenza - High Dose - PF (65 years and older)   10/16/2015  Imm Admin: Influenza   View More History   Postponed - COVID-19 Vaccine   (3 - Pfizer risk series)Postponed until 9/5/2025 02/25/2021  Imm Admin: COVID-19, MRNA, LN-S, PF (Pfizer) (Purple Cap)   02/04/2021  Imm Admin: COVID-19, MRNA, LN-S, PF (Pfizer) (Purple Cap)   Scheduled - Hemoglobin A1c   (Every 6 Months)Scheduled for 5/16/2025 11/07/2024  Hemoglobin A1C External component of Hemoglobin A1C   02/05/2024  Hemoglobin A1C External component of Hemoglobin A1C   07/29/2022  Hemoglobin A1C External component of Hemoglobin A1C   10/21/2021  Hemoglobin A1C External component of Hemoglobin A1C   04/27/2021  Hemoglobin A1C External component of Hemoglobin A1C   View More History   Eye Exam   (Yearly)Next due on 7/29/2025 07/29/2024  Level of Service: [place] EYE EXAM, NEW PATIENT,COMPREHESV   06/26/2017  Outside Claim: [place] EYE EXAM, EST PATIENT,COMPREHESV   06/19/2017  Outside Claim: [place] EYE EXAM, NEW PATIENT,COMPREHESV   Diabetes Urine Screening   (Yearly)Next due on 11/7/2025 11/07/2024  MICROALB/CREAT RATIO component of Microalbumin/Creatinine Ratio, Urine   Lipid Panel   (Yearly)Next due on 11/7/2025 11/07/2024  Cholesterol Total component of Lipid Panel   05/24/2022  Cholesterol Total component of Lipid Panel   10/21/2021  Cholesterol Total component of Lipid Panel   04/27/2021  Cholesterol Total component of Lipid Panel   10/23/2020  Cholesterol Total component of Lipid panel    View More History   Foot Exam   (Yearly)Next due on 1/6/2026 01/06/2025  Done   Colorectal Cancer Screening   (Colonoscopy - Every 10 Years)Next due on 4/6/2026 04/06/2016  SmartData: WORKFLOW - HEALTHY PLANET - EXTERNAL DATA - EXTERNAL PROCEDURES DATE - COLONOSCOPY   09/28/2015  Colonoscopy (Done - followed by GI due to history of ulcerative colitis)   09/08/2015  Outside Claim: [place] COLONOSCOPY,BIOPSY   DEXA Scan   (Every 5 Years)Next due on 8/24/2026 08/24/2021  DXA Bone Density Spine And Hip   08/08/2018  DXA Bone Density Spine And Hip   TETANUS VACCINE   (Every 10 Years)Next due on 5/15/2027  05/15/2017  Imm Admin: Tdap   Pneumococcal Vaccines (Age 50+)   (Series Information)Completed  07/17/2019  Imm Admin: Pneumococcal Polysaccharide - 23 Valent   07/06/2018  Imm Admin: Pneumococcal Conjugate - 13 Valent   Complete Opioid Risk Tool  Completed  04/08/2022  Registry Metric: Risk Tool Completion Date   Hepatitis C Screening  Completed  11/07/2024  Hepatitis C Ab component of Hepatitis C Antibody   09/24/2019  Hepatitis C Ab component of Hepatitis C antibody   03/30/2016  Hepatitis C Ab component of Hepatitis C antibody   Discontinued - Shingles Vaccine  Discontinued  04/28/2021  Imm Admin: Zoster Recombinant   05/15/2017  Imm Admin: Zoster       2. Meningioma  See additional progress note today as I am also patient's PCP and addressed all chronic problems and made adjustments as needed per condition.    3. Major depressive disorder, recurrent episode, moderate  See additional progress note today as I am also patient's PCP and addressed all chronic problems and made adjustments as needed per condition.    4. Benzodiazepine dependence  See additional progress note today as I am also patient's PCP and addressed all chronic problems and made adjustments as needed per condition.    5. Opioid dependence, daily use  See additional progress note today as I am also patient's PCP and addressed all chronic problems and  made adjustments as needed per condition.    6. Seronegative rheumatoid arthritis  See additional progress note today as I am also patient's PCP and addressed all chronic problems and made adjustments as needed per condition.    7. Hyperlipidemia associated with type 2 diabetes mellitus  See additional progress note today as I am also patient's PCP and addressed all chronic problems and made adjustments as needed per condition.    8. Hypertension associated with type 2 diabetes mellitus  See additional progress note today as I am also patient's PCP and addressed all chronic problems and made adjustments as needed per condition.    9. Type 2 diabetes mellitus without complication, without long-term current use of insulin  See additional progress note today as I am also patient's PCP and addressed all chronic problems and made adjustments as needed per condition.    10. Aortic atherosclerosis  See additional progress note today as I am also patient's PCP and addressed all chronic problems and made adjustments as needed per condition.    11. Aortic stenosis, mild  See additional progress note today as I am also patient's PCP and addressed all chronic problems and made adjustments as needed per condition.    12. Grade II diastolic dysfunction  See additional progress note today as I am also patient's PCP and addressed all chronic problems and made adjustments as needed per condition.    13. Lung nodules  See additional progress note today as I am also patient's PCP and addressed all chronic problems and made adjustments as needed per condition.    14. Acquired hypothyroidism  See additional progress note today as I am also patient's PCP and addressed all chronic problems and made adjustments as needed per condition.    15. Fibromyalgia  See additional progress note today as I am also patient's PCP and addressed all chronic problems and made adjustments as needed per condition.    16. Anxiety  See additional progress  note today as I am also patient's PCP and addressed all chronic problems and made adjustments as needed per condition.    17. Irritable bowel syndrome with diarrhea  See additional progress note today as I am also patient's PCP and addressed all chronic problems and made adjustments as needed per condition.    18. Menopause  See additional progress note today as I am also patient's PCP and addressed all chronic problems and made adjustments as needed per condition.    19. Vitamin D deficiency  See additional progress note today as I am also patient's PCP and addressed all chronic problems and made adjustments as needed per condition.  - ergocalciferol (ERGOCALCIFEROL) 50,000 unit Cap; Take 1 capsule (50,000 Units total) by mouth twice a week.  Dispense: 24 capsule; Refill: 3    20. Encounter for screening mammogram for malignant neoplasm of breast    - Mammo Digital Screening Bilat w/ Delfino; Future      Provided Madalyn with a 5-10 year written screening schedule and personal prevention plan. Recommendations were developed using the USPSTF age appropriate recommendations. Education, counseling, and referrals were provided as needed.  After Visit Summary printed and given to patient which includes a list of additional screenings\tests needed.    Follow up in about 3 weeks (around 1/27/2025) for blood pressure follow up ONLY; fasting labs and Medicare Wellness 1 year..      Ruth Jaime NP    I offered to discuss advanced care planning, including how to pick a person who would make decisions for you if you were unable to make them for yourself, called a health care power of , and what kind of decisions you might make such as use of life sustaining treatments such as ventilators and tube feeding when faced with a life limiting illness recorded on a living will that they will need to know. (How you want to be cared for as you near the end of your natural life)     X Patient is interested in learning more about  how to make advanced directives.  I provided them paperwork and offered to discuss this with them.

## 2025-01-07 ENCOUNTER — TELEPHONE (OUTPATIENT)
Dept: FAMILY MEDICINE | Facility: CLINIC | Age: 70
End: 2025-01-07
Payer: COMMERCIAL

## 2025-01-07 NOTE — TELEPHONE ENCOUNTER
----- Message from Ruth Jaime NP sent at 1/6/2025  8:41 PM CST -----  Call patient - tell her that her vitamin D remains LOW - increase her vitamin D to TWICE WEEKLY - will send new prescription

## 2025-01-07 NOTE — TELEPHONE ENCOUNTER
----- Message from Kathi sent at 1/7/2025  1:33 PM CST -----  Type:  Patient Returning Call    Who Called:Pt   Who Left Message for Patient:Yas  Does the patient know what this is regarding?:yes   Would the patient rather a call back or a response via [facility]? [facility] Call [facility]:[phone]  Additional Information:

## 2025-01-10 DIAGNOSIS — F34.1 PERSISTENT DEPRESSIVE DISORDER: ICD-10-CM

## 2025-01-10 DIAGNOSIS — F41.9 ANXIETY DISORDER, UNSPECIFIED TYPE: ICD-10-CM

## 2025-01-10 DIAGNOSIS — F33.1 MAJOR DEPRESSIVE DISORDER, RECURRENT EPISODE, MODERATE: Primary | ICD-10-CM

## 2025-01-10 RX ORDER — LEVOMILNACIPRAN HYDROCHLORIDE 40 MG/1
1 CAPSULE, EXTENDED RELEASE ORAL DAILY
Qty: 30 CAPSULE | Refills: 1 | Status: SHIPPED | OUTPATIENT
Start: 2025-01-10 | End: 2025-03-11

## 2025-01-10 RX ORDER — LEVOMILNACIPRAN HYDROCHLORIDE 20 MG-40MG
KIT ORAL
Qty: 28 EACH | Refills: 0 | Status: CANCELLED | OUTPATIENT
Start: 2025-01-10

## 2025-01-14 ENCOUNTER — OFFICE VISIT (OUTPATIENT)
Dept: RHEUMATOLOGY | Facility: CLINIC | Age: 70
End: 2025-01-14
Payer: MEDICARE

## 2025-01-14 VITALS
DIASTOLIC BLOOD PRESSURE: 84 MMHG | HEART RATE: 55 BPM | BODY MASS INDEX: 27.89 KG/M2 | SYSTOLIC BLOOD PRESSURE: 157 MMHG | WEIGHT: 157.44 LBS | HEIGHT: 63 IN

## 2025-01-14 DIAGNOSIS — M15.9 OSTEOARTHRITIS OF MULTIPLE JOINTS, UNSPECIFIED OSTEOARTHRITIS TYPE: ICD-10-CM

## 2025-01-14 DIAGNOSIS — D84.821 DRUG-INDUCED IMMUNODEFICIENCY: ICD-10-CM

## 2025-01-14 DIAGNOSIS — M79.7 FIBROMYALGIA: ICD-10-CM

## 2025-01-14 DIAGNOSIS — M06.00 SERONEGATIVE RHEUMATOID ARTHRITIS: Primary | ICD-10-CM

## 2025-01-14 DIAGNOSIS — Z79.899 DRUG-INDUCED IMMUNODEFICIENCY: ICD-10-CM

## 2025-01-14 DIAGNOSIS — F41.9 ANXIETY DISORDER, UNSPECIFIED TYPE: ICD-10-CM

## 2025-01-14 PROCEDURE — 99215 OFFICE O/P EST HI 40 MIN: CPT | Mod: S$GLB,,, | Performed by: STUDENT IN AN ORGANIZED HEALTH CARE EDUCATION/TRAINING PROGRAM

## 2025-01-14 PROCEDURE — 3079F DIAST BP 80-89 MM HG: CPT | Mod: CPTII,S$GLB,, | Performed by: STUDENT IN AN ORGANIZED HEALTH CARE EDUCATION/TRAINING PROGRAM

## 2025-01-14 PROCEDURE — 3077F SYST BP >= 140 MM HG: CPT | Mod: CPTII,S$GLB,, | Performed by: STUDENT IN AN ORGANIZED HEALTH CARE EDUCATION/TRAINING PROGRAM

## 2025-01-14 PROCEDURE — 3008F BODY MASS INDEX DOCD: CPT | Mod: CPTII,S$GLB,, | Performed by: STUDENT IN AN ORGANIZED HEALTH CARE EDUCATION/TRAINING PROGRAM

## 2025-01-14 PROCEDURE — 99999 PR PBB SHADOW E&M-EST. PATIENT-LVL III: CPT | Mod: PBBFAC,,, | Performed by: STUDENT IN AN ORGANIZED HEALTH CARE EDUCATION/TRAINING PROGRAM

## 2025-01-14 PROCEDURE — 4010F ACE/ARB THERAPY RXD/TAKEN: CPT | Mod: CPTII,S$GLB,, | Performed by: STUDENT IN AN ORGANIZED HEALTH CARE EDUCATION/TRAINING PROGRAM

## 2025-01-14 PROCEDURE — 1125F AMNT PAIN NOTED PAIN PRSNT: CPT | Mod: CPTII,S$GLB,, | Performed by: STUDENT IN AN ORGANIZED HEALTH CARE EDUCATION/TRAINING PROGRAM

## 2025-01-14 RX ORDER — TOCILIZUMAB 180 MG/ML
162 INJECTION, SOLUTION SUBCUTANEOUS
Qty: 1.8 ML | Refills: 11 | Status: ACTIVE | OUTPATIENT
Start: 2025-01-14

## 2025-01-14 RX ORDER — ALPRAZOLAM 0.5 MG/1
0.5 TABLET ORAL 3 TIMES DAILY
Qty: 90 TABLET | Refills: 0 | Status: SHIPPED | OUTPATIENT
Start: 2025-01-14 | End: 2025-01-21 | Stop reason: SDUPTHER
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
## 2025-01-21 ENCOUNTER — OFFICE VISIT (OUTPATIENT)
Dept: PSYCHIATRY | Facility: CLINIC | Age: 70
End: 2025-01-21
Payer: MEDICARE

## 2025-01-21 ENCOUNTER — PATIENT MESSAGE (OUTPATIENT)
Dept: PSYCHIATRY | Facility: CLINIC | Age: 70
End: 2025-01-21
Payer: MEDICARE

## 2025-01-21 DIAGNOSIS — F33.1 MAJOR DEPRESSIVE DISORDER, RECURRENT EPISODE, MODERATE: Primary | ICD-10-CM

## 2025-01-21 DIAGNOSIS — F13.20 BENZODIAZEPINE DEPENDENCE: ICD-10-CM

## 2025-01-21 DIAGNOSIS — M79.7 FIBROMYALGIA: Chronic | ICD-10-CM

## 2025-01-21 DIAGNOSIS — F41.9 ANXIETY DISORDER, UNSPECIFIED TYPE: ICD-10-CM

## 2025-01-21 DIAGNOSIS — F34.1 PERSISTENT DEPRESSIVE DISORDER: ICD-10-CM

## 2025-01-21 RX ORDER — LEVOMILNACIPRAN HYDROCHLORIDE 80 MG/1
80 CAPSULE, EXTENDED RELEASE ORAL DAILY
Qty: 30 CAPSULE | Refills: 1 | Status: SHIPPED | OUTPATIENT
Start: 2025-01-21 | End: 2025-03-22

## 2025-01-21 RX ORDER — ALPRAZOLAM 0.5 MG/1
0.5 TABLET ORAL 3 TIMES DAILY
Qty: 90 TABLET | Refills: 1 | Status: SHIPPED | OUTPATIENT
Start: 2025-01-21 | End: 2025-03-22

## 2025-01-21 NOTE — PROGRESS NOTES
Outpatient Psychiatry Follow-Up Visit (MD/NP)    1/21/2025    The patient location is: [place], [place]  The distant site is: [address] [place] [id number]  The chief complaint leading to consultation is: follow-up for medication management    Visit type: audiovisual    Face to Face time with patient: 8 minutes  15 minutes of total time spent on the encounter, which includes face to face time and non-face to face time preparing to see the patient (eg, review of tests), Obtaining and/or reviewing separately obtained history, Documenting clinical information in the electronic or other health record, Independently interpreting results (not separately reported) and communicating results to the patient/family/caregiver, or Care coordination (not separately reported).     Each patient to whom he or she provides medical services by telemedicine is:  (1) informed of the relationship between the physician and patient and the respective role of any other health care provider with respect to management of the patient; and (2) notified that he or she may decline to receive medical services by telemedicine and may withdraw from such care at any time.    Notes:        Clinical Status of Patient:  Outpatient (Ambulatory)    Chief Complaint:  Madalyn Iverson is a 69 y.o. female who presents today for follow-up of depression, anxiety, and attention problems.  Met with patient.      Interval History and Content of Current Session:  Interim Events/Subjective Report/Content of Current Session:   She reports overall doing about the same as the last time I saw her in clinic.  Mood depressed, anxiety related to stressors such as caring for her . Able to calm down with use of alprazolam. No misuse or abuse. Has not increased dosage. Tolerated Fetzima titration well without adverse effects. Due to continued excessive worry, depressed mood, insomnia, anhedonia etc. That are symptoms of continued depression and  anxiety, increasing dosage of Fetzima today.         Review of Systems   PSYCHIATRIC: Pertinant items are noted in the narrative.  MUSCULOSKELETAL: Positive for pain, joint stiffness, and joint swelling.  CARDIOVASCULAR: No tachycardia or chest pain.  GASTROINTESTINAL: No nausea, vomiting, pain, constipation or diarrhea.    Past Medical, Family and Social History: The patient's past medical, family and social history have been reviewed and updated as appropriate within the electronic medical record - see encounter notes.    Compliance: yes    Side effects: None    Risk Parameters:  Patient reports no suicidal ideation  Patient reports no homicidal ideation  Patient reports no self-injurious behavior  Patient reports no violent behavior    Exam (detailed: at least 9 elements; comprehensive: all 15 elements)   Constitutional  Vitals:  Most recent vital signs, dated less than 90 days prior to this appointment, were reviewed.   There were no vitals filed for this visit.       General:  age appropriate, casually dressed, neatly groomed     Musculoskeletal  Muscle Strength/Tone:  no dyskinesia, no dystonia, no tremor, no tic   Gait & Station:  non-ataxic     Psychiatric  Speech:  no latency; no press, spontaneous   Mood & Affect:  depressed  congruent and appropriate, tearful appropriately at times   Thought Process:  normal and logical, goal-directed   Associations:  intact   Thought Content:  normal, no suicidality, no homicidality, delusions, or paranoia   Insight:  intact, has awareness of illness   Judgement: behavior is adequate to circumstances, age appropriate   Orientation:  grossly intact   Memory: intact for content of interview   Language: grossly intact   Attention Span & Concentration:  able to focus   Fund of Knowledge:  intact and appropriate to age and level of education, familiar with aspects of current personal life     Assessment and Diagnosis   Status/Progress: Based on the examination today, the  patient's problem(s) is/are inadequately controlled.  New problems have not been presented today.   Co-morbidities are complicating management of the primary condition.  There are no active rule-out diagnoses for this patient at this time.     General Impression:         ICD-10-CM ICD-9-CM   1. Major depressive disorder, recurrent episode, moderate  F33.1 296.32   2. Persistent depressive disorder  F34.1 300.4   3. Anxiety disorder, unspecified type  F41.9 300.00   4. Benzodiazepine dependence  F13.20 304.10   5. Fibromyalgia  M79.7 729.1       Intervention/Counseling/Treatment Plan   Medication Management: The risks and benefits of medication were discussed with the patient.   Increase Fetzima to 80mg QD for depression and anxiety. Hopeful it will help with chronic pain syndromes such as fibromyalgia.  Start alprazolam 0.5mg TID. Counseled patient extensively on risks including but not limited to dependence, addiction, tolerance, rebound anxiety, increased risk of falls, memory deficits, withdrawal syndrome. Patient informed that I would not be increasing dosage. If she is tapered off the medication during a hospitalization I will not restart it. At this time however, anxiety is severely impacting QOL and daily function which was previously somewhat improved with alprazolam.  I am aware of her prescriptions for opioids. She is tolerant and has been prescribed both hydrocodone products and alprazolam for many years without significant interaction.   Not restarting amphetamines. CV risks outweigh benefits.   Counseling provided with patient as follows: importance of compliance with chosen treatment options was emphasized, risks and benefits of treatment options, including medications, were discussed with the patient, patient education  No indication for PEC  Treatment plan reviewed as above.      Return to Clinic: as scheduled    Rusty Muhammad MD  [facility]

## 2025-01-24 ENCOUNTER — TELEPHONE (OUTPATIENT)
Dept: OPHTHALMOLOGY | Facility: CLINIC | Age: 70
End: 2025-01-24
Payer: MEDICARE

## 2025-01-29 ENCOUNTER — OFFICE VISIT (OUTPATIENT)
Dept: FAMILY MEDICINE | Facility: CLINIC | Age: 70
End: 2025-01-29
Payer: MEDICARE

## 2025-01-29 ENCOUNTER — PRE-OP/PRE-PROCEDURE ORDERS (OUTPATIENT)
Dept: RHEUMATOLOGY | Facility: CLINIC | Age: 70
End: 2025-01-29
Payer: MEDICARE

## 2025-01-29 VITALS
WEIGHT: 155.19 LBS | HEIGHT: 63 IN | SYSTOLIC BLOOD PRESSURE: 136 MMHG | BODY MASS INDEX: 27.5 KG/M2 | HEART RATE: 110 BPM | TEMPERATURE: 98 F | DIASTOLIC BLOOD PRESSURE: 78 MMHG | OXYGEN SATURATION: 98 %

## 2025-01-29 DIAGNOSIS — M79.7 FIBROMYALGIA: ICD-10-CM

## 2025-01-29 DIAGNOSIS — M06.00 SERONEGATIVE RHEUMATOID ARTHRITIS: Primary | ICD-10-CM

## 2025-01-29 DIAGNOSIS — E11.59 HYPERTENSION ASSOCIATED WITH TYPE 2 DIABETES MELLITUS: ICD-10-CM

## 2025-01-29 DIAGNOSIS — M15.9 OSTEOARTHRITIS OF MULTIPLE JOINTS, UNSPECIFIED OSTEOARTHRITIS TYPE: ICD-10-CM

## 2025-01-29 DIAGNOSIS — M15.2 BOUCHARD'S NODES (WITH ARTHROPATHY): ICD-10-CM

## 2025-01-29 DIAGNOSIS — Z79.899 DRUG-INDUCED IMMUNODEFICIENCY: ICD-10-CM

## 2025-01-29 DIAGNOSIS — D84.821 DRUG-INDUCED IMMUNODEFICIENCY: ICD-10-CM

## 2025-01-29 DIAGNOSIS — I15.2 HYPERTENSION ASSOCIATED WITH TYPE 2 DIABETES MELLITUS: ICD-10-CM

## 2025-01-29 DIAGNOSIS — M15.1: ICD-10-CM

## 2025-01-29 PROCEDURE — 3008F BODY MASS INDEX DOCD: CPT | Mod: CPTII,S$GLB,, | Performed by: NURSE PRACTITIONER

## 2025-01-29 PROCEDURE — 4010F ACE/ARB THERAPY RXD/TAKEN: CPT | Mod: CPTII,S$GLB,, | Performed by: NURSE PRACTITIONER

## 2025-01-29 PROCEDURE — 1101F PT FALLS ASSESS-DOCD LE1/YR: CPT | Mod: CPTII,S$GLB,, | Performed by: NURSE PRACTITIONER

## 2025-01-29 PROCEDURE — 1126F AMNT PAIN NOTED NONE PRSNT: CPT | Mod: CPTII,S$GLB,, | Performed by: NURSE PRACTITIONER

## 2025-01-29 PROCEDURE — 1160F RVW MEDS BY RX/DR IN RCRD: CPT | Mod: CPTII,S$GLB,, | Performed by: NURSE PRACTITIONER

## 2025-01-29 PROCEDURE — 3078F DIAST BP <80 MM HG: CPT | Mod: CPTII,S$GLB,, | Performed by: NURSE PRACTITIONER

## 2025-01-29 PROCEDURE — 99999 PR PBB SHADOW E&M-EST. PATIENT-LVL V: CPT | Mod: PBBFAC,,, | Performed by: NURSE PRACTITIONER

## 2025-01-29 PROCEDURE — 1159F MED LIST DOCD IN RCRD: CPT | Mod: CPTII,S$GLB,, | Performed by: NURSE PRACTITIONER

## 2025-01-29 PROCEDURE — 3288F FALL RISK ASSESSMENT DOCD: CPT | Mod: CPTII,S$GLB,, | Performed by: NURSE PRACTITIONER

## 2025-01-29 PROCEDURE — 99213 OFFICE O/P EST LOW 20 MIN: CPT | Mod: S$GLB,,, | Performed by: NURSE PRACTITIONER

## 2025-01-29 PROCEDURE — 3075F SYST BP GE 130 - 139MM HG: CPT | Mod: CPTII,S$GLB,, | Performed by: NURSE PRACTITIONER

## 2025-01-29 RX ORDER — HYDROCODONE BITARTRATE AND ACETAMINOPHEN 5; 325 MG/1; MG/1
1 TABLET ORAL EVERY 12 HOURS PRN
Qty: 60 TABLET | Refills: 0 | Status: SHIPPED | OUTPATIENT
Start: 2025-01-29

## 2025-01-29 NOTE — PROGRESS NOTES
"Subjective:       Patient ID: Madalyn Iverson is a 70 y.o. female.    Chief Complaint: Blood Pressure Check (F/U BP Check) and right thumb pain (+ RA)        HPI WITH ASSESSMENT AND PLAN OF CARE:      Patient is a 70-year-old white female with Brain Mass/Meningiomas followed by [facility],  Eye Floaters followed by Ophthalmology, Aortic Atherosclerosis noted on CT 3/2019, Grade 2 diastolic dysfunction seen on echocardiogram with grade 2 systolic murmur followed by [facility], Hypertension, Hyperlipidemia intolerant of oral statins, Hypothyroidism followed by [facility], Type 2 Diabetes followed by [facility],  history of  ADHD, fibromyalgia and seronegative arthritis followed by Rheumatology, migraines, vitamin D deficiency, osteoarthritis to both hands and chronic GI problem that patient reported was ulcerative colitis that is now questionable that is here today for blood pressure check and has complaint of right thumb pain and deformity due to RA.    Hypertension  Currently taking Lisinopril 40 mg daily and Potassium 10 meq twice daily  /78   Pulse 110   Temp 97.9 °F (36.6 °C) (Temporal)   Ht 5' 3" (1.6 m)   Wt 70.4 kg (155 lb 3.3 oz)   SpO2 98%   BMI 27.49 kg/m²   Stable  Recheck in 1 year.    Fibromyalgia and Seronegative Arthritis   Followed by [facility] Dr. Rodriguez - last appt 1/14/2025  Rheumatologist prescribes:  OPIATE THERAPY - Norco as needed for pain  -failed SSZ, HCQ, humira and Enbrel in the past   -continue MTX 25 mg SC QW  -continue daily FA  -start actemra SC every 2 weeks. No hx of diverticulitis.     Right Thumb Pain  + joint deformity due to RA - + maria r and herbenden nodes present  Refer to hand surgeon for treatment options.      Vitals:    01/29/25 0854   BP: 136/78   Pulse: 110   Temp: 97.9 °F (36.6 °C)   TempSrc: Temporal   SpO2: 98%   Weight: 70.4 kg (155 lb 3.3 oz)   Height: 5' 3" (1.6 m)         Diagnoses this " "Encounter:         ICD-10-CM ICD-9-CM   1. Seronegative rheumatoid arthritis  M06.00 714.0   2. Heberden nodes of right hand  M15.1 715.04   3. Radu's nodes (with arthropathy)  M15.2 715.04   4. Hypertension associated with type 2 diabetes mellitus  E11.59 250.80    I15.2 401.9       Orders Placed This Encounter    Ambulatory referral/consult to Hand Surgery        No follow-ups on file.     Patient's Medications   New Prescriptions    No medications on file   Previous Medications    ALPRAZOLAM (XANAX) 0.5 MG TABLET    Take 1 tablet (0.5 mg total) by mouth 3 (three) times daily.    BD INSULIN SYRINGE 1 ML 25 GAUGE X 5/8" SYRG    Inject 1 mL into the skin once a week.    CYANOCOBALAMIN 1,000 MCG/ML INJECTION    INJECT 1 ML (1,000 MCG TOTAL) INTO THE SKIN EVERY 30 DAYS    ERGOCALCIFEROL (ERGOCALCIFEROL) 50,000 UNIT CAP    Take 1 capsule (50,000 Units total) by mouth twice a week.    FOLIC ACID (FOLVITE) 1 MG TABLET    Take 3 mg by mouth once daily.    HYDROCODONE-ACETAMINOPHEN (NORCO) 5-325 MG PER TABLET    Take 1 tablet by mouth every 12 (twelve) hours as needed for Pain.    LEVOMILNACIPRAN (FETZIMA) 80 MG CS24    Take 1 tablet (80 mg) by mouth once daily.    LISINOPRIL (PRINIVIL,ZESTRIL) 40 MG TABLET    Take 1 tablet (40 mg total) by mouth once daily.    METHOTREXATE 25 MG/ML INJECTION    Inject 1 mL (25 mg total) into the skin every 7 days.    POTASSIUM CHLORIDE (MICRO-K) 10 MEQ CPSR    Take 1 capsule (10 mEq total) by mouth 2 (two) times daily.    REPATHA SURECLICK 140 MG/ML PNIJ    TAKE 1 ML (140 MG TOTAL) BY MOUTH EVERY 14 (FOURTEEN) DAYS.    SYRINGE-NEEDLE,SAFETY,DISP UNT 1 ML 25 GAUGE X 5/8" SYRG    To use with mtx injections    THYROID, PORK, (ARMOUR THYROID) 60 MG TAB    One tab daily    TIRZEPATIDE (MOUNJARO) 5 MG/0.5 ML PNIJ    Inject 5 mg into the skin every 7 days.    TIZANIDINE (ZANAFLEX) 4 MG TABLET    TAKE 1 TABLET BY MOUTH FOUR TIMES A DAY    TOCILIZUMAB (ACTEMRA ACTPEN) 162 MG/0.9 ML ERI    " Inject 162 mg into the skin every 14 (fourteen) days.   Modified Medications    No medications on file   Discontinued Medications    No medications on file         Review of Systems      Objective:        Physical Exam        Past Medical History:   Diagnosis Date    Acute biliary pancreatitis without infection or necrosis 2018    Acute pancreatitis     2018 - admitted at [facility]    ADHD (attention deficit hyperactivity disorder), inattentive type     Aortic atherosclerosis 3/2/2019    Mild to moderate aortic atherosclerosis seen on CT abd/pelvis 3/2/2019    Chronic back pain     Followed by [facility] - Dr. Bautista - has been having medial branch blocks    Fibromyalgia     treated by Dr. Thorpe in past and now treated by Dr. Yates - Rheumatologist    Hyperlipidemia     High triglycerides    Hypertension     Hypothyroidism     Treated by Dr. Mathew    IFG (impaired fasting glucose)     Migraine     Treated by neurologist - Dr. Destiny Florez    Ulcerative colitis     Patient reported treated by Dr. Lee and Aaron in past  but on admit in  - no UC seen on recent colonoscopy.  It was noted on colonoscopy in 2004 there were ulcers noted to descending colon but not present on sigmoidoscopy in 2004.    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 Ghotra    Vitamin D deficiency 3/31/2016       Past Surgical History:   Procedure Laterality Date    CARPAL TUNNEL RELEASE Right 2019    Procedure: RELEASE, CARPAL TUNNEL right;  Surgeon: Li Waller MD;  Location: [address];  Service: Orthopedics;  Laterality: Right;  stretcher, supine, hand pan 1 and 2    CARPAL TUNNEL RELEASE Left 2021    Procedure: RELEASE, CARPAL TUNNEL;  Surgeon: Brian Reed Jr., MD;  Location: [facility];  Service: Orthopedics;  Laterality: Left;     SECTION      COLONOSCOPY   10/08/2010    Dr. Lee - repeat in 5 years - has appointment for colonoscopy 4/6/2016    COLONOSCOPY  04/06/2016    Dr. Kelly - normal  colon - repeat in 10 years - scanned report to media file.    ENDOSCOPIC ULTRASOUND OF UPPER GASTROINTESTINAL TRACT N/A 07/18/2018    Procedure: ULTRASOUND, ENDOSCOPIC, UPPER GI TRACT;  Surgeon: Reji Russell MD;  Location: [facility];  Service: Endoscopy;  Laterality: N/A;    ENDOSCOPIC ULTRASOUND OF UPPER GASTROINTESTINAL TRACT N/A 03/04/2019    Procedure: ULTRASOUND, UPPER GI TRACT, ENDOSCOPIC;  Surgeon: Randy Boyd MD;  Location: [facility];  Service: Endoscopy;  Laterality: N/A;    HYSTERECTOMY      INTERPOSITION ARTHROPLASTY OF CARPOMETACARPAL JOINTS Left 07/09/2021    Procedure: INTERPOSITION ARTHROPLASTY, CMC JOINT;  Surgeon: Brian Reed Jr., MD;  Location: [facility] OR;  Service: Orthopedics;  Laterality: Left;  need arthrex tightrope   Brit notified 6/22 LB, Brit confirmed 7/8/21 AM    LAPAROSCOPIC CHOLECYSTECTOMY N/A 03/06/2019    Procedure: CHOLECYSTECTOMY, LAPAROSCOPIC;  Surgeon: Hill Palacios MD;  Location: [facility] OR;  Service: General;  Laterality: N/A;    medial branch block      done at [facility] for axial back pain    OOPHORECTOMY      SHOULDER ARTHROSCOPY W/ ROTATOR CUFF REPAIR Right     Dr. Castro - rotator cuff repair; 3 screws placed, biceps tear repair.    SHOULDER SURGERY Left     TONSILLECTOMY      upper and lower GI  03/2016       Family History   Problem Relation Name Age of Onset    Hypertension Mother      Heart disease Mother      Hyperlipidemia Mother      Cancer Sister  53        thyroid cancer and lymph nodes involvement    Cancer Brother  61        colon and lung cancer    Colon cancer Brother  66    Cancer Brother  63        colon    Hypertension Brother      Diabetes Brother      Colon cancer Brother  60    Esophageal cancer Neg Hx      Stomach cancer Neg Hx      Ulcerative colitis Neg Hx      Crohn's disease  Neg Hx      Irritable bowel syndrome Neg Hx      Celiac disease Neg Hx         Social History     Socioeconomic History    Marital status:    Tobacco Use    Smoking status: Former     Current packs/day: 0.00     Average packs/day: 1 pack/day for 20.0 years (20.0 ttl pk-yrs)     Types: Cigarettes     Start date: 1977     Quit date: 1997     Years since quittin.0    Smokeless tobacco: Former   Substance and Sexual Activity    Alcohol use: No    Drug use: No    Sexual activity: Never     Social Drivers of Health     Financial Resource Strain: Low Risk  (2025)    Overall Financial Resource Strain (CARDIA)     Difficulty of Paying Living Expenses: Not hard at all   Food Insecurity: No Food Insecurity (2025)    Hunger Vital Sign     Worried About Running Out of Food in the Last Year: Never true     Ran Out of Food in the Last Year: Never true   Transportation Needs: No Transportation Needs (2025)    PRAPARE - Transportation     Lack of Transportation (Medical): No     Lack of Transportation (Non-Medical): No   Physical Activity: Inactive (2025)    Exercise Vital Sign     Days of Exercise per Week: 7 days     Minutes of Exercise per Session: 0 min   Stress: Stress Concern Present (2025)    [employer] - Occupational Stress Questionnaire     Feeling of Stress : Very much   Housing Stability: Low Risk  (2025)    Housing Stability Vital Sign     Unable to Pay for Housing in the Last Year: No     Homeless in the Last Year: No

## 2025-01-31 DIAGNOSIS — E11.69 TYPE 2 DIABETES MELLITUS WITH OTHER SPECIFIED COMPLICATION, WITHOUT LONG-TERM CURRENT USE OF INSULIN: ICD-10-CM

## 2025-01-31 NOTE — TELEPHONE ENCOUNTER
Patient's diabetes is followed by [facility] and her specialist should be filling medication.    Forwarding prescription to specialist.

## 2025-02-03 RX ORDER — TIRZEPATIDE 5 MG/.5ML
5 INJECTION, SOLUTION SUBCUTANEOUS
Qty: 4 PEN | Refills: 4 | Status: SHIPPED | OUTPATIENT
Start: 2025-02-03

## 2025-02-14 ENCOUNTER — OFFICE VISIT (OUTPATIENT)
Dept: OPHTHALMOLOGY | Facility: CLINIC | Age: 70
End: 2025-02-14
Payer: MEDICARE

## 2025-02-14 ENCOUNTER — CLINICAL SUPPORT (OUTPATIENT)
Dept: OPHTHALMOLOGY | Facility: CLINIC | Age: 70
End: 2025-02-14
Payer: MEDICARE

## 2025-02-14 DIAGNOSIS — H35.372 EPIRETINAL MEMBRANE (ERM) OF LEFT EYE: ICD-10-CM

## 2025-02-14 DIAGNOSIS — H43.812 PVD (POSTERIOR VITREOUS DETACHMENT), LEFT EYE: Primary | ICD-10-CM

## 2025-02-14 PROCEDURE — 99999 PR PBB SHADOW E&M-EST. PATIENT-LVL III: CPT | Mod: PBBFAC,,, | Performed by: OPHTHALMOLOGY

## 2025-02-14 NOTE — PROGRESS NOTES
"Subjective:       Patient ID: Madalyn Iverson is a 70 y.o. female      Chief Complaint   Patient presents with    Follow-up     Overdue for planned PVD f/u, still having floaters     History of Present Illness  HPI     Follow-up     Additional comments: Overdue for planned PVD f/u, still having floaters           Comments    Overdue OCT/DFE    Pt still having floaters OS but much less and not very bothersome.    Flashes persist but also less frequent.  Vision seems "weaker" OS than OD  No f/f OD          Last edited by Lucho Coffey MD on 2/14/2025 10:22 AM.        Imaging:    See report    Assessment/Plan:     1. PVD (posterior vitreous detachment), left eye  Pathology of PVD, Retinal Tear, Retinal Detachment reviewed in great detail  RD precautions discussed in detail, patient expressed understanding  RTC immediately PRN (especially ANY change flashes, floaters, vision, visual field)    Discussed PPV but not recommended    - Posterior Segment OCT Retina-Both eyes    2. Epiretinal membrane (ERM) of left eye  Excellent VA, peripheral without traction  Stable  Observe      Discussed improved pinhole vision, could see better with refraction    Yearly optometry examination.  RTC with me PRN only      Follow up if symptoms worsen or fail to improve.       "

## 2025-02-28 DIAGNOSIS — M79.7 FIBROMYALGIA: ICD-10-CM

## 2025-02-28 DIAGNOSIS — F41.9 ANXIETY DISORDER, UNSPECIFIED TYPE: ICD-10-CM

## 2025-02-28 DIAGNOSIS — M15.9 OSTEOARTHRITIS OF MULTIPLE JOINTS, UNSPECIFIED OSTEOARTHRITIS TYPE: ICD-10-CM

## 2025-02-28 RX ORDER — ALPRAZOLAM 0.5 MG/1
0.5 TABLET ORAL 3 TIMES DAILY
Qty: 90 TABLET | Refills: 1 | Status: SHIPPED | OUTPATIENT
Start: 2025-02-28 | End: 2025-04-29

## 2025-03-05 RX ORDER — HYDROCODONE BITARTRATE AND ACETAMINOPHEN 5; 325 MG/1; MG/1
1 TABLET ORAL EVERY 12 HOURS PRN
Qty: 60 TABLET | Refills: 0 | Status: SHIPPED | OUTPATIENT
Start: 2025-03-05

## 2025-03-07 DIAGNOSIS — E78.00 PURE HYPERCHOLESTEROLEMIA: ICD-10-CM

## 2025-03-07 RX ORDER — EVOLOCUMAB 140 MG/ML
140 INJECTION, SOLUTION SUBCUTANEOUS
Qty: 2 EACH | Refills: 11 | Status: SHIPPED | OUTPATIENT
Start: 2025-03-07

## 2025-03-07 RX ORDER — EVOLOCUMAB 140 MG/ML
140 INJECTION, SOLUTION SUBCUTANEOUS
Qty: 2 EACH | Refills: 11 | Status: SHIPPED | OUTPATIENT
Start: 2025-03-07 | End: 2025-03-07 | Stop reason: SDUPTHER

## 2025-03-07 NOTE — TELEPHONE ENCOUNTER
----- Message from Gracy sent at 3/7/2025 11:32 AM CST -----  Type:  Pharmacy Calling to Clarify an RXName of Caller:[facility] Name:CVSPrescription Name:REPATHA SURECLICK 140 mg/mL PnIj 2 each 11 12/11/2024 -Sig: TAKE 1 ML (140 MG TOTAL) BY MOUTH EVERY 14 (FOURTEEN) DAYS.What do they need to clarify?:Atul Hutchinson Call Back Number:[phone] Information: Pt states she is 3w late and is requesting a call back with status

## 2025-03-10 ENCOUNTER — OFFICE VISIT (OUTPATIENT)
Dept: ORTHOPEDICS | Facility: CLINIC | Age: 70
End: 2025-03-10
Payer: MEDICARE

## 2025-03-10 ENCOUNTER — TELEPHONE (OUTPATIENT)
Dept: FAMILY MEDICINE | Facility: CLINIC | Age: 70
End: 2025-03-10
Payer: MEDICARE

## 2025-03-10 ENCOUNTER — HOSPITAL ENCOUNTER (OUTPATIENT)
Dept: RADIOLOGY | Facility: HOSPITAL | Age: 70
Discharge: HOME | End: 2025-03-10
Attending: ORTHOPAEDIC SURGERY
Payer: MEDICARE

## 2025-03-10 VITALS — HEIGHT: 63 IN | BODY MASS INDEX: 27.46 KG/M2

## 2025-03-10 DIAGNOSIS — M15.1: ICD-10-CM

## 2025-03-10 DIAGNOSIS — M19.049 HAND ARTHRITIS: Primary | ICD-10-CM

## 2025-03-10 DIAGNOSIS — M06.00 SERONEGATIVE RHEUMATOID ARTHRITIS: ICD-10-CM

## 2025-03-10 DIAGNOSIS — M15.2 BOUCHARD'S NODES (WITH ARTHROPATHY): ICD-10-CM

## 2025-03-10 PROCEDURE — 73120 X-RAY EXAM OF HAND: CPT | Mod: TC,PN,RT

## 2025-03-10 PROCEDURE — 99999 PR PBB SHADOW E&M-EST. PATIENT-LVL IV: CPT | Mod: PBBFAC,,, | Performed by: ORTHOPAEDIC SURGERY

## 2025-03-10 PROCEDURE — 73120 X-RAY EXAM OF HAND: CPT | Mod: 26,RT,, | Performed by: RADIOLOGY

## 2025-03-10 RX ORDER — TRIAMCINOLONE ACETONIDE 40 MG/ML
20 INJECTION, SUSPENSION INTRA-ARTICULAR; INTRAMUSCULAR
Status: COMPLETED | OUTPATIENT
Start: 2025-03-10 | End: 2025-03-10

## 2025-03-10 RX ADMIN — TRIAMCINOLONE ACETONIDE 20 MG: 40 INJECTION, SUSPENSION INTRA-ARTICULAR; INTRAMUSCULAR at 02:03

## 2025-03-10 NOTE — PROGRESS NOTES
Subjective:      Patient ID: Madalyn Iverson is a 70 y.o. female.  Chief Complaint: Consult (Seronegative rheumatoid arthritis [/[place] nodes of right hand /Raud's nodes (with arthropathy) , )      HPI  Madalyn Iverson is a  70 y.o. female presenting today for follow up of hand arthritis.  She reports that she is having problems mainly in the right thumb she localizes most of the pain to the IP joint where she has noticed some swelling pain seems to radiate up the arm   She does have occasional numbness tingling in the hand usually worse at night   She did have successful CMC arthroplasty of the left thumb several years ago.    Review of patient's allergies indicates:   Allergen Reactions    Statins-hmg-coa reductase inhibitors Other (See Comments)     myalgias    Nsaids (non-steroidal anti-inflammatory drug) Other (See Comments)     Colitis/Stomach problems         Current Medications[1]    Past Medical History:   Diagnosis Date    Acute biliary pancreatitis without infection or necrosis 6/13/2018    Acute pancreatitis     June 2018 - admitted at [facility]    ADHD (attention deficit hyperactivity disorder), inattentive type     Aortic atherosclerosis 3/2/2019    Mild to moderate aortic atherosclerosis seen on CT abd/pelvis 3/2/2019    Chronic back pain     Followed by [facility] - Dr. Bautista - has been having medial branch blocks    Fibromyalgia     treated by Dr. Thorpe in past and now treated by Dr. Yates - Rheumatologist    Hyperlipidemia     High triglycerides    Hypertension     Hypothyroidism     Treated by Dr. Matehw    IFG (impaired fasting glucose)     Migraine     Treated by neurologist - Dr. Destiny Florez    Ulcerative colitis     Patient reported treated by Dr. Lee and Aaron in past  but on admit in 2018 - no UC seen on recent colonoscopy.  It was noted on colonoscopy in April 2004 there were ulcers noted to descending colon but not  present on sigmoidoscopy in 2004.    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 Ghotra    Vitamin D deficiency 3/31/2016       Past Surgical History:   Procedure Laterality Date    CARPAL TUNNEL RELEASE Right 2019    Procedure: RELEASE, CARPAL TUNNEL right;  Surgeon: Li Waller MD;  Location: [address];  Service: Orthopedics;  Laterality: Right;  stretcher, supine, hand pan 1 and 2    CARPAL TUNNEL RELEASE Left 2021    Procedure: RELEASE, CARPAL TUNNEL;  Surgeon: Brian Reed Jr., MD;  Location: [facility];  Service: Orthopedics;  Laterality: Left;     SECTION      COLONOSCOPY  10/08/2010    Dr. Lee - repeat in 5 years - has appointment for colonoscopy 2016    COLONOSCOPY  2016    Dr. Kelly - normal  colon - repeat in 10 years - scanned report to media file.    ENDOSCOPIC ULTRASOUND OF UPPER GASTROINTESTINAL TRACT N/A 2018    Procedure: ULTRASOUND, ENDOSCOPIC, UPPER GI TRACT;  Surgeon: Reji Russell MD;  Location: [facility];  Service: Endoscopy;  Laterality: N/A;    ENDOSCOPIC ULTRASOUND OF UPPER GASTROINTESTINAL TRACT N/A 2019    Procedure: ULTRASOUND, UPPER GI TRACT, ENDOSCOPIC;  Surgeon: Randy Boyd MD;  Location: [facility];  Service: Endoscopy;  Laterality: N/A;    HYSTERECTOMY      INTERPOSITION ARTHROPLASTY OF CARPOMETACARPAL JOINTS Left 2021    Procedure: INTERPOSITION ARTHROPLASTY, CMC JOINT;  Surgeon: Brian Reed Jr., MD;  Location: [facility];  Service: Orthopedics;  Laterality: Left;  need arthrex tightrope   Brit notified  LB, Brit confirmed 21 AM    LAPAROSCOPIC CHOLECYSTECTOMY N/A 2019    Procedure: CHOLECYSTECTOMY, LAPAROSCOPIC;  Surgeon: Hill Palacios MD;  Location: [facility];  Service: General;  Laterality: N/A;    medial branch block      done at [facility] for axial back pain    OOPHORECTOMY      SHOULDER  "ARTHROSCOPY W/ ROTATOR CUFF REPAIR Right     Dr. Castro - rotator cuff repair; 3 screws placed, biceps tear repair.    SHOULDER SURGERY Left     TONSILLECTOMY      upper and lower GI  03/2016       OBJECTIVE:   PHYSICAL EXAM:  Height: 5' 3" (160 cm)    Vitals:    03/10/25 1304   Height: 5' 3" (1.6 m)   PainSc:   7   PainLoc: Wrist     Ortho/SPM Exam  Examination right hand there is some bony enlargement of the IP joint of the thumb most of the tenderness is at the IP joint where there is some loss of motion   CMC joint is not really tender   No instability   Tinel sign mildly positive       RADIOGRAPHS:  AP lateral x-rays of the right hand show degenerative changes most severe at the IP joint of the right thumb  Comments: I have personally reviewed the imaging and I agree with the above radiologist's report.    ASSESSMENT/PLAN:     IMPRESSION:  1. Right thumb IP arthritis.      2. Possible superimposed right carpal tunnel syndrome    PLAN:  I explained the nature of the problem to the patient which could be treated surgically as well   She would benefit from IP fusion of the right thumb  She is interested in possible surgery but I would like to get the nerve test 1st to see if she does have some carpal tunnel symptoms    FOLLOW UP:  After the nerve test is complete    Disclaimer: This note has been generated using voice-recognition software. There may be typographical errors that have been missed during proof-reading.          [1]   Current Outpatient Medications   Medication Sig Dispense Refill    ALPRAZolam (XANAX) 0.5 MG tablet Take 1 tablet (0.5 mg total) by mouth 3 (three) times daily. 90 tablet 1    BD INSULIN SYRINGE 1 mL 25 gauge x 5/8" Syrg Inject 1 mL into the skin once a week. 30 each 1    cyanocobalamin 1,000 mcg/mL injection INJECT 1 ML (1,000 MCG TOTAL) INTO THE SKIN EVERY 30 DAYS 9 mL 1    ergocalciferol (ERGOCALCIFEROL) 50,000 unit Cap Take 1 capsule (50,000 Units total) by mouth twice a week. 24 " "capsule 3    evolocumab (REPATHA SURECLICK) 140 mg/mL PnIj Take 1 mL (140 mg total) by mouth every 14 (fourteen) days. 2 each 11    folic acid (FOLVITE) 1 MG tablet Take 3 mg by mouth once daily.      HYDROcodone-acetaminophen (NORCO) 5-325 mg per tablet Take 1 tablet by mouth every 12 (twelve) hours as needed for Pain. 60 tablet 0    levomilnacipran (FETZIMA) 80 mg Cs24 Take 1 tablet (80 mg) by mouth once daily. 30 capsule 1    lisinopriL (PRINIVIL,ZESTRIL) 40 MG tablet Take 1 tablet (40 mg total) by mouth once daily. 90 tablet 3    methotrexate 25 mg/mL injection Inject 1 mL (25 mg total) into the skin every 7 days. 4 mL 2    potassium chloride (MICRO-K) 10 MEQ CpSR Take 1 capsule (10 mEq total) by mouth 2 (two) times daily. 180 capsule 1    syringe-needle,safety,disp unt 1 mL 25 gauge x 5/8" Syrg To use with mtx injections 20 each 0    thyroid, pork, (ARMOUR THYROID) 60 mg Tab One tab daily 90 tablet 1    tirzepatide (MOUNJARO) 5 mg/0.5 mL PnIj Inject 5 mg into the skin every 7 days. 4 Pen 4    tiZANidine (ZANAFLEX) 4 MG tablet TAKE 1 TABLET BY MOUTH FOUR TIMES A  tablet 3    tocilizumab (ACTEMRA ACTPEN) 162 mg/0.9 mL PnIj Inject 162 mg into the skin every 14 (fourteen) days. 1.8 mL 11     No current facility-administered medications for this visit.     Facility-Administered Medications Ordered in Other Visits   Medication Dose Route Frequency Provider Last Rate Last Admin    0.9%  NaCl infusion   Intravenous Continuous Jermaine Chatman MD   Stopped at 03/27/19 1040    mupirocin 2 % ointment   Nasal On Call Procedure Jermaine Chatman MD   Given at 03/27/19 0848     "

## 2025-03-10 NOTE — TELEPHONE ENCOUNTER
----- Message from Danica sent at 3/10/2025  4:27 PM CDT -----  Type: General Call Back Name of Caller:pt Reason pt states that she need pre authorization to get her medication filled evolocumab (REPATHA SURECLICK) 140 mg/mL PnIj and it is now well past 3 months of not having this medication filled.Would the patient rather a call back or a response via [facility]? Call Best Call Back Number:[phone] Information: please give pt a call in regards, thank you.

## 2025-03-10 NOTE — TELEPHONE ENCOUNTER
Spoke with patient- advised medication was sent to [facility] on 03/07- number given to patient call to check statues of it

## 2025-03-11 DIAGNOSIS — E78.00 PURE HYPERCHOLESTEROLEMIA: ICD-10-CM

## 2025-03-11 RX ORDER — EVOLOCUMAB 140 MG/ML
140 INJECTION, SOLUTION SUBCUTANEOUS
Qty: 2 EACH | Refills: 11 | Status: ACTIVE | OUTPATIENT
Start: 2025-03-11

## 2025-03-26 DIAGNOSIS — F34.1 PERSISTENT DEPRESSIVE DISORDER: ICD-10-CM

## 2025-03-26 DIAGNOSIS — F41.9 ANXIETY DISORDER, UNSPECIFIED TYPE: ICD-10-CM

## 2025-03-26 DIAGNOSIS — F33.1 MAJOR DEPRESSIVE DISORDER, RECURRENT EPISODE, MODERATE: ICD-10-CM

## 2025-03-26 DIAGNOSIS — M79.7 FIBROMYALGIA: Chronic | ICD-10-CM

## 2025-03-26 RX ORDER — LEVOMILNACIPRAN HYDROCHLORIDE 80 MG/1
80 CAPSULE, EXTENDED RELEASE ORAL DAILY
Qty: 90 CAPSULE | Refills: 0 | Status: SHIPPED | OUTPATIENT
Start: 2025-03-26 | End: 2025-06-26

## 2025-04-03 DIAGNOSIS — M15.9 OSTEOARTHRITIS OF MULTIPLE JOINTS, UNSPECIFIED OSTEOARTHRITIS TYPE: ICD-10-CM

## 2025-04-03 DIAGNOSIS — F41.9 ANXIETY DISORDER, UNSPECIFIED TYPE: ICD-10-CM

## 2025-04-03 DIAGNOSIS — M79.7 FIBROMYALGIA: ICD-10-CM

## 2025-04-04 RX ORDER — HYDROCODONE BITARTRATE AND ACETAMINOPHEN 5; 325 MG/1; MG/1
1 TABLET ORAL EVERY 12 HOURS PRN
Qty: 60 TABLET | Refills: 0 | Status: SHIPPED | OUTPATIENT
Start: 2025-04-04

## 2025-04-05 RX ORDER — ALPRAZOLAM 0.5 MG/1
0.5 TABLET ORAL 3 TIMES DAILY
Qty: 90 TABLET | Refills: 1 | Status: SHIPPED | OUTPATIENT
Start: 2025-04-05 | End: 2025-06-04

## 2025-04-15 ENCOUNTER — TELEPHONE (OUTPATIENT)
Dept: RHEUMATOLOGY | Facility: CLINIC | Age: 70
End: 2025-04-15
Payer: MEDICARE

## 2025-04-23 ENCOUNTER — OFFICE VISIT (OUTPATIENT)
Dept: RHEUMATOLOGY | Facility: CLINIC | Age: 70
End: 2025-04-23
Payer: MEDICARE

## 2025-04-23 VITALS
RESPIRATION RATE: 18 BRPM | OXYGEN SATURATION: 96 % | HEIGHT: 66 IN | BODY MASS INDEX: 23.31 KG/M2 | SYSTOLIC BLOOD PRESSURE: 134 MMHG | DIASTOLIC BLOOD PRESSURE: 88 MMHG | HEART RATE: 126 BPM | WEIGHT: 145.06 LBS

## 2025-04-23 DIAGNOSIS — Z79.899 DRUG-INDUCED IMMUNODEFICIENCY: ICD-10-CM

## 2025-04-23 DIAGNOSIS — M06.00 SERONEGATIVE RHEUMATOID ARTHRITIS: Primary | ICD-10-CM

## 2025-04-23 DIAGNOSIS — D84.821 DRUG-INDUCED IMMUNODEFICIENCY: ICD-10-CM

## 2025-04-23 DIAGNOSIS — M79.7 FIBROMYALGIA: ICD-10-CM

## 2025-04-23 DIAGNOSIS — M15.9 OSTEOARTHRITIS OF MULTIPLE JOINTS, UNSPECIFIED OSTEOARTHRITIS TYPE: ICD-10-CM

## 2025-04-23 PROCEDURE — 3288F FALL RISK ASSESSMENT DOCD: CPT | Mod: CPTII,S$GLB,, | Performed by: STUDENT IN AN ORGANIZED HEALTH CARE EDUCATION/TRAINING PROGRAM

## 2025-04-23 PROCEDURE — 99999 PR PBB SHADOW E&M-EST. PATIENT-LVL IV: CPT | Mod: PBBFAC,,, | Performed by: STUDENT IN AN ORGANIZED HEALTH CARE EDUCATION/TRAINING PROGRAM

## 2025-04-23 PROCEDURE — 99214 OFFICE O/P EST MOD 30 MIN: CPT | Mod: S$GLB,,, | Performed by: STUDENT IN AN ORGANIZED HEALTH CARE EDUCATION/TRAINING PROGRAM

## 2025-04-23 PROCEDURE — G2211 COMPLEX E/M VISIT ADD ON: HCPCS | Mod: S$GLB,,, | Performed by: STUDENT IN AN ORGANIZED HEALTH CARE EDUCATION/TRAINING PROGRAM

## 2025-04-23 PROCEDURE — 1126F AMNT PAIN NOTED NONE PRSNT: CPT | Mod: CPTII,S$GLB,, | Performed by: STUDENT IN AN ORGANIZED HEALTH CARE EDUCATION/TRAINING PROGRAM

## 2025-04-23 PROCEDURE — 1101F PT FALLS ASSESS-DOCD LE1/YR: CPT | Mod: CPTII,S$GLB,, | Performed by: STUDENT IN AN ORGANIZED HEALTH CARE EDUCATION/TRAINING PROGRAM

## 2025-04-23 PROCEDURE — 3008F BODY MASS INDEX DOCD: CPT | Mod: CPTII,S$GLB,, | Performed by: STUDENT IN AN ORGANIZED HEALTH CARE EDUCATION/TRAINING PROGRAM

## 2025-04-23 PROCEDURE — 3079F DIAST BP 80-89 MM HG: CPT | Mod: CPTII,S$GLB,, | Performed by: STUDENT IN AN ORGANIZED HEALTH CARE EDUCATION/TRAINING PROGRAM

## 2025-04-23 PROCEDURE — 1159F MED LIST DOCD IN RCRD: CPT | Mod: CPTII,S$GLB,, | Performed by: STUDENT IN AN ORGANIZED HEALTH CARE EDUCATION/TRAINING PROGRAM

## 2025-04-23 PROCEDURE — 3075F SYST BP GE 130 - 139MM HG: CPT | Mod: CPTII,S$GLB,, | Performed by: STUDENT IN AN ORGANIZED HEALTH CARE EDUCATION/TRAINING PROGRAM

## 2025-04-23 PROCEDURE — 4010F ACE/ARB THERAPY RXD/TAKEN: CPT | Mod: CPTII,S$GLB,, | Performed by: STUDENT IN AN ORGANIZED HEALTH CARE EDUCATION/TRAINING PROGRAM

## 2025-04-23 RX ORDER — METHOTREXATE 25 MG/ML
25 INJECTION, SOLUTION INTRAMUSCULAR; INTRATHECAL; INTRAVENOUS; SUBCUTANEOUS
Qty: 4 ML | Refills: 2 | Status: SHIPPED | OUTPATIENT
Start: 2025-04-23

## 2025-04-23 RX ORDER — SYRINGE WITH NEEDLE, 1 ML 25GX5/8"
SYRINGE, EMPTY DISPOSABLE MISCELLANEOUS
Qty: 20 EACH | Refills: 0 | Status: SHIPPED | OUTPATIENT
Start: 2025-04-23
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
## 2025-05-05 DIAGNOSIS — M79.7 FIBROMYALGIA: ICD-10-CM

## 2025-05-05 DIAGNOSIS — M15.9 OSTEOARTHRITIS OF MULTIPLE JOINTS, UNSPECIFIED OSTEOARTHRITIS TYPE: ICD-10-CM

## 2025-05-05 RX ORDER — HYDROCODONE BITARTRATE AND ACETAMINOPHEN 5; 325 MG/1; MG/1
1 TABLET ORAL EVERY 12 HOURS PRN
Qty: 60 TABLET | Refills: 0 | Status: SHIPPED | OUTPATIENT
Start: 2025-05-05

## 2025-05-15 NOTE — PROGRESS NOTES
Endocrine Clinic Note      The patient location is: [place], [place]    Visit type: audiovisual    Face to Face time with patient: 10 mins  30 minutes of total time spent on the encounter, which includes face to face time and non-face to face time preparing to see the patient (eg, review of tests), Obtaining and/or reviewing separately obtained history, Documenting clinical information in the electronic or other health record, Independently interpreting results (not separately reported) and communicating results to the patient/family/caregiver, or Care coordination (not separately reported).     Each patient to whom he or she provides medical services by telemedicine is:  (1) informed of the relationship between the physician and patient and the respective role of any other health care provider with respect to management of the patient; and (2) notified that he or she may decline to receive medical services by telemedicine and may withdraw from such care at any time.    Notes:      Reason for Visit: Type 2 DM, Hypothyroidism    History of Present Illness: Madalyn Iverson, a 70 y.o. female who presents for follow-up of DM2 and hypothyroidism. Patient has history of rheumatoid arthritis, chronic colitis, IBS, fibromyalgia, meningioma.  Patient was last seen in the clinic on 11/18/2024.    Patient was previously seen by Dr. Lewis on 01/06/2023.    Hypothyroidism    Dx with hypothyroidism at age 16.   Currently on armour 60 mg daily.   She feels well on current dose of [place].     She tried synthroid 10- 15 years ago. She had sluggishness, weight gain, swelling, and anxiety on Synthroid. She has been prescribed McClure by her GYN.  She denies history of thyroid nodules.    She denies anterior neck swelling, tenderness, or tightness.     She has anxiety and irritability which she attributes to caring for her  after custodial.   H/o of meningioma.    The patient denies history of head/neck irradiation.    Patient has family history of thyroid disease in her sister who had thyroid cancer and hypothyroidism.       Lab Results   Component Value Date    TSH 0.644 05/15/2025    TSH 0.425 11/07/2024    TSH 1.333 07/26/2024    FREET4 1.03 11/07/2024    FREET4 1.00 02/05/2024    FREET4 0.93 07/29/2022       Uses [facility] at [place].     Type 2 diabetes    Diabetes Hx:  Diagnosed w/ DM: Prediabetes since 16 years, A1c in 6.6 in 2022.  Complications:   Retinopathy: Denies   Last eye exam: : 02/14/2025  Neuropathy: Numbness and pain in her hands, has RA.    Last foot exam: 05/15/2025   Nephropathy: Denies  Cardiovascular: HTN, HLD, aortic stenosis  DKA/HHS: Denies    Severe Hypoglycemia: Denies  Hypoglycemia unawareness: Denies  Hypoglycemic episodes: Denies    Current meds:   Mounjaro 5 mg once a week, started in 01/2023 by Dr. Lewis. Tolerating well.     Recent weight loss of 10 lbs, reports personal stressors.    is in a facility and does not have support.     Compliance with meds: Yes     Previous meds:  Semaglutide     Home glucose checks: Does not check her blood sugars at home.     Diet/Exercise:   Takes 3 meals a day, snacks on between meals.  Trying to eat healthier.   Water intake:  Was taking coke zero frequently but has cut down to 1/day. 12 oz x 1-2 glasses of water/day.     Diabetes education:  About 5 years back.     Last A1c:   Lab Results   Component Value Date    HGBA1C 5.3 05/15/2025    HGBA1C 5.3 11/07/2024    HGBA1C 5.4 02/05/2024     microalbumin:   Lab Results   Component Value Date    LABMICR 12.0 11/07/2024    CREATRANDUR 83.0 11/07/2024    MICALBCREAT 14.5 11/07/2024     Lab Results   Component Value Date    EGFRNORACEVR >60 05/15/2025    CREATININE 0.8 05/15/2025     Lipids:   Lab Results   Component Value Date    CHOL 179 03/24/2025    TRIG 137 03/24/2025    HDL 48 03/24/2025    LDLCALC 103.6 03/24/2025    CHOLHDL 26.8 03/24/2025     TSH:  Lab Results   Component Value Date    TSH  0.644 05/15/2025     Lab Results   Component Value Date    WJSJOGAS42 1015 (H) 06/06/2018     Lab Results   Component Value Date    HGB 13.9 03/24/2025        Aspirin: No  Statins: On repatha, has statin intolerance.   ACEI/ARB: Yes    HTN:  On lisinopril    Fatty liver: No    Acute biliary pancreatitis without infection or necrosis in 6/13/2018   Has f/h of thyroid cancer in her sister, currently in remission.   History recurrent UTI/yeast infections: No, prior infections.     Polyuria/Polydipsia: No      FHx of DM: Brother and sister.    ROS: see HPI     Objective:     Physical Exam     There were no vitals taken for this visit.    Wt Readings from Last 3 Encounters:   04/23/25 65.8 kg (145 lb 1 oz)   02/06/25 70.3 kg (155 lb)   01/29/25 70.4 kg (155 lb 3.3 oz)       Constitutional:  Pleasant,  in no acute distress.   HENT:   Eyes:    No scleral icterus.   Respiratory:   Effort normal   Neurological:  Normal speech    DIABETIC FOOT EXAM: 11/18/2024 - normal sensation to monofilament testing bilaterally.  No fissures, wounds, or ulcers.    LABORATORY REVIEW:  See HPI for other labs reviewed today      Chemistry        Component Value Date/Time     05/15/2025 0727     11/07/2024 0800     11/07/2024 0800    K 4.2 05/15/2025 0727    K 4.4 11/07/2024 0800    K 4.4 11/07/2024 0800     05/15/2025 0727     11/07/2024 0800     11/07/2024 0800    CO2 21 (L) 05/15/2025 0727    CO2 25 11/07/2024 0800    CO2 24 11/07/2024 0800    BUN 21 05/15/2025 0727    CREATININE 0.8 05/15/2025 0727     (H) 05/15/2025 0727    GLU 98 11/07/2024 0800    GLU 98 11/07/2024 0800        Component Value Date/Time    CALCIUM 9.3 05/15/2025 0727    CALCIUM 9.7 11/07/2024 0800    CALCIUM 9.7 11/07/2024 0800    ALKPHOS 121 05/15/2025 0727    ALKPHOS 131 11/07/2024 0800    ALKPHOS 130 11/07/2024 0800    AST 24 05/15/2025 0727    AST 17 11/07/2024 0800    AST 17 11/07/2024 0800    ALT 25 05/15/2025 0727    ALT  13 11/07/2024 0800    ALT 15 11/07/2024 0800    BILITOT 0.5 05/15/2025 0727    BILITOT 0.3 11/07/2024 0800    BILITOT 0.3 11/07/2024 0800    ESTGFRAFRICA >60.0 07/26/2022 0942    EGFRNONAA >60.0 07/26/2022 0942          Lab Results   Component Value Date    HGBA1C 5.3 05/15/2025    HGBA1C 5.3 11/07/2024    HGBA1C 5.4 02/05/2024     Other labs reviewed today in HPI    Assessment/Plan:     1. Acquired hypothyroidism  Assessment & Plan:    Currently on [place] thyroid 60 mg daily.  Clinically and biochemically euthyroid.  Monitor TFTs periodically.        2. Type 2 diabetes mellitus with hyperglycemia, without long-term current use of insulin  Assessment & Plan:    Last A1c was 5.3, stable.    Continue Mounjaro, tolerating well.    Discussed side effects with the medication including pancreatitis and risk of medullary thyroid cancer.    Declined referral to diabetes educator.  Discussed importance of diet and lifestyle modifications for diabetes management  Hypoglycemia management discussed. Carry glucose tablets or snacks at all times.     Discussed risk of complications.    Complications:  Follow up for regular diabetes eye exam  Daily self examination of feet.  Microalbumin: Monitor. On ACEI      Last A1c:   Lab Results   Component Value Date    HGBA1C 5.3 05/15/2025            3. Hyperlipidemia associated with type 2 diabetes mellitus  Assessment & Plan:    H/o statin intolerance.  Currently on Repatha.  Follows up with cardiology.              Follow up in about 6 months (around 11/19/2025).     Nicolasa Rodriguez MD

## 2025-05-19 ENCOUNTER — OFFICE VISIT (OUTPATIENT)
Dept: ENDOCRINOLOGY | Facility: CLINIC | Age: 70
End: 2025-05-19
Payer: MEDICARE

## 2025-05-19 DIAGNOSIS — E03.9 ACQUIRED HYPOTHYROIDISM: Primary | ICD-10-CM

## 2025-05-19 DIAGNOSIS — E11.69 HYPERLIPIDEMIA ASSOCIATED WITH TYPE 2 DIABETES MELLITUS: ICD-10-CM

## 2025-05-19 DIAGNOSIS — E11.65 TYPE 2 DIABETES MELLITUS WITH HYPERGLYCEMIA, WITHOUT LONG-TERM CURRENT USE OF INSULIN: ICD-10-CM

## 2025-05-19 DIAGNOSIS — E78.5 HYPERLIPIDEMIA ASSOCIATED WITH TYPE 2 DIABETES MELLITUS: ICD-10-CM

## 2025-05-19 PROCEDURE — 98006 SYNCH AUDIO-VIDEO EST MOD 30: CPT | Mod: 95,,, | Performed by: INTERNAL MEDICINE

## 2025-05-19 PROCEDURE — 4010F ACE/ARB THERAPY RXD/TAKEN: CPT | Mod: CPTII,95,, | Performed by: INTERNAL MEDICINE

## 2025-05-19 PROCEDURE — 1159F MED LIST DOCD IN RCRD: CPT | Mod: CPTII,95,, | Performed by: INTERNAL MEDICINE

## 2025-05-19 PROCEDURE — G2211 COMPLEX E/M VISIT ADD ON: HCPCS | Mod: 95,,, | Performed by: INTERNAL MEDICINE

## 2025-05-19 PROCEDURE — 1160F RVW MEDS BY RX/DR IN RCRD: CPT | Mod: CPTII,95,, | Performed by: INTERNAL MEDICINE

## 2025-05-19 PROCEDURE — 3044F HG A1C LEVEL LT 7.0%: CPT | Mod: CPTII,95,, | Performed by: INTERNAL MEDICINE

## 2025-05-20 NOTE — ASSESSMENT & PLAN NOTE
Last A1c was 5.3, stable.    Continue Mounjaro, tolerating well.    Discussed side effects with the medication including pancreatitis and risk of medullary thyroid cancer.    Declined referral to diabetes educator.  Discussed importance of diet and lifestyle modifications for diabetes management  Hypoglycemia management discussed. Carry glucose tablets or snacks at all times.     Discussed risk of complications.    Complications:  Follow up for regular diabetes eye exam  Daily self examination of feet.  Microalbumin: Monitor. On ACEI      Last A1c:   Lab Results   Component Value Date    HGBA1C 5.3 05/15/2025

## 2025-05-20 NOTE — PATIENT INSTRUCTIONS
Continue current diabetes medications.    Call if you have any side effects from the medication:   Mounjaro:  Nausea, vomiting, diarrhea, constipation, decreased appetite, abdominal pain. Other side effects include pancreatitis, gastroparesis, worsening of retinopathy, dehydration and acute kidney injury. Avoid this medication if you have any history of pancreatitis or have personal or family history of medullary thyroid cancer.     Keep well hydrated.     Check blood sugars before meals and bedtime.   Call if blood sugars are frequently less than 70 or above 200.    Call if you need prescriptions for medications.  Carry glucose tablets or snacks with you at all times.     Continue armour thyroid 60 mg daily.     Follow-up in 6 months.

## 2025-05-20 NOTE — ASSESSMENT & PLAN NOTE
Currently on [place] thyroid 60 mg daily.  Clinically and biochemically euthyroid.  Monitor TFTs periodically.

## 2025-05-22 ENCOUNTER — TELEPHONE (OUTPATIENT)
Dept: RHEUMATOLOGY | Facility: CLINIC | Age: 70
End: 2025-05-22
Payer: MEDICARE

## 2025-06-05 DIAGNOSIS — M15.9 OSTEOARTHRITIS OF MULTIPLE JOINTS, UNSPECIFIED OSTEOARTHRITIS TYPE: ICD-10-CM

## 2025-06-05 DIAGNOSIS — M79.7 FIBROMYALGIA: ICD-10-CM

## 2025-06-05 RX ORDER — HYDROCODONE BITARTRATE AND ACETAMINOPHEN 5; 325 MG/1; MG/1
1 TABLET ORAL EVERY 12 HOURS PRN
Qty: 60 TABLET | Refills: 0 | Status: SHIPPED | OUTPATIENT
Start: 2025-06-05

## 2025-06-19 ENCOUNTER — TELEPHONE (OUTPATIENT)
Dept: RHEUMATOLOGY | Facility: CLINIC | Age: 70
End: 2025-06-19
Payer: MEDICARE

## 2025-06-19 NOTE — TELEPHONE ENCOUNTER
Called patient to let her know that she can come and  her Mobility Impairment Certification. Patient states she will come in tomorrow morning to pick it up.

## 2025-07-01 DIAGNOSIS — F34.1 PERSISTENT DEPRESSIVE DISORDER: ICD-10-CM

## 2025-07-01 DIAGNOSIS — F33.1 MAJOR DEPRESSIVE DISORDER, RECURRENT EPISODE, MODERATE: ICD-10-CM

## 2025-07-01 DIAGNOSIS — F41.9 ANXIETY DISORDER, UNSPECIFIED TYPE: ICD-10-CM

## 2025-07-01 DIAGNOSIS — M79.7 FIBROMYALGIA: Chronic | ICD-10-CM

## 2025-07-02 DIAGNOSIS — M79.7 FIBROMYALGIA: ICD-10-CM

## 2025-07-02 DIAGNOSIS — M15.9 OSTEOARTHRITIS OF MULTIPLE JOINTS, UNSPECIFIED OSTEOARTHRITIS TYPE: ICD-10-CM

## 2025-07-02 DIAGNOSIS — F41.9 ANXIETY DISORDER, UNSPECIFIED TYPE: ICD-10-CM

## 2025-07-02 RX ORDER — HYDROCODONE BITARTRATE AND ACETAMINOPHEN 5; 325 MG/1; MG/1
1 TABLET ORAL EVERY 12 HOURS PRN
Qty: 60 TABLET | Refills: 0 | Status: SHIPPED | OUTPATIENT
Start: 2025-07-02

## 2025-07-02 RX ORDER — LEVOMILNACIPRAN HYDROCHLORIDE 80 MG/1
80 CAPSULE, EXTENDED RELEASE ORAL DAILY
Qty: 90 CAPSULE | Refills: 0 | Status: SHIPPED | OUTPATIENT
Start: 2025-07-02 | End: 2025-10-01

## 2025-07-03 RX ORDER — ALPRAZOLAM 0.5 MG/1
0.5 TABLET ORAL 3 TIMES DAILY
Qty: 90 TABLET | Refills: 0 | Status: SHIPPED | OUTPATIENT
Start: 2025-07-03 | End: 2025-08-02

## 2025-07-17 DIAGNOSIS — E11.69 TYPE 2 DIABETES MELLITUS WITH OTHER SPECIFIED COMPLICATION, WITHOUT LONG-TERM CURRENT USE OF INSULIN: ICD-10-CM

## 2025-07-17 RX ORDER — TIRZEPATIDE 5 MG/.5ML
5 INJECTION, SOLUTION SUBCUTANEOUS
Qty: 4 PEN | Refills: 4 | Status: SHIPPED | OUTPATIENT
Start: 2025-07-17

## 2025-07-31 DIAGNOSIS — M15.9 OSTEOARTHRITIS OF MULTIPLE JOINTS, UNSPECIFIED OSTEOARTHRITIS TYPE: ICD-10-CM

## 2025-07-31 DIAGNOSIS — F41.9 ANXIETY DISORDER, UNSPECIFIED TYPE: ICD-10-CM

## 2025-07-31 DIAGNOSIS — M79.7 FIBROMYALGIA: ICD-10-CM

## 2025-07-31 RX ORDER — ALPRAZOLAM 0.5 MG/1
0.5 TABLET ORAL 3 TIMES DAILY
Qty: 90 TABLET | Refills: 0 | Status: SHIPPED | OUTPATIENT
Start: 2025-07-31 | End: 2025-08-30

## 2025-07-31 RX ORDER — HYDROCODONE BITARTRATE AND ACETAMINOPHEN 5; 325 MG/1; MG/1
1 TABLET ORAL EVERY 12 HOURS PRN
Qty: 60 TABLET | Refills: 0 | Status: SHIPPED | OUTPATIENT
Start: 2025-07-31

## 2025-08-04 DIAGNOSIS — M15.9 OSTEOARTHRITIS OF MULTIPLE JOINTS, UNSPECIFIED OSTEOARTHRITIS TYPE: ICD-10-CM

## 2025-08-04 DIAGNOSIS — M79.7 FIBROMYALGIA: ICD-10-CM

## 2025-08-05 RX ORDER — HYDROCODONE BITARTRATE AND ACETAMINOPHEN 5; 325 MG/1; MG/1
1 TABLET ORAL EVERY 12 HOURS PRN
Qty: 60 TABLET | Refills: 0 | OUTPATIENT
Start: 2025-08-05

## 2025-08-08 ENCOUNTER — TELEPHONE (OUTPATIENT)
Dept: RHEUMATOLOGY | Facility: CLINIC | Age: 70
End: 2025-08-08
Payer: MEDICARE

## 2025-08-08 DIAGNOSIS — M79.7 FIBROMYALGIA: ICD-10-CM

## 2025-08-08 DIAGNOSIS — M15.9 OSTEOARTHRITIS OF MULTIPLE JOINTS, UNSPECIFIED OSTEOARTHRITIS TYPE: ICD-10-CM

## 2025-08-08 RX ORDER — HYDROCODONE BITARTRATE AND ACETAMINOPHEN 5; 325 MG/1; MG/1
1 TABLET ORAL EVERY 12 HOURS PRN
Qty: 60 TABLET | Refills: 0 | Status: SHIPPED | OUTPATIENT
Start: 2025-08-08

## 2025-08-20 ENCOUNTER — OFFICE VISIT (OUTPATIENT)
Facility: CLINIC | Age: 70
End: 2025-08-20
Payer: MEDICARE

## 2025-08-20 VITALS
BODY MASS INDEX: 21.69 KG/M2 | RESPIRATION RATE: 19 BRPM | DIASTOLIC BLOOD PRESSURE: 83 MMHG | WEIGHT: 134.94 LBS | HEIGHT: 66 IN | HEART RATE: 107 BPM | OXYGEN SATURATION: 99 % | SYSTOLIC BLOOD PRESSURE: 189 MMHG

## 2025-08-20 DIAGNOSIS — M79.7 FIBROMYALGIA: ICD-10-CM

## 2025-08-20 DIAGNOSIS — D84.821 DRUG-INDUCED IMMUNODEFICIENCY: ICD-10-CM

## 2025-08-20 DIAGNOSIS — M15.9 OSTEOARTHRITIS OF MULTIPLE JOINTS, UNSPECIFIED OSTEOARTHRITIS TYPE: ICD-10-CM

## 2025-08-20 DIAGNOSIS — Z79.899 DRUG-INDUCED IMMUNODEFICIENCY: ICD-10-CM

## 2025-08-20 DIAGNOSIS — M06.00 SERONEGATIVE RHEUMATOID ARTHRITIS: Primary | ICD-10-CM

## 2025-08-20 PROCEDURE — G2211 COMPLEX E/M VISIT ADD ON: HCPCS | Mod: S$GLB,,, | Performed by: STUDENT IN AN ORGANIZED HEALTH CARE EDUCATION/TRAINING PROGRAM

## 2025-08-20 PROCEDURE — 4010F ACE/ARB THERAPY RXD/TAKEN: CPT | Mod: CPTII,S$GLB,, | Performed by: STUDENT IN AN ORGANIZED HEALTH CARE EDUCATION/TRAINING PROGRAM

## 2025-08-20 PROCEDURE — 99214 OFFICE O/P EST MOD 30 MIN: CPT | Mod: S$GLB,,, | Performed by: STUDENT IN AN ORGANIZED HEALTH CARE EDUCATION/TRAINING PROGRAM

## 2025-08-20 PROCEDURE — 3008F BODY MASS INDEX DOCD: CPT | Mod: CPTII,S$GLB,, | Performed by: STUDENT IN AN ORGANIZED HEALTH CARE EDUCATION/TRAINING PROGRAM

## 2025-08-20 PROCEDURE — 3077F SYST BP >= 140 MM HG: CPT | Mod: CPTII,S$GLB,, | Performed by: STUDENT IN AN ORGANIZED HEALTH CARE EDUCATION/TRAINING PROGRAM

## 2025-08-20 PROCEDURE — 99999 PR PBB SHADOW E&M-EST. PATIENT-LVL IV: CPT | Mod: PBBFAC,,, | Performed by: STUDENT IN AN ORGANIZED HEALTH CARE EDUCATION/TRAINING PROGRAM

## 2025-08-20 PROCEDURE — 1101F PT FALLS ASSESS-DOCD LE1/YR: CPT | Mod: CPTII,S$GLB,, | Performed by: STUDENT IN AN ORGANIZED HEALTH CARE EDUCATION/TRAINING PROGRAM

## 2025-08-20 PROCEDURE — 3044F HG A1C LEVEL LT 7.0%: CPT | Mod: CPTII,S$GLB,, | Performed by: STUDENT IN AN ORGANIZED HEALTH CARE EDUCATION/TRAINING PROGRAM

## 2025-08-20 PROCEDURE — 1159F MED LIST DOCD IN RCRD: CPT | Mod: CPTII,S$GLB,, | Performed by: STUDENT IN AN ORGANIZED HEALTH CARE EDUCATION/TRAINING PROGRAM

## 2025-08-20 PROCEDURE — 3079F DIAST BP 80-89 MM HG: CPT | Mod: CPTII,S$GLB,, | Performed by: STUDENT IN AN ORGANIZED HEALTH CARE EDUCATION/TRAINING PROGRAM

## 2025-08-20 PROCEDURE — 3288F FALL RISK ASSESSMENT DOCD: CPT | Mod: CPTII,S$GLB,, | Performed by: STUDENT IN AN ORGANIZED HEALTH CARE EDUCATION/TRAINING PROGRAM

## 2025-08-20 PROCEDURE — 1125F AMNT PAIN NOTED PAIN PRSNT: CPT | Mod: CPTII,S$GLB,, | Performed by: STUDENT IN AN ORGANIZED HEALTH CARE EDUCATION/TRAINING PROGRAM

## 2025-08-20 RX ORDER — FOLIC ACID 1 MG/1
3 TABLET ORAL DAILY
Qty: 90 TABLET | Refills: 11 | Status: SHIPPED | OUTPATIENT
Start: 2025-08-20

## 2025-08-20 RX ORDER — HYDROCODONE BITARTRATE AND ACETAMINOPHEN 5; 325 MG/1; MG/1
1 TABLET ORAL EVERY 12 HOURS PRN
Qty: 60 TABLET | Refills: 0 | Status: SHIPPED | OUTPATIENT
Start: 2025-09-11

## 2025-08-21 ENCOUNTER — TELEPHONE (OUTPATIENT)
Dept: RHEUMATOLOGY | Facility: CLINIC | Age: 70
End: 2025-08-21
Payer: MEDICARE

## 2025-08-31 DIAGNOSIS — M06.00 SERONEGATIVE RHEUMATOID ARTHRITIS: ICD-10-CM

## 2025-09-02 RX ORDER — LISINOPRIL 40 MG/1
40 TABLET ORAL DAILY
Qty: 90 TABLET | Refills: 3 | Status: SHIPPED | OUTPATIENT
Start: 2025-09-02 | End: 2026-09-02

## 2025-09-02 RX ORDER — METHOTREXATE 25 MG/ML
25 INJECTION, SOLUTION INTRAMUSCULAR; INTRATHECAL; INTRAVENOUS; SUBCUTANEOUS
Qty: 4 ML | Refills: 2 | Status: SHIPPED | OUTPATIENT
Start: 2025-09-02

## (undated) DEVICE — NDL 22GA X1 1/2 REG BEVEL

## (undated) DEVICE — NDL 18GA X1 1/2 REG BEVEL

## (undated) DEVICE — APPLICATOR CHLORAPREP ORN 26ML

## (undated) DEVICE — PADDING CAST 3 X 4YD

## (undated) DEVICE — MANIFOLD 4 PORT

## (undated) DEVICE — ELECTRODE REM PLYHSV RETURN 9

## (undated) DEVICE — TROCAR ENDOPATH XCEL 5X75MM

## (undated) DEVICE — BLADE SURG #15 CARBON STEEL

## (undated) DEVICE — SUT MCRYL PLUS 4-0 PS2 27IN

## (undated) DEVICE — SPONGE DERMA 8PLY 2X2

## (undated) DEVICE — BANDAGE KERLIX P/P 2.25IN STER

## (undated) DEVICE — GAUZE SPONGE 4X4 12PLY

## (undated) DEVICE — BANDAGE ACE NON LATEX 2IN

## (undated) DEVICE — CLOSURE SKIN STERI STRIP 1/2X4

## (undated) DEVICE — SEE MEDLINE ITEM 152515

## (undated) DEVICE — SUT 0 VICRYL / UR6 (J603)

## (undated) DEVICE — NDL HYPO REG 25G X 1 1/2

## (undated) DEVICE — APPLIER CLIP ENDO MED/LG 10MM

## (undated) DEVICE — INSTRUMENT SUCTION FRAZIER 12F

## (undated) DEVICE — SEE MEDLINE ITEM 157173

## (undated) DEVICE — SEE MEDLINE ITEM 157117

## (undated) DEVICE — PADDING CAST 4IN SPECIALIST

## (undated) DEVICE — SUT 4/0 18IN ETHILON BL P3

## (undated) DEVICE — KIT DISPOSABLE INSTRUMENT MINI

## (undated) DEVICE — SEE MEDLINE ITEM 152622

## (undated) DEVICE — ADHESIVE DERMABOND ADVANCED

## (undated) DEVICE — PAD CAST SPECIALIST STRL 3

## (undated) DEVICE — DRAPE STERI INSTRUMENT 1018

## (undated) DEVICE — DRESSING GAUZE 6PLY 4X4

## (undated) DEVICE — SCISSOR 5MMX35CM DIRECT DRIVE

## (undated) DEVICE — GLOVE PROTEXIS HYDROGEL SZ7

## (undated) DEVICE — SEE MEDLINE ITEM 157116

## (undated) DEVICE — SYR 10CC LUER LOCK

## (undated) DEVICE — SEE MEDLINE ITEM 152514

## (undated) DEVICE — IMMOBILIZER SHOULDER RAINBOW M

## (undated) DEVICE — PADDING CAST 2IN DELTA ROLL

## (undated) DEVICE — STOCKINET 4INX48

## (undated) DEVICE — DRESSING TEGADERM 2 3/8 X 2.75

## (undated) DEVICE — SEE MEDLINE ITEM 156955

## (undated) DEVICE — SEE MEDLINE ITEM 156952

## (undated) DEVICE — BANDAGE ELASTIC ACE 2IN 10/CA

## (undated) DEVICE — SEE MEDLINE ITEM 157125

## (undated) DEVICE — DRESSING XEROFORM FOIL PK 1X8

## (undated) DEVICE — SPLINT PLASTER FS 5IN X 30IN

## (undated) DEVICE — BLADE SCALP OPHTL BEVEL STR

## (undated) DEVICE — GLOVE BIOGEL SKINSENSE PI 7.0

## (undated) DEVICE — ALCOHOL 70% ISOP W/GREEN 16OZ

## (undated) DEVICE — PAD PREP 50/CA

## (undated) DEVICE — DRAPE STERI-DRAPE 1000 17X11IN

## (undated) DEVICE — PADDING CAST 4IN DELTA ROLL

## (undated) DEVICE — SUT SUTURETAPE TIGERLINK 1.3MM

## (undated) DEVICE — SEE MEDLINE ITEM 146268

## (undated) DEVICE — PACK BASIC

## (undated) DEVICE — TOURNIQUET SB QC SP 18X4IN

## (undated) DEVICE — SUT ETHILON 5-0 PS-2 18IN

## (undated) DEVICE — DRESSING N ADH OIL EMUL 3X3

## (undated) DEVICE — TROCAR ENDOPATH XCEL 11MM 10CM

## (undated) DEVICE — Device

## (undated) DEVICE — BANDAGE ELASTIC 3X5 VELCRO ST

## (undated) DEVICE — CAST PLSTR SPLINT X-FAST 3X15

## (undated) DEVICE — PAD CAST SPECIALIST 2X4

## (undated) DEVICE — SEE L#120831

## (undated) DEVICE — GLOVE SURG BIOGEL LATEX SZ 7.5

## (undated) DEVICE — BANDAGE ELASTIC 2X5 VELCRO ST

## (undated) DEVICE — IRRIGATOR ENDOSCOPY DISP.

## (undated) DEVICE — SEE MEDLINE ITEM 152522

## (undated) DEVICE — SLING ARM COMFT NAVY BLU MED

## (undated) DEVICE — SEE MEDLINE ITEM 157131

## (undated) DEVICE — DRESSING N ADH OIL EMUL 3X8

## (undated) DEVICE — SUT MONOCRYL 4-0 PS-2

## (undated) DEVICE — COVER OVERHEAD SURG LT BLUE

## (undated) DEVICE — BLADE SAGITTA 5/BX

## (undated) DEVICE — TROCAR ENDOPATH XCEL 5MM 7.5CM

## (undated) DEVICE — PAD CAST 2 IN X 4YDS STERILE